# Patient Record
Sex: MALE | Race: BLACK OR AFRICAN AMERICAN | NOT HISPANIC OR LATINO | URBAN - METROPOLITAN AREA
[De-identification: names, ages, dates, MRNs, and addresses within clinical notes are randomized per-mention and may not be internally consistent; named-entity substitution may affect disease eponyms.]

---

## 2017-07-02 ENCOUNTER — EMERGENCY (EMERGENCY)
Facility: HOSPITAL | Age: 57
LOS: 0 days | Discharge: HOME | End: 2017-07-02
Attending: EMERGENCY MEDICINE
Payer: COMMERCIAL

## 2017-07-02 VITALS
HEART RATE: 93 BPM | WEIGHT: 175.05 LBS | SYSTOLIC BLOOD PRESSURE: 151 MMHG | HEIGHT: 69 IN | DIASTOLIC BLOOD PRESSURE: 108 MMHG | TEMPERATURE: 99 F | RESPIRATION RATE: 24 BRPM | OXYGEN SATURATION: 94 %

## 2017-07-02 VITALS
SYSTOLIC BLOOD PRESSURE: 112 MMHG | HEART RATE: 83 BPM | DIASTOLIC BLOOD PRESSURE: 68 MMHG | RESPIRATION RATE: 21 BRPM | OXYGEN SATURATION: 96 % | TEMPERATURE: 98 F

## 2017-07-02 LAB
ALBUMIN SERPL ELPH-MCNC: 3.6 G/DL — SIGNIFICANT CHANGE UP (ref 3.3–5)
ALP SERPL-CCNC: 53 U/L — SIGNIFICANT CHANGE UP (ref 40–120)
ALT FLD-CCNC: 39 U/L — SIGNIFICANT CHANGE UP (ref 12–78)
ANION GAP SERPL CALC-SCNC: 10 MMOL/L — SIGNIFICANT CHANGE UP (ref 5–17)
AST SERPL-CCNC: 38 U/L — HIGH (ref 15–37)
BASOPHILS # BLD AUTO: 0.1 K/UL — SIGNIFICANT CHANGE UP (ref 0–0.2)
BASOPHILS NFR BLD AUTO: 2.1 % — HIGH (ref 0–2)
BILIRUB SERPL-MCNC: 0.2 MG/DL — SIGNIFICANT CHANGE UP (ref 0.2–1.2)
BUN SERPL-MCNC: 15 MG/DL — SIGNIFICANT CHANGE UP (ref 7–23)
CALCIUM SERPL-MCNC: 8.4 MG/DL — LOW (ref 8.5–10.1)
CHLORIDE SERPL-SCNC: 109 MMOL/L — HIGH (ref 96–108)
CK MB BLD-MCNC: 2.8 % — SIGNIFICANT CHANGE UP (ref 0–3.5)
CK MB CFR SERPL CALC: 28 NG/ML — HIGH (ref 0.5–3.6)
CK SERPL-CCNC: 1001 U/L — HIGH (ref 26–308)
CO2 SERPL-SCNC: 24 MMOL/L — SIGNIFICANT CHANGE UP (ref 22–31)
CREAT SERPL-MCNC: 0.94 MG/DL — SIGNIFICANT CHANGE UP (ref 0.5–1.3)
EOSINOPHIL # BLD AUTO: 0.1 K/UL — SIGNIFICANT CHANGE UP (ref 0–0.5)
EOSINOPHIL NFR BLD AUTO: 1.4 % — SIGNIFICANT CHANGE UP (ref 0–6)
ETHANOL SERPL-MCNC: 119 MG/DL — HIGH (ref 0–10)
GLUCOSE SERPL-MCNC: 122 MG/DL — HIGH (ref 70–99)
HCT VFR BLD CALC: 38.7 % — LOW (ref 39–50)
HGB BLD-MCNC: 13.1 G/DL — SIGNIFICANT CHANGE UP (ref 13–17)
LACTATE SERPL-SCNC: 2.3 MMOL/L — HIGH (ref 0.7–2)
LYMPHOCYTES # BLD AUTO: 1.3 K/UL — SIGNIFICANT CHANGE UP (ref 1–3.3)
LYMPHOCYTES # BLD AUTO: 31.4 % — SIGNIFICANT CHANGE UP (ref 13–44)
MAGNESIUM SERPL-MCNC: 3.1 MG/DL — HIGH (ref 1.6–2.6)
MCHC RBC-ENTMCNC: 29.5 PG — SIGNIFICANT CHANGE UP (ref 27–34)
MCHC RBC-ENTMCNC: 33.9 GM/DL — SIGNIFICANT CHANGE UP (ref 32–36)
MCV RBC AUTO: 87 FL — SIGNIFICANT CHANGE UP (ref 80–100)
MONOCYTES # BLD AUTO: 0.2 K/UL — SIGNIFICANT CHANGE UP (ref 0–0.9)
MONOCYTES NFR BLD AUTO: 5.5 % — SIGNIFICANT CHANGE UP (ref 2–14)
NEUTROPHILS # BLD AUTO: 2.6 K/UL — SIGNIFICANT CHANGE UP (ref 1.8–7.4)
NEUTROPHILS NFR BLD AUTO: 59.7 % — SIGNIFICANT CHANGE UP (ref 43–77)
PLATELET # BLD AUTO: 203 K/UL — SIGNIFICANT CHANGE UP (ref 150–400)
POTASSIUM SERPL-MCNC: 3.5 MMOL/L — SIGNIFICANT CHANGE UP (ref 3.5–5.3)
POTASSIUM SERPL-SCNC: 3.5 MMOL/L — SIGNIFICANT CHANGE UP (ref 3.5–5.3)
PROT SERPL-MCNC: 6.6 GM/DL — SIGNIFICANT CHANGE UP (ref 6–8.3)
RBC # BLD: 4.45 M/UL — SIGNIFICANT CHANGE UP (ref 4.2–5.8)
RBC # FLD: 14.4 % — SIGNIFICANT CHANGE UP (ref 11–15)
SODIUM SERPL-SCNC: 143 MMOL/L — SIGNIFICANT CHANGE UP (ref 135–145)
TROPONIN I SERPL-MCNC: 0.02 NG/ML — SIGNIFICANT CHANGE UP (ref 0.01–0.04)
WBC # BLD: 4.3 K/UL — SIGNIFICANT CHANGE UP (ref 3.8–10.5)
WBC # FLD AUTO: 4.3 K/UL — SIGNIFICANT CHANGE UP (ref 3.8–10.5)

## 2017-07-02 PROCEDURE — 71020: CPT | Mod: 26

## 2017-07-02 PROCEDURE — 99285 EMERGENCY DEPT VISIT HI MDM: CPT

## 2017-07-02 RX ORDER — RANIBIZUMAB 10 MG/ML
0 INJECTION, SOLUTION INTRAVITREAL
Qty: 0 | Refills: 0 | COMMUNITY

## 2017-07-02 RX ORDER — MAGNESIUM SULFATE 500 MG/ML
2 VIAL (ML) INJECTION ONCE
Qty: 0 | Refills: 0 | Status: COMPLETED | OUTPATIENT
Start: 2017-07-02 | End: 2017-07-02

## 2017-07-02 RX ORDER — IPRATROPIUM/ALBUTEROL SULFATE 18-103MCG
3 AEROSOL WITH ADAPTER (GRAM) INHALATION
Qty: 0 | Refills: 0 | Status: COMPLETED | OUTPATIENT
Start: 2017-07-02 | End: 2017-07-02

## 2017-07-02 RX ORDER — SODIUM CHLORIDE 9 MG/ML
1000 INJECTION INTRAMUSCULAR; INTRAVENOUS; SUBCUTANEOUS ONCE
Qty: 0 | Refills: 0 | Status: COMPLETED | OUTPATIENT
Start: 2017-07-02 | End: 2017-07-02

## 2017-07-02 RX ADMIN — Medication 50 GRAM(S): at 08:43

## 2017-07-02 RX ADMIN — Medication 3 MILLILITER(S): at 08:50

## 2017-07-02 RX ADMIN — SODIUM CHLORIDE 1000 MILLILITER(S): 9 INJECTION INTRAMUSCULAR; INTRAVENOUS; SUBCUTANEOUS at 08:46

## 2017-07-02 RX ADMIN — Medication 3 MILLILITER(S): at 08:46

## 2017-07-02 NOTE — ED ADULT NURSE NOTE - OBJECTIVE STATEMENT
Patient alert, stating that he has had shortness of breath x 4 hours. Brother called EMS and brought him to the ER.

## 2017-07-02 NOTE — ED ADULT TRIAGE NOTE - CHIEF COMPLAINT QUOTE
Shortness of breath x couples of hours ago ,h/o asthma , epi, solumedrol ivp and 1 treatment given by ems. wheezing, using accessories muscles

## 2017-07-02 NOTE — ED PROVIDER NOTE - OBJECTIVE STATEMENT
Pertinent PMH/PSH/FHx/SHx and Review of Systems contained within:  Patient presents to the ED for SOB with asthma exacerbation.  PMH of EtOH, asthma (on advair, never intubated), and chronic sinusitis.  VSS.  BIBA with solumedrol and nebs.  no other concerns.  Speaking in full sentences.  lying nearly supine without distress.  Non toxic.  Well appearing. No aggravating or relieving factors. No other pertinent PMH.  No other pertinent PSH.  No other pertinent FHx.  Patient denies EtOH/tobacco/illicit substance use. No fever/chills, No photophobia/eye pain/changes in vision, No ear pain/sore throat/dysphagia, No chest pain/palpitations, no cough/stridor, No abdominal pain, No N/V/D, no dysuria/frequency/discharge, No neck/back pain, no rash, no changes in neurological status/function.

## 2017-07-02 NOTE — ED PROVIDER NOTE - MEDICAL DECISION MAKING DETAILS
Patient presnet with asthma exacerbation.  Lab values do not require emergent intervention. Xrays demonstrate no acute pathology. Patient feeling well.  smiling.  family bedside.  wants to d/c.  will f/u.  Patient given prescription medications for their condition and advised to take them as prescribed and check with their Primary Care Provider if any questions arise. Discussed results and outcome of testing with the patient.  Patient advised to please follow up with their primary care doctor within the next 24 hours and return to the Emergency Department for worsening symptoms or any other concerns.  Patient advised that their doctor may call  to follow up on the specific results of the tests performed today in the emergency department.

## 2017-07-02 NOTE — ED PROVIDER NOTE - PHYSICAL EXAMINATION
Gen: Alert, NAD Head: NC, AT, PERRL, EOMI, normal lids/conjunctiva ENT: normal hearing, patent oropharynx without erythema/exudate, uvula midline Neck: +supple, no tenderness/meningismus/JVD, +Trachea midline Pulm: Bilateral BS, wheeze and crackles LLL but otherwise normal resp effort, no stridor/retractions CV: RRR, no M/R/G, +dist pulses Abd: soft, NT/ND, +BS, no hepatosplenomegaly Mskel: no edema/erythema/cyanosis Skin: no rash Neuro: AAOx3, no sensory/motor deficits, CN 2-12 intact

## 2017-07-03 DIAGNOSIS — Z91.010 ALLERGY TO PEANUTS: ICD-10-CM

## 2017-07-03 DIAGNOSIS — F10.10 ALCOHOL ABUSE, UNCOMPLICATED: ICD-10-CM

## 2017-07-03 DIAGNOSIS — J45.901 UNSPECIFIED ASTHMA WITH (ACUTE) EXACERBATION: ICD-10-CM

## 2020-12-03 NOTE — ED ADULT TRIAGE NOTE - BSA (M2)
FUNCTIONAL MOBILITY:  Assistive Device: Rolling Walker and O2 (6L)  Assist Level:  Contact Guard Assistance. Distance:   Completed functional mobility within unit hallway ~60ft x2 at slow pace, no LOB noted. Pt requires max cues for walker safety to keep within MITCH reaching destination- extended seated rest break after trial of mobility, mod fatigue noted- O2 and HR WNL throughout. ADDITIONAL ACTIVITIES:  Patient identified a personal goal to increase UB strength and improve overall endurance so they can complete their toilet & shower transfers; skilled edu on UE strengthening. Completed BUE exercises x10-15 reps x1 sets using mod resistance band in all joints/planes to increase strength and endurance required for ADLs. Pt required mod rest break between each exercise and min v/c for proper technique. ASSESSMENT:     Activity Tolerance:  Patient tolerance of  treatment: fair. Pt limited by fatigue. Discharge Recommendations: Subacute/Skilled Nursing Facility(if declines, recommend 24/7 assist with [facility] OT)   Equipment Recommendations: Other: continue to assess  Plan: Times per week: 5x  Current Treatment Recommendations: Strengthening, Patient/Caregiver Education & Training, Balance Training, Functional Mobility Training, Endurance Training, Safety Education & Training, Self-Care / ADL    Patient Education  Patient Education: Home Exercise Program, Importance of Increasing Activity, Assistive Device Safety and Safety with transfers and mobility.     Goals  Short term goals  Time Frame for Short term goals: by discharge  Short term goal 1: Pt to complete LB ADL tasks using LHAE with SBA to be indep wtih dressing tasks  Short term goal 2: Pt to complete toileting tasks and tranfser wit CGA and no vcs for safety  Short term goal 3: Pt to navigate to/from bathroom using RW with CGA and no increase in SOB or decrease in O2 sats during to complete ADL tasks  Short term goal 4: pt to increase standing tolerance > 3 min with CGA and 0-1 UE support to allow for completion of ADL tasks    Following session, patient left in safe position with all fall risk precautions in place. 1.95

## 2022-09-13 PROBLEM — J45.909 UNSPECIFIED ASTHMA, UNCOMPLICATED: Chronic | Status: ACTIVE | Noted: 2017-07-02

## 2022-09-14 PROBLEM — Z00.00 ENCOUNTER FOR PREVENTIVE HEALTH EXAMINATION: Status: ACTIVE | Noted: 2022-09-14

## 2022-09-20 ENCOUNTER — APPOINTMENT (OUTPATIENT)
Dept: ORTHOPEDIC SURGERY | Facility: CLINIC | Age: 62
End: 2022-09-20

## 2022-09-20 DIAGNOSIS — M51.36 OTHER INTERVERTEBRAL DISC DEGENERATION, LUMBAR REGION: ICD-10-CM

## 2022-09-20 DIAGNOSIS — J45.909 UNSPECIFIED ASTHMA, UNCOMPLICATED: ICD-10-CM

## 2022-09-20 DIAGNOSIS — M25.512 PAIN IN LEFT SHOULDER: ICD-10-CM

## 2022-09-20 PROCEDURE — 99204 OFFICE O/P NEW MOD 45 MIN: CPT

## 2022-09-20 PROCEDURE — 72170 X-RAY EXAM OF PELVIS: CPT

## 2022-09-20 PROCEDURE — 72110 X-RAY EXAM L-2 SPINE 4/>VWS: CPT

## 2022-09-20 PROCEDURE — 73030 X-RAY EXAM OF SHOULDER: CPT | Mod: LT

## 2022-09-20 RX ORDER — DICLOFENAC SODIUM AND MISOPROSTOL 75; 200 MG/1; UG/1
TABLET, FILM COATED ORAL
Refills: 0 | Status: ACTIVE | COMMUNITY

## 2022-09-20 RX ORDER — TIZANIDINE HYDROCHLORIDE 4 MG/1
4 CAPSULE ORAL
Refills: 0 | Status: ACTIVE | COMMUNITY

## 2022-09-20 NOTE — ASSESSMENT
[FreeTextEntry1] : Unclear clinical picture\par Severe pain and limited L shoulder ROM concerning for RTC tear, though also distal weakness not explained by RTC pathology alone\par Also has potentially abnormal reflexes\par Differential including plexitis vs concurrent cervical HNP and RTC tear, plus LE radic\par MRI C spine to eval for critical stenosis given severe LUE weakness\par L shoulder MRI to eval for concurrent confounding L RTC tear\par Lumbar MRI to eval for HNP\par EMG to help differentiate LUE symptoms, may also have underlying CuTS

## 2022-09-20 NOTE — IMAGING
[Outside films reviewed] : Outside films reviewed [Straightening consistent with spasm] : Straightening consistent with spasm [de-identified] : CSPINE\par Inspection: No rash or ecchymosis\par Palpation: No spasm in traps, rhomboids, paracervicals\par ROM: diminished all planes\par Strength: 5/5 R deltoid, biceps, triceps, wrist flexors, wrist extensors, , abductors\par LUE delt 3/5, bi 4/5, tri 3/5, WE 2/5, WF 1/5,  1/5, abductions 1/5\par Sensation: Sensation present to light touch bilateral C5-T1 distributions\par Reflexes: + Gilbert's bilaterally \par \par L Shoulder-\par Palpation: Lateral tenderness to palpation\par ROM: FF AROM 40, FF PROM 80 with pain and guarding\par Strength: severe pain with rotator cuff testing\par Provocative maneuvers: Positive impingement testing\par \par + Tinel's at L>R elbows\par \par LSPINE\par Palpation: No tenderness to palpation or spasm in bilateral thoracic and lumbar paraspinal musculature, no SI joint tenderness to palpation\par ROM: diminished all planeds\par Strength: 5/5 bilateral hip flexors, knee extensors, ankle dorsiflexors, EHL, ankle plantarflexors\par Sensation: Sensation present to light touch bilateral L2-S1 distributions\par Provocative maneuvers: + bilateral straight leg raise   [Facet arthropathy] : Facet arthropathy [Disc space narrowing] : Disc space narrowing [Left] : left shoulder [There are no fractures, subluxations or dislocations. No significant abnormalities are seen] : There are no fractures, subluxations or dislocations. No significant abnormalities are seen [FreeTextEntry1] : Congenitally short pedicles

## 2022-09-20 NOTE — HISTORY OF PRESENT ILLNESS
[Neck] : neck [Lower back] : lower back [Sudden] : sudden [10] : 10 [Dull/Aching] : dull/aching [Sharp] : sharp [Tightness] : tightness [Constant] : constant [de-identified] : 9/20/22- [facility]. Neck pain with burning pain localized to the L shoulder with 'electric' shocks down the BUE to the digits. Difficulty raising LUE as well and pain with ROM. Also LBP with pain radiating down the BLE to the toes with N/T. Started 2 weeks ago pt was pulling weeds and woke up the next day with pain down the arms and down the legs. No BB dysfunction. \par MDP, nsaids, and muscle relaxant from PCP w/o relief.  [] : no [FreeTextEntry6] : numbness  [FreeTextEntry7] : down the arms and legs

## 2022-10-12 ENCOUNTER — EMERGENCY (EMERGENCY)
Facility: HOSPITAL | Age: 62
LOS: 0 days | Discharge: HOME | End: 2022-10-12
Attending: EMERGENCY MEDICINE

## 2022-10-12 VITALS
TEMPERATURE: 98 F | HEART RATE: 81 BPM | SYSTOLIC BLOOD PRESSURE: 139 MMHG | RESPIRATION RATE: 15 BRPM | DIASTOLIC BLOOD PRESSURE: 88 MMHG | OXYGEN SATURATION: 96 %

## 2022-10-12 VITALS
DIASTOLIC BLOOD PRESSURE: 102 MMHG | WEIGHT: 184.09 LBS | HEART RATE: 94 BPM | SYSTOLIC BLOOD PRESSURE: 177 MMHG | TEMPERATURE: 98 F | HEIGHT: 69 IN | RESPIRATION RATE: 14 BRPM | OXYGEN SATURATION: 100 %

## 2022-10-12 DIAGNOSIS — M54.50 LOW BACK PAIN, UNSPECIFIED: ICD-10-CM

## 2022-10-12 DIAGNOSIS — R20.0 ANESTHESIA OF SKIN: ICD-10-CM

## 2022-10-12 DIAGNOSIS — Z91.010 ALLERGY TO PEANUTS: ICD-10-CM

## 2022-10-12 DIAGNOSIS — M79.604 PAIN IN RIGHT LEG: ICD-10-CM

## 2022-10-12 DIAGNOSIS — M54.6 PAIN IN THORACIC SPINE: ICD-10-CM

## 2022-10-12 DIAGNOSIS — M79.602 PAIN IN LEFT ARM: ICD-10-CM

## 2022-10-12 DIAGNOSIS — M79.89 OTHER SPECIFIED SOFT TISSUE DISORDERS: ICD-10-CM

## 2022-10-12 LAB
ALBUMIN SERPL ELPH-MCNC: 3.7 G/DL — SIGNIFICANT CHANGE UP (ref 3.3–5)
ALP SERPL-CCNC: 58 U/L — SIGNIFICANT CHANGE UP (ref 40–120)
ALT FLD-CCNC: 34 U/L — SIGNIFICANT CHANGE UP (ref 12–78)
ANION GAP SERPL CALC-SCNC: 7 MMOL/L — SIGNIFICANT CHANGE UP (ref 5–17)
AST SERPL-CCNC: 30 U/L — SIGNIFICANT CHANGE UP (ref 15–37)
BASOPHILS # BLD AUTO: 0.06 K/UL — SIGNIFICANT CHANGE UP (ref 0–0.2)
BASOPHILS NFR BLD AUTO: 1.3 % — SIGNIFICANT CHANGE UP (ref 0–2)
BILIRUB SERPL-MCNC: 0.4 MG/DL — SIGNIFICANT CHANGE UP (ref 0.2–1.2)
BUN SERPL-MCNC: 15 MG/DL — SIGNIFICANT CHANGE UP (ref 7–23)
CALCIUM SERPL-MCNC: 9.4 MG/DL — SIGNIFICANT CHANGE UP (ref 8.5–10.1)
CHLORIDE SERPL-SCNC: 105 MMOL/L — SIGNIFICANT CHANGE UP (ref 96–108)
CK SERPL-CCNC: 585 U/L — HIGH (ref 26–308)
CO2 SERPL-SCNC: 30 MMOL/L — SIGNIFICANT CHANGE UP (ref 22–31)
CREAT SERPL-MCNC: 1.08 MG/DL — SIGNIFICANT CHANGE UP (ref 0.5–1.3)
EGFR: 78 ML/MIN/1.73M2 — SIGNIFICANT CHANGE UP
EOSINOPHIL # BLD AUTO: 0.13 K/UL — SIGNIFICANT CHANGE UP (ref 0–0.5)
EOSINOPHIL NFR BLD AUTO: 2.8 % — SIGNIFICANT CHANGE UP (ref 0–6)
GLUCOSE SERPL-MCNC: 97 MG/DL — SIGNIFICANT CHANGE UP (ref 70–99)
HCT VFR BLD CALC: 41.9 % — SIGNIFICANT CHANGE UP (ref 39–50)
HGB BLD-MCNC: 13.5 G/DL — SIGNIFICANT CHANGE UP (ref 13–17)
IMM GRANULOCYTES NFR BLD AUTO: 0.2 % — SIGNIFICANT CHANGE UP (ref 0–0.9)
LYMPHOCYTES # BLD AUTO: 2.14 K/UL — SIGNIFICANT CHANGE UP (ref 1–3.3)
LYMPHOCYTES # BLD AUTO: 46.7 % — HIGH (ref 13–44)
MCHC RBC-ENTMCNC: 28.6 PG — SIGNIFICANT CHANGE UP (ref 27–34)
MCHC RBC-ENTMCNC: 32.2 G/DL — SIGNIFICANT CHANGE UP (ref 32–36)
MCV RBC AUTO: 88.8 FL — SIGNIFICANT CHANGE UP (ref 80–100)
MONOCYTES # BLD AUTO: 0.43 K/UL — SIGNIFICANT CHANGE UP (ref 0–0.9)
MONOCYTES NFR BLD AUTO: 9.4 % — SIGNIFICANT CHANGE UP (ref 2–14)
NEUTROPHILS # BLD AUTO: 1.81 K/UL — SIGNIFICANT CHANGE UP (ref 1.8–7.4)
NEUTROPHILS NFR BLD AUTO: 39.6 % — LOW (ref 43–77)
NRBC # BLD: 0 /100 WBCS — SIGNIFICANT CHANGE UP (ref 0–0)
PLATELET # BLD AUTO: 288 K/UL — SIGNIFICANT CHANGE UP (ref 150–400)
POTASSIUM SERPL-MCNC: 3.9 MMOL/L — SIGNIFICANT CHANGE UP (ref 3.5–5.3)
POTASSIUM SERPL-SCNC: 3.9 MMOL/L — SIGNIFICANT CHANGE UP (ref 3.5–5.3)
PROT SERPL-MCNC: 7.5 GM/DL — SIGNIFICANT CHANGE UP (ref 6–8.3)
RBC # BLD: 4.72 M/UL — SIGNIFICANT CHANGE UP (ref 4.2–5.8)
RBC # FLD: 14.4 % — SIGNIFICANT CHANGE UP (ref 10.3–14.5)
SODIUM SERPL-SCNC: 142 MMOL/L — SIGNIFICANT CHANGE UP (ref 135–145)
WBC # BLD: 4.58 K/UL — SIGNIFICANT CHANGE UP (ref 3.8–10.5)
WBC # FLD AUTO: 4.58 K/UL — SIGNIFICANT CHANGE UP (ref 3.8–10.5)

## 2022-10-12 PROCEDURE — 93010 ELECTROCARDIOGRAM REPORT: CPT

## 2022-10-12 PROCEDURE — 99284 EMERGENCY DEPT VISIT MOD MDM: CPT

## 2022-10-12 RX ORDER — METHOCARBAMOL 500 MG/1
2 TABLET, FILM COATED ORAL
Qty: 30 | Refills: 0
Start: 2022-10-12 | End: 2022-10-16

## 2022-10-12 RX ORDER — IBUPROFEN 200 MG
1 TABLET ORAL
Qty: 20 | Refills: 0
Start: 2022-10-12 | End: 2022-10-16

## 2022-10-12 RX ORDER — KETOROLAC TROMETHAMINE 30 MG/ML
30 SYRINGE (ML) INJECTION ONCE
Refills: 0 | Status: DISCONTINUED | OUTPATIENT
Start: 2022-10-12 | End: 2022-10-12

## 2022-10-12 RX ORDER — METHOCARBAMOL 500 MG/1
1500 TABLET, FILM COATED ORAL ONCE
Refills: 0 | Status: COMPLETED | OUTPATIENT
Start: 2022-10-12 | End: 2022-10-12

## 2022-10-12 RX ORDER — SODIUM CHLORIDE 9 MG/ML
1000 INJECTION INTRAMUSCULAR; INTRAVENOUS; SUBCUTANEOUS ONCE
Refills: 0 | Status: COMPLETED | OUTPATIENT
Start: 2022-10-12 | End: 2022-10-12

## 2022-10-12 RX ORDER — MIDAZOLAM HYDROCHLORIDE 1 MG/ML
2 INJECTION, SOLUTION INTRAMUSCULAR; INTRAVENOUS ONCE
Refills: 0 | Status: DISCONTINUED | OUTPATIENT
Start: 2022-10-12 | End: 2022-10-12

## 2022-10-12 RX ORDER — DEXAMETHASONE 0.5 MG/5ML
6 ELIXIR ORAL ONCE
Refills: 0 | Status: COMPLETED | OUTPATIENT
Start: 2022-10-12 | End: 2022-10-12

## 2022-10-12 RX ADMIN — METHOCARBAMOL 1500 MILLIGRAM(S): 500 TABLET, FILM COATED ORAL at 09:22

## 2022-10-12 RX ADMIN — Medication 6 MILLIGRAM(S): at 09:21

## 2022-10-12 RX ADMIN — SODIUM CHLORIDE 1000 MILLILITER(S): 9 INJECTION INTRAMUSCULAR; INTRAVENOUS; SUBCUTANEOUS at 11:11

## 2022-10-12 RX ADMIN — Medication 30 MILLIGRAM(S): at 09:22

## 2022-10-12 RX ADMIN — MIDAZOLAM HYDROCHLORIDE 2 MILLIGRAM(S): 1 INJECTION, SOLUTION INTRAMUSCULAR; INTRAVENOUS at 12:47

## 2022-10-12 NOTE — ED PROVIDER NOTE - PATIENT PORTAL LINK FT
You can access the FollowMyHealth Patient Portal offered by [facility] by registering at the following website: [email or web address]. By joining [facility]’s FollowMyHealth portal, you will also be able to view your health information using other applications (apps) compatible with our system.

## 2022-10-12 NOTE — ED PROVIDER NOTE - CLINICAL SUMMARY MEDICAL DECISION MAKING FREE TEXT BOX
62 year old man with 1 month of worsening sensory changes and muscle wasting in LUE; increase fasciculations of lower extremities and 2 weeks of severe pain.  Will give medications for pain, toradol, robaxin and decadron.  Patient has follow-up with Orthopedics and states primary care doctor in process of having patient see neurology.  Low suspicion for spinal abscess will get MRI to also r/o mass.

## 2022-10-12 NOTE — ED PROVIDER NOTE - NSFOLLOWUPINSTRUCTIONS_ED_ALL_ED_FT
1) Take prescription medication as instructed  2) Follow up with your primary care doctor  3) Return to the ER for worsening or concerning symptoms

## 2022-10-12 NOTE — ED ADULT NURSE NOTE - CHIEF COMPLAINT QUOTE
hx of nerve damage and asthma S/P fall reports RLE " became weak "  denies head injury and LOC. reports LUE numb and edematous x 1 month.  vaccinated x 3 w/ Pfizer

## 2022-10-12 NOTE — ED PROVIDER NOTE - OBJECTIVE STATEMENT
This patient is a 62 year old man who presents to the ER c/o numbness and swelling of his left arm for the past month and weakness of his right leg.  He reports that he was told that he has nerve damage by his doctors.  Over the month the symptoms have been getting worse and include sensory changes.  He states that his arm can get very cold to touch and sometimes the right leg may not be able to accurately feel hot temperatures.  He reports increasing fasiculations and spasms.  His brother who is at bedside reports the patient having frequent falls due to his weakness.  This morning patient reports that he had a spasm and weakness and fell which prompted him to finally be seen.  Patient requesting to have his MRIs done in the ER.  He is being seen by a spine specialist and has a prescription to have his MRI as outpatient but his soonest appointment is in 2 weeks.  No fever, no urinary retention, no fecal incontinence.    62 year old man with 1 month of worsening sensory changes and muscle wasting in LUE; increase fasciculations of lower extremities and 2 weeks of severe pain. This patient is a 62 year old man who presents to the ER c/o numbness and swelling of his left arm for the past month and weakness of his right leg.  He reports that he was told that he has nerve damage by his doctors.  Over the month the symptoms have been getting worse and include sensory changes.  He states that his arm can get very cold to touch and sometimes the right leg may not be able to accurately feel hot temperatures.  He reports increasing fasciculations and spasms.  His brother who is at bedside reports the patient having frequent falls due to his weakness.  This morning patient reports that he had a spasm and weakness and fell which prompted him to finally be seen.  Patient requesting to have his MRIs done in the ER.  He is being seen by a spine specialist and has a prescription to have his MRI as outpatient but his soonest appointment is in 2 weeks.  No fever, no urinary retention, no fecal incontinence.    62 year old man with 1 month of worsening sensory changes and muscle wasting in LUE; increase fasciculations of lower extremities and 2 weeks of severe pain.

## 2022-10-12 NOTE — ED ADULT TRIAGE NOTE - CHIEF COMPLAINT QUOTE
hx of nerve damage and asthma S/P fall reports RLE " began weak "  denies head injury and LOC. reports LUE numb and edematous x 1 month.  vaccinated x 3 w/ Pfizer hx of nerve damage and asthma S/P fall reports RLE " became weak "  denies head injury and LOC. reports LUE numb and edematous x 1 month.  vaccinated x 3 w/ Pfizer

## 2022-10-12 NOTE — ED ADULT TRIAGE NOTE - GLASGOW COMA SCALE: SCORE, MLM
Height (cm): 170.18 (06-29)  Weight (kg): 64.8 (06-29)  BMI (kg/m2): 22.4 (06-29)  IBW:  61.4 kg +/- 10%       % IBW: 105%      UBW: unknown     %UBW: unknown 15

## 2022-10-12 NOTE — ED ADULT NURSE REASSESSMENT NOTE - NS ED NURSE REASSESS COMMENT FT1
call received from MRI by Sherif. As per MRI technician, patient wants to be asleep for the MRI. Discussed concerns with Dr. Stewart. versed 2mg ordered for MRI. Patient refuses to be sedated and wants to be fully asleep. Discussed concerns with patient and educated. Patient will follow up outpatient for MRI as per Dr. Stewart.
Received patient from overnight RN found laying supine, head elevated, c/o LLE weakness x 1 month, with spinal pain.  Has been seen by PCP, advised needs MRI to further eval for nerve damage.  Remains in bed, awaiting MD eval.

## 2022-10-12 NOTE — ED ADULT NURSE NOTE - OBJECTIVE STATEMENT
multiple complains. pt reports trip & fall today. denies LOC or hitting head. hx of nerve damage and asthma. reports progressively worsening gait. reports LUE numbness and edematous x 1 month +cold/warm sensation in right leg and Left hand. pt scheduled for MRI sometime Nov but states claustrophobic and cannot wit long for that appointment.

## 2022-10-22 ENCOUNTER — INPATIENT (INPATIENT)
Facility: HOSPITAL | Age: 62
LOS: 1 days | Discharge: ANOTHER HOSPITAL | End: 2022-10-24
Attending: HOSPITALIST | Admitting: HOSPITALIST

## 2022-10-22 VITALS
DIASTOLIC BLOOD PRESSURE: 84 MMHG | RESPIRATION RATE: 17 BRPM | HEIGHT: 69 IN | WEIGHT: 184.09 LBS | TEMPERATURE: 99 F | SYSTOLIC BLOOD PRESSURE: 199 MMHG | HEART RATE: 119 BPM | OXYGEN SATURATION: 100 %

## 2022-10-22 PROCEDURE — 99285 EMERGENCY DEPT VISIT HI MDM: CPT

## 2022-10-23 ENCOUNTER — TRANSCRIPTION ENCOUNTER (OUTPATIENT)
Age: 62
End: 2022-10-23

## 2022-10-23 VITALS
RESPIRATION RATE: 16 BRPM | SYSTOLIC BLOOD PRESSURE: 126 MMHG | TEMPERATURE: 98 F | DIASTOLIC BLOOD PRESSURE: 73 MMHG | HEART RATE: 92 BPM | OXYGEN SATURATION: 97 %

## 2022-10-23 LAB
ALBUMIN SERPL ELPH-MCNC: 3.5 G/DL — SIGNIFICANT CHANGE UP (ref 3.3–5)
ALP SERPL-CCNC: 43 U/L — SIGNIFICANT CHANGE UP (ref 40–120)
ALT FLD-CCNC: 32 U/L — SIGNIFICANT CHANGE UP (ref 12–78)
ANION GAP SERPL CALC-SCNC: 6 MMOL/L — SIGNIFICANT CHANGE UP (ref 5–17)
APPEARANCE UR: CLEAR — SIGNIFICANT CHANGE UP
APTT BLD: 24.4 SEC — LOW (ref 27.5–35.5)
AST SERPL-CCNC: 31 U/L — SIGNIFICANT CHANGE UP (ref 15–37)
BASOPHILS # BLD AUTO: 0.04 K/UL — SIGNIFICANT CHANGE UP (ref 0–0.2)
BASOPHILS NFR BLD AUTO: 0.7 % — SIGNIFICANT CHANGE UP (ref 0–2)
BILIRUB SERPL-MCNC: 0.3 MG/DL — SIGNIFICANT CHANGE UP (ref 0.2–1.2)
BILIRUB UR-MCNC: NEGATIVE — SIGNIFICANT CHANGE UP
BUN SERPL-MCNC: 14 MG/DL — SIGNIFICANT CHANGE UP (ref 7–23)
CALCIUM SERPL-MCNC: 9 MG/DL — SIGNIFICANT CHANGE UP (ref 8.5–10.1)
CHLORIDE SERPL-SCNC: 109 MMOL/L — HIGH (ref 96–108)
CO2 SERPL-SCNC: 26 MMOL/L — SIGNIFICANT CHANGE UP (ref 22–31)
COLOR SPEC: YELLOW — SIGNIFICANT CHANGE UP
CREAT SERPL-MCNC: 1.06 MG/DL — SIGNIFICANT CHANGE UP (ref 0.5–1.3)
CRP SERPL-MCNC: <3 MG/L — SIGNIFICANT CHANGE UP
CRP SERPL-MCNC: <3 MG/L — SIGNIFICANT CHANGE UP
DIFF PNL FLD: NEGATIVE — SIGNIFICANT CHANGE UP
EGFR: 79 ML/MIN/1.73M2 — SIGNIFICANT CHANGE UP
EOSINOPHIL # BLD AUTO: 0.05 K/UL — SIGNIFICANT CHANGE UP (ref 0–0.5)
EOSINOPHIL NFR BLD AUTO: 0.9 % — SIGNIFICANT CHANGE UP (ref 0–6)
ERYTHROCYTE [SEDIMENTATION RATE] IN BLOOD: 3 MM/HR — SIGNIFICANT CHANGE UP (ref 0–20)
ERYTHROCYTE [SEDIMENTATION RATE] IN BLOOD: 5 MM/HR — SIGNIFICANT CHANGE UP (ref 0–20)
GLUCOSE SERPL-MCNC: 121 MG/DL — HIGH (ref 70–99)
GLUCOSE UR QL: NEGATIVE MG/DL — SIGNIFICANT CHANGE UP
HCT VFR BLD CALC: 37.8 % — LOW (ref 39–50)
HGB BLD-MCNC: 12.3 G/DL — LOW (ref 13–17)
IMM GRANULOCYTES NFR BLD AUTO: 0.4 % — SIGNIFICANT CHANGE UP (ref 0–0.9)
INR BLD: 0.99 RATIO — SIGNIFICANT CHANGE UP (ref 0.88–1.16)
KETONES UR-MCNC: NEGATIVE — SIGNIFICANT CHANGE UP
LEUKOCYTE ESTERASE UR-ACNC: NEGATIVE — SIGNIFICANT CHANGE UP
LYMPHOCYTES # BLD AUTO: 1.14 K/UL — SIGNIFICANT CHANGE UP (ref 1–3.3)
LYMPHOCYTES # BLD AUTO: 20.9 % — SIGNIFICANT CHANGE UP (ref 13–44)
MCHC RBC-ENTMCNC: 28.5 PG — SIGNIFICANT CHANGE UP (ref 27–34)
MCHC RBC-ENTMCNC: 32.5 G/DL — SIGNIFICANT CHANGE UP (ref 32–36)
MCV RBC AUTO: 87.7 FL — SIGNIFICANT CHANGE UP (ref 80–100)
MONOCYTES # BLD AUTO: 0.13 K/UL — SIGNIFICANT CHANGE UP (ref 0–0.9)
MONOCYTES NFR BLD AUTO: 2.4 % — SIGNIFICANT CHANGE UP (ref 2–14)
NEUTROPHILS # BLD AUTO: 4.07 K/UL — SIGNIFICANT CHANGE UP (ref 1.8–7.4)
NEUTROPHILS NFR BLD AUTO: 74.7 % — SIGNIFICANT CHANGE UP (ref 43–77)
NITRITE UR-MCNC: NEGATIVE — SIGNIFICANT CHANGE UP
NRBC # BLD: 0 /100 WBCS — SIGNIFICANT CHANGE UP (ref 0–0)
PH UR: 7 — SIGNIFICANT CHANGE UP (ref 5–8)
PLATELET # BLD AUTO: 280 K/UL — SIGNIFICANT CHANGE UP (ref 150–400)
POTASSIUM SERPL-MCNC: 4 MMOL/L — SIGNIFICANT CHANGE UP (ref 3.5–5.3)
POTASSIUM SERPL-SCNC: 4 MMOL/L — SIGNIFICANT CHANGE UP (ref 3.5–5.3)
PROT SERPL-MCNC: 7 GM/DL — SIGNIFICANT CHANGE UP (ref 6–8.3)
PROT UR-MCNC: NEGATIVE MG/DL — SIGNIFICANT CHANGE UP
PROTHROM AB SERPL-ACNC: 11.9 SEC — SIGNIFICANT CHANGE UP (ref 10.5–13.4)
RBC # BLD: 4.31 M/UL — SIGNIFICANT CHANGE UP (ref 4.2–5.8)
RBC # FLD: 14.6 % — HIGH (ref 10.3–14.5)
SODIUM SERPL-SCNC: 141 MMOL/L — SIGNIFICANT CHANGE UP (ref 135–145)
SP GR SPEC: 1.01 — SIGNIFICANT CHANGE UP (ref 1.01–1.02)
UROBILINOGEN FLD QL: NEGATIVE MG/DL — SIGNIFICANT CHANGE UP
WBC # BLD: 5.45 K/UL — SIGNIFICANT CHANGE UP (ref 3.8–10.5)
WBC # FLD AUTO: 5.45 K/UL — SIGNIFICANT CHANGE UP (ref 3.8–10.5)

## 2022-10-23 PROCEDURE — 71045 X-RAY EXAM CHEST 1 VIEW: CPT | Mod: 26

## 2022-10-23 PROCEDURE — 72156 MRI NECK SPINE W/O & W/DYE: CPT | Mod: 26

## 2022-10-23 PROCEDURE — 93010 ELECTROCARDIOGRAM REPORT: CPT

## 2022-10-23 PROCEDURE — 72128 CT CHEST SPINE W/O DYE: CPT | Mod: 26

## 2022-10-23 PROCEDURE — 70450 CT HEAD/BRAIN W/O DYE: CPT | Mod: 26,MA

## 2022-10-23 PROCEDURE — 72100 X-RAY EXAM L-S SPINE 2/3 VWS: CPT | Mod: 26

## 2022-10-23 PROCEDURE — 70498 CT ANGIOGRAPHY NECK: CPT | Mod: 26

## 2022-10-23 PROCEDURE — 99223 1ST HOSP IP/OBS HIGH 75: CPT

## 2022-10-23 PROCEDURE — 72157 MRI CHEST SPINE W/O & W/DYE: CPT | Mod: 26

## 2022-10-23 PROCEDURE — 93971 EXTREMITY STUDY: CPT | Mod: 26

## 2022-10-23 PROCEDURE — 72158 MRI LUMBAR SPINE W/O & W/DYE: CPT | Mod: 26

## 2022-10-23 PROCEDURE — 93010 ELECTROCARDIOGRAM REPORT: CPT | Mod: 77

## 2022-10-23 PROCEDURE — 72131 CT LUMBAR SPINE W/O DYE: CPT | Mod: 26,MA

## 2022-10-23 PROCEDURE — 72125 CT NECK SPINE W/O DYE: CPT | Mod: 26

## 2022-10-23 RX ORDER — ONDANSETRON 8 MG/1
4 TABLET, FILM COATED ORAL EVERY 8 HOURS
Refills: 0 | Status: DISCONTINUED | OUTPATIENT
Start: 2022-10-23 | End: 2022-10-24

## 2022-10-23 RX ORDER — DIAZEPAM 5 MG
2 TABLET ORAL ONCE
Refills: 0 | Status: DISCONTINUED | OUTPATIENT
Start: 2022-10-23 | End: 2022-10-23

## 2022-10-23 RX ORDER — LANOLIN ALCOHOL/MO/W.PET/CERES
3 CREAM (GRAM) TOPICAL AT BEDTIME
Refills: 0 | Status: DISCONTINUED | OUTPATIENT
Start: 2022-10-23 | End: 2022-10-24

## 2022-10-23 RX ORDER — MORPHINE SULFATE 50 MG/1
4 CAPSULE, EXTENDED RELEASE ORAL ONCE
Refills: 0 | Status: DISCONTINUED | OUTPATIENT
Start: 2022-10-23 | End: 2022-10-23

## 2022-10-23 RX ORDER — TAPENTADOL HYDROCHLORIDE 50 MG/1
0 TABLET, FILM COATED ORAL
Qty: 0 | Refills: 0 | DISCHARGE

## 2022-10-23 RX ORDER — DIAZEPAM 5 MG
5 TABLET ORAL ONCE
Refills: 0 | Status: DISCONTINUED | OUTPATIENT
Start: 2022-10-23 | End: 2022-10-23

## 2022-10-23 RX ORDER — DEXAMETHASONE 0.5 MG/5ML
10 ELIXIR ORAL ONCE
Refills: 0 | Status: COMPLETED | OUTPATIENT
Start: 2022-10-23 | End: 2022-10-23

## 2022-10-23 RX ORDER — LANOLIN ALCOHOL/MO/W.PET/CERES
1 CREAM (GRAM) TOPICAL
Qty: 0 | Refills: 0 | DISCHARGE
Start: 2022-10-23

## 2022-10-23 RX ORDER — ACETAMINOPHEN 500 MG
650 TABLET ORAL EVERY 6 HOURS
Refills: 0 | Status: DISCONTINUED | OUTPATIENT
Start: 2022-10-23 | End: 2022-10-24

## 2022-10-23 RX ORDER — KETOROLAC TROMETHAMINE 30 MG/ML
15 SYRINGE (ML) INJECTION ONCE
Refills: 0 | Status: DISCONTINUED | OUTPATIENT
Start: 2022-10-23 | End: 2022-10-23

## 2022-10-23 RX ORDER — ACETAMINOPHEN 500 MG
2 TABLET ORAL
Qty: 0 | Refills: 0 | DISCHARGE
Start: 2022-10-23

## 2022-10-23 RX ADMIN — Medication 15 MILLIGRAM(S): at 07:01

## 2022-10-23 RX ADMIN — Medication 5 MILLIGRAM(S): at 09:44

## 2022-10-23 RX ADMIN — Medication 2 MILLIGRAM(S): at 13:15

## 2022-10-23 RX ADMIN — MORPHINE SULFATE 4 MILLIGRAM(S): 50 CAPSULE, EXTENDED RELEASE ORAL at 05:08

## 2022-10-23 RX ADMIN — Medication 1 MILLIGRAM(S): at 10:57

## 2022-10-23 RX ADMIN — MORPHINE SULFATE 4 MILLIGRAM(S): 50 CAPSULE, EXTENDED RELEASE ORAL at 04:38

## 2022-10-23 RX ADMIN — Medication 10 MILLIGRAM(S): at 15:38

## 2022-10-23 RX ADMIN — Medication 5 MILLIGRAM(S): at 04:49

## 2022-10-23 NOTE — DISCHARGE NOTE PROVIDER - NSDCMRMEDTOKEN_GEN_ALL_CORE_FT
acetaminophen 325 mg oral tablet: 2 tab(s) orally every 6 hours, As needed, Temp greater or equal to 38C (100.4F), Mild Pain (1 - 3)  Advair Diskus:  inhaled   aluminum hydroxide-magnesium hydroxide 200 mg-200 mg/5 mL oral suspension: 30 milliliter(s) orally every 4 hours, As needed, Dyspepsia  melatonin 3 mg oral tablet: 1 tab(s) orally once a day (at bedtime), As needed, Insomnia

## 2022-10-23 NOTE — CONSULT NOTE ADULT - SUBJECTIVE AND OBJECTIVE BOX
Neurology consult    KEVEN [id number]     Patient is a 62y old  Male who presents with a chief complaint of Back pain (23 Oct 2022 09:33)      HPI:  "62 year old man who presents to the ER c/o numbness and swelling of his left arm for the past month and weakness of his right leg.  He reports that he was told that he has nerve damage by his doctors.  Over the month the symptoms have been getting worse and include sensory changes.  He states that his arm can get very cold to touch and sometimes the right leg may not be able to accurately feel hot temperatures.  He reports increasing fasciculations and spasms.  His brother who is at bedside reports the patient having frequent falls due to his weakness.  This morning patient reports that he had a spasm and weakness and fell which prompted him to finally be seen.  Patient requesting to have his MRIs done in the ER.  He is being seen by a spine specialist and has a prescription to have his MRI as outpatient but his soonest appointment is in 2 weeks.  No fever, no urinary retention, no fecal incontinence.    ER course:  VSS afebrile  CT Lumbar negative   (23 Oct 2022 09:27)"    Patient endorse few weaknes LUE and LLe> e weakness with leg spasms back pain    MEDICATIONS    acetaminophen     Tablet .. 650 milliGRAM(s) Oral every 6 hours PRN  aluminum hydroxide/magnesium hydroxide/simethicone Suspension 30 milliLiter(s) Oral every 4 hours PRN  LORazepam   Injectable 1 milliGRAM(s) IV Push once  melatonin 3 milliGRAM(s) Oral at bedtime PRN  ondansetron Injectable 4 milliGRAM(s) IV Push every 8 hours PRN      PMH: Asthma         PSH:     Family history: No history of dementia, strokes, or seizures   FAMILY HISTORY:      SOCIAL HISTORY:  No history of tobacco or alcohol use     Allergies    No Known Drug Allergies  peanuts (Unknown)    Intolerances        Height (cm): 175.3 (10-22 @ 22:09)  Weight (kg): 83.5 (10-22 @ 22:09)  BMI (kg/m2): 27.2 (10-22 @ 22:09)    Vital Signs Last 24 Hrs  T(C): 36.6 (23 Oct 2022 04:48), Max: 37.1 (22 Oct 2022 22:09)  T(F): 97.8 (23 Oct 2022 04:48), Max: 98.7 (22 Oct 2022 22:09)  HR: 92 (23 Oct 2022 04:48) (92 - 119)  BP: 126/73 (23 Oct 2022 04:48) (126/73 - 199/84)  BP(mean): --  RR: 16 (23 Oct 2022 04:48) (16 - 17)  SpO2: 97% (23 Oct 2022 04:48) (97% - 100%)    Parameters below as of 23 Oct 2022 04:48  Patient On (Oxygen Delivery Method): room air          On Neurological Examination:    Mental Status - Patient is alert, awake, oriented X3. fluent, names, no dysarthria no aphasia Follows commands well and able to answer questions appropriately. Mood and affect  normal    Cranial Nerves - PERRL, EOMI, VFF, V1-V3 intact, no gross facial asymmetry, tongue/uvula midline    Motor Exam -   Right upper 4+ to 5/5  Left upper deltoid 4-/5, bicps 3/5 rest 3/5  Right lower increased tone 4/5  Left lower increased tone 4/5 distal 3/5      Sensory    Intact to light touch and pinprick bilaterally    Coord: FTN intact bilaterally     Gait -  deferred    Reflexes:          2+ RUe 1+ LUE + left hoffmans  left pattealr 3+ righjt 2+ bilateral clonus in response to babinxky                                                          LABS:  CBC Full  -  ( 23 Oct 2022 04:03 )  WBC Count : 5.45 K/uL  RBC Count : 4.31 M/uL  Hemoglobin : 12.3 g/dL  Hematocrit : 37.8 %  Platelet Count - Automated : 280 K/uL  Mean Cell Volume : 87.7 fl  Mean Cell Hemoglobin : 28.5 pg  Mean Cell Hemoglobin Concentration : 32.5 g/dL  Auto Neutrophil # : 4.07 K/uL  Auto Lymphocyte # : 1.14 K/uL  Auto Monocyte # : 0.13 K/uL  Auto Eosinophil # : 0.05 K/uL  Auto Basophil # : 0.04 K/uL  Auto Neutrophil % : 74.7 %  Auto Lymphocyte % : 20.9 %  Auto Monocyte % : 2.4 %  Auto Eosinophil % : 0.9 %  Auto Basophil % : 0.7 %    Urinalysis Basic - ( 23 Oct 2022 06:29 )    Color: Yellow / Appearance: Clear / S.010 / pH: x  Gluc: x / Ketone: Negative  / Bili: Negative / Urobili: Negative mg/dL   Blood: x / Protein: Negative mg/dL / Nitrite: Negative   Leuk Esterase: Negative / RBC: x / WBC x   Sq Epi: x / Non Sq Epi: x / Bacteria: x      10-23    141  |  109<H>  |  14  ----------------------------<  121<H>  4.0   |  26  |  1.06    Ca    9.0      23 Oct 2022 04:03    TPro  7.0  /  Alb  3.5  /  TBili  0.3  /  DBili  x   /  AST  31  /  ALT  32  /  AlkPhos  43  10-23    LIVER FUNCTIONS - ( 23 Oct 2022 04:03 )  Alb: 3.5 g/dL / Pro: 7.0 gm/dL / ALK PHOS: 43 U/L / ALT: 32 U/L / AST: 31 U/L / GGT: x           Hemoglobin A1C:             RADIOLOGY  < from: CT Lumbar Spine No Cont (10.23.22 @ 02:29) >  Impression:  1. Multilevel degenerative disc disease, results in severe central   stenosis at L3-4 and L4-5.  2. No acute fracture.    --- End of Report ---      < end of copied text >  < from: CT Head No Cont (10.23.22 @ 02:30) >  IMPRESSION:  Mildly motion degraded.  No CT evidence of acute intracranial pathology.    Follow-up MRI may be obtained for more sensitive evaluation.    --- End of Report ---    < end of copied text >

## 2022-10-23 NOTE — ED PROVIDER NOTE - OBJECTIVE STATEMENT
Pertinent PMH/PSH/FHx/SHx and Review of Systems contained within:  Patient presents to the ED for back pain.  Patient has known back problems, has not been able to undergo MR imaging.  He reports numbness going down the left leg and infact says for 3 weeks he's been having numbness of both the left arm and leg which appear swollen, and says that he's been dragging the leg.  He complains of pain and spasms going down the right leg which he is moving.  He denies any right sided weakness or saddle numbness.  Chart review shows that patient had been seen on Oct 12 but no imaging done, hospital discharge course not clear.     Relevant PMHx/SHx/SOCHx/FAMH:  HTN, back pain  Patient denies EtOH/tobacco/illicit substance use. Pertinent PMH/PSH/FHx/SHx and Review of Systems contained within:  Patient presents to the ED for back pain.  Patient has known back problems, has not been able to undergo MR imaging.  He reports numbness going down the left leg and infact says for 3 weeks he's been having numbness of both the left arm and leg which appear swollen, and says that he's been dragging the leg.  He complains of pain and spasms going to the right abdomen and down the right leg which he is moving.  He denies any right sided weakness or saddle numbness.  Chart review shows that patient had been seen on Oct 12 but no imaging done, hospital discharge course not clear.     Relevant PMHx/SHx/SOCHx/FAMH:  HTN, back pain  Patient denies EtOH/tobacco/illicit substance use.    ROS: No fever/chills, No headache/photophobia/eye pain/changes in vision, No ear pain/sore throat/dysphagia, No chest pain/palpitations, no SOB/cough/wheeze/stridor, No N/V/D/melena, no dysuria/frequency/discharge, No neck pain, no rash, no changes in neurological status/function.

## 2022-10-23 NOTE — ED ADULT NURSE REASSESSMENT NOTE - NS ED NURSE REASSESS COMMENT FT1
Pt AAOx4. Pt sitting comfortably in bed with no complaints at this time. Respirations equal and unlabored. No acute distress noted at this time.

## 2022-10-23 NOTE — H&P ADULT - NSHPPHYSICALEXAM_GEN_ALL_CORE
Objective:    Vitals:  T(C): 36.6 (10-23-22 @ 04:48), Max: 37.1 (10-22-22 @ 22:09)  HR: 92 (10-23-22 @ 04:48) (92 - 119)  BP: 126/73 (10-23-22 @ 04:48) (126/73 - 199/84)  RR: 16 (10-23-22 @ 04:48) (16 - 17)  SpO2: 97% (10-23-22 @ 04:48) (97% - 100%)    Physical Exam:  General: comfortable, no acute distress, well nourished  HEENT: Atraumatic, no LAD, trachea midline, PERRLA  Cardiovascular: normal s1s2, no murmurs, gallops or fricition rubs  Pulmonary: clear to ausculation Bilaterally, no wheezing , rhonchi  Gastrointestinal: soft non tender non distended, no masses felt, no organomegally  Muscloskeletal: no lower extremity edema, intact bilateral lower extremity pulses  Neurological: CN II-12 intact. No focal weakness  Psychiatrical: normal mood, cooperative  SKIN: no rash, lesions or ulcers

## 2022-10-23 NOTE — DISCHARGE NOTE PROVIDER - NSDCCPCAREPLAN_GEN_ALL_CORE_FT
PRINCIPAL DISCHARGE DIAGNOSIS  Diagnosis: Cord compression  Assessment and Plan of Treatment:       SECONDARY DISCHARGE DIAGNOSES  Diagnosis: Weakness  Assessment and Plan of Treatment:

## 2022-10-23 NOTE — CONSULT NOTE ADULT - ASSESSMENT
61 yo male few weeks weaknessLUE LLe> RLe weaknes PE LUE weakness bilateral LE increased tone hyperreflexia clonus left andrade left babinski CT L spine degenerative changes as above    Impression r/o cervical cord compression. possible superimposed LS radic/stenosis    Urgent MRi c-spine (can also obtain MRI brain and L spine)/ Patient states he is claustrophobic and may need sedation. Can consider urgent CT Cervical spine while arrangements are made for MRIs  reassess after imaging  Communicated to primary team

## 2022-10-23 NOTE — H&P ADULT - ASSESSMENT
62 year old man who presents to the ER c/o numbness and swelling of his left arm for the past month and weakness of his right leg. Patient admitted to medicine for further evl      Lumbar Radiculopathy: check MRI, PT medrol dose pack

## 2022-10-23 NOTE — ED ADULT NURSE NOTE - OBJECTIVE STATEMENT
Pt AAOx4. 62M presents to ED with c/o constant muscle spasm to right leg, entire lumbar region, and lower abdomen x 3 weeks. Pt states about 3 weeks ago he was gardening and the next day he noted swelling to his left arm, and then spasms began. Swelling noted in left lower extremity, pt unable to bend left knee. Pt states he was seen about a week ago but was not able to get MRI due to claustrophobia. Respirations equal and unlabored. No acute distress noted at this time. Visible spasms noted to right leg. Pt able to speak in complete sentences. Pt was seen last week and given methocarbamol and ibuprofen, with little relief, but spasms return. Denies recent vaccinations, denies new medications, denies trauma.

## 2022-10-23 NOTE — H&P ADULT - HISTORY OF PRESENT ILLNESS
62 year old man who presents to the ER c/o numbness and swelling of his left arm for the past month and weakness of his right leg.  He reports that he was told that he has nerve damage by his doctors.  Over the month the symptoms have been getting worse and include sensory changes.  He states that his arm can get very cold to touch and sometimes the right leg may not be able to accurately feel hot temperatures.  He reports increasing fasciculations and spasms.  His brother who is at bedside reports the patient having frequent falls due to his weakness.  This morning patient reports that he had a spasm and weakness and fell which prompted him to finally be seen.  Patient requesting to have his MRIs done in the ER.  He is being seen by a spine specialist and has a prescription to have his MRI as outpatient but his soonest appointment is in 2 weeks.  No fever, no urinary retention, no fecal incontinence.    ER course:  VSS afebrile  CT Lumbar negative

## 2022-10-23 NOTE — DISCHARGE NOTE PROVIDER - HOSPITAL COURSE
62y Male presents with b/l UE radicular type symptoms with subjective numbness bilaterally, left wrist drop, inability to flex biceps, RLE radicular type symptoms to the knee, b/l LE muscle spasm/fasiculation as well as contracted quadriceps muscle on the left. Pt reports onset of symptoms 3 weeks ago after physical labor in his yard, including moving the grass. Pt saw Dr. Irwin and attempted to see another spine surgeon who was "not credentialed and unable to see patients. Patient was referred for Lumbar MRI, L shoulder MRI and R Hip MRI. However due to extreme claustrophobia pt is unable to undergo MRI. MR with sedation was schedule for november. Due to noted decline in symptoms, MRI was moved up to 11/3, however patient is unable to bear the symptoms any longer. Patient was seen in the ED 10/12 for similar events. Was unable to tolerate MR at that visit. Patient's symptomatology has been reportedly unchanged for approximately 2 weeks. This includes the subjective numbness bilaterally, left wrist drop, inability to flex biceps, RLE radicular type symptoms to the knee, b/l LE muscle spasm/fasiculations as well as contracted quadriceps muscle on the left. Three days prior patient reports saddle anesthesia and left foot drop, both which spontaneously resolved. Pt denies hx urinary/bowel incontinence. At baseline pt ambulates independently.     MRI C spine shows C 3 cord compression and patient being transferred to [facility] for ortho spine / further management.

## 2022-10-23 NOTE — ED ADULT NURSE NOTE - ED STAT RN HANDOFF DETAILS 2
Report given to Rishabh Lawson on [facility] Tower at [facility]/ Pt Kaitlin on RA. All belongings and medical records sent with patient.

## 2022-10-23 NOTE — H&P ADULT - NSHPLABSRESULTS_GEN_ALL_CORE
Labs:                          12.3   5.45  )-----------( 280      ( 23 Oct 2022 04:03 )             37.8     10-23    141  |  109<H>  |  14  ----------------------------<  121<H>  4.0   |  26  |  1.06    Ca    9.0      23 Oct 2022 04:03    TPro  7.0  /  Alb  3.5  /  TBili  0.3  /  DBili  x   /  AST  31  /  ALT  32  /  AlkPhos  43  10-23    LIVER FUNCTIONS - ( 23 Oct 2022 04:03 )  Alb: 3.5 g/dL / Pro: 7.0 gm/dL / ALK PHOS: 43 U/L / ALT: 32 U/L / AST: 31 U/L / GGT: x                 Active Medications  MEDICATIONS  (STANDING):    MEDICATIONS  (PRN):  acetaminophen     Tablet .. 650 milliGRAM(s) Oral every 6 hours PRN Temp greater or equal to 38C (100.4F), Mild Pain (1 - 3)  aluminum hydroxide/magnesium hydroxide/simethicone Suspension 30 milliLiter(s) Oral every 4 hours PRN Dyspepsia  melatonin 3 milliGRAM(s) Oral at bedtime PRN Insomnia  ondansetron Injectable 4 milliGRAM(s) IV Push every 8 hours PRN Nausea and/or Vomiting

## 2022-10-23 NOTE — CONSULT NOTE ADULT - SUBJECTIVE AND OBJECTIVE BOX
62y Male presents with new onset left wrist drop and right lower leg radiculopathy. Pt reports onset of symptoms 3 weeks ago after physical labor in his yard. Pt was to undergo MRI Nov 3 via Dr. Irwin however pain in his RLE became severe and now presents to the ED. Pt endorses saddle anesthesia for a period of 2 days, now resolved. Pt denies urinary/bowel incontinence. Pt endorses RLE radicular pain, numbness & tingling, along with calf spasms. In LUE pt reports wrist drop with onset of 3 weeks. Pt reported "left foot was dragging" consistent with foot drop for a period of 2 days which resolved. At baseline pt ambulates independently.     PAST MEDICAL & SURGICAL HISTORY:  Asthma        Home Medications:  Advair Diskus:  inhaled  (2017 08:30)  Nucynta:  orally  (2017 08:30)    Allergies    No Known Drug Allergies  peanuts (Unknown)    Intolerances                              12.3   5.45  )-----------( 280      ( 23 Oct 2022 04:03 )             37.8     10    141  |  109<H>  |  14  ----------------------------<  121<H>  4.0   |  26  |  1.06    Ca    9.0      23 Oct 2022 04:03    TPro  7.0  /  Alb  3.5  /  TBili  0.3  /  DBili  x   /  AST  31  /  ALT  32  /  AlkPhos  43  10-23      Urinalysis Basic - ( 23 Oct 2022 06:29 )    Color: Yellow / Appearance: Clear / S.010 / pH: x  Gluc: x / Ketone: Negative  / Bili: Negative / Urobili: Negative mg/dL   Blood: x / Protein: Negative mg/dL / Nitrite: Negative   Leuk Esterase: Negative / RBC: x / WBC x   Sq Epi: x / Non Sq Epi: x / Bacteria: x          Vital Signs Last 24 Hrs  T(C): 36.6 (23 Oct 2022 04:48), Max: 37.1 (22 Oct 2022 22:09)  T(F): 97.8 (23 Oct 2022 04:48), Max: 98.7 (22 Oct 2022 22:09)  HR: 92 (23 Oct 2022 04:48) (92 - 119)  BP: 126/73 (23 Oct 2022 04:48) (126/73 - 199/84)  BP(mean): --  RR: 16 (23 Oct 2022 04:48) (16 - 17)  SpO2: 97% (23 Oct 2022 04:48) (97% - 100%)    Parameters below as of 23 Oct 2022 04:48  Patient On (Oxygen Delivery Method): room air        PHYSICAL EXAM  General: NAD, Awake and Alert    Exam:    General: NAD    Spine:    No palpable step off. Nontender to palpation.   LAURA: 4 corners SILT, +passive tone, +active tone  Motor:                   C5                C6              C7               C8           T1   R            5/5                5/5            5/5             5/5          5/5  L             2/5               0/5             2/5             4/5          3/5                L2             L3             L4               L5            S1  R         5/5           5/5          5/5             5/5           5/5  L          3/5          NT/5        3/5             4/5           4/5  NT = not testable  Sensory:            C5         C6         C7      C8       T1        (0=absent, 1=impaired, 2=normal, NT=not testable)  R         2            2           2        2         2  L          1            1           1        1         1               L2          L3         L4      L5       S1         (0=absent, 1=impaired, 2=normal, NT=not testable)  R         2            2            2        2        2  L          2            2           2        2         2    UMNs:    Babinski (-) B/L  Clonus (-) B/L  Gilbert (-) B/L      IMAGING:  CT : L3-4, L4-5 severe canal stenosis for which pt could be at risk for cauda equina syndrome    Assessment/Plan:  62y Male with new onset L wrist drop, RLE radiculopathy     -Suspicion for acute cervical myelopathy, cauda equina warrants emergent MRI, however pt reports inability to tolerate MRI without sedation  -If pt unable to tolerate emergent MRI with light sedation, then pt to be transferred for emergent MRI under full sedation  -Neurology recs appreciated, consider degenerative spinal tract dz, ALS  -Pain control as needed  -DVT ppx   -WBAT   -Will discuss with attending, and advise if plan changes   62y Male presents with new onset left wrist drop and right lower leg radiculopathy. Pt reports onset of symptoms 3 weeks ago after physical labor in his yard. Pt was to undergo MRI Nov 3 via Dr. Irwin, however pain in his RLE became severe and now pt presents to the ED.  Pt endorses RLE radicular pain, numbness & tingling, along with calf spasms. Pt also reports left wrist drop with similar onset of 3 weeks. Pt also briefly had a left foot drop which resolved after 2 days. Pt reports b/l UE radicular pain to shoulders along with finger numbness, tingling. Pt denies urinary/bowel incontinence. At baseline pt ambulates independently.     PAST MEDICAL & SURGICAL HISTORY:  Asthma        Home Medications:  Advair Diskus:  inhaled  (2017 08:30)  Nucynta:  orally  (2017 08:30)    Allergies    No Known Drug Allergies  peanuts (Unknown)    Intolerances                              12.3   5.45  )-----------( 280      ( 23 Oct 2022 04:03 )             37.8     10-    141  |  109<H>  |  14  ----------------------------<  121<H>  4.0   |  26  |  1.06    Ca    9.0      23 Oct 2022 04:03    TPro  7.0  /  Alb  3.5  /  TBili  0.3  /  DBili  x   /  AST  31  /  ALT  32  /  AlkPhos  43  10-23      Urinalysis Basic - ( 23 Oct 2022 06:29 )    Color: Yellow / Appearance: Clear / S.010 / pH: x  Gluc: x / Ketone: Negative  / Bili: Negative / Urobili: Negative mg/dL   Blood: x / Protein: Negative mg/dL / Nitrite: Negative   Leuk Esterase: Negative / RBC: x / WBC x   Sq Epi: x / Non Sq Epi: x / Bacteria: x          Vital Signs Last 24 Hrs  T(C): 36.6 (23 Oct 2022 04:48), Max: 37.1 (22 Oct 2022 22:09)  T(F): 97.8 (23 Oct 2022 04:48), Max: 98.7 (22 Oct 2022 22:09)  HR: 92 (23 Oct 2022 04:48) (92 - 119)  BP: 126/73 (23 Oct 2022 04:48) (126/73 - 199/84)  BP(mean): --  RR: 16 (23 Oct 2022 04:48) (16 - 17)  SpO2: 97% (23 Oct 2022 04:48) (97% - 100%)    Parameters below as of 23 Oct 2022 04:48  Patient On (Oxygen Delivery Method): room air        PHYSICAL EXAM  General: NAD, Awake and Alert    Exam:    General: NAD    Spine:    No palpable step off. Nontender to palpation.   LAURA: 4 corners SILT, +passive tone, +active tone  Motor:                   C5                C6              C7               C8           T1   R            5/5                5/5            5/5             5/5          5/5  L             2/5               0/5             2/5             4/5          3/5                L2             L3             L4               L5            S1  R         5/5           5/5          5/5             5/5           5/5  L          3/5          NT/5        3/5             4/5           4/5  NT = not testable  Sensory:            C5         C6         C7      C8       T1        (0=absent, 1=impaired, 2=normal, NT=not testable)  R         2            2           2        2         2  L          1            1           1        1         1               L2          L3         L4      L5       S1         (0=absent, 1=impaired, 2=normal, NT=not testable)  R         2            2            2        2        2  L          2            2           2        2         2    UMNs:    Babinski (-) B/L  Clonus (-) B/L  Gilbert (-) B/L      IMAGING:  CT : L3-4, L4-5 severe canal stenosis for which pt could be at risk for cauda equina syndrome    Assessment/Plan:  62y Male with new onset L wrist drop, RLE radiculopathy     -Suspicion for acute cervical myelopathy, cauda equina warrants emergent MRI, however pt reports inability to tolerate MRI without sedation  -If pt unable to tolerate emergent MRI with light sedation, then pt to be transferred for emergent MRI under full sedation  -Neurology recs appreciated, consider degenerative spinal tract dz (ALS)  - FU Lyme panel, pt reports extensive history of outdoor activities  -Pain control as needed  -DVT ppx   -WBAT   -Will discuss with attending, and advise if plan changes   62y Male presents with b/l UE radicular type symptoms with subjective numbness bilaterally, left wrist drop, inability to flex biceps, RLE radicular type symptoms to the knee, b/l LE muscle spasm/fasiculation as well as contracted quadriceps muscle on the left.     Pt reports onset of symptoms 3 weeks ago after physical labor in his yard, including moving the grass. Pt saw Dr. Irwin and attempted to see another spine surgeon who was "not credentialed and unable to see patients. Patient was referred for Lumbar MRI, L shoulder MRI and R Hip MRI. However due to extreme claustrophobia pt is unable to undergo MRI. MR with sedation was schedule for november. Due to noted decline in symptoms, MRI was moved up to 11/3, however patient is unable to bear the symptoms any longer. Patient was seen in the ED 10/12 for similar events. Was unable to tolerate MR at that visit.     Patient's symptomatology has been reportedly unchanged for approximately 2 weeks. This includes the subjective numbness bilaterally, left wrist drop, inability to flex biceps, RLE radicular type symptoms to the knee, b/l LE muscle spasm/fasiculations as well as contracted quadriceps muscle on the left. Three days prior patient reports saddle anesthesia and left foot drop, both which spontaneously resolved. The     Of note, Pt denies hx urinary/bowel incontinence. At baseline pt ambulates independently.     PAST MEDICAL & SURGICAL HISTORY:  Asthma        Home Medications:  Advair Diskus:  inhaled  (2017 08:30)  Nucynta:  orally  (2017 08:30)    Allergies    No Known Drug Allergies  peanuts (Unknown)    Intolerances                              12.3   5.45  )-----------( 280      ( 23 Oct 2022 04:03 )             37.8     10    141  |  109<H>  |  14  ----------------------------<  121<H>  4.0   |  26  |  1.06    Ca    9.0      23 Oct 2022 04:03    TPro  7.0  /  Alb  3.5  /  TBili  0.3  /  DBili  x   /  AST  31  /  ALT  32  /  AlkPhos  43  10      Urinalysis Basic - ( 23 Oct 2022 06:29 )    Color: Yellow / Appearance: Clear / S.010 / pH: x  Gluc: x / Ketone: Negative  / Bili: Negative / Urobili: Negative mg/dL   Blood: x / Protein: Negative mg/dL / Nitrite: Negative   Leuk Esterase: Negative / RBC: x / WBC x   Sq Epi: x / Non Sq Epi: x / Bacteria: x          Vital Signs Last 24 Hrs  T(C): 36.6 (23 Oct 2022 04:48), Max: 37.1 (22 Oct 2022 22:09)  T(F): 97.8 (23 Oct 2022 04:48), Max: 98.7 (22 Oct 2022 22:09)  HR: 92 (23 Oct 2022 04:48) (92 - 119)  BP: 126/73 (23 Oct 2022 04:48) (126/73 - 199/84)  BP(mean): --  RR: 16 (23 Oct 2022 04:48) (16 - 17)  SpO2: 97% (23 Oct 2022 04:48) (97% - 100%)    Parameters below as of 23 Oct 2022 04:48  Patient On (Oxygen Delivery Method): room air        PHYSICAL EXAM  General: NAD, Awake and Alert    Exam:    General: NAD    SPINE:  NTTP over the bony prominences of the cervical / thoracic / lumbar / sacral spine  + Paraspinal TTP of the cervical / lumbar spine  No paraspinal TTP of the thoracic   No bony step-offs  Grossly moving all extremities  + Radial Pulse  + DP/PT Pulses  No saddle anesthesia at present  + rectal tone      MOTOR EXAM:                          Elbow Flex (C5)     Wrist Ext (C6)     Elbow Ext (C7)      Finger Flex (C8)    Finger Abduction (T1)  RIGHT                 5/5                         5/5                         5/5                          5/5                              5/5  LEFT                    2/5                         0/5                         2/5                          4/5                              3/5                           Hip Flex (L2)      Knee Ext (L3)      Ank Dorsiflex (L4)     Hallux Ext (L5)     Ank PlantarFlex (S1)  RIGHT               5/5                      5/5                          5/5                            5/5                           5/5  LEFT                  3/5                     NT                          3/5                            4/5                           4/5      SENSORY EXAM:                        C5      C6      C7      C8       T1          RIGHT          2         2        2         2         2          (0=absent, 1=impaired, 2=normal, NT=not testable)  LEFT             1         1        1         1         1          (0=absent, 1=impaired, 2=normal, NT=not testable)                        L2        L3       L4      L5       S1          RIGHT        2          2         2        2        2           (0=absent, 1=impaired, 2=normal, NT=not testable)  LEFT           2          2         2        2        2           (0=absent, 1=impaired, 2=normal, NT=not testable)    Negative Gilbert's sign bilaterally  Negative Babinski bilaterally   Negative Myoclonus RLE  4 beats myoclonus LLE  Negative  and Release Test R  Unable to complete  and release on the L            IMAGING:  CT : L3-4, L4-5 severe canal stenosis for which pt could be at risk for cauda equina syndrome    Assessment/Plan:  62y Male with symptoms of cord compression vs central nervious system type pathology, B/l UE radiculopathy, RLE Radiculopathy, Lumbago, L3-4, L4-5 Central Stenosis     -Suspicion for acute cervical myelopathy, cauda equina warrants emergent MRI, however pt reports inability to tolerate MRI without procedural sedation  -Will attempt to perform MRI C/T/LSp w/ w/o   w/ valium  -FU XR CSp, LSp  -FU CT CSp  -CT LSp reviewed  -If pt unable to tolerate emergent MRI with light sedation, then pt to be transferred for emergent MRI under procedural sedation  -FU Neurology c/s, would consider degenerative spinal tract dz (ALS)  -FU Lyme Titers, pt reports extensive history of outdoor activities  -Pain control as needed  -Muscle relaxers  -Medical managment appreciated   -Hold DVT ppx   -Will keep NPO until MRI reviewed   -Preop labs  -CXR/EKG  -WBAT   -Will discuss with attending, and advise if plan changes

## 2022-10-23 NOTE — CONSULT NOTE ADULT - CONSULT REASON
L spine radic, L wrist drop Cord compression w/ neurological symptoms: left wrist drop; hx of L foot drop and saddle anesthesia

## 2022-10-23 NOTE — ED ADULT NURSE NOTE - ED STAT RN HANDOFF DETAILS
Report endorsed to oncoming RN, Lorena Meraz. Safety checks completed this shift. Safety rounds completed hourly.  IV sites checked Q2+remains WDL. Medications administered as ordered with no s/s of adverse rxns. Fall & skin precautions in place. Any issues endorsed to oncoming RN for follow up.

## 2022-10-23 NOTE — ED PROVIDER NOTE - PHYSICAL EXAMINATION
Gen: Alert, distressed, well appearing  Head: NC, AT, EOMI, normal lids/conjunctiva  ENT: normal hearing, patent oropharynx without erythema/exudate, uvula midline  Neck: +supple, no tenderness, +Trachea midline  Pulm: Bilateral BS, normal resp effort, no wheeze/stridor/retractions  CV: RRR, no M/R/G, +dist pulses  Abd: soft, NT/ND, Negative [place] signs, +BS, no palpable masses  Mskel: no erythema/cyanosis, +left arm and left leg edema, nonpitting  Skin: no rash, warm/dry  Neuro: AAOx3, coordination intact, +left arm and leg weakness

## 2022-10-24 ENCOUNTER — INPATIENT (INPATIENT)
Facility: HOSPITAL | Age: 62
LOS: 14 days | Discharge: NURSING, REHAB OR LONG-TERM CARE | End: 2022-11-08
Attending: ORTHOPAEDIC SURGERY | Admitting: ORTHOPAEDIC SURGERY
Payer: COMMERCIAL

## 2022-10-24 VITALS
DIASTOLIC BLOOD PRESSURE: 95 MMHG | TEMPERATURE: 99 F | OXYGEN SATURATION: 100 % | RESPIRATION RATE: 18 BRPM | SYSTOLIC BLOOD PRESSURE: 140 MMHG | HEART RATE: 73 BPM

## 2022-10-24 DIAGNOSIS — I10 ESSENTIAL (PRIMARY) HYPERTENSION: ICD-10-CM

## 2022-10-24 DIAGNOSIS — Z29.9 ENCOUNTER FOR PROPHYLACTIC MEASURES, UNSPECIFIED: ICD-10-CM

## 2022-10-24 DIAGNOSIS — G95.20 UNSPECIFIED CORD COMPRESSION: ICD-10-CM

## 2022-10-24 LAB
ALBUMIN SERPL ELPH-MCNC: 4.5 G/DL — SIGNIFICANT CHANGE UP (ref 3.3–5)
ALP SERPL-CCNC: 50 U/L — SIGNIFICANT CHANGE UP (ref 40–120)
ALT FLD-CCNC: 24 U/L — SIGNIFICANT CHANGE UP (ref 4–41)
ANION GAP SERPL CALC-SCNC: 12 MMOL/L — SIGNIFICANT CHANGE UP (ref 7–14)
APTT BLD: 25.2 SEC — LOW (ref 27–36.3)
APTT BLD: 25.6 SEC — LOW (ref 27–36.3)
AST SERPL-CCNC: 28 U/L — SIGNIFICANT CHANGE UP (ref 4–40)
B BURGDOR C6 AB SER-ACNC: NEGATIVE — SIGNIFICANT CHANGE UP
B BURGDOR IGG+IGM SER-ACNC: 0.07 INDEX — SIGNIFICANT CHANGE UP (ref 0.01–0.89)
BILIRUB SERPL-MCNC: 0.4 MG/DL — SIGNIFICANT CHANGE UP (ref 0.2–1.2)
BLD GP AB SCN SERPL QL: NEGATIVE — SIGNIFICANT CHANGE UP
BLOOD GAS ARTERIAL - LYTES,HGB,ICA,LACT RESULT: SIGNIFICANT CHANGE UP
BUN SERPL-MCNC: 22 MG/DL — SIGNIFICANT CHANGE UP (ref 7–23)
CALCIUM SERPL-MCNC: 9.9 MG/DL — SIGNIFICANT CHANGE UP (ref 8.4–10.5)
CHLORIDE SERPL-SCNC: 104 MMOL/L — SIGNIFICANT CHANGE UP (ref 98–107)
CO2 SERPL-SCNC: 25 MMOL/L — SIGNIFICANT CHANGE UP (ref 22–31)
CREAT SERPL-MCNC: 0.95 MG/DL — SIGNIFICANT CHANGE UP (ref 0.5–1.3)
CULTURE RESULTS: SIGNIFICANT CHANGE UP
EGFR: 90 ML/MIN/1.73M2 — SIGNIFICANT CHANGE UP
GLUCOSE SERPL-MCNC: 107 MG/DL — HIGH (ref 70–99)
HCT VFR BLD CALC: 43.8 % — SIGNIFICANT CHANGE UP (ref 39–50)
HGB BLD-MCNC: 14 G/DL — SIGNIFICANT CHANGE UP (ref 13–17)
INR BLD: 0.97 RATIO — SIGNIFICANT CHANGE UP (ref 0.88–1.16)
INR BLD: 1.05 RATIO — SIGNIFICANT CHANGE UP (ref 0.88–1.16)
MAGNESIUM SERPL-MCNC: 2.2 MG/DL — SIGNIFICANT CHANGE UP (ref 1.6–2.6)
MCHC RBC-ENTMCNC: 28.2 PG — SIGNIFICANT CHANGE UP (ref 27–34)
MCHC RBC-ENTMCNC: 32 GM/DL — SIGNIFICANT CHANGE UP (ref 32–36)
MCV RBC AUTO: 88.3 FL — SIGNIFICANT CHANGE UP (ref 80–100)
NRBC # BLD: 0 /100 WBCS — SIGNIFICANT CHANGE UP (ref 0–0)
NRBC # FLD: 0 K/UL — SIGNIFICANT CHANGE UP (ref 0–0)
PHOSPHATE SERPL-MCNC: 2.7 MG/DL — SIGNIFICANT CHANGE UP (ref 2.5–4.5)
PLATELET # BLD AUTO: 329 K/UL — SIGNIFICANT CHANGE UP (ref 150–400)
POTASSIUM SERPL-MCNC: 3.9 MMOL/L — SIGNIFICANT CHANGE UP (ref 3.5–5.3)
POTASSIUM SERPL-SCNC: 3.9 MMOL/L — SIGNIFICANT CHANGE UP (ref 3.5–5.3)
PROT SERPL-MCNC: 7.5 G/DL — SIGNIFICANT CHANGE UP (ref 6–8.3)
PROTHROM AB SERPL-ACNC: 11.3 SEC — SIGNIFICANT CHANGE UP (ref 10.5–13.4)
PROTHROM AB SERPL-ACNC: 12.2 SEC — SIGNIFICANT CHANGE UP (ref 10.5–13.4)
RBC # BLD: 4.96 M/UL — SIGNIFICANT CHANGE UP (ref 4.2–5.8)
RBC # FLD: 14.6 % — HIGH (ref 10.3–14.5)
RH IG SCN BLD-IMP: POSITIVE — SIGNIFICANT CHANGE UP
SARS-COV-2 RNA SPEC QL NAA+PROBE: SIGNIFICANT CHANGE UP
SODIUM SERPL-SCNC: 141 MMOL/L — SIGNIFICANT CHANGE UP (ref 135–145)
SPECIMEN SOURCE: SIGNIFICANT CHANGE UP
WBC # BLD: 5.43 K/UL — SIGNIFICANT CHANGE UP (ref 3.8–10.5)
WBC # FLD AUTO: 5.43 K/UL — SIGNIFICANT CHANGE UP (ref 3.8–10.5)

## 2022-10-24 PROCEDURE — 99223 1ST HOSP IP/OBS HIGH 75: CPT | Mod: GC

## 2022-10-24 PROCEDURE — 93010 ELECTROCARDIOGRAM REPORT: CPT

## 2022-10-24 DEVICE — MAYFIELD SKULL PIN ADULT PLASTIC: Type: IMPLANTABLE DEVICE | Status: FUNCTIONAL

## 2022-10-24 DEVICE — IMPLANTABLE DEVICE: Type: IMPLANTABLE DEVICE | Status: FUNCTIONAL

## 2022-10-24 DEVICE — BONE WAX 2.5GM: Type: IMPLANTABLE DEVICE | Status: FUNCTIONAL

## 2022-10-24 DEVICE — SCREW INFINITY MULTI AXIAL 3.5X14MM: Type: IMPLANTABLE DEVICE | Status: FUNCTIONAL

## 2022-10-24 DEVICE — ROD TI INFINITY 3.5X240MM: Type: IMPLANTABLE DEVICE | Status: FUNCTIONAL

## 2022-10-24 DEVICE — SURGIFLO MATRIX WITH THROMBIN KIT: Type: IMPLANTABLE DEVICE | Status: FUNCTIONAL

## 2022-10-24 DEVICE — SURGIFOAM PAD 8CM X 12.5CM X 2MM (100C): Type: IMPLANTABLE DEVICE | Status: FUNCTIONAL

## 2022-10-24 DEVICE — SCREW INFINITY MULTI AXIAL 3.5X12MM: Type: IMPLANTABLE DEVICE | Status: FUNCTIONAL

## 2022-10-24 DEVICE — SCREW SET INFINITY UPPER THORCIC: Type: IMPLANTABLE DEVICE | Status: FUNCTIONAL

## 2022-10-24 RX ORDER — AMLODIPINE BESYLATE 2.5 MG/1
10 TABLET ORAL DAILY
Refills: 0 | Status: DISCONTINUED | OUTPATIENT
Start: 2022-10-24 | End: 2022-10-26

## 2022-10-24 RX ORDER — ACETAMINOPHEN 500 MG
650 TABLET ORAL EVERY 6 HOURS
Refills: 0 | Status: DISCONTINUED | OUTPATIENT
Start: 2022-10-24 | End: 2022-10-25

## 2022-10-24 RX ORDER — HYDROMORPHONE HYDROCHLORIDE 2 MG/ML
2 INJECTION INTRAMUSCULAR; INTRAVENOUS; SUBCUTANEOUS ONCE
Refills: 0 | Status: DISCONTINUED | OUTPATIENT
Start: 2022-10-24 | End: 2022-10-24

## 2022-10-24 RX ORDER — DEXAMETHASONE 0.5 MG/5ML
10 ELIXIR ORAL DAILY
Refills: 0 | Status: DISCONTINUED | OUTPATIENT
Start: 2022-10-24 | End: 2022-10-25

## 2022-10-24 RX ORDER — HYDROMORPHONE HYDROCHLORIDE 2 MG/ML
2 INJECTION INTRAMUSCULAR; INTRAVENOUS; SUBCUTANEOUS EVERY 6 HOURS
Refills: 0 | Status: DISCONTINUED | OUTPATIENT
Start: 2022-10-24 | End: 2022-10-25

## 2022-10-24 RX ORDER — OXYCODONE HYDROCHLORIDE 5 MG/1
5 TABLET ORAL ONCE
Refills: 0 | Status: DISCONTINUED | OUTPATIENT
Start: 2022-10-24 | End: 2022-10-24

## 2022-10-24 RX ORDER — AMLODIPINE BESYLATE 2.5 MG/1
10 TABLET ORAL DAILY
Refills: 0 | Status: DISCONTINUED | OUTPATIENT
Start: 2022-10-24 | End: 2022-10-24

## 2022-10-24 RX ORDER — SODIUM CHLORIDE 9 MG/ML
1000 INJECTION, SOLUTION INTRAVENOUS
Refills: 0 | Status: DISCONTINUED | OUTPATIENT
Start: 2022-10-24 | End: 2022-10-24

## 2022-10-24 RX ORDER — MORPHINE SULFATE 50 MG/1
2 CAPSULE, EXTENDED RELEASE ORAL ONCE
Refills: 0 | Status: DISCONTINUED | OUTPATIENT
Start: 2022-10-24 | End: 2022-10-24

## 2022-10-24 RX ORDER — ACETAMINOPHEN 500 MG
1000 TABLET ORAL ONCE
Refills: 0 | Status: COMPLETED | OUTPATIENT
Start: 2022-10-24 | End: 2022-10-24

## 2022-10-24 RX ADMIN — Medication 400 MILLIGRAM(S): at 14:28

## 2022-10-24 RX ADMIN — MORPHINE SULFATE 2 MILLIGRAM(S): 50 CAPSULE, EXTENDED RELEASE ORAL at 06:40

## 2022-10-24 RX ADMIN — HYDROMORPHONE HYDROCHLORIDE 2 MILLIGRAM(S): 2 INJECTION INTRAMUSCULAR; INTRAVENOUS; SUBCUTANEOUS at 19:39

## 2022-10-24 RX ADMIN — HYDROMORPHONE HYDROCHLORIDE 2 MILLIGRAM(S): 2 INJECTION INTRAMUSCULAR; INTRAVENOUS; SUBCUTANEOUS at 18:34

## 2022-10-24 RX ADMIN — HYDROMORPHONE HYDROCHLORIDE 2 MILLIGRAM(S): 2 INJECTION INTRAMUSCULAR; INTRAVENOUS; SUBCUTANEOUS at 11:20

## 2022-10-24 RX ADMIN — OXYCODONE HYDROCHLORIDE 5 MILLIGRAM(S): 5 TABLET ORAL at 07:59

## 2022-10-24 RX ADMIN — Medication 102 MILLIGRAM(S): at 14:55

## 2022-10-24 RX ADMIN — AMLODIPINE BESYLATE 10 MILLIGRAM(S): 2.5 TABLET ORAL at 12:37

## 2022-10-24 RX ADMIN — MORPHINE SULFATE 2 MILLIGRAM(S): 50 CAPSULE, EXTENDED RELEASE ORAL at 06:19

## 2022-10-24 RX ADMIN — HYDROMORPHONE HYDROCHLORIDE 2 MILLIGRAM(S): 2 INJECTION INTRAMUSCULAR; INTRAVENOUS; SUBCUTANEOUS at 11:51

## 2022-10-24 NOTE — CONSULT NOTE ADULT - SUBJECTIVE AND OBJECTIVE BOX
Orthopedic Spine Consult Note    62y Male presents with b/l UE radicular type symptoms with subjective numbness bilaterally, left wrist drop, inability to flex biceps, RLE radicular type symptoms to the knee, b/l LE muscle spasm/fasiculation as well as contracted quadriceps muscle on the left.     Pt reports onset of symptoms 3 weeks ago after physical labor in his yard, including moving the grass. Pt saw Dr. Irwin and attempted to see another spine surgeon who was "not credentialed and unable to see patients. Patient was referred for Lumbar MRI, L shoulder MRI and R Hip MRI. However due to extreme claustrophobia pt is unable to undergo MRI. MR with sedation was schedule for november. Due to noted decline in symptoms, MRI was moved up to 11/3, however patient is unable to bear the symptoms any longer. Patient was seen in the ED 10/12 for similar events. Was unable to tolerate MR at that visit.     Patient's symptomatology has been reportedly unchanged for approximately 2 weeks. This includes the subjective numbness bilaterally, left wrist drop, inability to flex biceps, RLE radicular type symptoms to the knee, b/l LE muscle spasm/fasiculations as well as contracted quadriceps muscle on the left. Three days prior patient reports saddle anesthesia and left foot drop, both which spontaneously resolved. Initially seen at [facility] where MRI demonstrated C4-6 cord compression. Pt tx to [facility] for further care    Currently reports lower back pain and cramping LLE. No current numbness of biltaeral LE, saddle anesthesia or urinary/fecal incontinence. Reports numbness of entire LLE. Denies any other bone or joint complaints. No fevers or chills.         MEDICATIONS  (STANDING):  dexAMETHasone  IVPB 10 milliGRAM(s) IV Intermittent daily  oxyCODONE    IR 5 milliGRAM(s) Oral once      Allergies    No Known Drug Allergies  peanuts (Unknown)    Intolerances        PAST MEDICAL & SURGICAL HISTORY:  Asthma                              12.3   5.45  )-----------( 280      ( 23 Oct 2022 04:03 )             37.8       23 Oct 2022 04:03    141    |  109    |  14     ----------------------------<  121    4.0     |  26     |  1.06     Ca    9.0        23 Oct 2022 04:03    TPro  7.0    /  Alb  3.5    /  TBili  0.3    /  DBili  x      /  AST  31     /  ALT  32     /  AlkPhos  43     23 Oct 2022 04:03      PT/INR - ( 23 Oct 2022 12:20 )   PT: 11.9 sec;   INR: 0.99 ratio         PTT - ( 23 Oct 2022 12:20 )  PTT:24.4 sec    Urinalysis Basic - ( 23 Oct 2022 06:29 )    Color: Yellow / Appearance: Clear / S.010 / pH: x  Gluc: x / Ketone: Negative  / Bili: Negative / Urobili: Negative mg/dL   Blood: x / Protein: Negative mg/dL / Nitrite: Negative   Leuk Esterase: Negative / RBC: x / WBC x   Sq Epi: x / Non Sq Epi: x / Bacteria: x        Vital Signs Last 24 Hrs  T(C): 37.1 (10-24-22 @ 05:37), Max: 37.1 (10-24-22 @ 05:37)  T(F): 98.7 (10-24-22 @ 05:37), Max: 98.7 (10-24-22 @ 05:37)  HR: 100 (10-24-22 @ 06:24) (73 - 100)  BP: 150/98 (10-24-22 @ 06:24) (140/95 - 150/98)  BP(mean): --  RR: 18 (10-24-22 @ 05:37) (18 - 18)  SpO2: 100% (10-24-22 @ 05:37) (100% - 100%)      Physical exam  Gen: NAD  Resp: no increased WOB on RA  Spine PE:  Skin intact  No gross deformity  No midnline TTP C/T/L/S spine  No bony step offs  No paraspinal muscle ttp/hypertonicity   Negative clonus  Negative babinski  Negative andrade  No saddle anesthesia    Motor:                   C5                C6              C7               C8           T1   R            5/5                5/5            5/5             5/5          5/5  L             0/5               1/5             1/5             1/5          2/5                L2             L3             L4               L5            S1  R         5/5           5/5          5/5             5/5           5/5  L          5/5          5/5           5/5             5/5           5/5    Sensory:            C5         C6         C7      C8       T1        (0=absent, 1=impaired, 2=normal, NT=not testable)  R           2           2           2      2         2  L          1           1           1        1         1               L2          L3         L4      L5       S1         (0=absent, 1=impaired, 2=normal, NT=not testable)  R         2            2            2        2        2  L          2            2           2        2         2    Imaginy Male with symptoms of cord compression vs central nervious system type pathology, B/l UE radiculopathy, RLE Radiculopathy, Lumbago, L3-4, L4-5 Central Stenosis     -Plan for OR today w/ John  -Pain control as needed  -Muscle relaxers  -Medical managment appreciated   -Hold DVT ppx   -NPO/IVF  -Preop labs  -CXR/EKG  -WBAT

## 2022-10-24 NOTE — H&P ADULT - NSHPPHYSICALEXAM_GEN_ALL_CORE
PHYSICAL EXAM:  GENERAL: Sitting comfortable in bed, in no acute distress  HENMT: Atraumatic, moist mucous membranes, no oropharyngeal exudates or vesicles, uvula is midline EYES: Clear bilaterally, PERRL, EOMs intact b/l  HEART: RRR, S1/S2, no murmur/gallops/rubs  RESPIRATORY: Clear to auscultation bilaterally, no wheezes/rhonchi/rales  ABDOMEN: +BS, soft, nontender, nondistended  EXTREMITIES: No lower extremity edema, +2 radial pulses b/l  NEURO:  A&Ox4, no focal motor deficits or sensory deficits. 0/5 strength in LUE, limited abduction and adduction, flexion, extension. full ROM in RUE. +straight leg raise in LLE, pain in internal rotation in RLE  Heme/LYMPH: No ecchymosis or bruising, no anterior/posterior cervical or supraclavicular LAD  SKIN:  Skin normal color for race, warm, dry and intact. No evidence of rash.

## 2022-10-24 NOTE — CONSULT NOTE ADULT - SUBJECTIVE AND OBJECTIVE BOX
Manuel Porter MD  Interventional Cardiology / Endovascular Specialist  [place] Office : [address] [place] [address] [place] [address]  Tel:   [place] Office : [address] [facility] [place] [address]  Tel: [phone]        HISTORY OF PRESENTING ILLNESS:  62y Male presents with b/l UE radicular type symptoms with subjective numbness bilaterally, left wrist drop, inability to flex biceps, RLE radicular type symptoms to the knee, b/l LE muscle spasm/fasiculation as well as contracted quadriceps muscle on the left.     Pt reports onset of symptoms 3 weeks ago after physical labor in his yard, including moving the grass. Pt saw Dr. Irwin and attempted to see another spine surgeon who was "not credentialed and unable to see patients. Patient was referred for Lumbar MRI, L shoulder MRI and R Hip MRI. However due to extreme claustrophobia pt is unable to undergo MRI. MR with sedation was schedule for november. Due to noted decline in symptoms, MRI was moved up to 11/3, however patient is unable to bear the symptoms any longer. Patient was seen in the ED 10/12 for similar events. Was unable to tolerate MR at that visit.     Patient's symptomatology has been reportedly unchanged for approximately 2 weeks. This includes the subjective numbness bilaterally, left wrist drop, inability to flex biceps, RLE radicular type symptoms to the knee, b/l LE muscle spasm/fasiculations as well as contracted quadriceps muscle on the left. Three days prior patient reports saddle anesthesia and left foot drop, both which spontaneously resolved. Initially seen at [facility] where MRI demonstrated C4-6 cord compression. Pt tx to [facility] for further care    Currently reports lower back pain and cramping LLE. No current numbness of biltaeral LE, saddle anesthesia or urinary/fecal incontinence. Reports numbness of entire LLE. Denies any other bone or joint complaints. No fevers or chills.       PAST MEDICAL & SURGICAL HISTORY:  Asthma          SOCIAL HISTORY: Substance Use (street drugs): ( x ) never used  (  ) other:    FAMILY HISTORY:      REVIEW OF SYSTEMS:  CONSTITUTIONAL: No fever, weight loss, or fatigue  EYES: No eye pain, visual disturbances, or discharge  ENMT:  No difficulty hearing, tinnitus, vertigo; No sinus or throat pain  BREASTS: No pain, masses, or nipple discharge  GASTROINTESTINAL: No abdominal or epigastric pain. No nausea, vomiting, or hematemesis; No diarrhea or constipation. No melena or hematochezia.  GENITOURINARY: No dysuria, frequency, hematuria, or incontinence  NEUROLOGICAL: No headaches, memory loss, loss of strength, numbness, or tremors  ENDOCRINE: No heat or cold intolerance; No hair loss  MUSCULOSKELETAL: b/l LE pain and numbness   PSYCHIATRIC: No depression, anxiety, mood swings, or difficulty sleeping  HEME/LYMPH: No easy bruising, or bleeding gums  All others negative    MEDICATIONS:  amLODIPine   Tablet 10 milliGRAM(s) Oral daily        acetaminophen     Tablet .. 650 milliGRAM(s) Oral every 6 hours PRN  HYDROmorphone  Injectable 2 milliGRAM(s) IV Push once      dexAMETHasone  IVPB 10 milliGRAM(s) IV Intermittent daily        FAMILY HISTORY:        Allergies    No Known Drug Allergies  peanuts (Unknown)    Intolerances    	      PHYSICAL EXAM:  T(C): 36.8 (10-24-22 @ 09:41), Max: 37.1 (10-24-22 @ 05:37)  HR: 96 (10-24-22 @ 10:15) (73 - 100)  BP: 153/98 (10-24-22 @ 10:15) (140/95 - 169/103)  RR: 18 (10-24-22 @ 09:41) (18 - 18)  SpO2: 99% (10-24-22 @ 09:41) (99% - 100%)  Wt(kg): --  I&O's Summary      GENERAL: NAD  EYES: conjunctiva and sclera clear  ENMT: No tonsillar erythema, exudates, or enlargement  Cardiovascular: Normal S1 S2, No JVD, No murmurs, No edema  Respiratory: Lungs clear to auscultation	  Gastrointestinal:  Soft, Non-tender, + BS	  Extremities: No edema      LABS:	 	    CARDIAC MARKERS:                                  14.0   5.43  )-----------( 329      ( 24 Oct 2022 06:43 )             43.8     10-24    141  |  104  |  22  ----------------------------<  107<H>  3.9   |  25  |  0.95    Ca    9.9      24 Oct 2022 06:43  Phos  2.7     10-24  Mg     2.20     10-24    TPro  7.5  /  Alb  4.5  /  TBili  0.4  /  DBili  x   /  AST  28  /  ALT  24  /  AlkPhos  50  10-24    proBNP:   Lipid Profile:   HgA1c:   TSH:     Consultant(s) Notes Reviewed:  [x ] YES  [ ] NO    Care Discussed with Consultants/Other Providers [ x] YES  [ ] NO    Imaging Personally Reviewed independently:  [x] YES  [ ] NO    All labs, radiologic studies, vitals, orders and medications list reviewed. Patient is seen and examined at bedside. Case discussed with medical team.    ASSESSMENT/PLAN:

## 2022-10-24 NOTE — PATIENT PROFILE ADULT - FALL HARM RISK - HARM RISK INTERVENTIONS

## 2022-10-24 NOTE — H&P ADULT - ATTENDING COMMENTS
62M with asthma (well controlled) and HTN who presents as a transfer from [facility] with c/o numbness, weakness and swelling of left arm for the past month and weakness of right leg, and b/l LE muscle spasms, found to have L3-L5 central stenosis.  c/w Dilaudid for severe pain, is helping.  EKG- LVH , in setting of HTN. F/w TTE/non emergent basis   Jean-Baptiste pre-op is 0.2%.

## 2022-10-24 NOTE — CONSULT NOTE ADULT - ASSESSMENT
EKG SR LVH with re op changed unchanges from earlier this month     Assessment and Plan     1) Pre op asessment: planned for Spine decompression surgery. denies CP ro SOB with >5 METs of exertion , denies Palpitation syncope, BP elevated 2/2 pain needs pain management optimized from a cardiac standpoint for Spine surgery RCRI Class I risk 3.9 % risk for MACE in 30 days     2) HTN : needs pain management , will reassess once pain under control     3) DVT PPX recommend SCD's

## 2022-10-24 NOTE — H&P ADULT - HISTORY OF PRESENT ILLNESS
62M with asthma who presents as a transfer from  with c/o  62M with asthma who presents as a transfer from [facility] with c/o  62M with asthma and HTN who presents as a transfer from [facility] with c/o numbness, weakness and swelling of left arm for the past month and weakness of right leg, and b/l LE muscle spasms. Patient reports that hx of herniated disc, but his sx started worsening last month when he was gardening. no falls or trauma. Since then has been having progressively worsening weakness and muscle spasms. No bowel or bladder incontinence. Has been trying to take nucenta 100 mg daily and ibuprofen without relief. Decided to present to [facility] when the pain was uncontrolled.  62M with asthma and HTN who presents as a transfer from [facility] with c/o numbness, weakness and swelling of left arm for the past month and weakness of right leg, and b/l LE muscle spasms. Patient reports that hx of herniated disc, but his sx started worsening last month when he was gardening. no falls or trauma. Since then has been having progressively worsening weakness and muscle spasms. No bowel or bladder incontinence. Has been trying to take nucenta 100 mg daily and ibuprofen without relief. Decided to present to [facility] when the pain was uncontrolled. Imaging with concern for spinal canal stenosis and transferred to [facility] for further evaluation and management with orthopedic-spine team. In the ED patient was afebrile, hemodynamically stable satting well on RA. Given morphine and po oxy and admitted to medicine for further evaluation.

## 2022-10-24 NOTE — H&P ADULT - PROBLEM SELECTOR PLAN 1
MR lumbar spine wwo IVC with diffuse moderate spinal canal stenosis at L1-L2 thru L4-L5  - ortho following: plan for OR with John  - RCRI 0, Jean-Baptiste 0.2%, ASA class 2. low risk spinal surgery. patient is medically optimized for surgery  - pain: tylenol 650 mg q6h PRN for mild and moderate pain. dilaudid 2 mg q6h PRN for severe pain. Decadron 10 mg daily  - hold NSAIDs preoperatively given bleeding risk  - npo

## 2022-10-24 NOTE — H&P ADULT - NSHPREVIEWOFSYSTEMS_GEN_ALL_CORE
Constitutional: no fever and no chills  Eyes: no discharge, no irritation, no pain, no visual changes  ENMT: no ear pain or hearing loss, no dysphagia or throat pain  Neck: no pain, no stiffness, no swollen glands  CV: no chest pain, no palpitations, no edema  Resp: no cough, no shortness of breath  Abd: no abdominal pain, no nausea or vomiting, no diarrhea  : no dysuria, no hematuria  MSK: no back pain, no neck pain, no joint pain  Neuro: no LOC, no gait abnormality, no headache, no sensory deficits, no weakness  Skin: no rashes, no lacerations, no lesions Constitutional: no fever and no chills  Eyes: no discharge, no irritation, no pain, no visual changes  ENMT: no ear pain or hearing loss, no dysphagia or throat pain  Neck: no pain, no stiffness, no swollen glands  CV: no chest pain, no palpitations, no edema  Resp: no cough, no shortness of breath  Abd: no abdominal pain, no nausea or vomiting, no diarrhea  : no dysuria, no hematuria  MSK: no back pain, no neck pain, no joint pain  Neuro: no LOC, +gait abnormality, no headache, +sensory deficits, +weakness, no loss of bowel or bladder continence, no saddle anesthesia  Skin: no rashes, no lacerations, no lesions

## 2022-10-24 NOTE — H&P ADULT - PROBLEM SELECTOR PLAN 3
FEN/GI: NPO with meds for now preoperatively, then regular diet pending bedside swallow eval  Lines: PIV  PPx: SCDs for now, will start lovenox post op  Dispo: admit to Medicine  Code Status: Full    Plan was discussed with team and Dr. Eugene

## 2022-10-24 NOTE — PATIENT PROFILE ADULT - NSPROEXTENSIONSOFSELF_GEN_A_NUR
none Received patient's belongings from [address].  Belongings include phone, , patient's medications from home, clothes, shoes, change

## 2022-10-24 NOTE — H&P ADULT - ASSESSMENT
62M with asthma (well controlled) and HTN who presents as a transfer from [facility] with c/o numbness, weakness and swelling of left arm for the past month and weakness of right leg, and b/l LE muscle spasms, found to have L3-L5 central stenosis

## 2022-10-24 NOTE — H&P ADULT - NSHPSOCIALHISTORY_GEN_ALL_CORE
former smoker, quit 30 years ago  no alcohol  no ilicit drugs  lives with mother, who he helps care for  works as a mailman

## 2022-10-24 NOTE — H&P ADULT - NSHPLABSRESULTS_GEN_ALL_CORE
LABS:                         14.0   5.43  )-----------( 329      ( 24 Oct 2022 06:43 )             43.8     10-24    141  |  104  |  22  ----------------------------<  107<H>  3.9   |  25  |  0.95    Ca    9.9      24 Oct 2022 06:43  Phos  2.7     10-24  Mg     2.20     10-24    TPro  7.5  /  Alb  4.5  /  TBili  0.4  /  DBili  x   /  AST  28  /  ALT  24  /  AlkPhos  50  10-24    PT/INR - ( 24 Oct 2022 15:10 )   PT: 12.2 sec;   INR: 1.05 ratio         PTT - ( 24 Oct 2022 15:10 )  PTT:25.6 sec  Urinalysis Basic - ( 23 Oct 2022 06:29 )    Color: Yellow / Appearance: Clear / S.010 / pH: x  Gluc: x / Ketone: Negative  / Bili: Negative / Urobili: Negative mg/dL   Blood: x / Protein: Negative mg/dL / Nitrite: Negative   Leuk Esterase: Negative / RBC: x / WBC x   Sq Epi: x / Non Sq Epi: x / Bacteria: x            RADIOLOGY, EKG & ADDITIONAL TESTS: Reviewed.   < from: CT Angio Neck w/ IV Cont (10.23.22 @ 22:39) >    IMPRESSION:  CT ANGIOGRAPHY NECK: The cervical carotid systems and vertebral arteries   are patent bilaterally without significant stenosis or dissection.    CT ANGIOGRAPHY BRAIN: No vessel occlusion, flow-limiting stenosis or   aneurysm is identified about the [place].  Anatomic variants as   discussed above.    < end of copied text >    < from: MR Lumbar Spine w/wo IV Cont (10.23.22 @ 14:15) >    IMPRESSION:  CERVICAL SPINE MRI:  1.  Motion degraded.  Postcontrast images were not obtained.  2.  There is diffuse swelling of the cervical cord with long segment   central cord edema extending from C2 to T1, which has broad differential   diagnosis including transverse myelitis, other infectious and   inflammatory causes of myelitis, and trauma with subacute progressive   ascending myelopathy (SPAM).  Central cord infarct could produce similar   findings but is thought to be less likely as this is an atypical location   for spinal cord infarct.  While there is underlying moderate spinal canal   stenosis with apparent mild cord compression at C3-C4 and C4-C5,  minimal   cord compression at C5-C6, and mild ventral cord impingement at C6-C7,   the diffuse changes in the cervical cord are not compatible with primary   compressive myelopathy.  The appearanceof cord compression may be due in   part to the underlying swelling of the cervical cord.  3.  Follow-up contrast-enhanced MRI of the cervical spine is recommended.      THORACIC SPINE MRI:  1.  Motion degraded.  Postcontrast images were not obtained.  2.  Small disc herniations throughout the thoracic spine with slight   contact of the ventral cord at T7-T8.  No thoracic cord compression, cord   edema or other focal cord lesion.    LUMBAR SPINE MRI:  1.  Incomplete exam.  Only motion degraded sagittal T1 and sagittal T2   images are available.  2.  There appears to be at least diffuse moderate spinal canal stenosis   at L1-L2 through L4-L5, which is suboptimally evaluated due to motion   artifact.  Evaluation for cauda equina compression is suboptimal.  No   high-grade neural foraminal narrowing is visualized.    < end of copied text >

## 2022-10-25 ENCOUNTER — TRANSCRIPTION ENCOUNTER (OUTPATIENT)
Age: 62
End: 2022-10-25

## 2022-10-25 LAB
ANION GAP SERPL CALC-SCNC: 12 MMOL/L — SIGNIFICANT CHANGE UP (ref 7–14)
APTT BLD: 22.1 SEC — LOW (ref 27–36.3)
BLD GP AB SCN SERPL QL: NEGATIVE — SIGNIFICANT CHANGE UP
BLOOD GAS ARTERIAL - LYTES,HGB,ICA,LACT RESULT: SIGNIFICANT CHANGE UP
BLOOD GAS ARTERIAL - LYTES,HGB,ICA,LACT RESULT: SIGNIFICANT CHANGE UP
BUN SERPL-MCNC: 16 MG/DL — SIGNIFICANT CHANGE UP (ref 7–23)
CALCIUM SERPL-MCNC: 9 MG/DL — SIGNIFICANT CHANGE UP (ref 8.4–10.5)
CHLORIDE SERPL-SCNC: 107 MMOL/L — SIGNIFICANT CHANGE UP (ref 98–107)
CO2 SERPL-SCNC: 22 MMOL/L — SIGNIFICANT CHANGE UP (ref 22–31)
CREAT SERPL-MCNC: 0.72 MG/DL — SIGNIFICANT CHANGE UP (ref 0.5–1.3)
EGFR: 103 ML/MIN/1.73M2 — SIGNIFICANT CHANGE UP
GLUCOSE SERPL-MCNC: 150 MG/DL — HIGH (ref 70–99)
HCT VFR BLD CALC: 36.8 % — LOW (ref 39–50)
HGB BLD-MCNC: 12.3 G/DL — LOW (ref 13–17)
INR BLD: 1.02 RATIO — SIGNIFICANT CHANGE UP (ref 0.88–1.16)
MAGNESIUM SERPL-MCNC: 1.8 MG/DL — SIGNIFICANT CHANGE UP (ref 1.6–2.6)
MCHC RBC-ENTMCNC: 29.2 PG — SIGNIFICANT CHANGE UP (ref 27–34)
MCHC RBC-ENTMCNC: 33.4 GM/DL — SIGNIFICANT CHANGE UP (ref 32–36)
MCV RBC AUTO: 87.4 FL — SIGNIFICANT CHANGE UP (ref 80–100)
NRBC # BLD: 0 /100 WBCS — SIGNIFICANT CHANGE UP (ref 0–0)
NRBC # FLD: 0 K/UL — SIGNIFICANT CHANGE UP (ref 0–0)
PHOSPHATE SERPL-MCNC: 3.2 MG/DL — SIGNIFICANT CHANGE UP (ref 2.5–4.5)
PLATELET # BLD AUTO: 271 K/UL — SIGNIFICANT CHANGE UP (ref 150–400)
POTASSIUM SERPL-MCNC: 4.1 MMOL/L — SIGNIFICANT CHANGE UP (ref 3.5–5.3)
POTASSIUM SERPL-SCNC: 4.1 MMOL/L — SIGNIFICANT CHANGE UP (ref 3.5–5.3)
PROTHROM AB SERPL-ACNC: 11.8 SEC — SIGNIFICANT CHANGE UP (ref 10.5–13.4)
RBC # BLD: 4.21 M/UL — SIGNIFICANT CHANGE UP (ref 4.2–5.8)
RBC # FLD: 14.5 % — SIGNIFICANT CHANGE UP (ref 10.3–14.5)
RH IG SCN BLD-IMP: POSITIVE — SIGNIFICANT CHANGE UP
SODIUM SERPL-SCNC: 141 MMOL/L — SIGNIFICANT CHANGE UP (ref 135–145)
WBC # BLD: 9.41 K/UL — SIGNIFICANT CHANGE UP (ref 3.8–10.5)
WBC # FLD AUTO: 9.41 K/UL — SIGNIFICANT CHANGE UP (ref 3.8–10.5)

## 2022-10-25 PROCEDURE — 99222 1ST HOSP IP/OBS MODERATE 55: CPT

## 2022-10-25 PROCEDURE — 71045 X-RAY EXAM CHEST 1 VIEW: CPT | Mod: 26

## 2022-10-25 PROCEDURE — 99291 CRITICAL CARE FIRST HOUR: CPT | Mod: 25

## 2022-10-25 RX ORDER — MAGNESIUM SULFATE 500 MG/ML
2 VIAL (ML) INJECTION ONCE
Refills: 0 | Status: COMPLETED | OUTPATIENT
Start: 2022-10-25 | End: 2022-10-25

## 2022-10-25 RX ORDER — SODIUM CHLORIDE 9 MG/ML
1000 INJECTION, SOLUTION INTRAVENOUS ONCE
Refills: 0 | Status: COMPLETED | OUTPATIENT
Start: 2022-10-25 | End: 2022-10-25

## 2022-10-25 RX ORDER — CHLORHEXIDINE GLUCONATE 213 G/1000ML
15 SOLUTION TOPICAL EVERY 12 HOURS
Refills: 0 | Status: DISCONTINUED | OUTPATIENT
Start: 2022-10-25 | End: 2022-10-26

## 2022-10-25 RX ORDER — PROPOFOL 10 MG/ML
5 INJECTION, EMULSION INTRAVENOUS
Qty: 1000 | Refills: 0 | Status: DISCONTINUED | OUTPATIENT
Start: 2022-10-25 | End: 2022-10-25

## 2022-10-25 RX ORDER — DEXAMETHASONE 0.5 MG/5ML
10 ELIXIR ORAL EVERY 8 HOURS
Refills: 0 | Status: COMPLETED | OUTPATIENT
Start: 2022-10-25 | End: 2022-10-27

## 2022-10-25 RX ORDER — SODIUM CHLORIDE 9 MG/ML
1000 INJECTION, SOLUTION INTRAVENOUS
Refills: 0 | Status: DISCONTINUED | OUTPATIENT
Start: 2022-10-25 | End: 2022-10-27

## 2022-10-25 RX ORDER — CHLORHEXIDINE GLUCONATE 213 G/1000ML
15 SOLUTION TOPICAL EVERY 12 HOURS
Refills: 0 | Status: DISCONTINUED | OUTPATIENT
Start: 2022-10-25 | End: 2022-10-25

## 2022-10-25 RX ORDER — CEFAZOLIN SODIUM 1 G
1000 VIAL (EA) INJECTION EVERY 8 HOURS
Refills: 0 | Status: DISCONTINUED | OUTPATIENT
Start: 2022-10-25 | End: 2022-10-30

## 2022-10-25 RX ORDER — CHLORHEXIDINE GLUCONATE 213 G/1000ML
1 SOLUTION TOPICAL DAILY
Refills: 0 | Status: DISCONTINUED | OUTPATIENT
Start: 2022-10-25 | End: 2022-11-03

## 2022-10-25 RX ORDER — HYDROMORPHONE HYDROCHLORIDE 2 MG/ML
0.5 INJECTION INTRAMUSCULAR; INTRAVENOUS; SUBCUTANEOUS EVERY 4 HOURS
Refills: 0 | Status: DISCONTINUED | OUTPATIENT
Start: 2022-10-25 | End: 2022-10-26

## 2022-10-25 RX ORDER — DEXMEDETOMIDINE HYDROCHLORIDE IN 0.9% SODIUM CHLORIDE 4 UG/ML
0.05 INJECTION INTRAVENOUS
Qty: 400 | Refills: 0 | Status: DISCONTINUED | OUTPATIENT
Start: 2022-10-25 | End: 2022-10-26

## 2022-10-25 RX ADMIN — PROPOFOL 2.55 MICROGRAM(S)/KG/MIN: 10 INJECTION, EMULSION INTRAVENOUS at 06:28

## 2022-10-25 RX ADMIN — Medication 100 MILLIGRAM(S): at 22:07

## 2022-10-25 RX ADMIN — Medication 102 MILLIGRAM(S): at 21:27

## 2022-10-25 RX ADMIN — Medication 100 MILLIGRAM(S): at 13:03

## 2022-10-25 RX ADMIN — SODIUM CHLORIDE 1000 MILLILITER(S): 9 INJECTION, SOLUTION INTRAVENOUS at 09:15

## 2022-10-25 RX ADMIN — SODIUM CHLORIDE 100 MILLILITER(S): 9 INJECTION, SOLUTION INTRAVENOUS at 06:31

## 2022-10-25 RX ADMIN — HYDROMORPHONE HYDROCHLORIDE 0.5 MILLIGRAM(S): 2 INJECTION INTRAMUSCULAR; INTRAVENOUS; SUBCUTANEOUS at 08:14

## 2022-10-25 RX ADMIN — DEXMEDETOMIDINE HYDROCHLORIDE IN 0.9% SODIUM CHLORIDE 1.06 MICROGRAM(S)/KG/HR: 4 INJECTION INTRAVENOUS at 06:28

## 2022-10-25 RX ADMIN — HYDROMORPHONE HYDROCHLORIDE 0.5 MILLIGRAM(S): 2 INJECTION INTRAMUSCULAR; INTRAVENOUS; SUBCUTANEOUS at 07:44

## 2022-10-25 RX ADMIN — Medication 25 GRAM(S): at 11:06

## 2022-10-25 RX ADMIN — Medication 102 MILLIGRAM(S): at 13:04

## 2022-10-25 RX ADMIN — CHLORHEXIDINE GLUCONATE 15 MILLILITER(S): 213 SOLUTION TOPICAL at 18:06

## 2022-10-25 RX ADMIN — CHLORHEXIDINE GLUCONATE 1 APPLICATION(S): 213 SOLUTION TOPICAL at 11:10

## 2022-10-25 NOTE — PROGRESS NOTE ADULT - SUBJECTIVE AND OBJECTIVE BOX
SICU Progress Note    Interval Events:  - cuff leak test in PM 7% leak, will reassess the next day  - precedex increased due to agitation    ALLERGIES:  No Known Drug Allergies  peanuts (Unknown)      --------------------------------------------------------------------------------------    MEDICATIONS:    Neurologic Medications  fentaNYL    Injectable 25 MICROGram(s) IV Push every 5 minutes PRN Moderate Pain (4 - 6)  fentaNYL    Injectable 50 MICROGram(s) IV Push every 15 minutes PRN Severe Pain (7 - 10)  ondansetron Injectable 4 milliGRAM(s) IV Push once PRN Nausea and/or Vomiting    Respiratory Medications    Cardiovascular Medications    Gastrointestinal Medications  lactated ringers. 1000 milliLiter(s) IV Continuous <Continuous>    Genitourinary Medications    Hematologic/Oncologic Medications    Antimicrobial/Immunologic Medications    Endocrine/Metabolic Medications  insulin lispro (ADMELOG) corrective regimen sliding scale   SubCutaneous every 6 hours    Topical/Other Medications  chlorhexidine 4% Liquid 1 Application(s) Topical daily    --------------------------------------------------------------------------------------    VITAL SIGNS:  T(C): 37.3 (10-24-22 @ 19:45), Max: 37.3 (10-24-22 @ 19:45)  HR: 106 (10-24-22 @ 19:45) (73 - 106)  BP: 105/103 (10-24-22 @ 19:45) (105/103 - 169/103)  RR: 16 (10-24-22 @ 19:45) (16 - 18)  SpO2: 96% (10-24-22 @ 19:45) (96% - 100%)  --------------------------------------------------------------------------------------    INS AND OUTS:    10-24-22 @ 07:01  -  10-25-22 @ 02:36  --------------------------------------------------------  IN: 0 mL / OUT: 900 mL / NET: -900 mL    --------------------------------------------------------------------------------------    EXAM     NEURO: NAD,   HEENT: NC/AT  RESPIRATORY: intubated  CARDIO: RRR, S1S2  ABDOMEN: soft, nontender, nondistended  EXTREMITIES: normal strength RUE and RLE, improved strength in LUE compared to am and LLE no deformities    --------------------------------------------------------------------------------------    LABS                         14.0   5.43  )-----------( 329      ( 24 Oct 2022 06:43 )             43.8   10-24    141  |  104  |  22  ----------------------------<  107<H>  3.9   |  25  |  0.95    Ca    9.9      24 Oct 2022 06:43  Phos  2.7     10-24  Mg     2.20     10-24    TPro  7.5  /  Alb  4.5  /  TBili  0.4  /  DBili  x   /  AST  28  /  ALT  24  /  AlkPhos  50  10-24      --------------------------------------------------------------------------------------

## 2022-10-25 NOTE — PROGRESS NOTE ADULT - SUBJECTIVE AND OBJECTIVE BOX
Patient seen and examined this am.  s/p C2-T1 lami/fusion   MEDICATIONS:    amLODIPine   Tablet 10 milliGRAM(s) Oral daily  ceFAZolin   IVPB 1000 milliGRAM(s) IV Intermittent every 8 hours  chlorhexidine 0.12% Liquid 15 milliLiter(s) Oral Mucosa every 12 hours  chlorhexidine 4% Liquid 1 Application(s) Topical daily  dexAMETHasone  IVPB 10 milliGRAM(s) IV Intermittent every 8 hours  dexMEDEtomidine Infusion 0.05 MICROgram(s)/kG/Hr IV Continuous <Continuous>  HYDROmorphone  Injectable 0.5 milliGRAM(s) IV Push every 4 hours PRN  lactated ringers. 1000 milliLiter(s) IV Continuous <Continuous>  propofol Infusion 5 MICROgram(s)/kG/Min IV Continuous <Continuous>      LABS:                          12.3   9.41  )-----------( 271      ( 25 Oct 2022 06:30 )             36.8     10-25    141  |  107  |  16  ----------------------------<  150<H>  4.1   |  22  |  0.72    Ca    9.0      25 Oct 2022 06:30  Phos  3.2     10-25  Mg     1.80     10-25    TPro  7.5  /  Alb  4.5  /  TBili  0.4  /  DBili  x   /  AST  28  /  ALT  24  /  AlkPhos  50  10-24    CAPILLARY BLOOD GLUCOSE        PT/INR - ( 25 Oct 2022 06:30 )   PT: 11.8 sec;   INR: 1.02 ratio         PTT - ( 25 Oct 2022 06:30 )  PTT:22.1 sec    I&O's Summary    24 Oct 2022 07:01  -  25 Oct 2022 07:00  --------------------------------------------------------  IN: 130.5 mL / OUT: 1070 mL / NET: -939.5 mL    25 Oct 2022 07:01  -  25 Oct 2022 11:11  --------------------------------------------------------  IN: 221 mL / OUT: 130 mL / NET: 91 mL      Vital Signs Last 24 Hrs  T(C): 36.6 (25 Oct 2022 08:00), Max: 37.3 (24 Oct 2022 19:45)  T(F): 97.9 (25 Oct 2022 08:00), Max: 99.1 (24 Oct 2022 19:45)  HR: 64 (25 Oct 2022 10:00) (59 - 106)  BP: 90/58 (25 Oct 2022 09:00) (90/58 - 166/116)  BP(mean): 66 (25 Oct 2022 09:00) (66 - 130)  RR: 14 (25 Oct 2022 10:00) (11 - 21)  SpO2: 97% (25 Oct 2022 10:00) (96% - 100%)    Parameters below as of 25 Oct 2022 08:00  Patient On (Oxygen Delivery Method): ventilator  O2 Flow (L/min): 50        On Neurological Examination:    Mental Status - intubated alert awake follows commands    Cranial Nerves - PERRL, EOMI, VFF, V1-V3 intact, no gross facial asymmetry, tongue/uvula midline    Motor Exam -   Right upper 4+ to 5/5  Left upper deltoid 4/5, biceps 3/5 rest 3/5  Right lower increased tone 4/5  Left lower increased tone 4/5 distal 4-/5      Sensory    Intact to light touch and pinprick bilaterally    Coord: FTN intact bilaterally     Gait -  deferred                                                   RADIOLOGY  < from: CT Lumbar Spine No Cont (10.23.22 @ 02:29) >  Impression:  1. Multilevel degenerative disc disease, results in severe central   stenosis at L3-4 and L4-5.  2. No acute fracture.    --- End of Report ---      < end of copied text >  < from: CT Head No Cont (10.23.22 @ 02:30) >  IMPRESSION:  Mildly motion degraded.  No CT evidence of acute intracranial pathology.    Follow-up MRI may be obtained for more sensitive evaluation.    --- End of Report ---    < end of copied text >    < from: MR Thoracic Spine w/wo IV Cont (10.23.22 @ 14:15) >  IMPRESSION:  CERVICAL SPINE MRI:  1.  Motion degraded.  Postcontrast images were not obtained.  2.  There is diffuse swelling of the cervical cord with long segment   central cord edema extending from C2 to T1, which has broad differential   diagnosis including transverse myelitis, other infectious and   inflammatory causes of myelitis, and trauma with subacute progressive   ascending myelopathy (SPAM).  Central cord infarct could produce similar   findings but is thought to be less likely as this is an atypical location   for spinal cord infarct.  While there is underlying moderate spinal canal   stenosis with apparent mild cord compression at C3-C4 and C4-C5,  minimal   cord compression at C5-C6, and mild ventral cord impingement at C6-C7,   the diffuse changes in the cervical cord are not compatible with primary   compressive myelopathy.  The appearanceof cord compression may be due in   part to the underlying swelling of the cervical cord.  3.  Follow-up contrast-enhanced MRI of the cervical spine is recommended.      THORACIC SPINE MRI:  1.  Motion degraded.  Postcontrast images were not obtained.  2.  Small disc herniations throughout the thoracic spine with slight   contact of the ventral cord at T7-T8.  No thoracic cord compression, cord   edema or other focal cord lesion.    LUMBAR SPINE MRI:  1.  Incomplete exam.  Only motion degraded sagittal T1 and sagittal T2   images are available.  2.  There appears to be at least diffuse moderate spinal canal stenosis   at L1-L2 through L4-L5, which is suboptimally evaluated due to motion   artifact.  Evaluation for cauda equina compression is suboptimal.  No   high-grade neural foraminal narrowing is visualized.      I discussed the major findings in this report with Dr. Bolaños from   orthopedic surgery on 10/24/2022 at 3:30 AM.  Read back verification was   obtained.    --- End of Report ---    < end of copied text >

## 2022-10-25 NOTE — PROGRESS NOTE ADULT - ASSESSMENT
ASSESSMENT:  62M with PMH asthma, HTN, herniated disc who presents as a transfer from [facility] with c/o numbness, weakness and swelling of left arm for the past month and weakness of right leg, and b/l LE muscle spasms. Patient reports progressive worsening weakness and muscle spasms since last month. Denies falls, traumas, bladder or bowel incontinence. Has been trying to take nucenta 100 mg daily and ibuprofen without relief. Decided to present to [facility] when the pain was uncontrolled. Imaging with concern for spinal canal stenosis and transferred to [facility] for further evaluation and management with orthopedic-spine team. Now s/p C2-T1 laminectomy and fusion. SICU consulted for q1 neurovascular checks.    PLAN  NEUROLOGIC   - Wean precedex as needed  - Q1 neuro check  - aspen collar after extubation    RESPIRATORY   - Intubated CPAP 5/5 21%  - Monitor SpO2 goal >92%    CARDIOVASCULAR   - Monitor hemodynamics   - MAP goal > 85    GASTROINTESTINAL   - Diet: NPO    /RENAL   - IV fluids: LR @ 100cc/hr  - Strict I/Os  - Monitor BMP qd  - Maintain ladd catheter, strict Is/Os  - Monitor electrolytes, replete PRN    HEMATOLOGIC  - Monitor H/H   - DVT prophylaxis SCDs    INFECTIOUS DISEASE  - Monitor fever / WC  - Ancef until YOON drain removal    ENDOCRINE  - Monitor gluc  - Decadron 10q8 for 48 hours (started 10/25)    LINES  - R rad a line  - PIV  - Ladd    DISPO: SICU

## 2022-10-25 NOTE — CONSULT NOTE ADULT - SUBJECTIVE AND OBJECTIVE BOX
SICU Consult Note    HPI:  62M with PMH asthma, HTN, herniated disc who presents as a transfer from [facility] with c/o numbness, weakness and swelling of left arm for the past month and weakness of right leg, and b/l LE muscle spasms. Patient reports progressive worsening weakness and muscle spasms since last month. Denies falls, traumas, bladder or bowel incontinence. Has been trying to take nucenta 100 mg daily and ibuprofen without relief. Decided to present to [facility] when the pain was uncontrolled. Imaging with concern for spinal canal stenosis and transferred to [facility] for further evaluation and management with orthopedic-spine team. Now s/p C2-T1 laminectomy and fusion. SICU consulted for q1 neurovascular checks.      ALLERGIES:  No Known Drug Allergies  peanuts (Unknown)      --------------------------------------------------------------------------------------    MEDICATIONS:    Neurologic Medications  fentaNYL    Injectable 25 MICROGram(s) IV Push every 5 minutes PRN Moderate Pain (4 - 6)  fentaNYL    Injectable 50 MICROGram(s) IV Push every 15 minutes PRN Severe Pain (7 - 10)  ondansetron Injectable 4 milliGRAM(s) IV Push once PRN Nausea and/or Vomiting    Respiratory Medications    Cardiovascular Medications    Gastrointestinal Medications  lactated ringers. 1000 milliLiter(s) IV Continuous <Continuous>    Genitourinary Medications    Hematologic/Oncologic Medications    Antimicrobial/Immunologic Medications    Endocrine/Metabolic Medications  insulin lispro (ADMELOG) corrective regimen sliding scale   SubCutaneous every 6 hours    Topical/Other Medications  chlorhexidine 4% Liquid 1 Application(s) Topical daily    --------------------------------------------------------------------------------------    VITAL SIGNS:  T(C): 37.3 (10-24-22 @ 19:45), Max: 37.3 (10-24-22 @ 19:45)  HR: 106 (10-24-22 @ 19:45) (73 - 106)  BP: 105/103 (10-24-22 @ 19:45) (105/103 - 169/103)  RR: 16 (10-24-22 @ 19:45) (16 - 18)  SpO2: 96% (10-24-22 @ 19:45) (96% - 100%)  --------------------------------------------------------------------------------------    INS AND OUTS:    10-24-22 @ 07:01  -  10-25-22 @ 02:36  --------------------------------------------------------  IN: 0 mL / OUT: 900 mL / NET: -900 mL    --------------------------------------------------------------------------------------    EXAM ********    NEURO: NAD,   HEENT: NC/AT  RESPIRATORY: nonlabored respirations, normal CW expansion  CARDIO: RRR, S1S2  ABDOMEN: soft, nontender, nondistended, +/- NGT, ostomy, surgical dressing c/d/i, YOON drain output  EXTREMITIES: normal strength, no deformities    --------------------------------------------------------------------------------------    LABS ******    ((Insert SICU Labs here))***  -------------------------------------------------------------------------------------- SICU Consult Note    HPI:  62M with PMH asthma, HTN, herniated disc who presents as a transfer from [facility] with c/o numbness, weakness and swelling of left arm for the past month and weakness of right leg, and b/l LE muscle spasms. Patient reports progressive worsening weakness and muscle spasms since last month. Denies falls, traumas, bladder or bowel incontinence. Has been trying to take nucenta 100 mg daily and ibuprofen without relief. Decided to present to [facility] when the pain was uncontrolled. Imaging with concern for spinal canal stenosis and transferred to [facility] for further evaluation and management with orthopedic-spine team. Now s/p C2-T1 laminectomy and fusion. SICU consulted for q1 neurovascular checks.      ALLERGIES:  No Known Drug Allergies  peanuts (Unknown)      --------------------------------------------------------------------------------------    MEDICATIONS:    Neurologic Medications  fentaNYL    Injectable 25 MICROGram(s) IV Push every 5 minutes PRN Moderate Pain (4 - 6)  fentaNYL    Injectable 50 MICROGram(s) IV Push every 15 minutes PRN Severe Pain (7 - 10)  ondansetron Injectable 4 milliGRAM(s) IV Push once PRN Nausea and/or Vomiting    Respiratory Medications    Cardiovascular Medications    Gastrointestinal Medications  lactated ringers. 1000 milliLiter(s) IV Continuous <Continuous>    Genitourinary Medications    Hematologic/Oncologic Medications    Antimicrobial/Immunologic Medications    Endocrine/Metabolic Medications  insulin lispro (ADMELOG) corrective regimen sliding scale   SubCutaneous every 6 hours    Topical/Other Medications  chlorhexidine 4% Liquid 1 Application(s) Topical daily    --------------------------------------------------------------------------------------    VITAL SIGNS:  T(C): 37.3 (10-24-22 @ 19:45), Max: 37.3 (10-24-22 @ 19:45)  HR: 106 (10-24-22 @ 19:45) (73 - 106)  BP: 105/103 (10-24-22 @ 19:45) (105/103 - 169/103)  RR: 16 (10-24-22 @ 19:45) (16 - 18)  SpO2: 96% (10-24-22 @ 19:45) (96% - 100%)  --------------------------------------------------------------------------------------    INS AND OUTS:    10-24-22 @ 07:01  -  10-25-22 @ 02:36  --------------------------------------------------------  IN: 0 mL / OUT: 900 mL / NET: -900 mL    --------------------------------------------------------------------------------------    EXAM     NEURO: NAD,   HEENT: NC/AT  RESPIRATORY: intubated  CARDIO: RRR, S1S2  ABDOMEN: soft, nontender, nondistended  EXTREMITIES: normal strength, no deformities    --------------------------------------------------------------------------------------    LABS                         14.0   5.43  )-----------( 329      ( 24 Oct 2022 06:43 )             43.8   10-24    141  |  104  |  22  ----------------------------<  107<H>  3.9   |  25  |  0.95    Ca    9.9      24 Oct 2022 06:43  Phos  2.7     10-24  Mg     2.20     10-24    TPro  7.5  /  Alb  4.5  /  TBili  0.4  /  DBili  x   /  AST  28  /  ALT  24  /  AlkPhos  50  10-24      --------------------------------------------------------------------------------------

## 2022-10-25 NOTE — PROGRESS NOTE ADULT - ASSESSMENT
61 yo male few weeks weaknessLUE LLe> RLe weaknes PE LUE weakness bilateral LE. Patient s/p c2-T1 lami/fsuions. Pe exam stable to slightly improved from my exam on 1023 at [facility]      Impression cervical cord compression. possible superimposed LS radic/stenosis    s/p Lami/fusion  steroids as per spine servics  plan for possible lumbar intervention  When stable would consider a dedicated MRi C-spine with contrast

## 2022-10-25 NOTE — PROGRESS NOTE ADULT - SUBJECTIVE AND OBJECTIVE BOX
Pt seen/examined. Doing well. Pain controlled. No acute overnight complaints or events.    T(C): 37.3 (10-24-22 @ 19:45), Max: 37.3 (10-24-22 @ 19:45)  HR: 106 (10-24-22 @ 19:45) (79 - 106)  BP: 105/103 (10-24-22 @ 19:45) (105/103 - 169/103)  RR: 16 (10-24-22 @ 19:45) (16 - 18)  SpO2: 96% (10-24-22 @ 19:45) (96% - 99%)  Wt(kg): --  - Gen: NAD    Physical exam  Intubated/sedated  Spine PE:  Skin intact  No gross deformity  No bony step offs  Negative clonus  Negative babinski  Negative andrade    Motor:                   C5                C6              C7               C8           T1   R            5/5                5/5            5/5             5/5          5/5  L             1/5               2/5             2/5             1/5          2/5                L2             L3             L4               L5            S1  R         5/5           5/5          5/5             5/5           5/5  L          3/5          2/5           1/5             1/5           4/5    Sensory:            C5         C6         C7      C8       T1        (0=absent, 1=impaired, 2=normal, NT=not testable)  R           2           2           2      2         2  L          1           1           1        1         1               L2          L3         L4      L5       S1         (0=absent, 1=impaired, 2=normal, NT=not testable)  R         2            2            2        2        2  L          2            2           2        2         2    62y male s/p C2-T1 lami/fusion admitted to SICU for q1 neurochecks and MAP >85    - Q1 neurochecks  - Plan for return to OR 10/28 L2-S1 lami/fusion  - MAP >85  - Decadron 10 q8  - Aspen collar when extubated  - Pain control  - mechanical DVT ppx

## 2022-10-25 NOTE — CONSULT NOTE ADULT - ATTENDING COMMENTS
risks, benefits and alternative discussed.  Informed consent obtained.  all questions answered.  Patient with acute spinal cord injury with central cord type of picture from cervical spine ~ 3 weeks.  Progressive symptoms in last 3 weeks with essentially complete loss of function of left arm, acute wrist drop, shoulder function 1/5. elbow flexion 1-2/5 on left, triceps 1-2/5, wrist drop 1/5, finger flexion 2/5, decreased  strength loss in right 4/5 (difficulty grasping pen and signing, decreased sensation with diffuse numbness left > right.  Left leg foot drop, diffuse spontaneous fasciculations and spasm in the right leg and left leg.  Lumbar MRI shows severe compression as well from multilevel disc herniations L1-S1 with congenital underlying lumbar stenosis.  patient has had lumbar symptoms > 2 years.  All questions answered.  patient needs both cervical and lumbar decompression.  patient is aware he may not make any improvement.  There is also a small risk he can get worse.  We will stage lumbar surgery for later this week.  complications reviewed.  nails pin sites discussed.  Informed consent obtained.
I agree with the detailed interval history, physical, and plan, which I have reviewed and edited where appropriate'; also agree with notes/assessment with my team on service.  I have personally examined the patient.  I was physically present for the key portions of the evaluation and management (E/M) service provided.  I reviewed all the pertinent data.  The patient is a critical care patient with life threatening hemodynamic and metabolic instability in SICU.  The SICU team has a constant risk benefit analyzes discussion and coordinating care with the primary team and all consultants.   The patient is in SICU with the chief complaint and diagnosis mentioned in the note.   The plan will be specified in the note.  62M with PMH asthma, HTN, herniated disc; s/p C2-T1 laminectomy and fusion. SICU consulted for q1 neurovascular checks.  EXAM   NEURO: sedated  RESPIRATORY: intubated/clear  CARDIO: RR  ABDOMEN: soft, nontender, nondistended  EXTREMITIES: improving strength    PLAN  NEUROLOGIC;  Sedated  -propofol and precedex  - Q1 neuro check  RESPIRATORY; acute respiratory insufficiency  - Intubated 12/450/5/100%  CARDIOVASCULAR   - MAP goal > 75-85  GASTROINTESTINAL   - Diet: NPO  /RENAL   - IV fluids: LR @ 100cc/hr  HEMATOLOGIC  - DVT prophylaxis   INFECTIOUS DISEASE  - Ancef  ENDOCRINE  - Monitor glucose  - Decadron 10q8 for 48 hours  DISPO: SICU

## 2022-10-25 NOTE — BRIEF OPERATIVE NOTE - COMMENTS
Remained intubated postop per anesthesia request for monitoring  YOON drain x1  Decadron 10q8 x 48 hours  Ancef standing until drain removed  MAP >85 Remained intubated postop per anesthesia request for monitoring  YOON drain x1  Decadron 10q8 x 48 hours  Ancef standing until drain removed  No anticoagulation (if required please discuss with ortho team)  MAP >85

## 2022-10-25 NOTE — BRIEF OPERATIVE NOTE - NSICDXBRIEFPROCEDURE_GEN_ALL_CORE_FT
PROCEDURES:  Fusion of cervical spine at 6 levels by posterior approach 25-Oct-2022 05:53:39 C2-T1 Ntahan Clarke

## 2022-10-25 NOTE — CONSULT NOTE ADULT - ASSESSMENT
ASSESSMENT:  62M with PMH asthma, HTN, herniated disc who presents as a transfer from [facility] with c/o numbness, weakness and swelling of left arm for the past month and weakness of right leg, and b/l LE muscle spasms. Patient reports progressive worsening weakness and muscle spasms since last month. Denies falls, traumas, bladder or bowel incontinence. Has been trying to take nucenta 100 mg daily and ibuprofen without relief. Decided to present to [facility] when the pain was uncontrolled. Imaging with concern for spinal canal stenosis and transferred to [facility] for further evaluation and management with orthopedic-spine team. Now s/p C2-T1 laminectomy and fusion. SICU consulted for q1 neurovascular checks.    PLAN  NEUROLOGIC   - Pain control:   - Q1 neurovascular check    RESPIRATORY   - Monitor SpO2 goal >92%  - Incentive spirometry     CARDIOVASCULAR   - Monitor hemodynamics   - MAP goal > 85    GASTROINTESTINAL   - Diet:   -     /RENAL   - IV fluids: LR @ 125cc/hr  - Strict I/Os  - Monitor BMP qd  - Maintain ladd catheter, strict Is/Os  - Monitor electrolytes, replete PRN    HEMATOLOGIC  - Monitor H/H   - DVT ppx: Lovenox/SQH & SCD's    INFECTIOUS DISEASE  - Monitor fever / WBC    ENDOCRINE  - Monitor gluc    LINES  - IJ CVC (  /  )  - A line (  /  )  - Ladd (  /  )  - PIV     DISPO:   --------------------------------------------------------------------------------------    Critical Care Diagnoses: ASSESSMENT:  62M with PMH asthma, HTN, herniated disc who presents as a transfer from [facility] with c/o numbness, weakness and swelling of left arm for the past month and weakness of right leg, and b/l LE muscle spasms. Patient reports progressive worsening weakness and muscle spasms since last month. Denies falls, traumas, bladder or bowel incontinence. Has been trying to take nucenta 100 mg daily and ibuprofen without relief. Decided to present to [facility] when the pain was uncontrolled. Imaging with concern for spinal canal stenosis and transferred to [facility] for further evaluation and management with orthopedic-spine team. Now s/p C2-T1 laminectomy and fusion. SICU consulted for q1 neurovascular checks.    PLAN  NEUROLOGIC   - Sedated with propofol and precedex  - Q1 neuro check    RESPIRATORY   - Intubated 12/450/5/100%  - Monitor SpO2 goal >92%    CARDIOVASCULAR   - Monitor hemodynamics   - MAP goal > 85    GASTROINTESTINAL   - Diet: NPO    /RENAL   - IV fluids: LR @ 100cc/hr  - Strict I/Os  - Monitor BMP qd  - Maintain ladd catheter, strict Is/Os  - Monitor electrolytes, replete PRN    HEMATOLOGIC  - Monitor H/H   - DVT prophylaxis SCDs    INFECTIOUS DISEASE  - Monitor fever / WBC  - Ancef until YOON drain removal    ENDOCRINE  - Monitor gluc    LINES  - R rad a line  - PIV  - Ladd    DISPO: SICU  --------------------------------------------------------------------------------------    Critical Care Diagnoses: ASSESSMENT:  62M with PMH asthma, HTN, herniated disc who presents as a transfer from [facility] with c/o numbness, weakness and swelling of left arm for the past month and weakness of right leg, and b/l LE muscle spasms. Patient reports progressive worsening weakness and muscle spasms since last month. Denies falls, traumas, bladder or bowel incontinence. Has been trying to take nucenta 100 mg daily and ibuprofen without relief. Decided to present to [facility] when the pain was uncontrolled. Imaging with concern for spinal canal stenosis and transferred to [facility] for further evaluation and management with orthopedic-spine team. Now s/p C2-T1 laminectomy and fusion. SICU consulted for q1 neurovascular checks.    PLAN  NEUROLOGIC   - Sedated with propofol and precedex  - Q1 neuro check  - aspen collar after extubation    RESPIRATORY   - Intubated 12/450/5/100%  - Monitor SpO2 goal >92%      CARDIOVASCULAR   - Monitor hemodynamics   - MAP goal > 85    GASTROINTESTINAL   - Diet: NPO    /RENAL   - IV fluids: LR @ 100cc/hr  - Strict I/Os  - Monitor BMP qd  - Maintain ladd catheter, strict Is/Os  - Monitor electrolytes, replete PRN    HEMATOLOGIC  - Monitor H/H   - DVT prophylaxis SCDs    INFECTIOUS DISEASE  - Monitor fever / WBC  - Ancef until YOON drain removal    ENDOCRINE  - Monitor gluc  - Decadron 10q8 for 48 hours    LINES  - R rad a line  - PIV  - Ladd    DISPO: SICU  --------------------------------------------------------------------------------------    Critical Care Diagnoses:

## 2022-10-25 NOTE — PROGRESS NOTE ADULT - ASSESSMENT
ASSESSMENT:  62M with PMH asthma, HTN, herniated disc who presents as a transfer from [facility] with c/o numbness, weakness and swelling of left arm for the past month and weakness of right leg, and b/l LE muscle spasms. Patient reports progressive worsening weakness and muscle spasms since last month. Denies falls, traumas, bladder or bowel incontinence. Has been trying to take nucenta 100 mg daily and ibuprofen without relief. Decided to present to [facility] when the pain was uncontrolled. Imaging with concern for spinal canal stenosis and transferred to [facility] for further evaluation and management with orthopedic-spine team. Now s/p C2-T1 laminectomy and fusion. SICU consulted for q1 neurovascular checks.    PLAN  NEUROLOGIC   - Sedated with propofol and precedex  - Q1 neuro check  - aspen collar after extubation    RESPIRATORY   - Intubated 12/450/5/100%  - Monitor SpO2 goal >92%      CARDIOVASCULAR   - Monitor hemodynamics   - MAP goal > 85    GASTROINTESTINAL   - Diet: NPO    /RENAL   - IV fluids: LR @ 100cc/hr  - Strict I/Os  - Monitor BMP qd  - Maintain ladd catheter, strict Is/Os  - Monitor electrolytes, replete PRN    HEMATOLOGIC  - Monitor H/H   - DVT prophylaxis SCDs    INFECTIOUS DISEASE  - Monitor fever / WBC  - Ancef until YOON drain removal    ENDOCRINE  - Monitor gluc  - Decadron 10q8 for 48 hours    LINES  - R rad a line  - PIV  - Ladd    DISPO: SICU ASSESSMENT:  62M with PMH asthma, HTN, herniated disc who presents as a transfer from [facility] with c/o numbness, weakness and swelling of left arm for the past month and weakness of right leg, and b/l LE muscle spasms. Patient reports progressive worsening weakness and muscle spasms since last month. Denies falls, traumas, bladder or bowel incontinence. Has been trying to take nucenta 100 mg daily and ibuprofen without relief. Decided to present to [facility] when the pain was uncontrolled. Imaging with concern for spinal canal stenosis and transferred to [facility] for further evaluation and management with orthopedic-spine team. Now s/p C2-T1 laminectomy and fusion. SICU consulted for q1 neurovascular checks.    PLAN  NEUROLOGIC   - Wean propofol and precedex  - Q1 neuro check  - aspen collar after extubation    RESPIRATORY   - Intubated CPAP 5/5 21%  - Monitor SpO2 goal >92%    CARDIOVASCULAR   - Monitor hemodynamics   - MAP goal > 85    GASTROINTESTINAL   - Diet: NPO    /RENAL   - IV fluids: LR @ 100cc/hr  - Strict I/Os  - Monitor BMP qd  - Maintain ladd catheter, strict Is/Os  - Monitor electrolytes, replete PRN    HEMATOLOGIC  - Monitor H/H   - DVT prophylaxis SCDs    INFECTIOUS DISEASE  - Monitor fever / WC  - Ancef until YOON drain removal    ENDOCRINE  - Monitor gluc  - Decadron 10q8 for 48 hours    LINES  - R rad a line  - PIV  - Ladd    DISPO: SICU

## 2022-10-25 NOTE — PROGRESS NOTE ADULT - ASSESSMENT
EKG SR LVH with re op changed unchanges from earlier this month     Assessment and Plan     1) Pre op asessment: s/p spinal surgery tolerated the procedure well     2) HTN : has A line t.t per sicu team     3) DVT PPX recommend SCD's

## 2022-10-25 NOTE — CONSULT NOTE ADULT - CONSULT REASON
q1 neurovascular checks, possible mechanical ventilation q1 neurovascular checks, mechanical ventilation

## 2022-10-25 NOTE — PROGRESS NOTE ADULT - SUBJECTIVE AND OBJECTIVE BOX
HPI:  62M with PMH asthma, HTN, herniated disc who presents as a transfer from [facility] with c/o numbness, weakness and swelling of left arm for the past month and weakness of right leg, and b/l LE muscle spasms. Patient reports progressive worsening weakness and muscle spasms since last month. Denies falls, traumas, bladder or bowel incontinence. Has been trying to take nucenta 100 mg daily and ibuprofen without relief. Decided to present to [facility] when the pain was uncontrolled. Imaging with concern for spinal canal stenosis and transferred to [facility] for further evaluation and management with orthopedic-spine team. Now s/p C2-T1 laminectomy and fusion. SICU consulted for q1 neurovascular checks.      ALLERGIES:  No Known Drug Allergies  peanuts (Unknown)      --------------------------------------------------------------------------------------    MEDICATIONS:    Neurologic Medications  fentaNYL    Injectable 25 MICROGram(s) IV Push every 5 minutes PRN Moderate Pain (4 - 6)  fentaNYL    Injectable 50 MICROGram(s) IV Push every 15 minutes PRN Severe Pain (7 - 10)  ondansetron Injectable 4 milliGRAM(s) IV Push once PRN Nausea and/or Vomiting    Respiratory Medications    Cardiovascular Medications    Gastrointestinal Medications  lactated ringers. 1000 milliLiter(s) IV Continuous <Continuous>    Genitourinary Medications    Hematologic/Oncologic Medications    Antimicrobial/Immunologic Medications    Endocrine/Metabolic Medications  insulin lispro (ADMELOG) corrective regimen sliding scale   SubCutaneous every 6 hours    Topical/Other Medications  chlorhexidine 4% Liquid 1 Application(s) Topical daily    --------------------------------------------------------------------------------------    VITAL SIGNS:  T(C): 37.3 (10-24-22 @ 19:45), Max: 37.3 (10-24-22 @ 19:45)  HR: 106 (10-24-22 @ 19:45) (73 - 106)  BP: 105/103 (10-24-22 @ 19:45) (105/103 - 169/103)  RR: 16 (10-24-22 @ 19:45) (16 - 18)  SpO2: 96% (10-24-22 @ 19:45) (96% - 100%)  --------------------------------------------------------------------------------------    INS AND OUTS:    10-24-22 @ 07:01  -  10-25-22 @ 02:36  --------------------------------------------------------  IN: 0 mL / OUT: 900 mL / NET: -900 mL    --------------------------------------------------------------------------------------    EXAM     NEURO: NAD,   TEJA: NC/AT  RESPIRATORY: intubated  CARDIO: RRR, S1S2  ABDOMEN: soft, nontender, nondistended  EXTREMITIES: normal strength, no deformities    --------------------------------------------------------------------------------------    LABS                         14.0   5.43  )-----------( 329      ( 24 Oct 2022 06:43 )             43.8   10-24    141  |  104  |  22  ----------------------------<  107<H>  3.9   |  25  |  0.95    Ca    9.9      24 Oct 2022 06:43  Phos  2.7     10-24  Mg     2.20     10-24    TPro  7.5  /  Alb  4.5  /  TBili  0.4  /  DBili  x   /  AST  28  /  ALT  24  /  AlkPhos  50  10-24      -------------------------------------------------------------------------------------- HPI:  62M with PMH asthma, HTN, herniated disc who presents as a transfer from [facility] with c/o numbness, weakness and swelling of left arm for the past month and weakness of right leg, and b/l LE muscle spasms. Patient reports progressive worsening weakness and muscle spasms since last month. Denies falls, traumas, bladder or bowel incontinence. Has been trying to take nucenta 100 mg daily and ibuprofen without relief. Decided to present to [facility] when the pain was uncontrolled. Imaging with concern for spinal canal stenosis and transferred to [facility] for further evaluation and management with orthopedic-spine team. Now s/p C2-T1 laminectomy and fusion. SICU  for q1 neurovascular checks.    Interval Events:  - cuff leak test in AM 24% leak, will keep intubated at this time    ALLERGIES:  No Known Drug Allergies  peanuts (Unknown)      --------------------------------------------------------------------------------------    MEDICATIONS:    Neurologic Medications  fentaNYL    Injectable 25 MICROGram(s) IV Push every 5 minutes PRN Moderate Pain (4 - 6)  fentaNYL    Injectable 50 MICROGram(s) IV Push every 15 minutes PRN Severe Pain (7 - 10)  ondansetron Injectable 4 milliGRAM(s) IV Push once PRN Nausea and/or Vomiting    Respiratory Medications    Cardiovascular Medications    Gastrointestinal Medications  lactated ringers. 1000 milliLiter(s) IV Continuous <Continuous>    Genitourinary Medications    Hematologic/Oncologic Medications    Antimicrobial/Immunologic Medications    Endocrine/Metabolic Medications  insulin lispro (ADMELOG) corrective regimen sliding scale   SubCutaneous every 6 hours    Topical/Other Medications  chlorhexidine 4% Liquid 1 Application(s) Topical daily    --------------------------------------------------------------------------------------    VITAL SIGNS:  T(C): 37.3 (10-24-22 @ 19:45), Max: 37.3 (10-24-22 @ 19:45)  HR: 106 (10-24-22 @ 19:45) (73 - 106)  BP: 105/103 (10-24-22 @ 19:45) (105/103 - 169/103)  RR: 16 (10-24-22 @ 19:45) (16 - 18)  SpO2: 96% (10-24-22 @ 19:45) (96% - 100%)  --------------------------------------------------------------------------------------    INS AND OUTS:    10-24-22 @ 07:01  -  10-25-22 @ 02:36  --------------------------------------------------------  IN: 0 mL / OUT: 900 mL / NET: -900 mL    --------------------------------------------------------------------------------------    EXAM     NEURO: NAD,   HEENT: NC/AT  RESPIRATORY: intubated  CARDIO: RRR, S1S2  ABDOMEN: soft, nontender, nondistended  EXTREMITIES: normal strength RUE and RLE, decreased strength in LUE and LLE no deformities    --------------------------------------------------------------------------------------    LABS                         14.0   5.43  )-----------( 329      ( 24 Oct 2022 06:43 )             43.8   10-24    141  |  104  |  22  ----------------------------<  107<H>  3.9   |  25  |  0.95    Ca    9.9      24 Oct 2022 06:43  Phos  2.7     10-24  Mg     2.20     10-24    TPro  7.5  /  Alb  4.5  /  TBili  0.4  /  DBili  x   /  AST  28  /  ALT  24  /  AlkPhos  50  10-24      -------------------------------------------------------------------------------------- SICU Progress Note    Interval Events:  - cuff leak test in AM 24% leak, will keep intubated at this time    ALLERGIES:  No Known Drug Allergies  peanuts (Unknown)      --------------------------------------------------------------------------------------    MEDICATIONS:    Neurologic Medications  fentaNYL    Injectable 25 MICROGram(s) IV Push every 5 minutes PRN Moderate Pain (4 - 6)  fentaNYL    Injectable 50 MICROGram(s) IV Push every 15 minutes PRN Severe Pain (7 - 10)  ondansetron Injectable 4 milliGRAM(s) IV Push once PRN Nausea and/or Vomiting    Respiratory Medications    Cardiovascular Medications    Gastrointestinal Medications  lactated ringers. 1000 milliLiter(s) IV Continuous <Continuous>    Genitourinary Medications    Hematologic/Oncologic Medications    Antimicrobial/Immunologic Medications    Endocrine/Metabolic Medications  insulin lispro (ADMELOG) corrective regimen sliding scale   SubCutaneous every 6 hours    Topical/Other Medications  chlorhexidine 4% Liquid 1 Application(s) Topical daily    --------------------------------------------------------------------------------------    VITAL SIGNS:  T(C): 37.3 (10-24-22 @ 19:45), Max: 37.3 (10-24-22 @ 19:45)  HR: 106 (10-24-22 @ 19:45) (73 - 106)  BP: 105/103 (10-24-22 @ 19:45) (105/103 - 169/103)  RR: 16 (10-24-22 @ 19:45) (16 - 18)  SpO2: 96% (10-24-22 @ 19:45) (96% - 100%)  --------------------------------------------------------------------------------------    INS AND OUTS:    10-24-22 @ 07:01  -  10-25-22 @ 02:36  --------------------------------------------------------  IN: 0 mL / OUT: 900 mL / NET: -900 mL    --------------------------------------------------------------------------------------    EXAM     NEURO: NAD,   TEJA: NC/AT  RESPIRATORY: intubated  CARDIO: RRR, S1S2  ABDOMEN: soft, nontender, nondistended  EXTREMITIES: normal strength RUE and RLE, decreased strength in LUE and LLE no deformities    --------------------------------------------------------------------------------------    LABS                         14.0   5.43  )-----------( 329      ( 24 Oct 2022 06:43 )             43.8   10-24    141  |  104  |  22  ----------------------------<  107<H>  3.9   |  25  |  0.95    Ca    9.9      24 Oct 2022 06:43  Phos  2.7     10-24  Mg     2.20     10-24    TPro  7.5  /  Alb  4.5  /  TBili  0.4  /  DBili  x   /  AST  28  /  ALT  24  /  AlkPhos  50  10-24      --------------------------------------------------------------------------------------

## 2022-10-25 NOTE — PROGRESS NOTE ADULT - SUBJECTIVE AND OBJECTIVE BOX
Manuel Porter MD  Interventional Cardiology / Endovascular Specialist  [place] Office : [address] [place] [address] [address] [address]  Tel:   [place] Office : [address] [facility] [place] [address]  Tel: [phone]    Pt lying in bed in NAD denjoão CP SOB   	  MEDICATIONS:  amLODIPine   Tablet 10 milliGRAM(s) Oral daily    ceFAZolin   IVPB 1000 milliGRAM(s) IV Intermittent every 8 hours      dexMEDEtomidine Infusion 0.05 MICROgram(s)/kG/Hr IV Continuous <Continuous>  HYDROmorphone  Injectable 0.5 milliGRAM(s) IV Push every 4 hours PRN  propofol Infusion 5 MICROgram(s)/kG/Min IV Continuous <Continuous>      dexAMETHasone  IVPB 10 milliGRAM(s) IV Intermittent every 8 hours    chlorhexidine 0.12% Liquid 15 milliLiter(s) Oral Mucosa every 12 hours  chlorhexidine 4% Liquid 1 Application(s) Topical daily  lactated ringers. 1000 milliLiter(s) IV Continuous <Continuous>      PAST MEDICAL/SURGICAL HISTORY  PAST MEDICAL & SURGICAL HISTORY:  Asthma          SOCIAL HISTORY: Substance Use (street drugs): ( x ) never used  (  ) other:    FAMILY HISTORY:      REVIEW OF SYSTEMS:  not able to assess     PHYSICAL EXAM:  T(C): 35.9 (10-25-22 @ 12:00), Max: 37.3 (10-24-22 @ 19:45)  HR: 54 (10-25-22 @ 15:24) (54 - 106)  BP: 122/81 (10-25-22 @ 15:00) (90/58 - 166/116)  RR: 15 (10-25-22 @ 15:00) (11 - 21)  SpO2: 99% (10-25-22 @ 15:24) (96% - 100%)  Wt(kg): --  I&O's Summary    24 Oct 2022 07:01  -  25 Oct 2022 07:00  --------------------------------------------------------  IN: 130.5 mL / OUT: 1070 mL / NET: -939.5 mL    25 Oct 2022 07:01  -  25 Oct 2022 16:28  --------------------------------------------------------  IN: 2093 mL / OUT: 1160 mL / NET: 933 mL        Weight (kg): 84.9 (10-24 @ 18:26)    GENERAL: intubated and sedated   EYES: conjunctiva and sclera clear  ENMT: No tonsillar erythema, exudates, or enlargement  Cardiovascular: Normal S1 S2, No JVD, No murmurs, No edema  Respiratory: Lungs clear to auscultation	  Gastrointestinal:  Soft, Non-tender, + BS	  Extremities: No edema                                  12.3   9.41  )-----------( 271      ( 25 Oct 2022 06:30 )             36.8     10-25    141  |  107  |  16  ----------------------------<  150<H>  4.1   |  22  |  0.72    Ca    9.0      25 Oct 2022 06:30  Phos  3.2     10-25  Mg     1.80     10-25    TPro  7.5  /  Alb  4.5  /  TBili  0.4  /  DBili  x   /  AST  28  /  ALT  24  /  AlkPhos  50  10-24    proBNP:   Lipid Profile:   HgA1c:   TSH:     Consultant(s) Notes Reviewed:  [x ] YES  [ ] NO    Care Discussed with Consultants/Other Providers [ x] YES  [ ] NO    Imaging Personally Reviewed independently:  [x] YES  [ ] NO    All labs, radiologic studies, vitals, orders and medications list reviewed. Patient is seen and examined at bedside. Case discussed with medical team.

## 2022-10-26 LAB
ANION GAP SERPL CALC-SCNC: 8 MMOL/L — SIGNIFICANT CHANGE UP (ref 7–14)
BLOOD GAS ARTERIAL - LYTES,HGB,ICA,LACT RESULT: SIGNIFICANT CHANGE UP
BUN SERPL-MCNC: 17 MG/DL — SIGNIFICANT CHANGE UP (ref 7–23)
CALCIUM SERPL-MCNC: 8.7 MG/DL — SIGNIFICANT CHANGE UP (ref 8.4–10.5)
CHLORIDE SERPL-SCNC: 110 MMOL/L — HIGH (ref 98–107)
CO2 SERPL-SCNC: 25 MMOL/L — SIGNIFICANT CHANGE UP (ref 22–31)
CREAT SERPL-MCNC: 0.72 MG/DL — SIGNIFICANT CHANGE UP (ref 0.5–1.3)
EGFR: 103 ML/MIN/1.73M2 — SIGNIFICANT CHANGE UP
GLUCOSE SERPL-MCNC: 173 MG/DL — HIGH (ref 70–99)
HCT VFR BLD CALC: 33 % — LOW (ref 39–50)
HGB BLD-MCNC: 11 G/DL — LOW (ref 13–17)
MAGNESIUM SERPL-MCNC: 2.1 MG/DL — SIGNIFICANT CHANGE UP (ref 1.6–2.6)
MCHC RBC-ENTMCNC: 29 PG — SIGNIFICANT CHANGE UP (ref 27–34)
MCHC RBC-ENTMCNC: 33.3 GM/DL — SIGNIFICANT CHANGE UP (ref 32–36)
MCV RBC AUTO: 87.1 FL — SIGNIFICANT CHANGE UP (ref 80–100)
NRBC # BLD: 0 /100 WBCS — SIGNIFICANT CHANGE UP (ref 0–0)
NRBC # FLD: 0 K/UL — SIGNIFICANT CHANGE UP (ref 0–0)
PHOSPHATE SERPL-MCNC: 3.2 MG/DL — SIGNIFICANT CHANGE UP (ref 2.5–4.5)
PLATELET # BLD AUTO: 204 K/UL — SIGNIFICANT CHANGE UP (ref 150–400)
POTASSIUM SERPL-MCNC: 4.1 MMOL/L — SIGNIFICANT CHANGE UP (ref 3.5–5.3)
POTASSIUM SERPL-SCNC: 4.1 MMOL/L — SIGNIFICANT CHANGE UP (ref 3.5–5.3)
RBC # BLD: 3.79 M/UL — LOW (ref 4.2–5.8)
RBC # FLD: 14.5 % — SIGNIFICANT CHANGE UP (ref 10.3–14.5)
SODIUM SERPL-SCNC: 143 MMOL/L — SIGNIFICANT CHANGE UP (ref 135–145)
WBC # BLD: 8.14 K/UL — SIGNIFICANT CHANGE UP (ref 3.8–10.5)
WBC # FLD AUTO: 8.14 K/UL — SIGNIFICANT CHANGE UP (ref 3.8–10.5)

## 2022-10-26 PROCEDURE — 99291 CRITICAL CARE FIRST HOUR: CPT

## 2022-10-26 PROCEDURE — 99292 CRITICAL CARE ADDL 30 MIN: CPT

## 2022-10-26 PROCEDURE — 71045 X-RAY EXAM CHEST 1 VIEW: CPT | Mod: 26

## 2022-10-26 RX ORDER — ACETAMINOPHEN 500 MG
1000 TABLET ORAL EVERY 6 HOURS
Refills: 0 | Status: DISCONTINUED | OUTPATIENT
Start: 2022-10-26 | End: 2022-10-27

## 2022-10-26 RX ORDER — HYDROMORPHONE HYDROCHLORIDE 2 MG/ML
1 INJECTION INTRAMUSCULAR; INTRAVENOUS; SUBCUTANEOUS EVERY 4 HOURS
Refills: 0 | Status: DISCONTINUED | OUTPATIENT
Start: 2022-10-26 | End: 2022-10-27

## 2022-10-26 RX ORDER — ALPRAZOLAM 0.25 MG
0.5 TABLET ORAL ONCE
Refills: 0 | Status: DISCONTINUED | OUTPATIENT
Start: 2022-10-26 | End: 2022-10-26

## 2022-10-26 RX ORDER — HYDROMORPHONE HYDROCHLORIDE 2 MG/ML
0.5 INJECTION INTRAMUSCULAR; INTRAVENOUS; SUBCUTANEOUS ONCE
Refills: 0 | Status: DISCONTINUED | OUTPATIENT
Start: 2022-10-26 | End: 2022-10-26

## 2022-10-26 RX ORDER — LANOLIN ALCOHOL/MO/W.PET/CERES
3 CREAM (GRAM) TOPICAL AT BEDTIME
Refills: 0 | Status: DISCONTINUED | OUTPATIENT
Start: 2022-10-26 | End: 2022-11-08

## 2022-10-26 RX ORDER — AMLODIPINE BESYLATE 2.5 MG/1
10 TABLET ORAL DAILY
Refills: 0 | Status: DISCONTINUED | OUTPATIENT
Start: 2022-10-26 | End: 2022-10-31

## 2022-10-26 RX ORDER — HYDROMORPHONE HYDROCHLORIDE 2 MG/ML
0.5 INJECTION INTRAMUSCULAR; INTRAVENOUS; SUBCUTANEOUS EVERY 4 HOURS
Refills: 0 | Status: DISCONTINUED | OUTPATIENT
Start: 2022-10-26 | End: 2022-10-27

## 2022-10-26 RX ORDER — ACETAMINOPHEN 500 MG
1000 TABLET ORAL ONCE
Refills: 0 | Status: COMPLETED | OUTPATIENT
Start: 2022-10-26 | End: 2022-10-26

## 2022-10-26 RX ORDER — HYDRALAZINE HCL 50 MG
10 TABLET ORAL EVERY 8 HOURS
Refills: 0 | Status: DISCONTINUED | OUTPATIENT
Start: 2022-10-26 | End: 2022-10-27

## 2022-10-26 RX ADMIN — Medication 100 MILLIGRAM(S): at 22:06

## 2022-10-26 RX ADMIN — Medication 1000 MILLIGRAM(S): at 08:30

## 2022-10-26 RX ADMIN — HYDROMORPHONE HYDROCHLORIDE 1 MILLIGRAM(S): 2 INJECTION INTRAMUSCULAR; INTRAVENOUS; SUBCUTANEOUS at 15:18

## 2022-10-26 RX ADMIN — SODIUM CHLORIDE 100 MILLILITER(S): 9 INJECTION, SOLUTION INTRAVENOUS at 03:17

## 2022-10-26 RX ADMIN — Medication 10 MILLIGRAM(S): at 22:05

## 2022-10-26 RX ADMIN — Medication 100 MILLIGRAM(S): at 06:09

## 2022-10-26 RX ADMIN — HYDROMORPHONE HYDROCHLORIDE 0.5 MILLIGRAM(S): 2 INJECTION INTRAMUSCULAR; INTRAVENOUS; SUBCUTANEOUS at 10:29

## 2022-10-26 RX ADMIN — Medication 3 MILLIGRAM(S): at 22:02

## 2022-10-26 RX ADMIN — HYDROMORPHONE HYDROCHLORIDE 1 MILLIGRAM(S): 2 INJECTION INTRAMUSCULAR; INTRAVENOUS; SUBCUTANEOUS at 19:44

## 2022-10-26 RX ADMIN — HYDROMORPHONE HYDROCHLORIDE 0.5 MILLIGRAM(S): 2 INJECTION INTRAMUSCULAR; INTRAVENOUS; SUBCUTANEOUS at 14:02

## 2022-10-26 RX ADMIN — DEXMEDETOMIDINE HYDROCHLORIDE IN 0.9% SODIUM CHLORIDE 1.06 MICROGRAM(S)/KG/HR: 4 INJECTION INTRAVENOUS at 03:17

## 2022-10-26 RX ADMIN — CHLORHEXIDINE GLUCONATE 15 MILLILITER(S): 213 SOLUTION TOPICAL at 05:05

## 2022-10-26 RX ADMIN — Medication 400 MILLIGRAM(S): at 22:02

## 2022-10-26 RX ADMIN — HYDROMORPHONE HYDROCHLORIDE 1 MILLIGRAM(S): 2 INJECTION INTRAMUSCULAR; INTRAVENOUS; SUBCUTANEOUS at 14:48

## 2022-10-26 RX ADMIN — HYDROMORPHONE HYDROCHLORIDE 0.5 MILLIGRAM(S): 2 INJECTION INTRAMUSCULAR; INTRAVENOUS; SUBCUTANEOUS at 13:32

## 2022-10-26 RX ADMIN — Medication 400 MILLIGRAM(S): at 08:00

## 2022-10-26 RX ADMIN — CHLORHEXIDINE GLUCONATE 1 APPLICATION(S): 213 SOLUTION TOPICAL at 11:44

## 2022-10-26 RX ADMIN — Medication 102 MILLIGRAM(S): at 13:31

## 2022-10-26 RX ADMIN — HYDROMORPHONE HYDROCHLORIDE 1 MILLIGRAM(S): 2 INJECTION INTRAMUSCULAR; INTRAVENOUS; SUBCUTANEOUS at 19:59

## 2022-10-26 RX ADMIN — Medication 10 MILLIGRAM(S): at 14:31

## 2022-10-26 RX ADMIN — Medication 102 MILLIGRAM(S): at 23:02

## 2022-10-26 RX ADMIN — Medication 100 MILLIGRAM(S): at 13:34

## 2022-10-26 RX ADMIN — AMLODIPINE BESYLATE 10 MILLIGRAM(S): 2.5 TABLET ORAL at 16:52

## 2022-10-26 RX ADMIN — Medication 102 MILLIGRAM(S): at 05:05

## 2022-10-26 RX ADMIN — HYDROMORPHONE HYDROCHLORIDE 0.5 MILLIGRAM(S): 2 INJECTION INTRAMUSCULAR; INTRAVENOUS; SUBCUTANEOUS at 10:59

## 2022-10-26 RX ADMIN — Medication 0.5 MILLIGRAM(S): at 16:50

## 2022-10-26 NOTE — PROGRESS NOTE ADULT - ASSESSMENT
62M with PMH asthma, HTN, herniated disc who presents as a transfer from [facility] with c/o numbness, weakness and swelling of left arm for the past month and weakness of right leg, and b/l LE muscle spasms. Patient reports progressive worsening weakness and muscle spasms since last month. Denies falls, traumas, bladder or bowel incontinence. Has been trying to take nucenta 100 mg daily and ibuprofen without relief. Decided to present to [facility] when the pain was uncontrolled. Imaging with concern for spinal canal stenosis and transferred to [facility] for further evaluation and management with orthopedic-spine team. Now s/p C2-T1 laminectomy and fusion. SICU consulted for q1 neurovascular checks.    PLAN  NEUROLOGIC   - Wean precedex as needed  - Q4 neuro check  - aspen collar to be placed by ortho    RESPIRATORY   - Extubated 10/26  - Currently on NC  - Monitor SpO2 goal >92%    CARDIOVASCULAR   - Monitor hemodynamics   - MAP goal > 85    GASTROINTESTINAL   - Diet: NPO    /RENAL   - IV fluids: LR @ 100cc/hr  - Strict I/Os  - Monitor BMP qd  - Maintain ladd catheter, strict Is/Os  - Monitor electrolytes, replete PRN    HEMATOLOGIC  - Monitor H/H   - DVT prophylaxis SCDs and heparin subcutaneous    INFECTIOUS DISEASE  - Monitor fever / WC  - Ancef until YOON drain removal    ENDOCRINE  - Monitor gluc  - Decadron 10q8 for 48 hours (started 10/25)    LINES  - R rad a line  - PIV  - Ladd    DISPO: List for floor   62M with PMH asthma, HTN, herniated disc who presents as a transfer from [facility] with c/o numbness, weakness and swelling of left arm for the past month and weakness of right leg, and b/l LE muscle spasms. Patient reports progressive worsening weakness and muscle spasms since last month. Denies falls, traumas, bladder or bowel incontinence. Has been trying to take nucenta 100 mg daily and ibuprofen without relief. Decided to present to [facility] when the pain was uncontrolled. Imaging with concern for spinal canal stenosis and transferred to [facility] for further evaluation and management with orthopedic-spine team. Now s/p C2-T1 laminectomy and fusion. SICU consulted for q1 neurovascular checks.    PLAN  NEUROLOGIC   - Wean precedex as needed  - Q4 neuro check  - aspen collar to be placed by ortho  - Pain control with dilaudid PRN and tylenol    RESPIRATORY   - Extubated 10/26  - Currently on NC  - Monitor SpO2 goal >92%    CARDIOVASCULAR   - Monitor hemodynamics   - MAP goal > 85    GASTROINTESTINAL   - Diet: NPO    /RENAL   - IV fluids: LR @ 100cc/hr  - Strict I/Os  - Monitor BMP qd  - Maintain aldd catheter, strict Is/Os  - Monitor electrolytes, replete PRN    HEMATOLOGIC  - Monitor H/H   - DVT prophylaxis SCDs and heparin subcutaneous    INFECTIOUS DISEASE  - Monitor fever / WC  - Ancef until YOON drain removal    ENDOCRINE  - Monitor gluc  - Decadron 10q8 for 48 hours (started 10/25)    LINES  - R rad a line  - PIV  - Ladd    DISPO: List for floor   62M with PMH asthma, HTN, herniated disc who presents as a transfer from [facility] with c/o numbness, weakness and swelling of left arm for the past month and weakness of right leg, and b/l LE muscle spasms. Patient reports progressive worsening weakness and muscle spasms since last month. Denies falls, traumas, bladder or bowel incontinence. Has been trying to take nucenta 100 mg daily and ibuprofen without relief. Decided to present to [facility] [place] [facility] when the pain was uncontrolled. Imaging with concern for spinal canal stenosis and transferred to [facility] for further evaluation and management with orthopedic-spine team. Now s/p C2-T1 laminectomy and fusion. SICU consulted for q1 neurovascular checks.    PLAN  NEUROLOGIC   - Q4 neuro check  - aspen collar to be placed by ortho  - Pain control with dilaudid PRN and tylenol    RESPIRATORY   - Extubated 10/26  - Currently on NC  - Monitor SpO2 goal >92%    CARDIOVASCULAR   - Monitor hemodynamics   - MAP goal > 85    GASTROINTESTINAL   - Diet: NPO    /RENAL   - IV fluids: LR @ 100cc/hr  - Strict I/Os  - Monitor BMP qd  - Maintain ladd catheter, strict Is/Os  - Monitor electrolytes, replete PRN    HEMATOLOGIC  - Monitor H/H   - DVT prophylaxis SCDs and heparin subcutaneous    INFECTIOUS DISEASE  - Monitor fever / WC  - Ancef until YOON drain removal    ENDOCRINE  - Monitor gluc  - Decadron 10q8 for 48 hours (started 10/25)    LINES  - R rad a line  - PIV  - Ladd    DISPO: List for floor

## 2022-10-26 NOTE — PROGRESS NOTE ADULT - SUBJECTIVE AND OBJECTIVE BOX
SICU Daily Progress Note  =====================================================  Interval/Overnight Events:   - Increased apneic episodes --> transition from CPAP to volume AC  - Pending cuff leak potential extubation today 10/26    ALLERGIES:  No Known Drug Allergies  peanuts (Unknown)      --------------------------------------------------------------------------------------    MEDICATIONS:    Neurologic Medications  dexMEDEtomidine Infusion 0.05 MICROgram(s)/kG/Hr IV Continuous <Continuous>  HYDROmorphone  Injectable 0.5 milliGRAM(s) IV Push every 4 hours PRN Pain    Respiratory Medications    Cardiovascular Medications  amLODIPine   Tablet 10 milliGRAM(s) Oral daily    Gastrointestinal Medications  lactated ringers. 1000 milliLiter(s) IV Continuous <Continuous>    Genitourinary Medications    Hematologic/Oncologic Medications    Antimicrobial/Immunologic Medications  ceFAZolin   IVPB 1000 milliGRAM(s) IV Intermittent every 8 hours    Endocrine/Metabolic Medications  dexAMETHasone  IVPB 10 milliGRAM(s) IV Intermittent every 8 hours    Topical/Other Medications  chlorhexidine 0.12% Liquid 15 milliLiter(s) Oral Mucosa every 12 hours  chlorhexidine 4% Liquid 1 Application(s) Topical daily    --------------------------------------------------------------------------------------    VITAL SIGNS:  ICU Vital Signs Last 24 Hrs  T(C): 36.3 (26 Oct 2022 00:00), Max: 36.7 (25 Oct 2022 16:00)  T(F): 97.3 (26 Oct 2022 00:00), Max: 98 (25 Oct 2022 16:00)  HR: 49 (26 Oct 2022 00:00) (49 - 97)  BP: 150/90 (25 Oct 2022 20:00) (90/58 - 166/116)  BP(mean): 108 (25 Oct 2022 20:00) (66 - 130)  ABP: 136/80 (26 Oct 2022 00:00) (98/61 - 193/117)  ABP(mean): 100 (26 Oct 2022 00:00) (72 - 309)  RR: 14 (26 Oct 2022 00:00) (11 - 21)  SpO2: 99% (26 Oct 2022 00:00) (97% - 100%)    O2 Parameters below as of 26 Oct 2022 00:00  Patient On (Oxygen Delivery Method): ventilator,Volume AC     O2 Concentration (%): 21      --------------------------------------------------------------------------------------    INS AND OUTS:  I&O's Detail    24 Oct 2022 07:01  -  25 Oct 2022 07:00  --------------------------------------------------------  IN:    Dexmedetomidine: 12.7 mL    Lactated Ringers: 100 mL    Propofol: 17.8 mL  Total IN: 130.5 mL    OUT:    Bulb (mL): 70 mL    Indwelling Catheter - Urethral (mL): 100 mL    Voided (mL): 900 mL  Total OUT: 1070 mL    Total NET: -939.5 mL      25 Oct 2022 07:01  -  26 Oct 2022 00:22  --------------------------------------------------------  IN:    Dexmedetomidine: 377.8 mL    IV PiggyBack: 150 mL    Lactated Ringers: 1700 mL    Lactated Ringers Bolus: 1000 mL    Propofol: 5 mL  Total IN: 3232.8 mL    OUT:    Bulb (mL): 270 mL    Indwelling Catheter - Urethral (mL): 2340 mL  Total OUT: 2610 mL    Total NET: 622.8 mL  --------------------------------------------------------------------------------------    EXAM    NEURO: NAD, YOON x 1  HEENT: NC/AT  RESPIRATORY: nonlabored respirations, normal CW expansion  CARDIO: RRR, S1S2  ABDOMEN: soft, nontender, nondistended  EXTREMITIES: RUE 5/5, RLE 4/5, improving LUE and LLE strength, no deformities    --------------------------------------------------------------------------------------    LABS                        12.3   9.41  )-----------( 271      ( 25 Oct 2022 06:30 )             36.8   10-25    141  |  107  |  16  ----------------------------<  150<H>  4.1   |  22  |  0.72    Ca    9.0      25 Oct 2022 06:30  Phos  3.2     10-25  Mg     1.80     10-25    TPro  7.5  /  Alb  4.5  /  TBili  0.4  /  DBili  x   /  AST  28  /  ALT  24  /  AlkPhos  50  10-24    -------------------------------------------------------------------------------------- SICU Daily Progress Note  =====================================================  Interval/Overnight Events:   - Increased apneic episodes --> transition from CPAP to volume AC, back on CPAP in AM  - RSBI 24 -> extubated 10/26 AM    ALLERGIES:  No Known Drug Allergies  peanuts (Unknown)      --------------------------------------------------------------------------------------    MEDICATIONS:    Neurologic Medications  dexMEDEtomidine Infusion 0.05 MICROgram(s)/kG/Hr IV Continuous <Continuous>  HYDROmorphone  Injectable 0.5 milliGRAM(s) IV Push every 4 hours PRN Pain    Respiratory Medications    Cardiovascular Medications  amLODIPine   Tablet 10 milliGRAM(s) Oral daily    Gastrointestinal Medications  lactated ringers. 1000 milliLiter(s) IV Continuous <Continuous>    Genitourinary Medications    Hematologic/Oncologic Medications    Antimicrobial/Immunologic Medications  ceFAZolin   IVPB 1000 milliGRAM(s) IV Intermittent every 8 hours    Endocrine/Metabolic Medications  dexAMETHasone  IVPB 10 milliGRAM(s) IV Intermittent every 8 hours    Topical/Other Medications  chlorhexidine 0.12% Liquid 15 milliLiter(s) Oral Mucosa every 12 hours  chlorhexidine 4% Liquid 1 Application(s) Topical daily    --------------------------------------------------------------------------------------    VITAL SIGNS:  ICU Vital Signs Last 24 Hrs  T(C): 36.3 (26 Oct 2022 00:00), Max: 36.7 (25 Oct 2022 16:00)  T(F): 97.3 (26 Oct 2022 00:00), Max: 98 (25 Oct 2022 16:00)  HR: 49 (26 Oct 2022 00:00) (49 - 97)  BP: 150/90 (25 Oct 2022 20:00) (90/58 - 166/116)  BP(mean): 108 (25 Oct 2022 20:00) (66 - 130)  ABP: 136/80 (26 Oct 2022 00:00) (98/61 - 193/117)  ABP(mean): 100 (26 Oct 2022 00:00) (72 - 309)  RR: 14 (26 Oct 2022 00:00) (11 - 21)  SpO2: 99% (26 Oct 2022 00:00) (97% - 100%)    O2 Parameters below as of 26 Oct 2022 00:00  Patient On (Oxygen Delivery Method): ventilator,Volume AC     O2 Concentration (%): 21      --------------------------------------------------------------------------------------    INS AND OUTS:  I&O's Detail    24 Oct 2022 07:01  -  25 Oct 2022 07:00  --------------------------------------------------------  IN:    Dexmedetomidine: 12.7 mL    Lactated Ringers: 100 mL    Propofol: 17.8 mL  Total IN: 130.5 mL    OUT:    Bulb (mL): 70 mL    Indwelling Catheter - Urethral (mL): 100 mL    Voided (mL): 900 mL  Total OUT: 1070 mL    Total NET: -939.5 mL      25 Oct 2022 07:01  -  26 Oct 2022 00:22  --------------------------------------------------------  IN:    Dexmedetomidine: 377.8 mL    IV PiggyBack: 150 mL    Lactated Ringers: 1700 mL    Lactated Ringers Bolus: 1000 mL    Propofol: 5 mL  Total IN: 3232.8 mL    OUT:    Bulb (mL): 270 mL    Indwelling Catheter - Urethral (mL): 2340 mL  Total OUT: 2610 mL    Total NET: 622.8 mL  --------------------------------------------------------------------------------------    EXAM    NEURO: TAMMI, YOON x 1  HEENT: NC/AT  RESPIRATORY: nonlabored respirations, normal CW expansion  CARDIO: RRR, S1S2  ABDOMEN: soft, nontender, nondistended  EXTREMITIES: RUE 5/5, RLE 4/5, improving LUE and LLE strength, no deformities    --------------------------------------------------------------------------------------    LABS                        12.3   9.41  )-----------( 271      ( 25 Oct 2022 06:30 )             36.8   10-25    141  |  107  |  16  ----------------------------<  150<H>  4.1   |  22  |  0.72    Ca    9.0      25 Oct 2022 06:30  Phos  3.2     10-25  Mg     1.80     10-25    TPro  7.5  /  Alb  4.5  /  TBili  0.4  /  DBili  x   /  AST  28  /  ALT  24  /  AlkPhos  50  10-24    --------------------------------------------------------------------------------------

## 2022-10-26 NOTE — PROGRESS NOTE ADULT - SUBJECTIVE AND OBJECTIVE BOX
Pt seen/examined. Doing well. Pain controlled. No acute overnight complaints or events.    T(C): 36.4 (10-26-22 @ 04:00), Max: 36.7 (10-25-22 @ 16:00)  HR: 54 (10-26-22 @ 06:00) (47 - 79)  BP: 150/90 (10-25-22 @ 20:00) (90/58 - 150/90)  RR: 14 (10-26-22 @ 06:00) (13 - 21)  SpO2: 95% (10-26-22 @ 06:00) (94% - 100%)  Wt(kg): --  - Gen: NAD    Physical exam  Intubated/sedated  Unable to exam due to sedation  RESPIRATORY: nonlabored respirations, normal CW expansion  CARDIO: RRR  ABDOMEN: soft, nontender, nondistended      62y male s/p C2-T1 lami/fusion admitted to SICU for MAP >85    - Plan for return to OR 10/28 L2-S1 lami/fusion  - MAP >85  - Decadron 10 q8 [id number], end 10/26  - Aspen collar when extubated  - Pain control  - mechanical DVT ppx

## 2022-10-26 NOTE — PROGRESS NOTE ADULT - ASSESSMENT
EKG SR LVH with re op changed unchanges from earlier this month     Assessment and Plan     1) Pre op asessment: s/p spinal surgery tolerated the procedure well     2) HTN : has A line start hydral 10 mg IV TID     3) DVT PPX recommend SCD's

## 2022-10-26 NOTE — PROGRESS NOTE ADULT - SUBJECTIVE AND OBJECTIVE BOX
SICU Daily Progress Note  =====================================================  Interval/Overnight Events:   - Resting comfortably in bed on NC    ALLERGIES:  No Known Drug Allergies  peanuts (Unknown)      --------------------------------------------------------------------------------------    MEDICATIONS:    Neurologic Medications  dexMEDEtomidine Infusion 0.05 MICROgram(s)/kG/Hr IV Continuous <Continuous>  HYDROmorphone  Injectable 0.5 milliGRAM(s) IV Push every 4 hours PRN Pain    Respiratory Medications    Cardiovascular Medications  amLODIPine   Tablet 10 milliGRAM(s) Oral daily    Gastrointestinal Medications  lactated ringers. 1000 milliLiter(s) IV Continuous <Continuous>    Genitourinary Medications    Hematologic/Oncologic Medications    Antimicrobial/Immunologic Medications  ceFAZolin   IVPB 1000 milliGRAM(s) IV Intermittent every 8 hours    Endocrine/Metabolic Medications  dexAMETHasone  IVPB 10 milliGRAM(s) IV Intermittent every 8 hours    Topical/Other Medications  chlorhexidine 0.12% Liquid 15 milliLiter(s) Oral Mucosa every 12 hours  chlorhexidine 4% Liquid 1 Application(s) Topical daily    --------------------------------------------------------------------------------------    VITAL SIGNS:  ICU Vital Signs Last 24 Hrs  T(C): 36.3 (26 Oct 2022 00:00), Max: 36.7 (25 Oct 2022 16:00)  T(F): 97.3 (26 Oct 2022 00:00), Max: 98 (25 Oct 2022 16:00)  HR: 49 (26 Oct 2022 00:00) (49 - 97)  BP: 150/90 (25 Oct 2022 20:00) (90/58 - 166/116)  BP(mean): 108 (25 Oct 2022 20:00) (66 - 130)  ABP: 136/80 (26 Oct 2022 00:00) (98/61 - 193/117)  ABP(mean): 100 (26 Oct 2022 00:00) (72 - 309)  RR: 14 (26 Oct 2022 00:00) (11 - 21)  SpO2: 99% (26 Oct 2022 00:00) (97% - 100%)    O2 Parameters below as of 26 Oct 2022 00:00  Patient On (Oxygen Delivery Method): ventilator,Volume AC     O2 Concentration (%): 21      --------------------------------------------------------------------------------------    INS AND OUTS:  I&O's Detail    24 Oct 2022 07:01  -  25 Oct 2022 07:00  --------------------------------------------------------  IN:    Dexmedetomidine: 12.7 mL    Lactated Ringers: 100 mL    Propofol: 17.8 mL  Total IN: 130.5 mL    OUT:    Bulb (mL): 70 mL    Indwelling Catheter - Urethral (mL): 100 mL    Voided (mL): 900 mL  Total OUT: 1070 mL    Total NET: -939.5 mL      25 Oct 2022 07:01  -  26 Oct 2022 00:22  --------------------------------------------------------  IN:    Dexmedetomidine: 377.8 mL    IV PiggyBack: 150 mL    Lactated Ringers: 1700 mL    Lactated Ringers Bolus: 1000 mL    Propofol: 5 mL  Total IN: 3232.8 mL    OUT:    Bulb (mL): 270 mL    Indwelling Catheter - Urethral (mL): 2340 mL  Total OUT: 2610 mL    Total NET: 622.8 mL  --------------------------------------------------------------------------------------    EXAM    NEURO: TAMMI, YOON x 1  HEENT: NC/AT  RESPIRATORY: nonlabored respirations, normal CW expansion  CARDIO: RRR, S1S2  ABDOMEN: soft, nontender, nondistended  EXTREMITIES: RUE 5/5, RLE 4/5, improving LUE and LLE strength, no deformities    --------------------------------------------------------------------------------------    LABS                        12.3   9.41  )-----------( 271      ( 25 Oct 2022 06:30 )             36.8   10-25    141  |  107  |  16  ----------------------------<  150<H>  4.1   |  22  |  0.72    Ca    9.0      25 Oct 2022 06:30  Phos  3.2     10-25  Mg     1.80     10-25    TPro  7.5  /  Alb  4.5  /  TBili  0.4  /  DBili  x   /  AST  28  /  ALT  24  /  AlkPhos  50  10-24    -------------------------------------------------------------------------------------- SICU Afternoon Progress Note  =====================================================  Interval/Overnight Events:   - Resting comfortably in bed on NC    ALLERGIES:  No Known Drug Allergies  peanuts (Unknown)      --------------------------------------------------------------------------------------    MEDICATIONS:    Neurologic Medications  dexMEDEtomidine Infusion 0.05 MICROgram(s)/kG/Hr IV Continuous <Continuous>  HYDROmorphone  Injectable 0.5 milliGRAM(s) IV Push every 4 hours PRN Pain    Respiratory Medications    Cardiovascular Medications  amLODIPine   Tablet 10 milliGRAM(s) Oral daily    Gastrointestinal Medications  lactated ringers. 1000 milliLiter(s) IV Continuous <Continuous>    Genitourinary Medications    Hematologic/Oncologic Medications    Antimicrobial/Immunologic Medications  ceFAZolin   IVPB 1000 milliGRAM(s) IV Intermittent every 8 hours    Endocrine/Metabolic Medications  dexAMETHasone  IVPB 10 milliGRAM(s) IV Intermittent every 8 hours    Topical/Other Medications  chlorhexidine 0.12% Liquid 15 milliLiter(s) Oral Mucosa every 12 hours  chlorhexidine 4% Liquid 1 Application(s) Topical daily    --------------------------------------------------------------------------------------    VITAL SIGNS:  ICU Vital Signs Last 24 Hrs  T(C): 36.3 (26 Oct 2022 00:00), Max: 36.7 (25 Oct 2022 16:00)  T(F): 97.3 (26 Oct 2022 00:00), Max: 98 (25 Oct 2022 16:00)  HR: 49 (26 Oct 2022 00:00) (49 - 97)  BP: 150/90 (25 Oct 2022 20:00) (90/58 - 166/116)  BP(mean): 108 (25 Oct 2022 20:00) (66 - 130)  ABP: 136/80 (26 Oct 2022 00:00) (98/61 - 193/117)  ABP(mean): 100 (26 Oct 2022 00:00) (72 - 309)  RR: 14 (26 Oct 2022 00:00) (11 - 21)  SpO2: 99% (26 Oct 2022 00:00) (97% - 100%)    O2 Parameters below as of 26 Oct 2022 00:00  Patient On (Oxygen Delivery Method): ventilator,Volume AC     O2 Concentration (%): 21      --------------------------------------------------------------------------------------    INS AND OUTS:  I&O's Detail    24 Oct 2022 07:01  -  25 Oct 2022 07:00  --------------------------------------------------------  IN:    Dexmedetomidine: 12.7 mL    Lactated Ringers: 100 mL    Propofol: 17.8 mL  Total IN: 130.5 mL    OUT:    Bulb (mL): 70 mL    Indwelling Catheter - Urethral (mL): 100 mL    Voided (mL): 900 mL  Total OUT: 1070 mL    Total NET: -939.5 mL      25 Oct 2022 07:01  -  26 Oct 2022 00:22  --------------------------------------------------------  IN:    Dexmedetomidine: 377.8 mL    IV PiggyBack: 150 mL    Lactated Ringers: 1700 mL    Lactated Ringers Bolus: 1000 mL    Propofol: 5 mL  Total IN: 3232.8 mL    OUT:    Bulb (mL): 270 mL    Indwelling Catheter - Urethral (mL): 2340 mL  Total OUT: 2610 mL    Total NET: 622.8 mL  --------------------------------------------------------------------------------------    EXAM    NEURO: TAMMI, YOON x 1  HEENT: NC/AT  RESPIRATORY: nonlabored respirations, normal CW expansion  CARDIO: RRR, S1S2  ABDOMEN: soft, nontender, nondistended  EXTREMITIES: RUE 5/5, RLE 4/5, improving LUE and LLE strength, no deformities    --------------------------------------------------------------------------------------    LABS                        12.3   9.41  )-----------( 271      ( 25 Oct 2022 06:30 )             36.8   10-25    141  |  107  |  16  ----------------------------<  150<H>  4.1   |  22  |  0.72    Ca    9.0      25 Oct 2022 06:30  Phos  3.2     10-25  Mg     1.80     10-25    TPro  7.5  /  Alb  4.5  /  TBili  0.4  /  DBili  x   /  AST  28  /  ALT  24  /  AlkPhos  50  10-24    -------------------------------------------------------------------------------------- SICU Afternoon Progress Note  =====================================================  Interval/Overnight Events:   - Resting comfortably in bed on NC  - Precedex discontinued    ALLERGIES:  No Known Drug Allergies  peanuts (Unknown)      --------------------------------------------------------------------------------------    MEDICATIONS:    Neurologic Medications  dexMEDEtomidine Infusion 0.05 MICROgram(s)/kG/Hr IV Continuous <Continuous>  HYDROmorphone  Injectable 0.5 milliGRAM(s) IV Push every 4 hours PRN Pain    Respiratory Medications    Cardiovascular Medications  amLODIPine   Tablet 10 milliGRAM(s) Oral daily    Gastrointestinal Medications  lactated ringers. 1000 milliLiter(s) IV Continuous <Continuous>    Genitourinary Medications    Hematologic/Oncologic Medications    Antimicrobial/Immunologic Medications  ceFAZolin   IVPB 1000 milliGRAM(s) IV Intermittent every 8 hours    Endocrine/Metabolic Medications  dexAMETHasone  IVPB 10 milliGRAM(s) IV Intermittent every 8 hours    Topical/Other Medications  chlorhexidine 0.12% Liquid 15 milliLiter(s) Oral Mucosa every 12 hours  chlorhexidine 4% Liquid 1 Application(s) Topical daily    --------------------------------------------------------------------------------------    VITAL SIGNS:  ICU Vital Signs Last 24 Hrs  T(C): 36.3 (26 Oct 2022 00:00), Max: 36.7 (25 Oct 2022 16:00)  T(F): 97.3 (26 Oct 2022 00:00), Max: 98 (25 Oct 2022 16:00)  HR: 49 (26 Oct 2022 00:00) (49 - 97)  BP: 150/90 (25 Oct 2022 20:00) (90/58 - 166/116)  BP(mean): 108 (25 Oct 2022 20:00) (66 - 130)  ABP: 136/80 (26 Oct 2022 00:00) (98/61 - 193/117)  ABP(mean): 100 (26 Oct 2022 00:00) (72 - 309)  RR: 14 (26 Oct 2022 00:00) (11 - 21)  SpO2: 99% (26 Oct 2022 00:00) (97% - 100%)    O2 Parameters below as of 26 Oct 2022 00:00  Patient On (Oxygen Delivery Method): ventilator,Volume AC     O2 Concentration (%): 21      --------------------------------------------------------------------------------------    INS AND OUTS:  I&O's Detail    24 Oct 2022 07:01  -  25 Oct 2022 07:00  --------------------------------------------------------  IN:    Dexmedetomidine: 12.7 mL    Lactated Ringers: 100 mL    Propofol: 17.8 mL  Total IN: 130.5 mL    OUT:    Bulb (mL): 70 mL    Indwelling Catheter - Urethral (mL): 100 mL    Voided (mL): 900 mL  Total OUT: 1070 mL    Total NET: -939.5 mL      25 Oct 2022 07:01  -  26 Oct 2022 00:22  --------------------------------------------------------  IN:    Dexmedetomidine: 377.8 mL    IV PiggyBack: 150 mL    Lactated Ringers: 1700 mL    Lactated Ringers Bolus: 1000 mL    Propofol: 5 mL  Total IN: 3232.8 mL    OUT:    Bulb (mL): 270 mL    Indwelling Catheter - Urethral (mL): 2340 mL  Total OUT: 2610 mL    Total NET: 622.8 mL  --------------------------------------------------------------------------------------    EXAM    NEURO: NAD, YOON x 1  HEENT: NC/AT  RESPIRATORY: nonlabored respirations, normal CW expansion  CARDIO: RRR, S1S2  ABDOMEN: soft, nontender, nondistended  EXTREMITIES: RUE 5/5, RLE 4/5, improving LUE and LLE strength, no deformities    --------------------------------------------------------------------------------------    LABS                        12.3   9.41  )-----------( 271      ( 25 Oct 2022 06:30 )             36.8   10-25    141  |  107  |  16  ----------------------------<  150<H>  4.1   |  22  |  0.72    Ca    9.0      25 Oct 2022 06:30  Phos  3.2     10-25  Mg     1.80     10-25    TPro  7.5  /  Alb  4.5  /  TBili  0.4  /  DBili  x   /  AST  28  /  ALT  24  /  AlkPhos  50  10-24    --------------------------------------------------------------------------------------

## 2022-10-27 LAB
ANION GAP SERPL CALC-SCNC: 12 MMOL/L — SIGNIFICANT CHANGE UP (ref 7–14)
BUN SERPL-MCNC: 22 MG/DL — SIGNIFICANT CHANGE UP (ref 7–23)
CALCIUM SERPL-MCNC: 8.6 MG/DL — SIGNIFICANT CHANGE UP (ref 8.4–10.5)
CHLORIDE SERPL-SCNC: 106 MMOL/L — SIGNIFICANT CHANGE UP (ref 98–107)
CO2 SERPL-SCNC: 23 MMOL/L — SIGNIFICANT CHANGE UP (ref 22–31)
CREAT SERPL-MCNC: 0.89 MG/DL — SIGNIFICANT CHANGE UP (ref 0.5–1.3)
EGFR: 97 ML/MIN/1.73M2 — SIGNIFICANT CHANGE UP
GLUCOSE SERPL-MCNC: 143 MG/DL — HIGH (ref 70–99)
HCT VFR BLD CALC: 33.1 % — LOW (ref 39–50)
HGB BLD-MCNC: 10.9 G/DL — LOW (ref 13–17)
MAGNESIUM SERPL-MCNC: 2.1 MG/DL — SIGNIFICANT CHANGE UP (ref 1.6–2.6)
MCHC RBC-ENTMCNC: 28.5 PG — SIGNIFICANT CHANGE UP (ref 27–34)
MCHC RBC-ENTMCNC: 32.9 GM/DL — SIGNIFICANT CHANGE UP (ref 32–36)
MCV RBC AUTO: 86.6 FL — SIGNIFICANT CHANGE UP (ref 80–100)
NRBC # BLD: 0 /100 WBCS — SIGNIFICANT CHANGE UP (ref 0–0)
NRBC # FLD: 0 K/UL — SIGNIFICANT CHANGE UP (ref 0–0)
PHOSPHATE SERPL-MCNC: 3.2 MG/DL — SIGNIFICANT CHANGE UP (ref 2.5–4.5)
PLATELET # BLD AUTO: 206 K/UL — SIGNIFICANT CHANGE UP (ref 150–400)
POTASSIUM SERPL-MCNC: 3.9 MMOL/L — SIGNIFICANT CHANGE UP (ref 3.5–5.3)
POTASSIUM SERPL-SCNC: 3.9 MMOL/L — SIGNIFICANT CHANGE UP (ref 3.5–5.3)
RBC # BLD: 3.82 M/UL — LOW (ref 4.2–5.8)
RBC # FLD: 14.7 % — HIGH (ref 10.3–14.5)
SODIUM SERPL-SCNC: 141 MMOL/L — SIGNIFICANT CHANGE UP (ref 135–145)
WBC # BLD: 13.09 K/UL — HIGH (ref 3.8–10.5)
WBC # FLD AUTO: 13.09 K/UL — HIGH (ref 3.8–10.5)

## 2022-10-27 PROCEDURE — 99233 SBSQ HOSP IP/OBS HIGH 50: CPT

## 2022-10-27 RX ORDER — MORPHINE SULFATE 50 MG/1
2 CAPSULE, EXTENDED RELEASE ORAL
Refills: 0 | Status: DISCONTINUED | OUTPATIENT
Start: 2022-10-27 | End: 2022-11-03

## 2022-10-27 RX ORDER — POTASSIUM CHLORIDE 20 MEQ
10 PACKET (EA) ORAL ONCE
Refills: 0 | Status: DISCONTINUED | OUTPATIENT
Start: 2022-10-27 | End: 2022-10-27

## 2022-10-27 RX ORDER — TIZANIDINE 4 MG/1
2 TABLET ORAL EVERY 6 HOURS
Refills: 0 | Status: DISCONTINUED | OUTPATIENT
Start: 2022-10-27 | End: 2022-11-01

## 2022-10-27 RX ORDER — OXYCODONE HYDROCHLORIDE 5 MG/1
10 TABLET ORAL EVERY 4 HOURS
Refills: 0 | Status: DISCONTINUED | OUTPATIENT
Start: 2022-10-27 | End: 2022-10-27

## 2022-10-27 RX ORDER — CYCLOBENZAPRINE HYDROCHLORIDE 10 MG/1
5 TABLET, FILM COATED ORAL THREE TIMES A DAY
Refills: 0 | Status: DISCONTINUED | OUTPATIENT
Start: 2022-10-27 | End: 2022-10-27

## 2022-10-27 RX ORDER — ACETAMINOPHEN 500 MG
1000 TABLET ORAL EVERY 6 HOURS
Refills: 0 | Status: COMPLETED | OUTPATIENT
Start: 2022-10-27 | End: 2022-10-30

## 2022-10-27 RX ORDER — METHOCARBAMOL 500 MG/1
750 TABLET, FILM COATED ORAL EVERY 8 HOURS
Refills: 0 | Status: DISCONTINUED | OUTPATIENT
Start: 2022-10-27 | End: 2022-10-27

## 2022-10-27 RX ORDER — MORPHINE SULFATE 50 MG/1
15 CAPSULE, EXTENDED RELEASE ORAL
Refills: 0 | Status: DISCONTINUED | OUTPATIENT
Start: 2022-10-27 | End: 2022-11-03

## 2022-10-27 RX ORDER — POTASSIUM CHLORIDE 20 MEQ
10 PACKET (EA) ORAL ONCE
Refills: 0 | Status: COMPLETED | OUTPATIENT
Start: 2022-10-27 | End: 2022-10-27

## 2022-10-27 RX ORDER — HYDROMORPHONE HYDROCHLORIDE 2 MG/ML
0.5 INJECTION INTRAMUSCULAR; INTRAVENOUS; SUBCUTANEOUS EVERY 4 HOURS
Refills: 0 | Status: DISCONTINUED | OUTPATIENT
Start: 2022-10-27 | End: 2022-10-27

## 2022-10-27 RX ORDER — OXYCODONE HYDROCHLORIDE 5 MG/1
5 TABLET ORAL EVERY 4 HOURS
Refills: 0 | Status: DISCONTINUED | OUTPATIENT
Start: 2022-10-27 | End: 2022-10-27

## 2022-10-27 RX ORDER — MORPHINE SULFATE 50 MG/1
4 CAPSULE, EXTENDED RELEASE ORAL ONCE
Refills: 0 | Status: DISCONTINUED | OUTPATIENT
Start: 2022-10-27 | End: 2022-10-27

## 2022-10-27 RX ORDER — HEPARIN SODIUM 5000 [USP'U]/ML
5000 INJECTION INTRAVENOUS; SUBCUTANEOUS EVERY 8 HOURS
Refills: 0 | Status: DISCONTINUED | OUTPATIENT
Start: 2022-10-28 | End: 2022-10-31

## 2022-10-27 RX ADMIN — MORPHINE SULFATE 15 MILLIGRAM(S): 50 CAPSULE, EXTENDED RELEASE ORAL at 21:53

## 2022-10-27 RX ADMIN — MORPHINE SULFATE 15 MILLIGRAM(S): 50 CAPSULE, EXTENDED RELEASE ORAL at 17:55

## 2022-10-27 RX ADMIN — Medication 1000 MILLIGRAM(S): at 17:54

## 2022-10-27 RX ADMIN — Medication 100 MILLIGRAM(S): at 21:24

## 2022-10-27 RX ADMIN — MORPHINE SULFATE 4 MILLIGRAM(S): 50 CAPSULE, EXTENDED RELEASE ORAL at 11:25

## 2022-10-27 RX ADMIN — MORPHINE SULFATE 15 MILLIGRAM(S): 50 CAPSULE, EXTENDED RELEASE ORAL at 13:14

## 2022-10-27 RX ADMIN — HYDROMORPHONE HYDROCHLORIDE 1 MILLIGRAM(S): 2 INJECTION INTRAMUSCULAR; INTRAVENOUS; SUBCUTANEOUS at 01:40

## 2022-10-27 RX ADMIN — MORPHINE SULFATE 4 MILLIGRAM(S): 50 CAPSULE, EXTENDED RELEASE ORAL at 10:55

## 2022-10-27 RX ADMIN — TIZANIDINE 2 MILLIGRAM(S): 4 TABLET ORAL at 15:08

## 2022-10-27 RX ADMIN — OXYCODONE HYDROCHLORIDE 10 MILLIGRAM(S): 5 TABLET ORAL at 06:05

## 2022-10-27 RX ADMIN — Medication 400 MILLIGRAM(S): at 05:33

## 2022-10-27 RX ADMIN — TIZANIDINE 2 MILLIGRAM(S): 4 TABLET ORAL at 23:46

## 2022-10-27 RX ADMIN — Medication 1000 MILLIGRAM(S): at 23:45

## 2022-10-27 RX ADMIN — MORPHINE SULFATE 15 MILLIGRAM(S): 50 CAPSULE, EXTENDED RELEASE ORAL at 21:23

## 2022-10-27 RX ADMIN — OXYCODONE HYDROCHLORIDE 10 MILLIGRAM(S): 5 TABLET ORAL at 05:35

## 2022-10-27 RX ADMIN — CYCLOBENZAPRINE HYDROCHLORIDE 5 MILLIGRAM(S): 10 TABLET, FILM COATED ORAL at 02:16

## 2022-10-27 RX ADMIN — MORPHINE SULFATE 2 MILLIGRAM(S): 50 CAPSULE, EXTENDED RELEASE ORAL at 15:09

## 2022-10-27 RX ADMIN — HYDROMORPHONE HYDROCHLORIDE 1 MILLIGRAM(S): 2 INJECTION INTRAMUSCULAR; INTRAVENOUS; SUBCUTANEOUS at 01:25

## 2022-10-27 RX ADMIN — HYDROMORPHONE HYDROCHLORIDE 0.5 MILLIGRAM(S): 2 INJECTION INTRAMUSCULAR; INTRAVENOUS; SUBCUTANEOUS at 07:50

## 2022-10-27 RX ADMIN — MORPHINE SULFATE 2 MILLIGRAM(S): 50 CAPSULE, EXTENDED RELEASE ORAL at 15:39

## 2022-10-27 RX ADMIN — Medication 1000 MILLIGRAM(S): at 12:57

## 2022-10-27 RX ADMIN — Medication 100 MILLIGRAM(S): at 14:33

## 2022-10-27 RX ADMIN — MORPHINE SULFATE 15 MILLIGRAM(S): 50 CAPSULE, EXTENDED RELEASE ORAL at 12:44

## 2022-10-27 RX ADMIN — Medication 10 MILLIGRAM(S): at 05:35

## 2022-10-27 RX ADMIN — CHLORHEXIDINE GLUCONATE 1 APPLICATION(S): 213 SOLUTION TOPICAL at 12:56

## 2022-10-27 RX ADMIN — Medication 3 MILLIGRAM(S): at 23:45

## 2022-10-27 RX ADMIN — AMLODIPINE BESYLATE 10 MILLIGRAM(S): 2.5 TABLET ORAL at 05:35

## 2022-10-27 RX ADMIN — SODIUM CHLORIDE 100 MILLILITER(S): 9 INJECTION, SOLUTION INTRAVENOUS at 06:32

## 2022-10-27 RX ADMIN — HYDROMORPHONE HYDROCHLORIDE 0.5 MILLIGRAM(S): 2 INJECTION INTRAMUSCULAR; INTRAVENOUS; SUBCUTANEOUS at 08:20

## 2022-10-27 RX ADMIN — Medication 10 MILLIEQUIVALENT(S): at 06:32

## 2022-10-27 RX ADMIN — Medication 100 MILLIGRAM(S): at 05:33

## 2022-10-27 RX ADMIN — MORPHINE SULFATE 15 MILLIGRAM(S): 50 CAPSULE, EXTENDED RELEASE ORAL at 18:25

## 2022-10-27 RX ADMIN — Medication 102 MILLIGRAM(S): at 05:34

## 2022-10-27 NOTE — PROGRESS NOTE ADULT - ASSESSMENT
62y Male presents with b/l UE radicular type symptoms with subjective numbness bilaterally, left wrist drop, inability to flex biceps, RLE radicular type symptoms to the knee, b/l LE muscle spasm/fasiculation as well as contracted quadriceps muscle on the left.     EKG SR LVH with re op changed unchanged from earlier this month       1) Post op assessment  - s/p spinal surgery tolerated the procedure well     2) HTN  -improving  -c/w norvasc  -continue to monitor BP     3) DVT PPX  -hep subq

## 2022-10-27 NOTE — PHYSICAL THERAPY INITIAL EVALUATION ADULT - PERTINENT HX OF CURRENT PROBLEM, REHAB EVAL
62 year old male presents as a transfer from [facility] with c/o numbness, weakness and swelling of left arm for the past month and weakness of right leg, and b/l LE muscle spasms. Now s/p C2-T1 laminectomy and fusion. POD#2

## 2022-10-27 NOTE — DIETITIAN INITIAL EVALUATION ADULT - OTHER INFO
63 y/o male with PMH asthma, HTN, herniated disc who presents as a transfer from [facility] with c/o numbness, weakness and swelling of left arm for the past month and weakness of right leg, and b/l LE muscle spasms. Patient reports progressive worsening weakness and muscle spasms since last month. Denies falls, traumas, bladder or bowel incontinence. Has been trying to take nucenta 100 mg daily and ibuprofen without relief. Decided to present to [facility] when the pain was uncontrolled. Imaging with concern for spinal canal stenosis and transferred to [facility] for further evaluation and management with orthopedic-spine team. Now s/p C2-T1 laminectomy and fusion. Visited with pt to obtain nutrition hx. Pt said he was eating well PTA. He has a food allergy to peanuts - Food and Nutrition Dept aware. No GI distress reported; last BM 10/26. Pt identifed with a suspected DTI of the R chest which confers a higher nutrition need. Suggest advancing diet as tolerated. Will provide Magic Cups (290 kcal, 9 gm protein per 4 oz) x 2/day on meal trays for when diet has been advanced, to enhance pt's overall nutrition. Please assist pt with meals/feeding as he has a neck collar which makes consuming meals now more difficult for him. No stated wt change PTA. RDN services to remain available as needed.

## 2022-10-27 NOTE — DIETITIAN INITIAL EVALUATION ADULT - PERTINENT LABORATORY DATA
10-27    141  |  106  |  22  ----------------------------<  143<H>  3.9   |  23  |  0.89    Ca    8.6      27 Oct 2022 02:30  Phos  3.2     10-27  Mg     2.10     10-27

## 2022-10-27 NOTE — CONSULT NOTE ADULT - SUBJECTIVE AND OBJECTIVE BOX
Patient is a 62y old  Male who presents with a chief complaint of Spinal cord compression     (27 Oct 2022 11:20)      HPI:  62M with asthma and HTN who presents as a transfer from [facility] with c/o numbness, weakness and swelling of left arm for the past month and weakness of right leg, and b/l LE muscle spasms. Patient reports that hx of herniated disc, but his sx started worsening last month when he was gardening. no falls or trauma. Since then has been having progressively worsening weakness and muscle spasms. No bowel or bladder incontinence. Has been trying to take nucenta 100 mg daily and ibuprofen without relief. Decided to present to [facility] when the pain was uncontrolled. Imaging with concern for spinal canal stenosis and transferred to [facility] for further evaluation and management with orthopedic-spine team. In the ED patient was afebrile, hemodynamically stable satting well on RA. Given morphine and po oxy and admitted to medicine for further evaluation. (24 Oct 2022 07:37)      PAST MEDICAL & SURGICAL HISTORY:  Asthma          MEDICATIONS  (STANDING):  acetaminophen     Tablet .. 1000 milliGRAM(s) Oral every 6 hours  amLODIPine   Tablet 10 milliGRAM(s) Oral daily  ceFAZolin   IVPB 1000 milliGRAM(s) IV Intermittent every 8 hours  chlorhexidine 4% Liquid 1 Application(s) Topical daily  melatonin 3 milliGRAM(s) Oral at bedtime  tiZANidine 2 milliGRAM(s) Oral every 6 hours    MEDICATIONS  (PRN):  morphine  - Injectable 2 milliGRAM(s) IV Push every 3 hours PRN Severe Breakthrough Pain (7 - 10)  morphine  IR 15 milliGRAM(s) Oral every 3 hours PRN Moderate - Severe Pain (4 - 6)      ICU Vital Signs Last 24 Hrs  T(C): 37 (27 Oct 2022 08:00), Max: 37.9 (26 Oct 2022 20:00)  T(F): 98.6 (27 Oct 2022 08:00), Max: 100.3 (26 Oct 2022 20:00)  HR: 118 (27 Oct 2022 12:00) (93 - 127)  BP: 153/81 (27 Oct 2022 12:00) (121/77 - 153/98)  BP(mean): 100 (27 Oct 2022 12:00) (87 - 113)  ABP: 130/73 (27 Oct 2022 12:00) (124/67 - 174/92)  ABP(mean): 92 (27 Oct 2022 12:00) (86 - 119)  RR: 23 (27 Oct 2022 12:00) (14 - 26)  SpO2: 100% (27 Oct 2022 12:00) (99% - 100%)    O2 Parameters below as of 27 Oct 2022 08:00  Patient On (Oxygen Delivery Method): room air            Vital Signs Last 24 Hrs  T(C): 37 (27 Oct 2022 08:00), Max: 37.9 (26 Oct 2022 20:00)  T(F): 98.6 (27 Oct 2022 08:00), Max: 100.3 (26 Oct 2022 20:00)  HR: 118 (27 Oct 2022 12:00) (93 - 127)  BP: 153/81 (27 Oct 2022 12:00) (121/77 - 153/98)  BP(mean): 100 (27 Oct 2022 12:00) (87 - 113)  RR: 23 (27 Oct 2022 12:00) (14 - 26)  SpO2: 100% (27 Oct 2022 12:00) (99% - 100%)    Parameters below as of 27 Oct 2022 08:00  Patient On (Oxygen Delivery Method): room air                              10.9   13.09 )-----------( 206      ( 27 Oct 2022 02:30 )             33.1                 COMMENTS/PLAN:  Patient is POD#2 and reports 8/10 spasm pains from his right hip to the top of the knee and from the left foot radiating up to his abdomen. He states that prior to surgery, the pain was also in his back and wrapped around to his abdomen. Patient had seen his PCP Dr. Vaughn Fraser who prescribed Motrin and Robaxin 750mg BID which helped briefly. He also reports being prescribed Nucynta 100mg TID by his PCP for history of herniated discs for several years. However, the spasm pain worsened and Nucynta dose was increased to Q6H prior to surgery. Patient reports Oxycodone 10mg has not helped and IVP Dilaudid worked "for 2 minutes" and he was in pain again. He states Flexeril and Robaxin have not been working. Flexeril 5mg was recently ordered PRN and patient had not taken for full 24 hours but did not believe it was effective. Patient states he had received IV Morphine in the ED during this admission which helped. Denies taking any other pain medications at home. Denies smoking, alcohol and illicit drug use.     PHYSICAL EXAM:  GENERAL: Alert & Oriented x 3 in NAD. Maintains eye contact, answers questions appropriately.    Recommendations:  -  Consider continuing current orders for Acetaminophen.  -  Consider discontinuing current orders for Flexeril and Robaxin. Instead:  -  Consider ordering PO Tizanidine 2mg Q6H for muscle spasms. Hold for sedation and SBP <100.  -  Consider discontinuing current orders for Oxycodone. Instead:  -  Consider ordering PO Morphine 15mg Q3H PRN Moderate-Severe pain. Hold for sedation. Not to be given within 1 hour of any immediate acting opioid.  -  Consider ordering IVP Morphine 2mg Q3H PRN Severe Breakthrough pain. Hold for sedation. Not to be given within 1 hour of any immediate acting opioid.  -  Ordered STAT dose IVP Morphine 4mg x 1 dose.  -  Recommend continuing home dose of Nucynta 100mg Q6H and having [facility] verify home medication as it is non-formulary. Then change PO Morphine to Q4H PRN moderate-severe pain.  -  Recommend maintaining continuous pulse oximetry.  -  Recommend Physical Therapy consult for TENS therapy and strengthening exercises.  -  Recommend follow up with PCP and/or Chronic Pain doctor when discharged. If patient does not have a Chronic Pain doctor, may acquire one through patient's personal insurance carrier.  Discussed patient with Anesthesia Pain Attending on call who agrees with the above recommendations.  No further recommendations at this time, acute pain service to sign off. May call Pain Service if needed.   Thank you.    [x]  [place]  Reviewed   Nucynta 100mg tablets. Quantity 120 tablets for 30 days. Last dispensed 10/07/2022.  Alprazolam 0.5mg tablet. Quantity 8 tablets for 4 days. Last dispensed 10/06/2022.

## 2022-10-27 NOTE — PROGRESS NOTE ADULT - ASSESSMENT
62M with PMH asthma, HTN, herniated disc who presents as a transfer from [facility] with c/o numbness, weakness and swelling of left arm for the past month and weakness of right leg, and b/l LE muscle spasms. Patient reports progressive worsening weakness and muscle spasms since last month. Denies falls, traumas, bladder or bowel incontinence. Has been trying to take nucenta 100 mg daily and ibuprofen without relief. Decided to present to [facility] when the pain was uncontrolled. Imaging with concern for spinal canal stenosis and transferred to [facility] for further evaluation and management with orthopedic-spine team. Now s/p C2-T1 laminectomy and fusion. SICU consulted for q1 neurovascular checks.      PLAN  NEUROLOGIC   - Q4 neuro check  - aspen collar to be placed, primary team aware    RESPIRATORY   - Extubated 10/26  - Monitor SpO2 goal >92%    CARDIOVASCULAR   - Monitor hemodynamics   - MAP goal > 85  -10mgq8 Hydral for BP  - amlodipine 10mg PO (home med)    GASTROINTESTINAL   - Diet: NPO except meds    /RENAL   - IV fluids: LR @ 100cc/hr  - Strict I/Os  - Monitor BMP qd  - Maintain ladd catheter, strict Is/Os  - Monitor electrolytes, replete PRN    HEMATOLOGIC  - Monitor H/H   - DVT prophylaxis SCDs and heparin subcutaneous    INFECTIOUS DISEASE  - Monitor fever / WC  - Ancef until YOON drain removal    ENDOCRINE  - Monitor gluc  - Decadron 10q8 for 48 hours (started 10/25)    LINES  - R rad a line  - PIV  - Ladd    DISPO: List for floor if tolerating extubation     62M with PMH asthma, HTN, herniated disc who presents as a transfer from [facility] with c/o numbness, weakness and swelling of left arm for the past month and weakness of right leg, and b/l LE muscle spasms. Patient reports progressive worsening weakness and muscle spasms since last month. Denies falls, traumas, bladder or bowel incontinence. Has been trying to take nucenta 100 mg daily and ibuprofen without relief. Decided to present to [facility] when the pain was uncontrolled. Imaging with concern for spinal canal stenosis and transferred to [facility] for further evaluation and management with orthopedic-spine team. Now s/p C2-T1 laminectomy and fusion. SICU consulted for q1 neurovascular checks.      PLAN  NEUROLOGIC   - Q4 neuro check  - aspen collar to be placed, primary team aware  - melatonin at bedtime  - Pain control: dilaudid, oxy, tylenol, flexeril  - Pain management consult    RESPIRATORY   - Extubated 10/26  - On NC  - Monitor SpO2 goal >92%    CARDIOVASCULAR   - Monitor hemodynamics   - MAP goal > 85  -10mgq8 Hydral for BP  - amlodipine 10mg PO (home med)    GASTROINTESTINAL   - Diet: Regular diet    /RENAL   - IV fluids: LR @ 100cc/hr  - Strict I/Os  - Monitor BMP qd  - d/c ladd, TOV  - strict Is/Os  - Monitor electrolytes, replete PRN    HEMATOLOGIC  - Monitor H/H   - DVT prophylaxis SCDs and heparin subcutaneous    INFECTIOUS DISEASE  - Monitor fever / WC  - Ancef until YOON drain removal    ENDOCRINE  - Monitor gluc  - s/p Decadron 10q8 for 48 hours    LINES  - R rad a line (d/c)  - PIV  - Ladd (d/c)    DISPO: Listed for floor

## 2022-10-27 NOTE — PROGRESS NOTE ADULT - SUBJECTIVE AND OBJECTIVE BOX
SICU Daily Progress Note  =====================================================  Interval/Overnight Events:   - Extubated during rounds 10/26 to RA  - Listed for floor     HPI: 62M with PMH asthma, HTN, herniated disc who presents as a transfer from [facility] with c/o numbness, weakness and swelling of left arm for the past month and weakness of right leg, and b/l LE muscle spasms. Patient reports progressive worsening weakness and muscle spasms since last month. Denies falls, traumas, bladder or bowel incontinence. Has been trying to take nucenta 100 mg daily and ibuprofen without relief. Decided to present to [facility] when the pain was uncontrolled. Imaging with concern for spinal canal stenosis and transferred to [facility] for further evaluation and management with orthopedic-spine team. Now s/p C2-T1 laminectomy and fusion. SICU consulted for q1 neurovascular checks.  --------------------------------------------------------------------------------------    MEDICATIONS:    Neurologic Medications  acetaminophen   IVPB .. 1000 milliGRAM(s) IV Intermittent every 6 hours  HYDROmorphone  Injectable 1 milliGRAM(s) IV Push every 4 hours PRN Severe Pain (7 - 10)  HYDROmorphone  Injectable 0.5 milliGRAM(s) IV Push every 4 hours PRN Moderate Pain (4 - 6)  melatonin 3 milliGRAM(s) Oral at bedtime    Cardiovascular Medications  amLODIPine   Tablet 10 milliGRAM(s) Oral daily  hydrALAZINE Injectable 10 milliGRAM(s) IV Push every 8 hours    Gastrointestinal Medications  lactated ringers. 1000 milliLiter(s) IV Continuous <Continuous>    Antimicrobial/Immunologic Medications  ceFAZolin   IVPB 1000 milliGRAM(s) IV Intermittent every 8 hours    Endocrine/Metabolic Medications  dexAMETHasone  IVPB 10 milliGRAM(s) IV Intermittent every 8 hours    Topical/Other Medications  chlorhexidine 4% Liquid 1 Application(s) Topical daily    --------------------------------------------------------------------------------------    VITAL SIGNS:  Vital Signs Last 24 Hrs  T(C): 37.3 (27 Oct 2022 00:00), Max: 37.9 (26 Oct 2022 20:00)  T(F): 99.1 (27 Oct 2022 00:00), Max: 100.3 (26 Oct 2022 20:00)  HR: 102 (27 Oct 2022 00:00) (47 - 127)  BP: 122/74 (27 Oct 2022 00:00) (121/77 - 155/97)  BP(mean): 87 (27 Oct 2022 00:00) (87 - 116)  RR: 21 (27 Oct 2022 00:00) (12 - 26)  SpO2: 100% (27 Oct 2022 00:00) (94% - 100%)    Parameters below as of 27 Oct 2022 00:00  Patient On (Oxygen Delivery Method): room air    --------------------------------------------------------------------------------------    25 Oct 2022 07:01  -  26 Oct 2022 07:00  --------------------------------------------------------  IN:    Dexmedetomidine: 475.5 mL    IV PiggyBack: 150 mL    Lactated Ringers: 2400 mL    Lactated Ringers Bolus: 1000 mL    Propofol: 5 mL  Total IN: 4030.5 mL    OUT:    Bulb (mL): 350 mL    Indwelling Catheter - Urethral (mL): 2740 mL  Total OUT: 3090 mL    Total NET: 940.5 mL      26 Oct 2022 07:01  -  27 Oct 2022 00:12  --------------------------------------------------------  IN:    Dexmedetomidine: 8.5 mL    IV PiggyBack: 200 mL    Lactated Ringers: 1600 mL  Total IN: 1808.5 mL    OUT:    Bulb (mL): 40 mL    Indwelling Catheter - Urethral (mL): 880 mL  Total OUT: 920 mL    Total NET: 888.5 mL        EXAM     NEURO: NAD,   HEENT: NC/AT  RESPIRATORY: nonlabored respirations on RA  ABDOMEN: soft, nontender, nondistended  EXTREMITIES: normal strength RUE and RLE, decreased strength in LUE and LLE no deformities          HEMATOLOGIC  [x] VTE Prophylaxis:   Transfusions:	[] PRBC	[] Platelets		[] FFP	[] Cryoprecipitate    INFECTIOUS DISEASE  Antimicrobials/Immunologic Medications:  ceFAZolin   IVPB 1000 milliGRAM(s) IV Intermittent every 8 hours      LABS:  cret                        11.0   8.14  )-----------( 204      ( 26 Oct 2022 02:22 )             33.0     10-26    143  |  110<H>  |  17  ----------------------------<  173<H>  4.1   |  25  |  0.72    Ca    8.7      26 Oct 2022 02:22  Phos  3.2     10-26  Mg     2.10     10-26      PT/INR - ( 25 Oct 2022 06:30 )   PT: 11.8 sec;   INR: 1.02 ratio         PTT - ( 25 Oct 2022 06:30 )  PTT:22.1 sec    OTHER LABORATORY:    SICU Daily Progress Note  =====================================================  Interval/Overnight Events:   - Endorses pain at surgical site      HPI: 62M with PMH asthma, HTN, herniated disc who presents as a transfer from [facility] with c/o numbness, weakness and swelling of left arm for the past month and weakness of right leg, and b/l LE muscle spasms. Patient reports progressive worsening weakness and muscle spasms since last month. Denies falls, traumas, bladder or bowel incontinence. Has been trying to take nucenta 100 mg daily and ibuprofen without relief. Decided to present to [facility] when the pain was uncontrolled. Imaging with concern for spinal canal stenosis and transferred to [facility] for further evaluation and management with orthopedic-spine team. Now s/p C2-T1 laminectomy and fusion. SICU consulted for q1 neurovascular checks.  --------------------------------------------------------------------------------------    MEDICATIONS:    Neurologic Medications  acetaminophen   IVPB .. 1000 milliGRAM(s) IV Intermittent every 6 hours  HYDROmorphone  Injectable 1 milliGRAM(s) IV Push every 4 hours PRN Severe Pain (7 - 10)  HYDROmorphone  Injectable 0.5 milliGRAM(s) IV Push every 4 hours PRN Moderate Pain (4 - 6)  melatonin 3 milliGRAM(s) Oral at bedtime    Cardiovascular Medications  amLODIPine   Tablet 10 milliGRAM(s) Oral daily  hydrALAZINE Injectable 10 milliGRAM(s) IV Push every 8 hours    Gastrointestinal Medications  lactated ringers. 1000 milliLiter(s) IV Continuous <Continuous>    Antimicrobial/Immunologic Medications  ceFAZolin   IVPB 1000 milliGRAM(s) IV Intermittent every 8 hours    Endocrine/Metabolic Medications  dexAMETHasone  IVPB 10 milliGRAM(s) IV Intermittent every 8 hours    Topical/Other Medications  chlorhexidine 4% Liquid 1 Application(s) Topical daily    --------------------------------------------------------------------------------------    VITAL SIGNS:  Vital Signs Last 24 Hrs  T(C): 37.3 (27 Oct 2022 00:00), Max: 37.9 (26 Oct 2022 20:00)  T(F): 99.1 (27 Oct 2022 00:00), Max: 100.3 (26 Oct 2022 20:00)  HR: 102 (27 Oct 2022 00:00) (47 - 127)  BP: 122/74 (27 Oct 2022 00:00) (121/77 - 155/97)  BP(mean): 87 (27 Oct 2022 00:00) (87 - 116)  RR: 21 (27 Oct 2022 00:00) (12 - 26)  SpO2: 100% (27 Oct 2022 00:00) (94% - 100%)    Parameters below as of 27 Oct 2022 00:00  Patient On (Oxygen Delivery Method): room air    --------------------------------------------------------------------------------------    25 Oct 2022 07:01  -  26 Oct 2022 07:00  --------------------------------------------------------  IN:    Dexmedetomidine: 475.5 mL    IV PiggyBack: 150 mL    Lactated Ringers: 2400 mL    Lactated Ringers Bolus: 1000 mL    Propofol: 5 mL  Total IN: 4030.5 mL    OUT:    Bulb (mL): 350 mL    Indwelling Catheter - Urethral (mL): 2740 mL  Total OUT: 3090 mL    Total NET: 940.5 mL      26 Oct 2022 07:01  -  27 Oct 2022 00:12  --------------------------------------------------------  IN:    Dexmedetomidine: 8.5 mL    IV PiggyBack: 200 mL    Lactated Ringers: 1600 mL  Total IN: 1808.5 mL    OUT:    Bulb (mL): 40 mL    Indwelling Catheter - Urethral (mL): 880 mL  Total OUT: 920 mL    Total NET: 888.5 mL        EXAM     NEURO: NAD,   HEENT: NC/AT  RESPIRATORY: nonlabored respirations on RA  ABDOMEN: soft, nontender, nondistended  EXTREMITIES: normal strength RUE and RLE, normal strength LLE, improved strength LUE          HEMATOLOGIC  [x] VTE Prophylaxis:   Transfusions:	[] PRBC	[] Platelets		[] FFP	[] Cryoprecipitate    INFECTIOUS DISEASE  Antimicrobials/Immunologic Medications:  ceFAZolin   IVPB 1000 milliGRAM(s) IV Intermittent every 8 hours      LABS:  cret                        11.0   8.14  )-----------( 204      ( 26 Oct 2022 02:22 )             33.0     10-26    143  |  110<H>  |  17  ----------------------------<  173<H>  4.1   |  25  |  0.72    Ca    8.7      26 Oct 2022 02:22  Phos  3.2     10-26  Mg     2.10     10-26      PT/INR - ( 25 Oct 2022 06:30 )   PT: 11.8 sec;   INR: 1.02 ratio         PTT - ( 25 Oct 2022 06:30 )  PTT:22.1 sec    OTHER LABORATORY:

## 2022-10-27 NOTE — CHART NOTE - NSCHARTNOTEFT_GEN_A_CORE
ORTHO CHART NOTE    Pt seen and evaluated by Dr Lopez.  Pt left lower extremity strength has returned after cervical procedure therefore will not proceed with lumbar procedure tomorrow.  Pt floor eligible per SICU.

## 2022-10-27 NOTE — PROGRESS NOTE ADULT - SUBJECTIVE AND OBJECTIVE BOX
ORTHO PROGRESS NOTE     No acute overnight events. Pt resting comfortably, describes neck pain mostly relieved with prn pain mediccation      Vital Signs Last 24 Hrs  T(C): 36.8 (27 Oct 2022 04:00), Max: 37.9 (26 Oct 2022 20:00)  T(F): 98.3 (27 Oct 2022 04:00), Max: 100.3 (26 Oct 2022 20:00)  HR: 101 (27 Oct 2022 07:00) (70 - 127)  BP: 140/84 (27 Oct 2022 04:00) (121/77 - 155/97)  BP(mean): 102 (27 Oct 2022 04:00) (87 - 116)  RR: 18 (27 Oct 2022 07:00) (15 - 26)  SpO2: 100% (27 Oct 2022 07:00) (97% - 100%)    Parameters below as of 27 Oct 2022 04:00  Patient On (Oxygen Delivery Method): room air        Back: Dressing/ Incision Clean/Dry/Intact,  HV intact with SS output  Motor:                   C5                C6              C7               C8           T1   R            5/5                5/5            5/5             5/5          5/5  L             3/5               3/5             3/5             3/5          3/5                L2             L3             L4               L5            S1  R         5/5           5/5          5/5             5/5           5/5  L          4/5          4/5           4/5             4/5           4/5    Sensory:            C5         C6         C7      C8       T1        (0=absent, 1=impaired, 2=normal, NT=not testable)  R         2            2           2        2         2  L          2            2           2        2         2               L2          L3         L4      L5       S1         (0=absent, 1=impaired, 2=normal, NT=not testable)  R         2            2            2        2        2  L          2            2           2        2         2      62y male s/p C2-T1 lami/fusion admitted to SICU for MAP >85. Plan for RTOR for L2-S1 lami fusion tomorrow, Please document SICU clearance    - Plan for return to OR 10/28 L2-S1 lami/fusion  - MAP >85  - Decadron 10 q8 [id number], end 10/26  - Aspen collar at all times  - Pain control  - mechanical DVT ppx

## 2022-10-27 NOTE — DIETITIAN INITIAL EVALUATION ADULT - PERTINENT MEDS FT
MEDICATIONS  (STANDING):  acetaminophen     Tablet .. 1000 milliGRAM(s) Oral every 6 hours  amLODIPine   Tablet 10 milliGRAM(s) Oral daily  ceFAZolin   IVPB 1000 milliGRAM(s) IV Intermittent every 8 hours  chlorhexidine 4% Liquid 1 Application(s) Topical daily  melatonin 3 milliGRAM(s) Oral at bedtime  tiZANidine 2 milliGRAM(s) Oral every 6 hours    MEDICATIONS  (PRN):  morphine  - Injectable 2 milliGRAM(s) IV Push every 3 hours PRN Severe Breakthrough Pain (7 - 10)  morphine  IR 15 milliGRAM(s) Oral every 3 hours PRN Moderate - Severe Pain (4 - 6)

## 2022-10-27 NOTE — DIETITIAN INITIAL EVALUATION ADULT - CALCULATED TO (CAL/KG)
[employer] Message sent with results. Unable to reach patient by phone. [id number] Jason Whitakerwliz,    Your ultrasound showed small fibroids. There is no follow up needed at this time. If you have further questions or concerns please call the office at [phone]. [phone]

## 2022-10-27 NOTE — PROGRESS NOTE ADULT - SUBJECTIVE AND OBJECTIVE BOX
Manuel Porter MD  Interventional Cardiology / Endovascular Specialist  [place] Office : [address] [place] [address] [place]. [address]  Tel:   [place] Office : [address] [facility] [place] [address]  Tel: [phone]      Subjective/Overnight events: Patient lying in bed comfortably. No acute distress.   	  MEDICATIONS:  amLODIPine   Tablet 10 milliGRAM(s) Oral daily    ceFAZolin   IVPB 1000 milliGRAM(s) IV Intermittent every 8 hours      acetaminophen     Tablet .. 1000 milliGRAM(s) Oral every 6 hours  melatonin 3 milliGRAM(s) Oral at bedtime  morphine  - Injectable 2 milliGRAM(s) IV Push every 3 hours PRN  morphine  IR 15 milliGRAM(s) Oral every 3 hours PRN  tiZANidine 2 milliGRAM(s) Oral every 6 hours        chlorhexidine 4% Liquid 1 Application(s) Topical daily      PAST MEDICAL/SURGICAL HISTORY  PAST MEDICAL & SURGICAL HISTORY:  Asthma          SOCIAL HISTORY: Substance Use (street drugs): ( x ) never used  (  ) other:    FAMILY HISTORY:          PHYSICAL EXAM:  T(C): 37 (10-27-22 @ 08:00), Max: 37.9 (10-26-22 @ 20:00)  HR: 118 (10-27-22 @ 12:00) (93 - 127)  BP: 153/81 (10-27-22 @ 12:00) (121/77 - 153/98)  RR: 23 (10-27-22 @ 12:00) (14 - 26)  SpO2: 100% (10-27-22 @ 12:00) (99% - 100%)  Wt(kg): --  I&O's Summary    26 Oct 2022 07:01  -  27 Oct 2022 07:00  --------------------------------------------------------  IN: 2508.5 mL / OUT: 1415 mL / NET: 1093.5 mL    27 Oct 2022 07:01  -  27 Oct 2022 14:43  --------------------------------------------------------  IN: 200 mL / OUT: 660 mL / NET: -460 mL        GENERAL: extubated   EYES: conjunctiva and sclera clear  ENMT: No tonsillar erythema, exudates, or enlargement  Cardiovascular: Normal S1 S2, No JVD, No murmurs, No edema  Respiratory: Lungs clear to auscultation	  Gastrointestinal:  Soft, Non-tender, + BS	  Extremities: No edema                                    10.9   13.09 )-----------( 206      ( 27 Oct 2022 02:30 )             33.1     10-27    141  |  106  |  22  ----------------------------<  143<H>  3.9   |  23  |  0.89    Ca    8.6      27 Oct 2022 02:30  Phos  3.2     10-27  Mg     2.10     10-27      proBNP:   Lipid Profile:   HgA1c:   TSH:     Consultant(s) Notes Reviewed:  [x ] YES  [ ] NO    Care Discussed with Consultants/Other Providers [ x] YES  [ ] NO    Imaging Personally Reviewed independently:  [x] YES  [ ] NO    All labs, radiologic studies, vitals, orders and medications list reviewed. Patient is seen and examined at bedside. Case discussed with medical team.

## 2022-10-27 NOTE — PHYSICAL THERAPY INITIAL EVALUATION ADULT - ADDITIONAL COMMENTS
Pt states he lives in a house with his brother and mother with 3 steps to enter. Prior to admission pt reports ambulating independently without a device and being independent in ADLs. Pt owns a cane, began using it prior to admission.    Following evaluation, pt was left semireclined in bed in no distress, all lines in tact, call hurtado in reach. MAULIK zuleta.

## 2022-10-27 NOTE — DIETITIAN INITIAL EVALUATION ADULT - ADD RECOMMEND
1. Advance diet when medically feasible. 2. Monitor weights, labs, BM's, skin integrity, p.o. intake. 3. Follow pt as per protocol.

## 2022-10-28 LAB
ANION GAP SERPL CALC-SCNC: 7 MMOL/L — SIGNIFICANT CHANGE UP (ref 7–14)
BUN SERPL-MCNC: 16 MG/DL — SIGNIFICANT CHANGE UP (ref 7–23)
CALCIUM SERPL-MCNC: 8.5 MG/DL — SIGNIFICANT CHANGE UP (ref 8.4–10.5)
CHLORIDE SERPL-SCNC: 100 MMOL/L — SIGNIFICANT CHANGE UP (ref 98–107)
CO2 SERPL-SCNC: 27 MMOL/L — SIGNIFICANT CHANGE UP (ref 22–31)
CREAT SERPL-MCNC: 0.77 MG/DL — SIGNIFICANT CHANGE UP (ref 0.5–1.3)
EGFR: 101 ML/MIN/1.73M2 — SIGNIFICANT CHANGE UP
GLUCOSE SERPL-MCNC: 122 MG/DL — HIGH (ref 70–99)
HCT VFR BLD CALC: 32.1 % — LOW (ref 39–50)
HGB BLD-MCNC: 10.5 G/DL — LOW (ref 13–17)
MAGNESIUM SERPL-MCNC: 2 MG/DL — SIGNIFICANT CHANGE UP (ref 1.6–2.6)
MCHC RBC-ENTMCNC: 28.4 PG — SIGNIFICANT CHANGE UP (ref 27–34)
MCHC RBC-ENTMCNC: 32.7 GM/DL — SIGNIFICANT CHANGE UP (ref 32–36)
MCV RBC AUTO: 86.8 FL — SIGNIFICANT CHANGE UP (ref 80–100)
NRBC # BLD: 0 /100 WBCS — SIGNIFICANT CHANGE UP (ref 0–0)
NRBC # FLD: 0 K/UL — SIGNIFICANT CHANGE UP (ref 0–0)
PHOSPHATE SERPL-MCNC: 2.7 MG/DL — SIGNIFICANT CHANGE UP (ref 2.5–4.5)
PLATELET # BLD AUTO: 171 K/UL — SIGNIFICANT CHANGE UP (ref 150–400)
POTASSIUM SERPL-MCNC: 3.6 MMOL/L — SIGNIFICANT CHANGE UP (ref 3.5–5.3)
POTASSIUM SERPL-SCNC: 3.6 MMOL/L — SIGNIFICANT CHANGE UP (ref 3.5–5.3)
RBC # BLD: 3.7 M/UL — LOW (ref 4.2–5.8)
RBC # FLD: 14.5 % — SIGNIFICANT CHANGE UP (ref 10.3–14.5)
SODIUM SERPL-SCNC: 134 MMOL/L — LOW (ref 135–145)
WBC # BLD: 11.72 K/UL — HIGH (ref 3.8–10.5)
WBC # FLD AUTO: 11.72 K/UL — HIGH (ref 3.8–10.5)

## 2022-10-28 PROCEDURE — 99233 SBSQ HOSP IP/OBS HIGH 50: CPT

## 2022-10-28 RX ORDER — TAPENTADOL HYDROCHLORIDE 50 MG/1
100 TABLET, FILM COATED ORAL EVERY 8 HOURS
Refills: 0 | Status: DISCONTINUED | OUTPATIENT
Start: 2022-10-28 | End: 2022-11-04

## 2022-10-28 RX ADMIN — Medication 3 MILLIGRAM(S): at 21:37

## 2022-10-28 RX ADMIN — Medication 1000 MILLIGRAM(S): at 12:37

## 2022-10-28 RX ADMIN — Medication 100 MILLIGRAM(S): at 14:36

## 2022-10-28 RX ADMIN — MORPHINE SULFATE 2 MILLIGRAM(S): 50 CAPSULE, EXTENDED RELEASE ORAL at 14:43

## 2022-10-28 RX ADMIN — TIZANIDINE 2 MILLIGRAM(S): 4 TABLET ORAL at 17:53

## 2022-10-28 RX ADMIN — Medication 1000 MILLIGRAM(S): at 23:47

## 2022-10-28 RX ADMIN — HEPARIN SODIUM 5000 UNIT(S): 5000 INJECTION INTRAVENOUS; SUBCUTANEOUS at 05:54

## 2022-10-28 RX ADMIN — MORPHINE SULFATE 15 MILLIGRAM(S): 50 CAPSULE, EXTENDED RELEASE ORAL at 05:53

## 2022-10-28 RX ADMIN — MORPHINE SULFATE 2 MILLIGRAM(S): 50 CAPSULE, EXTENDED RELEASE ORAL at 20:00

## 2022-10-28 RX ADMIN — MORPHINE SULFATE 2 MILLIGRAM(S): 50 CAPSULE, EXTENDED RELEASE ORAL at 09:28

## 2022-10-28 RX ADMIN — AMLODIPINE BESYLATE 10 MILLIGRAM(S): 2.5 TABLET ORAL at 05:54

## 2022-10-28 RX ADMIN — MORPHINE SULFATE 15 MILLIGRAM(S): 50 CAPSULE, EXTENDED RELEASE ORAL at 06:08

## 2022-10-28 RX ADMIN — HEPARIN SODIUM 5000 UNIT(S): 5000 INJECTION INTRAVENOUS; SUBCUTANEOUS at 21:37

## 2022-10-28 RX ADMIN — CHLORHEXIDINE GLUCONATE 1 APPLICATION(S): 213 SOLUTION TOPICAL at 21:44

## 2022-10-28 RX ADMIN — Medication 1000 MILLIGRAM(S): at 13:07

## 2022-10-28 RX ADMIN — MORPHINE SULFATE 2 MILLIGRAM(S): 50 CAPSULE, EXTENDED RELEASE ORAL at 19:32

## 2022-10-28 RX ADMIN — MORPHINE SULFATE 2 MILLIGRAM(S): 50 CAPSULE, EXTENDED RELEASE ORAL at 15:10

## 2022-10-28 RX ADMIN — Medication 100 MILLIGRAM(S): at 05:53

## 2022-10-28 RX ADMIN — Medication 100 MILLIGRAM(S): at 21:37

## 2022-10-28 RX ADMIN — MORPHINE SULFATE 2 MILLIGRAM(S): 50 CAPSULE, EXTENDED RELEASE ORAL at 09:58

## 2022-10-28 RX ADMIN — TAPENTADOL HYDROCHLORIDE 100 MILLIGRAM(S): 50 TABLET, FILM COATED ORAL at 21:44

## 2022-10-28 RX ADMIN — Medication 1000 MILLIGRAM(S): at 05:53

## 2022-10-28 RX ADMIN — TIZANIDINE 2 MILLIGRAM(S): 4 TABLET ORAL at 12:38

## 2022-10-28 RX ADMIN — TIZANIDINE 2 MILLIGRAM(S): 4 TABLET ORAL at 05:53

## 2022-10-28 RX ADMIN — Medication 1000 MILLIGRAM(S): at 17:50

## 2022-10-28 RX ADMIN — HEPARIN SODIUM 5000 UNIT(S): 5000 INJECTION INTRAVENOUS; SUBCUTANEOUS at 14:38

## 2022-10-28 NOTE — OCCUPATIONAL THERAPY INITIAL EVALUATION ADULT - PERTINENT HX OF CURRENT PROBLEM, REHAB EVAL
Pt is a 62 year old male with hx of asthma and HTN, who presented to [facility] with c/o numbness, weakness and swelling of left arm for the past month and weakness of right leg, and Bilateral LE muscle spasms. Imaging with concern for spinal canal stenosis and transferred to [facility] for further evaluation and management with orthopedic-spine team. Pt is now s/p C2-T1 posterolateral spinal fusion with C3-6 laminectomies on 10/24/22.

## 2022-10-28 NOTE — PROGRESS NOTE ADULT - SUBJECTIVE AND OBJECTIVE BOX
SICU AM PROGRESS NOTE  ===============================  Interval Events:   -No acute events overnight  -Awaiting floor bed    HPI:  62M with PMH asthma, HTN, herniated disc who presents as a transfer from [facility] with c/o numbness, weakness and swelling of left arm for the past month and weakness of right leg, and b/l LE muscle spasms. Patient reports progressive worsening weakness and muscle spasms since last month. Denies falls, traumas, bladder or bowel incontinence. Has been trying to take nucenta 100 mg daily and ibuprofen without relief. Decided to present to [facility] when the pain was uncontrolled. Imaging with concern for spinal canal stenosis and transferred to [facility] for further evaluation and management with orthopedic-spine team. Now s/p C2-T1 laminectomy and fusion. SICU consulted for q1 neurovascular checks.    VITAL SIGNS, INS/OUTS (last 24 hours):  --------------------------------------------------------------------------------------  T(C): 37.2 (10-27-22 @ 20:00), Max: 37.2 (10-27-22 @ 16:00)  HR: 110 (10-27-22 @ 20:00) (93 - 118)  BP: 143/80 (10-27-22 @ 20:00) (126/74 - 153/81)  BP(mean): 97 (10-27-22 @ 20:00) (89 - 102)  ABP: 130/73 (10-27-22 @ 12:00) (124/67 - 162/82)  ABP(mean): 92 (10-27-22 @ 12:00) (86 - 110)  RR: 21 (10-27-22 @ 20:00) (14 - 23)  SpO2: 98% (10-27-22 @ 20:00) (98% - 100%)  CAPILLARY BLOOD GLUCOSE      10-26 @ 07:01  -  10-27 @ 07:00  --------------------------------------------------------  IN:    Dexmedetomidine: 8.5 mL    IV PiggyBack: 200 mL    Lactated Ringers: 2300 mL  Total IN: 2508.5 mL    OUT:    Bulb (mL): 100 mL    Indwelling Catheter - Urethral (mL): 1315 mL  Total OUT: 1415 mL    Total NET: 1093.5 mL      10-27 @ 07:01  -  10-28 @ 00:02  --------------------------------------------------------  IN:    IV PiggyBack: 50 mL    Lactated Ringers: 200 mL  Total IN: 250 mL    OUT:    Bulb (mL): 50 mL    Indwelling Catheter - Urethral (mL): 710 mL    Voided (mL): 600 mL  Total OUT: 1360 mL    Total NET: -1110 mL  --------------------------------------------------------------------------------------    EXAM  NEUROLOGY  Exam: Normal, NAD, alert, oriented x3, no focal deficits. PERRLA.       RESPIRATORY  Exam: Lungs clear to auscultation, Normal expansion/effort.      CARDIOVASCULAR  Exam: S1, S2.  Regular rate and rhythm     GI/NUTRITION  Exam: Abdomen soft, Non-tender, Non-distended.        METABOLIC/FLUIDS/ELECTROLYTES      HEMATOLOGIC  [x] DVT Prophylaxis:   Transfusions:	[] PRBC	[] Platelets		[] FFP	[] Cryoprecipitate    INFECTIOUS DISEASE  Antimicrobials/Immunologic Medications:  ceFAZolin   IVPB 1000 milliGRAM(s) IV Intermittent every 8 hours      VASCULAR  Exam: Extremities warm, pink, well-perfused.      MUSCULOSKELETAL  Exam: normal strength RUE and RLE, normal strength LLE, improved strength LUE    SKIN  Exam: Good skin turgor, no skin breakdown.     LABS  --------------------------------------------------------------------------------------  CBC (10-27 @ 02:30)                              10.9<L>                         13.09<H>  )----------------(  206        --    % Neutrophils, --    % Lymphocytes, ANC: --                                  33.1<L>    BMP (10-27 @ 02:30)             141     |  106     |  22    		Ca++ --      Ca 8.6                ---------------------------------( 143<H>		Mg 2.10               3.9     |  23      |  0.89  			Ph 3.2                   -> Clean Catch Clean Catch (Midstream) Culture (10-23 @ 06:29)     NG    NG    <10,000 CFU/mL Normal Urogenital Jessenia      --------------------------------------------------------------------------------------    IMAGING STUDIES

## 2022-10-28 NOTE — PROGRESS NOTE ADULT - ASSESSMENT
62M with PMH asthma, HTN, herniated disc who presents as a transfer from [facility] with c/o numbness, weakness and swelling of left arm for the past month and weakness of right leg, and b/l LE muscle spasms. Patient reports progressive worsening weakness and muscle spasms since last month. Denies falls, traumas, bladder or bowel incontinence. Has been trying to take nucenta 100 mg daily and ibuprofen without relief. Decided to present to [facility] when the pain was uncontrolled. Imaging with concern for spinal canal stenosis and transferred to [facility] for further evaluation and management with orthopedic-spine team. Now s/p C2-T1 laminectomy and fusion. SICU consulted for q1 neurovascular checks.      PLAN  NEUROLOGIC   - Q4 neuro check  - aspen collar to be placed, primary team aware  - melatonin at bedtime  - Pain control: dilaudid, oxy, tylenol, flexeril  - Pain management consult    RESPIRATORY   - Extubated 10/26  - On NC  - Monitor SpO2 goal >92%    CARDIOVASCULAR   - Monitor hemodynamics   - MAP goal > 85  -10mgq8 Hydral for BP  - amlodipine 10mg PO (home med)    GASTROINTESTINAL   - Diet: Regular diet    /RENAL   - IV fluids: LR @ 100cc/hr  - Strict I/Os  - Monitor BMP qd  - d/c ladd, TOV  - strict Is/Os  - Monitor electrolytes, replete PRN    HEMATOLOGIC  - Monitor H/H   - DVT prophylaxis SCDs and heparin subcutaneous    INFECTIOUS DISEASE  - Monitor fever / WC  - Ancef until YOON drain removal    ENDOCRINE  - Monitor gluc  - s/p Decadron 10q8 for 48 hours    LINES  - R rad a line (d/c)  - PIV  - Ladd (d/c)    DISPO: Listed for floor 62M with PMH asthma, HTN, herniated disc who presents as a transfer from [facility] with c/o numbness, weakness and swelling of left arm for the past month and weakness of right leg, and b/l LE muscle spasms. Patient reports progressive worsening weakness and muscle spasms since last month. Denies falls, traumas, bladder or bowel incontinence. Has been trying to take nucenta 100 mg daily and ibuprofen without relief. Decided to present to [facility] when the pain was uncontrolled. Imaging with concern for spinal canal stenosis and transferred to [facility] for further evaluation and management with orthopedic-spine team. Now s/p C2-T1 laminectomy and fusion. SICU consulted for q1 neurovascular checks.      PLAN  NEUROLOGIC   - Q4 neuro check  - melatonin at bedtime  - Pain control: dilaudid, oxy, tylenol, flexeril  - Pain management following     RESPIRATORY   - Extubated 10/26  - On NC  - Monitor SpO2 goal >92%    CARDIOVASCULAR   - Monitor hemodynamics   - MAP goal > 85  -10mgq8 Hydral for BP  - amlodipine 10mg PO (home med)    GASTROINTESTINAL   - Diet: Regular diet    /RENAL   - Monitor BMP qd  - Monitor electrolytes, replete PRN    HEMATOLOGIC  - Monitor H/H   - DVT prophylaxis SCDs and heparin subcutaneous    INFECTIOUS DISEASE  - Monitor fever / WC  - Ancef until YOON drain removal    ENDOCRINE  - Monitor gluc  - s/p Decadron 10q8 for 48 hours    LINES  - PIV    DISPO: Listed for floor

## 2022-10-28 NOTE — OCCUPATIONAL THERAPY INITIAL EVALUATION ADULT - NSOTDISCHREC_GEN_A_CORE
Rehabilitation facility. Pt. may benefit from PM&R consult, to determine if pt. may qualify for Acute Rehab.

## 2022-10-28 NOTE — OCCUPATIONAL THERAPY INITIAL EVALUATION ADULT - RANGE OF MOTION EXAMINATION, UPPER EXTREMITY
except Left Shoulder Flexion Active Assistive ROM 0-40 degrees; Right Shoulder Flexion Active ROM 0-90 degrees (not assessed further than 90 degrees secondary to spinal precautions)/Left UE Active Assistive ROM was WFL  (within functional limits)/Right UE Active ROM was WFL (within functional limits)

## 2022-10-28 NOTE — PROGRESS NOTE ADULT - SUBJECTIVE AND OBJECTIVE BOX
Pt seen/examined. Doing well. Pain controlled. No acute overnight complaints or events.    T(C): 36.2 (10-28-22 @ 04:00), Max: 37.2 (10-27-22 @ 16:00)  HR: 85 (10-28-22 @ 04:00) (85 - 118)  BP: 118/74 (10-28-22 @ 04:00) (118/74 - 153/81)  RR: 14 (10-28-22 @ 04:00) (14 - 23)  SpO2: 98% (10-28-22 @ 04:00) (98% - 100%)  Wt(kg): --  - Gen: NAD    Back: Dressing/ Incision Clean/Dry/Intact,  YOON intact with SS output  Motor:                   C5                C6              C7               C8           T1   R            5/5                5/5            5/5             5/5          5/5  L             3/5               3/5             3/5             3/5          3/5                L2             L3             L4               L5            S1  R         5/5           5/5          5/5             5/5           5/5  L          5/5          5/5           5/5             5/5           5/5    Sensory:            C5         C6         C7      C8       T1        (0=absent, 1=impaired, 2=normal, NT=not testable)  R         2            2           2        2         2  L          2            2           2        2         2               L2          L3         L4      L5       S1         (0=absent, 1=impaired, 2=normal, NT=not testable)  R         2            2            2        2        2  L          2            2           2        2         2    62y male s/p C2-T1 lami/fusion. Recovering appropriately. No plans to RTOR at this time, will cont. to monitor    - Aspen collar at all times  - Pain control  - DVT ppx SQH  - Ancef while drain is in  - f/u PMR consult

## 2022-10-28 NOTE — PROGRESS NOTE ADULT - SUBJECTIVE AND OBJECTIVE BOX
Manuel Porter MD  Interventional Cardiology / Endovascular Specialist  [place] Office : [address] [place] [address]. [address]  Tel:   [place] Office : [address] [facility] [place] [address]  Tel: [phone]    Subjective/Overnight events: Patient lying in bed comfortably. No acute distress.   	  MEDICATIONS:  amLODIPine   Tablet 10 milliGRAM(s) Oral daily  heparin   Injectable 5000 Unit(s) SubCutaneous every 8 hours    ceFAZolin   IVPB 1000 milliGRAM(s) IV Intermittent every 8 hours      acetaminophen     Tablet .. 1000 milliGRAM(s) Oral every 6 hours  melatonin 3 milliGRAM(s) Oral at bedtime  morphine  - Injectable 2 milliGRAM(s) IV Push every 3 hours PRN  morphine  IR 15 milliGRAM(s) Oral every 3 hours PRN  tiZANidine 2 milliGRAM(s) Oral every 6 hours        chlorhexidine 4% Liquid 1 Application(s) Topical daily      PAST MEDICAL/SURGICAL HISTORY  PAST MEDICAL & SURGICAL HISTORY:  Asthma          SOCIAL HISTORY: Substance Use (street drugs): ( x ) never used  (  ) other:    FAMILY HISTORY:      REVIEW OF SYSTEMS:  CONSTITUTIONAL: No fever, weight loss, or fatigue  EYES: No eye pain, visual disturbances, or discharge  ENMT:  No difficulty hearing, tinnitus, vertigo; No sinus or throat pain  BREASTS: No pain, masses, or nipple discharge  GASTROINTESTINAL: No abdominal or epigastric pain. No nausea, vomiting, or hematemesis; No diarrhea or constipation. No melena or hematochezia.  GENITOURINARY: No dysuria, frequency, hematuria, or incontinence  NEUROLOGICAL: No headaches, memory loss, loss of strength, numbness, or tremors  ENDOCRINE: No heat or cold intolerance; No hair loss  MUSCULOSKELETAL: No joint pain or swelling; No muscle, back, or extremity pain  PSYCHIATRIC: No depression, anxiety, mood swings, or difficulty sleeping  HEME/LYMPH: No easy bruising, or bleeding gums  All others negative    PHYSICAL EXAM:  T(C): 36.8 (10-28-22 @ 16:00), Max: 37.2 (10-27-22 @ 20:00)  HR: 85 (10-28-22 @ 16:00) (85 - 110)  BP: 106/69 (10-28-22 @ 16:00) (106/69 - 143/80)  RR: 16 (10-28-22 @ 16:00) (14 - 21)  SpO2: 99% (10-28-22 @ 16:00) (98% - 100%)  Wt(kg): --  I&O's Summary    27 Oct 2022 07:01  -  28 Oct 2022 07:00  --------------------------------------------------------  IN: 300 mL / OUT: 2200 mL / NET: -1900 mL    28 Oct 2022 07:01  -  28 Oct 2022 16:18  --------------------------------------------------------  IN: 890 mL / OUT: 280 mL / NET: 610 mL    GENERAL: extubated   EYES: conjunctiva and sclera clear  ENMT: No tonsillar erythema, exudates, or enlargement  Cardiovascular: Normal S1 S2, No JVD, No murmurs, No edema  Respiratory: Lungs clear to auscultation	  Gastrointestinal:  Soft, Non-tender, + BS	  Extremities: No edema                                10.5   11.72 )-----------( 171      ( 28 Oct 2022 06:12 )             32.1     10-28    134<L>  |  100  |  16  ----------------------------<  122<H>  3.6   |  27  |  0.77    Ca    8.5      28 Oct 2022 06:12  Phos  2.7     10-28  Mg     2.00     10-28      proBNP:   Lipid Profile:   HgA1c:   TSH:     Consultant(s) Notes Reviewed:  [x ] YES  [ ] NO    Care Discussed with Consultants/Other Providers [ x] YES  [ ] NO    Imaging Personally Reviewed independently:  [x] YES  [ ] NO    All labs, radiologic studies, vitals, orders and medications list reviewed. Patient is seen and examined at bedside. Case discussed with medical team.

## 2022-10-28 NOTE — OCCUPATIONAL THERAPY INITIAL EVALUATION ADULT - DIAGNOSIS, OT EVAL
s/p C2-T1 posterolateral spinal fusion with C3-6 laminectomies; Decreased Left UE Active ROM; Decreased standing balance; Decreased functional mobility; Decreased ADL management

## 2022-10-28 NOTE — OCCUPATIONAL THERAPY INITIAL EVALUATION ADULT - GENERAL OBSERVATIONS, REHAB EVAL
Pt. received semisupine in bed. No acute distress. Patient agreed to evaluation from Occupational Therapist. +IV, +Tele, +Cervical Collar, +YOON Bulb Drain, +Pulse ox to Left finger, +Bilateral Venodynes.

## 2022-10-28 NOTE — OCCUPATIONAL THERAPY INITIAL EVALUATION ADULT - LIVES WITH, PROFILE
Pt. reports he lives with his mother and brother in a house with 4 steps to enter. Once inside, pt. reports he has a full flight of steps to negotiate to reach basement where main bedroom and bathroom are located. Per pt., he has a shower stall in his bathroom (under construction), but a bathtub in bathroom on the main level.

## 2022-10-28 NOTE — OCCUPATIONAL THERAPY INITIAL EVALUATION ADULT - PHYSICAL ASSIST/NONPHYSICAL ASSIST: SIT/SUPINE, REHAB EVAL
using the log-rolling technique to adhere to spinal precautions/set-up required/verbal cues/nonverbal cues (demo/gestures)/1 person assist

## 2022-10-28 NOTE — PROGRESS NOTE ADULT - ATTENDING COMMENTS
I agree with the detailed interval history, physical, and plan, which I have reviewed and edited where appropriate'; also agree with notes/assessment with my team on service.  I have personally examined the patient.  I was physically present for the key portions of the evaluation and management (E/M) service provided.  I reviewed all the pertinent data.  The patient is a critical care patient with life threatening hemodynamic and metabolic instability in SICU.  The SICU team has a constant risk benefit analyzes discussion and coordinating care with the primary team and all consultants.   The patient is in SICU with the chief complaint and diagnosis mentioned in the note.   The plan will be specified in the note.  62M with PMH asthma, HTN, herniated disc; s/p C2-T1 laminectomy and fusion in SICU for neurovascular checks.    EXAM  NEUROLOGY  Exam:  alert, oriented x3,   RESPIRATORY  Exam: Lungs clear   CARDIOVASCULAR  Exam:  Regular rate   GI/NUTRITION  Exam: Abdomen soft    PLAN  NEUROLOGIC   - Q4 neuro check  - dilaudid, oxy, tylenol, flexeril  RESPIRATORY   -- Monitor SpO2 goal >92%  CARDIOVASCULAR   --Hydral for BP  - amlodipine   GASTROINTESTINAL   - Diet: Regular diet  /RENAL   - Monitor electrolytes,   HEMATOLOGIC  - DVT prophylaxis, heparin subcutaneous  INFECTIOUS DISEASE  -- Ancef   ENDOCRINE  - Monitor glucose  DISPO: d/c floor
I agree with the detailed interval history, physical, and plan, which I have reviewed and edited where appropriate'; also agree with notes/assessment with my team on service.  I have personally examined the patient.  I was physically present for the key portions of the evaluation and management (E/M) service provided.  I reviewed all the pertinent data.  The patient is a critical care patient with life threatening hemodynamic and metabolic instability in SICU.  The SICU team has a constant risk benefit analyzes discussion and coordinating care with the primary team and all consultants.   The patient is in SICU with the chief complaint and diagnosis mentioned in the note.   The plan will be specified in the note.  62M with PMH asthma, HTN, herniated disc; s/p C2-T1 laminectomy and fusion in SICU for q1 neurovascular checks.    EXAM   RESPIRATORY: intubated-clear  CARDIO: RR  ABDOMEN: soft, nontender, nondistended  EXTREMITIES: normal strength RUE and RLE, improved strength in LUE compared to am and LLE no deformities    PLAN  NEUROLOGIC   - Wean precedex d  - Q1 neuro check  RESPIRATORY   - Intubated CPAP 5/5 21%  CARDIOVASCULAR   - MAP goal > 85  GASTROINTESTINAL   - Diet: NPO  /RENAL   - IV fluids: LR @ 100cc/hr  HEMATOLOGIC  - Monitor H/H   INFECTIOUS DISEASE  - Monitor fever   ENDOCRINE  - Decadron   DISPO: SICU
I agree with the detailed interval history, physical, and plan, which I have reviewed and edited where appropriate'; also agree with notes/assessment with my team on service.  I have personally examined the patient.  I was physically present for the key portions of the evaluation and management (E/M) service provided.  I reviewed all the pertinent data.  The patient is a critical care patient with life threatening hemodynamic and metabolic instability in SICU.  The SICU team has a constant risk benefit analyzes discussion and coordinating care with the primary team and all consultants.   The patient is in SICU with the chief complaint and diagnosis mentioned in the note.   The plan will be specified in the note.  62M with PMH asthma, HTN, herniated disc; s/p C2-T1 laminectomy and fusion in SICU for neurovascular checks.    EXAM   NEURO: NAD,   RESPIRATORY: clear  ABDOMEN: soft, nontender  EXTREMITIES: normal strength RUE and RLE, normal strength LLE, improved strength LUE    PLAN  NEUROLOGIC   - Q4 neuro check  -dilaudid, oxy, tylenol, flexeril  RESPIRATORY   -Monitor SpO2 goal >92%  CARDIOVASCULAR   - amlodipine   GASTROINTESTINAL   - Diet: Regular diet  /RENAL   - IV fluids: LR @ 100cc/hr  HEMATOLOGIC  -  SCDs and heparin subcutaneous  INFECTIOUS DISEASE  - Ancef   ENDOCRINE  - Monitor glucose  DISPO: d/c floor
I agree with the detailed interval history, physical, and plan, which I have reviewed and edited where appropriate'; also agree with notes/assessment with my team on service.  I have personally examined the patient.  I was physically present for the key portions of the evaluation and management (E/M) service provided.  I reviewed all the pertinent data.  The patient is a critical care patient with life threatening hemodynamic and metabolic instability in SICU.  The SICU team has a constant risk benefit analyzes discussion and coordinating care with the primary team and all consultants.   The patient is in SICU with the chief complaint and diagnosis mentioned in the note.   The plan will be specified in the note.  62M s/p C2-T1 laminectomy and fusion. SICU consulted for q1 neurovascular checks.    EXAM   NEURO: NAD,   RESPIRATORY: intubated-clear  CARDIO: RR  ABDOMEN: soft, nontender, nondistended  EXTREMITIES: normal strength RUE and RLE, decreased strength in LUE and LLE no deformities    PLAN  NEUROLOGIC   - Wean propofol and precedex  - Q1 neuro check  RESPIRATORY   - Intubated CPAP 5/5 21%  CARDIOVASCULAR   - MAP goal > 85  GASTROINTESTINAL   - Diet: NPO  /RENAL   - IV fluids: LR @ 100cc/hr  HEMATOLOGIC  - DVT prophylaxis SCDs  INFECTIOUS DISEASE  - Ancef   ENDOCRINE  - Monitor glucose  - Decadron     DISPO: SICU
I agree with the detailed interval history, physical, and plan, which I have reviewed and edited where appropriate'; also agree with notes/assessment with my team on service.  I have personally examined the patient.  I was physically present for the key portions of the evaluation and management (E/M) service provided.  I reviewed all the pertinent data.  The patient is a critical care patient with life threatening hemodynamic and metabolic instability in SICU.  The SICU team has a constant risk benefit analyzes discussion and coordinating care with the primary team and all consultants.   The patient is in SICU with the chief complaint and diagnosis mentioned in the note.   The plan will be specified in the note.  62M with PMH asthma, HTN, herniated disc; s/p C2-T1 laminectomy and fusion in SICU for neurovascular checks.    EXAM  RESPIRATORY: clear  CARDIO: RR  ABDOMEN: soft, nontender, nondistended  EXTREMITIES: RUE 5/5, RLE 4/5, improving LUE and LLE strength, no deformities    PLAN  NEUROLOGIC   - Q4 neuro check  -dilaudid PRN and tylenol  RESPIRATORY   -- Currently on 2LtNC  CARDIOVASCULAR   - MAP goal > 85  GASTROINTESTINAL   - Diet: NPO  /RENAL   - IV fluids: LR @ 100cc/hr  HEMATOLOGIC  - Heparin subcutaneous  INFECTIOUS DISEASE  - Ancef   ENDOCRINE  - Monitor glucose  - Decadron     DISPO: List for floor
I agree with the detailed interval history, physical, and plan, which I have reviewed and edited where appropriate'; also agree with notes/assessment with my team on service.  I have personally examined the patient.  I was physically present for the key portions of the evaluation and management (E/M) service provided.  I reviewed all the pertinent data.  The patient is a critical care patient with life threatening hemodynamic and metabolic instability in SICU.  The SICU team has a constant risk benefit analyzes discussion and coordinating care with the primary team and all consultants.   The patient is in SICU with the chief complaint and diagnosis mentioned in the note.   The plan will be specified in the note.  62M with PMH asthma, HTN, herniated disc; s/p C2-T1 laminectomy and fusion in SICU for neurovascular checks.    EXAM  NEURO: NAD, YOON x 1  RESPIRATORY: clear  CARDIO: RR  ABDOMEN: soft, nontender, nondistended  EXTREMITIES: RUE 5/5, RLE 4/5, improving LUE and LLE strength, no deformities    PLAN  NEUROLOGIC   - Wean precedex as needed  - Q4 neuro check  RESPIRATORY   - Extubate  CARDIOVASCULAR   MAP>70  GASTROINTESTINAL   - Diet: NPO  /RENAL   - IV fluids: LR @ 100cc/hr  HEMATOLOGIC  - heparin subcutaneous  INFECTIOUS DISEASE  - Ancef   ENDOCRINE  - Decadron   DISPO: SICU-d/c floor

## 2022-10-29 LAB
ANION GAP SERPL CALC-SCNC: 12 MMOL/L — SIGNIFICANT CHANGE UP (ref 7–14)
BUN SERPL-MCNC: 18 MG/DL — SIGNIFICANT CHANGE UP (ref 7–23)
CALCIUM SERPL-MCNC: 8.7 MG/DL — SIGNIFICANT CHANGE UP (ref 8.4–10.5)
CHLORIDE SERPL-SCNC: 104 MMOL/L — SIGNIFICANT CHANGE UP (ref 98–107)
CO2 SERPL-SCNC: 25 MMOL/L — SIGNIFICANT CHANGE UP (ref 22–31)
CREAT SERPL-MCNC: 0.78 MG/DL — SIGNIFICANT CHANGE UP (ref 0.5–1.3)
EGFR: 101 ML/MIN/1.73M2 — SIGNIFICANT CHANGE UP
GLUCOSE SERPL-MCNC: 87 MG/DL — SIGNIFICANT CHANGE UP (ref 70–99)
HCT VFR BLD CALC: 34.6 % — LOW (ref 39–50)
HGB BLD-MCNC: 11.2 G/DL — LOW (ref 13–17)
MAGNESIUM SERPL-MCNC: 2.2 MG/DL — SIGNIFICANT CHANGE UP (ref 1.6–2.6)
MCHC RBC-ENTMCNC: 28.9 PG — SIGNIFICANT CHANGE UP (ref 27–34)
MCHC RBC-ENTMCNC: 32.4 GM/DL — SIGNIFICANT CHANGE UP (ref 32–36)
MCV RBC AUTO: 89.4 FL — SIGNIFICANT CHANGE UP (ref 80–100)
NRBC # BLD: 0 /100 WBCS — SIGNIFICANT CHANGE UP (ref 0–0)
NRBC # FLD: 0 K/UL — SIGNIFICANT CHANGE UP (ref 0–0)
PHOSPHATE SERPL-MCNC: 3.1 MG/DL — SIGNIFICANT CHANGE UP (ref 2.5–4.5)
PLATELET # BLD AUTO: 180 K/UL — SIGNIFICANT CHANGE UP (ref 150–400)
POTASSIUM SERPL-MCNC: 3.8 MMOL/L — SIGNIFICANT CHANGE UP (ref 3.5–5.3)
POTASSIUM SERPL-SCNC: 3.8 MMOL/L — SIGNIFICANT CHANGE UP (ref 3.5–5.3)
RBC # BLD: 3.87 M/UL — LOW (ref 4.2–5.8)
RBC # FLD: 14.6 % — HIGH (ref 10.3–14.5)
SODIUM SERPL-SCNC: 141 MMOL/L — SIGNIFICANT CHANGE UP (ref 135–145)
WBC # BLD: 7.97 K/UL — SIGNIFICANT CHANGE UP (ref 3.8–10.5)
WBC # FLD AUTO: 7.97 K/UL — SIGNIFICANT CHANGE UP (ref 3.8–10.5)

## 2022-10-29 RX ADMIN — TIZANIDINE 2 MILLIGRAM(S): 4 TABLET ORAL at 12:31

## 2022-10-29 RX ADMIN — Medication 1000 MILLIGRAM(S): at 18:24

## 2022-10-29 RX ADMIN — Medication 1000 MILLIGRAM(S): at 05:40

## 2022-10-29 RX ADMIN — MORPHINE SULFATE 15 MILLIGRAM(S): 50 CAPSULE, EXTENDED RELEASE ORAL at 21:31

## 2022-10-29 RX ADMIN — Medication 3 MILLIGRAM(S): at 22:32

## 2022-10-29 RX ADMIN — TIZANIDINE 2 MILLIGRAM(S): 4 TABLET ORAL at 00:40

## 2022-10-29 RX ADMIN — Medication 100 MILLIGRAM(S): at 21:31

## 2022-10-29 RX ADMIN — Medication 1000 MILLIGRAM(S): at 13:00

## 2022-10-29 RX ADMIN — MORPHINE SULFATE 15 MILLIGRAM(S): 50 CAPSULE, EXTENDED RELEASE ORAL at 14:17

## 2022-10-29 RX ADMIN — Medication 1000 MILLIGRAM(S): at 17:31

## 2022-10-29 RX ADMIN — TAPENTADOL HYDROCHLORIDE 100 MILLIGRAM(S): 50 TABLET, FILM COATED ORAL at 05:26

## 2022-10-29 RX ADMIN — MORPHINE SULFATE 15 MILLIGRAM(S): 50 CAPSULE, EXTENDED RELEASE ORAL at 22:15

## 2022-10-29 RX ADMIN — TIZANIDINE 2 MILLIGRAM(S): 4 TABLET ORAL at 17:33

## 2022-10-29 RX ADMIN — Medication 100 MILLIGRAM(S): at 14:16

## 2022-10-29 RX ADMIN — HEPARIN SODIUM 5000 UNIT(S): 5000 INJECTION INTRAVENOUS; SUBCUTANEOUS at 21:31

## 2022-10-29 RX ADMIN — MORPHINE SULFATE 15 MILLIGRAM(S): 50 CAPSULE, EXTENDED RELEASE ORAL at 15:15

## 2022-10-29 RX ADMIN — Medication 100 MILLIGRAM(S): at 05:41

## 2022-10-29 RX ADMIN — AMLODIPINE BESYLATE 10 MILLIGRAM(S): 2.5 TABLET ORAL at 05:41

## 2022-10-29 RX ADMIN — Medication 1000 MILLIGRAM(S): at 12:30

## 2022-10-29 RX ADMIN — TIZANIDINE 2 MILLIGRAM(S): 4 TABLET ORAL at 06:44

## 2022-10-29 RX ADMIN — HEPARIN SODIUM 5000 UNIT(S): 5000 INJECTION INTRAVENOUS; SUBCUTANEOUS at 05:39

## 2022-10-29 RX ADMIN — TAPENTADOL HYDROCHLORIDE 100 MILLIGRAM(S): 50 TABLET, FILM COATED ORAL at 22:29

## 2022-10-29 RX ADMIN — TAPENTADOL HYDROCHLORIDE 100 MILLIGRAM(S): 50 TABLET, FILM COATED ORAL at 14:11

## 2022-10-29 RX ADMIN — HEPARIN SODIUM 5000 UNIT(S): 5000 INJECTION INTRAVENOUS; SUBCUTANEOUS at 13:53

## 2022-10-29 NOTE — PROGRESS NOTE ADULT - SUBJECTIVE AND OBJECTIVE BOX
Pt seen/examined. Doing well. Pain controlled. No acute overnight complaints or events.    T(C): 37.1 (10-28-22 @ 23:33), Max: 37.6 (10-28-22 @ 20:00)  HR: 88 (10-28-22 @ 23:33) (85 - 99)  BP: 142/90 (10-28-22 @ 23:33) (106/69 - 149/94)  RR: 20 (10-28-22 @ 23:33) (14 - 25)  SpO2: 98% (10-28-22 @ 23:33) (97% - 100%)  Wt(kg): --  - Gen: NAD    Back: Dressing/ Incision Clean/Dry/Intact,  YOON intact with SS output  Motor:                   C5                C6              C7               C8           T1   R            5/5                5/5            5/5             5/5          5/5  L             3/5               3/5             3/5             3/5          3/5                L2             L3             L4               L5            S1  R         5/5           5/5          5/5             5/5           5/5  L          5/5          5/5           5/5             5/5           5/5    Sensory:            C5         C6         C7      C8       T1        (0=absent, 1=impaired, 2=normal, NT=not testable)  R         2            2           2        2         2  L          2            2           2        2         2               L2          L3         L4      L5       S1         (0=absent, 1=impaired, 2=normal, NT=not testable)  R         2            2            2        2        2  L          2            2           2        2         2      62y male s/p C2-T1 lami/fusion. Recovering appropriately. No plans to RTOR at this time, will cont. to monitor    - Aspen collar at all times  - Pain control  - DVT ppx SQH  - Ancef while drain is in  - f/u PMR consult  - kd ladd when pt OOB and walking

## 2022-10-29 NOTE — PROGRESS NOTE ADULT - SUBJECTIVE AND OBJECTIVE BOX
Manuel Porter MD  Interventional Cardiology / Endovascular Specialist  [place] Office : [address] [place] [address] N. [address]  Tel:   [place] Office : [address] [facility] [place] [address]  Tel: [phone]    Subjective/Overnight events: Patient lying in bed comfortably. No acute distress.   	  MEDICATIONS:  amLODIPine   Tablet 10 milliGRAM(s) Oral daily  heparin   Injectable 5000 Unit(s) SubCutaneous every 8 hours    ceFAZolin   IVPB 1000 milliGRAM(s) IV Intermittent every 8 hours      acetaminophen     Tablet .. 1000 milliGRAM(s) Oral every 6 hours  melatonin 3 milliGRAM(s) Oral at bedtime  morphine  - Injectable 2 milliGRAM(s) IV Push every 3 hours PRN  morphine  IR 15 milliGRAM(s) Oral every 3 hours PRN  tapentadol 100 milliGRAM(s) Oral every 8 hours  tiZANidine 2 milliGRAM(s) Oral every 6 hours        chlorhexidine 4% Liquid 1 Application(s) Topical daily      PAST MEDICAL/SURGICAL HISTORY  PAST MEDICAL & SURGICAL HISTORY:  Asthma          SOCIAL HISTORY: Substance Use (street drugs): ( x ) never used  (  ) other:    FAMILY HISTORY:      REVIEW OF SYSTEMS:  CONSTITUTIONAL: No fever, weight loss, or fatigue  EYES: No eye pain, visual disturbances, or discharge  ENMT:  No difficulty hearing, tinnitus, vertigo; No sinus or throat pain  BREASTS: No pain, masses, or nipple discharge  GASTROINTESTINAL: No abdominal or epigastric pain. No nausea, vomiting, or hematemesis; No diarrhea or constipation. No melena or hematochezia.  GENITOURINARY: No dysuria, frequency, hematuria, or incontinence  NEUROLOGICAL: No headaches, memory loss, loss of strength, numbness, or tremors  ENDOCRINE: No heat or cold intolerance; No hair loss  MUSCULOSKELETAL: No joint pain or swelling; No muscle, back, or extremity pain  PSYCHIATRIC: No depression, anxiety, mood swings, or difficulty sleeping  HEME/LYMPH: No easy bruising, or bleeding gums  All others negative    PHYSICAL EXAM:  T(C): 36.9 (10-29-22 @ 05:37), Max: 37.6 (10-28-22 @ 20:00)  HR: 84 (10-29-22 @ 05:37) (84 - 99)  BP: 131/88 (10-29-22 @ 05:37) (106/69 - 149/94)  RR: 20 (10-29-22 @ 05:37) (16 - 25)  SpO2: 100% (10-29-22 @ 05:37) (97% - 100%)  Wt(kg): --  I&O's Summary    28 Oct 2022 07:01  -  29 Oct 2022 07:00  --------------------------------------------------------  IN: 1290 mL / OUT: 1255 mL / NET: 35 mL    GENERAL: NAD   EYES: conjunctiva and sclera clear  ENMT: No tonsillar erythema, exudates, or enlargement  Cardiovascular: Normal S1 S2, No JVD, No murmurs, No edema  Respiratory: Lungs clear to auscultation	  Gastrointestinal:  Soft, Non-tender, + BS	  Extremities: No edema                            11.2   7.97  )-----------( 180      ( 29 Oct 2022 05:04 )             34.6     10-29    141  |  104  |  18  ----------------------------<  87  3.8   |  25  |  0.78    Ca    8.7      29 Oct 2022 05:04  Phos  3.1     10-29  Mg     2.20     10-29      proBNP:   Lipid Profile:   HgA1c:   TSH:     Consultant(s) Notes Reviewed:  [x ] YES  [ ] NO    Care Discussed with Consultants/Other Providers [ x] YES  [ ] NO    Imaging Personally Reviewed independently:  [x] YES  [ ] NO    All labs, radiologic studies, vitals, orders and medications list reviewed. Patient is seen and examined at bedside. Case discussed with medical team.

## 2022-10-30 LAB
ANION GAP SERPL CALC-SCNC: 9 MMOL/L — SIGNIFICANT CHANGE UP (ref 7–14)
BUN SERPL-MCNC: 16 MG/DL — SIGNIFICANT CHANGE UP (ref 7–23)
CALCIUM SERPL-MCNC: 8.7 MG/DL — SIGNIFICANT CHANGE UP (ref 8.4–10.5)
CHLORIDE SERPL-SCNC: 99 MMOL/L — SIGNIFICANT CHANGE UP (ref 98–107)
CO2 SERPL-SCNC: 27 MMOL/L — SIGNIFICANT CHANGE UP (ref 22–31)
CREAT SERPL-MCNC: 0.76 MG/DL — SIGNIFICANT CHANGE UP (ref 0.5–1.3)
EGFR: 102 ML/MIN/1.73M2 — SIGNIFICANT CHANGE UP
GLUCOSE SERPL-MCNC: 100 MG/DL — HIGH (ref 70–99)
HCT VFR BLD CALC: 35.4 % — LOW (ref 39–50)
HGB BLD-MCNC: 11.5 G/DL — LOW (ref 13–17)
MCHC RBC-ENTMCNC: 29 PG — SIGNIFICANT CHANGE UP (ref 27–34)
MCHC RBC-ENTMCNC: 32.5 GM/DL — SIGNIFICANT CHANGE UP (ref 32–36)
MCV RBC AUTO: 89.4 FL — SIGNIFICANT CHANGE UP (ref 80–100)
NRBC # BLD: 0 /100 WBCS — SIGNIFICANT CHANGE UP (ref 0–0)
NRBC # FLD: 0 K/UL — SIGNIFICANT CHANGE UP (ref 0–0)
PLATELET # BLD AUTO: 135 K/UL — LOW (ref 150–400)
POTASSIUM SERPL-MCNC: 4.4 MMOL/L — SIGNIFICANT CHANGE UP (ref 3.5–5.3)
POTASSIUM SERPL-SCNC: 4.4 MMOL/L — SIGNIFICANT CHANGE UP (ref 3.5–5.3)
RBC # BLD: 3.96 M/UL — LOW (ref 4.2–5.8)
RBC # FLD: 14.6 % — HIGH (ref 10.3–14.5)
SODIUM SERPL-SCNC: 135 MMOL/L — SIGNIFICANT CHANGE UP (ref 135–145)
WBC # BLD: 9.11 K/UL — SIGNIFICANT CHANGE UP (ref 3.8–10.5)
WBC # FLD AUTO: 9.11 K/UL — SIGNIFICANT CHANGE UP (ref 3.8–10.5)

## 2022-10-30 PROCEDURE — 93010 ELECTROCARDIOGRAM REPORT: CPT

## 2022-10-30 RX ADMIN — TIZANIDINE 2 MILLIGRAM(S): 4 TABLET ORAL at 12:13

## 2022-10-30 RX ADMIN — MORPHINE SULFATE 15 MILLIGRAM(S): 50 CAPSULE, EXTENDED RELEASE ORAL at 09:55

## 2022-10-30 RX ADMIN — MORPHINE SULFATE 15 MILLIGRAM(S): 50 CAPSULE, EXTENDED RELEASE ORAL at 23:35

## 2022-10-30 RX ADMIN — MORPHINE SULFATE 15 MILLIGRAM(S): 50 CAPSULE, EXTENDED RELEASE ORAL at 15:30

## 2022-10-30 RX ADMIN — MORPHINE SULFATE 15 MILLIGRAM(S): 50 CAPSULE, EXTENDED RELEASE ORAL at 05:37

## 2022-10-30 RX ADMIN — TIZANIDINE 2 MILLIGRAM(S): 4 TABLET ORAL at 00:09

## 2022-10-30 RX ADMIN — AMLODIPINE BESYLATE 10 MILLIGRAM(S): 2.5 TABLET ORAL at 05:38

## 2022-10-30 RX ADMIN — TAPENTADOL HYDROCHLORIDE 100 MILLIGRAM(S): 50 TABLET, FILM COATED ORAL at 15:06

## 2022-10-30 RX ADMIN — Medication 3 MILLIGRAM(S): at 21:01

## 2022-10-30 RX ADMIN — MORPHINE SULFATE 15 MILLIGRAM(S): 50 CAPSULE, EXTENDED RELEASE ORAL at 10:30

## 2022-10-30 RX ADMIN — MORPHINE SULFATE 15 MILLIGRAM(S): 50 CAPSULE, EXTENDED RELEASE ORAL at 15:03

## 2022-10-30 RX ADMIN — HEPARIN SODIUM 5000 UNIT(S): 5000 INJECTION INTRAVENOUS; SUBCUTANEOUS at 15:03

## 2022-10-30 RX ADMIN — TAPENTADOL HYDROCHLORIDE 100 MILLIGRAM(S): 50 TABLET, FILM COATED ORAL at 22:57

## 2022-10-30 RX ADMIN — TAPENTADOL HYDROCHLORIDE 100 MILLIGRAM(S): 50 TABLET, FILM COATED ORAL at 05:37

## 2022-10-30 RX ADMIN — MORPHINE SULFATE 15 MILLIGRAM(S): 50 CAPSULE, EXTENDED RELEASE ORAL at 06:20

## 2022-10-30 RX ADMIN — HEPARIN SODIUM 5000 UNIT(S): 5000 INJECTION INTRAVENOUS; SUBCUTANEOUS at 21:01

## 2022-10-30 RX ADMIN — MORPHINE SULFATE 15 MILLIGRAM(S): 50 CAPSULE, EXTENDED RELEASE ORAL at 19:35

## 2022-10-30 RX ADMIN — Medication 1000 MILLIGRAM(S): at 05:36

## 2022-10-30 RX ADMIN — HEPARIN SODIUM 5000 UNIT(S): 5000 INJECTION INTRAVENOUS; SUBCUTANEOUS at 05:37

## 2022-10-30 RX ADMIN — MORPHINE SULFATE 2 MILLIGRAM(S): 50 CAPSULE, EXTENDED RELEASE ORAL at 20:47

## 2022-10-30 RX ADMIN — MORPHINE SULFATE 15 MILLIGRAM(S): 50 CAPSULE, EXTENDED RELEASE ORAL at 19:06

## 2022-10-30 RX ADMIN — MORPHINE SULFATE 2 MILLIGRAM(S): 50 CAPSULE, EXTENDED RELEASE ORAL at 21:02

## 2022-10-30 RX ADMIN — Medication 1000 MILLIGRAM(S): at 00:09

## 2022-10-30 RX ADMIN — TIZANIDINE 2 MILLIGRAM(S): 4 TABLET ORAL at 17:44

## 2022-10-30 NOTE — PROGRESS NOTE ADULT - SUBJECTIVE AND OBJECTIVE BOX
Manuel Porter MD  Interventional Cardiology / Endovascular Specialist  [place] Office : [address] [place] [address]. [address]  Tel:   [place] Office : [address] [facility] [place] [address]  Tel: [phone]    Subjective/Overnight events: Patient lying in bed comfortably. No acute distress.   	  MEDICATIONS:  amLODIPine   Tablet 10 milliGRAM(s) Oral daily  heparin   Injectable 5000 Unit(s) SubCutaneous every 8 hours        melatonin 3 milliGRAM(s) Oral at bedtime  morphine  - Injectable 2 milliGRAM(s) IV Push every 3 hours PRN  morphine  IR 15 milliGRAM(s) Oral every 3 hours PRN  tapentadol 100 milliGRAM(s) Oral every 8 hours  tiZANidine 2 milliGRAM(s) Oral every 6 hours        chlorhexidine 4% Liquid 1 Application(s) Topical daily      PAST MEDICAL/SURGICAL HISTORY  PAST MEDICAL & SURGICAL HISTORY:  Asthma          SOCIAL HISTORY: Substance Use (street drugs): ( x ) never used  (  ) other:    FAMILY HISTORY:      REVIEW OF SYSTEMS:  CONSTITUTIONAL: No fever, weight loss, or fatigue  EYES: No eye pain, visual disturbances, or discharge  ENMT:  No difficulty hearing, tinnitus, vertigo; No sinus or throat pain  BREASTS: No pain, masses, or nipple discharge  GASTROINTESTINAL: No abdominal or epigastric pain. No nausea, vomiting, or hematemesis; No diarrhea or constipation. No melena or hematochezia.  GENITOURINARY: No dysuria, frequency, hematuria, or incontinence  NEUROLOGICAL: No headaches, memory loss, loss of strength, numbness, or tremors  ENDOCRINE: No heat or cold intolerance; No hair loss  MUSCULOSKELETAL: No joint pain or swelling; No muscle, back, or extremity pain  PSYCHIATRIC: No depression, anxiety, mood swings, or difficulty sleeping  HEME/LYMPH: No easy bruising, or bleeding gums  All others negative    PHYSICAL EXAM:  T(C): 37.4 (10-31-22 @ 01:45), Max: 37.8 (10-30-22 @ 15:05)  HR: 108 (10-31-22 @ 01:45) (88 - 119)  BP: 129/78 (10-31-22 @ 01:45) (104/70 - 161/97)  RR: 18 (10-31-22 @ 01:45) (17 - 18)  SpO2: 97% (10-31-22 @ 01:45) (94% - 97%)  Wt(kg): --  I&O's Summary    29 Oct 2022 07:01  -  30 Oct 2022 07:00  --------------------------------------------------------  IN: 0 mL / OUT: 950 mL / NET: -950 mL    30 Oct 2022 07:01  -  31 Oct 2022 01:55  --------------------------------------------------------  IN: 480 mL / OUT: 450 mL / NET: 30 mL        GENERAL: NAD   EYES: conjunctiva and sclera clear  ENMT: No tonsillar erythema, exudates, or enlargement  Cardiovascular: Normal S1 S2, No JVD, No murmurs, No edema  Respiratory: Lungs clear to auscultation	  Gastrointestinal:  Soft, Non-tender, + BS	  Extremities: No edema                              11.5   9.11  )-----------( 135      ( 30 Oct 2022 05:30 )             35.4     10-30    135  |  99  |  16  ----------------------------<  100<H>  4.4   |  27  |  0.76    Ca    8.7      30 Oct 2022 05:30  Phos  3.1     10-29  Mg     2.20     10-29      proBNP:   Lipid Profile:   HgA1c:   TSH:     Consultant(s) Notes Reviewed:  [x ] YES  [ ] NO    Care Discussed with Consultants/Other Providers [ x] YES  [ ] NO    Imaging Personally Reviewed independently:  [x] YES  [ ] NO    All labs, radiologic studies, vitals, orders and medications list reviewed. Patient is seen and examined at bedside. Case discussed with medical team.

## 2022-10-30 NOTE — PROGRESS NOTE ADULT - SUBJECTIVE AND OBJECTIVE BOX
Pt seen/examined. Doing well. Pain controlled. No acute overnight complaints or events.    Vital Signs Last 24 Hrs  T(C): 37.2 (30 Oct 2022 01:26), Max: 37.4 (29 Oct 2022 18:30)  T(F): 99 (30 Oct 2022 01:26), Max: 99.3 (29 Oct 2022 18:30)  HR: 88 (30 Oct 2022 02:35) (84 - 99)  BP: 104/70 (30 Oct 2022 02:35) (99/57 - 145/83)  RR: 19 (30 Oct 2022 01:26) (18 - 20)  SpO2: 94% (30 Oct 2022 01:26) (94% - 100%)  Parameters below as of 30 Oct 2022 01:26  Patient On (Oxygen Delivery Method): room air      Back: Dressing/ Incision Clean/Dry/Intact  Motor:                   C5                C6              C7               C8           T1   R            5/5                5/5            5/5             5/5          5/5  L             3/5               3/5             3/5             3/5          3/5                L2             L3             L4               L5            S1  R         5/5           5/5          5/5             5/5           5/5  L          5/5          5/5           5/5             5/5           5/5    Sensory:            C5         C6         C7      C8       T1        (0=absent, 1=impaired, 2=normal, NT=not testable)  R         2            2           2        2         2  L          2            2           2        2         2               L2          L3         L4      L5       S1         (0=absent, 1=impaired, 2=normal, NT=not testable)  R         2            2            2        2        2  L          2            2           2        2         2      62y male s/p C2-T1 lami/fusion. Recovering appropriately. No plans to RTOR at this time, will cont. to monitor    - Aspen collar at all times  - Drain removed  - Pain control  - DVT ppx SQH  - f/u PMR consult

## 2022-10-31 DIAGNOSIS — R00.0 TACHYCARDIA, UNSPECIFIED: ICD-10-CM

## 2022-10-31 DIAGNOSIS — J45.909 UNSPECIFIED ASTHMA, UNCOMPLICATED: ICD-10-CM

## 2022-10-31 DIAGNOSIS — I10 ESSENTIAL (PRIMARY) HYPERTENSION: ICD-10-CM

## 2022-10-31 DIAGNOSIS — D50.0 IRON DEFICIENCY ANEMIA SECONDARY TO BLOOD LOSS (CHRONIC): ICD-10-CM

## 2022-10-31 DIAGNOSIS — D72.829 ELEVATED WHITE BLOOD CELL COUNT, UNSPECIFIED: ICD-10-CM

## 2022-10-31 LAB
ANION GAP SERPL CALC-SCNC: 10 MMOL/L — SIGNIFICANT CHANGE UP (ref 7–14)
APPEARANCE UR: CLEAR — SIGNIFICANT CHANGE UP
BILIRUB UR-MCNC: NEGATIVE — SIGNIFICANT CHANGE UP
BUN SERPL-MCNC: 17 MG/DL — SIGNIFICANT CHANGE UP (ref 7–23)
CALCIUM SERPL-MCNC: 8.8 MG/DL — SIGNIFICANT CHANGE UP (ref 8.4–10.5)
CHLORIDE SERPL-SCNC: 99 MMOL/L — SIGNIFICANT CHANGE UP (ref 98–107)
CO2 SERPL-SCNC: 26 MMOL/L — SIGNIFICANT CHANGE UP (ref 22–31)
COLOR SPEC: YELLOW — SIGNIFICANT CHANGE UP
CREAT SERPL-MCNC: 0.85 MG/DL — SIGNIFICANT CHANGE UP (ref 0.5–1.3)
DIFF PNL FLD: NEGATIVE — SIGNIFICANT CHANGE UP
EGFR: 98 ML/MIN/1.73M2 — SIGNIFICANT CHANGE UP
GLUCOSE SERPL-MCNC: 104 MG/DL — HIGH (ref 70–99)
GLUCOSE UR QL: NEGATIVE — SIGNIFICANT CHANGE UP
HCT VFR BLD CALC: 32.4 % — LOW (ref 39–50)
HGB BLD-MCNC: 10.9 G/DL — LOW (ref 13–17)
KETONES UR-MCNC: NEGATIVE — SIGNIFICANT CHANGE UP
LEUKOCYTE ESTERASE UR-ACNC: NEGATIVE — SIGNIFICANT CHANGE UP
MCHC RBC-ENTMCNC: 29.1 PG — SIGNIFICANT CHANGE UP (ref 27–34)
MCHC RBC-ENTMCNC: 33.6 GM/DL — SIGNIFICANT CHANGE UP (ref 32–36)
MCV RBC AUTO: 86.6 FL — SIGNIFICANT CHANGE UP (ref 80–100)
NITRITE UR-MCNC: NEGATIVE — SIGNIFICANT CHANGE UP
NRBC # BLD: 0 /100 WBCS — SIGNIFICANT CHANGE UP (ref 0–0)
NRBC # FLD: 0 K/UL — SIGNIFICANT CHANGE UP (ref 0–0)
NT-PROBNP SERPL-SCNC: 907 PG/ML — HIGH
PH UR: 6 — SIGNIFICANT CHANGE UP (ref 5–8)
PLATELET # BLD AUTO: 123 K/UL — LOW (ref 150–400)
POTASSIUM SERPL-MCNC: 4.5 MMOL/L — SIGNIFICANT CHANGE UP (ref 3.5–5.3)
POTASSIUM SERPL-SCNC: 4.5 MMOL/L — SIGNIFICANT CHANGE UP (ref 3.5–5.3)
PROT UR-MCNC: ABNORMAL
RBC # BLD: 3.74 M/UL — LOW (ref 4.2–5.8)
RBC # FLD: 14.2 % — SIGNIFICANT CHANGE UP (ref 10.3–14.5)
SODIUM SERPL-SCNC: 135 MMOL/L — SIGNIFICANT CHANGE UP (ref 135–145)
SP GR SPEC: 1.03 — SIGNIFICANT CHANGE UP (ref 1.01–1.05)
TROPONIN T, HIGH SENSITIVITY RESULT: 46 NG/L — SIGNIFICANT CHANGE UP
UROBILINOGEN FLD QL: SIGNIFICANT CHANGE UP
WBC # BLD: 10.04 K/UL — SIGNIFICANT CHANGE UP (ref 3.8–10.5)
WBC # FLD AUTO: 10.04 K/UL — SIGNIFICANT CHANGE UP (ref 3.8–10.5)

## 2022-10-31 PROCEDURE — 99233 SBSQ HOSP IP/OBS HIGH 50: CPT

## 2022-10-31 PROCEDURE — 71275 CT ANGIOGRAPHY CHEST: CPT | Mod: 26

## 2022-10-31 PROCEDURE — 99221 1ST HOSP IP/OBS SF/LOW 40: CPT

## 2022-10-31 PROCEDURE — 93010 ELECTROCARDIOGRAM REPORT: CPT

## 2022-10-31 PROCEDURE — 71045 X-RAY EXAM CHEST 1 VIEW: CPT | Mod: 26

## 2022-10-31 RX ORDER — ACETAMINOPHEN 500 MG
1000 TABLET ORAL ONCE
Refills: 0 | Status: COMPLETED | OUTPATIENT
Start: 2022-10-31 | End: 2022-10-31

## 2022-10-31 RX ORDER — RIVAROXABAN 15 MG-20MG
20 KIT ORAL
Refills: 0 | Status: DISCONTINUED | OUTPATIENT
Start: 2022-10-31 | End: 2022-11-08

## 2022-10-31 RX ORDER — AMLODIPINE BESYLATE 2.5 MG/1
10 TABLET ORAL DAILY
Refills: 0 | Status: DISCONTINUED | OUTPATIENT
Start: 2022-10-31 | End: 2022-11-08

## 2022-10-31 RX ORDER — ENOXAPARIN SODIUM 100 MG/ML
30 INJECTION SUBCUTANEOUS EVERY 12 HOURS
Refills: 0 | Status: DISCONTINUED | OUTPATIENT
Start: 2022-10-31 | End: 2022-10-31

## 2022-10-31 RX ADMIN — MORPHINE SULFATE 15 MILLIGRAM(S): 50 CAPSULE, EXTENDED RELEASE ORAL at 05:12

## 2022-10-31 RX ADMIN — MORPHINE SULFATE 2 MILLIGRAM(S): 50 CAPSULE, EXTENDED RELEASE ORAL at 02:41

## 2022-10-31 RX ADMIN — TAPENTADOL HYDROCHLORIDE 100 MILLIGRAM(S): 50 TABLET, FILM COATED ORAL at 18:05

## 2022-10-31 RX ADMIN — TIZANIDINE 2 MILLIGRAM(S): 4 TABLET ORAL at 08:34

## 2022-10-31 RX ADMIN — Medication 400 MILLIGRAM(S): at 15:22

## 2022-10-31 RX ADMIN — TAPENTADOL HYDROCHLORIDE 100 MILLIGRAM(S): 50 TABLET, FILM COATED ORAL at 23:29

## 2022-10-31 RX ADMIN — MORPHINE SULFATE 15 MILLIGRAM(S): 50 CAPSULE, EXTENDED RELEASE ORAL at 06:12

## 2022-10-31 RX ADMIN — Medication 3 MILLIGRAM(S): at 23:24

## 2022-10-31 RX ADMIN — MORPHINE SULFATE 15 MILLIGRAM(S): 50 CAPSULE, EXTENDED RELEASE ORAL at 19:32

## 2022-10-31 RX ADMIN — MORPHINE SULFATE 15 MILLIGRAM(S): 50 CAPSULE, EXTENDED RELEASE ORAL at 00:35

## 2022-10-31 RX ADMIN — TIZANIDINE 2 MILLIGRAM(S): 4 TABLET ORAL at 12:32

## 2022-10-31 RX ADMIN — TIZANIDINE 2 MILLIGRAM(S): 4 TABLET ORAL at 01:20

## 2022-10-31 RX ADMIN — TIZANIDINE 2 MILLIGRAM(S): 4 TABLET ORAL at 18:05

## 2022-10-31 RX ADMIN — Medication 400 MILLIGRAM(S): at 09:37

## 2022-10-31 RX ADMIN — MORPHINE SULFATE 2 MILLIGRAM(S): 50 CAPSULE, EXTENDED RELEASE ORAL at 02:56

## 2022-10-31 RX ADMIN — HEPARIN SODIUM 5000 UNIT(S): 5000 INJECTION INTRAVENOUS; SUBCUTANEOUS at 14:49

## 2022-10-31 RX ADMIN — TAPENTADOL HYDROCHLORIDE 100 MILLIGRAM(S): 50 TABLET, FILM COATED ORAL at 06:16

## 2022-10-31 RX ADMIN — AMLODIPINE BESYLATE 10 MILLIGRAM(S): 2.5 TABLET ORAL at 05:13

## 2022-10-31 RX ADMIN — TAPENTADOL HYDROCHLORIDE 100 MILLIGRAM(S): 50 TABLET, FILM COATED ORAL at 12:29

## 2022-10-31 RX ADMIN — MORPHINE SULFATE 15 MILLIGRAM(S): 50 CAPSULE, EXTENDED RELEASE ORAL at 19:02

## 2022-10-31 RX ADMIN — MORPHINE SULFATE 2 MILLIGRAM(S): 50 CAPSULE, EXTENDED RELEASE ORAL at 22:46

## 2022-10-31 RX ADMIN — HEPARIN SODIUM 5000 UNIT(S): 5000 INJECTION INTRAVENOUS; SUBCUTANEOUS at 05:15

## 2022-10-31 RX ADMIN — TIZANIDINE 2 MILLIGRAM(S): 4 TABLET ORAL at 23:23

## 2022-10-31 RX ADMIN — MORPHINE SULFATE 2 MILLIGRAM(S): 50 CAPSULE, EXTENDED RELEASE ORAL at 22:16

## 2022-10-31 NOTE — CONSULT NOTE ADULT - CONSULT REQUESTED DATE/TIME
25-Oct-2022 02:33
24-Oct-2022 11:15
27-Oct-2022 13:14
31-Oct-2022 14:07
24-Oct-2022 06:47
31-Oct-2022 08:01

## 2022-10-31 NOTE — PROGRESS NOTE ADULT - SUBJECTIVE AND OBJECTIVE BOX
Manuel Porter MD  Interventional Cardiology / Advance Heart Failure and Cardiac Transplant Specialist  [place] Office : [phone] [place] [address] [address] [address]  Tel:   [place] Office : [address] [facility] [place] [address]  Tel: [phone]      Subjective/Overnight events: Pt is lying in bed comfortable not in distress, no chest pains no SOB no palpitations  	  MEDICATIONS:  amLODIPine   Tablet 10 milliGRAM(s) Oral daily  heparin   Injectable 5000 Unit(s) SubCutaneous every 8 hours        melatonin 3 milliGRAM(s) Oral at bedtime  morphine  - Injectable 2 milliGRAM(s) IV Push every 3 hours PRN  morphine  IR 15 milliGRAM(s) Oral every 3 hours PRN  tapentadol 100 milliGRAM(s) Oral four times a day  tiZANidine 2 milliGRAM(s) Oral every 6 hours        chlorhexidine 4% Liquid 1 Application(s) Topical daily      PAST MEDICAL/SURGICAL HISTORY  PAST MEDICAL & SURGICAL HISTORY:  Asthma          SOCIAL HISTORY: Substance Use (street drugs): ( x ) never used  (  ) other:    FAMILY HISTORY:        PHYSICAL EXAM:  T(C): 36.5 (10-31-22 @ 09:45), Max: 38 (10-31-22 @ 05:11)  HR: 106 (10-31-22 @ 09:45) (98 - 115)  BP: 118/76 (10-31-22 @ 09:45) (118/76 - 161/97)  RR: 18 (10-31-22 @ 09:45) (17 - 18)  SpO2: 98% (10-31-22 @ 09:45) (94% - 100%)  Wt(kg): --  I&O's Summary    30 Oct 2022 07:01  -  31 Oct 2022 07:00  --------------------------------------------------------  IN: 480 mL / OUT: 750 mL / NET: -270 mL            GENERAL: NAD   EYES: conjunctiva and sclera clear  ENMT: No tonsillar erythema, exudates, or enlargement  Cardiovascular: Normal S1 S2, No JVD, No murmurs, No edema  Respiratory: Lungs clear to auscultation	  Gastrointestinal:  Soft, Non-tender, + BS	  Extremities: No edema                                10.9   10.04 )-----------( 123      ( 31 Oct 2022 06:29 )             32.4     10-31    135  |  99  |  17  ----------------------------<  104<H>  4.5   |  26  |  0.85    Ca    8.8      31 Oct 2022 06:29      proBNP:   Lipid Profile:   HgA1c:   TSH:     Consultant(s) Notes Reviewed:  [x ] YES  [ ] NO    Care Discussed with Consultants/Other Providers [ x] YES  [ ] NO    Imaging Personally Reviewed independently:  [x] YES  [ ] NO    All labs, radiologic studies, vitals, orders and medications list reviewed. Patient is seen and examined at bedside. Case discussed with medical team.

## 2022-10-31 NOTE — PROGRESS NOTE ADULT - SUBJECTIVE AND OBJECTIVE BOX
Pt seen/examined. Doing well. Pain controlled. No acute overnight complaints or events. Tachy ysterday with low grade temp of 100.1. Asymptomatic, no calf tenderness    T(C): 37.4 (10-31-22 @ 01:45), Max: 37.8 (10-30-22 @ 15:05)  HR: 108 (10-31-22 @ 01:45) (99 - 119)  BP: 129/78 (10-31-22 @ 01:45) (106/44 - 161/97)  RR: 18 (10-31-22 @ 01:45) (17 - 18)  SpO2: 97% (10-31-22 @ 01:45) (94% - 97%)  Wt(kg): --  - Gen: NAD    Back: Dressing/ Incision Clean/Dry/Intact  Motor:                   C5                C6              C7               C8           T1   R            5/5                5/5            5/5             5/5          5/5  L             3/5               3/5             3/5             3/5          3/5                L2             L3             L4               L5            S1  R         5/5           5/5          5/5             5/5           5/5  L          5/5          5/5           5/5             5/5           5/5    Sensory:            C5         C6         C7      C8       T1        (0=absent, 1=impaired, 2=normal, NT=not testable)  R         2            2           2        2         2  L          2            2           2        2         2               L2          L3         L4      L5       S1         (0=absent, 1=impaired, 2=normal, NT=not testable)  R         2            2            2        2        2  L          2            2           2        2         2      62y male s/p C2-T1 lami/fusion. Recovering appropriately. No plans to RTOR at this time, will cont. to monitor    - Aspen collar at all times  - FU CTPA  - Drain removed  - Pain control  - DVT ppx SQH  - f/u PMR consult

## 2022-10-31 NOTE — PROGRESS NOTE ADULT - ASSESSMENT
62y Male presents with b/l UE radicular type symptoms with subjective numbness bilaterally, left wrist drop, inability to flex biceps, RLE radicular type symptoms to the knee, b/l LE muscle spasm/fasiculation as well as contracted quadriceps muscle on the left.     EKG SR LVH with re op changed unchanged from earlier this month       1) Post op assessment  - s/p spinal surgery tolerated the procedure well   -EKG today with non-specific STT changes, denies CP. will do outpatient ischemic eval     2) HTN  -improving  -c/w norvasc  -continue to monitor BP     3) DVT PPX  -hep subq

## 2022-10-31 NOTE — PROVIDER CONTACT NOTE (OTHER) - ASSESSMENT
pt temp 102.2, pulse 100, bp 111/76, rr 18 sating 94 on RA. pt in no acute distress, denies SOB or chest pains.

## 2022-10-31 NOTE — CONSULT NOTE ADULT - ASSESSMENT
62M Asthma, HTN, spinal stenosis w/ radiculopathy s/p C2-T1 lami/fusion on 10/25 - in SICU for post-op neuro checks c/b post-op leukocytosis, anemia, tachycardia.

## 2022-10-31 NOTE — CONSULT NOTE ADULT - PROBLEM SELECTOR RECOMMENDATION 2
While clinical suspicion of PE is low, given that patient is high risk, will obtain CTPA to rule out.  - Sinus tachycardia - could be related to pain.

## 2022-10-31 NOTE — CONSULT NOTE ADULT - SUBJECTIVE AND OBJECTIVE BOX
[facility] Division of Hospital Medicine  Juan Downey MD  Pager (DARYN-MARYCRUZ, 8A-5P): [phone]  Other Times:  [id number]    Patient is a 62y old  Male who presents with a chief complaint of cord compression (31 Oct 2022 14:00)      HPI:  62M with asthma and HTN who presents as a transfer from [facility] with c/o numbness, weakness and swelling of left arm for the past month and weakness of right leg, and b/l LE muscle spasms. Patient reports that hx of herniated disc, but his sx started worsening last month when he was gardening. no falls or trauma. Since then has been having progressively worsening weakness and muscle spasms. No bowel or bladder incontinence. Has been trying to take nucenta 100 mg daily and ibuprofen without relief. Decided to present to [facility] when the pain was uncontrolled. Imaging with concern for spinal canal stenosis and transferred to [facility] for further evaluation and management with orthopedic-spine team. In the ED patient was afebrile, hemodynamically stable satting well on RA. Given morphine and po oxy and admitted to medicine for further evaluation. (24 Oct 2022 07:37)  Patient underwent C2-T1 lami/fusion on 10/25 - was in SICU for post-op neuro checks c/b post-op leukocytosis, anemia, tachycardia.  Patient c/b pain at the surgical site, spasm in nature, 10/10 at worst, non-radiating, worse with movement but improved with pain meds. Episode of low grade temp and reviewing the vitals showed elevtated HR overnight but no other complaints.  No F/C, N/V, CP, SOB, Cough, lightheadedness, dizziness, abdominal pain, diarrhea, dysuria.    Pain Symptoms if applicable:             	                         none	   mild         moderate         severe  Pain:	           10                 0	    1-3	     4-6	         7-10  Location:	Surgical site  Modifying factors:	Worse with movement  Associated symptoms:	    Allergies    No Known Drug Allergies  peanuts (Unknown)    Intolerances        HOME MEDICATIONS: Reviewed    MEDICATIONS  (STANDING):  amLODIPine   Tablet 10 milliGRAM(s) Oral daily  chlorhexidine 4% Liquid 1 Application(s) Topical daily  heparin   Injectable 5000 Unit(s) SubCutaneous every 8 hours  melatonin 3 milliGRAM(s) Oral at bedtime  tapentadol 100 milliGRAM(s) Oral four times a day  tiZANidine 2 milliGRAM(s) Oral every 6 hours    MEDICATIONS  (PRN):  morphine  - Injectable 2 milliGRAM(s) IV Push every 3 hours PRN Severe Breakthrough Pain (7 - 10)  morphine  IR 15 milliGRAM(s) Oral every 3 hours PRN Moderate - Severe Pain (4 - 6)      PAST MEDICAL & SURGICAL HISTORY:  Asthma          SOCIAL HISTORY:  No tobacco/alcohol use/abuse.    FAMILY HISTORY:      REVIEW OF SYSTEMS:    CONSTITUTIONAL: No fever, weight loss, or fatigue  EYES: No eye pain, visual disturbances, or discharge  ENMT:  No difficulty hearing, tinnitus, vertigo; No sinus or throat pain  NECK: No pain or stiffness  RESPIRATORY: No cough, wheezing, chills or hemoptysis; No shortness of breath  CARDIOVASCULAR: No chest pain, palpitations, dizziness, or leg swelling  GASTROINTESTINAL: No abdominal or epigastric pain. No nausea, vomiting, or hematemesis; No diarrhea or constipation. No melena or hematochezia.  GENITOURINARY: No dysuria, frequency, hematuria, or incontinence  NEUROLOGICAL: No headaches, memory loss, loss of strength, numbness, or tremors  SKIN: No itching, burning, rashes, or lesions   LYMPH NODES: No enlarged glands  ENDOCRINE: No heat or cold intolerance; No hair loss  MUSCULOSKELETAL:   PSYCHIATRIC: No depression, anxiety, mood swings, or difficulty sleeping  HEME/LYMPH: No easy bruising, or bleeding gums  ALLERGY AND IMMUNOLOGIC: No hives or eczema    [] Unable to obtain due to poor mental status    Vital Signs Last 24 Hrs  T(C): 36.5 (31 Oct 2022 09:45), Max: 38 (31 Oct 2022 05:11)  T(F): 97.7 (31 Oct 2022 09:45), Max: 100.4 (31 Oct 2022 05:11)  HR: 106 (31 Oct 2022 09:45) (98 - 115)  BP: 118/76 (31 Oct 2022 09:45) (118/76 - 161/97)  BP(mean): --  RR: 18 (31 Oct 2022 09:45) (17 - 18)  SpO2: 98% (31 Oct 2022 09:45) (94% - 100%)    Parameters below as of 31 Oct 2022 09:45  Patient On (Oxygen Delivery Method): nasal cannula      CAPILLARY BLOOD GLUCOSE          PHYSICAL EXAM:    CONSTITUTIONAL: NAD, well-developed, well-groomed  EYES: PERRLA; conjunctiva and sclera clear  ENMT: Moist oral mucosa, no pharyngeal injection or exudates; normal dentition  NECK: C-collar in place, no palpable masses  RESPIRATORY: Normal respiratory effort; lungs are clear to auscultation bilaterally  CARDIOVASCULAR: Regular rate and rhythm, normal S1 and S2, no murmur/rub/gallop; No lower extremity edema; Peripheral pulses are 2+ bilaterally  ABDOMEN: Nontender to palpation, normoactive bowel sounds, no rebound/guarding; No hepatosplenomegaly  MUSCULOSKELETAL:  Did not assess gait; no clubbing or cyanosis of digits; no joint swelling or tenderness to palpation  PSYCH: A+O to person, place, and time; affect appropriate  NEUROLOGY: CN 2-12 are intact and symmetric; no gross sensory deficits   SKIN: No rashes; no palpable lesions, surgical dressing C/D/I    LABS:                        10.9   10.04 )-----------( 123      ( 31 Oct 2022 06:29 )             32.4     10-    135  |  99  |  17  ----------------------------<  104<H>  4.5   |  26  |  0.85    Ca    8.8      31 Oct 2022 06:29        Urinalysis Basic - ( 31 Oct 2022 10:30 )    Color: Yellow / Appearance: Clear / S.027 / pH: x  Gluc: x / Ketone: Negative  / Bili: Negative / Urobili: <2 mg/dL   Blood: x / Protein: Trace / Nitrite: Negative   Leuk Esterase: Negative / RBC: x / WBC x   Sq Epi: x / Non Sq Epi: x / Bacteria: x      CAPILLARY BLOOD GLUCOSE          RADIOLOGY & ADDITIONAL STUDIES:    Imaging:   Personally Reviewed:  [ ] YES               EKG:   Personally Reviewed:  [ ] YES       Care Discussed with Consultant(s)/Other Providers:  Care Discussed with Primary Team.      [ ] Increased delirium risk  [ ] Delirium and other risks can be reduced by:          -early ambulation          -minimizing "tethers" - IV, oxygen, catheters, etc          -avoiding hypnotics and sedatives          -maintaining hydration/nutrition          -avoid anticholinergics - diphenhydramine, etc          -pain control          -supportive environment

## 2022-10-31 NOTE — CONSULT NOTE ADULT - PROBLEM SELECTOR RECOMMENDATION 9
s/p C2-T1 lami/Fusion on 10/25  - Pain control with morphine IR PRN, zanaflex, tapentadol  - Hep SC for VTE ppx  - Wound care as per ortho  - PT/OT eval for safe disposition

## 2022-10-31 NOTE — PROVIDER CONTACT NOTE (OTHER) - ASSESSMENT
Pt in bed at this time, with complaints of feeling "hot." Pt in bed at this time with complaint of feeling "hot." All other vital sgns stable at this time. See flowsheet.

## 2022-10-31 NOTE — CONSULT NOTE ADULT - SUBJECTIVE AND OBJECTIVE BOX
HPI:  62M with asthma and HTN who presents as a transfer from [facility] with c/o numbness, weakness and swelling of left arm for the past month and weakness of right leg, and b/l LE muscle spasms. Patient reports that hx of herniated disc, but his sx started worsening last month when he was gardening. no falls or trauma. Since then has been having progressively worsening weakness and muscle spasms. No bowel or bladder incontinence. Has been trying to take nucenta 100 mg daily and ibuprofen without relief. Decided to present to [facility] when the pain was uncontrolled. Imaging with concern for spinal canal stenosis and transferred to [facility] for further evaluation and management with orthopedic-spine team. In the ED patient was afebrile, hemodynamically stable satting well on RA. Given morphine and po oxy and admitted to medicine for further evaluation. (24 Oct 2022 07:37), s/p C2-T1 PLSF, C3-C6 lami 2/2 cervical spinal stenosis.      REVIEW OF SYSTEMS/Subjective: Patient in bed with Aspen collar in NAD. No SOB, no n/v, pain controlled.  Constitutional - No fever, No fatigue  HEENT - No visual disturbances, No difficulty hearing,  (+) neck pain  Respiratory - No cough, No wheezing, No shortness of breath  Cardiovascular - No chest pain  Gastrointestinal - No abdominal pain, No nausea, No vomiting  Genitourinary - No dysuria,  No incontinence  Neurological - No headaches, (+)weakness LUE/LLE, (+)numbness LUE  Skin - No itching, No rashes  Musculoskeletal - No joint pain, No joint swelling  Psychiatric - No depression, No anxiety  All other review of systems negative    PAST MEDICAL & SURGICAL HISTORY  Spinal cord compression    Handoff    MEWS Score    Asthma    Injury of cervical spinal cord    Cervical spinal cord injury, sequela    Spinal cord compression    HTN (hypertension)    Preventive measure    Fusion of cervical spine at 6 levels by posterior approach    [email or web address]        SOCIAL HISTORY    Smoking - Denied  EtOH - Denied   Drugs - Denied    FUNCTIONAL HISTORY  Lives with mother un a pvt house with 1 flight of steps to bedroom. (-)HHA.   Independent in ambulation, ADL's, transfers prior to hospitalization      FAMILY HISTORY   Reviewed and non-contributory    ALLERGIES  No Known Drug Allergies  peanuts (Unknown)    VITALS  T(C): 38 (10-31-22 @ 05:11)  T(F): 100.4 (10-31-22 @ 05:11), Max: 100.4 (10-31-22 @ 05:11)  HR: 98 (10-31-22 @ 05:11) (98 - 119)  BP: 136/76 (10-31-22 @ 05:11) (106/44 - 161/97)  RR:  (17 - 18)  SpO2:  (94% - 100%)  Wt(kg): --    PHYSICAL EXAM  Constitutional - NAD, Comfortable  HEENT - NCAT, MMM  Neck - Supple  Chest - CTA bilaterally  Cardiovascular - RRR, S1S2  Abdomen - BS+, Soft, NTND  Extremities - No C/C/E, No calf tenderness   Neurologic Exam -                    Cognitive - Awake, Alert, Oriented to self, place, date, year, situation     Communication - Fluent, No dysarthria     Cranial Nerves - EOMI, tongue midline, no facial asymetry     Motor -                     LEFT    UE - ShAB <3/5, EF 3/5, EE <2/5, WE 4-/5,  4/5                    RIGHT UE - ShAB 5/5, EF 5/5, EE 5/5, WE 5/5,  5/5                    LEFT    LE - HF 4/5, KE 4+/5, DF 4/5, PF 4/5                    RIGHT LE - HF 5/5, KE 5/5, DF 5/5, PF 5/5        Sensory - LT intact BLE, diminished LUE     Reflexes - +1 KJ on left, 0/+1 KJ on right (+) Babinski on left.         Psychiatric - Affect WNL    CURRENT FUNCTIONAL STATUS  Sit-Stand Transfer Training  Sit-to-Stand Transfer Training Rehab Potential: good, to achieve stated therapy goals  Sit-to-Stand Transfer Training Symptoms Noted During/After Treatment: none  Transfer Training Sit-to-Stand Transfer: rolling walker;  weight-bearing as tolerated   1 person assist;  verbal cues;  minimum assist (75% patient effort)  Transfer Training Stand-to-Sit Transfer: rolling walker;  weight-bearing as tolerated   1 person assist;  verbal cues;  minimum assist (75% patient effort)  Sit-to-Stand Transfer Training Transfer Safety Analysis: decreased step length;  decreased weight-shifting ability;  decreased balance;  decreased strength;  decreased flexibility;  impaired balance;  impaired coordination;  impaired motor control;  rolling walker    Gait Training  Gait Training Rehab Potential: good, to achieve stated therapy goals  Gait Training Symptoms Noted During/After Treatment: dizziness  Gait Trainin feet;  rolling walker;  weight-bearing as tolerated   1 person assist;  verbal cues;  minimum assist (75% patient effort)  Gait Analysis: 2-point gait   decreased stride length;  decreased hip/knee flexion;  increased time in double stance;  ataxic;  decreased nilda;  decreased strength;  impaired balance;  impaired coordination;  decreased flexibility;  impaired postural control;  impaired motor control;  25 feet;  rolling walker  Brace/Orthotics Brace/Orthotics: cervical collar  Gait Number of Times:: x 1    RECENT LABS/IMAGING                        11.5   9.11  )-----------( 135      ( 30 Oct 2022 05:30 )             35.4     10-30    135  |  99  |  16  ----------------------------<  100<H>  4.4   |  27  |  0.76    Ca    8.7      30 Oct 2022 05:30    MEDICATIONS   MEDICATIONS  (STANDING):  amLODIPine   Tablet 10 milliGRAM(s) Oral daily  chlorhexidine 4% Liquid 1 Application(s) Topical daily  heparin   Injectable 5000 Unit(s) SubCutaneous every 8 hours  melatonin 3 milliGRAM(s) Oral at bedtime  tapentadol 100 milliGRAM(s) Oral every 8 hours  tiZANidine 2 milliGRAM(s) Oral every 6 hours    MEDICATIONS  (PRN):  morphine  - Injectable 2 milliGRAM(s) IV Push every 3 hours PRN Severe Breakthrough Pain (7 - 10)  morphine  IR 15 milliGRAM(s) Oral every 3 hours PRN Moderate - Severe Pain (4 - 6)      ASSESSMENT/PLAN  62M with asthma and HTN who presents as a transfer from [facility] with c/o numbness, weakness and swelling of left arm for the past month and weakness of left leg, and b/l LE muscle spasms with progressively worsening weakness and muscle spasms 2/2 cervical spinal stenosis/myelopathy. s/p C2-T1 PLSF, C3-C6 lami  with functional, gait, ADL impairments.    Disposition - Patient is a candidate for restorative inpatient rehab, acute unit. Patient can tolerate 3 hours/day of rehab services and requires daily physician visits.   PT - ROM, Bed mobility, Transfers, Ambulation with assistive device  OT - ADLs, ROM  SLP - Dysphagia eval and treat  Precautions - Falls, Cardiac  DVT Prophylaxis - Heparin SQ  Weight bearing status - WBAT  Skin - Turn Q2hrs  Diet - Reg

## 2022-11-01 DIAGNOSIS — I26.99 OTHER PULMONARY EMBOLISM WITHOUT ACUTE COR PULMONALE: ICD-10-CM

## 2022-11-01 LAB
ANION GAP SERPL CALC-SCNC: 9 MMOL/L — SIGNIFICANT CHANGE UP (ref 7–14)
BASOPHILS # BLD AUTO: 0.02 K/UL — SIGNIFICANT CHANGE UP (ref 0–0.2)
BASOPHILS NFR BLD AUTO: 0.2 % — SIGNIFICANT CHANGE UP (ref 0–2)
BUN SERPL-MCNC: 18 MG/DL — SIGNIFICANT CHANGE UP (ref 7–23)
CALCIUM SERPL-MCNC: 8.6 MG/DL — SIGNIFICANT CHANGE UP (ref 8.4–10.5)
CHLORIDE SERPL-SCNC: 96 MMOL/L — LOW (ref 98–107)
CO2 SERPL-SCNC: 26 MMOL/L — SIGNIFICANT CHANGE UP (ref 22–31)
CREAT SERPL-MCNC: 0.87 MG/DL — SIGNIFICANT CHANGE UP (ref 0.5–1.3)
EGFR: 98 ML/MIN/1.73M2 — SIGNIFICANT CHANGE UP
EOSINOPHIL # BLD AUTO: 0.06 K/UL — SIGNIFICANT CHANGE UP (ref 0–0.5)
EOSINOPHIL NFR BLD AUTO: 0.7 % — SIGNIFICANT CHANGE UP (ref 0–6)
GLUCOSE SERPL-MCNC: 113 MG/DL — HIGH (ref 70–99)
HCT VFR BLD CALC: 30.6 % — LOW (ref 39–50)
HGB BLD-MCNC: 10.3 G/DL — LOW (ref 13–17)
IANC: 5.93 K/UL — SIGNIFICANT CHANGE UP (ref 1.8–7.4)
IMM GRANULOCYTES NFR BLD AUTO: 0.3 % — SIGNIFICANT CHANGE UP (ref 0–0.9)
LYMPHOCYTES # BLD AUTO: 1.52 K/UL — SIGNIFICANT CHANGE UP (ref 1–3.3)
LYMPHOCYTES # BLD AUTO: 17.6 % — SIGNIFICANT CHANGE UP (ref 13–44)
MCHC RBC-ENTMCNC: 28.8 PG — SIGNIFICANT CHANGE UP (ref 27–34)
MCHC RBC-ENTMCNC: 33.7 GM/DL — SIGNIFICANT CHANGE UP (ref 32–36)
MCV RBC AUTO: 85.5 FL — SIGNIFICANT CHANGE UP (ref 80–100)
MONOCYTES # BLD AUTO: 1.08 K/UL — HIGH (ref 0–0.9)
MONOCYTES NFR BLD AUTO: 12.5 % — SIGNIFICANT CHANGE UP (ref 2–14)
NEUTROPHILS # BLD AUTO: 5.93 K/UL — SIGNIFICANT CHANGE UP (ref 1.8–7.4)
NEUTROPHILS NFR BLD AUTO: 68.7 % — SIGNIFICANT CHANGE UP (ref 43–77)
NRBC # BLD: 0 /100 WBCS — SIGNIFICANT CHANGE UP (ref 0–0)
NRBC # FLD: 0 K/UL — SIGNIFICANT CHANGE UP (ref 0–0)
PLATELET # BLD AUTO: 130 K/UL — LOW (ref 150–400)
POTASSIUM SERPL-MCNC: 4 MMOL/L — SIGNIFICANT CHANGE UP (ref 3.5–5.3)
POTASSIUM SERPL-SCNC: 4 MMOL/L — SIGNIFICANT CHANGE UP (ref 3.5–5.3)
RBC # BLD: 3.58 M/UL — LOW (ref 4.2–5.8)
RBC # FLD: 13.9 % — SIGNIFICANT CHANGE UP (ref 10.3–14.5)
SODIUM SERPL-SCNC: 131 MMOL/L — LOW (ref 135–145)
WBC # BLD: 8.64 K/UL — SIGNIFICANT CHANGE UP (ref 3.8–10.5)
WBC # FLD AUTO: 8.64 K/UL — SIGNIFICANT CHANGE UP (ref 3.8–10.5)

## 2022-11-01 PROCEDURE — 93306 TTE W/DOPPLER COMPLETE: CPT | Mod: 26

## 2022-11-01 PROCEDURE — 99233 SBSQ HOSP IP/OBS HIGH 50: CPT

## 2022-11-01 RX ORDER — ACETAMINOPHEN 500 MG
975 TABLET ORAL EVERY 8 HOURS
Refills: 0 | Status: DISCONTINUED | OUTPATIENT
Start: 2022-11-01 | End: 2022-11-08

## 2022-11-01 RX ORDER — CYCLOBENZAPRINE HYDROCHLORIDE 10 MG/1
5 TABLET, FILM COATED ORAL THREE TIMES A DAY
Refills: 0 | Status: DISCONTINUED | OUTPATIENT
Start: 2022-11-01 | End: 2022-11-07

## 2022-11-01 RX ADMIN — MORPHINE SULFATE 15 MILLIGRAM(S): 50 CAPSULE, EXTENDED RELEASE ORAL at 18:51

## 2022-11-01 RX ADMIN — MORPHINE SULFATE 2 MILLIGRAM(S): 50 CAPSULE, EXTENDED RELEASE ORAL at 16:00

## 2022-11-01 RX ADMIN — Medication 975 MILLIGRAM(S): at 13:33

## 2022-11-01 RX ADMIN — TIZANIDINE 2 MILLIGRAM(S): 4 TABLET ORAL at 06:00

## 2022-11-01 RX ADMIN — CYCLOBENZAPRINE HYDROCHLORIDE 5 MILLIGRAM(S): 10 TABLET, FILM COATED ORAL at 23:49

## 2022-11-01 RX ADMIN — MORPHINE SULFATE 2 MILLIGRAM(S): 50 CAPSULE, EXTENDED RELEASE ORAL at 20:38

## 2022-11-01 RX ADMIN — TAPENTADOL HYDROCHLORIDE 100 MILLIGRAM(S): 50 TABLET, FILM COATED ORAL at 12:28

## 2022-11-01 RX ADMIN — Medication 975 MILLIGRAM(S): at 12:30

## 2022-11-01 RX ADMIN — MORPHINE SULFATE 2 MILLIGRAM(S): 50 CAPSULE, EXTENDED RELEASE ORAL at 09:30

## 2022-11-01 RX ADMIN — TAPENTADOL HYDROCHLORIDE 100 MILLIGRAM(S): 50 TABLET, FILM COATED ORAL at 18:00

## 2022-11-01 RX ADMIN — MORPHINE SULFATE 2 MILLIGRAM(S): 50 CAPSULE, EXTENDED RELEASE ORAL at 09:16

## 2022-11-01 RX ADMIN — TAPENTADOL HYDROCHLORIDE 100 MILLIGRAM(S): 50 TABLET, FILM COATED ORAL at 06:03

## 2022-11-01 RX ADMIN — TAPENTADOL HYDROCHLORIDE 100 MILLIGRAM(S): 50 TABLET, FILM COATED ORAL at 23:48

## 2022-11-01 RX ADMIN — TAPENTADOL HYDROCHLORIDE 100 MILLIGRAM(S): 50 TABLET, FILM COATED ORAL at 12:27

## 2022-11-01 RX ADMIN — RIVAROXABAN 20 MILLIGRAM(S): KIT at 17:57

## 2022-11-01 RX ADMIN — MORPHINE SULFATE 15 MILLIGRAM(S): 50 CAPSULE, EXTENDED RELEASE ORAL at 04:16

## 2022-11-01 RX ADMIN — CYCLOBENZAPRINE HYDROCHLORIDE 5 MILLIGRAM(S): 10 TABLET, FILM COATED ORAL at 15:48

## 2022-11-01 RX ADMIN — MORPHINE SULFATE 15 MILLIGRAM(S): 50 CAPSULE, EXTENDED RELEASE ORAL at 04:57

## 2022-11-01 RX ADMIN — TAPENTADOL HYDROCHLORIDE 100 MILLIGRAM(S): 50 TABLET, FILM COATED ORAL at 04:57

## 2022-11-01 RX ADMIN — MORPHINE SULFATE 2 MILLIGRAM(S): 50 CAPSULE, EXTENDED RELEASE ORAL at 15:48

## 2022-11-01 NOTE — PROGRESS NOTE ADULT - SUBJECTIVE AND OBJECTIVE BOX
[facility] Division of Hospital Medicine  Juan Downey MD  Pager (M-F, 8A-5P): [phone]  Other Times:  [phone]    Patient is a 62y old  Male who presents with a chief complaint of cord compression (2022 11:35)    SUBJECTIVE / OVERNIGHT EVENTS:  Patient was noted to have PE on CTPA. No F/C, N/V, CP, SOB, Cough, lightheadedness, dizziness, abdominal pain, diarrhea, dysuria.    MEDICATIONS  (STANDING):  acetaminophen     Tablet .. 975 milliGRAM(s) Oral every 8 hours  amLODIPine   Tablet 10 milliGRAM(s) Oral daily  chlorhexidine 4% Liquid 1 Application(s) Topical daily  melatonin 3 milliGRAM(s) Oral at bedtime  rivaroxaban 20 milliGRAM(s) Oral with dinner  tapentadol 100 milliGRAM(s) Oral four times a day    MEDICATIONS  (PRN):  cyclobenzaprine 5 milliGRAM(s) Oral three times a day PRN Muscle Spasm  morphine  - Injectable 2 milliGRAM(s) IV Push every 3 hours PRN Severe Breakthrough Pain (7 - 10)  morphine  IR 15 milliGRAM(s) Oral every 3 hours PRN Moderate - Severe Pain (4 - 6)      Vital Signs Last 24 Hrs  T(C): 38.3 (2022 12:10), Max: 39 (31 Oct 2022 14:55)  T(F): 100.9 (2022 12:10), Max: 102.2 (31 Oct 2022 14:55)  HR: 97 (2022 12:10) (85 - 103)  BP: 132/80 (2022 12:10) (103/63 - 132/80)  BP(mean): --  RR: 18 (2022 12:10) (17 - 18)  SpO2: 97% (2022 12:10) (94% - 97%)    Parameters below as of 2022 12:10  Patient On (Oxygen Delivery Method): room air      CAPILLARY BLOOD GLUCOSE        I&O's Summary    31 Oct 2022 07:01  -  2022 07:00  --------------------------------------------------------  IN: 350 mL / OUT: 950 mL / NET: -600 mL    2022 07:01  -  2022 14:03  --------------------------------------------------------  IN: 0 mL / OUT: 500 mL / NET: -500 mL        PHYSICAL EXAM:  CONSTITUTIONAL: NAD, well-developed, well-groomed  EYES: PERRLA; conjunctiva and sclera clear  ENMT: Moist oral mucosa, no pharyngeal injection or exudates; normal dentition  NECK: C-collar in place, no palpable masses  RESPIRATORY: Normal respiratory effort; lungs are clear to auscultation bilaterally  CARDIOVASCULAR: Regular rate and rhythm, normal S1 and S2, no murmur/rub/gallop; No lower extremity edema; Peripheral pulses are 2+ bilaterally  ABDOMEN: Nontender to palpation, normoactive bowel sounds, no rebound/guarding; No hepatosplenomegaly  MUSCULOSKELETAL:  Did not assess gait; no clubbing or cyanosis of digits; no joint swelling or tenderness to palpation  PSYCH: A+O to person, place, and time; affect appropriate  NEUROLOGY: CN 2-12 are intact and symmetric; no gross sensory deficits   SKIN: No rashes; no palpable lesions, surgical dressing C/D/I    LABS:                        10.3   8.64  )-----------( 130      ( 2022 04:35 )             30.6     11-    131<L>  |  96<L>  |  18  ----------------------------<  113<H>  4.0   |  26  |  0.87    Ca    8.6      2022 04:35            Urinalysis Basic - ( 31 Oct 2022 10:30 )    Color: Yellow / Appearance: Clear / S.027 / pH: x  Gluc: x / Ketone: Negative  / Bili: Negative / Urobili: <2 mg/dL   Blood: x / Protein: Trace / Nitrite: Negative   Leuk Esterase: Negative / RBC: x / WBC x   Sq Epi: x / Non Sq Epi: x / Bacteria: x        RADIOLOGY & ADDITIONAL TESTS:    Imaging Personally Reviewed:    Care Discussed with Consultants/Other Providers:    Care Discussed with Orthopedic PA about:

## 2022-11-01 NOTE — PROGRESS NOTE ADULT - ASSESSMENT
62y Male presents with b/l UE radicular type symptoms with subjective numbness bilaterally, left wrist drop, inability to flex biceps, RLE radicular type symptoms to the knee, b/l LE muscle spasm/fasiculation as well as contracted quadriceps muscle on the left.     EKG SR LVH with re op changed unchanged from earlier this month       1) Post op assessment  - s/p spinal surgery tolerated the procedure well   -CT chest shows b/l PE on xarelto   - Echo shows mild LV segmental dysfunction, needs out pt ischmia eval     2) HTN  -improving  -c/w norvasc  -continue to monitor BP     3) DVT PPX  -on xarelto

## 2022-11-01 NOTE — PROGRESS NOTE ADULT - ASSESSMENT
62M Asthma, HTN, spinal stenosis w/ radiculopathy s/p C2-T1 lami/fusion on 10/25 - in SICU for post-op neuro checks c/b post-op leukocytosis, anemia, pulmonary embolism with cor pulmonale.

## 2022-11-01 NOTE — PROGRESS NOTE ADULT - ASSESSMENT
61 yo male few weeks weaknessLUE LLe> RLe weaknes PE LUE weakness bilateral LE. Patient s/p c2-T1 lami/fsuions. Pe exam stable to slightly improved from my exam on 1023 at [facility]      Impression cervical cord compression, decompressed with improved strength in Les    s/p Lami/fusion  PT  Can follow up with Neurology, Dr. Kory Ibarra at [phone]

## 2022-11-01 NOTE — PROGRESS NOTE ADULT - SUBJECTIVE AND OBJECTIVE BOX
Manuel Porter MD  Interventional Cardiology / Advance Heart Failure and Cardiac Transplant Specialist  [place] Office : [address] [place] [address] [place]. [address]  Tel:   [place] Office : [address] [facility] [place] [address]  Tel: [phone]       Pt is lying in bed comfortable not in distress, no chest pains no SOB no palpitations  	  MEDICATIONS:  amLODIPine   Tablet 10 milliGRAM(s) Oral daily  rivaroxaban 20 milliGRAM(s) Oral with dinner        acetaminophen     Tablet .. 975 milliGRAM(s) Oral every 8 hours  cyclobenzaprine 5 milliGRAM(s) Oral three times a day PRN  melatonin 3 milliGRAM(s) Oral at bedtime  morphine  - Injectable 2 milliGRAM(s) IV Push every 3 hours PRN  morphine  IR 15 milliGRAM(s) Oral every 3 hours PRN  tapentadol 100 milliGRAM(s) Oral four times a day        chlorhexidine 4% Liquid 1 Application(s) Topical daily      PAST MEDICAL/SURGICAL HISTORY  PAST MEDICAL & SURGICAL HISTORY:  Asthma          SOCIAL HISTORY: Substance Use (street drugs): ( x ) never used  (  ) other:    FAMILY HISTORY:      REVIEW OF SYSTEMS:  CONSTITUTIONAL: No fever, weight loss, or fatigue  EYES: No eye pain, visual disturbances, or discharge  ENMT:  No difficulty hearing, tinnitus, vertigo; No sinus or throat pain  BREASTS: No pain, masses, or nipple discharge  GASTROINTESTINAL: No abdominal or epigastric pain. No nausea, vomiting, or hematemesis; No diarrhea or constipation. No melena or hematochezia.  GENITOURINARY: No dysuria, frequency, hematuria, or incontinence  NEUROLOGICAL: No headaches, memory loss, loss of strength, numbness, or tremors  ENDOCRINE: No heat or cold intolerance; No hair loss  MUSCULOSKELETAL: No joint pain or swelling; No muscle, back, or extremity pain  PSYCHIATRIC: No depression, anxiety, mood swings, or difficulty sleeping  HEME/LYMPH: No easy bruising, or bleeding gums       PHYSICAL EXAM:  T(C): 38.6 (11-01-22 @ 22:22), Max: 38.6 (11-01-22 @ 22:22)  HR: 97 (11-01-22 @ 22:22) (85 - 97)  BP: 109/60 (11-01-22 @ 22:22) (103/74 - 132/89)  RR: 18 (11-01-22 @ 22:22) (18 - 18)  SpO2: 95% (11-01-22 @ 22:22) (95% - 97%)  Wt(kg): --  I&O's Summary    31 Oct 2022 07:01  -  01 Nov 2022 07:00  --------------------------------------------------------  IN: 350 mL / OUT: 950 mL / NET: -600 mL    01 Nov 2022 07:01  -  01 Nov 2022 23:32  --------------------------------------------------------  IN: 0 mL / OUT: 1000 mL / NET: -1000 mL      Height (cm): 177.8 (11-01 @ 09:32)    GENERAL: NAD  EYES:   PERRLA   ENMT:   Moist mucous membranes, Good dentition, No lesions  Cardiovascular: Normal S1 S2, No JVD, No murmurs, No edema  Respiratory: Lungs clear to auscultation	  Gastrointestinal:  Soft, Non-tender, + BS	  Extremities: no edema                                    10.3   8.64  )-----------( 130      ( 01 Nov 2022 04:35 )             30.6     11-01    131<L>  |  96<L>  |  18  ----------------------------<  113<H>  4.0   |  26  |  0.87    Ca    8.6      01 Nov 2022 04:35      proBNP:   Lipid Profile:   HgA1c:   TSH:     Consultant(s) Notes Reviewed:  [x ] YES  [ ] NO    Care Discussed with Consultants/Other Providers [ x] YES  [ ] NO    Imaging Personally Reviewed independently:  [x] YES  [ ] NO    All labs, radiologic studies, vitals, orders and medications list reviewed. Patient is seen and examined at bedside. Case discussed with medical team.

## 2022-11-01 NOTE — PROGRESS NOTE ADULT - SUBJECTIVE AND OBJECTIVE BOX
Pt seen/examined. Doing well. Pain controlled.  No acute overnight complaints or events. CTPA showed b/l PE, will order echo, blood cx, bnp. Patient will start AC. VSS, asymptomatic.     T(C): 37.3 (10-31-22 @ 23:19), Max: 39 (10-31-22 @ 14:55)  HR: 86 (10-31-22 @ 23:19) (86 - 106)  BP: 110/73 (10-31-22 @ 23:19) (103/63 - 136/76)  RR: 17 (10-31-22 @ 23:19) (17 - 18)  SpO2: 94% (10-31-22 @ 23:19) (94% - 100%)  Wt(kg): --  - Gen: NAD    Back: Dressing/ Incision Clean/Dry/Intact  Motor:                   C5                C6              C7               C8           T1   R            5/5                5/5            5/5             5/5          5/5  L             3/5               3/5             3/5             3/5          3/5                L2             L3             L4               L5            S1  R         5/5           5/5          5/5             5/5           5/5  L          5/5          5/5           5/5             5/5           5/5    Sensory:            C5         C6         C7      C8       T1        (0=absent, 1=impaired, 2=normal, NT=not testable)  R         2            2           2        2         2  L          2            2           2        2         2               L2          L3         L4      L5       S1         (0=absent, 1=impaired, 2=normal, NT=not testable)  R         2            2            2        2        2  L          2            2           2        2         2    62y male s/p C2-T1 lami/fusion. Recovering appropriately. No plans to RTOR at this time, will cont. to monitor. CTPA 10/31 showed b/l PE    - Aspen collar at all times  - FU ECHO  - AC with Xarelto 20qD  - FU Blood Cx  - Drain removed  - Pain control

## 2022-11-01 NOTE — PROGRESS NOTE ADULT - SUBJECTIVE AND OBJECTIVE BOX
Patient seen and examined this am.  s/p C2-T1 lami/fusion       MEDICATIONS:    amLODIPine   Tablet 10 milliGRAM(s) Oral daily  chlorhexidine 4% Liquid 1 Application(s) Topical daily  cyclobenzaprine 5 milliGRAM(s) Oral three times a day PRN  melatonin 3 milliGRAM(s) Oral at bedtime  morphine  - Injectable 2 milliGRAM(s) IV Push every 3 hours PRN  morphine  IR 15 milliGRAM(s) Oral every 3 hours PRN  rivaroxaban 20 milliGRAM(s) Oral with dinner  tapentadol 100 milliGRAM(s) Oral four times a day      LABS:                          10.3   8.64  )-----------( 130      ( 2022 04:35 )             30.6     11    131<L>  |  96<L>  |  18  ----------------------------<  113<H>  4.0   |  26  |  0.87    Ca    8.6      2022 04:35      CAPILLARY BLOOD GLUCOSE          Urinalysis Basic - ( 31 Oct 2022 10:30 )    Color: Yellow / Appearance: Clear / S.027 / pH: x  Gluc: x / Ketone: Negative  / Bili: Negative / Urobili: <2 mg/dL   Blood: x / Protein: Trace / Nitrite: Negative   Leuk Esterase: Negative / RBC: x / WBC x   Sq Epi: x / Non Sq Epi: x / Bacteria: x      I&O's Summary    31 Oct 2022 07:01  -  2022 07:00  --------------------------------------------------------  IN: 350 mL / OUT: 950 mL / NET: -600 mL      Vital Signs Last 24 Hrs  T(C): 37.3 (2022 09:32), Max: 39 (31 Oct 2022 14:55)  T(F): 99.1 (2022 09:32), Max: 102.2 (31 Oct 2022 14:55)  HR: 92 (2022 09:32) (85 - 103)  BP: 105/74 (2022 09:32) (103/63 - 127/65)  BP(mean): --  RR: 18 (:) (17 - 18)  SpO2: 97% (:) (94% - 97%)    Parameters below as of :32  Patient On (Oxygen Delivery Method): room air          On Neurological Examination:    Mental Status - iAAO x3 follows complex commands fluent    Cranial Nerves - PERRL, EOMI, VFF, V1-V3 intact, no gross facial asymmetry, tongue/uvula midline    Motor Exam -   Right upper 5/5 5/5  Left upper deltoid 4/5, biceps 3/5 rest 3/5  lowers 4+ to 5/5      Sensory    Intact to light touch and pinprick bilaterally    Coord: FTN intact bilaterally     Gait -  deferred                                                   RADIOLOGY  < from: CT Lumbar Spine No Cont (10.23.22 @ 02:29) >  Impression:  1. Multilevel degenerative disc disease, results in severe central   stenosis at L3-4 and L4-5.  2. No acute fracture.    --- End of Report ---      < end of copied text >  < from: CT Head No Cont (10.23.22 @ 02:30) >  IMPRESSION:  Mildly motion degraded.  No CT evidence of acute intracranial pathology.    Follow-up MRI may be obtained for more sensitive evaluation.    --- End of Report ---    < end of copied text >    < from: MR Thoracic Spine w/wo IV Cont (10.23.22 @ 14:15) >  IMPRESSION:  CERVICAL SPINE MRI:  1.  Motion degraded.  Postcontrast images were not obtained.  2.  There is diffuse swelling of the cervical cord with long segment   central cord edema extending from C2 to T1, which has broad differential   diagnosis including transverse myelitis, other infectious and   inflammatory causes of myelitis, and trauma with subacute progressive   ascending myelopathy (SPAM).  Central cord infarct could produce similar   findings but is thought to be less likely as this is an atypical location   for spinal cord infarct.  While there is underlying moderate spinal canal   stenosis with apparent mild cord compression at C3-C4 and C4-C5,  minimal   cord compression at C5-C6, and mild ventral cord impingement at C6-C7,   the diffuse changes in the cervical cord are not compatible with primary   compressive myelopathy.  The appearanceof cord compression may be due in   part to the underlying swelling of the cervical cord.  3.  Follow-up contrast-enhanced MRI of the cervical spine is recommended.      THORACIC SPINE MRI:  1.  Motion degraded.  Postcontrast images were not obtained.  2.  Small disc herniations throughout the thoracic spine with slight   contact of the ventral cord at T7-T8.  No thoracic cord compression, cord   edema or other focal cord lesion.    LUMBAR SPINE MRI:  1.  Incomplete exam.  Only motion degraded sagittal T1 and sagittal T2   images are available.  2.  There appears to be at least diffuse moderate spinal canal stenosis   at L1-L2 through L4-L5, which is suboptimally evaluated due to motion   artifact.  Evaluation for cauda equina compression is suboptimal.  No   high-grade neural foraminal narrowing is visualized.      I discussed the major findings in this report with Dr. Bolaños from   orthopedic surgery on 10/24/2022 at 3:30 AM.  Read back verification was   obtained.    --- End of Report ---    < end of copied text >

## 2022-11-02 LAB
ANION GAP SERPL CALC-SCNC: 12 MMOL/L — SIGNIFICANT CHANGE UP (ref 7–14)
BASOPHILS # BLD AUTO: 0.03 K/UL — SIGNIFICANT CHANGE UP (ref 0–0.2)
BASOPHILS NFR BLD AUTO: 0.4 % — SIGNIFICANT CHANGE UP (ref 0–2)
BUN SERPL-MCNC: 20 MG/DL — SIGNIFICANT CHANGE UP (ref 7–23)
CALCIUM SERPL-MCNC: 8.6 MG/DL — SIGNIFICANT CHANGE UP (ref 8.4–10.5)
CHLORIDE SERPL-SCNC: 95 MMOL/L — LOW (ref 98–107)
CO2 SERPL-SCNC: 24 MMOL/L — SIGNIFICANT CHANGE UP (ref 22–31)
CREAT SERPL-MCNC: 0.89 MG/DL — SIGNIFICANT CHANGE UP (ref 0.5–1.3)
EGFR: 97 ML/MIN/1.73M2 — SIGNIFICANT CHANGE UP
EOSINOPHIL # BLD AUTO: 0.13 K/UL — SIGNIFICANT CHANGE UP (ref 0–0.5)
EOSINOPHIL NFR BLD AUTO: 1.7 % — SIGNIFICANT CHANGE UP (ref 0–6)
GLUCOSE SERPL-MCNC: 110 MG/DL — HIGH (ref 70–99)
HCT VFR BLD CALC: 31.6 % — LOW (ref 39–50)
HGB BLD-MCNC: 10.5 G/DL — LOW (ref 13–17)
IANC: 5.24 K/UL — SIGNIFICANT CHANGE UP (ref 1.8–7.4)
IMM GRANULOCYTES NFR BLD AUTO: 0.5 % — SIGNIFICANT CHANGE UP (ref 0–0.9)
LYMPHOCYTES # BLD AUTO: 1.29 K/UL — SIGNIFICANT CHANGE UP (ref 1–3.3)
LYMPHOCYTES # BLD AUTO: 16.5 % — SIGNIFICANT CHANGE UP (ref 13–44)
MCHC RBC-ENTMCNC: 28.9 PG — SIGNIFICANT CHANGE UP (ref 27–34)
MCHC RBC-ENTMCNC: 33.2 GM/DL — SIGNIFICANT CHANGE UP (ref 32–36)
MCV RBC AUTO: 87.1 FL — SIGNIFICANT CHANGE UP (ref 80–100)
MONOCYTES # BLD AUTO: 1.09 K/UL — HIGH (ref 0–0.9)
MONOCYTES NFR BLD AUTO: 13.9 % — SIGNIFICANT CHANGE UP (ref 2–14)
NEUTROPHILS # BLD AUTO: 5.24 K/UL — SIGNIFICANT CHANGE UP (ref 1.8–7.4)
NEUTROPHILS NFR BLD AUTO: 67 % — SIGNIFICANT CHANGE UP (ref 43–77)
NRBC # BLD: 0 /100 WBCS — SIGNIFICANT CHANGE UP (ref 0–0)
NRBC # FLD: 0 K/UL — SIGNIFICANT CHANGE UP (ref 0–0)
PLATELET # BLD AUTO: 179 K/UL — SIGNIFICANT CHANGE UP (ref 150–400)
POTASSIUM SERPL-MCNC: 4.1 MMOL/L — SIGNIFICANT CHANGE UP (ref 3.5–5.3)
POTASSIUM SERPL-SCNC: 4.1 MMOL/L — SIGNIFICANT CHANGE UP (ref 3.5–5.3)
RBC # BLD: 3.63 M/UL — LOW (ref 4.2–5.8)
RBC # FLD: 14.1 % — SIGNIFICANT CHANGE UP (ref 10.3–14.5)
SODIUM SERPL-SCNC: 131 MMOL/L — LOW (ref 135–145)
WBC # BLD: 7.82 K/UL — SIGNIFICANT CHANGE UP (ref 3.8–10.5)
WBC # FLD AUTO: 7.82 K/UL — SIGNIFICANT CHANGE UP (ref 3.8–10.5)

## 2022-11-02 PROCEDURE — 99233 SBSQ HOSP IP/OBS HIGH 50: CPT

## 2022-11-02 RX ADMIN — TAPENTADOL HYDROCHLORIDE 100 MILLIGRAM(S): 50 TABLET, FILM COATED ORAL at 18:02

## 2022-11-02 RX ADMIN — Medication 975 MILLIGRAM(S): at 13:28

## 2022-11-02 RX ADMIN — Medication 975 MILLIGRAM(S): at 05:09

## 2022-11-02 RX ADMIN — CHLORHEXIDINE GLUCONATE 1 APPLICATION(S): 213 SOLUTION TOPICAL at 11:23

## 2022-11-02 RX ADMIN — MORPHINE SULFATE 15 MILLIGRAM(S): 50 CAPSULE, EXTENDED RELEASE ORAL at 08:08

## 2022-11-02 RX ADMIN — AMLODIPINE BESYLATE 10 MILLIGRAM(S): 2.5 TABLET ORAL at 05:09

## 2022-11-02 RX ADMIN — TAPENTADOL HYDROCHLORIDE 100 MILLIGRAM(S): 50 TABLET, FILM COATED ORAL at 06:00

## 2022-11-02 RX ADMIN — TAPENTADOL HYDROCHLORIDE 100 MILLIGRAM(S): 50 TABLET, FILM COATED ORAL at 05:07

## 2022-11-02 RX ADMIN — CYCLOBENZAPRINE HYDROCHLORIDE 5 MILLIGRAM(S): 10 TABLET, FILM COATED ORAL at 18:05

## 2022-11-02 RX ADMIN — Medication 975 MILLIGRAM(S): at 14:28

## 2022-11-02 RX ADMIN — Medication 975 MILLIGRAM(S): at 00:02

## 2022-11-02 RX ADMIN — MORPHINE SULFATE 15 MILLIGRAM(S): 50 CAPSULE, EXTENDED RELEASE ORAL at 20:15

## 2022-11-02 RX ADMIN — CYCLOBENZAPRINE HYDROCHLORIDE 5 MILLIGRAM(S): 10 TABLET, FILM COATED ORAL at 08:08

## 2022-11-02 RX ADMIN — Medication 975 MILLIGRAM(S): at 06:00

## 2022-11-02 RX ADMIN — MORPHINE SULFATE 2 MILLIGRAM(S): 50 CAPSULE, EXTENDED RELEASE ORAL at 01:02

## 2022-11-02 RX ADMIN — TAPENTADOL HYDROCHLORIDE 100 MILLIGRAM(S): 50 TABLET, FILM COATED ORAL at 11:23

## 2022-11-02 RX ADMIN — MORPHINE SULFATE 15 MILLIGRAM(S): 50 CAPSULE, EXTENDED RELEASE ORAL at 13:29

## 2022-11-02 RX ADMIN — MORPHINE SULFATE 15 MILLIGRAM(S): 50 CAPSULE, EXTENDED RELEASE ORAL at 21:00

## 2022-11-02 RX ADMIN — TAPENTADOL HYDROCHLORIDE 100 MILLIGRAM(S): 50 TABLET, FILM COATED ORAL at 12:03

## 2022-11-02 RX ADMIN — MORPHINE SULFATE 15 MILLIGRAM(S): 50 CAPSULE, EXTENDED RELEASE ORAL at 09:08

## 2022-11-02 RX ADMIN — MORPHINE SULFATE 15 MILLIGRAM(S): 50 CAPSULE, EXTENDED RELEASE ORAL at 14:29

## 2022-11-02 RX ADMIN — TAPENTADOL HYDROCHLORIDE 100 MILLIGRAM(S): 50 TABLET, FILM COATED ORAL at 23:33

## 2022-11-02 RX ADMIN — RIVAROXABAN 20 MILLIGRAM(S): KIT at 18:05

## 2022-11-02 RX ADMIN — Medication 975 MILLIGRAM(S): at 01:30

## 2022-11-02 RX ADMIN — Medication 975 MILLIGRAM(S): at 23:33

## 2022-11-02 NOTE — PROGRESS NOTE ADULT - SUBJECTIVE AND OBJECTIVE BOX
[facility] Division of Hospital Medicine  Juan Downey MD  Pager (M-F, 8A-5P): [phone]  Other Times:  [phone]    Patient is a 62y old  Male who presents with a chief complaint of cord compression (02 Nov 2022 06:32)    SUBJECTIVE / OVERNIGHT EVENTS:  Episode of fever overnight.  No N/V, CP, SOB, Cough, lightheadedness, dizziness, abdominal pain, diarrhea, dysuria.    MEDICATIONS  (STANDING):  acetaminophen     Tablet .. 975 milliGRAM(s) Oral every 8 hours  amLODIPine   Tablet 10 milliGRAM(s) Oral daily  chlorhexidine 4% Liquid 1 Application(s) Topical daily  melatonin 3 milliGRAM(s) Oral at bedtime  rivaroxaban 20 milliGRAM(s) Oral with dinner  tapentadol 100 milliGRAM(s) Oral four times a day    MEDICATIONS  (PRN):  cyclobenzaprine 5 milliGRAM(s) Oral three times a day PRN Muscle Spasm  morphine  - Injectable 2 milliGRAM(s) IV Push every 3 hours PRN Severe Breakthrough Pain (7 - 10)  morphine  IR 15 milliGRAM(s) Oral every 3 hours PRN Moderate - Severe Pain (4 - 6)      Vital Signs Last 24 Hrs  T(C): 37.3 (02 Nov 2022 11:41), Max: 38.6 (01 Nov 2022 22:22)  T(F): 99.2 (02 Nov 2022 11:41), Max: 101.5 (01 Nov 2022 22:22)  HR: 81 (02 Nov 2022 11:20) (81 - 101)  BP: 9/71 (02 Nov 2022 11:20) (9/71 - 132/89)  BP(mean): 82 (02 Nov 2022 06:00) (81 - 82)  RR: 16 (02 Nov 2022 11:20) (16 - 18)  SpO2: 98% (02 Nov 2022 11:20) (95% - 98%)    Parameters below as of 02 Nov 2022 11:20  Patient On (Oxygen Delivery Method): room air      CAPILLARY BLOOD GLUCOSE        I&O's Summary    01 Nov 2022 07:01  -  02 Nov 2022 07:00  --------------------------------------------------------  IN: 740 mL / OUT: 1800 mL / NET: -1060 mL    02 Nov 2022 07:01  -  02 Nov 2022 13:04  --------------------------------------------------------  IN: 100 mL / OUT: 0 mL / NET: 100 mL        PHYSICAL EXAM:  CONSTITUTIONAL: NAD, well-developed, well-groomed  EYES: PERRLA; conjunctiva and sclera clear  ENMT: Moist oral mucosa, no pharyngeal injection or exudates; normal dentition  NECK: C-collar in place, no palpable masses  RESPIRATORY: Normal respiratory effort; lungs are clear to auscultation bilaterally  CARDIOVASCULAR: Regular rate and rhythm, normal S1 and S2, no murmur/rub/gallop; No lower extremity edema; Peripheral pulses are 2+ bilaterally  ABDOMEN: Nontender to palpation, normoactive bowel sounds, no rebound/guarding; No hepatosplenomegaly  MUSCULOSKELETAL:  Did not assess gait; no clubbing or cyanosis of digits; no joint swelling or tenderness to palpation  PSYCH: A+O to person, place, and time; affect appropriate  NEUROLOGY: CN 2-12 are intact and symmetric; no gross sensory deficits   SKIN: No rashes; no palpable lesions, surgical dressing C/D/I    LABS:                        10.5   7.82  )-----------( 179      ( 02 Nov 2022 05:34 )             31.6     11-02    131<L>  |  95<L>  |  20  ----------------------------<  110<H>  4.1   |  24  |  0.89    Ca    8.6      02 Nov 2022 05:34                RADIOLOGY & ADDITIONAL TESTS:  < from: Transthoracic Echocardiogram (11.01.22 @ 14:45) >  CONCLUSIONS:  1. Aortic valve not well visualized; appears calcified.  2. Increased relative wall thickness with normal left  ventricular mass index, consistent with concentric left  ventricular remodeling.  3. Endocardium not well visualized;grossly mild segmental  left ventricular systolic function. Possible basal inferior  and basal inferolateral wall hypokinesis.  4. The right ventricle is not well visualized; grossly  normal right ventricular systolic function.    < end of copied text >    Imaging Personally Reviewed:    Care Discussed with Consultants/Other Providers:    Care Discussed with Orthopedic PA about:

## 2022-11-02 NOTE — PROGRESS NOTE ADULT - SUBJECTIVE AND OBJECTIVE BOX
Pt seen/examined. Doing well. Pain controlled. No acute overnight complaints or events.    T(C): 37.6 (11-01-22 @ 23:52), Max: 38.6 (11-01-22 @ 22:22)  HR: 100 (11-02-22 @ 01:00) (92 - 101)  BP: 120/70 (11-02-22 @ 01:00) (103/74 - 132/89)  RR: 18 (11-02-22 @ 01:00) (18 - 18)  SpO2: 98% (11-02-22 @ 01:00) (95% - 98%)  Wt(kg): --  - Gen: NAD    Back: Dressing/ Incision Clean/Dry/Intact  Motor:                   C5                C6              C7               C8           T1   R            5/5                5/5            5/5             5/5          5/5  L             3/5               3/5             3/5             3/5          3/5                L2             L3             L4               L5            S1  R         5/5           5/5          5/5             5/5           5/5  L          5/5          5/5           5/5             5/5           5/5    Sensory:            C5         C6         C7      C8       T1        (0=absent, 1=impaired, 2=normal, NT=not testable)  R         2            2           2        2         2  L          2            2           2        2         2               L2          L3         L4      L5       S1         (0=absent, 1=impaired, 2=normal, NT=not testable)  R         2            2            2        2        2  L          2            2           2        2         2    62y male s/p C2-T1 lami/fusion. Recovering appropriately. No plans to RTOR at this time, will cont. to monitor. CTPA 10/31 showed b/l PE    - Aspen collar at all times  - AC with Xarelto 20qD  - FU Blood Cx NGTD  - Drain removed  - Pain control  - cont. fever w/u

## 2022-11-02 NOTE — PROGRESS NOTE ADULT - ASSESSMENT
62M Asthma, HTN, spinal stenosis w/ radiculopathy s/p C2-T1 lami/fusion on 10/25 - in SICU for post-op neuro checks c/b post-op leukocytosis, anemia, pulmonary embolism.

## 2022-11-02 NOTE — PROGRESS NOTE ADULT - SUBJECTIVE AND OBJECTIVE BOX
Manuel Porter MD  Interventional Cardiology / Advance Heart Failure and Cardiac Transplant Specialist  [place] Office : [address] [place] [address] [place]. [address]  Tel:   [place] Office : [address] [facility] [place] [address]  Tel: [phone]       Pt is lying in bed comfortable not in distress, no chest pains no SOB no palpitations  	  MEDICATIONS:  amLODIPine   Tablet 10 milliGRAM(s) Oral daily  rivaroxaban 20 milliGRAM(s) Oral with dinner        acetaminophen     Tablet .. 975 milliGRAM(s) Oral every 8 hours  cyclobenzaprine 5 milliGRAM(s) Oral three times a day PRN  melatonin 3 milliGRAM(s) Oral at bedtime  morphine  - Injectable 2 milliGRAM(s) IV Push every 3 hours PRN  morphine  IR 15 milliGRAM(s) Oral every 3 hours PRN  tapentadol 100 milliGRAM(s) Oral four times a day        chlorhexidine 4% Liquid 1 Application(s) Topical daily      PAST MEDICAL/SURGICAL HISTORY  PAST MEDICAL & SURGICAL HISTORY:  Asthma          SOCIAL HISTORY: Substance Use (street drugs): ( x ) never used  (  ) other:    FAMILY HISTORY:      REVIEW OF SYSTEMS:  CONSTITUTIONAL: No fever, weight loss, or fatigue  EYES: No eye pain, visual disturbances, or discharge  ENMT:  No difficulty hearing, tinnitus, vertigo; No sinus or throat pain  BREASTS: No pain, masses, or nipple discharge  GASTROINTESTINAL: No abdominal or epigastric pain. No nausea, vomiting, or hematemesis; No diarrhea or constipation. No melena or hematochezia.  GENITOURINARY: No dysuria, frequency, hematuria, or incontinence  NEUROLOGICAL: No headaches, memory loss, loss of strength, numbness, or tremors  ENDOCRINE: No heat or cold intolerance; No hair loss  MUSCULOSKELETAL: No joint pain or swelling; No muscle, back, or extremity pain  PSYCHIATRIC: No depression, anxiety, mood swings, or difficulty sleeping  HEME/LYMPH: No easy bruising, or bleeding gums       PHYSICAL EXAM:  T(C): 37.3 (11-02-22 @ 17:35), Max: 38.6 (11-01-22 @ 22:22)  HR: 88 (11-02-22 @ 17:35) (81 - 101)  BP: 113/79 (11-02-22 @ 17:35) (109/60 - 124/75)  RR: 17 (11-02-22 @ 17:35) (16 - 18)  SpO2: 95% (11-02-22 @ 17:35) (95% - 98%)  Wt(kg): --  I&O's Summary    01 Nov 2022 07:01  -  02 Nov 2022 07:00  --------------------------------------------------------  IN: 740 mL / OUT: 1800 mL / NET: -1060 mL    02 Nov 2022 07:01  -  02 Nov 2022 20:44  --------------------------------------------------------  IN: 1050 mL / OUT: 1600 mL / NET: -550 mL          GENERAL: NAD  EYES:   PERRLA   ENMT:   Moist mucous membranes, Good dentition, No lesions  Cardiovascular: Normal S1 S2, No JVD, No murmurs, No edema  Respiratory: Lungs clear to auscultation	  Gastrointestinal:  Soft, Non-tender, + BS	  Extremities: no edema                                    10.5   7.82  )-----------( 179      ( 02 Nov 2022 05:34 )             31.6     11-02    131<L>  |  95<L>  |  20  ----------------------------<  110<H>  4.1   |  24  |  0.89    Ca    8.6      02 Nov 2022 05:34      proBNP:   Lipid Profile:   HgA1c:   TSH:     Consultant(s) Notes Reviewed:  [x ] YES  [ ] NO    Care Discussed with Consultants/Other Providers [ x] YES  [ ] NO    Imaging Personally Reviewed independently:  [x] YES  [ ] NO    All labs, radiologic studies, vitals, orders and medications list reviewed. Patient is seen and examined at bedside. Case discussed with medical team.

## 2022-11-03 DIAGNOSIS — E87.1 HYPO-OSMOLALITY AND HYPONATREMIA: ICD-10-CM

## 2022-11-03 LAB
ANION GAP SERPL CALC-SCNC: 8 MMOL/L — SIGNIFICANT CHANGE UP (ref 7–14)
APPEARANCE UR: CLEAR — SIGNIFICANT CHANGE UP
B PERT IGG+IGM PNL SER: SIGNIFICANT CHANGE UP
BASOPHILS # BLD AUTO: 0.05 K/UL — SIGNIFICANT CHANGE UP (ref 0–0.2)
BASOPHILS NFR BLD AUTO: 0.8 % — SIGNIFICANT CHANGE UP (ref 0–2)
BILIRUB UR-MCNC: NEGATIVE — SIGNIFICANT CHANGE UP
BUN SERPL-MCNC: 17 MG/DL — SIGNIFICANT CHANGE UP (ref 7–23)
CALCIUM SERPL-MCNC: 8.8 MG/DL — SIGNIFICANT CHANGE UP (ref 8.4–10.5)
CHLORIDE SERPL-SCNC: 95 MMOL/L — LOW (ref 98–107)
CO2 SERPL-SCNC: 26 MMOL/L — SIGNIFICANT CHANGE UP (ref 22–31)
COLOR FLD: SIGNIFICANT CHANGE UP
COLOR SPEC: SIGNIFICANT CHANGE UP
CREAT SERPL-MCNC: 0.91 MG/DL — SIGNIFICANT CHANGE UP (ref 0.5–1.3)
CRP SERPL-MCNC: 110 MG/L — HIGH
DIFF PNL FLD: NEGATIVE — SIGNIFICANT CHANGE UP
EGFR: 95 ML/MIN/1.73M2 — SIGNIFICANT CHANGE UP
EOSINOPHIL # BLD AUTO: 0.13 K/UL — SIGNIFICANT CHANGE UP (ref 0–0.5)
EOSINOPHIL # FLD: 0 % — SIGNIFICANT CHANGE UP
EOSINOPHIL NFR BLD AUTO: 2.1 % — SIGNIFICANT CHANGE UP (ref 0–6)
ERYTHROCYTE [SEDIMENTATION RATE] IN BLOOD: 63 MM/HR — HIGH (ref 1–15)
FLUID INTAKE SUBSTANCE CLASS: SIGNIFICANT CHANGE UP
FOLATE+VIT B12 SERBLD-IMP: 0 % — SIGNIFICANT CHANGE UP
GLUCOSE SERPL-MCNC: 106 MG/DL — HIGH (ref 70–99)
GLUCOSE UR QL: NEGATIVE — SIGNIFICANT CHANGE UP
GRAM STN FLD: SIGNIFICANT CHANGE UP
HCT VFR BLD CALC: 31.5 % — LOW (ref 39–50)
HCT VFR BLD CALC: 33.7 % — LOW (ref 39–50)
HGB BLD-MCNC: 10.3 G/DL — LOW (ref 13–17)
HGB BLD-MCNC: 11.3 G/DL — LOW (ref 13–17)
IANC: 3.69 K/UL — SIGNIFICANT CHANGE UP (ref 1.8–7.4)
IMM GRANULOCYTES NFR BLD AUTO: 0.5 % — SIGNIFICANT CHANGE UP (ref 0–0.9)
KETONES UR-MCNC: NEGATIVE — SIGNIFICANT CHANGE UP
LEUKOCYTE ESTERASE UR-ACNC: NEGATIVE — SIGNIFICANT CHANGE UP
LYMPHOCYTES # BLD AUTO: 1.33 K/UL — SIGNIFICANT CHANGE UP (ref 1–3.3)
LYMPHOCYTES # BLD AUTO: 21.1 % — SIGNIFICANT CHANGE UP (ref 13–44)
LYMPHOCYTES # FLD: 16 % — SIGNIFICANT CHANGE UP
MCHC RBC-ENTMCNC: 28.6 PG — SIGNIFICANT CHANGE UP (ref 27–34)
MCHC RBC-ENTMCNC: 29.4 PG — SIGNIFICANT CHANGE UP (ref 27–34)
MCHC RBC-ENTMCNC: 32.7 GM/DL — SIGNIFICANT CHANGE UP (ref 32–36)
MCHC RBC-ENTMCNC: 33.5 GM/DL — SIGNIFICANT CHANGE UP (ref 32–36)
MCV RBC AUTO: 87.5 FL — SIGNIFICANT CHANGE UP (ref 80–100)
MCV RBC AUTO: 87.5 FL — SIGNIFICANT CHANGE UP (ref 80–100)
MESOTHL CELL # FLD: 0 % — SIGNIFICANT CHANGE UP
MONOCYTES # BLD AUTO: 1.07 K/UL — HIGH (ref 0–0.9)
MONOCYTES NFR BLD AUTO: 17 % — HIGH (ref 2–14)
MONOS+MACROS # FLD: 83 % — SIGNIFICANT CHANGE UP
NEUTROPHILS # BLD AUTO: 3.69 K/UL — SIGNIFICANT CHANGE UP (ref 1.8–7.4)
NEUTROPHILS NFR BLD AUTO: 58.5 % — SIGNIFICANT CHANGE UP (ref 43–77)
NEUTROPHILS-BODY FLUID: 1 % — SIGNIFICANT CHANGE UP
NITRITE UR-MCNC: NEGATIVE — SIGNIFICANT CHANGE UP
NRBC # BLD: 0 /100 WBCS — SIGNIFICANT CHANGE UP (ref 0–0)
NRBC # BLD: 0 /100 WBCS — SIGNIFICANT CHANGE UP (ref 0–0)
NRBC # FLD: 0 K/UL — SIGNIFICANT CHANGE UP (ref 0–0)
NRBC # FLD: 0 K/UL — SIGNIFICANT CHANGE UP (ref 0–0)
OTHER CELLS FLD MANUAL: 0 % — SIGNIFICANT CHANGE UP
PH UR: 6.5 — SIGNIFICANT CHANGE UP (ref 5–8)
PLATELET # BLD AUTO: 227 K/UL — SIGNIFICANT CHANGE UP (ref 150–400)
PLATELET # BLD AUTO: 278 K/UL — SIGNIFICANT CHANGE UP (ref 150–400)
POTASSIUM SERPL-MCNC: 4.5 MMOL/L — SIGNIFICANT CHANGE UP (ref 3.5–5.3)
POTASSIUM SERPL-SCNC: 4.5 MMOL/L — SIGNIFICANT CHANGE UP (ref 3.5–5.3)
PROT UR-MCNC: ABNORMAL
RBC # BLD: 3.6 M/UL — LOW (ref 4.2–5.8)
RBC # BLD: 3.85 M/UL — LOW (ref 4.2–5.8)
RBC # FLD: 13.9 % — SIGNIFICANT CHANGE UP (ref 10.3–14.5)
RBC # FLD: 13.9 % — SIGNIFICANT CHANGE UP (ref 10.3–14.5)
RCV VOL RI: 4000 CELLS/UL — HIGH (ref 0–5)
SARS-COV-2 RNA SPEC QL NAA+PROBE: SIGNIFICANT CHANGE UP
SODIUM SERPL-SCNC: 129 MMOL/L — LOW (ref 135–145)
SP GR SPEC: 1.02 — SIGNIFICANT CHANGE UP (ref 1.01–1.05)
SPECIMEN SOURCE: SIGNIFICANT CHANGE UP
TOTAL CELLS COUNTED, BODY FLUID: 100 CELLS — SIGNIFICANT CHANGE UP
TOTAL NUCLEATED CELL COUNT, BODY FLUID: 371 CELLS/UL — HIGH (ref 0–5)
TUBE TYPE: SIGNIFICANT CHANGE UP
UROBILINOGEN FLD QL: SIGNIFICANT CHANGE UP
WBC # BLD: 6.3 K/UL — SIGNIFICANT CHANGE UP (ref 3.8–10.5)
WBC # BLD: 7.44 K/UL — SIGNIFICANT CHANGE UP (ref 3.8–10.5)
WBC # FLD AUTO: 6.3 K/UL — SIGNIFICANT CHANGE UP (ref 3.8–10.5)
WBC # FLD AUTO: 7.44 K/UL — SIGNIFICANT CHANGE UP (ref 3.8–10.5)

## 2022-11-03 PROCEDURE — 88108 CYTOPATH CONCENTRATE TECH: CPT | Mod: 26

## 2022-11-03 PROCEDURE — 93970 EXTREMITY STUDY: CPT | Mod: 26

## 2022-11-03 PROCEDURE — 99233 SBSQ HOSP IP/OBS HIGH 50: CPT

## 2022-11-03 PROCEDURE — 73562 X-RAY EXAM OF KNEE 3: CPT | Mod: 26,LT

## 2022-11-03 PROCEDURE — 71045 X-RAY EXAM CHEST 1 VIEW: CPT | Mod: 26

## 2022-11-03 RX ADMIN — MORPHINE SULFATE 2 MILLIGRAM(S): 50 CAPSULE, EXTENDED RELEASE ORAL at 19:45

## 2022-11-03 RX ADMIN — TAPENTADOL HYDROCHLORIDE 100 MILLIGRAM(S): 50 TABLET, FILM COATED ORAL at 05:36

## 2022-11-03 RX ADMIN — MORPHINE SULFATE 2 MILLIGRAM(S): 50 CAPSULE, EXTENDED RELEASE ORAL at 09:41

## 2022-11-03 RX ADMIN — Medication 975 MILLIGRAM(S): at 08:32

## 2022-11-03 RX ADMIN — Medication 975 MILLIGRAM(S): at 15:16

## 2022-11-03 RX ADMIN — MORPHINE SULFATE 2 MILLIGRAM(S): 50 CAPSULE, EXTENDED RELEASE ORAL at 18:59

## 2022-11-03 RX ADMIN — RIVAROXABAN 20 MILLIGRAM(S): KIT at 16:56

## 2022-11-03 RX ADMIN — TAPENTADOL HYDROCHLORIDE 100 MILLIGRAM(S): 50 TABLET, FILM COATED ORAL at 18:00

## 2022-11-03 RX ADMIN — MORPHINE SULFATE 15 MILLIGRAM(S): 50 CAPSULE, EXTENDED RELEASE ORAL at 03:20

## 2022-11-03 RX ADMIN — MORPHINE SULFATE 15 MILLIGRAM(S): 50 CAPSULE, EXTENDED RELEASE ORAL at 22:32

## 2022-11-03 RX ADMIN — TAPENTADOL HYDROCHLORIDE 100 MILLIGRAM(S): 50 TABLET, FILM COATED ORAL at 13:01

## 2022-11-03 RX ADMIN — MORPHINE SULFATE 15 MILLIGRAM(S): 50 CAPSULE, EXTENDED RELEASE ORAL at 02:19

## 2022-11-03 RX ADMIN — TAPENTADOL HYDROCHLORIDE 100 MILLIGRAM(S): 50 TABLET, FILM COATED ORAL at 08:32

## 2022-11-03 RX ADMIN — MORPHINE SULFATE 2 MILLIGRAM(S): 50 CAPSULE, EXTENDED RELEASE ORAL at 08:32

## 2022-11-03 RX ADMIN — Medication 975 MILLIGRAM(S): at 00:30

## 2022-11-03 RX ADMIN — TAPENTADOL HYDROCHLORIDE 100 MILLIGRAM(S): 50 TABLET, FILM COATED ORAL at 00:30

## 2022-11-03 RX ADMIN — TAPENTADOL HYDROCHLORIDE 100 MILLIGRAM(S): 50 TABLET, FILM COATED ORAL at 17:28

## 2022-11-03 RX ADMIN — MORPHINE SULFATE 2 MILLIGRAM(S): 50 CAPSULE, EXTENDED RELEASE ORAL at 10:30

## 2022-11-03 RX ADMIN — MORPHINE SULFATE 2 MILLIGRAM(S): 50 CAPSULE, EXTENDED RELEASE ORAL at 01:30

## 2022-11-03 RX ADMIN — Medication 975 MILLIGRAM(S): at 05:36

## 2022-11-03 NOTE — CHART NOTE - NSCHARTNOTEFT_GEN_A_CORE
Notified by nurse that patient has 102.1 fever rectally. Vitals otherwise stable.  Patient seen and evaluated. Patient resting comfortably in bed. Reports he feels hot, but otherwise fine. Complains of left knee pain since yesterday. Unable to weight bear due to pain. Reports he cannot range his knee because of pain. Denies history of gout.     Exam:  Neck: Dressing taken down, incision C/D/I  L knee: warm to touch, small effusion w/ tenderness to palpation, Unable to actively or passively range knee due to pain, calf nontender to palpation    Reviewed above case with medicine hospitalist Dr. Downey and Ortho Spine attending Dr. Lopez.    Plan:  -Order Repeat BCx, UA, CXR, BLE Dopplers, ESR, CRP  -Order XR L knee  -Per Dr. Lopez, would recommend joint aspiration L knee to rule out septic knee joint after obtaining X-ray.  -Will hold off on starting broad spectrum antibiotics at this time as patient is aseptic appearing and need to confirm source of infection.  -Will follow up results.  -Please notify ortho with any questions. Notified by nurse that patient has 102.1 fever rectally. Vitals otherwise stable.  Patient seen and evaluated. Patient resting comfortably in bed. Reports he feels hot, but otherwise fine. Complains of left knee pain since yesterday. Unable to weight bear due to pain. Reports he cannot range his knee because of pain. Denies history of gout.     Exam:  Neck: Dressing taken down, incision C/D/I  L knee: warm to touch, small effusion w/ tenderness to palpation, Unable to actively or passively range knee due to pain, calf nontender to palpation    Reviewed above case with medicine hospitalist Dr. Downey and Ortho Spine attending Dr. Lopez.    Plan:  -Order Repeat BCx, UA, CXR, BLE Dopplers, ESR, CRP  -Order XR L knee  -Per Dr. Lopez, would recommend joint aspiration L knee to rule out septic knee joint after obtaining X-ray.  -Will hold off on starting broad spectrum antibiotics at this time as patient is aseptic appearing and need to confirm source of infection.  -Will follow up results.  -Please notify ortho with any questions.    Attending note: patient is seen.  Left knee effusion is noted.  no significant erythema.  concern for septic joint is low but clinically he has decreased ROM.  Likely stress related inflammatory arthropathy.  Knee joint is being tapped for diagnostics and therapeutic relief.

## 2022-11-03 NOTE — PROGRESS NOTE ADULT - SUBJECTIVE AND OBJECTIVE BOX
[facility] Division of Hospital Medicine  Juan Downey MD  Pager (M-F, 8A-5P): [phone]  Other Times:  [phone]    Patient is a 62y old  Male who presents with a chief complaint of cord compression (03 Nov 2022 11:46)    SUBJECTIVE / OVERNIGHT EVENTS:  Patient offers no new complaints.  Does admit to drinking a lot of fluid because he was thirsty.  No F/C, N/V, CP, SOB, Cough, lightheadedness, dizziness, abdominal pain, diarrhea, dysuria.    MEDICATIONS  (STANDING):  acetaminophen     Tablet .. 975 milliGRAM(s) Oral every 8 hours  amLODIPine   Tablet 10 milliGRAM(s) Oral daily  chlorhexidine 4% Liquid 1 Application(s) Topical daily  melatonin 3 milliGRAM(s) Oral at bedtime  rivaroxaban 20 milliGRAM(s) Oral with dinner  tapentadol 100 milliGRAM(s) Oral four times a day    MEDICATIONS  (PRN):  cyclobenzaprine 5 milliGRAM(s) Oral three times a day PRN Muscle Spasm  morphine  - Injectable 2 milliGRAM(s) IV Push every 3 hours PRN Severe Breakthrough Pain (7 - 10)  morphine  IR 15 milliGRAM(s) Oral every 3 hours PRN Moderate - Severe Pain (4 - 6)      Vital Signs Last 24 Hrs  T(C): 37.8 (03 Nov 2022 13:00), Max: 38.1 (02 Nov 2022 21:58)  T(F): 100 (03 Nov 2022 13:00), Max: 100.5 (02 Nov 2022 21:58)  HR: 97 (03 Nov 2022 13:00) (88 - 98)  BP: 116/68 (03 Nov 2022 13:00) (113/74 - 120/76)  BP(mean): 83 (03 Nov 2022 06:00) (83 - 83)  RR: 18 (03 Nov 2022 13:00) (17 - 18)  SpO2: 100% (03 Nov 2022 13:00) (95% - 100%)    Parameters below as of 03 Nov 2022 13:00  Patient On (Oxygen Delivery Method): room air      CAPILLARY BLOOD GLUCOSE        I&O's Summary    02 Nov 2022 07:01  -  03 Nov 2022 07:00  --------------------------------------------------------  IN: 1350 mL / OUT: 2600 mL / NET: -1250 mL    03 Nov 2022 07:01  -  03 Nov 2022 14:24  --------------------------------------------------------  IN: 0 mL / OUT: 600 mL / NET: -600 mL        PHYSICAL EXAM:  CONSTITUTIONAL: NAD, well-developed, well-groomed  EYES: PERRLA; conjunctiva and sclera clear  ENMT: Moist oral mucosa, no pharyngeal injection or exudates; normal dentition  NECK: C-collar in place, no palpable masses  RESPIRATORY: Normal respiratory effort; lungs are clear to auscultation bilaterally  CARDIOVASCULAR: Regular rate and rhythm, normal S1 and S2, no murmur/rub/gallop; No lower extremity edema; Peripheral pulses are 2+ bilaterally  ABDOMEN: Nontender to palpation, normoactive bowel sounds, no rebound/guarding; No hepatosplenomegaly  MUSCULOSKELETAL:  Did not assess gait; no clubbing or cyanosis of digits; no joint swelling or tenderness to palpation  PSYCH: A+O to person, place, and time; affect appropriate  NEUROLOGY: CN 2-12 are intact and symmetric; no gross sensory deficits   SKIN: No rashes; no palpable lesions, surgical dressing C/D/I    LABS:                        10.3   6.30  )-----------( 227      ( 03 Nov 2022 06:00 )             31.5     11-03    129<L>  |  95<L>  |  17  ----------------------------<  106<H>  4.5   |  26  |  0.91    Ca    8.8      03 Nov 2022 06:00                RADIOLOGY & ADDITIONAL TESTS:    Imaging Personally Reviewed:    Care Discussed with Consultants/Other Providers:    Care Discussed with Orthopedic PA about:

## 2022-11-03 NOTE — PROGRESS NOTE ADULT - SUBJECTIVE AND OBJECTIVE BOX
ORTHO PROGRESS NOTE     Patient resting comfortably without complaint  Walking with physical therapy.  Tmax 100.5 at 9158 last pm       Vital Signs Last 24 Hrs  T(C): 37.5 (03 Nov 2022 00:11), Max: 38.1 (02 Nov 2022 21:58)  T(F): 99.5 (03 Nov 2022 00:11), Max: 100.5 (02 Nov 2022 21:58)  HR: 98 (03 Nov 2022 00:11) (81 - 98)  BP: 120/76 (03 Nov 2022 00:11) (112/73 - 124/75)  BP(mean): --  RR: 17 (03 Nov 2022 00:11) (16 - 18)  SpO2: 98% (03 Nov 2022 00:11) (95% - 98%)    Parameters below as of 03 Nov 2022 00:11  Patient On (Oxygen Delivery Method): room air      Neck: Dressing clean/ dry/intact       UE: lDeltoids: R 5/5 , L 4/5         Biceps: R 5/5, L 3/5         Triceps: R 5/5, L 4/5         Wrist ext: R 5/5, L 3/5         : R 5/5, L 4/5        A/P :  Stable              PT-WBAT             Pain control             Incentive spirometer             Rehab planning

## 2022-11-03 NOTE — PROGRESS NOTE ADULT - SUBJECTIVE AND OBJECTIVE BOX
Manuel Porter MD  Interventional Cardiology / Advance Heart Failure and Cardiac Transplant Specialist  [place] Office : [address] [place] [id number]  Tel:   [place] Office : [address] [facility] [place] [address]  Tel: [phone]      Subjective/Overnight events: Pt is lying in bed comfortable not in distress      MEDICATIONS:  amLODIPine   Tablet 10 milliGRAM(s) Oral daily  rivaroxaban 20 milliGRAM(s) Oral with dinner        acetaminophen     Tablet .. 975 milliGRAM(s) Oral every 8 hours  cyclobenzaprine 5 milliGRAM(s) Oral three times a day PRN  melatonin 3 milliGRAM(s) Oral at bedtime  morphine  - Injectable 2 milliGRAM(s) IV Push every 3 hours PRN  morphine  IR 15 milliGRAM(s) Oral every 3 hours PRN  tapentadol 100 milliGRAM(s) Oral four times a day        chlorhexidine 4% Liquid 1 Application(s) Topical daily      PAST MEDICAL/SURGICAL HISTORY  PAST MEDICAL & SURGICAL HISTORY:  Asthma          SOCIAL HISTORY: Substance Use (street drugs): ( x ) never used  (  ) other:    FAMILY HISTORY:        PHYSICAL EXAM:  T(C): 36.7 (11-03-22 @ 09:07), Max: 38.1 (11-02-22 @ 21:58)  HR: 88 (11-03-22 @ 09:07) (88 - 98)  BP: 119/78 (11-03-22 @ 09:07) (113/74 - 124/75)  RR: 18 (11-03-22 @ 09:07) (17 - 18)  SpO2: 100% (11-03-22 @ 09:07) (95% - 100%)  Wt(kg): --  I&O's Summary    02 Nov 2022 07:01  -  03 Nov 2022 07:00  --------------------------------------------------------  IN: 1350 mL / OUT: 2600 mL / NET: -1250 mL    03 Nov 2022 07:01  -  03 Nov 2022 11:46  --------------------------------------------------------  IN: 0 mL / OUT: 300 mL / NET: -300 mL          GENERAL: NAD  EYES:   PERRLA   ENMT:   Moist mucous membranes, Good dentition, No lesions  Cardiovascular: Normal S1 S2, No JVD, No murmurs, No edema  Respiratory: Lungs clear to auscultation	  Gastrointestinal:  Soft, Non-tender, + BS	  Extremities: no edema                                  10.3   6.30  )-----------( 227      ( 03 Nov 2022 06:00 )             31.5     11-03    129<L>  |  95<L>  |  17  ----------------------------<  106<H>  4.5   |  26  |  0.91    Ca    8.8      03 Nov 2022 06:00      proBNP:   Lipid Profile:   HgA1c:   TSH:     Consultant(s) Notes Reviewed:  [x ] YES  [ ] NO    Care Discussed with Consultants/Other Providers [ x] YES  [ ] NO    Imaging Personally Reviewed independently:  [x] YES  [ ] NO    All labs, radiologic studies, vitals, orders and medications list reviewed. Patient is seen and examined at bedside. Case discussed with medical team.

## 2022-11-03 NOTE — CHART NOTE - NSCHARTNOTEFT_GEN_A_CORE
NUTRITION FOLLOW-UP    Pt seen for Nutrition follow up.    Pt is s/p C2-T1 laminectomy and fusion.  Pt states his appetite and po intake have been good. He has no difficulty chewing or swallowing. Pt reports eating 510 to 75% of his meals and tolerating them well.    Weight: 85.8 kg  10/31/22    Labs: 11-03 Na 129 mmol/L<L> Glu 106 mg/dL<H> K+ 4.5 mmol/L Cr 0.91 mg/dL BUN 17 mg/dL Phos n/a        Current Diet: Diet, Regular (10-27-22 @ 09:45)    Edema: 1+ left knee    Skin: surgical incision posterior back/neck    RD to Remain Available: Ira Patino MS, RDN, CDN pager [phone]     Additional Recommendations:   1) Monitor weight, labs, po intake and skin integrity.

## 2022-11-03 NOTE — PROGRESS NOTE ADULT - ASSESSMENT
62M Asthma, HTN, spinal stenosis w/ radiculopathy s/p C2-T1 lami/fusion on 10/25 - in SICU for post-op neuro checks c/b post-op leukocytosis, anemia, pulmonary embolism, hyponatremia.

## 2022-11-04 DIAGNOSIS — M11.20 OTHER CHONDROCALCINOSIS, UNSPECIFIED SITE: ICD-10-CM

## 2022-11-04 LAB
ANION GAP SERPL CALC-SCNC: 12 MMOL/L — SIGNIFICANT CHANGE UP (ref 7–14)
BUN SERPL-MCNC: 16 MG/DL — SIGNIFICANT CHANGE UP (ref 7–23)
CALCIUM SERPL-MCNC: 8.9 MG/DL — SIGNIFICANT CHANGE UP (ref 8.4–10.5)
CHLORIDE SERPL-SCNC: 96 MMOL/L — LOW (ref 98–107)
CO2 SERPL-SCNC: 24 MMOL/L — SIGNIFICANT CHANGE UP (ref 22–31)
COMMENT - FLUIDS: SIGNIFICANT CHANGE UP
CREAT SERPL-MCNC: 0.85 MG/DL — SIGNIFICANT CHANGE UP (ref 0.5–1.3)
EGFR: 98 ML/MIN/1.73M2 — SIGNIFICANT CHANGE UP
GLUCOSE SERPL-MCNC: 111 MG/DL — HIGH (ref 70–99)
HCT VFR BLD CALC: 31.6 % — LOW (ref 39–50)
HGB BLD-MCNC: 10.4 G/DL — LOW (ref 13–17)
MCHC RBC-ENTMCNC: 28.8 PG — SIGNIFICANT CHANGE UP (ref 27–34)
MCHC RBC-ENTMCNC: 32.9 GM/DL — SIGNIFICANT CHANGE UP (ref 32–36)
MCV RBC AUTO: 87.5 FL — SIGNIFICANT CHANGE UP (ref 80–100)
NRBC # BLD: 0 /100 WBCS — SIGNIFICANT CHANGE UP (ref 0–0)
NRBC # FLD: 0 K/UL — SIGNIFICANT CHANGE UP (ref 0–0)
PLATELET # BLD AUTO: 285 K/UL — SIGNIFICANT CHANGE UP (ref 150–400)
POTASSIUM SERPL-MCNC: 4.4 MMOL/L — SIGNIFICANT CHANGE UP (ref 3.5–5.3)
POTASSIUM SERPL-SCNC: 4.4 MMOL/L — SIGNIFICANT CHANGE UP (ref 3.5–5.3)
RBC # BLD: 3.61 M/UL — LOW (ref 4.2–5.8)
RBC # FLD: 13.9 % — SIGNIFICANT CHANGE UP (ref 10.3–14.5)
SODIUM SERPL-SCNC: 132 MMOL/L — LOW (ref 135–145)
SYNOVIAL CRYSTALS CLARITY: SIGNIFICANT CHANGE UP
SYNOVIAL CRYSTALS COLOR: SIGNIFICANT CHANGE UP
SYNOVIAL CRYSTALS ID: SIGNIFICANT CHANGE UP
SYNOVIAL CRYSTALS TUBE: SIGNIFICANT CHANGE UP
WBC # BLD: 6.07 K/UL — SIGNIFICANT CHANGE UP (ref 3.8–10.5)
WBC # FLD AUTO: 6.07 K/UL — SIGNIFICANT CHANGE UP (ref 3.8–10.5)

## 2022-11-04 PROCEDURE — 99232 SBSQ HOSP IP/OBS MODERATE 35: CPT

## 2022-11-04 PROCEDURE — 99233 SBSQ HOSP IP/OBS HIGH 50: CPT

## 2022-11-04 RX ORDER — MORPHINE SULFATE 50 MG/1
15 CAPSULE, EXTENDED RELEASE ORAL
Refills: 0 | Status: DISCONTINUED | OUTPATIENT
Start: 2022-11-04 | End: 2022-11-04

## 2022-11-04 RX ORDER — MORPHINE SULFATE 50 MG/1
2 CAPSULE, EXTENDED RELEASE ORAL
Refills: 0 | Status: DISCONTINUED | OUTPATIENT
Start: 2022-11-04 | End: 2022-11-07

## 2022-11-04 RX ORDER — MORPHINE SULFATE 50 MG/1
2 CAPSULE, EXTENDED RELEASE ORAL
Refills: 0 | Status: DISCONTINUED | OUTPATIENT
Start: 2022-11-04 | End: 2022-11-04

## 2022-11-04 RX ORDER — TAPENTADOL HYDROCHLORIDE 50 MG/1
100 TABLET, FILM COATED ORAL
Refills: 0 | Status: DISCONTINUED | OUTPATIENT
Start: 2022-11-04 | End: 2022-11-08

## 2022-11-04 RX ORDER — MORPHINE SULFATE 50 MG/1
15 CAPSULE, EXTENDED RELEASE ORAL
Refills: 0 | Status: DISCONTINUED | OUTPATIENT
Start: 2022-11-04 | End: 2022-11-08

## 2022-11-04 RX ADMIN — Medication 975 MILLIGRAM(S): at 15:26

## 2022-11-04 RX ADMIN — MORPHINE SULFATE 15 MILLIGRAM(S): 50 CAPSULE, EXTENDED RELEASE ORAL at 08:00

## 2022-11-04 RX ADMIN — TAPENTADOL HYDROCHLORIDE 100 MILLIGRAM(S): 50 TABLET, FILM COATED ORAL at 00:00

## 2022-11-04 RX ADMIN — TAPENTADOL HYDROCHLORIDE 100 MILLIGRAM(S): 50 TABLET, FILM COATED ORAL at 17:14

## 2022-11-04 RX ADMIN — Medication 975 MILLIGRAM(S): at 16:18

## 2022-11-04 RX ADMIN — Medication 975 MILLIGRAM(S): at 05:55

## 2022-11-04 RX ADMIN — Medication 975 MILLIGRAM(S): at 21:58

## 2022-11-04 RX ADMIN — TAPENTADOL HYDROCHLORIDE 100 MILLIGRAM(S): 50 TABLET, FILM COATED ORAL at 05:53

## 2022-11-04 RX ADMIN — TAPENTADOL HYDROCHLORIDE 100 MILLIGRAM(S): 50 TABLET, FILM COATED ORAL at 10:42

## 2022-11-04 RX ADMIN — RIVAROXABAN 20 MILLIGRAM(S): KIT at 17:14

## 2022-11-04 RX ADMIN — MORPHINE SULFATE 15 MILLIGRAM(S): 50 CAPSULE, EXTENDED RELEASE ORAL at 08:14

## 2022-11-04 RX ADMIN — CYCLOBENZAPRINE HYDROCHLORIDE 5 MILLIGRAM(S): 10 TABLET, FILM COATED ORAL at 15:26

## 2022-11-04 RX ADMIN — Medication 3 MILLIGRAM(S): at 21:58

## 2022-11-04 RX ADMIN — AMLODIPINE BESYLATE 10 MILLIGRAM(S): 2.5 TABLET ORAL at 05:55

## 2022-11-04 RX ADMIN — TAPENTADOL HYDROCHLORIDE 100 MILLIGRAM(S): 50 TABLET, FILM COATED ORAL at 20:45

## 2022-11-04 RX ADMIN — TAPENTADOL HYDROCHLORIDE 100 MILLIGRAM(S): 50 TABLET, FILM COATED ORAL at 12:17

## 2022-11-04 RX ADMIN — Medication 20 MILLIGRAM(S): at 17:13

## 2022-11-04 NOTE — PROGRESS NOTE ADULT - SUBJECTIVE AND OBJECTIVE BOX
Manuel Porter MD  Interventional Cardiology / Advance Heart Failure and Cardiac Transplant Specialist  [place] Office : [address] [place] [address] [facility] [address]  Tel:   [place] Office : [address] [facility] [place] [address]  Tel: [phone]       Pt is lying in bed comfortable not in distress, no chest pains no SOB no palpitations s/p left knee arthrocentesis  	  MEDICATIONS:  amLODIPine   Tablet 10 milliGRAM(s) Oral daily  rivaroxaban 20 milliGRAM(s) Oral with dinner        acetaminophen     Tablet .. 975 milliGRAM(s) Oral every 8 hours  cyclobenzaprine 5 milliGRAM(s) Oral three times a day PRN  melatonin 3 milliGRAM(s) Oral at bedtime  morphine  - Injectable 2 milliGRAM(s) IV Push every 3 hours PRN  morphine  IR 15 milliGRAM(s) Oral every 3 hours PRN  tapentadol 100 milliGRAM(s) Oral four times a day      predniSONE   Tablet 20 milliGRAM(s) Oral two times a day        PAST MEDICAL/SURGICAL HISTORY  PAST MEDICAL & SURGICAL HISTORY:  Asthma          SOCIAL HISTORY: Substance Use (street drugs): ( x ) never used  (  ) other:    FAMILY HISTORY:         PHYSICAL EXAM:  T(C): 36.6 (11-04-22 @ 20:16), Max: 37.2 (11-04-22 @ 14:00)  HR: 86 (11-04-22 @ 20:16) (85 - 90)  BP: 111/73 (11-04-22 @ 20:16) (111/73 - 126/78)  RR: 18 (11-04-22 @ 20:16) (17 - 18)  SpO2: 95% (11-04-22 @ 20:16) (95% - 100%)  Wt(kg): --  I&O's Summary    03 Nov 2022 07:01  -  04 Nov 2022 07:00  --------------------------------------------------------  IN: 840 mL / OUT: 1400 mL / NET: -560 mL    04 Nov 2022 07:01  -  04 Nov 2022 22:49  --------------------------------------------------------  IN: 240 mL / OUT: 500 mL / NET: -260 mL             EYES:   PERRLA   ENMT:   Moist mucous membranes, Good dentition, No lesions  Cardiovascular: Normal S1 S2, No JVD, No murmurs, No edema  Respiratory: Lungs clear to auscultation	  Gastrointestinal:  Soft, Non-tender, + BS	  Extremities: left knee covered                                    10.4   6.07  )-----------( 285      ( 04 Nov 2022 05:57 )             31.6     11-04    132<L>  |  96<L>  |  16  ----------------------------<  111<H>  4.4   |  24  |  0.85    Ca    8.9      04 Nov 2022 05:57      proBNP:   Lipid Profile:   HgA1c:   TSH:     Consultant(s) Notes Reviewed:  [x ] YES  [ ] NO    Care Discussed with Consultants/Other Providers [ x] YES  [ ] NO    Imaging Personally Reviewed independently:  [x] YES  [ ] NO    All labs, radiologic studies, vitals, orders and medications list reviewed. Patient is seen and examined at bedside. Case discussed with medical team.

## 2022-11-04 NOTE — PROGRESS NOTE ADULT - SUBJECTIVE AND OBJECTIVE BOX
Patient seen and examined this am.  s/p C2-T1 lami/fusion       MEDICATIONS:    acetaminophen     Tablet .. 975 milliGRAM(s) Oral every 8 hours  amLODIPine   Tablet 10 milliGRAM(s) Oral daily  cyclobenzaprine 5 milliGRAM(s) Oral three times a day PRN  melatonin 3 milliGRAM(s) Oral at bedtime  morphine  - Injectable 2 milliGRAM(s) IV Push every 3 hours PRN  morphine  IR 15 milliGRAM(s) Oral every 3 hours PRN  rivaroxaban 20 milliGRAM(s) Oral with dinner  tapentadol 100 milliGRAM(s) Oral four times a day      LABS:                          10.4   6.07  )-----------( 285      ( 2022 05:57 )             31.6     11-04    132<L>  |  96<L>  |  16  ----------------------------<  111<H>  4.4   |  24  |  0.85    Ca    8.9      2022 05:57      CAPILLARY BLOOD GLUCOSE          Urinalysis Basic - ( 2022 15:40 )    Color: Light Yellow / Appearance: Clear / S.020 / pH: x  Gluc: x / Ketone: Negative  / Bili: Negative / Urobili: <2 mg/dL   Blood: x / Protein: Trace / Nitrite: Negative   Leuk Esterase: Negative / RBC: x / WBC x   Sq Epi: x / Non Sq Epi: x / Bacteria: x      I&O's Summary    2022 07:01  -  2022 07:00  --------------------------------------------------------  IN: 840 mL / OUT: 1400 mL / NET: -560 mL      Vital Signs Last 24 Hrs  T(C): 37.1 (2022 06:00), Max: 38.9 (2022 16:00)  T(F): 98.8 (2022 06:00), Max: 102.1 (2022 16:00)  HR: 90 (2022 06:00) (89 - 98)  BP: 121/71 (2022 06:00) (107/60 - 121/71)  BP(mean): --  RR: 18 (2022 06:00) (18 - 18)  SpO2: 95% (2022 06:00) (95% - 100%)    Parameters below as of 2022 06:00  Patient On (Oxygen Delivery Method): room air      On Neurological Examination:    Mental Status - iAAO x3 follows complex commands fluent    Cranial Nerves - PERRL, EOMI, VFF, V1-V3 intact, no gross facial asymmetry, tongue/uvula midline    Motor Exam -   Right upper 5/5 5/5  Left upper deltoid 4/5, biceps 3/5 rest 3/5  lowers 4+ to 5/5      Sensory    Intact to light touch and pinprick bilaterally    Coord: FTN intact bilaterally     Gait -  deferred                                                   RADIOLOGY  < from: CT Lumbar Spine No Cont (10.23.22 @ 02:29) >  Impression:  1. Multilevel degenerative disc disease, results in severe central   stenosis at L3-4 and L4-5.  2. No acute fracture.    --- End of Report ---      < end of copied text >  < from: CT Head No Cont (10.23.22 @ 02:30) >  IMPRESSION:  Mildly motion degraded.  No CT evidence of acute intracranial pathology.    Follow-up MRI may be obtained for more sensitive evaluation.    --- End of Report ---    < end of copied text >    < from: MR Thoracic Spine w/wo IV Cont (10.23.22 @ 14:15) >  IMPRESSION:  CERVICAL SPINE MRI:  1.  Motion degraded.  Postcontrast images were not obtained.  2.  There is diffuse swelling of the cervical cord with long segment   central cord edema extending from C2 to T1, which has broad differential   diagnosis including transverse myelitis, other infectious and   inflammatory causes of myelitis, and trauma with subacute progressive   ascending myelopathy (SPAM).  Central cord infarct could produce similar   findings but is thought to be less likely as this is an atypical location   for spinal cord infarct.  While there is underlying moderate spinal canal   stenosis with apparent mild cord compression at C3-C4 and C4-C5,  minimal   cord compression at C5-C6, and mild ventral cord impingement at C6-C7,   the diffuse changes in the cervical cord are not compatible with primary   compressive myelopathy.  The appearanceof cord compression may be due in   part to the underlying swelling of the cervical cord.  3.  Follow-up contrast-enhanced MRI of the cervical spine is recommended.      THORACIC SPINE MRI:  1.  Motion degraded.  Postcontrast images were not obtained.  2.  Small disc herniations throughout the thoracic spine with slight   contact of the ventral cord at T7-T8.  No thoracic cord compression, cord   edema or other focal cord lesion.    LUMBAR SPINE MRI:  1.  Incomplete exam.  Only motion degraded sagittal T1 and sagittal T2   images are available.  2.  There appears to be at least diffuse moderate spinal canal stenosis   at L1-L2 through L4-L5, which is suboptimally evaluated due to motion   artifact.  Evaluation for cauda equina compression is suboptimal.  No   high-grade neural foraminal narrowing is visualized.      I discussed the major findings in this report with Dr. Bolaños from   orthopedic surgery on 10/24/2022 at 3:30 AM.  Read back verification was   obtained.    --- End of Report ---    < end of copied text >

## 2022-11-04 NOTE — PROGRESS NOTE ADULT - ASSESSMENT
61 yo male few weeks weaknessLUE LLe> RLe weaknes PE LUE weakness bilateral LE. Patient s/p c2-T1 lami/fsuions. Pe exam stable to slightly improved from my exam on 1023 at [facility]      Impression cervical cord compression, decompressed with improved strength in Legs and mild improvement in LUE    s/p Lami/fusion  PT  Can follow up with Neurology, Dr. Kory Ibarra at [phone]

## 2022-11-04 NOTE — PROGRESS NOTE ADULT - SUBJECTIVE AND OBJECTIVE BOX
Pt seen/examined. Doing well. Pain controlled. No acute overnight complaints or events. Febrile yesterday. BLE LE doppler negative, UA neg.    T(C): 37.1 (11-04-22 @ 06:00), Max: 38.9 (11-03-22 @ 16:00)  HR: 90 (11-04-22 @ 06:00) (88 - 98)  BP: 121/71 (11-04-22 @ 06:00) (107/60 - 121/71)  RR: 18 (11-04-22 @ 06:00) (18 - 18)  SpO2: 95% (11-04-22 @ 06:00) (95% - 100%)  Wt(kg): --  - Gen: NAD    Back: Dressing/ Incision Clean/Dry/Intact  Motor:                   C5                C6              C7               C8           T1   R            5/5                5/5            5/5             5/5          5/5  L             3/5               3/5             3/5             3/5          3/5                L2             L3             L4               L5            S1  R         5/5           5/5          5/5             5/5           5/5  L          5/5          5/5           5/5             5/5           5/5    Sensory:            C5         C6         C7      C8       T1        (0=absent, 1=impaired, 2=normal, NT=not testable)  R         2            2           2        2         2  L          2            2           2        2         2               L2          L3         L4      L5       S1         (0=absent, 1=impaired, 2=normal, NT=not testable)  R         2            2            2        2        2  L          2            2           2        2         2    62y male s/p C2-T1 lami/fusion. Recovering appropriately. No plans to RTOR at this time, will cont. to monitor. CTPA 10/31 showed b/l PE. Febrile yesterday. BLE LE doppler negative, UA neg.    - Aspen collar at all times  - AC with Xarelto 20qD  - FU Blood Cx NGTD 11/4  - Knee aspiration (, crystals pending, GS -, f/u Cx)  - No plans for knee I and D at this time  - Drain removed  - Pain control  - cont. fever w/u

## 2022-11-04 NOTE — PROGRESS NOTE ADULT - SUBJECTIVE AND OBJECTIVE BOX
Patient is a 62y old  Male who presents with a chief complaint of cord compression (2022 12:36)      HPI:  62M with asthma and HTN who presents as a transfer from [facility] with c/o numbness, weakness and swelling of left arm for the past month and weakness of right leg, and b/l LE muscle spasms. Patient reports that hx of herniated disc, but his sx started worsening last month when he was gardening. no falls or trauma. Since then has been having progressively worsening weakness and muscle spasms. No bowel or bladder incontinence. Has been trying to take nucenta 100 mg daily and ibuprofen without relief. Decided to present to [facility] when the pain was uncontrolled. Imaging with concern for spinal canal stenosis and transferred to [facility] for further evaluation and management with orthopedic-spine team. In the ED patient was afebrile, hemodynamically stable satting well on RA. Given morphine and po oxy and admitted to medicine for further evaluation. (24 Oct 2022 07:37)    febrile 102.1 11/3 at 16:00    REVIEW OF SYSTEMS  Constitutional - No fever, No weight loss, No fatigue  HEENT - No eye pain, No visual disturbances, No difficulty hearing, No tinnitus, No vertigo, No neck pain  Respiratory - No cough, No wheezing, No shortness of breath  Cardiovascular - No chest pain, No palpitations  Gastrointestinal - No abdominal pain, No nausea, No vomiting, No diarrhea, No constipation  Genitourinary - No dysuria, No frequency, No hematuria, No incontinence  Neurological - No headaches, No memory loss, No loss of strength, No numbness, No tremors  Skin - No itching, No rashes, No lesions   Endocrine - No temperature intolerance  Musculoskeletal - No joint pain, No joint swelling, No muscle pain  Psychiatric - No depression, No anxiety    PAST MEDICAL & SURGICAL HISTORY  Asthma         CURRENT FUNCTIONAL STATUS     Bed Mobility  Bed Mobility Training Rehab Potential: good, to achieve stated therapy goals  Bed Mobility Training Symptoms Noted During/After Treatment: Left Lower Extremity spasms, RN made aware  Bed Mobility Training Rolling/Turning: moderate assist (50% patient effort);  1 person assist;  verbal cues;  bed rails  Bed Mobility Training Scooting: moderate assist (50% patient effort);  1 person assist;  towards edge of bed   Bed Mobility Training Sit-to-Supine: moderate assist (50% patient effort);  1 person assist;  verbal cues  Bed Mobility Training Supine-to-Sit: moderate assist (50% patient effort);  1 person assist;  nonverbal cues (demo/gestures);  verbal cues  Bed Mobility Training Limitations: decreased ability to use legs for bridging/pushing;  decreased ability to use arms for pushing/pulling;  decreased strength    Sit-Stand Transfer Training  Sit-to-Stand Transfer Training Rehab Potential: good, to achieve stated therapy goals  Sit-to-Stand Transfer Training Symptoms Noted During/After Treatment: none  Transfer Training Sit-to-Stand Transfer: moderate assist (50% patient effort);  1 person assist;  verbal cues;  full weight-bearing   rolling walker  Transfer Training Stand-to-Sit Transfer: moderate assist (50% patient effort);  1 person assist;  verbal cues;  full weight-bearing   rolling walker  Sit-to-Stand Transfer Training Transfer Safety Analysis: decreased balance;  decreased strength;  impaired balance;  rolling walker    Gait Training  Gait Training Treatment not Performed: unable to treat patient due to medical status,  unable to perform 2/2 pt having Left Lower Extremity spasms, RN made aware    Therapeutic Exercise  Therapeutic Exercise Rehab Effort: good  Therapeutic Exercise Detail: Pt. participated in seated Active Range of Motion on right LE and active assist range of motion on Left Lower Extremity therapeutic exercises x10.         11/3   Bed Mobility  Bed Mobility Training Treatment not Performed: Patient received and left in bedside chair.    Sit-Stand Transfer Training  Sit-to-Stand Transfer Training Treatment not Performed: Deferred OOB despite polite encouragement. Patient states discomfort in left knee.    Therapeutic Exercise  Therapeutic Exercise Rehab Effort: good  Therapeutic Exercise Symptoms Noted During/After Treatment: none  Therapeutic Exercise Detail: Active range of motion exercises for upper and lower extremities while sitting in chair.            RECENT LABS/IMAGING  CBC Full  -  ( 2022 05:57 )  WBC Count : 6.07 K/uL  RBC Count : 3.61 M/uL  Hemoglobin : 10.4 g/dL  Hematocrit : 31.6 %  Platelet Count - Automated : 285 K/uL  Mean Cell Volume : 87.5 fL  Mean Cell Hemoglobin : 28.8 pg  Mean Cell Hemoglobin Concentration : 32.9 gm/dL  Auto Neutrophil # : x  Auto Lymphocyte # : x  Auto Monocyte # : x  Auto Eosinophil # : x  Auto Basophil # : x  Auto Neutrophil % : x  Auto Lymphocyte % : x  Auto Monocyte % : x  Auto Eosinophil % : x  Auto Basophil % : x    11-04    132<L>  |  96<L>  |  16  ----------------------------<  111<H>  4.4   |  24  |  0.85    Ca    8.9      2022 05:57      Urinalysis Basic - ( 2022 15:40 )    Color: Light Yellow / Appearance: Clear / S.020 / pH: x  Gluc: x / Ketone: Negative  / Bili: Negative / Urobili: <2 mg/dL   Blood: x / Protein: Trace / Nitrite: Negative   Leuk Esterase: Negative / RBC: x / WBC x   Sq Epi: x / Non Sq Epi: x / Bacteria: x        VITALS  T(C): 36.6 (22 @ 10:00), Max: 38.9 (22 @ 16:00)  HR: 89 (22 @ 10:00) (89 - 98)  BP: 126/78 (22 @ 10:00) (107/60 - 126/78)  RR: 18 (22 @ 10:00) (18 - 18)  SpO2: 100% (22 @ 10:00) (95% - 100%)  Wt(kg): --    ALLERGIES  No Known Drug Allergies  peanuts (Unknown)      MEDICATIONS   acetaminophen     Tablet .. 975 milliGRAM(s) Oral every 8 hours  amLODIPine   Tablet 10 milliGRAM(s) Oral daily  cyclobenzaprine 5 milliGRAM(s) Oral three times a day PRN  melatonin 3 milliGRAM(s) Oral at bedtime  predniSONE   Tablet 20 milliGRAM(s) Oral two times a day  rivaroxaban 20 milliGRAM(s) Oral with dinner  tapentadol 100 milliGRAM(s) Oral four times a day      ----------------------------------------------------------------------------------------   PHYSICAL EXAM  Constitutional - NAD, Comfortable  HEENT - NCAT, MMM   Chest - no respiratory distress  Cardiovascular - RRR, S1S2  Abdomen - BS+, Soft, NTND  Extremities - No C/C/E, No calf tenderness   Neurologic Exam -                    Cognitive - Awake, Alert, Oriented to self, place, date, year, situation     Communication - Fluent, No dysarthria     Cranial Nerves - EOMI, tongue midline, no facial asymmetry     Motor -                     LEFT    UE - ShAB <3/5, EF 3/5, EE <2/5, WE 4-/5,  4/5                    RIGHT UE - ShAB 5/5, EF 5/5, EE 5/5, WE 5/5,  5/5                    LEFT    LE - HF 4/5, KE 4+/5, DF 4/5, PF 4/5                    RIGHT LE - HF 5/5, KE 5/5, DF 5/5, PF 5/5        Sensory - LT intact BLE, diminished LUE    ----------------------------------------------------------------------------------------  ASSESSMENT/PLAN  62y male s/p C2-T1 lami/fusion, course complicated by marciano/l PE 10/31       incomplete note, consult in progress Patient is a 62y old  Male who presents with a chief complaint of cord compression (2022 12:36)      HPI:  62M with asthma and HTN who presents as a transfer from [facility] with c/o numbness, weakness and swelling of left arm for the past month and weakness of right leg, and b/l LE muscle spasms. Patient reports that hx of herniated disc, but his sx started worsening last month when he was gardening. no falls or trauma. Since then has been having progressively worsening weakness and muscle spasms. No bowel or bladder incontinence. Has been trying to take nucenta 100 mg daily and ibuprofen without relief. Decided to present to [facility] when the pain was uncontrolled. Imaging with concern for spinal canal stenosis and transferred to [facility] for further evaluation and management with orthopedic-spine team. In the ED patient was afebrile, hemodynamically stable satting well on RA. Given morphine and po oxy and admitted to medicine for further evaluation. (24 Oct 2022 07:37)    febrile 102.1 11/3 at 16:00  Joint aspiration done yesterday by ortho  patient still reports L knee pain up to 9+/10.        REVIEW OF SYSTEMS  L knee pain  weakness    PAST MEDICAL & SURGICAL HISTORY  Asthma         CURRENT FUNCTIONAL STATUS     Bed Mobility  Bed Mobility Training Rehab Potential: good, to achieve stated therapy goals  Bed Mobility Training Symptoms Noted During/After Treatment: Left Lower Extremity spasms, RN made aware  Bed Mobility Training Rolling/Turning: moderate assist (50% patient effort);  1 person assist;  verbal cues;  bed rails  Bed Mobility Training Scooting: moderate assist (50% patient effort);  1 person assist;  towards edge of bed   Bed Mobility Training Sit-to-Supine: moderate assist (50% patient effort);  1 person assist;  verbal cues  Bed Mobility Training Supine-to-Sit: moderate assist (50% patient effort);  1 person assist;  nonverbal cues (demo/gestures);  verbal cues  Bed Mobility Training Limitations: decreased ability to use legs for bridging/pushing;  decreased ability to use arms for pushing/pulling;  decreased strength    Sit-Stand Transfer Training  Sit-to-Stand Transfer Training Rehab Potential: good, to achieve stated therapy goals  Sit-to-Stand Transfer Training Symptoms Noted During/After Treatment: none  Transfer Training Sit-to-Stand Transfer: moderate assist (50% patient effort);  1 person assist;  verbal cues;  full weight-bearing   rolling walker  Transfer Training Stand-to-Sit Transfer: moderate assist (50% patient effort);  1 person assist;  verbal cues;  full weight-bearing   rolling walker  Sit-to-Stand Transfer Training Transfer Safety Analysis: decreased balance;  decreased strength;  impaired balance;  rolling walker    Gait Training  Gait Training Treatment not Performed: unable to treat patient due to medical status,  unable to perform 2/2 pt having Left Lower Extremity spasms, RN made aware    Therapeutic Exercise  Therapeutic Exercise Rehab Effort: good  Therapeutic Exercise Detail: Pt. participated in seated Active Range of Motion on right LE and active assist range of motion on Left Lower Extremity therapeutic exercises x10.         /3   Bed Mobility  Bed Mobility Training Treatment not Performed: Patient received and left in bedside chair.    Sit-Stand Transfer Training  Sit-to-Stand Transfer Training Treatment not Performed: Deferred OOB despite polite encouragement. Patient states discomfort in left knee.    Therapeutic Exercise  Therapeutic Exercise Rehab Effort: good  Therapeutic Exercise Symptoms Noted During/After Treatment: none  Therapeutic Exercise Detail: Active range of motion exercises for upper and lower extremities while sitting in chair.            RECENT LABS/IMAGING  CBC Full  -  ( 2022 05:57 )  WBC Count : 6.07 K/uL  RBC Count : 3.61 M/uL  Hemoglobin : 10.4 g/dL  Hematocrit : 31.6 %  Platelet Count - Automated : 285 K/uL  Mean Cell Volume : 87.5 fL  Mean Cell Hemoglobin : 28.8 pg  Mean Cell Hemoglobin Concentration : 32.9 gm/dL  Auto Neutrophil # : x  Auto Lymphocyte # : x  Auto Monocyte # : x  Auto Eosinophil # : x  Auto Basophil # : x  Auto Neutrophil % : x  Auto Lymphocyte % : x  Auto Monocyte % : x  Auto Eosinophil % : x  Auto Basophil % : x    11-04    132<L>  |  96<L>  |  16  ----------------------------<  111<H>  4.4   |  24  |  0.85    Ca    8.9      2022 05:57      Urinalysis Basic - ( 2022 15:40 )    Color: Light Yellow / Appearance: Clear / S.020 / pH: x  Gluc: x / Ketone: Negative  / Bili: Negative / Urobili: <2 mg/dL   Blood: x / Protein: Trace / Nitrite: Negative   Leuk Esterase: Negative / RBC: x / WBC x   Sq Epi: x / Non Sq Epi: x / Bacteria: x        VITALS  T(C): 36.6 (22 @ 10:00), Max: 38.9 (22 @ 16:00)  HR: 89 (22 @ 10:00) (89 - 98)  BP: 126/78 (22 @ 10:00) (107/60 - 126/78)  RR: 18 (22 @ 10:00) (18 - 18)  SpO2: 100% (22 @ 10:00) (95% - 100%)  Wt(kg): --    ALLERGIES  No Known Drug Allergies  peanuts (Unknown)      MEDICATIONS   acetaminophen     Tablet .. 975 milliGRAM(s) Oral every 8 hours  amLODIPine   Tablet 10 milliGRAM(s) Oral daily  cyclobenzaprine 5 milliGRAM(s) Oral three times a day PRN  melatonin 3 milliGRAM(s) Oral at bedtime  predniSONE   Tablet 20 milliGRAM(s) Oral two times a day  rivaroxaban 20 milliGRAM(s) Oral with dinner  tapentadol 100 milliGRAM(s) Oral four times a day      ----------------------------------------------------------------------------------------   PHYSICAL EXAM  Constitutional - NAD, Comfortable  HEENT - NCAT, MMM   Chest - no respiratory distress  Cardiovascular - RRR, S1S2  Abdomen - BS+, Soft, NTND  Extremities - No C/C/E, No calf tenderness   Neurologic Exam -                    Cognitive - Awake, Alert, Oriented to self, place, date, year, situation     Communication - Fluent, No dysarthria     Cranial Nerves - EOMI, tongue midline, no facial asymmetry     Motor -                     LEFT    UE - ShAB 2-/5, EF 3/5, EE  2/5, WE 4-/5,  4/5                    RIGHT UE - ShAB 5/5, EF 5/5, EE 5/5, WE 5/5,  5/5                    LEFT    LE - HF 4/5, Knee ROM limited by pain DF 4/5, PF 4/5                    RIGHT LE - HF 5/5, KE 5/5, DF 5/5, PF 5/5        Sensory - LT intact BLE, diminished LUE    ----------------------------------------------------------------------------------------   ASSESSMENT/PLAN  62M with asthma and HTN who presents as a transfer from [facility] with c/o numbness, weakness and swelling of left arm for the past month and weakness of left leg, and b/l LE muscle spasms with progressively worsening weakness and muscle spasms 2/2 cervical spinal stenosis/myelopathy. s/p C2-T1 PLSF, C3-C6 lami  with functional, gait, ADL impairments.  course complicated by b/l PE- on rivaroxaban  Disposition - Patient is a candidate for inpatient rehab.  He was previously recommended for acute rehab, however now having significant L knee pain limiting participation in bedside therapy. He is currently unable to tolerate 3 hours per day of therapy due to pain.  Recommend subacute rehab based on current function, however will monitor for improvement.  PT - ROM, Bed mobility, Transfers, Ambulation with assistive device  OT - ADLs, ROM  SLP - Dysphagia eval and treat  Precautions - Falls, Cardiac   Skin - Turn Q2hrs  Diet - Reg     Patient is a 62y old  Male who presents with a chief complaint of cord compression (2022 12:36)      HPI:  62M with asthma and HTN who presents as a transfer from [facility] with c/o numbness, weakness and swelling of left arm for the past month and weakness of right leg, and b/l LE muscle spasms. Patient reports that hx of herniated disc, but his sx started worsening last month when he was gardening. no falls or trauma. Since then has been having progressively worsening weakness and muscle spasms. No bowel or bladder incontinence. Has been trying to take nucenta 100 mg daily and ibuprofen without relief. Decided to present to [facility] when the pain was uncontrolled. Imaging with concern for spinal canal stenosis and transferred to [facility] for further evaluation and management with orthopedic-spine team. In the ED patient was afebrile, hemodynamically stable satting well on RA. Given morphine and po oxy and admitted to medicine for further evaluation. (24 Oct 2022 07:37)    febrile 102.1 11/3 at 16:00  Joint aspiration done yesterday by ortho  patient still reports L knee pain up to 9+/10.        REVIEW OF SYSTEMS  L knee pain  weakness    PAST MEDICAL & SURGICAL HISTORY  Asthma         CURRENT FUNCTIONAL STATUS     Bed Mobility  Bed Mobility Training Rehab Potential: good, to achieve stated therapy goals  Bed Mobility Training Symptoms Noted During/After Treatment: Left Lower Extremity spasms, RN made aware  Bed Mobility Training Rolling/Turning: moderate assist (50% patient effort);  1 person assist;  verbal cues;  bed rails  Bed Mobility Training Scooting: moderate assist (50% patient effort);  1 person assist;  towards edge of bed   Bed Mobility Training Sit-to-Supine: moderate assist (50% patient effort);  1 person assist;  verbal cues  Bed Mobility Training Supine-to-Sit: moderate assist (50% patient effort);  1 person assist;  nonverbal cues (demo/gestures);  verbal cues  Bed Mobility Training Limitations: decreased ability to use legs for bridging/pushing;  decreased ability to use arms for pushing/pulling;  decreased strength    Sit-Stand Transfer Training  Sit-to-Stand Transfer Training Rehab Potential: good, to achieve stated therapy goals  Sit-to-Stand Transfer Training Symptoms Noted During/After Treatment: none  Transfer Training Sit-to-Stand Transfer: moderate assist (50% patient effort);  1 person assist;  verbal cues;  full weight-bearing   rolling walker  Transfer Training Stand-to-Sit Transfer: moderate assist (50% patient effort);  1 person assist;  verbal cues;  full weight-bearing   rolling walker  Sit-to-Stand Transfer Training Transfer Safety Analysis: decreased balance;  decreased strength;  impaired balance;  rolling walker    Gait Training  Gait Training Treatment not Performed: unable to treat patient due to medical status,  unable to perform 2/2 pt having Left Lower Extremity spasms, RN made aware    Therapeutic Exercise  Therapeutic Exercise Rehab Effort: good  Therapeutic Exercise Detail: Pt. participated in seated Active Range of Motion on right LE and active assist range of motion on Left Lower Extremity therapeutic exercises x10.         /3   Bed Mobility  Bed Mobility Training Treatment not Performed: Patient received and left in bedside chair.    Sit-Stand Transfer Training  Sit-to-Stand Transfer Training Treatment not Performed: Deferred OOB despite polite encouragement. Patient states discomfort in left knee.    Therapeutic Exercise  Therapeutic Exercise Rehab Effort: good  Therapeutic Exercise Symptoms Noted During/After Treatment: none  Therapeutic Exercise Detail: Active range of motion exercises for upper and lower extremities while sitting in chair.            RECENT LABS/IMAGING  CBC Full  -  ( 2022 05:57 )  WBC Count : 6.07 K/uL  RBC Count : 3.61 M/uL  Hemoglobin : 10.4 g/dL  Hematocrit : 31.6 %  Platelet Count - Automated : 285 K/uL  Mean Cell Volume : 87.5 fL  Mean Cell Hemoglobin : 28.8 pg  Mean Cell Hemoglobin Concentration : 32.9 gm/dL  Auto Neutrophil # : x  Auto Lymphocyte # : x  Auto Monocyte # : x  Auto Eosinophil # : x  Auto Basophil # : x  Auto Neutrophil % : x  Auto Lymphocyte % : x  Auto Monocyte % : x  Auto Eosinophil % : x  Auto Basophil % : x    11-04    132<L>  |  96<L>  |  16  ----------------------------<  111<H>  4.4   |  24  |  0.85    Ca    8.9      2022 05:57      Urinalysis Basic - ( 2022 15:40 )    Color: Light Yellow / Appearance: Clear / S.020 / pH: x  Gluc: x / Ketone: Negative  / Bili: Negative / Urobili: <2 mg/dL   Blood: x / Protein: Trace / Nitrite: Negative   Leuk Esterase: Negative / RBC: x / WBC x   Sq Epi: x / Non Sq Epi: x / Bacteria: x        VITALS  T(C): 36.6 (22 @ 10:00), Max: 38.9 (22 @ 16:00)  HR: 89 (22 @ 10:00) (89 - 98)  BP: 126/78 (22 @ 10:00) (107/60 - 126/78)  RR: 18 (22 @ 10:00) (18 - 18)  SpO2: 100% (22 @ 10:00) (95% - 100%)  Wt(kg): --    ALLERGIES  No Known Drug Allergies  peanuts (Unknown)      MEDICATIONS   acetaminophen     Tablet .. 975 milliGRAM(s) Oral every 8 hours  amLODIPine   Tablet 10 milliGRAM(s) Oral daily  cyclobenzaprine 5 milliGRAM(s) Oral three times a day PRN  melatonin 3 milliGRAM(s) Oral at bedtime  predniSONE   Tablet 20 milliGRAM(s) Oral two times a day  rivaroxaban 20 milliGRAM(s) Oral with dinner  tapentadol 100 milliGRAM(s) Oral four times a day      ----------------------------------------------------------------------------------------   PHYSICAL EXAM  Constitutional - NAD, Comfortable   Chest - no respiratory distress  Cardiovascular - RRR, S1S2  Abdomen - BS+, Soft, NTND  Extremities - No C/C/E, No calf tenderness   Neurologic Exam -                    Cognitive - Awake, Alert, Oriented to self, place, date, year, situation     Communication - Fluent, No dysarthria     Cranial Nerves - EOMI, tongue midline, no facial asymmetry     Motor -                     LEFT    UE - ShAB 2-/5, EF 3/5, EE  2/5, WE 4-/5,  4/5                    RIGHT UE - ShAB 5/5, EF 5/5, EE 5/5, WE 5/5,  5/5                    LEFT    LE - HF 4/5, Knee ROM limited by pain DF 4/5, PF 4/5                    RIGHT LE - HF 5/5, KE 5/5, DF 5/5, PF 5/5        Sensory - LT intact BLE, diminished LUE    ----------------------------------------------------------------------------------------   ASSESSMENT/PLAN  62M with asthma and HTN who presents as a transfer from [facility] with c/o numbness, weakness and swelling of left arm for the past month and weakness of left leg, and b/l LE muscle spasms with progressively worsening weakness and muscle spasms 2/2 cervical spinal stenosis/myelopathy. s/p C2-T1 PLSF, C3-C6 lami  with functional, gait, ADL impairments.  course complicated by b/l PE- on rivaroxaban  Disposition - Patient is a candidate for inpatient rehab.  He was previously recommended for acute rehab, however now having significant L knee pain limiting participation in bedside therapy. He is currently unable to tolerate 3 hours per day of therapy due to pain.   Will monitor for improvement.  pain: on nucynta, flexeril, tylenol  PT - ROM, Bed mobility, Transfers, Ambulation with assistive device  OT - ADLs, ROM  SLP - Dysphagia eval and treat  Precautions - Falls, Cardiac   Skin - Turn Q2hrs  Diet - Reg

## 2022-11-04 NOTE — PROGRESS NOTE ADULT - SUBJECTIVE AND OBJECTIVE BOX
[facility] Division of Hospital Medicine  Juan Downey MD  Pager (M-F, 8A-5P): [phone]  Other Times:  [phone]    Patient is a 62y old  Male who presents with a chief complaint of cord compression (2022 10:21)    SUBJECTIVE / OVERNIGHT EVENTS:  Patient with significant pain at the left knee s/p aspiration. No F/C, N/V, CP, SOB, Cough, lightheadedness, dizziness, abdominal pain, diarrhea, dysuria.    MEDICATIONS  (STANDING):  acetaminophen     Tablet .. 975 milliGRAM(s) Oral every 8 hours  amLODIPine   Tablet 10 milliGRAM(s) Oral daily  melatonin 3 milliGRAM(s) Oral at bedtime  rivaroxaban 20 milliGRAM(s) Oral with dinner  tapentadol 100 milliGRAM(s) Oral four times a day    MEDICATIONS  (PRN):  cyclobenzaprine 5 milliGRAM(s) Oral three times a day PRN Muscle Spasm      Vital Signs Last 24 Hrs  T(C): 37.1 (2022 06:00), Max: 38.9 (2022 16:00)  T(F): 98.8 (2022 06:00), Max: 102.1 (2022 16:00)  HR: 90 (2022 06:00) (89 - 98)  BP: 121/71 (2022 06:00) (107/60 - 121/71)  BP(mean): --  RR: 18 (2022 06:00) (18 - 18)  SpO2: 95% (2022 06:00) (95% - 100%)    Parameters below as of 2022 06:00  Patient On (Oxygen Delivery Method): room air      CAPILLARY BLOOD GLUCOSE        I&O's Summary    2022 07:01  -  2022 07:00  --------------------------------------------------------  IN: 840 mL / OUT: 1400 mL / NET: -560 mL        PHYSICAL EXAM:  CONSTITUTIONAL: NAD, well-developed, well-groomed  EYES: PERRLA; conjunctiva and sclera clear  ENMT: Moist oral mucosa, no pharyngeal injection or exudates; normal dentition  NECK: C-collar in place, no palpable masses  RESPIRATORY: Normal respiratory effort; lungs are clear to auscultation bilaterally  CARDIOVASCULAR: Regular rate and rhythm, normal S1 and S2, no murmur/rub/gallop; No lower extremity edema; Peripheral pulses are 2+ bilaterally  ABDOMEN: Nontender to palpation, normoactive bowel sounds, no rebound/guarding; No hepatosplenomegaly  MUSCULOSKELETAL:  Did not assess gait; no clubbing or cyanosis of digits; no joint swelling or tenderness to palpation  PSYCH: A+O to person, place, and time; affect appropriate  NEUROLOGY: CN 2-12 are intact and symmetric; no gross sensory deficits   SKIN: No rashes; no palpable lesions, surgical dressing C/D/I    LABS:                        10.4   6.07  )-----------( 285      ( 2022 05:57 )             31.6     11-04    132<L>  |  96<L>  |  16  ----------------------------<  111<H>  4.4   |  24  |  0.85    Ca    8.9      2022 05:57            Urinalysis Basic - ( 2022 15:40 )    Color: Light Yellow / Appearance: Clear / S.020 / pH: x  Gluc: x / Ketone: Negative  / Bili: Negative / Urobili: <2 mg/dL   Blood: x / Protein: Trace / Nitrite: Negative   Leuk Esterase: Negative / RBC: x / WBC x   Sq Epi: x / Non Sq Epi: x / Bacteria: x        RADIOLOGY & ADDITIONAL TESTS:    Imaging Personally Reviewed:    Care Discussed with Consultants/Other Providers:    Care Discussed with Orthopedic PA about:

## 2022-11-04 NOTE — PROGRESS NOTE ADULT - ASSESSMENT
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

## 2022-11-05 LAB
CULTURE RESULTS: SIGNIFICANT CHANGE UP
CULTURE RESULTS: SIGNIFICANT CHANGE UP
SPECIMEN SOURCE: SIGNIFICANT CHANGE UP
SPECIMEN SOURCE: SIGNIFICANT CHANGE UP

## 2022-11-05 PROCEDURE — 99233 SBSQ HOSP IP/OBS HIGH 50: CPT

## 2022-11-05 RX ADMIN — MORPHINE SULFATE 15 MILLIGRAM(S): 50 CAPSULE, EXTENDED RELEASE ORAL at 08:07

## 2022-11-05 RX ADMIN — AMLODIPINE BESYLATE 10 MILLIGRAM(S): 2.5 TABLET ORAL at 05:58

## 2022-11-05 RX ADMIN — MORPHINE SULFATE 2 MILLIGRAM(S): 50 CAPSULE, EXTENDED RELEASE ORAL at 13:19

## 2022-11-05 RX ADMIN — RIVAROXABAN 20 MILLIGRAM(S): KIT at 18:03

## 2022-11-05 RX ADMIN — MORPHINE SULFATE 15 MILLIGRAM(S): 50 CAPSULE, EXTENDED RELEASE ORAL at 16:00

## 2022-11-05 RX ADMIN — CYCLOBENZAPRINE HYDROCHLORIDE 5 MILLIGRAM(S): 10 TABLET, FILM COATED ORAL at 23:58

## 2022-11-05 RX ADMIN — Medication 975 MILLIGRAM(S): at 06:28

## 2022-11-05 RX ADMIN — Medication 975 MILLIGRAM(S): at 14:23

## 2022-11-05 RX ADMIN — TAPENTADOL HYDROCHLORIDE 100 MILLIGRAM(S): 50 TABLET, FILM COATED ORAL at 06:03

## 2022-11-05 RX ADMIN — TAPENTADOL HYDROCHLORIDE 100 MILLIGRAM(S): 50 TABLET, FILM COATED ORAL at 18:04

## 2022-11-05 RX ADMIN — TAPENTADOL HYDROCHLORIDE 100 MILLIGRAM(S): 50 TABLET, FILM COATED ORAL at 23:57

## 2022-11-05 RX ADMIN — Medication 975 MILLIGRAM(S): at 21:19

## 2022-11-05 RX ADMIN — Medication 975 MILLIGRAM(S): at 05:58

## 2022-11-05 RX ADMIN — Medication 975 MILLIGRAM(S): at 15:01

## 2022-11-05 RX ADMIN — TAPENTADOL HYDROCHLORIDE 100 MILLIGRAM(S): 50 TABLET, FILM COATED ORAL at 13:32

## 2022-11-05 RX ADMIN — Medication 975 MILLIGRAM(S): at 22:19

## 2022-11-05 RX ADMIN — MORPHINE SULFATE 15 MILLIGRAM(S): 50 CAPSULE, EXTENDED RELEASE ORAL at 09:00

## 2022-11-05 RX ADMIN — Medication 3 MILLIGRAM(S): at 21:20

## 2022-11-05 RX ADMIN — MORPHINE SULFATE 15 MILLIGRAM(S): 50 CAPSULE, EXTENDED RELEASE ORAL at 17:00

## 2022-11-05 RX ADMIN — Medication 20 MILLIGRAM(S): at 18:03

## 2022-11-05 RX ADMIN — MORPHINE SULFATE 2 MILLIGRAM(S): 50 CAPSULE, EXTENDED RELEASE ORAL at 12:50

## 2022-11-05 RX ADMIN — Medication 20 MILLIGRAM(S): at 05:58

## 2022-11-05 RX ADMIN — TAPENTADOL HYDROCHLORIDE 100 MILLIGRAM(S): 50 TABLET, FILM COATED ORAL at 18:35

## 2022-11-05 RX ADMIN — TAPENTADOL HYDROCHLORIDE 100 MILLIGRAM(S): 50 TABLET, FILM COATED ORAL at 22:44

## 2022-11-05 NOTE — PROGRESS NOTE ADULT - SUBJECTIVE AND OBJECTIVE BOX
Orthopaedic Surgery Progress Note    Subjective:   Patient seen and examined. No acute events overnight. States pain is controlled. Denies fever/chills/chest pain/shortness of breath/numbness/tingling.    Objective:  T(C): 36.7 (22 @ 00:03), Max: 37.2 (22 @ 14:00)  HR: 80 (22 @ 00:03) (80 - 90)  BP: 123/76 (22 @ 00:03) (111/73 - 126/78)  RR: 18 (22 @ 00:03) (17 - 18)  SpO2: 100% (22 @ 00:03) (95% - 100%)  Wt(kg): --     @ 07:01  -   @ 07:00  --------------------------------------------------------  IN: 840 mL / OUT: 1400 mL / NET: -560 mL     @ 07:01  -   @ 02:43  --------------------------------------------------------  IN: 240 mL / OUT: 1110 mL / NET: -870 mL        Physical Exam:    Gen: NAD    Back: Dressing/ Incision Clean/Dry/Intact  Motor:                   C5                C6              C7               C8           T1   R            5/5                5/5            5/5             5/5          5/5  L             3/5               3/5             3/5             3/5          3/5                L2             L3             L4               L5            S1  R         5/5           5/5          5/5             5/5           5/5  L          5/5          5/5           5/5             5/5           5/5    Sensory:            C5         C6         C7      C8       T1        (0=absent, 1=impaired, 2=normal, NT=not testable)  R         2            2           2        2         2  L          2            2           2        2         2               L2          L3         L4      L5       S1         (0=absent, 1=impaired, 2=normal, NT=not testable)  R         2            2            2        2        2  L          2            2           2        2         2                             10.4   6.07  )-----------( 285      ( 2022 05:57 )             31.6     11-04    132<L>  |  96<L>  |  16  ----------------------------<  111<H>  4.4   |  24  |  0.85    Ca    8.9      2022 05:57        Urinalysis Basic - ( 2022 15:40 )    Color: Light Yellow / Appearance: Clear / S.020 / pH: x  Gluc: x / Ketone: Negative  / Bili: Negative / Urobili: <2 mg/dL   Blood: x / Protein: Trace / Nitrite: Negative   Leuk Esterase: Negative / RBC: x / WBC x   Sq Epi: x / Non Sq Epi: x / Bacteria: x

## 2022-11-05 NOTE — PROGRESS NOTE ADULT - SUBJECTIVE AND OBJECTIVE BOX
Manuel Porter MD  Interventional Cardiology / Advance Heart Failure and Cardiac Transplant Specialist  [place] Office : [address] [place] [address] [place]. [address]  Tel:   [place] Office : [address] [facility] [place] [address]  Tel: [phone]       Pt is lying in bed comfortable not in distress,  	  MEDICATIONS:  amLODIPine   Tablet 10 milliGRAM(s) Oral daily  rivaroxaban 20 milliGRAM(s) Oral with dinner        acetaminophen     Tablet .. 975 milliGRAM(s) Oral every 8 hours  cyclobenzaprine 5 milliGRAM(s) Oral three times a day PRN  melatonin 3 milliGRAM(s) Oral at bedtime  morphine  - Injectable 2 milliGRAM(s) IV Push every 3 hours PRN  morphine  IR 15 milliGRAM(s) Oral every 3 hours PRN  tapentadol 100 milliGRAM(s) Oral four times a day      predniSONE   Tablet 20 milliGRAM(s) Oral two times a day        PAST MEDICAL/SURGICAL HISTORY  PAST MEDICAL & SURGICAL HISTORY:  Asthma          SOCIAL HISTORY: Substance Use (street drugs): ( x ) never used  (  ) other:    FAMILY HISTORY:          PHYSICAL EXAM:  T(C): 36.7 (11-05-22 @ 12:22), Max: 37.2 (11-04-22 @ 14:00)  HR: 97 (11-05-22 @ 12:22) (79 - 97)  BP: 127/83 (11-05-22 @ 12:22) (111/73 - 127/83)  RR: 18 (11-05-22 @ 12:22) (17 - 18)  SpO2: 99% (11-05-22 @ 12:22) (95% - 100%)  Wt(kg): --  I&O's Summary    04 Nov 2022 07:01  -  05 Nov 2022 07:00  --------------------------------------------------------  IN: 240 mL / OUT: 1510 mL / NET: -1270 mL    05 Nov 2022 08:01  -  05 Nov 2022 13:37  --------------------------------------------------------  IN: 560 mL / OUT: 700 mL / NET: -140 mL          GENERAL: NAD  EYES:   PERRLA   ENMT:   Moist mucous membranes, Good dentition, No lesions  Cardiovascular: Normal S1 S2, No JVD, No murmurs, No edema  Respiratory: Lungs clear to auscultation	  Gastrointestinal:  Soft, Non-tender, + BS	  Extremities: no edema                                  10.4   6.07  )-----------( 285      ( 04 Nov 2022 05:57 )             31.6     11-04    132<L>  |  96<L>  |  16  ----------------------------<  111<H>  4.4   |  24  |  0.85    Ca    8.9      04 Nov 2022 05:57      proBNP:   Lipid Profile:   HgA1c:   TSH:     Consultant(s) Notes Reviewed:  [x ] YES  [ ] NO    Care Discussed with Consultants/Other Providers [ x] YES  [ ] NO    Imaging Personally Reviewed independently:  [x] YES  [ ] NO    All labs, radiologic studies, vitals, orders and medications list reviewed. Patient is seen and examined at bedside. Case discussed with medical team.

## 2022-11-05 NOTE — PROGRESS NOTE ADULT - SUBJECTIVE AND OBJECTIVE BOX
[facility] Division of Hospital Medicine  Juan Downey MD  Pager (M-F, 8A-5P): [phone]  Other Times:  [phone]    Patient is a 62y old  Male who presents with a chief complaint of cord compression (2022 02:42)    SUBJECTIVE / OVERNIGHT EVENTS:  Patient states that his left knee pain is significantly improved after starting on steroid.  Able to ambulate.  No F/C, N/V, CP, SOB, Cough, lightheadedness, dizziness, abdominal pain, diarrhea, dysuria.    MEDICATIONS  (STANDING):  acetaminophen     Tablet .. 975 milliGRAM(s) Oral every 8 hours  amLODIPine   Tablet 10 milliGRAM(s) Oral daily  melatonin 3 milliGRAM(s) Oral at bedtime  predniSONE   Tablet 20 milliGRAM(s) Oral two times a day  rivaroxaban 20 milliGRAM(s) Oral with dinner  tapentadol 100 milliGRAM(s) Oral four times a day    MEDICATIONS  (PRN):  cyclobenzaprine 5 milliGRAM(s) Oral three times a day PRN Muscle Spasm  morphine  - Injectable 2 milliGRAM(s) IV Push every 3 hours PRN Severe Breakthrough Pain (7 - 10)  morphine  IR 15 milliGRAM(s) Oral every 3 hours PRN Moderate - Severe Pain (4 - 6)      Vital Signs Last 24 Hrs  T(C): 36.9 (2022 09:26), Max: 37.2 (2022 14:00)  T(F): 98.4 (:26), Max: 99 (2022 14:00)  HR: 79 (:) (79 - 91)  BP: 118/67 (:) (111/73 - 123/76)  BP(mean): --  RR: 18 (:26) (17 - 18)  SpO2: 100% (:) (95% - 100%)    Parameters below as of :  Patient On (Oxygen Delivery Method): room air      CAPILLARY BLOOD GLUCOSE        I&O's Summary    2022 07:01  -  2022 07:00  --------------------------------------------------------  IN: 240 mL / OUT: 1510 mL / NET: -1270 mL    2022 08:01  -  2022 12:07  --------------------------------------------------------  IN: 359 mL / OUT: 400 mL / NET: -41 mL        PHYSICAL EXAM:  CONSTITUTIONAL: NAD, well-developed, well-groomed  EYES: PERRLA; conjunctiva and sclera clear  ENMT: Moist oral mucosa, no pharyngeal injection or exudates; normal dentition  NECK: C-collar in place, no palpable masses  RESPIRATORY: Normal respiratory effort; lungs are clear to auscultation bilaterally  CARDIOVASCULAR: Regular rate and rhythm, normal S1 and S2, no murmur/rub/gallop; No lower extremity edema; Peripheral pulses are 2+ bilaterally  ABDOMEN: Nontender to palpation, normoactive bowel sounds, no rebound/guarding; No hepatosplenomegaly  MUSCULOSKELETAL:  Did not assess gait; no clubbing or cyanosis of digits; Lt knee joint ROM improved, tenderness improved.  PSYCH: A+O to person, place, and time; affect appropriate  NEUROLOGY: CN 2-12 are intact and symmetric; no gross sensory deficits   SKIN: No rashes; no palpable lesions, surgical dressing C/D/I    LABS:                        10.4   6.07  )-----------( 285      ( 2022 05:57 )             31.6     11-04    132<L>  |  96<L>  |  16  ----------------------------<  111<H>  4.4   |  24  |  0.85    Ca    8.9      2022 05:57            Urinalysis Basic - ( 2022 15:40 )    Color: Light Yellow / Appearance: Clear / S.020 / pH: x  Gluc: x / Ketone: Negative  / Bili: Negative / Urobili: <2 mg/dL   Blood: x / Protein: Trace / Nitrite: Negative   Leuk Esterase: Negative / RBC: x / WBC x   Sq Epi: x / Non Sq Epi: x / Bacteria: x        RADIOLOGY & ADDITIONAL TESTS:    Imaging Personally Reviewed:    Care Discussed with Consultants/Other Providers:    Care Discussed with Orthopedic PA about:

## 2022-11-05 NOTE — PROGRESS NOTE ADULT - ASSESSMENT
62y male s/p C2-T1 lami/fusion. Recovering appropriately. No plans to RTOR at this time, will cont. to monitor. CTPA 10/31 showed b/l PE    - Aspen collar at all times  - AC with Xarelto 20qD  - FU Blood Cx NGTD 11/5  - Knee aspiration: , crystals pending, GS -, Cx: NGTD (11/5)  - No plans for knee I and D at this time  - Drain removed  - Pain control  - cont. fever w/u

## 2022-11-05 NOTE — PROGRESS NOTE ADULT - ASSESSMENT
62y Male presents with b/l UE radicular type symptoms with subjective numbness bilaterally, left wrist drop, inability to flex biceps, RLE radicular type symptoms to the knee, b/l LE muscle spasm/fasiculation as well as contracted quadriceps muscle on the left.     EKG SR LVH with re op changed unchanged from earlier this month       1) Post op assessment  - s/p spinal surgery tolerated the procedure well   -CT chest shows b/l PE on xarelto   - Echo shows mild LV segmental dysfunction, needs out pt ischmia eval   -s/p left knee aspiration    2) HTN  -improving  -c/w norvasc  -continue to monitor BP     3) DVT PPX  -on xarelto

## 2022-11-06 LAB — SARS-COV-2 RNA SPEC QL NAA+PROBE: SIGNIFICANT CHANGE UP

## 2022-11-06 PROCEDURE — 99232 SBSQ HOSP IP/OBS MODERATE 35: CPT

## 2022-11-06 RX ADMIN — Medication 975 MILLIGRAM(S): at 06:25

## 2022-11-06 RX ADMIN — TAPENTADOL HYDROCHLORIDE 100 MILLIGRAM(S): 50 TABLET, FILM COATED ORAL at 13:01

## 2022-11-06 RX ADMIN — TAPENTADOL HYDROCHLORIDE 100 MILLIGRAM(S): 50 TABLET, FILM COATED ORAL at 12:22

## 2022-11-06 RX ADMIN — TAPENTADOL HYDROCHLORIDE 100 MILLIGRAM(S): 50 TABLET, FILM COATED ORAL at 18:19

## 2022-11-06 RX ADMIN — TAPENTADOL HYDROCHLORIDE 100 MILLIGRAM(S): 50 TABLET, FILM COATED ORAL at 06:25

## 2022-11-06 RX ADMIN — Medication 975 MILLIGRAM(S): at 15:11

## 2022-11-06 RX ADMIN — Medication 20 MILLIGRAM(S): at 18:18

## 2022-11-06 RX ADMIN — Medication 20 MILLIGRAM(S): at 06:25

## 2022-11-06 RX ADMIN — AMLODIPINE BESYLATE 10 MILLIGRAM(S): 2.5 TABLET ORAL at 06:26

## 2022-11-06 RX ADMIN — RIVAROXABAN 20 MILLIGRAM(S): KIT at 18:18

## 2022-11-06 RX ADMIN — Medication 3 MILLIGRAM(S): at 21:06

## 2022-11-06 RX ADMIN — Medication 975 MILLIGRAM(S): at 07:25

## 2022-11-06 RX ADMIN — MORPHINE SULFATE 2 MILLIGRAM(S): 50 CAPSULE, EXTENDED RELEASE ORAL at 15:08

## 2022-11-06 RX ADMIN — TAPENTADOL HYDROCHLORIDE 100 MILLIGRAM(S): 50 TABLET, FILM COATED ORAL at 19:02

## 2022-11-06 RX ADMIN — Medication 975 MILLIGRAM(S): at 14:13

## 2022-11-06 RX ADMIN — MORPHINE SULFATE 15 MILLIGRAM(S): 50 CAPSULE, EXTENDED RELEASE ORAL at 07:50

## 2022-11-06 RX ADMIN — MORPHINE SULFATE 15 MILLIGRAM(S): 50 CAPSULE, EXTENDED RELEASE ORAL at 08:20

## 2022-11-06 RX ADMIN — MORPHINE SULFATE 2 MILLIGRAM(S): 50 CAPSULE, EXTENDED RELEASE ORAL at 14:12

## 2022-11-06 RX ADMIN — TAPENTADOL HYDROCHLORIDE 100 MILLIGRAM(S): 50 TABLET, FILM COATED ORAL at 00:57

## 2022-11-06 RX ADMIN — MORPHINE SULFATE 2 MILLIGRAM(S): 50 CAPSULE, EXTENDED RELEASE ORAL at 21:11

## 2022-11-06 RX ADMIN — Medication 975 MILLIGRAM(S): at 21:30

## 2022-11-06 RX ADMIN — Medication 975 MILLIGRAM(S): at 21:06

## 2022-11-06 NOTE — PROGRESS NOTE ADULT - ASSESSMENT
62y male s/p C2-T1 lami/fusion. Recovering appropriately. No plans to RTOR at this time, will cont. to monitor. CTPA 10/31 showed b/l PE    - Aspen collar at all times  - AC with Xarelto 20qD  - FU Blood Cx NGTD 11/6  - Knee aspiration: , crystals pending, GS -, Cx: NGTD (11/6)  - No plans for knee I and D at this time  - Drain removed  - Pain control

## 2022-11-06 NOTE — PROGRESS NOTE ADULT - SUBJECTIVE AND OBJECTIVE BOX
Manuel Porter MD  Interventional Cardiology / Advance Heart Failure and Cardiac Transplant Specialist  [place] Office : [address] [place] [address] [place]. [address]  Tel:   [place] Office : [address] [facility] [place] [address]  Tel: [phone]       Pt is lying in bed comfortable not in distress, no chest pains no SOB no palpitations  	  MEDICATIONS:  amLODIPine   Tablet 10 milliGRAM(s) Oral daily  rivaroxaban 20 milliGRAM(s) Oral with dinner        acetaminophen     Tablet .. 975 milliGRAM(s) Oral every 8 hours  cyclobenzaprine 5 milliGRAM(s) Oral three times a day PRN  melatonin 3 milliGRAM(s) Oral at bedtime  morphine  - Injectable 2 milliGRAM(s) IV Push every 3 hours PRN  morphine  IR 15 milliGRAM(s) Oral every 3 hours PRN  tapentadol 100 milliGRAM(s) Oral four times a day      predniSONE   Tablet 20 milliGRAM(s) Oral two times a day        PAST MEDICAL/SURGICAL HISTORY  PAST MEDICAL & SURGICAL HISTORY:  Asthma          SOCIAL HISTORY: Substance Use (street drugs): ( x ) never used  (  ) other:    FAMILY HISTORY:           PHYSICAL EXAM:  T(C): 36.8 (11-06-22 @ 08:52), Max: 36.8 (11-06-22 @ 08:52)  HR: 95 (11-06-22 @ 08:52) (86 - 98)  BP: 109/83 (11-06-22 @ 08:52) (109/83 - 126/52)  RR: 18 (11-06-22 @ 08:52) (18 - 18)  SpO2: 97% (11-06-22 @ 08:52) (96% - 98%)  Wt(kg): --  I&O's Summary    05 Nov 2022 08:01  -  06 Nov 2022 07:00  --------------------------------------------------------  IN: 1680 mL / OUT: 2650 mL / NET: -970 mL    06 Nov 2022 07:01  -  06 Nov 2022 12:33  --------------------------------------------------------  IN: 220 mL / OUT: 300 mL / NET: -80 mL          GENERAL: NAD  EYES:   PERRLA   ENMT:   Moist mucous membranes, Good dentition, No lesions  Cardiovascular: Normal S1 S2, No JVD, No murmurs, No edema  Respiratory: Lungs clear to auscultation	  Gastrointestinal:  Soft, Non-tender, + BS	  Extremities: no edema                      proBNP:   Lipid Profile:   HgA1c:   TSH:     Consultant(s) Notes Reviewed:  [x ] YES  [ ] NO    Care Discussed with Consultants/Other Providers [ x] YES  [ ] NO    Imaging Personally Reviewed independently:  [x] YES  [ ] NO    All labs, radiologic studies, vitals, orders and medications list reviewed. Patient is seen and examined at bedside. Case discussed with medical team.

## 2022-11-06 NOTE — PROGRESS NOTE ADULT - SUBJECTIVE AND OBJECTIVE BOX
[facility] Division of Hospital Medicine  Juan Downey MD  Pager (M-F, 8A-5P): [phone]  Other Times:  [phone]    Patient is a 62y old  Male who presents with a chief complaint of cord compression (06 Nov 2022 00:30)    SUBJECTIVE / OVERNIGHT EVENTS:  Left knee pain almost resolved. No F/C, N/V, CP, SOB, Cough, lightheadedness, dizziness, abdominal pain, diarrhea, dysuria.    MEDICATIONS  (STANDING):  acetaminophen     Tablet .. 975 milliGRAM(s) Oral every 8 hours  amLODIPine   Tablet 10 milliGRAM(s) Oral daily  melatonin 3 milliGRAM(s) Oral at bedtime  predniSONE   Tablet 20 milliGRAM(s) Oral two times a day  rivaroxaban 20 milliGRAM(s) Oral with dinner  tapentadol 100 milliGRAM(s) Oral four times a day    MEDICATIONS  (PRN):  cyclobenzaprine 5 milliGRAM(s) Oral three times a day PRN Muscle Spasm  morphine  - Injectable 2 milliGRAM(s) IV Push every 3 hours PRN Severe Breakthrough Pain (7 - 10)  morphine  IR 15 milliGRAM(s) Oral every 3 hours PRN Moderate - Severe Pain (4 - 6)      Vital Signs Last 24 Hrs  T(C): 36.8 (06 Nov 2022 08:52), Max: 36.8 (06 Nov 2022 08:52)  T(F): 98.2 (06 Nov 2022 08:52), Max: 98.2 (06 Nov 2022 08:52)  HR: 95 (06 Nov 2022 08:52) (86 - 98)  BP: 109/83 (06 Nov 2022 08:52) (109/83 - 127/83)  BP(mean): --  RR: 18 (06 Nov 2022 08:52) (18 - 18)  SpO2: 97% (06 Nov 2022 08:52) (96% - 99%)    Parameters below as of 06 Nov 2022 08:52  Patient On (Oxygen Delivery Method): room air      CAPILLARY BLOOD GLUCOSE        I&O's Summary    05 Nov 2022 08:01  -  06 Nov 2022 07:00  --------------------------------------------------------  IN: 1680 mL / OUT: 2650 mL / NET: -970 mL    06 Nov 2022 07:01  -  06 Nov 2022 12:00  --------------------------------------------------------  IN: 220 mL / OUT: 300 mL / NET: -80 mL        PHYSICAL EXAM:  CONSTITUTIONAL: NAD, well-developed, well-groomed  EYES: PERRLA; conjunctiva and sclera clear  ENMT: Moist oral mucosa, no pharyngeal injection or exudates; normal dentition  NECK: C-collar in place, no palpable masses  RESPIRATORY: Normal respiratory effort; lungs are clear to auscultation bilaterally  CARDIOVASCULAR: Regular rate and rhythm, normal S1 and S2, no murmur/rub/gallop; No lower extremity edema; Peripheral pulses are 2+ bilaterally  ABDOMEN: Nontender to palpation, normoactive bowel sounds, no rebound/guarding; No hepatosplenomegaly  MUSCULOSKELETAL:  Did not assess gait; no clubbing or cyanosis of digits; Lt knee joint ROM improved, tenderness improved.  PSYCH: A+O to person, place, and time; affect appropriate  NEUROLOGY: CN 2-12 are intact and symmetric; no gross sensory deficits   SKIN: No rashes; no palpable lesions, surgical dressing C/D/I    LABS:                    RADIOLOGY & ADDITIONAL TESTS:    Imaging Personally Reviewed:    Care Discussed with Consultants/Other Providers:    Care Discussed with Orthopedic PA about:

## 2022-11-06 NOTE — PROGRESS NOTE ADULT - SUBJECTIVE AND OBJECTIVE BOX
ORTHOPAEDICS DAILY PROGRESS NOTE:       SUBJECTIVE/ROS: POD 13. Seen and examined. Pain well controlled. Has been working with Pt. Cj CP/SOB/N/V.         MEDICATIONS  (STANDING):  acetaminophen     Tablet .. 975 milliGRAM(s) Oral every 8 hours  amLODIPine   Tablet 10 milliGRAM(s) Oral daily  melatonin 3 milliGRAM(s) Oral at bedtime  predniSONE   Tablet 20 milliGRAM(s) Oral two times a day  rivaroxaban 20 milliGRAM(s) Oral with dinner  tapentadol 100 milliGRAM(s) Oral four times a day    MEDICATIONS  (PRN):  cyclobenzaprine 5 milliGRAM(s) Oral three times a day PRN Muscle Spasm  morphine  - Injectable 2 milliGRAM(s) IV Push every 3 hours PRN Severe Breakthrough Pain (7 - 10)  morphine  IR 15 milliGRAM(s) Oral every 3 hours PRN Moderate - Severe Pain (4 - 6)      OBJECTIVE:    Vital Signs Last 24 Hrs  T(C): 36.7 (2022 00:03), Max: 36.9 (2022 09:26)  T(F): 98.1 (2022 00:03), Max: 98.4 (2022 09:26)  HR: 98 (2022 00:03) (79 - 98)  BP: 126/52 (2022 00:03) (112/68 - 127/83)  BP(mean): --  RR: 18 (2022 00:03) (18 - 18)  SpO2: 98% (2022 00:03) (96% - 100%)    Parameters below as of 2022 00:03  Patient On (Oxygen Delivery Method): room air        I&O's Detail    2022 07:01  -  2022 07:00  --------------------------------------------------------  IN:    Oral Fluid: 240 mL  Total IN: 240 mL    OUT:    Voided (mL): 1510 mL  Total OUT: 1510 mL    Total NET: -1270 mL      2022 08:01  -  2022 00:31  --------------------------------------------------------  IN:    Oral Fluid: 960 mL  Total IN: 960 mL    OUT:    Voided (mL): 1450 mL  Total OUT: 1450 mL    Total NET: -490 mL          Daily     Daily Weight in k.2 (2022 04:30)    LABS:                        10.4   6.07  )-----------( 285      ( 2022 05:57 )             31.6     11-04    132<L>  |  96<L>  |  16  ----------------------------<  111<H>  4.4   |  24  |  0.85    Ca    8.9      2022 05:57                    PHYSICAL EXAM:  Gen: NAD    Back: Dressing/ Incision Clean/Dry/Intact  Motor:                   C5                C6              C7               C8           T1   R            5/5                5/5            5/5             5/5          5/5  L             3/5               3/5             3/5             3/5          3/5                L2             L3             L4               L5            S1  R         5/5           5/5          5/5             5/5           5/5  L          5/5          5/5           5/5             5/5           5/5    Sensory:            C5         C6         C7      C8       T1        (0=absent, 1=impaired, 2=normal, NT=not testable)  R         2            2           2        2         2  L          2            2           2        2         2               L2          L3         L4      L5       S1         (0=absent, 1=impaired, 2=normal, NT=not testable)  R         2            2            2        2        2  L          2            2           2        2         2

## 2022-11-06 NOTE — PROGRESS NOTE ADULT - ASSESSMENT
62y Male presents with b/l UE radicular type symptoms with subjective numbness bilaterally, left wrist drop, inability to flex biceps, RLE radicular type symptoms to the knee, b/l LE muscle spasm/fasiculation as well as contracted quadriceps muscle on the left.     EKG SR LVH with re op changed unchanged from earlier this month       1) Post op assessment  - s/p spinal surgery tolerated the procedure well   -CT chest shows b/l PE on xarelto   - Echo shows mild LV segmental dysfunction, needs out pt ischemia eval   -s/p left knee aspiration    2) HTN  -improving  -c/w norvasc  -continue to monitor BP     3) DVT PPX  -on xarelto

## 2022-11-07 ENCOUNTER — TRANSCRIPTION ENCOUNTER (OUTPATIENT)
Age: 62
End: 2022-11-07

## 2022-11-07 DIAGNOSIS — I47.29 OTHER VENTRICULAR TACHYCARDIA: ICD-10-CM

## 2022-11-07 PROCEDURE — 99232 SBSQ HOSP IP/OBS MODERATE 35: CPT

## 2022-11-07 PROCEDURE — 99233 SBSQ HOSP IP/OBS HIGH 50: CPT

## 2022-11-07 RX ORDER — CYCLOBENZAPRINE HYDROCHLORIDE 10 MG/1
10 TABLET, FILM COATED ORAL THREE TIMES A DAY
Refills: 0 | Status: DISCONTINUED | OUTPATIENT
Start: 2022-11-07 | End: 2022-11-08

## 2022-11-07 RX ORDER — POLYETHYLENE GLYCOL 3350 17 G/17G
17 POWDER, FOR SOLUTION ORAL DAILY
Refills: 0 | Status: DISCONTINUED | OUTPATIENT
Start: 2022-11-07 | End: 2022-11-08

## 2022-11-07 RX ADMIN — Medication 3 MILLIGRAM(S): at 22:39

## 2022-11-07 RX ADMIN — Medication 975 MILLIGRAM(S): at 22:40

## 2022-11-07 RX ADMIN — TAPENTADOL HYDROCHLORIDE 100 MILLIGRAM(S): 50 TABLET, FILM COATED ORAL at 06:29

## 2022-11-07 RX ADMIN — Medication 975 MILLIGRAM(S): at 23:00

## 2022-11-07 RX ADMIN — Medication 975 MILLIGRAM(S): at 07:00

## 2022-11-07 RX ADMIN — AMLODIPINE BESYLATE 10 MILLIGRAM(S): 2.5 TABLET ORAL at 06:31

## 2022-11-07 RX ADMIN — Medication 975 MILLIGRAM(S): at 06:30

## 2022-11-07 RX ADMIN — Medication 20 MILLIGRAM(S): at 17:29

## 2022-11-07 RX ADMIN — TAPENTADOL HYDROCHLORIDE 100 MILLIGRAM(S): 50 TABLET, FILM COATED ORAL at 00:12

## 2022-11-07 RX ADMIN — TAPENTADOL HYDROCHLORIDE 100 MILLIGRAM(S): 50 TABLET, FILM COATED ORAL at 00:00

## 2022-11-07 RX ADMIN — POLYETHYLENE GLYCOL 3350 17 GRAM(S): 17 POWDER, FOR SOLUTION ORAL at 11:44

## 2022-11-07 RX ADMIN — TAPENTADOL HYDROCHLORIDE 100 MILLIGRAM(S): 50 TABLET, FILM COATED ORAL at 11:42

## 2022-11-07 RX ADMIN — MORPHINE SULFATE 15 MILLIGRAM(S): 50 CAPSULE, EXTENDED RELEASE ORAL at 20:01

## 2022-11-07 RX ADMIN — TAPENTADOL HYDROCHLORIDE 100 MILLIGRAM(S): 50 TABLET, FILM COATED ORAL at 17:28

## 2022-11-07 RX ADMIN — RIVAROXABAN 20 MILLIGRAM(S): KIT at 17:28

## 2022-11-07 RX ADMIN — Medication 975 MILLIGRAM(S): at 14:21

## 2022-11-07 RX ADMIN — TAPENTADOL HYDROCHLORIDE 100 MILLIGRAM(S): 50 TABLET, FILM COATED ORAL at 12:30

## 2022-11-07 RX ADMIN — Medication 20 MILLIGRAM(S): at 06:33

## 2022-11-07 NOTE — PROGRESS NOTE ADULT - SUBJECTIVE AND OBJECTIVE BOX
Manuel Porter MD  Interventional Cardiology / Endovascular Specialist  [place] Office : [phone] [place] [address]. [id number]  Tel:   [place] Office : [address] [facility] [place] [address]  Tel: [phone]      Subjective/Overnight events: Patient lying in bed comfortably. No acute distress. Denies chest pain, SOB or palpitations  	  MEDICATIONS:  amLODIPine   Tablet 10 milliGRAM(s) Oral daily  rivaroxaban 20 milliGRAM(s) Oral with dinner        acetaminophen     Tablet .. 975 milliGRAM(s) Oral every 8 hours  cyclobenzaprine 10 milliGRAM(s) Oral three times a day PRN  melatonin 3 milliGRAM(s) Oral at bedtime  morphine  IR 15 milliGRAM(s) Oral every 3 hours PRN  tapentadol 100 milliGRAM(s) Oral four times a day    polyethylene glycol 3350 17 Gram(s) Oral daily    predniSONE   Tablet 20 milliGRAM(s) Oral two times a day        PAST MEDICAL/SURGICAL HISTORY  PAST MEDICAL & SURGICAL HISTORY:  Asthma          SOCIAL HISTORY: Substance Use (street drugs): ( x ) never used  (  ) other:    FAMILY HISTORY:          PHYSICAL EXAM:  T(C): 36.4 (11-07-22 @ 12:42), Max: 36.8 (11-07-22 @ 06:00)  HR: 84 (11-07-22 @ 12:42) (77 - 88)  BP: 117/82 (11-07-22 @ 12:42) (116/90 - 133/82)  RR: 18 (11-07-22 @ 12:42) (17 - 18)  SpO2: 99% (11-07-22 @ 12:42) (95% - 100%)  Wt(kg): --  I&O's Summary    06 Nov 2022 07:01  -  07 Nov 2022 07:00  --------------------------------------------------------  IN: 1320 mL / OUT: 2900 mL / NET: -1580 mL    07 Nov 2022 07:01  -  07 Nov 2022 16:16  --------------------------------------------------------  IN: 240 mL / OUT: 300 mL / NET: -60 mL        GENERAL: NAD  EYES:   PERRLA   ENMT:   Moist mucous membranes, Good dentition, No lesions  Cardiovascular: Normal S1 S2, No JVD, No murmurs, No edema  Respiratory: Lungs clear to auscultation	  Gastrointestinal:  Soft, Non-tender, + BS	  Extremities: no edema                    proBNP:   Lipid Profile:   HgA1c:   TSH:     Consultant(s) Notes Reviewed:  [x ] YES  [ ] NO    Care Discussed with Consultants/Other Providers [ x] YES  [ ] NO    Imaging Personally Reviewed independently:  [x] YES  [ ] NO    All labs, radiologic studies, vitals, orders and medications list reviewed. Patient is seen and examined at bedside. Case discussed with medical team.

## 2022-11-07 NOTE — DISCHARGE NOTE PROVIDER - CARE PROVIDER_API CALL
Ami Lopez (DO)  Orthopaedic Surgery Surgery  [address] [facility], [address] [place] [address]  Phone: [phone]  Fax: [phone]  Follow Up Time:    Ami Lopez (DO)  Orthopaedic Surgery Surgery  [address] [facility], [address] [place] [address]  Phone: [phone]  Fax: [phone]  Established Patient  Follow Up Time:

## 2022-11-07 NOTE — DISCHARGE NOTE PROVIDER - NSDCCPCAREPLAN_GEN_ALL_CORE_FT
PRINCIPAL DISCHARGE DIAGNOSIS  Diagnosis: Spinal cord compression  Assessment and Plan of Treatment:        PRINCIPAL DISCHARGE DIAGNOSIS  Diagnosis: Spinal cord compression  Assessment and Plan of Treatment: Diet: Continue regular diet upon discharge.   Activity: No heavy lifting. Avoid straining or excessive activity. DO NOT sit for long periods of time.   -DO NOT bend, twist, or lift heavy objects for at least 3 months.   -DO NOT drive until cleared by your surgeon.   -To reduce strain/pressure on your spine place a pillow under your knees when lying on your back. Place a pillow between your knees when lying on your side.   -When you sit put your feet up on a foot rest. DO NOT lie on your stomach or with your legs flat.  -If you need to bend over, bend at your knees, not your back.  -We encourage you to take many short walks after your surgery to help blood move through your body and to prevent blood clots from  If you feel weak or dizzy, sit or lie down right away.   Neck brace: DO NOT stop wearing your neck brace until instructed by Dr. Oconnor.  Dressings: Keep dressing clean, dry, and intact. Remove all cotton and yellow gauze 72 hours after surgery. You do not need to put a new dressing on your wound unless there is light drainage. If that occurs place Band-Aids on your wound.   Other Care:  -You may shower 72 hours after surgery but DO NOT soak dressing and/or incision. The water may run over your dressing/incision but DO NOT let the water directly hit your dressing/incision (take a shower with your wound away from the direct stream of water).  NO hot tubes, NO bath tubs, NO swimming pools.

## 2022-11-07 NOTE — PROGRESS NOTE ADULT - ASSESSMENT
61 yo male few weeks weaknessLUE LLe> RLe weaknes PE LUE weakness bilateral LE. Patient s/p c2-T1 lami/fsuions. Pe exam stable to slightly improved from my exam on 1023 at [facility]      Impression cervical cord compression, decompressed with improved strength in Les. on AC for PE    s/p Lami/fusion  PT  Can follow up with Neurology, Dr. Kory Ibarra at [phone]

## 2022-11-07 NOTE — PROGRESS NOTE ADULT - SUBJECTIVE AND OBJECTIVE BOX
[facility] Division of Hospital Medicine  Juan Downey MD  Pager (DARYN-F, 8A-5P): [phone]  Other Times:  [phone]    Patient is a 62y old  Male who presents with a chief complaint of cord compression (07 Nov 2022 12:40)    SUBJECTIVE / OVERNIGHT EVENTS:  Patient offers no new complaints.  No F/C, N/V, CP, SOB, Cough, lightheadedness, dizziness, abdominal pain, diarrhea, dysuria. Noted 5 beats of VT - but asymptomatic.    MEDICATIONS  (STANDING):  acetaminophen     Tablet .. 975 milliGRAM(s) Oral every 8 hours  amLODIPine   Tablet 10 milliGRAM(s) Oral daily  melatonin 3 milliGRAM(s) Oral at bedtime  polyethylene glycol 3350 17 Gram(s) Oral daily  predniSONE   Tablet 20 milliGRAM(s) Oral two times a day  rivaroxaban 20 milliGRAM(s) Oral with dinner  tapentadol 100 milliGRAM(s) Oral four times a day    MEDICATIONS  (PRN):  cyclobenzaprine 10 milliGRAM(s) Oral three times a day PRN Muscle Spasm  morphine  IR 15 milliGRAM(s) Oral every 3 hours PRN Moderate - Severe Pain (4 - 6)      Vital Signs Last 24 Hrs  T(C): 36.4 (07 Nov 2022 12:42), Max: 36.8 (07 Nov 2022 06:00)  T(F): 97.5 (07 Nov 2022 12:42), Max: 98.3 (07 Nov 2022 06:00)  HR: 84 (07 Nov 2022 12:42) (77 - 88)  BP: 117/82 (07 Nov 2022 12:42) (116/90 - 133/82)  BP(mean): --  RR: 18 (07 Nov 2022 12:42) (17 - 18)  SpO2: 99% (07 Nov 2022 12:42) (95% - 100%)    Parameters below as of 07 Nov 2022 12:42  Patient On (Oxygen Delivery Method): room air      CAPILLARY BLOOD GLUCOSE        I&O's Summary    06 Nov 2022 07:01  -  07 Nov 2022 07:00  --------------------------------------------------------  IN: 1320 mL / OUT: 2900 mL / NET: -1580 mL    07 Nov 2022 07:01  -  07 Nov 2022 15:40  --------------------------------------------------------  IN: 240 mL / OUT: 300 mL / NET: -60 mL        PHYSICAL EXAM:  CONSTITUTIONAL: NAD, well-developed, well-groomed  EYES: PERRLA; conjunctiva and sclera clear  ENMT: Moist oral mucosa, no pharyngeal injection or exudates; normal dentition  NECK: Supple, no palpable masses; no thyromegaly  RESPIRATORY: Normal respiratory effort; lungs are clear to auscultation bilaterally  CARDIOVASCULAR: Regular rate and rhythm, normal S1 and S2, no murmur/rub/gallop; No lower extremity edema; Peripheral pulses are 2+ bilaterally  ABDOMEN: Nontender to palpation, normoactive bowel sounds, no rebound/guarding; No hepatosplenomegaly  MUSCULOSKELETAL:  Normal gait; no clubbing or cyanosis of digits; no joint swelling or tenderness to palpation  PSYCH: A+O to person, place, and time; affect appropriate  NEUROLOGY: CN 2-12 are intact and symmetric; no gross sensory deficits   SKIN: No rashes; no palpable lesions, surgical dressing C/D/I    LABS:                    RADIOLOGY & ADDITIONAL TESTS:    Imaging Personally Reviewed:    Care Discussed with Consultants/Other Providers:    Care Discussed with Orthopedic PA about:

## 2022-11-07 NOTE — PROGRESS NOTE ADULT - ASSESSMENT
62M Asthma, HTN, spinal stenosis w/ radiculopathy s/p C2-T1 lami/fusion on 10/25 - in SICU for post-op neuro checks c/b post-op leukocytosis, anemia, acute PE, inflammatory left knee pseudogout, NSVT

## 2022-11-07 NOTE — PROGRESS NOTE ADULT - SUBJECTIVE AND OBJECTIVE BOX
Patient is a 62y old  Male who presents with a chief complaint of cord compression (07 Nov 2022 10:30)      HPI:  62M with asthma and HTN who presents as a transfer from [facility] with c/o numbness, weakness and swelling of left arm for the past month and weakness of right leg, and b/l LE muscle spasms. Patient reports that hx of herniated disc, but his sx started worsening last month when he was gardening. no falls or trauma. Since then has been having progressively worsening weakness and muscle spasms. No bowel or bladder incontinence. Has been trying to take nucenta 100 mg daily and ibuprofen without relief. Decided to present to [facility] when the pain was uncontrolled. Imaging with concern for spinal canal stenosis and transferred to [facility] for further evaluation and management with orthopedic-spine team. In the ED patient was afebrile, hemodynamically stable satting well on RA. Given morphine and po oxy and admitted to medicine for further evaluation. (24 Oct 2022 07:37)    reports pain improving, tolerating bedside therapy.  reports no bm in several days, denies n/v    REVIEW OF SYSTEMS  L knee pain improving  constipation    PAST MEDICAL & SURGICAL HISTORY  Asthma        CURRENT FUNCTIONAL STATUS  11/6   Bed Mobility  Bed Mobility Training Rehab Potential: good, to achieve stated therapy goals  Bed Mobility Training Symptoms Noted During/After Treatment: dizziness  Bed Mobility Training Rolling/Turning: minimum assist (75% patient effort);  1 person assist  Bed Mobility Training Supine-to-Sit: minimum assist (75% patient effort);  1 person assist;  verbal cues  Bed Mobility Training Limitations: decreased strength;  decreased flexibility;  decreased ability to use legs for bridging/pushing;  decreased ability to use arms for pushing/pulling;  impaired ability to control trunk for mobility    Sit-Stand Transfer Training  Sit-to-Stand Transfer Training Rehab Potential: good, to achieve stated therapy goals  Sit-to-Stand Transfer Training Symptoms Noted During/After Treatment: none  Transfer Training Sit-to-Stand Transfer: rolling walker;  weight-bearing as tolerated   1 person assist;  verbal cues;  minimum assist (75% patient effort)  Transfer Training Stand-to-Sit Transfer: minimum assist (75% patient effort);  1 person assist;  verbal cues;  weight-bearing as tolerated   rolling walker  Sit-to-Stand Transfer Training Transfer Safety Analysis: decreased step length;  decreased weight-shifting ability;  decreased balance;  decreased strength;  decreased flexibility;  impaired coordination;  impaired balance;  rolling walker    Gait Training  Gait Training Rehab Potential: good, to achieve stated therapy goals  Gait Training Symptoms Noted During/After Treatment: none  Gait Training: rolling walker;  weight-bearing as tolerated   25 feet;  verbal cues;  1 person assist;  minimum assist (75% patient effort);  contact guard  Gait Analysis: 3-point gait   decreased velocity of limb motion;  crouch;  decreased nilda;  decreased hip/knee flexion;  increased time in double stance;  decreased stride length;  decreased weight-shifting ability;  decreased trunk rotation;  decreased strength;  decreased flexibility;  impaired coordination;  impaired balance;  impaired postural control;  25 feet;  rolling walker  Brace/Orthotics Brace/Orthotics: cervical collar  Gait Number of Times:: x 1    Therapeutic Exercise  Therapeutic Exercise Rehab Effort: good  Therapeutic Exercise Symptoms Noted During/After Treatment: none  Therapeutic Exercise Detail: long arc quads, marches, hip adducitons complete without complaints. Encouraged Home exercise program to maintain strength and prevent DVT while in the hospital pt with verbal confirmation of HEP performance outside of PT             VITALS  T(C): 36.3 (11-07-22 @ 09:35), Max: 36.8 (11-06-22 @ 13:07)  HR: 87 (11-07-22 @ 09:35) (77 - 92)  BP: 118/75 (11-07-22 @ 09:35) (115/79 - 133/82)  RR: 18 (11-07-22 @ 09:35) (17 - 18)  SpO2: 99% (11-07-22 @ 09:35) (95% - 100%)  Wt(kg): --    ALLERGIES  No Known Drug Allergies  peanuts (Unknown)      MEDICATIONS   acetaminophen     Tablet .. 975 milliGRAM(s) Oral every 8 hours  amLODIPine   Tablet 10 milliGRAM(s) Oral daily  cyclobenzaprine 10 milliGRAM(s) Oral three times a day PRN  melatonin 3 milliGRAM(s) Oral at bedtime  morphine  IR 15 milliGRAM(s) Oral every 3 hours PRN  predniSONE   Tablet 20 milliGRAM(s) Oral two times a day  rivaroxaban 20 milliGRAM(s) Oral with dinner  tapentadol 100 milliGRAM(s) Oral four times a day      ----------------------------------------------------------------------------------------  PHYSICAL EXAM  Constitutional - NAD, Comfortable   Chest - no respiratory distress  Cardiovascular - RRR, S1S2  Abdomen - BS+, Soft, NTND  Extremities - No C/C/E, No calf tenderness   Neurologic Exam -                    Cognitive - Awake, Alert, Oriented to self, place, date, year, situation     Communication - Fluent, No dysarthria     Cranial Nerves - EOMI, tongue midline, no facial asymmetry     Motor -                     LEFT    UE - ShAB 3/5, EF 4/5, EE  3/5, WE 4-/5,  4/5                    RIGHT UE - ShAB 5/5, EF 5/5, EE 5/5, WE 5/5,  5/5                    LEFT    LE - HF 4/5, KE 4/5 DF 4/5, PF 4/5                    RIGHT LE - HF 5/5, KE 5/5, DF 5/5, PF 5/5        Sensory -numbness L hand     ----------------------------------------------------------------------------------------   ASSESSMENT/PLAN  62M with asthma and HTN who presents as a transfer from [facility] with c/o numbness, weakness and swelling of left arm for the past month and weakness of left leg, and b/l LE muscle spasms with progressively worsening weakness and muscle spasms 2/2 cervical spinal stenosis/myelopathy. s/p C2-T1 PLSF, C3-C6 lami  with functional, gait, ADL impairments.  course complicated by b/l PE- on rivaroxaban  Disposition - recommend acute inpatient rehab when medically cleared. patient can tolerate 3 hours per day of therapy with medical supervision.  pain: on nucynta, flexeril, tylenol    please treat for constipation  PT - ROM, Bed mobility, Transfers, Ambulation with assistive device  OT - ADLs, ROM   Precautions - Falls, Cardiac   Skin - Turn Q2hrs  Diet - Reg

## 2022-11-07 NOTE — DISCHARGE NOTE PROVIDER - HOSPITAL COURSE
Patient presented to [facility] ED on 10/23 for cord compression w/ neurological symptoms. Patient was admitted to the orthopedic service and underwent a C2-T1 laminectomy/fusion with Dr. Lopez on 10/24. Patient tolerated procedure without complication and recovered in the surgical ICU until 10/28 when he was downgraded to a floor telemetry unit with cardiology comanagement. Patient began to develop postoperative fevers and on 10/31 imaging was obtained that showed bilateral pulmonary embolisms for which therapeutic Xarelto was started. When patient's fevers persisted and he was unable to range his L knee, an aspiration was obtained on 11/3 which was found to be negative along with all other fever workup. Patient cleared by medicine and cardiology for discharge to acute rehab when bed available. 63yo male with PMH of asthma and HTN who was transferred from [facility]. Patient presented to [facility] ED on 10/23 and Orthopedics was consulted for cord compression w/ neurological symptoms. Patient was admitted to the orthopedic service and underwent a C2-T1 laminectomy/fusion with C3-6 laminectomies with Dr. Lopez on 10/24. Patient tolerated procedure without complication and recovered in the surgical ICU until 10/28 when he was downgraded to the floor with medicine and cardiology comanagement. Patient began to develop postoperative fevers and on 10/31 imaging was obtained that showed bilateral pulmonary embolisms for which the patient was transferred to telemetry and started on therapeutic Xarelto. When patient's fevers persisted and he was unable to range his L knee, an aspiration was obtained on 11/3 which was found to be negative along with all other fever workup. Throughout hospital stay patient worked with Physical Therapy. Patient cleared by medicine and cardiology for discharge to acute rehab.

## 2022-11-07 NOTE — DISCHARGE NOTE PROVIDER - NSDCMRMEDTOKEN_GEN_ALL_CORE_FT
Advair Diskus:  inhaled   amLODIPine 10 mg oral tablet: 1 tab(s) orally once a day   acetaminophen 325 mg oral tablet: 3 tab(s) orally every 8 hours  Advair Diskus:  inhaled   amLODIPine 10 mg oral tablet: 1 tab(s) orally once a day  bisacodyl 10 mg rectal suppository: 1 suppository(ies) rectal once a day, As needed, Constipation  cyclobenzaprine 10 mg oral tablet: 1 tab(s) orally 3 times a day, As needed, Muscle Spasm  melatonin 3 mg oral tablet: 1 tab(s) orally once a day (at bedtime)  polyethylene glycol 3350 oral powder for reconstitution: 17 gram(s) orally once a day  rivaroxaban 20 mg oral tablet: 1 tab(s) orally once a day (before a meal)  senna leaf extract oral tablet: 2 tab(s) orally once a day (at bedtime)  tapentadol 100 mg oral tablet: 1 tab(s) orally 4 times a day

## 2022-11-07 NOTE — PROGRESS NOTE ADULT - SUBJECTIVE AND OBJECTIVE BOX
Patient seen and examined this am.  s/p C2-T1 lami/fusion       MEDICATIONS:    acetaminophen     Tablet .. 975 milliGRAM(s) Oral every 8 hours  amLODIPine   Tablet 10 milliGRAM(s) Oral daily  cyclobenzaprine 10 milliGRAM(s) Oral three times a day PRN  melatonin 3 milliGRAM(s) Oral at bedtime  morphine  IR 15 milliGRAM(s) Oral every 3 hours PRN  polyethylene glycol 3350 17 Gram(s) Oral daily  predniSONE   Tablet 20 milliGRAM(s) Oral two times a day  rivaroxaban 20 milliGRAM(s) Oral with dinner  tapentadol 100 milliGRAM(s) Oral four times a day      LABS:            CAPILLARY BLOOD GLUCOSE            I&O's Summary    06 Nov 2022 07:01  -  07 Nov 2022 07:00  --------------------------------------------------------  IN: 1320 mL / OUT: 2900 mL / NET: -1580 mL    07 Nov 2022 07:01  -  07 Nov 2022 12:40  --------------------------------------------------------  IN: 240 mL / OUT: 300 mL / NET: -60 mL      Vital Signs Last 24 Hrs  T(C): 36.3 (07 Nov 2022 09:35), Max: 36.8 (06 Nov 2022 13:07)  T(F): 97.4 (07 Nov 2022 09:35), Max: 98.3 (06 Nov 2022 13:07)  HR: 87 (07 Nov 2022 09:35) (77 - 92)  BP: 118/75 (07 Nov 2022 09:35) (115/79 - 133/82)  BP(mean): --  RR: 18 (07 Nov 2022 09:35) (17 - 18)  SpO2: 99% (07 Nov 2022 09:35) (95% - 100%)    Parameters below as of 07 Nov 2022 09:35  Patient On (Oxygen Delivery Method): room air        On Neurological Examination:    Mental Status - iAAO x3 follows complex commands fluent    Cranial Nerves - PERRL, EOMI, VFF, V1-V3 intact, no gross facial asymmetry, tongue/uvula midline    Motor Exam -   Right upper 5/5 5/5  Left upper deltoid 4/5, biceps 3/5 rest 3/5  lowers 4+ to 5/5      Sensory    Intact to light touch and pinprick bilaterally    Coord: FTN intact bilaterally     Gait -  deferred                                                   RADIOLOGY  < from: CT Lumbar Spine No Cont (10.23.22 @ 02:29) >  Impression:  1. Multilevel degenerative disc disease, results in severe central   stenosis at L3-4 and L4-5.  2. No acute fracture.    --- End of Report ---      < end of copied text >  < from: CT Head No Cont (10.23.22 @ 02:30) >  IMPRESSION:  Mildly motion degraded.  No CT evidence of acute intracranial pathology.    Follow-up MRI may be obtained for more sensitive evaluation.    --- End of Report ---    < end of copied text >    < from: MR Thoracic Spine w/wo IV Cont (10.23.22 @ 14:15) >  IMPRESSION:  CERVICAL SPINE MRI:  1.  Motion degraded.  Postcontrast images were not obtained.  2.  There is diffuse swelling of the cervical cord with long segment   central cord edema extending from C2 to T1, which has broad differential   diagnosis including transverse myelitis, other infectious and   inflammatory causes of myelitis, and trauma with subacute progressive   ascending myelopathy (SPAM).  Central cord infarct could produce similar   findings but is thought to be less likely as this is an atypical location   for spinal cord infarct.  While there is underlying moderate spinal canal   stenosis with apparent mild cord compression at C3-C4 and C4-C5,  minimal   cord compression at C5-C6, and mild ventral cord impingement at C6-C7,   the diffuse changes in the cervical cord are not compatible with primary   compressive myelopathy.  The appearanceof cord compression may be due in   part to the underlying swelling of the cervical cord.  3.  Follow-up contrast-enhanced MRI of the cervical spine is recommended.      THORACIC SPINE MRI:  1.  Motion degraded.  Postcontrast images were not obtained.  2.  Small disc herniations throughout the thoracic spine with slight   contact of the ventral cord at T7-T8.  No thoracic cord compression, cord   edema or other focal cord lesion.    LUMBAR SPINE MRI:  1.  Incomplete exam.  Only motion degraded sagittal T1 and sagittal T2   images are available.  2.  There appears to be at least diffuse moderate spinal canal stenosis   at L1-L2 through L4-L5, which is suboptimally evaluated due to motion   artifact.  Evaluation for cauda equina compression is suboptimal.  No   high-grade neural foraminal narrowing is visualized.      I discussed the major findings in this report with Dr. Bolaños from   orthopedic surgery on 10/24/2022 at 3:30 AM.  Read back verification was   obtained.    --- End of Report ---    < end of copied text >

## 2022-11-07 NOTE — DISCHARGE NOTE PROVIDER - NSDCCPTREATMENT_GEN_ALL_CORE_FT
PRINCIPAL PROCEDURE  Procedure: Fusion of cervical spine at 6 levels by posterior approach  Findings and Treatment: C2-T1       PRINCIPAL PROCEDURE  Procedure: Fusion of cervical spine at 6 levels by posterior approach  Findings and Treatment: C2-T1  Follow up: Please follow up at your prescheduled post-operative follow up appointment with Dr. Lopez for 2 weeks after hospital discharge. Please call with any questions or concerns including fevers/chills, worsening pain, pus from the wounds, redness of the skin, new or worsening trouble when you swallow, worsening hoarsness or trouble speaking, difficulty breathing or heaviness in the chest atFollow up: Please follow up at your prescheduled post-operative follow up appointment with Dr. Lopez for 2 weeks after hospital discharge. Please call with any questions or concerns including fevers/chills, worsening pain, pus from the wounds, redness of the skin, new or worsening trouble when you swallow, worsening hoarsness or trouble speaking, difficulty breathing or heaviness in the chest at [phone].   Please seek immediate care or call 911 if:   -Your bandage begins to soak with blood  -Your surgical wound breaks open  -You cannot control your bladder or bowel movements  -You have painful swelling in your neck AND trouble swallowing  -You have new or worsening trouble moving your neck, arms, or legs  Please also follow up with your PCP and cardiologist within 1 week for continuation of care. .   Please seek immediate care or call 911 if:   -Your bandage begins to soak with blood  -Your surgical wound breaks open  -You cannot control your bladder or bowel movements  -You have painful swelling in your neck AND trouble swallowing  -You have new or worsening trouble moving your neck, arms, or legs  Please also follow up with your PCP and cardiologist within 1 week for continuation of care.

## 2022-11-07 NOTE — PROGRESS NOTE ADULT - SUBJECTIVE AND OBJECTIVE BOX
ORTHO PROGRESS NOTE     No acute overnight events. Pt resting comfortably without complaint. Pain controlled       Vital Signs Last 24 Hrs  T(C): 36.8 (07 Nov 2022 06:00), Max: 36.8 (06 Nov 2022 08:52)  T(F): 98.3 (07 Nov 2022 06:00), Max: 98.3 (06 Nov 2022 13:07)  HR: 82 (07 Nov 2022 06:00) (77 - 95)  BP: 133/82 (07 Nov 2022 06:00) (109/83 - 133/82)  BP(mean): --  RR: 18 (07 Nov 2022 06:00) (17 - 18)  SpO2: 100% (07 Nov 2022 06:00) (95% - 100%)    Parameters below as of 07 Nov 2022 06:00  Patient On (Oxygen Delivery Method): room air        Back: Dressing/ Incision Clean/Dry/Intact,     Motor:                   C5                C6              C7               C8           T1   R            5/5                5/5            5/5             5/5          5/5  L             3/5               3/5             3/5             3/5          3/5                L2             L3             L4               L5            S1  R         5/5           5/5          5/5             5/5           5/5  L          5/5          5/5           5/5             5/5           5/5    Sensory:            C5         C6         C7      C8       T1        (0=absent, 1=impaired, 2=normal, NT=not testable)  R         2            2           2        2         2  L          2            2           2        2         2               L2          L3         L4      L5       S1         (0=absent, 1=impaired, 2=normal, NT=not testable)  R         2            2            2        2        2  L          2            2           2        2         2      62y male s/p C2-T1 lami/fusion. Recovering appropriately. No plans to RTOR at this time, will cont. to monitor. CTPA 10/31 showed b/l PE    - Aspen collar at all times  - AC with Xarelto 20qD  - FU Blood Cx NGTD 11/6  - Knee aspiration: , crystals pending, GS -, Cx: NGTD (11/6)  - No plans for knee I and D at this time  - Drain removed  - Pain control

## 2022-11-08 ENCOUNTER — INPATIENT (INPATIENT)
Facility: HOSPITAL | Age: 62
LOS: 14 days | Discharge: HOME HEALTH | DRG: 949 | End: 2022-11-23
Attending: PHYSICAL MEDICINE & REHABILITATION | Admitting: PHYSICAL MEDICINE & REHABILITATION
Payer: COMMERCIAL

## 2022-11-08 ENCOUNTER — TRANSCRIPTION ENCOUNTER (OUTPATIENT)
Age: 62
End: 2022-11-08

## 2022-11-08 VITALS
RESPIRATION RATE: 16 BRPM | SYSTOLIC BLOOD PRESSURE: 156 MMHG | OXYGEN SATURATION: 97 % | HEART RATE: 90 BPM | DIASTOLIC BLOOD PRESSURE: 87 MMHG | TEMPERATURE: 98 F | WEIGHT: 166.89 LBS | HEIGHT: 70 IN

## 2022-11-08 VITALS
DIASTOLIC BLOOD PRESSURE: 55 MMHG | RESPIRATION RATE: 18 BRPM | TEMPERATURE: 98 F | SYSTOLIC BLOOD PRESSURE: 136 MMHG | OXYGEN SATURATION: 100 % | HEART RATE: 90 BPM

## 2022-11-08 DIAGNOSIS — Z98.1 ARTHRODESIS STATUS: ICD-10-CM

## 2022-11-08 DIAGNOSIS — I26.99 OTHER PULMONARY EMBOLISM WITHOUT ACUTE COR PULMONALE: ICD-10-CM

## 2022-11-08 LAB
ANION GAP SERPL CALC-SCNC: 13 MMOL/L — SIGNIFICANT CHANGE UP (ref 7–14)
CALCIUM SERPL-MCNC: 10 MG/DL — SIGNIFICANT CHANGE UP (ref 8.4–10.5)
CHLORIDE SERPL-SCNC: 96 MMOL/L — LOW (ref 98–107)
CO2 SERPL-SCNC: 27 MMOL/L — SIGNIFICANT CHANGE UP (ref 22–31)
CREAT SERPL-MCNC: 0.81 MG/DL — SIGNIFICANT CHANGE UP (ref 0.5–1.3)
GLUCOSE SERPL-MCNC: 136 MG/DL — HIGH (ref 70–99)
HCT VFR BLD CALC: 34.3 % — LOW (ref 39–50)
HGB BLD-MCNC: 11.4 G/DL — LOW (ref 13–17)
MCHC RBC-ENTMCNC: 29 PG — SIGNIFICANT CHANGE UP (ref 27–34)
MCHC RBC-ENTMCNC: 33.2 GM/DL — SIGNIFICANT CHANGE UP (ref 32–36)
MCV RBC AUTO: 87.3 FL — SIGNIFICANT CHANGE UP (ref 80–100)
NRBC # BLD: 0 /100 WBCS — SIGNIFICANT CHANGE UP (ref 0–0)
NRBC # FLD: 0 K/UL — SIGNIFICANT CHANGE UP (ref 0–0)
PLATELET # BLD AUTO: 540 K/UL — HIGH (ref 150–400)
RBC # BLD: 3.93 M/UL — LOW (ref 4.2–5.8)
RBC # FLD: 13.7 % — SIGNIFICANT CHANGE UP (ref 10.3–14.5)
SODIUM SERPL-SCNC: 136 MMOL/L — SIGNIFICANT CHANGE UP (ref 135–145)
WBC # BLD: 9.23 K/UL — SIGNIFICANT CHANGE UP (ref 3.8–10.5)
WBC # FLD AUTO: 9.23 K/UL — SIGNIFICANT CHANGE UP (ref 3.8–10.5)

## 2022-11-08 PROCEDURE — 99233 SBSQ HOSP IP/OBS HIGH 50: CPT

## 2022-11-08 PROCEDURE — 99232 SBSQ HOSP IP/OBS MODERATE 35: CPT

## 2022-11-08 RX ORDER — LANOLIN ALCOHOL/MO/W.PET/CERES
1 CREAM (GRAM) TOPICAL
Qty: 0 | Refills: 0 | DISCHARGE
Start: 2022-11-08

## 2022-11-08 RX ORDER — POLYETHYLENE GLYCOL 3350 17 G/17G
17 POWDER, FOR SOLUTION ORAL
Qty: 0 | Refills: 0 | DISCHARGE
Start: 2022-11-08

## 2022-11-08 RX ORDER — SENNA PLUS 8.6 MG/1
2 TABLET ORAL
Qty: 0 | Refills: 0 | DISCHARGE
Start: 2022-11-08

## 2022-11-08 RX ORDER — SENNA PLUS 8.6 MG/1
2 TABLET ORAL AT BEDTIME
Refills: 0 | Status: DISCONTINUED | OUTPATIENT
Start: 2022-11-08 | End: 2022-11-08

## 2022-11-08 RX ORDER — POLYETHYLENE GLYCOL 3350 17 G/17G
17 POWDER, FOR SOLUTION ORAL DAILY
Refills: 0 | Status: DISCONTINUED | OUTPATIENT
Start: 2022-11-08 | End: 2022-11-23

## 2022-11-08 RX ORDER — RIVAROXABAN 15 MG-20MG
1 KIT ORAL
Qty: 0 | Refills: 0 | DISCHARGE
Start: 2022-11-08

## 2022-11-08 RX ORDER — ACETAMINOPHEN 500 MG
3 TABLET ORAL
Qty: 0 | Refills: 0 | DISCHARGE
Start: 2022-11-08

## 2022-11-08 RX ORDER — TAPENTADOL HYDROCHLORIDE 50 MG/1
1 TABLET, FILM COATED ORAL
Qty: 0 | Refills: 0 | DISCHARGE
Start: 2022-11-08

## 2022-11-08 RX ORDER — ACETAMINOPHEN 500 MG
975 TABLET ORAL EVERY 8 HOURS
Refills: 0 | Status: DISCONTINUED | OUTPATIENT
Start: 2022-11-08 | End: 2022-11-23

## 2022-11-08 RX ORDER — CYCLOBENZAPRINE HYDROCHLORIDE 10 MG/1
1 TABLET, FILM COATED ORAL
Qty: 0 | Refills: 0 | DISCHARGE
Start: 2022-11-08

## 2022-11-08 RX ORDER — RIVAROXABAN 15 MG-20MG
20 KIT ORAL DAILY
Refills: 0 | Status: DISCONTINUED | OUTPATIENT
Start: 2022-11-09 | End: 2022-11-23

## 2022-11-08 RX ORDER — ALBUTEROL 90 UG/1
2 AEROSOL, METERED ORAL EVERY 6 HOURS
Refills: 0 | Status: DISCONTINUED | OUTPATIENT
Start: 2022-11-08 | End: 2022-11-23

## 2022-11-08 RX ORDER — LANOLIN ALCOHOL/MO/W.PET/CERES
3 CREAM (GRAM) TOPICAL AT BEDTIME
Refills: 0 | Status: DISCONTINUED | OUTPATIENT
Start: 2022-11-08 | End: 2022-11-15

## 2022-11-08 RX ORDER — OXYCODONE HYDROCHLORIDE 5 MG/1
10 TABLET ORAL EVERY 12 HOURS
Refills: 0 | Status: DISCONTINUED | OUTPATIENT
Start: 2022-11-08 | End: 2022-11-09

## 2022-11-08 RX ORDER — MORPHINE SULFATE 50 MG/1
15 CAPSULE, EXTENDED RELEASE ORAL
Refills: 0 | Status: DISCONTINUED | OUTPATIENT
Start: 2022-11-08 | End: 2022-11-10

## 2022-11-08 RX ORDER — SENNA PLUS 8.6 MG/1
2 TABLET ORAL AT BEDTIME
Refills: 0 | Status: DISCONTINUED | OUTPATIENT
Start: 2022-11-08 | End: 2022-11-23

## 2022-11-08 RX ORDER — CYCLOBENZAPRINE HYDROCHLORIDE 10 MG/1
10 TABLET, FILM COATED ORAL THREE TIMES A DAY
Refills: 0 | Status: DISCONTINUED | OUTPATIENT
Start: 2022-11-08 | End: 2022-11-10

## 2022-11-08 RX ORDER — LACTULOSE 10 G/15ML
10 SOLUTION ORAL ONCE
Refills: 0 | Status: COMPLETED | OUTPATIENT
Start: 2022-11-08 | End: 2022-11-08

## 2022-11-08 RX ADMIN — MORPHINE SULFATE 15 MILLIGRAM(S): 50 CAPSULE, EXTENDED RELEASE ORAL at 00:20

## 2022-11-08 RX ADMIN — TAPENTADOL HYDROCHLORIDE 100 MILLIGRAM(S): 50 TABLET, FILM COATED ORAL at 18:00

## 2022-11-08 RX ADMIN — AMLODIPINE BESYLATE 10 MILLIGRAM(S): 2.5 TABLET ORAL at 06:00

## 2022-11-08 RX ADMIN — Medication 3 MILLIGRAM(S): at 21:30

## 2022-11-08 RX ADMIN — Medication 975 MILLIGRAM(S): at 06:40

## 2022-11-08 RX ADMIN — Medication 975 MILLIGRAM(S): at 14:00

## 2022-11-08 RX ADMIN — Medication 20 MILLIGRAM(S): at 17:19

## 2022-11-08 RX ADMIN — RIVAROXABAN 20 MILLIGRAM(S): KIT at 17:19

## 2022-11-08 RX ADMIN — OXYCODONE HYDROCHLORIDE 10 MILLIGRAM(S): 5 TABLET ORAL at 23:32

## 2022-11-08 RX ADMIN — Medication 20 MILLIGRAM(S): at 06:00

## 2022-11-08 RX ADMIN — MORPHINE SULFATE 15 MILLIGRAM(S): 50 CAPSULE, EXTENDED RELEASE ORAL at 17:00

## 2022-11-08 RX ADMIN — Medication 975 MILLIGRAM(S): at 23:32

## 2022-11-08 RX ADMIN — TAPENTADOL HYDROCHLORIDE 100 MILLIGRAM(S): 50 TABLET, FILM COATED ORAL at 00:15

## 2022-11-08 RX ADMIN — SENNA PLUS 2 TABLET(S): 8.6 TABLET ORAL at 21:21

## 2022-11-08 RX ADMIN — TAPENTADOL HYDROCHLORIDE 100 MILLIGRAM(S): 50 TABLET, FILM COATED ORAL at 17:20

## 2022-11-08 RX ADMIN — OXYCODONE HYDROCHLORIDE 10 MILLIGRAM(S): 5 TABLET ORAL at 21:30

## 2022-11-08 RX ADMIN — Medication 975 MILLIGRAM(S): at 21:32

## 2022-11-08 RX ADMIN — TAPENTADOL HYDROCHLORIDE 100 MILLIGRAM(S): 50 TABLET, FILM COATED ORAL at 06:30

## 2022-11-08 RX ADMIN — TAPENTADOL HYDROCHLORIDE 100 MILLIGRAM(S): 50 TABLET, FILM COATED ORAL at 05:59

## 2022-11-08 RX ADMIN — Medication 975 MILLIGRAM(S): at 06:00

## 2022-11-08 RX ADMIN — MORPHINE SULFATE 15 MILLIGRAM(S): 50 CAPSULE, EXTENDED RELEASE ORAL at 16:03

## 2022-11-08 RX ADMIN — LACTULOSE 10 GRAM(S): 10 SOLUTION ORAL at 21:46

## 2022-11-08 NOTE — PROGRESS NOTE ADULT - PROVIDER SPECIALTY LIST ADULT
Orthopedics
Orthopedics
Rehab Medicine
Cardiology
Neurology
Orthopedics
SICU
SICU
Cardiology
Orthopedics
SICU
Cardiology
Neurology
SICU
Hospitalist

## 2022-11-08 NOTE — H&P ADULT - NSHPPHYSICALEXAM_GEN_ALL_CORE
PHYSICAL EXAM  VITALS  T(C): 36.8 (11-08-22 @ 19:44), Max: 36.8 (11-08-22 @ 19:44)  HR: 90 (11-08-22 @ 19:44) (85 - 93)  BP: 156/87 (11-08-22 @ 19:44) (115/91 - 156/87)  RR: 16 (11-08-22 @ 19:44) (16 - 18)  SpO2: 97% (11-08-22 @ 19:44) (97% - 100%)    Gen - NAD, Comfortable  HEENT - NCAT, EOMI, MMM  Neck - Supple, No limited ROM  Pulm - CTAB, No wheeze, No rhonchi, No crackles  Cardiovascular - RRR, S1S2, No murmurs  Chest - good chest expansion, good respiratory effort  Abdomen - Soft, NT/ND, +BS  Extremities - No Cyanosis, no clubbing, no edema, no calf tenderness  Neuro-     Cognitive - awake, alert, oriented to person, place, date, year, and situation.  Able  to follow command     Communication - Fluent     Attention: Intact, able to state days of week chronologically and backwards. Able to perform simple additions and subtractions     Judgement: Good evidence of judgement     Memory: Intact     Cranial Nerves - CN 2-12 intact     Motor -                     LEFT    UE - ShAB 4/5, EF 4/5, EE 3/5,  4/5                    RIGHT UE - ShAB 5/5, EF 5/5, EE 5/5,   5/5                    LEFT    LE - HF 4/5, KE 4/5, DF 4/5, PF 4/5                    RIGHT LE - HF 5/5, KE 5/5, DF 5/5, PF 5/5        Sensory - numbness to finger tips L hand > right hand, numbness to toes      Reflexes - DTR Intact     Coordination - FTN impaired   MSK:  left knee tenderness  Psychiatric - Mood stable, Affect WNL  Skin:   posterior cervical spine incision with sutures +gauze & tegaderm PHYSICAL EXAM  VITALS  T(C): 36.8 (11-08-22 @ 19:44), Max: 36.8 (11-08-22 @ 19:44)  HR: 90 (11-08-22 @ 19:44) (85 - 93)  BP: 156/87 (11-08-22 @ 19:44) (115/91 - 156/87)  RR: 16 (11-08-22 @ 19:44) (16 - 18)  SpO2: 97% (11-08-22 @ 19:44) (97% - 100%)    Gen - NAD, Comfortable. Seen with brother at bedside O x 3. NAD  HEENT - NCAT, EOMI, MMM  +incision C/D/I +running sutures, no erythema, no TTP, non induration or fluctuance    Pulm - CTAB, No wheeze, No rhonchi, No crackles  Cardiovascular - RRR, S1S2, No murmurs  Chest - good chest expansion, good respiratory effort  Abdomen - Soft, NT/ND, +BS  Extremities - No Cyanosis, no clubbing, no edema, no calf tenderness  Neuro-     Cognitive - awake, alert, oriented to person, place, date, year, and situation.  Able  to follow command     Communication - Fluent     Attention: Intact, able to state days of week chronologically and backwards. Able to perform simple additions and subtractions     Judgement: Good evidence of judgement     Memory: Intact     Cranial Nerves - CN 2-12 intact     Motor -                     LEFT    UE - ShAB 4/5, EF 4/5, EE 3/5,  4/5 intrinsic 3/5 +atrophy intrinsic muscles hand                    RIGHT UE - ShAB 5/5, EF 5/5, EE 5/5,   5/5                    LEFT    LE - HF 4/5, KE 4/5, DF 4/5, PF 4/5                    RIGHT LE - HF 5/5, KE 5/5, DF 5/5, PF 5/5        Sensory - numbness to finger tips L hand > right hand, numbness to toes      Reflexes - DTR Intact     Coordination - FTN impaired   MSK:  left knee tenderness no warmth  Psychiatric - Mood stable, Affect WNL  Skin:   posterior cervical spine incision with sutures +gauze & tegaderm

## 2022-11-08 NOTE — H&P ADULT - NSHPREVIEWOFSYSTEMS_GEN_ALL_CORE
REVIEW OF SYSTEMS  Constitutional: No fever, No Chills, No fatigue  HEENT: No eye pain, No visual disturbances, No difficulty hearing  Pulm: No cough,  No shortness of breath  Cardio: No chest pain, No palpitations  GI:  No abdominal pain, No nausea, No vomiting, No diarrhea, + constipation- NO BM in 6 days  : No dysuria, No frequency, No hematuria  Neuro: No headaches, No memory loss, + loss of strength, + numbness, No tremors  Skin: No itching, No rashes, No lesions   Endo: No temperature intolerance  MSK: + joint pain- left knee/ right hip, No joint swelling, No muscle pain, + Neck pain,  No back pain  Psych:  No depression, No anxiety REVIEW OF SYSTEMS  Constitutional: No fever, No Chills, No fatigue RH dominant +weight loss,   HEENT: No eye pain, No visual disturbances, No difficulty hearing  Pulm: No cough,  No shortness of breath  Cardio: No chest pain, No palpitations  GI:  No abdominal pain, No nausea, No vomiting, No diarrhea, + constipation- NO BM in 6 days  : No dysuria, No frequency, No hematuria  Neuro: No headaches, No memory loss, + loss of strength, + numbness, No tremors  Skin: No itching, No rashes, No lesions   Endo: No temperature intolerance  MSK: + joint pain- left knee/ right hip, +spasm that can start around leftknee , right hip, from LUE to neck  Psych:  No depression, No anxiety

## 2022-11-08 NOTE — PROGRESS NOTE ADULT - PROBLEM SELECTOR PLAN 2
s/p C2-T1 lami/Fusion on 10/25  - Pain control with morphine IR PRN, zanaflex, tapentadol  - Hep SC for VTE ppx  - Wound care as per ortho  - PT/OT eval for safe disposition.
s/p C2-T1 lami/Fusion on 10/25  - Pain control with morphine IR PRN, zanaflex, tapentadol  - Hep SC for VTE ppx  - Wound care as per ortho  - PT/OT eval for safe disposition.
TTE reviewed - No evidence of RV strain  Clinically stable at this time - not hypoxic.  Xarelto initiated - monitor for bleeding, monitor Hgb.  - fever likely related to PE - infection work up negative so far.
s/p C2-T1 lami/Fusion on 10/25  - Pain control with morphine IR PRN, zanaflex, tapentadol  - Hep SC for VTE ppx  - Wound care as per ortho  - PT/OT eval for safe disposition.
TTE reviewed - No evidence of RV strain  Clinically stable at this time - not hypoxic.  Xarelto initiated - monitor for bleeding, monitor Hgb.  - fever likely related to PE - infection work up negative so far.

## 2022-11-08 NOTE — H&P ADULT - ASSESSMENT
ASSESSMENT/PLAN  This is a 61 YO male with PMH of asthma and HTN who was transferred from [facility]. Patient presented to [facility] ED on 10/23 and Orthopedics was consulted for cord compression with neurological symptoms. Patient  underwent a C2-T1 laminectomy/fusion with C3-6 laminectomies with Dr. Lopez on 10/24. post -op c/b fevers found to have PE.   Patient now with gait Instability, ADL impairments and Functional impairments.    # Spinal cord compression  -  C2-T1 laminectomy/fusion with C3-6 laminectomies with Dr. Lopez on 10/24  - Start Comprehensive Rehab Program: PT/OT, 3hours daily and 5 days weekly  - PT: Focused on improving strength, endurance, coordination, balance, functional mobility, and transfers  - OT: Focused on improving strength, fine motor skills, coordination, posture and ADLs.   - Cervical collar when OOB    #HTN  - Off amldodipine  - monitor    #PE  - Xarelto 20mg daily  - TTE reviewed - No evidence of RV strain     #Asthma  - Albuterol INH PRN    #Hyponatremia  - likely SIADH    # Pseudo Gout  - aspirate from the Lt knee with no suspicion for infectious arthritis  - prednisone 20 BID for 5 days    #Leukocytosis  - likely reactive. No suspicion for infection  - Monitor WBC    #Pain management  - Tylenol PRN, morphine 15mg Q 3 hrs, flexeril TID     #DVT ppx  - SCD, TEDs    #GI ppx  -  Protonix 40mg    #Bowel Regimen  - Senna, miralax, dulcolax supp PRN    #Bladder management  - BS on admission, and q 8 hours (SC if > 400)  - Monitor UO    #FEN   - Diet: Regular- no egg  - Supplements:    #Skin:  - Skin on admission: ***  - Pressure injury/Skin: Turn Q2hrs while in bed, OOB to Chair, PT/OT     #Sleep:   - Maintain quiet hours and low stim environment.  - Melatonin PRN to maximize participation in therapy during the day.     #Precaution  - Fall,  cervical spine    #GOC  CODE STATUS: FULL CODE    Outpatient Follow-up (Specialty/Name of physician):    Ami Lopez (DO)  Orthopaedic Surgery Surgery  [address] [facility], [address]  [place] [address]  Phone: [phone]  Fax: [phone]      MEDICAL PROGNOSIS: GOOD            REHAB POTENTIAL: GOOD             ESTIMATED DISPOSITION: HOME WITH HOME CARE            ELOS: 10-14 Days   EXPECTED THERAPY:     P.T. 1hr/day       O.T. 1hr/day      S.L.P. 1hr/day     P&O Unnecessary     EXP FREQUENCY: 5 days per 7 day period     PRESCREEN COMPARISON:   I have reviewed the prescreen information and I have found no relevant changes between the preadmission screening and my post admission evaluation     RATIONALE FOR INPATIENT ADMISSION - Patient demonstrates the following: (check all that apply)  [X] Medically appropriate for rehabilitation admission  [X] Has attainable rehab goals with an appropriate initial discharge plan  [X] Has rehabilitation potential (expected to make a significant improvement within a reasonable period of time)   [X] Requires close medical management by a rehab physician, rehab nursing care, Hospitalist and comprehensive interdisciplinary team (including PT, OT, & or SLP, Prosthetics and Orthotics)   61 YO male with PMH of asthma and HTN who was transferred from [facility]. Patient presented to [facility] ED on 10/23 and Orthopedics was consulted for cord compression with neurological symptoms. Patient  underwent a C2-T1 laminectomy/fusion with C3-6 laminectomies with Dr. Lopez on 10/24. post -op c/b fevers found to have PE.   Patient now with gait Instability, ADL impairments and Functional impairments.    # Spinal cord compression/central cord syndrome  -  s/p C2-T1 laminectomy/fusion with C3-6 laminectomies with Dr. Lopez on 10/24  - Start Comprehensive Rehab Program: PT/OT, 3hours daily and 5 days weekly  - PT: Focused on improving strength, endurance, coordination, balance, functional mobility, and transfers  - OT: Focused on improving strength, fine motor skills, coordination, posture and ADLs.   - Cervical collar when OOB  - Precautions: spinal, respiratory, cardiac, sensory, fall, AC    # HTN  - Off amldodipine  - monitor, hospitalist consult    # PE  - TTE reviewed - No evidence of RV strain  - Xarelto 20mg daily  - monitor HR, O2 sats  - hosptialist consult     #Asthma  - Albuterol INH PRN    # Leukocytosis  - likely reactive. No suspicion for infection  - Monitor WBC. CBC 11/9    # Hyponatremia  - likely SIADH  - BMP 11/9    # Pseudo Gout  - aspirate from the Lt knee with no suspicion for infectious arthritis  - prednisone 20 BID for 5 days    # Pain management  - Tylenol PRN  - morphine 15mg Q 3 hrs  - flexeril TID     # GI ppx  -  Protonix 40mg  - Senna, miralax, dulcolax supp PRN    # Bladder management  - BS on admission, and q 8 hours (SC if > 400)  - Monitor UO    # FEN   - Diet: Regular- no egg    # Skin:  - Skin on admission: ***  - Pressure injury/Skin: Turn Q2hrs while in bed, OOB to Chair, PT/OT     # DVT ppx  - SCD, TEDs  - on xarelto    # LABs  CBC BMP 11/9    #GOC  CODE STATUS: FULL CODE    Outpatient Follow-up (Specialty/Name of physician):    mAi Lopez (DO)  Orthopaedic Surgery Surgery  [address] [facility], [address]  [place] [address]  Phone: [phone]  Fax: [phone]      MEDICAL PROGNOSIS: GOOD-            REHAB POTENTIAL: GOOD             ESTIMATED DISPOSITION: HOME WITH HOME CARE            ELOS: 17-21 Days   EXPECTED THERAPY:     P.T. 1.5 hr/day       O.T. 1.5 hr/day      S.L.P. NA  P&O TBD     EXP FREQUENCY: 5 days per 7 day period     PRESCREEN COMPARISON:   I have reviewed the prescreen information and I have found no relevant changes between the preadmission screening and my post admission evaluation     RATIONALE FOR INPATIENT ADMISSION - Patient demonstrates the following: (check all that apply)  [X] Medically appropriate for rehabilitation admission  [X] Has attainable rehab goals with an appropriate initial discharge plan  [X] Has rehabilitation potential (expected to make a significant improvement within a reasonable period of time)   [X] Requires close medical management by a rehab physician, rehab nursing care, Hospitalist and comprehensive interdisciplinary team (including PT, OT, & or SLP, Prosthetics and Orthotics)   61 YO male with PMH of asthma and HTN who was transferred from [facility]. Patient presented to [facility] ED on 10/23 and Orthopedics was consulted for cord compression with neurological symptoms. Patient  underwent a C2-T1 laminectomy/fusion with C3-6 laminectomies with Dr. Lopez on 10/24. post -op c/b fevers found to have PE.   Patient now with gait Instability, ADL impairments and Functional impairments.    # Spinal cord compression/central cord syndrome  -  s/p C2-T1 laminectomy/fusion with C3-6 laminectomies with Dr. Lopez on 10/24  - Start Comprehensive Rehab Program: PT/OT, 3hours daily and 5 days weekly  - PT: Focused on improving strength, endurance, coordination, balance, functional mobility, and transfers  - OT: Focused on improving strength, fine motor skills, coordination, posture and ADLs.   - Cervical collar when OOB  - Precautions: spinal, respiratory, cardiac, sensory, fall, AC    # HTN  - Off amldodipine  - monitor, hospitalist consult    # PE  - TTE reviewed - No evidence of RV strain  - Xarelto 20mg daily  - monitor HR, O2 sats  - hosptialist consult     #Asthma  - Albuterol INH PRN    # Leukocytosis  - likely reactive. No suspicion for infection  - Monitor WBC. CBC 11/9    # Hyponatremia  - likely SIADH  - BMP 11/9    # Pseudo Gout  - aspirate from the Lt knee with no suspicion for infectious arthritis  - prednisone 20 BID for 5 days- 1 more dose    # Pain management  - Tylenol PRN  - morphine 15mg Q 3 hrs  - flexeril TID   - Tapentadol- patient's family will bring in tomorrow. Oxycontin Q 12 for tonight. d/c once family brings Tapentadol    # GI ppx  -  Protonix 40mg  - Senna, miralax, dulcolax supp PRN    # Bladder management  - BS on admission, and q 8 hours (SC if > 400)  - Monitor UO    # FEN   - Diet: Regular- no egg    # Skin:  - Skin on admission: posterior cervical spine incision with sutures and dry dressing  - Pressure injury/Skin: Turn Q2hrs while in bed, OOB to Chair, PT/OT     # DVT ppx  - SCD, TEDs  - on xarelto    # LABs  CBC BMP 11/9    #GOC  CODE STATUS: FULL CODE    Outpatient Follow-up (Specialty/Name of physician):    Ami Lopez (DO)  Orthopaedic Surgery Surgery  [address] [facility], [address]  [place] [address]  Phone: [phone]  Fax: [phone]      MEDICAL PROGNOSIS: GOOD-            REHAB POTENTIAL: GOOD             ESTIMATED DISPOSITION: HOME WITH HOME CARE            ELOS: 17-21 Days   EXPECTED THERAPY:     P.T. 1.5 hr/day       O.T. 1.5 hr/day      S.L.P. NA  P&O TBD     EXP FREQUENCY: 5 days per 7 day period     PRESCREEN COMPARISON:   I have reviewed the prescreen information and I have found no relevant changes between the preadmission screening and my post admission evaluation     RATIONALE FOR INPATIENT ADMISSION - Patient demonstrates the following: (check all that apply)  [X] Medically appropriate for rehabilitation admission  [X] Has attainable rehab goals with an appropriate initial discharge plan  [X] Has rehabilitation potential (expected to make a significant improvement within a reasonable period of time)   [X] Requires close medical management by a rehab physician, rehab nursing care, Hospitalist and comprehensive interdisciplinary team (including PT, OT, & or SLP, Prosthetics and Orthotics)   61 YO male with PMH of asthma and HTN who was transferred from [facility]. Patient presented to [facility] ED on 10/23 and Orthopedics was consulted for cord compression with neurological symptoms. Patient  underwent a C2-T1 laminectomy/fusion with C3-6 laminectomies with Dr. Lopez on 10/24. post -op c/b fevers found to have PE.   Patient now with gait Instability, ADL impairments and Functional impairments.    # Spinal cord compression/central cord syndrome  -  s/p C2-T1 laminectomy/fusion with C3-6 laminectomies with Dr. Lopez on 10/24  - Start Comprehensive Rehab Program: PT/OT, 3hours daily and 5 days weekly  - PT: Focused on improving strength, endurance, coordination, balance, functional mobility, and transfers  - OT: Focused on improving strength, fine motor skills, coordination, posture and ADLs.   - Cervical collar when OOB  - Precautions: spinal, respiratory, cardiac, sensory, fall, AC    # HTN  - Off amldodipine  - monitor, hospitalist consult  - 115/91 - 156/87) 11/9    # PE  - TTE reviewed - No evidence of RV strain  - Xarelto 20mg daily  - monitor HR, O2 sats. O2 sat % RA 11/9  - hosptialist consult     #Asthma  - Albuterol INH PRN    # Leukocytosis  - likely reactive. No suspicion for infection  - WBC 11.54 11/9, monitor, repeat CBC 11/10    # Hyponatremia  - likely SIADH  - Na 135 11/9 stable    # Pseudo Gout  - aspirate from the Lt knee with no suspicion for infectious arthritis  - prednisone 20 BID for 5 days- 1 more dose    # Pain management  - Tylenol PRN  - morphine 15mg Q 3 hrs  - flexeril TID   - Tapentadol- patient's family will bring in tomorrow. Oxycontin Q 12 for tonight. d/c once family brings Tapentadol    # GI ppx  -  Protonix 40mg  - Senna, miralax, dulcolax supp PRN    # Bladder management  - BS on admission, and q 8 hours (SC if > 400)  - Monitor UO    # FEN   - Diet: Regular- no egg    # Skin:  - Skin on admission: posterior cervical spine incision with sutures and dry dressing  - Pressure injury/Skin: Turn Q2hrs while in bed, OOB to Chair, PT/OT     # DVT ppx  - SCD, TEDs  - on xarelto    # LABs  CBC BMP 11/10    #[facility]  CODE STATUS: FULL CODE    Outpatient Follow-up (Specialty/Name of physician):    Ami Lopez (DO)  Orthopaedic Surgery Surgery  [address] [facility], [address] [place] [address]  Phone: [phone]  Fax: [phone]      MEDICAL PROGNOSIS: GOOD-            REHAB POTENTIAL: GOOD             ESTIMATED DISPOSITION: HOME WITH HOME CARE            ELOS: 17-21 Days   EXPECTED THERAPY:     P.T. 1.5 hr/day       O.T. 1.5 hr/day      S.L.P. NA  P&O TBD     EXP FREQUENCY: 5 days per 7 day period     PRESCREEN COMPARISON:   I have reviewed the prescreen information and I have found no relevant changes between the preadmission screening and my post admission evaluation     RATIONALE FOR INPATIENT ADMISSION - Patient demonstrates the following: (check all that apply)  [X] Medically appropriate for rehabilitation admission  [X] Has attainable rehab goals with an appropriate initial discharge plan  [X] Has rehabilitation potential (expected to make a significant improvement within a reasonable period of time)   [X] Requires close medical management by a rehab physician, rehab nursing care, Hospitalist and comprehensive interdisciplinary team (including PT, OT, & or SLP, Prosthetics and Orthotics)   63 YO male with PMH of asthma and HTN who was transferred from [facility]. Patient presented to [facility] ED on 10/23 and Orthopedics was consulted for cord compression with neurological symptoms. Patient  underwent a C2-T1 laminectomy/fusion with C3-6 laminectomies with Dr. Lopez on 10/24. post -op c/b fevers found to have PE.   Patient now with gait Instability, ADL impairments and Functional impairments.    # Spinal cord compression/central cord syndrome  -  s/p C2-T1 laminectomy/fusion with C3-6 laminectomies with Dr. Lopez on 10/24  - Start Comprehensive Rehab Program: PT/OT, 3hours daily and 5 days weekly  - PT: Focused on improving strength, endurance, coordination, balance, functional mobility, and transfers  - OT: Focused on improving strength, fine motor skills, coordination, posture and ADLs.   - aspen cervical collar when OOB  - Precautions: spinal, respiratory, cardiac, sensory, fall, AC    # HTN  - Off amldodipine  - monitor, hospitalist consult  - 115/91 - 156/87) 11/9    # PE  - TTE reviewed - No evidence of RV strain  - Xarelto 20mg daily  - monitor HR, O2 sats. O2 sat % RA 11/9  - hosptialist consult     #Asthma  - Albuterol INH PRN    # Leukocytosis  - likely reactive. No suspicion for infection  - WBC 11.54 11/9, monitor, repeat CBC 11/10  - f/u Blood and Knee Cx    # Hyponatremia  - likely SIADH  - Na 135 11/9 stable    # Pseudo Gout  - aspirate from the Lt knee with no suspicion for infectious arthritis  - prednisone 20 BID for 5 days- Last dose 11/9    # Pain management  - Tylenol PRN  - morphine 15mg Q 3 hrs  - oxycontin 10 q12. Family to bring in nucynta to substitute  - flexeril TID     # GI ppx  -  Protonix 40mg  - Senna, miralax, dulcolax supp PRN  - lactulose    # Bladder management  - BS on admission, and q 8 hours (SC if > 400)  - Monitor UO    # FEN   - Diet: Regular- no egg    # Skin:  - Skin on admission: posterior cervical spine incision with sutures and dry dressing  - Pressure injury/Skin: Turn Q2hrs while in bed, OOB to Chair, PT/OT     # DVT ppx  - SCD, TEDs  - on xarelto    # LABs  CBC BMP 11/10  Blood and Knee Cx    CODE STATUS: FULL CODE    Outpatient Follow-up (Specialty/Name of physician):    Ami Lopez (DO)  Orthopaedic Surgery Surgery  [address] [facility], [address]  [place] [address]  Phone: [phone]  Fax: [phone]      MEDICAL PROGNOSIS: GOOD-            REHAB POTENTIAL: GOOD             ESTIMATED DISPOSITION: HOME WITH HOME CARE            ELOS: 17-21 Days   EXPECTED THERAPY:     P.T. 1.5 hr/day       O.T. 1.5 hr/day      S.L.P. NA  P&O TBD     EXP FREQUENCY: 5 days per 7 day period     PRESCREEN COMPARISON:   I have reviewed the prescreen information and I have found no relevant changes between the preadmission screening and my post admission evaluation     RATIONALE FOR INPATIENT ADMISSION - Patient demonstrates the following: (check all that apply)  [X] Medically appropriate for rehabilitation admission  [X] Has attainable rehab goals with an appropriate initial discharge plan  [X] Has rehabilitation potential (expected to make a significant improvement within a reasonable period of time)   [X] Requires close medical management by a rehab physician, rehab nursing care, Hospitalist and comprehensive interdisciplinary team (including PT, OT, & or SLP, Prosthetics and Orthotics)   61 YO male with PMH of asthma and HTN who was transferred from [facility]. Patient presented to [facility] ED on 10/23 and Orthopedics was consulted for cord compression with neurological symptoms. Patient  underwent a C2-T1 laminectomy/fusion with C3-6 laminectomies with Dr. Lopez on 10/24. post -op c/b fevers found to have PE.   Patient now with gait Instability, ADL impairments and Functional impairments.    # Spinal cord compression/central cord syndrome  -  s/p C2-T1 laminectomy/fusion with C3-6 laminectomies with Dr. Lopez on 10/24  - wound care: DSD and tegaderm daily. f/u with NSGY re: suture removal  - Start Comprehensive Rehab Program: PT/OT, 3hours daily and 5 days weekly  - PT: Focused on improving strength, endurance, coordination, balance, functional mobility, and transfers  - OT: Focused on improving strength, fine motor skills, coordination, posture and ADLs.   - aspen cervical collar when OOB  - Precautions: spinal, respiratory, cardiac, sensory, fall, AC    # HTN  - Off amldodipine  - monitor, hospitalist consult  - 115/91 - 156/87 11/9    # PE  - TTE reviewed - No evidence of RV strain  - Xarelto 20mg daily  - monitor HR, O2 sats. O2 sat % RA 11/9  - hosptialist consult     #Asthma  - Albuterol INH PRN    # Leukocytosis  - likely reactive. No suspicion for infection  - WBC 11.54 11/9, monitor, repeat CBC 11/10  - f/u Blood and Knee Cx    # Hyponatremia  - likely SIADH  - Na 135 11/9 stable    # Pseudo Gout  - aspirate from the Lt knee with no suspicion for infectious arthritis  - prednisone 20 BID for 5 days- Last dose 11/9    # Pain management  - Tylenol PRN  - morphine 15mg Q 3 hrs  - oxycontin 10 q12--> dc and switched to home med nucynta 100 q6 11/9  - flexeril TID     # GI ppx  -  Protonix 40mg  - Senna, miralax, dulcolax supp PRN  - lactulose x1 ordered for constipation 11/9    # Bladder management  - BS on admission, and q 8 hours (SC if > 400)  - Monitor UO    # FEN   - Diet: Regular- no egg    # Skin:  - Skin on admission: posterior cervical spine incision with sutures and dry dressing  - Pressure injury/Skin: Turn Q2hrs while in bed, OOB to Chair, PT/OT     # DVT ppx  - SCD, TEDs  - on xarelto    # LABs  CBC BMP 11/10  Blood and Knee Cx    CODE STATUS: FULL CODE    Outpatient Follow-up (Specialty/Name of physician):    Ami Lopez (DO)  Orthopaedic Surgery Surgery  [address] [facility], [address]  [place] [address]  Phone: [phone]  Fax: [phone]      MEDICAL PROGNOSIS: GOOD-            REHAB POTENTIAL: GOOD             ESTIMATED DISPOSITION: HOME WITH HOME CARE            ELOS: 14-17 Days   EXPECTED THERAPY:     P.T. 1.5 hr/day       O.T. 1.5 hr/day      S.L.P. NA  P&O TBD     EXP FREQUENCY: 5 days per 7 day period     PRESCREEN COMPARISON:   I have reviewed the prescreen information and I have found no relevant changes between the preadmission screening and my post admission evaluation     RATIONALE FOR INPATIENT ADMISSION - Patient demonstrates the following: (check all that apply)  [X] Medically appropriate for rehabilitation admission  [X] Has attainable rehab goals with an appropriate initial discharge plan  [X] Has rehabilitation potential (expected to make a significant improvement within a reasonable period of time)   [X] Requires close medical management by a rehab physician, rehab nursing care, Hospitalist and comprehensive interdisciplinary team (including PT, OT, & or SLP, Prosthetics and Orthotics)

## 2022-11-08 NOTE — PROGRESS NOTE ADULT - PROBLEM SELECTOR PROBLEM 5
HTN (hypertension)
Anemia due to blood loss
HTN (hypertension)
HTN (hypertension)
Anemia due to blood loss
Anemia due to blood loss

## 2022-11-08 NOTE — PROGRESS NOTE ADULT - PROBLEM SELECTOR PLAN 6
Norvasc with hold parameter.
Albuterol INH PRN if symptomatic.
Norvasc with hold parameter.
Albuterol INH PRN if symptomatic.
Norvasc with hold parameter.
Albuterol INH PRN if symptomatic.

## 2022-11-08 NOTE — PROGRESS NOTE ADULT - PROBLEM SELECTOR PLAN 1
TTE reviewed - No evidence of RV strain  Clinically stable at this time - not hypoxic.  Xarelto initiated - monitor for bleeding, monitor Hgb.  - fever likely related to PE - infection work up negative so far.
Reviewed aspirate from the Lt knee.  - no suspicion for infectious arthritis  - high clinical suspicion for pseudogout but crystals are not visible  - treat with prednisone 20 BID for 5 days  - F/U Cx from aspiration.
Reviewed aspirate from the Lt knee.  - no suspicion for infectious arthritis  - high clinical suspicion for pseudogout but crystals are not visible  - treat with prednisone 20 BID for 5 days - no need for taper.  - F/U Cx from aspiration.
with concern for Cor Pulmonale.  F/U TTE  Clinically stable at this time - not hypoxic.  Xarelto initiated - monitor for bleeding, monitor Hgb.
TTE reviewed - No evidence of RV strain  Clinically stable at this time - not hypoxic.  Xarelto initiated - monitor for bleeding, monitor Hgb.  - fever likely related to PE - infection work up negative so far.
Reviewed aspirate from the Lt knee.  - no suspicion for infectious arthritis  - high clinical suspicion for pseudogout but crystals are not visible  - treat with prednisone 20 BID for 5 days  - F/U Cx from aspiration.
Reviewed aspirate from the Lt knee.  - no suspicion for infectious arthritis  - high clinical suspicion for pseudogout but crystals are not visible  - will treat with prednisone 20 BID for 5 days  - F/U Cx from aspiration.
Reviewed aspirate from the Lt knee.  - no suspicion for infectious arthritis  - high clinical suspicion for pseudogout but crystals are not visible  - treat with prednisone 20 BID for 5 days  - F/U Cx from aspiration.

## 2022-11-08 NOTE — PROGRESS NOTE ADULT - SUBJECTIVE AND OBJECTIVE BOX
Patient is a 62y old  Male who presents with a chief complaint of cord compression (08 Nov 2022 12:52)      HPI:  62M with asthma and HTN who presents as a transfer from [facility] with c/o numbness, weakness and swelling of left arm for the past month and weakness of right leg, and b/l LE muscle spasms. Patient reports that hx of herniated disc, but his sx started worsening last month when he was gardening. no falls or trauma. Since then has been having progressively worsening weakness and muscle spasms. No bowel or bladder incontinence. Has been trying to take nucenta 100 mg daily and ibuprofen without relief. Decided to present to [facility] when the pain was uncontrolled. Imaging with concern for spinal canal stenosis and transferred to [facility] for further evaluation and management with orthopedic-spine team. In the ED patient was afebrile, hemodynamically stable satting well on RA. Given morphine and po oxy and admitted to medicine for further evaluation. (24 Oct 2022 07:37)    patient seen earlier this afternoon. denied bm, per social work patient had BM yesterday. Denies complaints, reports mild knee pain, tolerated bedside therapy    REVIEW OF SYSTEMS   pain  weakness  constipation    PAST MEDICAL & SURGICAL HISTORY  Asthma  HTN (hypertension)      CURRENT FUNCTIONAL STATUS  11/8  Bed-Chair Transfer Training  Transfer Training Bed-to-Chair Transfer: minimum assist (75% patient effort);  1 person assist;  weight-bearing as tolerated   rolling walker  Transfer Training Chair-to-Bed Transfer: minimum assist (75% patient effort);  1 person assist;  weight-bearing as tolerated   rolling walker  Bed-to-Chair Transfer Training Transfer Safety Analysis: decreased step length;  decreased strength;  decreased flexibility;  rolling walker    Sit-Stand Transfer Training  Transfer Training Sit-to-Stand Transfer: minimum assist (75% patient effort);  1 person assist;  weight-bearing as tolerated   rolling walker  Transfer Training Stand-to-Sit Transfer: minimum assist (75% patient effort);  1 person assist;  weight-bearing as tolerated   rolling walker  Sit-to-Stand Transfer Training Transfer Safety Analysis: decreased step length;  decreased balance;  decreased strength;  rolling walker    Gait Training  Gait Training: minimum assist (75% patient effort);  1 person assist;  weight-bearing as tolerated   rolling walker;  50 feet  Gait Analysis: 3-point gait   decreased strength;  50 feet;  rolling walker  Brace/Orthotics Brace/Orthotics: cervical collar    Therapeutic Exercise  Therapeutic Exercise Detail: Pt performed ActiveROM, ankle pumps Bilateral LE's.             RECENT LABS/IMAGING  CBC Full  -  ( 08 Nov 2022 10:54 )  WBC Count : 9.23 K/uL  RBC Count : 3.93 M/uL  Hemoglobin : 11.4 g/dL  Hematocrit : 34.3 %  Platelet Count - Automated : 540 K/uL  Mean Cell Volume : 87.3 fL  Mean Cell Hemoglobin : 29.0 pg  Mean Cell Hemoglobin Concentration : 33.2 gm/dL  Auto Neutrophil # : x  Auto Lymphocyte # : x  Auto Monocyte # : x  Auto Eosinophil # : x  Auto Basophil # : x  Auto Neutrophil % : x  Auto Lymphocyte % : x  Auto Monocyte % : x  Auto Eosinophil % : x  Auto Basophil % : x    11-08    136  |  96<L>  |  22  ----------------------------<  136<H>  4.7   |  27  |  0.81    Ca    10.0      08 Nov 2022 10:54          VITALS  T(C): 36.4 (11-08-22 @ 16:36), Max: 36.8 (11-07-22 @ 17:00)  HR: 90 (11-08-22 @ 16:36) (82 - 93)  BP: 136/55 (11-08-22 @ 16:36) (115/91 - 136/55)  RR: 18 (11-08-22 @ 16:36) (18 - 18)  SpO2: 100% (11-08-22 @ 16:36) (98% - 100%)  Wt(kg): --    ALLERGIES  No Known Drug Allergies  peanuts (Unknown)      MEDICATIONS   acetaminophen     Tablet .. 975 milliGRAM(s) Oral every 8 hours  amLODIPine   Tablet 10 milliGRAM(s) Oral daily  bisacodyl Suppository 10 milliGRAM(s) Rectal daily PRN  cyclobenzaprine 10 milliGRAM(s) Oral three times a day PRN  melatonin 3 milliGRAM(s) Oral at bedtime  morphine  IR 15 milliGRAM(s) Oral every 3 hours PRN  polyethylene glycol 3350 17 Gram(s) Oral daily  predniSONE   Tablet 20 milliGRAM(s) Oral two times a day  rivaroxaban 20 milliGRAM(s) Oral with dinner  senna 2 Tablet(s) Oral at bedtime  tapentadol 100 milliGRAM(s) Oral four times a day      ----------------------------------------------------------------------------------------   PHYSICAL EXAM  Constitutional - NAD, Comfortable   Chest - no respiratory distress  Cardiovascular - RRR, S1S2  Abdomen -   NTND  Extremities - No C/C/E, No calf tenderness   Neurologic Exam -                    Cognitive - Awake, Alert, Oriented to self, place, date, year, situation     Communication - Fluent, No dysarthria      Motor -                     LEFT    UE - ShAB 3/5, EF 4/5, EE  3/5, WE 4-/5,  4/5                    RIGHT UE - ShAB 5/5, EF 5/5, EE 5/5, WE 5/5,  5/5                    LEFT    LE - HF 4/5, KE 4/5 DF 4/5, PF 4/5                    RIGHT LE - HF 5/5, KE 5/5, DF 5/5, PF 5/5        Sensory -numbness L hand     ----------------------------------------------------------------------------------------   ASSESSMENT/PLAN  62M with asthma and HTN who presents as a transfer from [facility] with c/o numbness, weakness and swelling of left arm for the past month and weakness of left leg, and b/l LE muscle spasms with progressively worsening weakness and muscle spasms 2/2 cervical spinal stenosis/myelopathy. s/p C2-T1 PLSF, C3-C6 lami  with functional, gait, ADL impairments.  course complicated by b/l PE- on rivaroxaban  Disposition - recommend acute inpatient rehab when medically cleared. patient can tolerate 3 hours per day of therapy with medical supervision.  pain: on nucynta, flexeril, tylenol    please treat for constipation  PT - ROM, Bed mobility, Transfers, Ambulation with assistive device  OT - ADLs, ROM   Precautions - Falls, Cardiac   Skin - Turn Q2hrs  Diet - Reg

## 2022-11-08 NOTE — H&P ADULT - NSHPLABSRESULTS_GEN_ALL_CORE
RECENT LABS/IMAGING                        11.4   9.23  )-----------( 540      ( 08 Nov 2022 10:54 )             34.3     11-08    136  |  96<L>  |  22  ----------------------------<  136<H>  4.7   |  27  |  0.81    Ca    10.0      08 Nov 2022 10:54    US Duplex Venous Lower Ext Complete, Bilateral (11.03.22 @ 18:19)     IMPRESSION:  No evidence of deep venous thrombosis in either lower extremity.      X-ray Knee 3 Views, Left (11.03.22 @ 17:58)     IMPRESSION:  Obliquity on lateral view limits evaluation for joint effusion. No fractures or dislocations.  Narrowed medial tibiofemoral joint space with peripheral joint margin osteophytes. Preserved remaining visualized joint spaces and no joint margin erosions or intra-articular or periarticular calcifications. Fabella present adjacent to the lateral femoral condyle.  No discrete lytic or blastic lesions. Vascular calcifications.    CT Angio Chest PE Protocol w/ IV Cont (10.31.22 @ 18:34)     IMPRESSION:  Acute bilateral pulmonary embolism as described above with findings   suggestive of right heart strain. Consider further evaluation with   echocardiogram      CT Angio Neck w/ IV Cont (10.23.22 @ 22:39)     IMPRESSION:  CT ANGIOGRAPHY NECK: The cervical carotid systems and vertebral arteries   are patent bilaterally without significant stenosis or dissection.    CT ANGIOGRAPHY BRAIN: No vessel occlusion, flow-limiting stenosis or   aneurysm is identified about the [place].  Anatomic variants as   discussed above.    MR Lumbar Spine w/wo IV Cont (10.23.22 @ 14:15)     IMPRESSION:  CERVICAL SPINE MRI:  1.  Motion degraded.  Postcontrast images were not obtained.  2.  There is diffuse swelling of the cervical cord with long segment central cord edema extending from C2 to T1, which has broad differential diagnosis including transverse myelitis, other infectious and inflammatory causes of myelitis, and trauma with subacute progressive   ascending myelopathy (SPAM).  Central cord infarct could produce similar findings but is thought to be less likely as this is an atypical location for spinal cord infarct.  While there is underlying moderate spinal canal stenosis with apparent mild cord compression at C3-C4 and C4-C5,  minimal cord compression at C5-C6, and mild ventral cord impingement at C6-C7,   the diffuse changes in the cervical cord are not compatible with primary compressive myelopathy.  The appearance of cord compression may be due in part to the underlying swelling of the cervical cord.  3.  Follow-up contrast-enhanced MRI of the cervical spine is recommended.      THORACIC SPINE MRI:  1.  Motion degraded.  Postcontrast images were not obtained.  2.  Small disc herniations throughout the thoracic spine with slight contact of the ventral cord at T7-T8.  No thoracic cord compression, cord edema or other focal cord lesion.    LUMBAR SPINE MRI:  1.  Incomplete exam.  Only motion degraded sagittal T1 and sagittal T2 images are available.  2.  There appears to be at least diffuse moderate spinal canal stenosis at L1-L2 through L4-L5, which is suboptimally evaluated due to motion artifact.  Evaluation for cauda equina compression is suboptimal.  No high-grade neural foraminal narrowing is visualized.

## 2022-11-08 NOTE — PROGRESS NOTE ADULT - PROBLEM SELECTOR PROBLEM 6
HTN (hypertension)
Asthma

## 2022-11-08 NOTE — PROGRESS NOTE ADULT - SUBJECTIVE AND OBJECTIVE BOX
ORTHO PROGRESS NOTE     Run of asymptomatic vtach yesterday, no events on tele. Pt asymptomatic, denies any chest pain/SOB/palpitations. Pt resting comfortably without complaint. Pain controlled. Eager to go to rehab.       Vital Signs Last 24 Hrs  T(C): 36.7 (08 Nov 2022 06:40), Max: 36.8 (07 Nov 2022 17:00)  T(F): 98 (08 Nov 2022 06:40), Max: 98.3 (07 Nov 2022 17:00)  HR: 93 (08 Nov 2022 06:40) (82 - 93)  BP: 119/86 (08 Nov 2022 06:40) (117/82 - 134/73)  BP(mean): 74 (08 Nov 2022 06:40) (74 - 90)  RR: 18 (07 Nov 2022 21:00) (18 - 18)  SpO2: 100% (07 Nov 2022 21:00) (98% - 100%)  Parameters below as of 08 Nov 2022 06:40  Patient On (Oxygen Delivery Method): room air        Back: Dressing/ Incision Clean/Dry/Intact,     Motor:                   C5                C6              C7               C8           T1   R            5/5                5/5            5/5             5/5          5/5  L             3/5               3/5             3/5             3/5          3/5                L2             L3             L4               L5            S1  R         5/5           5/5          5/5             5/5           5/5  L          5/5          5/5           5/5             5/5           5/5    Sensory:            C5         C6         C7      C8       T1        (0=absent, 1=impaired, 2=normal, NT=not testable)  R         2            2           2        2         2  L          2            2           2        2         2               L2          L3         L4      L5       S1         (0=absent, 1=impaired, 2=normal, NT=not testable)  R         2            2            2        2        2  L          2            2           2        2         2      62y male s/p C2-T1 lami/fusion. Recovering appropriately. No plans to RTOR at this time, will cont. to monitor. CTPA 10/31 showed b/l PE    - Aspen collar at all times  - AC with Xarelto 20qD  - FU Blood Cx NGTD 11/8  - Knee aspiration: , crystals pending, GS -, Cx: NGTD (11/8)  - No plans for knee I and D at this time  - Drain removed  - Pain control  - Dispo: acute rehab

## 2022-11-08 NOTE — PROGRESS NOTE ADULT - PROBLEM SELECTOR PLAN 9
Asymptomatic.  Continue tele monitor.  If occurs again, will change Norvasc to Lopressor.
Asymptomatic.  Continue tele monitor.  If occurs again, will change Norvasc to Lopressor.

## 2022-11-08 NOTE — PROGRESS NOTE ADULT - PROBLEM SELECTOR PROBLEM 4
Anemia due to blood loss
Leukocytosis, unspecified type
Leukocytosis, unspecified type
Anemia due to blood loss
Anemia due to blood loss
Leukocytosis, unspecified type

## 2022-11-08 NOTE — PROGRESS NOTE ADULT - PROBLEM SELECTOR PLAN 3
s/p C2-T1 lami/Fusion on 10/25  - Pain control with morphine IR PRN, zanaflex, tapentadol  - Hep SC for VTE ppx  - Wound care as per ortho  - PT/OT eval for safe disposition.
Likely post-op reactive leukocytosis.  No clinical suspicion for infection.  Continue to monitor CBC.
Likely post-op reactive leukocytosis.  No clinical suspicion for infection.  Continue to monitor CBC.
s/p C2-T1 lami/Fusion on 10/25  - Pain control with morphine IR PRN, zanaflex, tapentadol  - Hep SC for VTE ppx  - Wound care as per ortho  - PT/OT eval for safe disposition.
s/p C2-T1 lami/Fusion on 10/25  - Pain control with morphine IR PRN, zanaflex, tapentadol  - Hep SC for VTE ppx  - Wound care as per ortho  - PT/OT eval for safe disposition.
Likely post-op reactive leukocytosis.  No clinical suspicion for infection.  Continue to monitor CBC.
s/p C2-T1 lami/Fusion on 10/25  - Pain control with morphine IR PRN, zanaflex, tapentadol  - Hep SC for VTE ppx  - Wound care as per ortho  - PT/OT eval for safe disposition.
s/p C2-T1 lami/Fusion on 10/25  - Pain control with morphine IR PRN, zanaflex, tapentadol  - Hep SC for VTE ppx  - Wound care as per ortho  - PT/OT eval for safe disposition.

## 2022-11-08 NOTE — PATIENT PROFILE ADULT - FALL HARM RISK - HARM RISK INTERVENTIONS

## 2022-11-08 NOTE — PATIENT PROFILE ADULT - FALL HARM RISK - PATIENT NEEDS ASSISTANCE
Routing refill request to provider for review/approval because:  Patient needs to be seen because:  Per protocol, patient needs to be seen every 6 months for diabetic follow ups.   Labs out of Range:  Creatinine, Microalbumin.     Paulina Garrett RN         
glipiZIDE (GLUCOTROL) 10 MG tablet    Last Written Prescription Date: 4/6/17  Last Fill Quantity: 120, # refills: 4  Last Office Visit with G, P or [facility] prescribing provider:  9/23/16               BP Readings from Last 3 Encounters:   04/06/17 124/78   09/23/16 128/66   08/29/16 135/80     Lab Results   Component Value Date    MICROL 58 07/25/2016     Lab Results   Component Value Date    UMALCR 52.64 07/25/2016     Creatinine   Date Value Ref Range Status   04/05/2017 1.21 (H) 0.52 - 1.04 mg/dL Final   ]  GFR Estimate   Date Value Ref Range Status   04/05/2017 43 (L) >60 mL/min/1.7m2 Final     Comment:     Non  GFR Calc   07/25/2016 44 (L) >60 mL/min/1.7m2 Final     Comment:     Non  GFR Calc   02/02/2016 57 (L) >60 mL/min/1.7m2 Final     Comment:     Non  GFR Calc     GFR Estimate If Black   Date Value Ref Range Status   04/05/2017 52 (L) >60 mL/min/1.7m2 Final     Comment:      GFR Calc   07/25/2016 53 (L) >60 mL/min/1.7m2 Final     Comment:      GFR Calc   02/02/2016 70 >60 mL/min/1.7m2 Final     Comment:      GFR Calc     Lab Results   Component Value Date    CHOL 167 04/05/2017     Lab Results   Component Value Date    HDL 43 04/05/2017     Lab Results   Component Value Date    LDL  04/05/2017     Cannot estimate LDL when triglyceride exceeds 400 mg/dL    LDL 81 04/05/2017     Lab Results   Component Value Date    TRIG 462 04/05/2017     Lab Results   Component Value Date    CHOLHDLRATIO 4.2 06/09/2015     Lab Results   Component Value Date    AST 17 04/05/2017     Lab Results   Component Value Date    ALT 24 04/05/2017     Lab Results   Component Value Date    A1C 8.0 04/05/2017    A1C 8.5 07/25/2016    A1C 7.8 09/25/2015    A1C 7.9 07/13/2015    A1C 7.9 06/09/2015     Potassium   Date Value Ref Range Status   04/05/2017 4.4 3.4 - 5.3 mmol/L Final       
Standing/Walking/Toileting

## 2022-11-08 NOTE — PROGRESS NOTE ADULT - PROBLEM SELECTOR PLAN 5
Acute anemia likely post-op blood loss related.  No clinical indication for PRBC transfusion.  Monitor H/H.
Norvasc with hold parameter.
Acute anemia likely post-op blood loss related.  No clinical indication for PRBC transfusion.  Monitor H/H.
Norvasc with hold parameter.
Acute anemia likely post-op blood loss related.  No clinical indication for PRBC transfusion.  Monitor H/H.
Norvasc with hold parameter.

## 2022-11-08 NOTE — DISCHARGE NOTE NURSING/CASE MANAGEMENT/SOCIAL WORK - PATIENT PORTAL LINK FT
You can access the FollowMyHealth Patient Portal offered by [facility] by registering at the following website: [email or web address]. By joining [facility]’s FollowMyHealth portal, you will also be able to view your health information using other applications (apps) compatible with our system.

## 2022-11-08 NOTE — H&P ADULT - NSHPSOCIALHISTORY_GEN_ALL_CORE
Smoking -  EtOH -   Drugs -     Marital status:    Patient lives in a house with his brother and mother with 3 steps to enter. Prior to admission pt reports ambulating independently without a device and being independent in ADLs. Pt owns a cane, began using it prior to admission.    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 Prior to admission pt reports ambulating independently without a device and being independent in ADLs. Pt owns a cane, began using it prior to admission.    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 Prior to admission pt reports ambulating independently without a device and being independent in ADLs. Pt owns a cane, began using it prior to admission. 6 months ago, was working out regularly     CURRENT FUNCTIONAL STATUS  Date: 11/7  Bed Mobility: Min  A, 1 person  Transfers: Min A, 1 person  Gait: Min A, 1 person, 50ft with RW

## 2022-11-08 NOTE — PROGRESS NOTE ADULT - PROBLEM SELECTOR PLAN 4
Likely post-op reactive leukocytosis.  No clinical suspicion for infection.  Continue to monitor CBC.
Likely post-op reactive leukocytosis.  No clinical suspicion for infection.  Continue to monitor CBC.
Acute anemia likely post-op blood loss related.  No clinical indication for PRBC transfusion.  Monitor H/H.
Likely post-op reactive leukocytosis.  No clinical suspicion for infection.  Continue to monitor CBC.

## 2022-11-08 NOTE — PROGRESS NOTE ADULT - PROBLEM SELECTOR PLAN 7
Asymptomatic.  Advised on limited oral fluid intake.  Check Urine/Serum Osm and Urine electrolyte - suspect SIADH.
Albuterol INH PRN if symptomatic.

## 2022-11-08 NOTE — PROGRESS NOTE ADULT - PROBLEM SELECTOR PROBLEM 3
Spinal cord compression
Leukocytosis, unspecified type
Spinal cord compression
Spinal cord compression
Leukocytosis, unspecified type
Spinal cord compression
Leukocytosis, unspecified type
Spinal cord compression

## 2022-11-08 NOTE — H&P ADULT - HISTORY OF PRESENT ILLNESS
This is a 61 YO male with PMH of asthma and HTN who was transferred from [facility]. Patient presented to [facility] ED on 10/23 and Orthopedics was consulted for cord compression with neurological symptoms. Patient was admitted to the orthopedic service and underwent a C2-T1 laminectomy/fusion with C3-6 laminectomies with Dr. Lopez on 10/24.     On 10/31, patient developed fevers, imaging  showed bilateral pulmonary embolisms. He was transferred to telemetry and started on therapeutic Xarelto.  Additionally, he was having trouble ranging his left knee. His left knee was aspirated on 11/3 which was found to be negative. He continued to have fever, all other fever work up remained negative. Fever likely pseudogout treated with steroid x 5 days.    Patient was evaluated by PM&R and therapy for functional deficits, gait/ADL impairments and acute rehabilitation was recommended. Patient was medically optimized for discharge to [facility] IRU on 11/8/22. This is a 61 YO male with PMH of asthma and HTN who was transferred from [facility] to [facility] ED on 10/23/22. Patient presented to [facility] 10/23 with complaints of numbness and swelling of his left arm for the past month and weakness of his right leg. He reports that he was told that he has nerve damage by his doctors. Over the month the symptoms have been getting worse and include sensory changes. He states that his arm can get very cold to touch and sometimes the right leg may not be able to accurately feel hot temperatures. He reports increasing fasciculations and spasms. His brother who is at bedside reports the patient having frequent falls due to his weakness. No fever, no urinary retention, no fecal incontinence.  C spine MRI showed diffuse swelling of the cervical cord with long segment central cord edema extending from C2 to T1. there was moderate spinal canal stenosis with apparent mild cord compression at C3-C4 and C4-C5,  minimal cord compression at C5-C6, and mild ventral cord impingement at C6-C7, however per rads read the diffuse changes in the cervical cord are not compatible with primary compressive myelopathy Patient was seen by neuro and orthopedics for cord compression with neurological symptoms. Patient was admitted to the orthopedic service and underwent a C2-T1 laminectomy/fusion with C3-6 laminectomies with Dr. Lopez on 10/24.     On 10/31, patient developed fevers, imaging  showed bilateral pulmonary embolisms. He was transferred to telemetry and started on therapeutic Xarelto.  Additionally, he was having trouble ranging his left knee. His left knee was aspirated on 11/3 which was found to be negative for gout. He continued to have fever, all other fever work up remained negative. Fever likely pseudogout treated with steroid x 5 days.    Patient was evaluated by PM&R and therapy for functional deficits, gait/ADL impairments and acute rehabilitation was recommended. Patient was medically optimized for discharge to [facility] IRF on 11/8/22. This is a 63 YO male RH dominant with PMH of asthma and HTN who was transferred from [facility] to [facility] ED on 10/23/22. Patient presented to [facility] 10/23 with complaints of numbness and swelling of his left arm for the past month and weakness of his right leg. He reports that he was told that he has nerve damage by his doctors. Over the month the symptoms have been getting worse and include sensory changes. He states that his arm can get very cold to touch and sometimes the right leg may not be able to accurately feel hot temperatures. He reports increasing fasciculations and spasms. His brother who is at bedside reports the patient having frequent falls due to his weakness. No fever, no urinary retention, no fecal incontinence.  C spine MRI showed diffuse swelling of the cervical cord with long segment central cord edema extending from C2 to T1. there was moderate spinal canal stenosis with apparent mild cord compression at C3-C4 and C4-C5,  minimal cord compression at C5-C6, and mild ventral cord impingement at C6-C7, however per rads read the diffuse changes in the cervical cord are not compatible with primary compressive myelopathy Patient was seen by neuro and orthopedics for cord compression with neurological symptoms. Patient was admitted to the orthopedic service and underwent a C2-T1 laminectomy/fusion with C3-6 laminectomies with Dr. Lopez on 10/24.     On 10/31, patient developed fevers, imaging  showed bilateral pulmonary embolisms. He was transferred to telemetry and started on therapeutic Xarelto.  Additionally, he was having trouble ranging his left knee. His left knee was aspirated on 11/3 which was found to be negative for gout. He continued to have fever, all other fever work up remained negative. Fever likely pseudogout treated with steroid x 5 days.    Patient was evaluated by PM&R and therapy for functional deficits, gait/ADL impairments and acute rehabilitation was recommended. Patient was medically optimized for discharge to [facility] IRF on 11/8/22.

## 2022-11-08 NOTE — PROGRESS NOTE ADULT - REASON FOR ADMISSION
cord compression

## 2022-11-08 NOTE — PATIENT PROFILE ADULT - FUNCTIONAL ASSESSMENT - BASIC MOBILITY 1.
Chemodenervation  Date/Time: 2/28/2019 11:44 AM  Performed by: Karthik Conley MD  Authorized by: Karthik Conley MD     Pre-procedure details:     Prepped With: Alcohol      Premedication:  Diazepam  Anesthesia (see MAR for exact dosages): Anesthesia method:  None  Procedure details:     Position:  Upright  Botox:     Botox Type:  Type A    Brand:  Botox    mL's of Botulinum Toxin:  200    Final Concentration per CC:  200 units    Needle Gauge:  30 G 2 5 inch  Procedures:     Botox Procedures: chronic headache      Indications: migraines    Injection Location:     Head / Face:  L superior cervical paraspinal, R superior cervical paraspinal, L , R , L frontalis, R frontalis, L medial occipitalis, R medial occipitalis, procerus, R temporalis, L temporalis, R superior trapezius and L superior trapezius    L  injection amount:  5 unit(s)    R  injection amount:  5 unit(s)    L lateral frontalis:  5 unit(s)    R lateral frontalis:  5 unit(s)    L medial frontalis:  5 unit(s)    R medial frontalis:  5 unit(s)    L temporalis injection amount:  20 unit(s)    R temporalis injection amount:  20 unit(s)    Procerus injection amount:  5 unit(s)    L medial occipitalis injection amount:  15 unit(s)    R medial occipitalis injection amount:  15 unit(s)    L superior cervical paraspinal injection amount:  10 unit(s)    R superior cervical paraspinal injection amount:  10 unit(s)    L superior trapezius injection amount:  15 unit(s)    R superior trapezius injection amount:  15 unit(s)  Total Units:     Total units used:  200    Total units discarded:  0  Post-procedure details:     Chemodenervation:  Chronic migraine    Facial Nerve Location[de-identified]  Bilateral facial nerve    Patient tolerance of procedure:   Tolerated well, no immediate complications    45 units given in the scalp which was medically necessary
3 = A little assistance

## 2022-11-08 NOTE — PROGRESS NOTE ADULT - PROBLEM SELECTOR PLAN 8
Asymptomatic.  Advised on limited oral fluid intake.  Check Urine/Serum Osm and Urine electrolyte - suspect SIADH.

## 2022-11-08 NOTE — PROGRESS NOTE ADULT - PROBLEM SELECTOR PROBLEM 2
Spinal cord compression
Acute pulmonary embolism
Spinal cord compression
Acute pulmonary embolism
Spinal cord compression
Acute pulmonary embolism

## 2022-11-08 NOTE — H&P ADULT - ATTENDING COMMENTS
Progress note amended to include my discussions with patient, resident, hospitalist, NP      RECENT LABS    Vital Signs Last 24 Hrs  T(C): 36.8 (08 Nov 2022 19:44), Max: 36.8 (08 Nov 2022 19:44)  T(F): 98.2 (08 Nov 2022 19:44), Max: 98.2 (08 Nov 2022 19:44)  HR: 87 (09 Nov 2022 06:43) (85 - 90)  BP: 139/88 (09 Nov 2022 06:43) (115/91 - 156/87)  BP(mean): --  RR: 16 (08 Nov 2022 19:44) (16 - 18)  SpO2: 97% (08 Nov 2022 19:44) (97% - 100%)    Parameters below as of 08 Nov 2022 19:44  Patient On (Oxygen Delivery Method): room air                              11.6   11.54 )-----------( 539      ( 09 Nov 2022 06:00 )             34.4     11-09    135  |  98  |  27<H>  ----------------------------<  115<H>  4.6   |  30  |  0.92    Ca    9.8      09 Nov 2022 06:00    TPro  7.6  /  Alb  3.3  /  TBili  0.3  /  DBili  x   /  AST  22  /  ALT  29  /  AlkPhos  60  11-09        CAPILLARY BLOOD GLUCOSE Progress note amended to include my discussions with patient, resident, hospitalist, NP. Brother    Patient is RH dominant male , retired post  who presents with progressive weakness and numbness predominantly UE left > right after trying to use pull cord on his lawnmower without success; he estimates that he tried close to 14 times, with final time yanking cord and jerking his neck. Developed weakness, numbness, spasm, fasciculations and swelling in hands after. Work up revealed diffuse swelling of the cervical cord with long segment central cord edema extending from C2 to T1, and patient underwent lami/fusion. He has had some improvement in strength since, but still weak and sensory deficits persists.    Incision evaluated, dressing removed. Intact, clean, no warmth or erythema, no induration, fluctuance or TTP. +running sutures, healing well. Daily dressing ordered, applied. Labs show mild leukocytosis, WBC 11.54 , likely reactive s incision looks good, afebrile, and without cough, N/V, diarrhea, urinary complaints. Will monitor, CBC in AM. Hgb stable 11.6. BUn/Cr 27/0.92, encourage po fluids. Constipated, will give lactulose x 1 dose per request. Re: pain, was given oxycontin last night 10 q12 as nucynta non formulary; family michael in medication, and restarted on 100 q6 standing. continue to work on pain management and weaning, combination spasticity, radicular pain/numbness/paresthesias, and incisional pain.    on exam, reduced left UE tone and coordination, PROM WF+evidence intrinsic wasting left hand. left shoulder 4+/5 elbow flexion 4/5 extension 4/5 gross grasp 4-/5 intrinsic 3/5. wrist extension 4-/5 flexion 4-/5. right shoulder 5-/5 elbow flexion and extension 5/5 gross grasp 5-/5. Calves soft, NTTP. O2 sats stable without respiratory distress.    comprehensive rehab evaluation in progress    RECENT LABS    Vital Signs Last 24 Hrs  T(C): 36.8 (08 Nov 2022 19:44), Max: 36.8 (08 Nov 2022 19:44)  T(F): 98.2 (08 Nov 2022 19:44), Max: 98.2 (08 Nov 2022 19:44)  HR: 87 (09 Nov 2022 06:43) (85 - 90)  BP: 139/88 (09 Nov 2022 06:43) (115/91 - 156/87)  BP(mean): --  RR: 16 (08 Nov 2022 19:44) (16 - 18)  SpO2: 97% (08 Nov 2022 19:44) (97% - 100%)    Parameters below as of 08 Nov 2022 19:44  Patient On (Oxygen Delivery Method): room air                              11.6   11.54 )-----------( 539      ( 09 Nov 2022 06:00 )             34.4     11-09    135  |  98  |  27<H>  ----------------------------<  115<H>  4.6   |  30  |  0.92    Ca    9.8      09 Nov 2022 06:00    TPro  7.6  /  Alb  3.3  /  TBili  0.3  /  DBili  x   /  AST  22  /  ALT  29  /  AlkPhos  60  11-09        CAPILLARY BLOOD GLUCOSE

## 2022-11-08 NOTE — PROGRESS NOTE ADULT - SUBJECTIVE AND OBJECTIVE BOX
[facility] Division of Hospital Medicine  Juan Downey MD  Pager (M-F, 8A-5P): [phone]  Other Times:  [phone]    Patient is a 62y old  Male who presents with a chief complaint of cord compression (08 Nov 2022 09:10)    SUBJECTIVE / OVERNIGHT EVENTS:  No new complaints. No F/C, N/V, CP, SOB, Cough, lightheadedness, dizziness, abdominal pain, diarrhea, dysuria.    MEDICATIONS  (STANDING):  acetaminophen     Tablet .. 975 milliGRAM(s) Oral every 8 hours  amLODIPine   Tablet 10 milliGRAM(s) Oral daily  melatonin 3 milliGRAM(s) Oral at bedtime  polyethylene glycol 3350 17 Gram(s) Oral daily  predniSONE   Tablet 20 milliGRAM(s) Oral two times a day  rivaroxaban 20 milliGRAM(s) Oral with dinner  senna 2 Tablet(s) Oral at bedtime  tapentadol 100 milliGRAM(s) Oral four times a day    MEDICATIONS  (PRN):  bisacodyl Suppository 10 milliGRAM(s) Rectal daily PRN Constipation  cyclobenzaprine 10 milliGRAM(s) Oral three times a day PRN Muscle Spasm  morphine  IR 15 milliGRAM(s) Oral every 3 hours PRN Moderate - Severe Pain (4 - 6)      Vital Signs Last 24 Hrs  T(C): 36.7 (08 Nov 2022 09:00), Max: 36.8 (07 Nov 2022 17:00)  T(F): 98 (08 Nov 2022 09:00), Max: 98.3 (07 Nov 2022 17:00)  HR: 85 (08 Nov 2022 09:00) (82 - 93)  BP: 115/91 (08 Nov 2022 09:00) (115/91 - 134/73)  BP(mean): 74 (08 Nov 2022 06:40) (74 - 90)  RR: 18 (08 Nov 2022 09:00) (18 - 18)  SpO2: 100% (08 Nov 2022 09:00) (98% - 100%)    Parameters below as of 08 Nov 2022 09:00  Patient On (Oxygen Delivery Method): room air      CAPILLARY BLOOD GLUCOSE        I&O's Summary    07 Nov 2022 07:01  -  08 Nov 2022 07:00  --------------------------------------------------------  IN: 960 mL / OUT: 2700 mL / NET: -1740 mL    08 Nov 2022 07:01  -  08 Nov 2022 12:52  --------------------------------------------------------  IN: 0 mL / OUT: 600 mL / NET: -600 mL        PHYSICAL EXAM:  CONSTITUTIONAL: NAD, well-developed, well-groomed  EYES: PERRLA; conjunctiva and sclera clear  ENMT: Moist oral mucosa, no pharyngeal injection or exudates; normal dentition  NECK: Supple, no palpable masses; no thyromegaly  RESPIRATORY: Normal respiratory effort; lungs are clear to auscultation bilaterally  CARDIOVASCULAR: Regular rate and rhythm, normal S1 and S2, no murmur/rub/gallop; No lower extremity edema; Peripheral pulses are 2+ bilaterally  ABDOMEN: Nontender to palpation, normoactive bowel sounds, no rebound/guarding; No hepatosplenomegaly  MUSCULOSKELETAL:  Normal gait; no clubbing or cyanosis of digits; no joint swelling or tenderness to palpation  PSYCH: A+O to person, place, and time; affect appropriate  NEUROLOGY: CN 2-12 are intact and symmetric; no gross sensory deficits   SKIN: No rashes; no palpable lesions, surgical dressing C/D/I    LABS:                        11.4   9.23  )-----------( 540      ( 08 Nov 2022 10:54 )             34.3     11-08    136  |  96<L>  |  22  ----------------------------<  136<H>  4.7   |  27  |  0.81    Ca    10.0      08 Nov 2022 10:54                RADIOLOGY & ADDITIONAL TESTS:    Imaging Personally Reviewed:    Care Discussed with Consultants/Other Providers:    Care Discussed with Orthopedic PA about:

## 2022-11-08 NOTE — PROGRESS NOTE ADULT - PROBLEM SELECTOR PROBLEM 1
Acute pulmonary embolism
Acute pulmonary embolism
Pseudogout
Acute pulmonary embolism
Pseudogout

## 2022-11-09 DIAGNOSIS — M47.12 OTHER SPONDYLOSIS WITH MYELOPATHY, CERVICAL REGION: ICD-10-CM

## 2022-11-09 LAB
ALBUMIN SERPL ELPH-MCNC: 3.3 G/DL — SIGNIFICANT CHANGE UP (ref 3.3–5)
ALP SERPL-CCNC: 60 U/L — SIGNIFICANT CHANGE UP (ref 40–120)
ALT FLD-CCNC: 29 U/L — SIGNIFICANT CHANGE UP (ref 10–45)
ANION GAP SERPL CALC-SCNC: 7 MMOL/L — SIGNIFICANT CHANGE UP (ref 5–17)
AST SERPL-CCNC: 22 U/L — SIGNIFICANT CHANGE UP (ref 10–40)
BASOPHILS # BLD AUTO: 0.04 K/UL — SIGNIFICANT CHANGE UP (ref 0–0.2)
BASOPHILS NFR BLD AUTO: 0.3 % — SIGNIFICANT CHANGE UP (ref 0–2)
BILIRUB SERPL-MCNC: 0.3 MG/DL — SIGNIFICANT CHANGE UP (ref 0.2–1.2)
BUN SERPL-MCNC: 27 MG/DL — HIGH (ref 7–23)
CALCIUM SERPL-MCNC: 9.8 MG/DL — SIGNIFICANT CHANGE UP (ref 8.4–10.5)
CHLORIDE SERPL-SCNC: 98 MMOL/L — SIGNIFICANT CHANGE UP (ref 96–108)
CO2 SERPL-SCNC: 30 MMOL/L — SIGNIFICANT CHANGE UP (ref 22–31)
CREAT SERPL-MCNC: 0.92 MG/DL — SIGNIFICANT CHANGE UP (ref 0.5–1.3)
CULTURE RESULTS: SIGNIFICANT CHANGE UP
CULTURE RESULTS: SIGNIFICANT CHANGE UP
EGFR: 94 ML/MIN/1.73M2 — SIGNIFICANT CHANGE UP
EOSINOPHIL # BLD AUTO: 0.01 K/UL — SIGNIFICANT CHANGE UP (ref 0–0.5)
EOSINOPHIL NFR BLD AUTO: 0.1 % — SIGNIFICANT CHANGE UP (ref 0–6)
GLUCOSE SERPL-MCNC: 115 MG/DL — HIGH (ref 70–99)
HCT VFR BLD CALC: 34.4 % — LOW (ref 39–50)
HCV AB S/CO SERPL IA: 0.08 S/CO — SIGNIFICANT CHANGE UP (ref 0–0.99)
HCV AB SERPL-IMP: SIGNIFICANT CHANGE UP
HGB BLD-MCNC: 11.6 G/DL — LOW (ref 13–17)
IMM GRANULOCYTES NFR BLD AUTO: 0.7 % — SIGNIFICANT CHANGE UP (ref 0–0.9)
LYMPHOCYTES # BLD AUTO: 2.39 K/UL — SIGNIFICANT CHANGE UP (ref 1–3.3)
LYMPHOCYTES # BLD AUTO: 20.7 % — SIGNIFICANT CHANGE UP (ref 13–44)
MCHC RBC-ENTMCNC: 29.3 PG — SIGNIFICANT CHANGE UP (ref 27–34)
MCHC RBC-ENTMCNC: 33.7 GM/DL — SIGNIFICANT CHANGE UP (ref 32–36)
MCV RBC AUTO: 86.9 FL — SIGNIFICANT CHANGE UP (ref 80–100)
MONOCYTES # BLD AUTO: 0.67 K/UL — SIGNIFICANT CHANGE UP (ref 0–0.9)
MONOCYTES NFR BLD AUTO: 5.8 % — SIGNIFICANT CHANGE UP (ref 2–14)
NEUTROPHILS # BLD AUTO: 8.35 K/UL — HIGH (ref 1.8–7.4)
NEUTROPHILS NFR BLD AUTO: 72.4 % — SIGNIFICANT CHANGE UP (ref 43–77)
NRBC # BLD: 0 /100 WBCS — SIGNIFICANT CHANGE UP (ref 0–0)
PLATELET # BLD AUTO: 539 K/UL — HIGH (ref 150–400)
POTASSIUM SERPL-MCNC: 4.6 MMOL/L — SIGNIFICANT CHANGE UP (ref 3.5–5.3)
POTASSIUM SERPL-SCNC: 4.6 MMOL/L — SIGNIFICANT CHANGE UP (ref 3.5–5.3)
PROT SERPL-MCNC: 7.6 G/DL — SIGNIFICANT CHANGE UP (ref 6–8.3)
RBC # BLD: 3.96 M/UL — LOW (ref 4.2–5.8)
RBC # FLD: 13.9 % — SIGNIFICANT CHANGE UP (ref 10.3–14.5)
SARS-COV-2 RNA SPEC QL NAA+PROBE: SIGNIFICANT CHANGE UP
SODIUM SERPL-SCNC: 135 MMOL/L — SIGNIFICANT CHANGE UP (ref 135–145)
SPECIMEN SOURCE: SIGNIFICANT CHANGE UP
SPECIMEN SOURCE: SIGNIFICANT CHANGE UP
WBC # BLD: 11.54 K/UL — HIGH (ref 3.8–10.5)
WBC # FLD AUTO: 11.54 K/UL — HIGH (ref 3.8–10.5)

## 2022-11-09 PROCEDURE — 99223 1ST HOSP IP/OBS HIGH 75: CPT

## 2022-11-09 RX ORDER — AMLODIPINE BESYLATE 2.5 MG/1
10 TABLET ORAL DAILY
Refills: 0 | Status: DISCONTINUED | OUTPATIENT
Start: 2022-11-09 | End: 2022-11-23

## 2022-11-09 RX ORDER — LACTULOSE 10 G/15ML
10 SOLUTION ORAL ONCE
Refills: 0 | Status: COMPLETED | OUTPATIENT
Start: 2022-11-09 | End: 2022-11-09

## 2022-11-09 RX ORDER — TAPENTADOL HYDROCHLORIDE 50 MG/1
100 TABLET, FILM COATED ORAL EVERY 6 HOURS
Refills: 0 | Status: DISCONTINUED | OUTPATIENT
Start: 2022-11-09 | End: 2022-11-15

## 2022-11-09 RX ADMIN — LACTULOSE 10 GRAM(S): 10 SOLUTION ORAL at 12:32

## 2022-11-09 RX ADMIN — TAPENTADOL HYDROCHLORIDE 100 MILLIGRAM(S): 50 TABLET, FILM COATED ORAL at 12:29

## 2022-11-09 RX ADMIN — OXYCODONE HYDROCHLORIDE 10 MILLIGRAM(S): 5 TABLET ORAL at 06:43

## 2022-11-09 RX ADMIN — Medication 975 MILLIGRAM(S): at 06:44

## 2022-11-09 RX ADMIN — TAPENTADOL HYDROCHLORIDE 100 MILLIGRAM(S): 50 TABLET, FILM COATED ORAL at 18:18

## 2022-11-09 RX ADMIN — RIVAROXABAN 20 MILLIGRAM(S): KIT at 11:22

## 2022-11-09 RX ADMIN — OXYCODONE HYDROCHLORIDE 10 MILLIGRAM(S): 5 TABLET ORAL at 04:52

## 2022-11-09 RX ADMIN — AMLODIPINE BESYLATE 10 MILLIGRAM(S): 2.5 TABLET ORAL at 06:38

## 2022-11-09 RX ADMIN — Medication 20 MILLIGRAM(S): at 04:52

## 2022-11-09 RX ADMIN — Medication 975 MILLIGRAM(S): at 22:00

## 2022-11-09 RX ADMIN — SENNA PLUS 2 TABLET(S): 8.6 TABLET ORAL at 21:28

## 2022-11-09 RX ADMIN — Medication 975 MILLIGRAM(S): at 21:27

## 2022-11-09 RX ADMIN — Medication 975 MILLIGRAM(S): at 04:52

## 2022-11-09 RX ADMIN — Medication 3 MILLIGRAM(S): at 21:27

## 2022-11-09 NOTE — DIETITIAN INITIAL EVALUATION ADULT - OTHER INFO
Patient is a 63 y/o M admitted to [facility] with other spondylosis with myelopathy, cervical region. PMH HTN, asthma. Patient reports good appetite at this time (% PO intake). Patient states UBW to be ~184lbs; current BW is 166.8lbs (17lbs wt loss, 9% x 1 month). BMI reflects as normal (23.9). Reports he is allergic to peanuts and does not want eggs. Patient needs assistance cutting food and opening up containers due to left arm/hand weakness. No issues chewing/swallowing at this time. No N/V/D however, patient reports he is constipated and has not had a BM in 7 days and has been experiencing a lot of gas. Severe signs of muscle wasting and fat loss found in upper and lower extremities

## 2022-11-09 NOTE — DIETITIAN INITIAL EVALUATION ADULT - PERTINENT LABORATORY DATA
11-09    135  |  98  |  27<H>  ----------------------------<  115<H>  4.6   |  30  |  0.92    Ca    9.8      09 Nov 2022 06:00    TPro  7.6  /  Alb  3.3  /  TBili  0.3  /  DBili  x   /  AST  22  /  ALT  29  /  AlkPhos  60  11-09

## 2022-11-09 NOTE — DIETITIAN INITIAL EVALUATION ADULT - PERSON TAUGHT/METHOD
Education on need for high protein and high kcalories to optimize nutrition/verbal instruction/patient instructed

## 2022-11-09 NOTE — CONSULT NOTE ADULT - ASSESSMENT
61yo male with hx of asthma and HTN, presented to [facility] for acute rehab from [facility] where he was transferred to from [facility] with acute complaints of numbness and swelling of his left arm for the past month and weakness of his right leg, noted to have diffuse swelling of the cervical cord with long segment central cord edema extending from C2 to T1 with moderate spinal canal stenosis and mild cord compression at C3-C4 and C4-C5. Pt s/p C2-T1 laminectomy/fusion with C3-6 laminectomies with Dr. Lopez on 10/24. Postop with B/L PE and left knee pain    #Spinal cord compression  #Central cord syndrome  -S/p C2-T1 laminectomy/fusion with C3-6 laminectomies with Dr. Lopez on 10/24  -Cervical collar when OOB  -Precautions: spinal, respiratory, cardiac, sensory, fall, AC  -Start PT/OT  -Primary team to manage pain regimen    #B/L Pulmonary Emboli  -No respiratory compromise  -Continue Xarelto. Treatment for 3-6months    #HTN  -Continue Amlodipine w/ holding parameters    #Asthma  -Albuterol INH PRN    #Left knee pain  -S/p aspiration, fluid assessment reviewed. Negative for gout or infection  -Empirically treated with Steroid  -Encourage PT/OT    # DVT ppx  -Xarelto

## 2022-11-09 NOTE — DIETITIAN INITIAL EVALUATION ADULT - PERTINENT MEDS FT
MEDICATIONS  (STANDING):  acetaminophen     Tablet .. 975 milliGRAM(s) Oral every 8 hours  amLODIPine   Tablet 10 milliGRAM(s) Oral daily  melatonin 3 milliGRAM(s) Oral at bedtime  polyethylene glycol 3350 17 Gram(s) Oral daily  rivaroxaban 20 milliGRAM(s) Oral daily  senna 2 Tablet(s) Oral at bedtime  tapentadol 100 milliGRAM(s) Oral every 6 hours    MEDICATIONS  (PRN):  albuterol    90 MICROgram(s) HFA Inhaler 2 Puff(s) Inhalation every 6 hours PRN Shortness of Breath and/or Wheezing  bisacodyl Suppository 10 milliGRAM(s) Rectal daily PRN Constipation  cyclobenzaprine 10 milliGRAM(s) Oral three times a day PRN Muscle Spasm  morphine  IR 15 milliGRAM(s) Oral every 3 hours PRN Moderate - Severe Pain (4 - 6)

## 2022-11-09 NOTE — CONSULT NOTE ADULT - SUBJECTIVE AND OBJECTIVE BOX
HPI:  63yo male with hx of asthma and HTN, presented to [facility] for acute rehab from [facility] where he was transferred to from [facility]- with acute complaints of numbness and swelling of his left arm for the past month and weakness of his right leg, noted to have diffuse swelling of the cervical cord with long segment central cord edema extending from C2 to T1 with moderate spinal canal stenosis and mild cord compression at C3-C4 and C4-C5. Pt s/p C2-T1 laminectomy/fusion with C3-6 laminectomies with Dr. Lopez on 10/24. No immediate post op complication. Hospital course, however, complicated by B/L Pulmonary Emboli, started on Xarelto. Further complication of Left knee pain and swelling, s/p knee aspiration negative for Gout, however noted to have therapeutic relief. Treated with course of Steroids x 5 days for empiric treatment of pseudogout. Pt was deemed medically stable for discharge to acute rehab    Pt seen and examined at bedside.  Overnight pt with episode of khari attack/anxiety. States he felt claustrophobic and was a bit confused as far as his location. States he calmed down after he received pain medication. Pt also states he takes standing Nucynta at home for pain. His brother will be bringing his medications to the hospital. He understands that his pain regimen will need to be readjusted but does not want to stop taking Nucynta.   Pt denies cp, palpitations, sob, abd pain, N/V, fever, chills    Home Medications:  acetaminophen 325 mg oral tablet: 3 tab(s) orally every 8 hours (08 Nov 2022 12:46)  Advair Diskus:  inhaled  (24 Oct 2022 10:13)  amLODIPine 10 mg oral tablet: 1 tab(s) orally once a day (24 Oct 2022 10:13)  bisacodyl 10 mg rectal suppository: 1 suppository(ies) rectal once a day, As needed, Constipation (08 Nov 2022 12:46)  cyclobenzaprine 10 mg oral tablet: 1 tab(s) orally 3 times a day, As needed, Muscle Spasm (08 Nov 2022 12:46)  melatonin 3 mg oral tablet: 1 tab(s) orally once a day (at bedtime) (08 Nov 2022 12:46)  polyethylene glycol 3350 oral powder for reconstitution: 17 gram(s) orally once a day (08 Nov 2022 12:46)  rivaroxaban 20 mg oral tablet: 1 tab(s) orally once a day (before a meal) (08 Nov 2022 12:46)  senna leaf extract oral tablet: 2 tab(s) orally once a day (at bedtime) (08 Nov 2022 12:46)  tapentadol 100 mg oral tablet: 1 tab(s) orally 4 times a day (08 Nov 2022 12:46)      PAST MEDICAL & SURGICAL HISTORY:  Asthma  HTN (hypertension)    Review of Systems:   CONSTITUTIONAL: No fever, weight loss, or fatigue  EYES: No eye pain, visual disturbances, or discharge  ENMT:  No difficulty hearing, tinnitus, vertigo; No sinus or throat pain  NECK: No pain or stiffness  BREASTS: No pain, masses, or nipple discharge  RESPIRATORY: No cough, wheezing, chills or hemoptysis; No shortness of breath  CARDIOVASCULAR: No chest pain, palpitations, dizziness, or leg swelling  GASTROINTESTINAL: No abdominal or epigastric pain. No nausea, vomiting, or hematemesis; No diarrhea or constipation. No melena or hematochezia.  GENITOURINARY: No dysuria, frequency, hematuria, or incontinence  NEUROLOGICAL: No headaches, memory loss, loss of strength, numbness, or tremors  SKIN: No itching, burning, rashes, or lesions   LYMPH NODES: No enlarged glands  ENDOCRINE: No heat or cold intolerance; No hair loss  MUSCULOSKELETAL: No joint pain or swelling; No muscle, back, or extremity pain  PSYCHIATRIC: No depression, anxiety, mood swings, or difficulty sleeping  HEME/LYMPH: No easy bruising, or bleeding gums  ALLERY AND IMMUNOLOGIC: No hives or eczema    Allergies  No Known Drug Allergies  peanuts (Unknown)    Social History:   Lives with family    FAMILY HISTORY:  No family hx of heart disease or CVA, but states mom has HTN and is still alive    MEDICATIONS  (STANDING):  acetaminophen     Tablet .. 975 milliGRAM(s) Oral every 8 hours  amLODIPine   Tablet 10 milliGRAM(s) Oral daily  melatonin 3 milliGRAM(s) Oral at bedtime  oxyCODONE  ER Tablet 10 milliGRAM(s) Oral every 12 hours  polyethylene glycol 3350 17 Gram(s) Oral daily  rivaroxaban 20 milliGRAM(s) Oral daily  senna 2 Tablet(s) Oral at bedtime    MEDICATIONS  (PRN):  albuterol    90 MICROgram(s) HFA Inhaler 2 Puff(s) Inhalation every 6 hours PRN Shortness of Breath and/or Wheezing  bisacodyl Suppository 10 milliGRAM(s) Rectal daily PRN Constipation  cyclobenzaprine 10 milliGRAM(s) Oral three times a day PRN Muscle Spasm  morphine  IR 15 milliGRAM(s) Oral every 3 hours PRN Moderate - Severe Pain (4 - 6)      Vital Signs Last 24 Hrs  T(C): 36.8 (08 Nov 2022 19:44), Max: 36.8 (08 Nov 2022 19:44)  T(F): 98.2 (08 Nov 2022 19:44), Max: 98.2 (08 Nov 2022 19:44)  HR: 94 (09 Nov 2022 08:53) (87 - 94)  BP: 128/84 (09 Nov 2022 08:53) (128/84 - 156/87)  BP(mean): --  RR: 16 (09 Nov 2022 08:53) (16 - 18)  SpO2: 98% (09 Nov 2022 08:53) (97% - 100%)    Parameters below as of 09 Nov 2022 08:53  Patient On (Oxygen Delivery Method): room air      CAPILLARY BLOOD GLUCOSE            PHYSICAL EXAM:  GENERAL: NAD, well-developed  HEAD:  Atraumatic, Normocephalic  EYES: EOMI, PERRLA, conjunctiva and sclera clear  NECK: +C-collar  CHEST/LUNG: Clear to auscultation bilaterally; No wheeze, nonlabored breathing  HEART: Regular rate and rhythm; No murmurs, rubs, or gallops  ABDOMEN: Soft, Nontender, Nondistended; Bowel sounds present  EXTREMITIES:  No clubbing, cyanosis, or edema  NEUROLOGY: AAOx3, non-focal  PSYCH: calm, appropriate mood  SKIN: No rashes or lesions, warm intact    LABS:                        11.6   11.54 )-----------( 539      ( 09 Nov 2022 06:00 )             34.4     11-09    135  |  98  |  27<H>  ----------------------------<  115<H>  4.6   |  30  |  0.92    Ca    9.8      09 Nov 2022 06:00    TPro  7.6  /  Alb  3.3  /  TBili  0.3  /  DBili  x   /  AST  22  /  ALT  29  /  AlkPhos  60  11-09                RADIOLOGY & ADDITIONAL TESTS:    Prior hospital records reviewed  Discussed case with physiatry

## 2022-11-10 DIAGNOSIS — G83.89 OTHER SPECIFIED PARALYTIC SYNDROMES: ICD-10-CM

## 2022-11-10 LAB
ALBUMIN SERPL ELPH-MCNC: 3.2 G/DL — LOW (ref 3.3–5)
ALP SERPL-CCNC: 60 U/L — SIGNIFICANT CHANGE UP (ref 40–120)
ALT FLD-CCNC: 29 U/L — SIGNIFICANT CHANGE UP (ref 10–45)
ANION GAP SERPL CALC-SCNC: 6 MMOL/L — SIGNIFICANT CHANGE UP (ref 5–17)
AST SERPL-CCNC: 27 U/L — SIGNIFICANT CHANGE UP (ref 10–40)
BILIRUB SERPL-MCNC: 0.4 MG/DL — SIGNIFICANT CHANGE UP (ref 0.2–1.2)
BUN SERPL-MCNC: 26 MG/DL — HIGH (ref 7–23)
CALCIUM SERPL-MCNC: 9.3 MG/DL — SIGNIFICANT CHANGE UP (ref 8.4–10.5)
CHLORIDE SERPL-SCNC: 100 MMOL/L — SIGNIFICANT CHANGE UP (ref 96–108)
CO2 SERPL-SCNC: 31 MMOL/L — SIGNIFICANT CHANGE UP (ref 22–31)
CREAT SERPL-MCNC: 0.98 MG/DL — SIGNIFICANT CHANGE UP (ref 0.5–1.3)
EGFR: 87 ML/MIN/1.73M2 — SIGNIFICANT CHANGE UP
GLUCOSE SERPL-MCNC: 95 MG/DL — SIGNIFICANT CHANGE UP (ref 70–99)
HCT VFR BLD CALC: 33.4 % — LOW (ref 39–50)
HGB BLD-MCNC: 11 G/DL — LOW (ref 13–17)
MCHC RBC-ENTMCNC: 29 PG — SIGNIFICANT CHANGE UP (ref 27–34)
MCHC RBC-ENTMCNC: 32.9 GM/DL — SIGNIFICANT CHANGE UP (ref 32–36)
MCV RBC AUTO: 88.1 FL — SIGNIFICANT CHANGE UP (ref 80–100)
NRBC # BLD: 0 /100 WBCS — SIGNIFICANT CHANGE UP (ref 0–0)
PLATELET # BLD AUTO: 521 K/UL — HIGH (ref 150–400)
POTASSIUM SERPL-MCNC: 4.8 MMOL/L — SIGNIFICANT CHANGE UP (ref 3.5–5.3)
POTASSIUM SERPL-SCNC: 4.8 MMOL/L — SIGNIFICANT CHANGE UP (ref 3.5–5.3)
PROT SERPL-MCNC: 6.7 G/DL — SIGNIFICANT CHANGE UP (ref 6–8.3)
RBC # BLD: 3.79 M/UL — LOW (ref 4.2–5.8)
RBC # FLD: 14 % — SIGNIFICANT CHANGE UP (ref 10.3–14.5)
SODIUM SERPL-SCNC: 137 MMOL/L — SIGNIFICANT CHANGE UP (ref 135–145)
WBC # BLD: 8.51 K/UL — SIGNIFICANT CHANGE UP (ref 3.8–10.5)
WBC # FLD AUTO: 8.51 K/UL — SIGNIFICANT CHANGE UP (ref 3.8–10.5)

## 2022-11-10 PROCEDURE — 99232 SBSQ HOSP IP/OBS MODERATE 35: CPT

## 2022-11-10 RX ORDER — OXYCODONE HYDROCHLORIDE 5 MG/1
5 TABLET ORAL EVERY 6 HOURS
Refills: 0 | Status: DISCONTINUED | OUTPATIENT
Start: 2022-11-10 | End: 2022-11-10

## 2022-11-10 RX ORDER — GABAPENTIN 400 MG/1
300 CAPSULE ORAL AT BEDTIME
Refills: 0 | Status: DISCONTINUED | OUTPATIENT
Start: 2022-11-10 | End: 2022-11-12

## 2022-11-10 RX ORDER — OXYCODONE HYDROCHLORIDE 5 MG/1
5 TABLET ORAL EVERY 6 HOURS
Refills: 0 | Status: DISCONTINUED | OUTPATIENT
Start: 2022-11-10 | End: 2022-11-17

## 2022-11-10 RX ORDER — GABAPENTIN 400 MG/1
100 CAPSULE ORAL AT BEDTIME
Refills: 0 | Status: DISCONTINUED | OUTPATIENT
Start: 2022-11-10 | End: 2022-11-10

## 2022-11-10 RX ORDER — CAPSAICIN 0.025 %
1 CREAM (GRAM) TOPICAL EVERY 8 HOURS
Refills: 0 | Status: DISCONTINUED | OUTPATIENT
Start: 2022-11-10 | End: 2022-11-11

## 2022-11-10 RX ORDER — OXYCODONE HYDROCHLORIDE 5 MG/1
10 TABLET ORAL EVERY 6 HOURS
Refills: 0 | Status: DISCONTINUED | OUTPATIENT
Start: 2022-11-10 | End: 2022-11-10

## 2022-11-10 RX ORDER — OXYCODONE HYDROCHLORIDE 5 MG/1
2.5 TABLET ORAL EVERY 6 HOURS
Refills: 0 | Status: DISCONTINUED | OUTPATIENT
Start: 2022-11-10 | End: 2022-11-10

## 2022-11-10 RX ORDER — PANTOPRAZOLE SODIUM 20 MG/1
40 TABLET, DELAYED RELEASE ORAL
Refills: 0 | Status: DISCONTINUED | OUTPATIENT
Start: 2022-11-10 | End: 2022-11-23

## 2022-11-10 RX ORDER — METHOCARBAMOL 500 MG/1
250 TABLET, FILM COATED ORAL EVERY 4 HOURS
Refills: 0 | Status: DISCONTINUED | OUTPATIENT
Start: 2022-11-10 | End: 2022-11-16

## 2022-11-10 RX ADMIN — Medication 975 MILLIGRAM(S): at 21:33

## 2022-11-10 RX ADMIN — SENNA PLUS 2 TABLET(S): 8.6 TABLET ORAL at 21:33

## 2022-11-10 RX ADMIN — TAPENTADOL HYDROCHLORIDE 100 MILLIGRAM(S): 50 TABLET, FILM COATED ORAL at 19:35

## 2022-11-10 RX ADMIN — TAPENTADOL HYDROCHLORIDE 100 MILLIGRAM(S): 50 TABLET, FILM COATED ORAL at 00:29

## 2022-11-10 RX ADMIN — Medication 1 APPLICATION(S): at 21:32

## 2022-11-10 RX ADMIN — Medication 975 MILLIGRAM(S): at 18:52

## 2022-11-10 RX ADMIN — Medication 975 MILLIGRAM(S): at 16:27

## 2022-11-10 RX ADMIN — TAPENTADOL HYDROCHLORIDE 100 MILLIGRAM(S): 50 TABLET, FILM COATED ORAL at 12:12

## 2022-11-10 RX ADMIN — OXYCODONE HYDROCHLORIDE 5 MILLIGRAM(S): 5 TABLET ORAL at 18:52

## 2022-11-10 RX ADMIN — Medication 975 MILLIGRAM(S): at 22:10

## 2022-11-10 RX ADMIN — GABAPENTIN 300 MILLIGRAM(S): 400 CAPSULE ORAL at 21:33

## 2022-11-10 RX ADMIN — TAPENTADOL HYDROCHLORIDE 100 MILLIGRAM(S): 50 TABLET, FILM COATED ORAL at 18:56

## 2022-11-10 RX ADMIN — Medication 3 MILLIGRAM(S): at 21:33

## 2022-11-10 RX ADMIN — TAPENTADOL HYDROCHLORIDE 100 MILLIGRAM(S): 50 TABLET, FILM COATED ORAL at 06:06

## 2022-11-10 RX ADMIN — AMLODIPINE BESYLATE 10 MILLIGRAM(S): 2.5 TABLET ORAL at 06:10

## 2022-11-10 RX ADMIN — Medication 975 MILLIGRAM(S): at 08:08

## 2022-11-10 RX ADMIN — Medication 1 APPLICATION(S): at 18:58

## 2022-11-10 RX ADMIN — RIVAROXABAN 20 MILLIGRAM(S): KIT at 12:15

## 2022-11-10 RX ADMIN — Medication 975 MILLIGRAM(S): at 06:10

## 2022-11-10 RX ADMIN — OXYCODONE HYDROCHLORIDE 5 MILLIGRAM(S): 5 TABLET ORAL at 16:27

## 2022-11-10 NOTE — PROGRESS NOTE ADULT - ASSESSMENT
63yo male with hx of asthma and HTN, presented to [facility] for acute rehab from [facility] where he was transferred to from [facility] [place] with acute complaints of numbness and swelling of his left arm for the past month and weakness of his right leg, noted to have diffuse swelling of the cervical cord with long segment central cord edema extending from C2 to T1 with moderate spinal canal stenosis and mild cord compression at C3-C4 and C4-C5. Pt s/p C2-T1 laminectomy/fusion with C3-6 laminectomies with Dr. Lopez on 10/24. Postop with B/L PE and left knee pain    #Spinal cord compression  #Central cord syndrome  -S/p C2-T1 laminectomy/fusion with C3-6 laminectomies with Dr. Lopez on 10/24  -Cervical collar when OOB  -Precautions: spinal, respiratory, cardiac, sensory, fall, AC  -Continue PT/OT  -Primary team to manage pain regimen    #B/L Pulmonary Emboli  -No respiratory compromise  -Continue Xarelto. Treatment for 3-6months    #HTN  -Continue Amlodipine w/ holding parameters    #Asthma  -Albuterol INH PRN    #Left knee pain  -S/p aspiration, fluid assessment reviewed. Negative for gout or infection  -Empirically treated with Steroid  -Encourage PT/OT    # DVT ppx  -Xarelto

## 2022-11-10 NOTE — PROGRESS NOTE ADULT - SUBJECTIVE AND OBJECTIVE BOX
HISTORY OF PRESENT ILLNESS  This is a 63 YO male RH dominant with PMH of asthma and HTN who was transferred from [facility] to [facility] ED on 10/23/22. Patient presented to [facility] 10/23 with complaints of numbness and swelling of his left arm for the past month and weakness of his right leg. He reports that he was told that he has nerve damage by his doctors. Over the month the symptoms have been getting worse and include sensory changes. He states that his arm can get very cold to touch and sometimes the right leg may not be able to accurately feel hot temperatures. He reports increasing fasciculations and spasms. His brother who is at bedside reports the patient having frequent falls due to his weakness. No fever, no urinary retention, no fecal incontinence.  C spine MRI showed diffuse swelling of the cervical cord with long segment central cord edema extending from C2 to T1. there was moderate spinal canal stenosis with apparent mild cord compression at C3-C4 and C4-C5,  minimal cord compression at C5-C6, and mild ventral cord impingement at C6-C7, however per rads read the diffuse changes in the cervical cord are not compatible with primary compressive myelopathy Patient was seen by neuro and orthopedics for cord compression with neurological symptoms. Patient was admitted to the orthopedic service and underwent a C2-T1 laminectomy/fusion with C3-6 laminectomies with Dr. Lopez on 10/24.     On 10/31, patient developed fevers, imaging  showed bilateral pulmonary embolisms. He was transferred to telemetry and started on therapeutic Xarelto.  Additionally, he was having trouble ranging his left knee. His left knee was aspirated on 11/3 which was found to be negative for gout. He continued to have fever, all other fever work up remained negative. Fever likely pseudogout treated with steroid x 5 days.    Patient was evaluated by PM&R and therapy for functional deficits, gait/ADL impairments and acute rehabilitation was recommended. Patient was medically optimized for discharge to [facility] IRF on 11/8/22.     SUBJECTIVE / OVERNIGHT EVENTS:  Pt seen and examined in chair. No acute events overnight.  Pt denies cp, palpitations, sob, abd pain, N/V, fever, chills. Right knee pain-requests topicals.     ROS:  Adjusting pain regimen for complaint of tingling in fingers      VITALS  Vital Signs Last 24 Hrs  T(C): 36.8 (09 Nov 2022 20:34), Max: 36.8 (09 Nov 2022 20:34)  T(F): 98.2 (09 Nov 2022 20:34), Max: 98.2 (09 Nov 2022 20:34)  HR: 97 (10 Nov 2022 09:48) (76 - 98)  BP: 146/88 (10 Nov 2022 09:48) (126/69 - 146/88)  BP(mean): --  RR: 16 (10 Nov 2022 09:48) (16 - 16)  SpO2: 99% (10 Nov 2022 09:48) (97% - 99%)    Parameters below as of 10 Nov 2022 09:48  Patient On (Oxygen Delivery Method): room air      RECENT LABS                        11.0   8.51  )-----------( 521      ( 10 Nov 2022 06:36 )             33.4     11-10    137  |  100  |  26<H>  ----------------------------<  95  4.8   |  31  |  0.98    Ca    9.3      10 Nov 2022 06:36    TPro  6.7  /  Alb  3.2<L>  /  TBili  0.4  /  DBili  x   /  AST  27  /  ALT  29  /  AlkPhos  60  11-10      LIVER FUNCTIONS - ( 10 Nov 2022 06:36 )  Alb: 3.2 g/dL / Pro: 6.7 g/dL / ALK PHOS: 60 U/L / ALT: 29 U/L / AST: 27 U/L / GGT: x             PHYSICAL EXAM:  GENERAL: NAD, well-developed  HEAD:  Atraumatic, Normocephalic  NECK: Supple, C-Collar on  CHEST/LUNG: nonlabored breathing  HEART: Regular rate and rhythm  ABDOMEN: Soft, Nontender, Nondistended; Bowel sounds present  EXTREMITIES:  No clubbing, cyanosis, or edema  NEUROLOGY: AAOx3, at baseline, no new weakness today  PSYCH: mood calm      CURRENT MEDICATIONS  MEDICATIONS  (STANDING):  acetaminophen     Tablet .. 975 milliGRAM(s) Oral every 8 hours  amLODIPine   Tablet 10 milliGRAM(s) Oral daily  capsaicin 0.025% Cream 1 Application(s) Topical every 8 hours  gabapentin 300 milliGRAM(s) Oral at bedtime  melatonin 3 milliGRAM(s) Oral at bedtime  pantoprazole    Tablet 40 milliGRAM(s) Oral before breakfast  polyethylene glycol 3350 17 Gram(s) Oral daily  rivaroxaban 20 milliGRAM(s) Oral daily  senna 2 Tablet(s) Oral at bedtime  tapentadol 100 milliGRAM(s) Oral every 6 hours    MEDICATIONS  (PRN):  albuterol    90 MICROgram(s) HFA Inhaler 2 Puff(s) Inhalation every 6 hours PRN Shortness of Breath and/or Wheezing  bisacodyl Suppository 10 milliGRAM(s) Rectal daily PRN Constipation  methocarbamol 250 milliGRAM(s) Oral every 4 hours PRN Muscle Spasm  oxyCODONE    IR 2.5 milliGRAM(s) Oral every 6 hours PRN Moderate Pain (4 - 6)  oxyCODONE    IR 5 milliGRAM(s) Oral every 6 hours PRN Severe Pain (7 - 10)    IDT meeting on 11/10    SW: PH 4STE, 0STI, lives c mother and brother    OT:   eating set up  grooming set up  UBD/LBD min A  toileting min A  tub/shower min A  bathing min A    PT:  amb 75ft CG  transfers CG  stairs 4 min A    SLP na    tentative dc home c HC on 11/18     Continue comprehensive acute rehab program consisting of 3hrs/day of OT/PT and SLP.

## 2022-11-10 NOTE — PROGRESS NOTE ADULT - ASSESSMENT
63 YO male with PMH of asthma and HTN who was transferred from [facility]. Patient presented to [facility] ED on 10/23 and Orthopedics was consulted for cord compression with neurological symptoms. Patient  underwent a C2-T1 laminectomy/fusion with C3-6 laminectomies with Dr. Lopez on 10/24. post -op c/b fevers found to have PE.   Patient now with gait Instability, ADL impairments and Functional impairments.    # Spinal cord compression/central cord syndrome  -  s/p C2-T1 laminectomy/fusion with C3-6 laminectomies with Dr. Lopez on 10/24  - wound care: DSD and tegaderm daily. f/u with NSGY re: suture removal  - Comprehensive Rehab Program: PT/OT, 3hours daily and 5 days weekly  - PT: Focused on improving strength, endurance, coordination, balance, functional mobility, and transfers  - OT: Focused on improving strength, fine motor skills, coordination, posture and ADLs.   - aspen cervical collar when OOB  - Precautions: spinal, respiratory, cardiac, sensory, fall, AC    # HTN  - Off amldodipine  - monitor, hospitalist consult    # PE  - TTE No evidence of RV strain  - Xarelto 20mg daily  - monitor HR, O2 sats well RA   - hosptialist in     #Asthma  - Albuterol INH PRN    # Leukocytosis  - No suspicion for infection  - WBC improved, afebrile    # Hyponatremia  - Na 135 11/9 stable    # Pseudo Gout  - aspirate from the Lt knee with no suspicion for infectious arthritis  - prednisone 20 BID for 5 days- Last dose 11/9    # Pain management  - Tylenol PRN  - morphine 15mg Q 3 hrs-to low dose oxycodone prn  - oxycontin 10 q12--> dc and switched to home med nucynta 100 q6 11/9  - flexeril TID to robaxin  -neurontin low dose added    # GI ppx  -  Protonix 40mg  - Senna, miralax, dulcolax supp PRN  -BM x1    # Bladder management  - BS on admission, and q 8 hours (SC if > 400)  - Monitor UO    # FEN   - Diet: Regular- no egg    # Skin:  - Skin on admission: posterior cervical spine incision with sutures and dry dressing  - Pressure injury/Skin: Turn Q2hrs while in bed, OOB to Chair, PT/OT     # DVT ppx  - SCD, TEDs  - on xarelto    CODE STATUS: FULL CODE    Outpatient Follow-up (Specialty/Name of physician):    Ami Lopez (DO)  Orthopaedic Surgery Surgery  [address], [place] [address]  Phone: [phone]  Fax: [phone]

## 2022-11-10 NOTE — PROGRESS NOTE ADULT - SUBJECTIVE AND OBJECTIVE BOX
Patient is a 62y old  Male who presents with a chief complaint of Other spondylosis with myelopathy, cervical region     (09 Nov 2022 14:14)      SUBJECTIVE / OVERNIGHT EVENTS:  Pt seen and examined at bedside. No acute events overnight.  Pt denies cp, palpitations, sob, abd pain, N/V, fever, chills.    ROS:  All other review of systems negative    Allergies    No Known Drug Allergies  peanuts (Unknown)    Intolerances        MEDICATIONS  (STANDING):  acetaminophen     Tablet .. 975 milliGRAM(s) Oral every 8 hours  amLODIPine   Tablet 10 milliGRAM(s) Oral daily  melatonin 3 milliGRAM(s) Oral at bedtime  polyethylene glycol 3350 17 Gram(s) Oral daily  rivaroxaban 20 milliGRAM(s) Oral daily  senna 2 Tablet(s) Oral at bedtime  tapentadol 100 milliGRAM(s) Oral every 6 hours    MEDICATIONS  (PRN):  albuterol    90 MICROgram(s) HFA Inhaler 2 Puff(s) Inhalation every 6 hours PRN Shortness of Breath and/or Wheezing  bisacodyl Suppository 10 milliGRAM(s) Rectal daily PRN Constipation  cyclobenzaprine 10 milliGRAM(s) Oral three times a day PRN Muscle Spasm  morphine  IR 15 milliGRAM(s) Oral every 3 hours PRN Moderate - Severe Pain (4 - 6)      Vital Signs Last 24 Hrs  T(C): 36.8 (09 Nov 2022 20:34), Max: 36.8 (09 Nov 2022 20:34)  T(F): 98.2 (09 Nov 2022 20:34), Max: 98.2 (09 Nov 2022 20:34)  HR: 76 (10 Nov 2022 06:10) (76 - 98)  BP: 126/69 (10 Nov 2022 06:10) (126/69 - 133/84)  BP(mean): --  RR: 16 (09 Nov 2022 20:34) (16 - 16)  SpO2: 97% (09 Nov 2022 20:34) (97% - 98%)    Parameters below as of 09 Nov 2022 20:34  Patient On (Oxygen Delivery Method): room air      CAPILLARY BLOOD GLUCOSE        I&O's Summary      PHYSICAL EXAM:  GENERAL: NAD, well-developed  HEAD:  Atraumatic, Normocephalic  NECK: Supple no JVD. C-Collar off while in bed  CHEST/LUNG: Clear to auscultation bilaterally; No wheeze, nonlabored breathing  HEART: Regular rate and rhythm; No murmurs, rubs, or gallops  ABDOMEN: Soft, Nontender, Nondistended; Bowel sounds present  EXTREMITIES:  No clubbing, cyanosis, or edema  NEUROLOGY: AAOx3, non-focal  PSYCH: calm, appropriate mood    LABS:                        11.0   8.51  )-----------( 521      ( 10 Nov 2022 06:36 )             33.4     11-10    137  |  100  |  26<H>  ----------------------------<  95  4.8   |  31  |  0.98    Ca    9.3      10 Nov 2022 06:36    TPro  6.7  /  Alb  3.2<L>  /  TBili  0.4  /  DBili  x   /  AST  27  /  ALT  29  /  AlkPhos  60  11-10              RADIOLOGY & ADDITIONAL TESTS:  Results Reviewed:   Imaging Personally Reviewed:  Electrocardiogram Personally Reviewed:    COORDINATION OF CARE:  Care Discussed with Consultants/Other Providers [Y/N]:  Prior or Outpatient Records Reviewed [Y/N]:

## 2022-11-11 PROCEDURE — 99232 SBSQ HOSP IP/OBS MODERATE 35: CPT

## 2022-11-11 RX ORDER — DICLOFENAC SODIUM 30 MG/G
4 GEL TOPICAL THREE TIMES A DAY
Refills: 0 | Status: DISCONTINUED | OUTPATIENT
Start: 2022-11-11 | End: 2022-11-23

## 2022-11-11 RX ADMIN — OXYCODONE HYDROCHLORIDE 5 MILLIGRAM(S): 5 TABLET ORAL at 13:43

## 2022-11-11 RX ADMIN — Medication 975 MILLIGRAM(S): at 13:45

## 2022-11-11 RX ADMIN — Medication 975 MILLIGRAM(S): at 06:20

## 2022-11-11 RX ADMIN — Medication 975 MILLIGRAM(S): at 13:03

## 2022-11-11 RX ADMIN — OXYCODONE HYDROCHLORIDE 5 MILLIGRAM(S): 5 TABLET ORAL at 14:30

## 2022-11-11 RX ADMIN — RIVAROXABAN 20 MILLIGRAM(S): KIT at 12:00

## 2022-11-11 RX ADMIN — TAPENTADOL HYDROCHLORIDE 100 MILLIGRAM(S): 50 TABLET, FILM COATED ORAL at 06:29

## 2022-11-11 RX ADMIN — SENNA PLUS 2 TABLET(S): 8.6 TABLET ORAL at 21:47

## 2022-11-11 RX ADMIN — TAPENTADOL HYDROCHLORIDE 100 MILLIGRAM(S): 50 TABLET, FILM COATED ORAL at 17:50

## 2022-11-11 RX ADMIN — Medication 975 MILLIGRAM(S): at 05:46

## 2022-11-11 RX ADMIN — Medication 1 APPLICATION(S): at 05:47

## 2022-11-11 RX ADMIN — PANTOPRAZOLE SODIUM 40 MILLIGRAM(S): 20 TABLET, DELAYED RELEASE ORAL at 05:46

## 2022-11-11 RX ADMIN — Medication 975 MILLIGRAM(S): at 21:47

## 2022-11-11 RX ADMIN — TAPENTADOL HYDROCHLORIDE 100 MILLIGRAM(S): 50 TABLET, FILM COATED ORAL at 12:45

## 2022-11-11 RX ADMIN — Medication 3 MILLIGRAM(S): at 21:47

## 2022-11-11 RX ADMIN — GABAPENTIN 300 MILLIGRAM(S): 400 CAPSULE ORAL at 21:48

## 2022-11-11 RX ADMIN — TAPENTADOL HYDROCHLORIDE 100 MILLIGRAM(S): 50 TABLET, FILM COATED ORAL at 00:24

## 2022-11-11 RX ADMIN — TAPENTADOL HYDROCHLORIDE 100 MILLIGRAM(S): 50 TABLET, FILM COATED ORAL at 05:38

## 2022-11-11 RX ADMIN — TAPENTADOL HYDROCHLORIDE 100 MILLIGRAM(S): 50 TABLET, FILM COATED ORAL at 01:00

## 2022-11-11 RX ADMIN — TAPENTADOL HYDROCHLORIDE 100 MILLIGRAM(S): 50 TABLET, FILM COATED ORAL at 11:53

## 2022-11-11 RX ADMIN — Medication 975 MILLIGRAM(S): at 22:17

## 2022-11-11 NOTE — PROGRESS NOTE ADULT - MOTOR
reduced left UE tone and coordination  left shoulder 4+/5 elbow flexion 4/5 extension 4/5 gross grasp 4-/5 intrinsic 3/5. wrist extension 4-/5 flexion 4-/5  right shoulder 5-/5 elbow flexion and extension 5/5 gross grasp 5-/5  bilateral calves soft no TTP

## 2022-11-11 NOTE — PROGRESS NOTE ADULT - ASSESSMENT
63 YO male with PMH of asthma and HTN who was transferred from [facility]. Patient presented to [facility] ED on 10/23 and Orthopedics was consulted for cord compression with neurological symptoms. Patient  underwent a C2-T1 laminectomy/fusion with C3-6 laminectomies with Dr. Lopez on 10/24. post -op complicated by PE.     # Spinal cord compression/central cord syndrome  -  s/p C2-T1 laminectomy/fusion with C3-6 laminectomies with Dr. Lopez on 10/24  - wound care: DSD and tegaderm daily. f/u with NSGY re: suture removal  - Comprehensive Rehab Program: PT/OT, 3hours daily and 5 days weekly   - aspen cervical collar when OOB  - Precautions: spinal, respiratory, cardiac, sensory, fall, AC    # HTN  - Off amldodipine  - (105/69 - 116/72) 11/11 stable    # PE  - TTE No evidence of RV strain  - Xarelto 20mg daily  - HR and O2 sats controlled on RA 11/11     # Asthma  - Albuterol INH PRN    # Leukocytosis  - No suspicion for infection  - WBC 8.51 11/10, resolved, afebrile  - CBC 11/14    # Hyponatremia  - Na 135 11/9--> 137 11/10  - CBC 11/11    # Pseudo Gout  - aspirate from the Lt knee with no suspicion for infectious arthritis  - prednisone 20 BID for 5 days- Last dose 11/9    # Pain management  - Tylenol PRN  - low dose oxycodone prn breakthrough  -  nucynta 100 q6 11/9  - robaxin PRN  - neurontin low dose added  - voltaren gel    # GI ppx  -  Protonix 40mg  - Senna, miralax, dulcolax supp PRN    # FEN   - Diet: Regular- no egg    # DVT ppx  - SCD, TEDs  - on xarelto    # LABS  CBC BMP 11/14  f/u re: suture removal    CODE STATUS: FULL CODE    Outpatient Follow-up (Specialty/Name of physician):    Ami Lopez (DO)  Orthopaedic Surgery Surgery  [address], [address]  [place] [address]  Phone: [phone]  Fax: [phone]

## 2022-11-11 NOTE — PROGRESS NOTE ADULT - SUBJECTIVE AND OBJECTIVE BOX
Patient is a 62y old  Male who presents with a chief complaint of Spinal cord compression s/p C2-T1 lami/fusion (10 Nov 2022 13:24)      SUBJECTIVE / OVERNIGHT EVENTS:  Pt seen and examined at bedside. No acute events overnight.  Pt denies cp, palpitations, sob, abd pain, N/V, fever, chills.    ROS:  All other review of systems negative    Allergies    No Known Drug Allergies  peanuts (Unknown)    Intolerances        MEDICATIONS  (STANDING):  acetaminophen     Tablet .. 975 milliGRAM(s) Oral every 8 hours  amLODIPine   Tablet 10 milliGRAM(s) Oral daily  capsaicin 0.025% Cream 1 Application(s) Topical every 8 hours  gabapentin 300 milliGRAM(s) Oral at bedtime  melatonin 3 milliGRAM(s) Oral at bedtime  pantoprazole    Tablet 40 milliGRAM(s) Oral before breakfast  polyethylene glycol 3350 17 Gram(s) Oral daily  rivaroxaban 20 milliGRAM(s) Oral daily  senna 2 Tablet(s) Oral at bedtime  tapentadol 100 milliGRAM(s) Oral every 6 hours    MEDICATIONS  (PRN):  albuterol    90 MICROgram(s) HFA Inhaler 2 Puff(s) Inhalation every 6 hours PRN Shortness of Breath and/or Wheezing  bisacodyl Suppository 10 milliGRAM(s) Rectal daily PRN Constipation  methocarbamol 250 milliGRAM(s) Oral every 4 hours PRN Muscle Spasm  oxyCODONE    IR 2.5 milliGRAM(s) Oral every 6 hours PRN Moderate Pain (4 - 6)  oxyCODONE    IR 5 milliGRAM(s) Oral every 6 hours PRN Severe Pain (7 - 10)      Vital Signs Last 24 Hrs  T(C): 36.8 (10 Nov 2022 19:49), Max: 36.8 (10 Nov 2022 19:49)  T(F): 98.2 (10 Nov 2022 19:49), Max: 98.2 (10 Nov 2022 19:49)  HR: 77 (11 Nov 2022 05:45) (77 - 97)  BP: 105/69 (11 Nov 2022 05:45) (105/69 - 146/88)  BP(mean): --  RR: 16 (10 Nov 2022 19:49) (16 - 16)  SpO2: 97% (10 Nov 2022 19:49) (97% - 99%)    Parameters below as of 10 Nov 2022 19:49  Patient On (Oxygen Delivery Method): room air      CAPILLARY BLOOD GLUCOSE        I&O's Summary      PHYSICAL EXAM:  GENERAL: NAD, well-developed  HEAD:  Atraumatic, Normocephalic  NECK: +C-Collar  CHEST/LUNG: Clear to auscultation bilaterally; No wheeze, nonlabored breathing  HEART: Regular rate and rhythm; No murmurs, rubs, or gallops  ABDOMEN: Soft, Nontender, Nondistended; Bowel sounds present  EXTREMITIES:  No clubbing, cyanosis, or edema  NEUROLOGY: AAOx3, non-focal  PSYCH: calm, appropriate mood      LABS:                        11.0   8.51  )-----------( 521      ( 10 Nov 2022 06:36 )             33.4     11-10    137  |  100  |  26<H>  ----------------------------<  95  4.8   |  31  |  0.98    Ca    9.3      10 Nov 2022 06:36    TPro  6.7  /  Alb  3.2<L>  /  TBili  0.4  /  DBili  x   /  AST  27  /  ALT  29  /  AlkPhos  60  11-10              RADIOLOGY & ADDITIONAL TESTS:  Results Reviewed:   Imaging Personally Reviewed:  Electrocardiogram Personally Reviewed:    COORDINATION OF CARE:  Care Discussed with Consultants/Other Providers [Y/N]:  Prior or Outpatient Records Reviewed [Y/N]:

## 2022-11-11 NOTE — PROGRESS NOTE ADULT - COMMENTS
Patient remains motivated, performing HEP in room. States feels improvement in therapies and is optimistic for his recovery. He also states he wants to remain focused on himself, giving himself the time needed, and reducing family responsibilities during this time. He did have some cramping in left quad/around knee last night; was not aware that methocarbamol was PRN. indication and dose reviewed.     will also add voltaren gel, patient agreeable

## 2022-11-11 NOTE — PROVIDER CONTACT NOTE (MEDICATION) - SITUATION
Pt is A&Ox4, s/p cervical lami. Pt requested replacement of at home med tapentadol 100mg for tonight, while family member brings a refill of med tommorow. Pt is new admission.
Norvasc held this AM as per parameters. Pt asymptomatic

## 2022-11-11 NOTE — PROGRESS NOTE ADULT - ASSESSMENT
61yo male with hx of asthma and HTN, presented to [facility] for acute rehab from [facility] where he was transferred to from [facility] with acute complaints of numbness and swelling of his left arm for the past month and weakness of his right leg, noted to have diffuse swelling of the cervical cord with long segment central cord edema extending from C2 to T1 with moderate spinal canal stenosis and mild cord compression at C3-C4 and C4-C5. Pt s/p C2-T1 laminectomy/fusion with C3-6 laminectomies with Dr. Lopez on 10/24. Postop with B/L PE and left knee pain    #Spinal cord compression  #Central cord syndrome  -S/p C2-T1 laminectomy/fusion with C3-6 laminectomies with Dr. Lopez on 10/24  -Cervical collar when OOB  -Precautions: spinal, respiratory, cardiac, sensory, fall, AC  -Continue PT/OT  -Primary team to manage pain regimen    #B/L Pulmonary Emboli  -Continue Xarelto. Treatment for 3-6months    #HTN  -Continue Amlodipine w/ holding parameters    #Asthma  -Albuterol INH PRN    #Left knee pain  -S/p aspiration, fluid assessment reviewed. Negative for gout or infection  -Empirically treated with Steroid  -Encourage PT/OT    # DVT ppx  -Xarelto

## 2022-11-11 NOTE — PROVIDER CONTACT NOTE (MEDICATION) - ACTION/TREATMENT ORDERED:
Oxy 10 ER ordered. MD approved oxy 10 ER to be given at night and morning until medication refill arrives tommorow morning.
Dr Dyson made aware

## 2022-11-11 NOTE — PROGRESS NOTE ADULT - ADDITIONAL PE
no joint effusion or warmth  +mld TTP reproducible superior patella and quad tendon insertion, increased discomfort with end range flexion, improved with extension knee

## 2022-11-11 NOTE — PROGRESS NOTE ADULT - CONSTITUTIONAL COMMENTS
alert O x 3. Mood stable. Aspen collar in place, incision with running sutures, C/D/I no erythema or swelling

## 2022-11-11 NOTE — PROGRESS NOTE ADULT - SUBJECTIVE AND OBJECTIVE BOX
Patient is a 62y old  Male who presents with a chief complaint of Spinal cord compression s/p C2-T1 lami/fusion (11 Nov 2022 08:40)      HPI:  This is a 63 YO male RH dominant with PMH of asthma and HTN who was transferred from [facility] to [facility] ED on 10/23/22. Patient presented to [facility] 10/23 with complaints of numbness and swelling of his left arm for the past month and weakness of his right leg. He reports that he was told that he has nerve damage by his doctors. Over the month the symptoms have been getting worse and include sensory changes. He states that his arm can get very cold to touch and sometimes the right leg may not be able to accurately feel hot temperatures. He reports increasing fasciculations and spasms. His brother who is at bedside reports the patient having frequent falls due to his weakness. No fever, no urinary retention, no fecal incontinence.  C spine MRI showed diffuse swelling of the cervical cord with long segment central cord edema extending from C2 to T1. there was moderate spinal canal stenosis with apparent mild cord compression at C3-C4 and C4-C5,  minimal cord compression at C5-C6, and mild ventral cord impingement at C6-C7, however per rads read the diffuse changes in the cervical cord are not compatible with primary compressive myelopathy Patient was seen by neuro and orthopedics for cord compression with neurological symptoms. Patient was admitted to the orthopedic service and underwent a C2-T1 laminectomy/fusion with C3-6 laminectomies with Dr. Lopez on 10/24.     On 10/31, patient developed fevers, imaging  showed bilateral pulmonary embolisms. He was transferred to telemetry and started on therapeutic Xarelto.  Additionally, he was having trouble ranging his left knee. His left knee was aspirated on 11/3 which was found to be negative for gout. He continued to have fever, all other fever work up remained negative. Fever likely pseudogout treated with steroid x 5 days.    Patient was evaluated by PM&R and therapy for functional deficits, gait/ADL impairments and acute rehabilitation was recommended. Patient was medically optimized for discharge to [facility] IRF on 11/8/22. (08 Nov 2022 13:56)      PAST MEDICAL & SURGICAL HISTORY:  Asthma      HTN (hypertension)          MEDICATIONS  (STANDING):  acetaminophen     Tablet .. 975 milliGRAM(s) Oral every 8 hours  amLODIPine   Tablet 10 milliGRAM(s) Oral daily  gabapentin 300 milliGRAM(s) Oral at bedtime  melatonin 3 milliGRAM(s) Oral at bedtime  pantoprazole    Tablet 40 milliGRAM(s) Oral before breakfast  polyethylene glycol 3350 17 Gram(s) Oral daily  rivaroxaban 20 milliGRAM(s) Oral daily  senna 2 Tablet(s) Oral at bedtime  tapentadol 100 milliGRAM(s) Oral every 6 hours    MEDICATIONS  (PRN):  albuterol    90 MICROgram(s) HFA Inhaler 2 Puff(s) Inhalation every 6 hours PRN Shortness of Breath and/or Wheezing  bisacodyl Suppository 10 milliGRAM(s) Rectal daily PRN Constipation  diclofenac sodium 1% Gel 4 Gram(s) Topical three times a day PRN pain  methocarbamol 250 milliGRAM(s) Oral every 4 hours PRN Muscle Spasm  oxyCODONE    IR 2.5 milliGRAM(s) Oral every 6 hours PRN Moderate Pain (4 - 6)  oxyCODONE    IR 5 milliGRAM(s) Oral every 6 hours PRN Severe Pain (7 - 10)      Allergies    No Known Drug Allergies  peanuts (Unknown)    Intolerances          VITALS  62y  Vital Signs Last 24 Hrs  T(C): 36.8 (11 Nov 2022 09:20), Max: 36.8 (10 Nov 2022 19:49)  T(F): 98.2 (11 Nov 2022 09:20), Max: 98.2 (10 Nov 2022 19:49)  HR: 90 (11 Nov 2022 09:20) (77 - 90)  BP: 106/71 (11 Nov 2022 09:20) (105/69 - 116/72)  BP(mean): --  RR: 16 (11 Nov 2022 09:20) (16 - 16)  SpO2: 97% (11 Nov 2022 09:20) (97% - 97%)    Parameters below as of 11 Nov 2022 09:20  Patient On (Oxygen Delivery Method): room air      Daily     Daily         RECENT LABS:                          11.0   8.51  )-----------( 521      ( 10 Nov 2022 06:36 )             33.4     11-10    137  |  100  |  26<H>  ----------------------------<  95  4.8   |  31  |  0.98    Ca    9.3      10 Nov 2022 06:36    TPro  6.7  /  Alb  3.2<L>  /  TBili  0.4  /  DBili  x   /  AST  27  /  ALT  29  /  AlkPhos  60  11-10    LIVER FUNCTIONS - ( 10 Nov 2022 06:36 )  Alb: 3.2 g/dL / Pro: 6.7 g/dL / ALK PHOS: 60 U/L / ALT: 29 U/L / AST: 27 U/L / GGT: x                   CAPILLARY BLOOD GLUCOSE

## 2022-11-12 PROCEDURE — 99232 SBSQ HOSP IP/OBS MODERATE 35: CPT

## 2022-11-12 RX ORDER — GABAPENTIN 400 MG/1
200 CAPSULE ORAL EVERY 8 HOURS
Refills: 0 | Status: DISCONTINUED | OUTPATIENT
Start: 2022-11-12 | End: 2022-11-14

## 2022-11-12 RX ORDER — CAPSAICIN 0.025 %
1 CREAM (GRAM) TOPICAL EVERY 8 HOURS
Refills: 0 | Status: DISCONTINUED | OUTPATIENT
Start: 2022-11-12 | End: 2022-11-23

## 2022-11-12 RX ADMIN — AMLODIPINE BESYLATE 10 MILLIGRAM(S): 2.5 TABLET ORAL at 05:58

## 2022-11-12 RX ADMIN — TAPENTADOL HYDROCHLORIDE 100 MILLIGRAM(S): 50 TABLET, FILM COATED ORAL at 18:30

## 2022-11-12 RX ADMIN — TAPENTADOL HYDROCHLORIDE 100 MILLIGRAM(S): 50 TABLET, FILM COATED ORAL at 00:09

## 2022-11-12 RX ADMIN — TAPENTADOL HYDROCHLORIDE 100 MILLIGRAM(S): 50 TABLET, FILM COATED ORAL at 05:54

## 2022-11-12 RX ADMIN — RIVAROXABAN 20 MILLIGRAM(S): KIT at 12:06

## 2022-11-12 RX ADMIN — GABAPENTIN 200 MILLIGRAM(S): 400 CAPSULE ORAL at 15:31

## 2022-11-12 RX ADMIN — TAPENTADOL HYDROCHLORIDE 100 MILLIGRAM(S): 50 TABLET, FILM COATED ORAL at 18:01

## 2022-11-12 RX ADMIN — OXYCODONE HYDROCHLORIDE 5 MILLIGRAM(S): 5 TABLET ORAL at 15:38

## 2022-11-12 RX ADMIN — Medication 975 MILLIGRAM(S): at 23:10

## 2022-11-12 RX ADMIN — TAPENTADOL HYDROCHLORIDE 100 MILLIGRAM(S): 50 TABLET, FILM COATED ORAL at 07:49

## 2022-11-12 RX ADMIN — Medication 3 MILLIGRAM(S): at 22:09

## 2022-11-12 RX ADMIN — Medication 975 MILLIGRAM(S): at 16:01

## 2022-11-12 RX ADMIN — Medication 975 MILLIGRAM(S): at 05:58

## 2022-11-12 RX ADMIN — Medication 975 MILLIGRAM(S): at 22:10

## 2022-11-12 RX ADMIN — TAPENTADOL HYDROCHLORIDE 100 MILLIGRAM(S): 50 TABLET, FILM COATED ORAL at 12:03

## 2022-11-12 RX ADMIN — Medication 975 MILLIGRAM(S): at 15:31

## 2022-11-12 RX ADMIN — TAPENTADOL HYDROCHLORIDE 100 MILLIGRAM(S): 50 TABLET, FILM COATED ORAL at 00:45

## 2022-11-12 RX ADMIN — PANTOPRAZOLE SODIUM 40 MILLIGRAM(S): 20 TABLET, DELAYED RELEASE ORAL at 06:00

## 2022-11-12 RX ADMIN — GABAPENTIN 200 MILLIGRAM(S): 400 CAPSULE ORAL at 22:09

## 2022-11-12 RX ADMIN — TAPENTADOL HYDROCHLORIDE 100 MILLIGRAM(S): 50 TABLET, FILM COATED ORAL at 23:01

## 2022-11-12 RX ADMIN — Medication 975 MILLIGRAM(S): at 06:15

## 2022-11-12 RX ADMIN — TAPENTADOL HYDROCHLORIDE 100 MILLIGRAM(S): 50 TABLET, FILM COATED ORAL at 12:30

## 2022-11-12 RX ADMIN — OXYCODONE HYDROCHLORIDE 5 MILLIGRAM(S): 5 TABLET ORAL at 16:08

## 2022-11-12 NOTE — PROGRESS NOTE ADULT - ASSESSMENT
61yo male with hx of asthma and HTN, presented to [facility] for acute rehab from [facility] where he was transferred to from [facility] with acute complaints of numbness and swelling of his left arm for the past month and weakness of his right leg, noted to have diffuse swelling of the cervical cord with long segment central cord edema extending from C2 to T1 with moderate spinal canal stenosis and mild cord compression at C3-C4 and C4-C5. Pt s/p C2-T1 laminectomy/fusion with C3-6 laminectomies with Dr. Lopez on 10/24. Postop with B/L PE and left knee pain    #Spinal cord compression  #Central cord syndrome  #Neuropathic Pain  -S/p C2-T1 laminectomy/fusion with C3-6 laminectomies with Dr. Lopez on 10/24  -Cervical collar when OOB  -Precautions: spinal, respiratory, cardiac, sensory, fall, AC  -Continue PT/OT  -Primary team to manage pain regimen  -Readjust Gabapentin from 300mg QHS to 200mg TID. Monitor for somnolence, sedation, drowsiness    #B/L Pulmonary Emboli  -Continue Xarelto. Treatment for 3-6months    #HTN  -Continue Amlodipine w/ holding parameters    #Asthma  -Albuterol INH PRN    #Left knee pain  -S/p aspiration, fluid assessment reviewed. Negative for gout or infection  -Empirically treated with Steroid  -Encourage PT/OT    # DVT ppx  -Xarelto

## 2022-11-12 NOTE — PROGRESS NOTE ADULT - SUBJECTIVE AND OBJECTIVE BOX
Patient is a 62y old  Male who presents with a chief complaint of Spinal cord compression s/p C2-T1 lami/fusion (11 Nov 2022 11:10)      SUBJECTIVE / OVERNIGHT EVENTS:  Pt seen and examined at bedside. No acute events overnight. Pt w/ complaints of ongoing numbness/tingling in UEs. States no improvement.   Pt denies cp, palpitations, sob, abd pain, N/V, fever, chills.    ROS:  All other review of systems negative    Allergies    No Known Drug Allergies  peanuts (Unknown)    Intolerances        MEDICATIONS  (STANDING):  acetaminophen     Tablet .. 975 milliGRAM(s) Oral every 8 hours  amLODIPine   Tablet 10 milliGRAM(s) Oral daily  gabapentin 200 milliGRAM(s) Oral every 8 hours  melatonin 3 milliGRAM(s) Oral at bedtime  pantoprazole    Tablet 40 milliGRAM(s) Oral before breakfast  polyethylene glycol 3350 17 Gram(s) Oral daily  rivaroxaban 20 milliGRAM(s) Oral daily  senna 2 Tablet(s) Oral at bedtime  tapentadol 100 milliGRAM(s) Oral every 6 hours    MEDICATIONS  (PRN):  albuterol    90 MICROgram(s) HFA Inhaler 2 Puff(s) Inhalation every 6 hours PRN Shortness of Breath and/or Wheezing  bisacodyl Suppository 10 milliGRAM(s) Rectal daily PRN Constipation  capsaicin 0.025% Cream 1 Application(s) Topical every 8 hours PRN neuropathic pain  diclofenac sodium 1% Gel 4 Gram(s) Topical three times a day PRN pain  methocarbamol 250 milliGRAM(s) Oral every 4 hours PRN Muscle Spasm  oxyCODONE    IR 2.5 milliGRAM(s) Oral every 6 hours PRN Moderate Pain (4 - 6)  oxyCODONE    IR 5 milliGRAM(s) Oral every 6 hours PRN Severe Pain (7 - 10)      Vital Signs Last 24 Hrs  T(C): 36.9 (11 Nov 2022 20:11), Max: 36.9 (11 Nov 2022 20:11)  T(F): 98.4 (11 Nov 2022 20:11), Max: 98.4 (11 Nov 2022 20:11)  HR: 82 (12 Nov 2022 05:57) (82 - 95)  BP: 127/80 (12 Nov 2022 05:57) (106/71 - 127/80)  BP(mean): --  RR: 16 (11 Nov 2022 20:11) (16 - 16)  SpO2: 96% (11 Nov 2022 20:11) (96% - 97%)    Parameters below as of 11 Nov 2022 20:11  Patient On (Oxygen Delivery Method): room air      CAPILLARY BLOOD GLUCOSE        I&O's Summary      PHYSICAL EXAM:  GENERAL: NAD, well-developed  HEAD:  Atraumatic, Normocephalic  NECK: Supple no JVD. C-Collar at bedside   CHEST/LUNG: Clear to auscultation bilaterally; No wheeze, nonlabored breathing  HEART: Regular rate and rhythm; No murmurs, rubs, or gallops  ABDOMEN: Soft, Nontender, Nondistended; Bowel sounds present  EXTREMITIES:  No clubbing, cyanosis, or edema  NEUROLOGY: AAOx3, non-focal  PSYCH: calm, appropriate mood      LABS:                    RADIOLOGY & ADDITIONAL TESTS:  Results Reviewed:   Imaging Personally Reviewed:  Electrocardiogram Personally Reviewed:    COORDINATION OF CARE:  Care Discussed with Consultants/Other Providers [Y/N]:  Prior or Outpatient Records Reviewed [Y/N]:

## 2022-11-13 PROCEDURE — 99232 SBSQ HOSP IP/OBS MODERATE 35: CPT

## 2022-11-13 RX ADMIN — AMLODIPINE BESYLATE 10 MILLIGRAM(S): 2.5 TABLET ORAL at 06:07

## 2022-11-13 RX ADMIN — Medication 975 MILLIGRAM(S): at 22:29

## 2022-11-13 RX ADMIN — Medication 975 MILLIGRAM(S): at 06:07

## 2022-11-13 RX ADMIN — GABAPENTIN 200 MILLIGRAM(S): 400 CAPSULE ORAL at 21:30

## 2022-11-13 RX ADMIN — DICLOFENAC SODIUM 4 GRAM(S): 30 GEL TOPICAL at 06:23

## 2022-11-13 RX ADMIN — OXYCODONE HYDROCHLORIDE 5 MILLIGRAM(S): 5 TABLET ORAL at 13:36

## 2022-11-13 RX ADMIN — Medication 975 MILLIGRAM(S): at 13:33

## 2022-11-13 RX ADMIN — TAPENTADOL HYDROCHLORIDE 100 MILLIGRAM(S): 50 TABLET, FILM COATED ORAL at 12:14

## 2022-11-13 RX ADMIN — OXYCODONE HYDROCHLORIDE 5 MILLIGRAM(S): 5 TABLET ORAL at 14:14

## 2022-11-13 RX ADMIN — Medication 975 MILLIGRAM(S): at 14:13

## 2022-11-13 RX ADMIN — Medication 975 MILLIGRAM(S): at 07:05

## 2022-11-13 RX ADMIN — POLYETHYLENE GLYCOL 3350 17 GRAM(S): 17 POWDER, FOR SOLUTION ORAL at 12:10

## 2022-11-13 RX ADMIN — TAPENTADOL HYDROCHLORIDE 100 MILLIGRAM(S): 50 TABLET, FILM COATED ORAL at 16:57

## 2022-11-13 RX ADMIN — Medication 3 MILLIGRAM(S): at 21:30

## 2022-11-13 RX ADMIN — RIVAROXABAN 20 MILLIGRAM(S): KIT at 12:10

## 2022-11-13 RX ADMIN — TAPENTADOL HYDROCHLORIDE 100 MILLIGRAM(S): 50 TABLET, FILM COATED ORAL at 07:06

## 2022-11-13 RX ADMIN — TAPENTADOL HYDROCHLORIDE 100 MILLIGRAM(S): 50 TABLET, FILM COATED ORAL at 00:45

## 2022-11-13 RX ADMIN — GABAPENTIN 200 MILLIGRAM(S): 400 CAPSULE ORAL at 13:32

## 2022-11-13 RX ADMIN — Medication 975 MILLIGRAM(S): at 21:29

## 2022-11-13 RX ADMIN — TAPENTADOL HYDROCHLORIDE 100 MILLIGRAM(S): 50 TABLET, FILM COATED ORAL at 12:10

## 2022-11-13 RX ADMIN — PANTOPRAZOLE SODIUM 40 MILLIGRAM(S): 20 TABLET, DELAYED RELEASE ORAL at 06:07

## 2022-11-13 RX ADMIN — TAPENTADOL HYDROCHLORIDE 100 MILLIGRAM(S): 50 TABLET, FILM COATED ORAL at 06:11

## 2022-11-13 RX ADMIN — TAPENTADOL HYDROCHLORIDE 100 MILLIGRAM(S): 50 TABLET, FILM COATED ORAL at 23:15

## 2022-11-13 RX ADMIN — GABAPENTIN 200 MILLIGRAM(S): 400 CAPSULE ORAL at 06:07

## 2022-11-13 RX ADMIN — TAPENTADOL HYDROCHLORIDE 100 MILLIGRAM(S): 50 TABLET, FILM COATED ORAL at 18:14

## 2022-11-13 NOTE — PROGRESS NOTE ADULT - SUBJECTIVE AND OBJECTIVE BOX
Patient is a 62y old  Male who presents with a chief complaint of Spinal cord compression s/p C2-T1 lami/fusion (12 Nov 2022 08:34)      SUBJECTIVE / OVERNIGHT EVENTS:  Pt seen and examined at bedside. No acute events overnight.  Pt denies cp, palpitations, sob, abd pain, N/V, fever, chills.    ROS:  All other review of systems negative    Allergies    No Known Drug Allergies  peanuts (Unknown)    Intolerances        MEDICATIONS  (STANDING):  acetaminophen     Tablet .. 975 milliGRAM(s) Oral every 8 hours  amLODIPine   Tablet 10 milliGRAM(s) Oral daily  gabapentin 200 milliGRAM(s) Oral every 8 hours  melatonin 3 milliGRAM(s) Oral at bedtime  pantoprazole    Tablet 40 milliGRAM(s) Oral before breakfast  polyethylene glycol 3350 17 Gram(s) Oral daily  rivaroxaban 20 milliGRAM(s) Oral daily  senna 2 Tablet(s) Oral at bedtime  tapentadol 100 milliGRAM(s) Oral every 6 hours    MEDICATIONS  (PRN):  albuterol    90 MICROgram(s) HFA Inhaler 2 Puff(s) Inhalation every 6 hours PRN Shortness of Breath and/or Wheezing  bisacodyl Suppository 10 milliGRAM(s) Rectal daily PRN Constipation  capsaicin 0.025% Cream 1 Application(s) Topical every 8 hours PRN neuropathic pain  diclofenac sodium 1% Gel 4 Gram(s) Topical three times a day PRN pain  methocarbamol 250 milliGRAM(s) Oral every 4 hours PRN Muscle Spasm  oxyCODONE    IR 2.5 milliGRAM(s) Oral every 6 hours PRN Moderate Pain (4 - 6)  oxyCODONE    IR 5 milliGRAM(s) Oral every 6 hours PRN Severe Pain (7 - 10)      Vital Signs Last 24 Hrs  T(C): 36.4 (13 Nov 2022 08:52), Max: 36.9 (12 Nov 2022 09:12)  T(F): 97.6 (13 Nov 2022 08:52), Max: 98.4 (12 Nov 2022 09:12)  HR: 92 (13 Nov 2022 08:52) (87 - 95)  BP: 103/72 (13 Nov 2022 08:52) (103/72 - 120/77)  BP(mean): --  RR: 15 (13 Nov 2022 08:52) (15 - 16)  SpO2: 97% (13 Nov 2022 08:52) (96% - 98%)    Parameters below as of 12 Nov 2022 22:15  Patient On (Oxygen Delivery Method): room air      CAPILLARY BLOOD GLUCOSE        I&O's Summary      PHYSICAL EXAM:  GENERAL: NAD, well-developed  HEAD:  Atraumatic, Normocephalic  NECK: Supple no JVD. C-Collar at bedside. Posterior incision c/d/i w/ dressing   CHEST/LUNG: Clear to auscultation bilaterally; No wheeze, nonlabored breathing  HEART: Regular rate and rhythm; No murmurs, rubs, or gallops  ABDOMEN: Soft, Nontender, Nondistended; Bowel sounds present  EXTREMITIES:  No clubbing, cyanosis, or edema  NEUROLOGY: AAOx3, non-focal  PSYCH: calm, appropriate mood    LABS:                    RADIOLOGY & ADDITIONAL TESTS:  Results Reviewed:   Imaging Personally Reviewed:  Electrocardiogram Personally Reviewed:    COORDINATION OF CARE:  Care Discussed with Consultants/Other Providers [Y/N]:  Prior or Outpatient Records Reviewed [Y/N]:

## 2022-11-13 NOTE — PROGRESS NOTE ADULT - SUBJECTIVE AND OBJECTIVE BOX
Cc: Gait dysfunction    HPI: Patient with no new medical issues overnight.  Pain controlled, no chest pain, no N/V, no Fevers/Chills. No other new ROS  Has been tolerating rehabilitation program.    acetaminophen     Tablet .. 975 milliGRAM(s) Oral every 8 hours  albuterol    90 MICROgram(s) HFA Inhaler 2 Puff(s) Inhalation every 6 hours PRN  amLODIPine   Tablet 10 milliGRAM(s) Oral daily  bisacodyl Suppository 10 milliGRAM(s) Rectal daily PRN  capsaicin 0.025% Cream 1 Application(s) Topical every 8 hours PRN  diclofenac sodium 1% Gel 4 Gram(s) Topical three times a day PRN  gabapentin 200 milliGRAM(s) Oral every 8 hours  melatonin 3 milliGRAM(s) Oral at bedtime  methocarbamol 250 milliGRAM(s) Oral every 4 hours PRN  oxyCODONE    IR 2.5 milliGRAM(s) Oral every 6 hours PRN  oxyCODONE    IR 5 milliGRAM(s) Oral every 6 hours PRN  pantoprazole    Tablet 40 milliGRAM(s) Oral before breakfast  polyethylene glycol 3350 17 Gram(s) Oral daily  rivaroxaban 20 milliGRAM(s) Oral daily  senna 2 Tablet(s) Oral at bedtime  tapentadol 100 milliGRAM(s) Oral every 6 hours      T(C): 36.4 (11-13-22 @ 08:52), Max: 36.8 (11-12-22 @ 22:15)  HR: 92 (11-13-22 @ 08:52) (87 - 93)  BP: 103/72 (11-13-22 @ 08:52) (103/72 - 120/77)  RR: 15 (11-13-22 @ 08:52) (15 - 16)  SpO2: 97% (11-13-22 @ 08:52) (97% - 98%)    In NAD  HEENT- EOMI  Heart- RRR, S1S2  Lungs- CTA bl.  Abd- + BS, NT  Ext- No calf pain  Neuro- Exam unchanged    neg modified finkelstein  neg eichoff  neg CMC grind test   Ambulatory

## 2022-11-13 NOTE — PROGRESS NOTE ADULT - ASSESSMENT
63yo male with hx of asthma and HTN, presented to [facility] for acute rehab from [facility] where he was transferred to from [facility] with acute complaints of numbness and swelling of his left arm for the past month and weakness of his right leg, noted to have diffuse swelling of the cervical cord with long segment central cord edema extending from C2 to T1 with moderate spinal canal stenosis and mild cord compression at C3-C4 and C4-C5. Pt s/p C2-T1 laminectomy/fusion with C3-6 laminectomies with Dr. Lopez on 10/24. Postop with B/L PE and left knee pain    #Spinal cord compression  #Central cord syndrome  #Neuropathic Pain  -S/p C2-T1 laminectomy/fusion with C3-6 laminectomies with Dr. Lopez on 10/24  -Cervical collar when OOB  -Continue PT/OT  -Primary team to manage pain regimen  -Continue Gabapentin 200mg TID. Monitor for somnolence, sedation, drowsiness. Titrate up as tolerated  -Precautions: spinal, respiratory, cardiac, sensory, fall, AC    #B/L Pulmonary Emboli  -Continue Xarelto. Treatment for 3-6months    #HTN  -Continue Amlodipine w/ holding parameters    #Asthma  -Albuterol INH PRN    #Left knee pain  -S/p aspiration, fluid assessment reviewed. Negative for gout or infection  -Empirically treated with Steroid  -Encourage PT/OT    # DVT ppx  -Xarelto

## 2022-11-13 NOTE — PROGRESS NOTE ADULT - ASSESSMENT
Imp: Patient with diagnosis of  spinal cord compression     admitted for comprehensive acute rehabilitation.    Plan:  - Continue PT/OT/SLP  - DVT prophylaxis  - Skin- Turn q2h, check skin daily  - Continue current medications; patient medically stable.   - continues to have left hand pain, appears to be part of their central pain syndrome, less likely radial styloid tenosynovitis or CMC OA based on their clinical exam  -Active issues-   - Patient is stable to continue current rehabilitation program.

## 2022-11-14 LAB
ALBUMIN SERPL ELPH-MCNC: 3 G/DL — LOW (ref 3.3–5)
ALP SERPL-CCNC: 63 U/L — SIGNIFICANT CHANGE UP (ref 40–120)
ALT FLD-CCNC: 32 U/L — SIGNIFICANT CHANGE UP (ref 10–45)
ANION GAP SERPL CALC-SCNC: 3 MMOL/L — LOW (ref 5–17)
AST SERPL-CCNC: 22 U/L — SIGNIFICANT CHANGE UP (ref 10–40)
BILIRUB SERPL-MCNC: 0.1 MG/DL — LOW (ref 0.2–1.2)
BUN SERPL-MCNC: 21 MG/DL — SIGNIFICANT CHANGE UP (ref 7–23)
CALCIUM SERPL-MCNC: 8.9 MG/DL — SIGNIFICANT CHANGE UP (ref 8.4–10.5)
CHLORIDE SERPL-SCNC: 104 MMOL/L — SIGNIFICANT CHANGE UP (ref 96–108)
CO2 SERPL-SCNC: 32 MMOL/L — HIGH (ref 22–31)
CREAT SERPL-MCNC: 0.79 MG/DL — SIGNIFICANT CHANGE UP (ref 0.5–1.3)
EGFR: 101 ML/MIN/1.73M2 — SIGNIFICANT CHANGE UP
GLUCOSE SERPL-MCNC: 97 MG/DL — SIGNIFICANT CHANGE UP (ref 70–99)
HCT VFR BLD CALC: 32.9 % — LOW (ref 39–50)
HGB BLD-MCNC: 10.6 G/DL — LOW (ref 13–17)
MCHC RBC-ENTMCNC: 28.9 PG — SIGNIFICANT CHANGE UP (ref 27–34)
MCHC RBC-ENTMCNC: 32.2 GM/DL — SIGNIFICANT CHANGE UP (ref 32–36)
MCV RBC AUTO: 89.6 FL — SIGNIFICANT CHANGE UP (ref 80–100)
NRBC # BLD: 0 /100 WBCS — SIGNIFICANT CHANGE UP (ref 0–0)
PLATELET # BLD AUTO: 429 K/UL — HIGH (ref 150–400)
POTASSIUM SERPL-MCNC: 4.4 MMOL/L — SIGNIFICANT CHANGE UP (ref 3.5–5.3)
POTASSIUM SERPL-SCNC: 4.4 MMOL/L — SIGNIFICANT CHANGE UP (ref 3.5–5.3)
PROT SERPL-MCNC: 6.5 G/DL — SIGNIFICANT CHANGE UP (ref 6–8.3)
RBC # BLD: 3.67 M/UL — LOW (ref 4.2–5.8)
RBC # FLD: 14.2 % — SIGNIFICANT CHANGE UP (ref 10.3–14.5)
SODIUM SERPL-SCNC: 139 MMOL/L — SIGNIFICANT CHANGE UP (ref 135–145)
WBC # BLD: 4.19 K/UL — SIGNIFICANT CHANGE UP (ref 3.8–10.5)
WBC # FLD AUTO: 4.19 K/UL — SIGNIFICANT CHANGE UP (ref 3.8–10.5)

## 2022-11-14 PROCEDURE — 99232 SBSQ HOSP IP/OBS MODERATE 35: CPT

## 2022-11-14 RX ORDER — GABAPENTIN 400 MG/1
300 CAPSULE ORAL THREE TIMES A DAY
Refills: 0 | Status: DISCONTINUED | OUTPATIENT
Start: 2022-11-14 | End: 2022-11-16

## 2022-11-14 RX ADMIN — TAPENTADOL HYDROCHLORIDE 100 MILLIGRAM(S): 50 TABLET, FILM COATED ORAL at 05:32

## 2022-11-14 RX ADMIN — TAPENTADOL HYDROCHLORIDE 100 MILLIGRAM(S): 50 TABLET, FILM COATED ORAL at 12:07

## 2022-11-14 RX ADMIN — AMLODIPINE BESYLATE 10 MILLIGRAM(S): 2.5 TABLET ORAL at 05:31

## 2022-11-14 RX ADMIN — TAPENTADOL HYDROCHLORIDE 100 MILLIGRAM(S): 50 TABLET, FILM COATED ORAL at 17:54

## 2022-11-14 RX ADMIN — METHOCARBAMOL 250 MILLIGRAM(S): 500 TABLET, FILM COATED ORAL at 21:08

## 2022-11-14 RX ADMIN — TAPENTADOL HYDROCHLORIDE 100 MILLIGRAM(S): 50 TABLET, FILM COATED ORAL at 00:15

## 2022-11-14 RX ADMIN — Medication 975 MILLIGRAM(S): at 13:57

## 2022-11-14 RX ADMIN — TAPENTADOL HYDROCHLORIDE 100 MILLIGRAM(S): 50 TABLET, FILM COATED ORAL at 08:01

## 2022-11-14 RX ADMIN — Medication 975 MILLIGRAM(S): at 05:31

## 2022-11-14 RX ADMIN — GABAPENTIN 300 MILLIGRAM(S): 400 CAPSULE ORAL at 21:06

## 2022-11-14 RX ADMIN — Medication 3 MILLIGRAM(S): at 21:06

## 2022-11-14 RX ADMIN — Medication 975 MILLIGRAM(S): at 22:05

## 2022-11-14 RX ADMIN — RIVAROXABAN 20 MILLIGRAM(S): KIT at 12:14

## 2022-11-14 RX ADMIN — GABAPENTIN 200 MILLIGRAM(S): 400 CAPSULE ORAL at 05:32

## 2022-11-14 RX ADMIN — TAPENTADOL HYDROCHLORIDE 100 MILLIGRAM(S): 50 TABLET, FILM COATED ORAL at 06:48

## 2022-11-14 RX ADMIN — PANTOPRAZOLE SODIUM 40 MILLIGRAM(S): 20 TABLET, DELAYED RELEASE ORAL at 06:17

## 2022-11-14 RX ADMIN — TAPENTADOL HYDROCHLORIDE 100 MILLIGRAM(S): 50 TABLET, FILM COATED ORAL at 23:36

## 2022-11-14 RX ADMIN — Medication 975 MILLIGRAM(S): at 06:48

## 2022-11-14 RX ADMIN — Medication 975 MILLIGRAM(S): at 21:06

## 2022-11-14 RX ADMIN — Medication 975 MILLIGRAM(S): at 16:01

## 2022-11-14 RX ADMIN — GABAPENTIN 300 MILLIGRAM(S): 400 CAPSULE ORAL at 13:57

## 2022-11-14 RX ADMIN — TAPENTADOL HYDROCHLORIDE 100 MILLIGRAM(S): 50 TABLET, FILM COATED ORAL at 13:53

## 2022-11-14 RX ADMIN — TAPENTADOL HYDROCHLORIDE 100 MILLIGRAM(S): 50 TABLET, FILM COATED ORAL at 19:07

## 2022-11-14 NOTE — PROGRESS NOTE ADULT - ASSESSMENT
63 y/o M with hx of asthma and HTN, presented to [facility] for acute rehab from [facility] where he was transferred to from [facility] with acute complaints of numbness and swelling of his left arm for the past month and weakness of his right leg, noted to have diffuse swelling of the cervical cord with long segment central cord edema extending from C2 to T1 with moderate spinal canal stenosis and mild cord compression at C3-C4 and C4-C5. Pt s/p C2-T1 laminectomy/fusion with C3-6 laminectomies with Dr. Lopez on 10/24. Postop with B/L PE and left knee pain    #Spinal cord compression  #Central cord syndrome  #Neuropathic Pain  -S/p C2-T1 laminectomy/fusion with C3-6 laminectomies with Dr. Lopez on 10/24  -Cervical collar when OOB  -Continue Gabapentin. Monitor for somnolence, sedation, drowsiness. Titrate up as tolerated  -Precautions: spinal, respiratory, cardiac, sensory, fall, AC  -Continue comprehensive rehab program - PT/OT/SLP per rehab team  -Pain management, bowel regimen per rehab     #B/L Pulmonary Emboli  -Continue Xarelto. Treatment for 3-6months    #HTN  -Continue Amlodipine w/ holding parameters    #Asthma  -Albuterol INH PRN    #Left knee pain  -S/p aspiration, fluid assessment reviewed. Negative for gout or infection  -Empirically treated with Steroid  -Encourage PT/OT    #DVT ppx - Xarelto  #GI ppx - PPI

## 2022-11-14 NOTE — PROGRESS NOTE ADULT - SUBJECTIVE AND OBJECTIVE BOX
Patient is a 62y old  Male who presents with a chief complaint of Spinal cord compression s/p C2-T1 lami/fusion (14 Nov 2022 10:05)      HPI:  This is a 61 YO male RH dominant with PMH of asthma and HTN who was transferred from [facility] to [facility] ED on 10/23/22. Patient presented to [facility] 10/23 with complaints of numbness and swelling of his left arm for the past month and weakness of his right leg. He reports that he was told that he has nerve damage by his doctors. Over the month the symptoms have been getting worse and include sensory changes. He states that his arm can get very cold to touch and sometimes the right leg may not be able to accurately feel hot temperatures. He reports increasing fasciculations and spasms. His brother who is at bedside reports the patient having frequent falls due to his weakness. No fever, no urinary retention, no fecal incontinence.  C spine MRI showed diffuse swelling of the cervical cord with long segment central cord edema extending from C2 to T1. there was moderate spinal canal stenosis with apparent mild cord compression at C3-C4 and C4-C5,  minimal cord compression at C5-C6, and mild ventral cord impingement at C6-C7, however per rads read the diffuse changes in the cervical cord are not compatible with primary compressive myelopathy Patient was seen by neuro and orthopedics for cord compression with neurological symptoms. Patient was admitted to the orthopedic service and underwent a C2-T1 laminectomy/fusion with C3-6 laminectomies with Dr. Lopez on 10/24.     On 10/31, patient developed fevers, imaging  showed bilateral pulmonary embolisms. He was transferred to telemetry and started on therapeutic Xarelto.  Additionally, he was having trouble ranging his left knee. His left knee was aspirated on 11/3 which was found to be negative for gout. He continued to have fever, all other fever work up remained negative. Fever likely pseudogout treated with steroid x 5 days.    Patient was evaluated by PM&R and therapy for functional deficits, gait/ADL impairments and acute rehabilitation was recommended. Patient was medically optimized for discharge to [facility] IRF on 11/8/22. (08 Nov 2022 13:56)      PAST MEDICAL & SURGICAL HISTORY:  Asthma      HTN (hypertension)          MEDICATIONS  (STANDING):  acetaminophen     Tablet .. 975 milliGRAM(s) Oral every 8 hours  amLODIPine   Tablet 10 milliGRAM(s) Oral daily  gabapentin 300 milliGRAM(s) Oral three times a day  melatonin 3 milliGRAM(s) Oral at bedtime  pantoprazole    Tablet 40 milliGRAM(s) Oral before breakfast  polyethylene glycol 3350 17 Gram(s) Oral daily  rivaroxaban 20 milliGRAM(s) Oral daily  senna 2 Tablet(s) Oral at bedtime  tapentadol 100 milliGRAM(s) Oral every 6 hours    MEDICATIONS  (PRN):  albuterol    90 MICROgram(s) HFA Inhaler 2 Puff(s) Inhalation every 6 hours PRN Shortness of Breath and/or Wheezing  bisacodyl Suppository 10 milliGRAM(s) Rectal daily PRN Constipation  capsaicin 0.025% Cream 1 Application(s) Topical every 8 hours PRN neuropathic pain  diclofenac sodium 1% Gel 4 Gram(s) Topical three times a day PRN pain  methocarbamol 250 milliGRAM(s) Oral every 4 hours PRN Muscle Spasm  oxyCODONE    IR 2.5 milliGRAM(s) Oral every 6 hours PRN Moderate Pain (4 - 6)  oxyCODONE    IR 5 milliGRAM(s) Oral every 6 hours PRN Severe Pain (7 - 10)      Allergies    No Known Drug Allergies  peanuts (Unknown)    Intolerances          VITALS  62y  Vital Signs Last 24 Hrs  T(C): 36.7 (13 Nov 2022 20:13), Max: 36.7 (13 Nov 2022 20:13)  T(F): 98.1 (13 Nov 2022 20:13), Max: 98.1 (13 Nov 2022 20:13)  HR: 90 (14 Nov 2022 08:29) (80 - 92)  BP: 122/79 (14 Nov 2022 08:29) (109/68 - 122/79)  BP(mean): --  RR: 16 (14 Nov 2022 08:29) (15 - 16)  SpO2: 98% (14 Nov 2022 08:29) (98% - 98%)    Parameters below as of 14 Nov 2022 08:29  Patient On (Oxygen Delivery Method): room air      Daily     Daily         RECENT LABS:                          10.6   4.19  )-----------( 429      ( 14 Nov 2022 06:20 )             32.9     11-14    139  |  104  |  21  ----------------------------<  97  4.4   |  32<H>  |  0.79    Ca    8.9      14 Nov 2022 06:20    TPro  6.5  /  Alb  3.0<L>  /  TBili  0.1<L>  /  DBili  x   /  AST  22  /  ALT  32  /  AlkPhos  63  11-14    LIVER FUNCTIONS - ( 14 Nov 2022 06:20 )  Alb: 3.0 g/dL / Pro: 6.5 g/dL / ALK PHOS: 63 U/L / ALT: 32 U/L / AST: 22 U/L / GGT: x                   CAPILLARY BLOOD GLUCOSE      Review of Systems:   · Additional ROS	Patient working on HEP hand strengthening. He was stable over the weekend although he had some increased numbness and paresthesias particularly in left hand. He has noted improvement in his LB dressing although he has difficulties wiht fasteners/laces and still has difficulty with control of knee and placement left LE    He is aware of improvements and admits he needs to be easier on himself . No new physical complaints    Physical Exam:   · Constitutional Comments	alert O x 3. emotional and tearful at times when discussing both improvement and residual deficits  · Respiratory	clear to auscultation bilaterally; no wheezes; no rales; no rhonchi  · Cardiovascular	regular rate and rhythm; S1 S2 present; no gallops; no rub; no murmur  · Gastrointestinal	soft; nontender; nondistended; normal active bowel sounds  · Motor	no left knee effusion no warmth or joint line TTP  PROM left knee flexion  to 110 improved  quad 5-/5 ham 4+/5  ankle eversion 4-/5 tight heel cords  bilateral calves soft no TTP

## 2022-11-14 NOTE — PROGRESS NOTE ADULT - ASSESSMENT
63 YO male with PMH of asthma and HTN who was transferred from [facility]. Patient presented to [facility] ED on 10/23 and Orthopedics was consulted for cord compression with neurological symptoms. Patient  underwent a C2-T1 laminectomy/fusion with C3-6 laminectomies with Dr. Lopez on 10/24. post -op complicated by PE.     # Spinal cord compression/central cord syndrome  -  s/p C2-T1 laminectomy/fusion with C3-6 laminectomies with Dr. Lopez on 10/24  - wound care: DSD and tegaderm daily. f/u with NSGY re: timing of suture removal  - Comprehensive Rehab Program: PT/OT, 3hours daily and 5 days weekly   - aspen cervical collar when OOB  - neuropsychology supportive counseling, rec therapy  - Precautions: spinal, respiratory, cardiac, sensory, fall, AC    # HTN  - Off amldodipine  -  (109/68 - 122/79) 11/14    # PE  - TTE No evidence of RV strain  - Xarelto 20mg daily  - HR and O2 sats controlled      # Asthma  - Albuterol INH PRN    # Leukocytosis  - No suspicion for infection  - WBC 4.19 11/14 resolved    # Hyponatremia  - Na 139 11/14 resolved    # Pseudo Gout  - aspirate from the Lt knee with no suspicion for infectious arthritis  - s/p prednisone 20 BID for 5 days    # Pain management  - Tylenol PRN  - low dose oxycodone prn breakthrough  -  nucynta 100 q6 11/9  - robaxin PRN  - neurontin low dose added. Will increase to 100/100 300 11/14, discussed with patient who is agreeable  - voltaren gel    # GI ppx  -  Protonix 40mg  - Senna, miralax, dulcolax supp PRN    # FEN   - Diet: Regular- no egg    # DVT ppx  - SCD, TEDs  - on xarelto    # LABS  CBC BMP 11/17  f/u re: suture removal    CODE STATUS: FULL CODE    Outpatient Follow-up (Specialty/Name of physician):    Ami Lopez (DO)  Orthopaedic Surgery Surgery  [address], [address]  [place] [address]  Phone: [phone]  Fax: [phone]   63 YO male with PMH of asthma and HTN who was transferred from [facility]. Patient presented to [facility] ED on 10/23 and Orthopedics was consulted for cord compression with neurological symptoms. Patient  underwent a C2-T1 laminectomy/fusion with C3-6 laminectomies with Dr. Lopez on 10/24. post -op complicated by PE.     # Spinal cord compression/central cord syndrome  -  s/p C2-T1 laminectomy/fusion with C3-6 laminectomies with Dr. Lopez on 10/24  - wound care: DSD and tegaderm daily. f/u with NSGY re: timing of suture removal  - Comprehensive Rehab Program: PT/OT, 3hours daily and 5 days weekly   - aspen cervical collar when OOB  - neuropsychology supportive counseling, rec therapy  - Precautions: spinal, respiratory, cardiac, sensory, fall, AC    # HTN  - Off amldodipine  -  (109/68 - 122/79) 11/14    # PE  - TTE No evidence of RV strain  - Xarelto 20mg daily  - HR and O2 sats controlled      # Asthma  - Albuterol INH PRN    # Leukocytosis  - No suspicion for infection  - WBC 4.19 11/14 resolved    # Hyponatremia  - Na 139 11/14 resolved    # Pseudo Gout  - aspirate from the Lt knee with no suspicion for infectious arthritis  - s/p prednisone 20 BID for 5 days    # Pain management  - Tylenol PRN  - low dose oxycodone prn breakthrough  -  nucynta 100 q6 11/9  - robaxin PRN  - neurontin low dose added. Will increase to 300 q8 11/14, discussed with patient who is agreeable  - voltaren gel    # GI ppx  -  Protonix 40mg  - Senna, miralax, dulcolax supp PRN    # FEN   - Diet: Regular- no egg    # DVT ppx  - SCD, TEDs  - on xarelto    # LABS  CBC BMP 11/17  f/u re: suture removal    CODE STATUS: FULL CODE    Outpatient Follow-up (Specialty/Name of physician):    Ami Lopez (DO)  Orthopaedic Surgery Surgery  [address] [facility] [address] [facility], [address] [place] [id number]  Phone: [phone]  Fax: [phone]

## 2022-11-14 NOTE — PROGRESS NOTE ADULT - SUBJECTIVE AND OBJECTIVE BOX
Patient is a 62y old  Male who presents with a chief complaint of Spinal cord compression s/p C2-T1 lami/fusion (13 Nov 2022 13:25)      Patient seen and examined at bedside. +tired, otherwise no acute medical complaints.     ALLERGIES:  No Known Drug Allergies  peanuts (Unknown)    MEDICATIONS  (STANDING):  acetaminophen     Tablet .. 975 milliGRAM(s) Oral every 8 hours  amLODIPine   Tablet 10 milliGRAM(s) Oral daily  gabapentin 200 milliGRAM(s) Oral every 8 hours  melatonin 3 milliGRAM(s) Oral at bedtime  pantoprazole    Tablet 40 milliGRAM(s) Oral before breakfast  polyethylene glycol 3350 17 Gram(s) Oral daily  rivaroxaban 20 milliGRAM(s) Oral daily  senna 2 Tablet(s) Oral at bedtime  tapentadol 100 milliGRAM(s) Oral every 6 hours    MEDICATIONS  (PRN):  albuterol    90 MICROgram(s) HFA Inhaler 2 Puff(s) Inhalation every 6 hours PRN Shortness of Breath and/or Wheezing  bisacodyl Suppository 10 milliGRAM(s) Rectal daily PRN Constipation  capsaicin 0.025% Cream 1 Application(s) Topical every 8 hours PRN neuropathic pain  diclofenac sodium 1% Gel 4 Gram(s) Topical three times a day PRN pain  methocarbamol 250 milliGRAM(s) Oral every 4 hours PRN Muscle Spasm  oxyCODONE    IR 2.5 milliGRAM(s) Oral every 6 hours PRN Moderate Pain (4 - 6)  oxyCODONE    IR 5 milliGRAM(s) Oral every 6 hours PRN Severe Pain (7 - 10)    Vital Signs Last 24 Hrs  T(F): 98.1 (13 Nov 2022 20:13), Max: 98.1 (13 Nov 2022 20:13)  HR: 90 (14 Nov 2022 08:29) (80 - 92)  BP: 122/79 (14 Nov 2022 08:29) (109/68 - 122/79)  RR: 16 (14 Nov 2022 08:29) (15 - 16)  SpO2: 98% (14 Nov 2022 08:29) (98% - 98%)  I&O's Summary        PHYSICAL EXAM:  General: NAD  ENT: MMM, no scleral icterus  Neck: Supple, No JVD  Lungs: Clear to auscultation bilaterally, no wheezes, rales, rhonchi  Cardio: RRR, S1/S2  Abdomen: Soft, Nontender, Nondistended; Bowel sounds present  Extremities: No calf tenderness, No pitting edema    LABS:                        10.6   4.19  )-----------( 429      ( 14 Nov 2022 06:20 )             32.9       11-14    139  |  104  |  21  ----------------------------<  97  4.4   |  32  |  0.79    Ca    8.9      14 Nov 2022 06:20    TPro  6.5  /  Alb  3.0  /  TBili  0.1  /  DBili  x   /  AST  22  /  ALT  32  /  AlkPhos  63  11-14                                      COVID-19 PCR: NotDetec (11-09-22 @ 06:00)  COVID-19 PCR: NotDetec (11-06-22 @ 06:57)  COVID-19 PCR: NotDetec (11-03-22 @ 02:28)  COVID-19 PCR: NotDetec (10-24-22 @ 09:00)      RADIOLOGY & ADDITIONAL TESTS: reviewed    Care Discussed with Consultants/Other Providers: yes, rehab

## 2022-11-15 PROCEDURE — 99232 SBSQ HOSP IP/OBS MODERATE 35: CPT | Mod: GC

## 2022-11-15 PROCEDURE — 90832 PSYTX W PT 30 MINUTES: CPT

## 2022-11-15 RX ORDER — RIVAROXABAN 15 MG-20MG
1 KIT ORAL
Qty: 30 | Refills: 0
Start: 2022-11-15 | End: 2022-12-14

## 2022-11-15 RX ORDER — HYDROXYZINE HCL 10 MG
25 TABLET ORAL ONCE
Refills: 0 | Status: DISCONTINUED | OUTPATIENT
Start: 2022-11-15 | End: 2022-11-23

## 2022-11-15 RX ORDER — METHOCARBAMOL 500 MG/1
500 TABLET, FILM COATED ORAL ONCE
Refills: 0 | Status: COMPLETED | OUTPATIENT
Start: 2022-11-15 | End: 2022-11-15

## 2022-11-15 RX ORDER — TAPENTADOL HYDROCHLORIDE 50 MG/1
100 TABLET, FILM COATED ORAL EVERY 6 HOURS
Refills: 0 | Status: DISCONTINUED | OUTPATIENT
Start: 2022-11-15 | End: 2022-11-22

## 2022-11-15 RX ORDER — LANOLIN ALCOHOL/MO/W.PET/CERES
6 CREAM (GRAM) TOPICAL AT BEDTIME
Refills: 0 | Status: DISCONTINUED | OUTPATIENT
Start: 2022-11-15 | End: 2022-11-23

## 2022-11-15 RX ADMIN — TAPENTADOL HYDROCHLORIDE 100 MILLIGRAM(S): 50 TABLET, FILM COATED ORAL at 18:58

## 2022-11-15 RX ADMIN — OXYCODONE HYDROCHLORIDE 5 MILLIGRAM(S): 5 TABLET ORAL at 22:43

## 2022-11-15 RX ADMIN — TAPENTADOL HYDROCHLORIDE 100 MILLIGRAM(S): 50 TABLET, FILM COATED ORAL at 23:44

## 2022-11-15 RX ADMIN — OXYCODONE HYDROCHLORIDE 5 MILLIGRAM(S): 5 TABLET ORAL at 21:59

## 2022-11-15 RX ADMIN — Medication 1 APPLICATION(S): at 22:22

## 2022-11-15 RX ADMIN — RIVAROXABAN 20 MILLIGRAM(S): KIT at 12:08

## 2022-11-15 RX ADMIN — GABAPENTIN 300 MILLIGRAM(S): 400 CAPSULE ORAL at 21:34

## 2022-11-15 RX ADMIN — Medication 975 MILLIGRAM(S): at 22:43

## 2022-11-15 RX ADMIN — GABAPENTIN 300 MILLIGRAM(S): 400 CAPSULE ORAL at 13:15

## 2022-11-15 RX ADMIN — SENNA PLUS 2 TABLET(S): 8.6 TABLET ORAL at 21:36

## 2022-11-15 RX ADMIN — TAPENTADOL HYDROCHLORIDE 100 MILLIGRAM(S): 50 TABLET, FILM COATED ORAL at 23:12

## 2022-11-15 RX ADMIN — Medication 975 MILLIGRAM(S): at 09:37

## 2022-11-15 RX ADMIN — TAPENTADOL HYDROCHLORIDE 100 MILLIGRAM(S): 50 TABLET, FILM COATED ORAL at 06:44

## 2022-11-15 RX ADMIN — TAPENTADOL HYDROCHLORIDE 100 MILLIGRAM(S): 50 TABLET, FILM COATED ORAL at 12:07

## 2022-11-15 RX ADMIN — METHOCARBAMOL 250 MILLIGRAM(S): 500 TABLET, FILM COATED ORAL at 19:57

## 2022-11-15 RX ADMIN — METHOCARBAMOL 500 MILLIGRAM(S): 500 TABLET, FILM COATED ORAL at 20:53

## 2022-11-15 RX ADMIN — AMLODIPINE BESYLATE 10 MILLIGRAM(S): 2.5 TABLET ORAL at 08:25

## 2022-11-15 RX ADMIN — PANTOPRAZOLE SODIUM 40 MILLIGRAM(S): 20 TABLET, DELAYED RELEASE ORAL at 06:43

## 2022-11-15 RX ADMIN — TAPENTADOL HYDROCHLORIDE 100 MILLIGRAM(S): 50 TABLET, FILM COATED ORAL at 07:02

## 2022-11-15 RX ADMIN — Medication 975 MILLIGRAM(S): at 06:42

## 2022-11-15 RX ADMIN — Medication 975 MILLIGRAM(S): at 18:56

## 2022-11-15 RX ADMIN — GABAPENTIN 300 MILLIGRAM(S): 400 CAPSULE ORAL at 06:42

## 2022-11-15 RX ADMIN — Medication 6 MILLIGRAM(S): at 21:36

## 2022-11-15 RX ADMIN — Medication 975 MILLIGRAM(S): at 21:35

## 2022-11-15 RX ADMIN — Medication 975 MILLIGRAM(S): at 13:16

## 2022-11-15 RX ADMIN — TAPENTADOL HYDROCHLORIDE 100 MILLIGRAM(S): 50 TABLET, FILM COATED ORAL at 13:17

## 2022-11-15 NOTE — PROGRESS NOTE ADULT - ASSESSMENT
Pt alert, attentive, intact language, thought processes - circumstantial, no abnormal thought contents noted, mildly elated affect, euthymic mood, denied AH/VH, denied SI/HI/I/P, calm behavior. Plan: Continue the assessment process.

## 2022-11-15 NOTE — PROGRESS NOTE ADULT - SUBJECTIVE AND OBJECTIVE BOX
Patient is a 62y old  Male who presents with a chief complaint of Spinal cord compression s/p C2-T1 lami/fusion (14 Nov 2022 10:42)      SUBJECTIVE/ROS: Patient seen and examined. He notes numbness and tingling in the fingers (bilateral) and shooting pain and numbness of R leg(+spasms) at night when lying down. He continues to complain of L knee pain. Denies constipation/diarrhea. Denies dysuria. Denies shortness of breath.       MEDICATIONS:  MEDICATIONS  (STANDING):  acetaminophen     Tablet .. 975 milliGRAM(s) Oral every 8 hours  amLODIPine   Tablet 10 milliGRAM(s) Oral daily  gabapentin 300 milliGRAM(s) Oral three times a day  melatonin 3 milliGRAM(s) Oral at bedtime  pantoprazole    Tablet 40 milliGRAM(s) Oral before breakfast  polyethylene glycol 3350 17 Gram(s) Oral daily  rivaroxaban 20 milliGRAM(s) Oral daily  senna 2 Tablet(s) Oral at bedtime  tapentadol 100 milliGRAM(s) Oral every 6 hours    MEDICATIONS  (PRN):  albuterol    90 MICROgram(s) HFA Inhaler 2 Puff(s) Inhalation every 6 hours PRN Shortness of Breath and/or Wheezing  bisacodyl Suppository 10 milliGRAM(s) Rectal daily PRN Constipation  capsaicin 0.025% Cream 1 Application(s) Topical every 8 hours PRN neuropathic pain  diclofenac sodium 1% Gel 4 Gram(s) Topical three times a day PRN pain  methocarbamol 250 milliGRAM(s) Oral every 4 hours PRN Muscle Spasm  oxyCODONE    IR 2.5 milliGRAM(s) Oral every 6 hours PRN Moderate Pain (4 - 6)  oxyCODONE    IR 5 milliGRAM(s) Oral every 6 hours PRN Severe Pain (7 - 10)      RECENT LABS:                        10.6   4.19  )-----------( 429      ( 14 Nov 2022 06:20 )             32.9     11-14    139  |  104  |  21  ----------------------------<  97  4.4   |  32<H>  |  0.79    Ca    8.9      14 Nov 2022 06:20    TPro  6.5  /  Alb  3.0<L>  /  TBili  0.1<L>  /  DBili  x   /  AST  22  /  ALT  32  /  AlkPhos  63  11-14    LIVER FUNCTIONS - ( 14 Nov 2022 06:20 )  Alb: 3.0 g/dL / Pro: 6.5 g/dL / ALK PHOS: 63 U/L / ALT: 32 U/L / AST: 22 U/L / GGT: x             VITALS  62y  Vital Signs Last 24 Hrs  T(C): 36.7 (15 Nov 2022 08:23), Max: 36.8 (14 Nov 2022 19:48)  T(F): 98 (15 Nov 2022 08:23), Max: 98.3 (14 Nov 2022 19:48)  HR: 95 (15 Nov 2022 08:23) (89 - 95)  BP: 130/79 (15 Nov 2022 08:23) (109/72 - 132/87)  BP(mean): --  RR: 16 (15 Nov 2022 08:23) (16 - 17)  SpO2: 96% (15 Nov 2022 08:23) (96% - 97%)    Parameters below as of 15 Nov 2022 08:23  Patient On (Oxygen Delivery Method): room air    Daily     PHYSICAL EXAM:   · Constitutional Comments	alert O x 3. emotional and tearful at times when discussing both improvement and residual deficits.  · Respiratory	clear to auscultation bilaterally; no wheezes; no rales; no rhonchi  · Cardiovascular	regular rate and rhythm; S1 S2 present; no gallops; no rub; no murmur  · Gastrointestinal	soft; nontender; nondistended; normal active bowel sounds  · Motor	no left knee effusion no warmth or joint line TTP  PROM left knee flexion  to 110 improved  quad 5-/5 ham 4+/5  ankle eversion 4-/5 tight heel cords  bilateral calves soft no TTP      IDT ROUNDS:             Patient is a 62y old  Male who presents with a chief complaint of Spinal cord compression s/p C2-T1 lami/fusion (14 Nov 2022 10:42)      SUBJECTIVE/ROS: Patient seen and examined. He notes numbness and tingling in the fingers (bilateral) and shooting pain and numbness of R leg(+spasms) at night when lying down. He continues to complain of L knee pain. Denies constipation/diarrhea. Denies dysuria. Denies shortness of breath.       MEDICATIONS:  MEDICATIONS  (STANDING):  acetaminophen     Tablet .. 975 milliGRAM(s) Oral every 8 hours  amLODIPine   Tablet 10 milliGRAM(s) Oral daily  gabapentin 300 milliGRAM(s) Oral three times a day  melatonin 3 milliGRAM(s) Oral at bedtime  pantoprazole    Tablet 40 milliGRAM(s) Oral before breakfast  polyethylene glycol 3350 17 Gram(s) Oral daily  rivaroxaban 20 milliGRAM(s) Oral daily  senna 2 Tablet(s) Oral at bedtime  tapentadol 100 milliGRAM(s) Oral every 6 hours    MEDICATIONS  (PRN):  albuterol    90 MICROgram(s) HFA Inhaler 2 Puff(s) Inhalation every 6 hours PRN Shortness of Breath and/or Wheezing  bisacodyl Suppository 10 milliGRAM(s) Rectal daily PRN Constipation  capsaicin 0.025% Cream 1 Application(s) Topical every 8 hours PRN neuropathic pain  diclofenac sodium 1% Gel 4 Gram(s) Topical three times a day PRN pain  methocarbamol 250 milliGRAM(s) Oral every 4 hours PRN Muscle Spasm  oxyCODONE    IR 2.5 milliGRAM(s) Oral every 6 hours PRN Moderate Pain (4 - 6)  oxyCODONE    IR 5 milliGRAM(s) Oral every 6 hours PRN Severe Pain (7 - 10)      RECENT LABS:                        10.6   4.19  )-----------( 429      ( 14 Nov 2022 06:20 )             32.9     11-14    139  |  104  |  21  ----------------------------<  97  4.4   |  32<H>  |  0.79    Ca    8.9      14 Nov 2022 06:20    TPro  6.5  /  Alb  3.0<L>  /  TBili  0.1<L>  /  DBili  x   /  AST  22  /  ALT  32  /  AlkPhos  63  11-14    LIVER FUNCTIONS - ( 14 Nov 2022 06:20 )  Alb: 3.0 g/dL / Pro: 6.5 g/dL / ALK PHOS: 63 U/L / ALT: 32 U/L / AST: 22 U/L / GGT: x             VITALS  62y  Vital Signs Last 24 Hrs  T(C): 36.7 (15 Nov 2022 08:23), Max: 36.8 (14 Nov 2022 19:48)  T(F): 98 (15 Nov 2022 08:23), Max: 98.3 (14 Nov 2022 19:48)  HR: 95 (15 Nov 2022 08:23) (89 - 95)  BP: 130/79 (15 Nov 2022 08:23) (109/72 - 132/87)  BP(mean): --  RR: 16 (15 Nov 2022 08:23) (16 - 17)  SpO2: 96% (15 Nov 2022 08:23) (96% - 97%)    Parameters below as of 15 Nov 2022 08:23  Patient On (Oxygen Delivery Method): room air    Daily     PHYSICAL EXAM:   · Constitutional Comments	alert O x 3. appears comfortable. Emotional at times discussing progress and previous function.   · Respiratory	clear to auscultation bilaterally; no wheezes; no rales; no rhonchi  · Cardiovascular	regular rate and rhythm; S1 S2 present; no gallops; no rub; no murmur  · Gastrointestinal	soft; nontender; nondistended; normal active bowel sounds  · Motor	no left knee effusion no warmth or joint line TTP  PROM left knee flexion  to 110 improving but less than R knee  L quad 5-/5 ham 4+/5  ankle eversion 4-/5 tight heel cords  bilateral calves soft no TTP  LUE shoulder ROM limited , painful past 90deg  4/5 throughout LUE       IDT ROUNDS:  IDT 11/10:  OT: setup for eating/grooming/UBD. Karol LBD  PT: amb at CG level with RW, 4 steps Karol   ST: n/a     Dispo planning: 10/18 home -> modified to 10/23.

## 2022-11-15 NOTE — PROGRESS NOTE ADULT - SUBJECTIVE AND OBJECTIVE BOX
Pt was seen for 30 min for supportive tx. Pt had no complaints. Reviewed chart, approached Pt, introduced the role of neuropsychology in the tx team, and explained the nature and purpose of the initial eval. Pt agreed to participate. Focus of session was on establishing rapport, gathering  biographical information, and assessing Pt's current emotional functioning. Pt answer all questions asked to explore his medical and social hx, as well as those to assess his current mental state. Pt was highly participative, verbose and circumstantial at times, and well related throughout the meeting. Pt expressed concern about his LOS he expressed awareness that he may be approaching his d/c soon and made clear that if possible he would like to stay a little longer in the program to solidify some of his gains. Pt also expressed awareness and gratitude for improvement he has noticed since he had neck surgery on 10/24/22.  Support and encouragement were provided.

## 2022-11-15 NOTE — PROGRESS NOTE ADULT - ASSESSMENT
61 YO male with PMH of asthma and HTN who was transferred from [facility]. Patient presented to [facility] ED on 10/23 and Orthopedics was consulted for cord compression with neurological symptoms. Patient  underwent a C2-T1 laminectomy/fusion with C3-6 laminectomies with Dr. Lopez on 10/24. post -op complicated by PE.     # Spinal cord compression/central cord syndrome  -  s/p C2-T1 laminectomy/fusion with C3-6 laminectomies with Dr. Lopez on 10/24  - wound care: DSD and tegaderm daily. Pending callback from Dr Lopez - regarding suture removal.   - Comprehensive Rehab Program: PT/OT, 3hours daily and 5 days weekly   - aspen cervical collar when OOB  - neuropsychology supportive counseling, rec therapy  - Precautions: spinal, respiratory, cardiac, sensory, fall, AC    # HTN  - Off amlodipine  - 109//87     # PE  - TTE No evidence of RV strain  - Xarelto 20mg daily  - HR and O2 sats controlled      # Asthma  - Albuterol INH PRN    # Leukocytosis - resolved  - No suspicion for infection  - WBC 4.19 11/14 resolved    # Hyponatremia - resolved   - Na 139 11/14 resolved    # Pseudo Gout  - aspirate from the Lt knee with no suspicion for infectious arthritis  - s/p prednisone 20 BID for 5 days    # Pain management  - Tylenol PRN  - low dose oxycodone prn breakthrough  -  nucynta 100 q6 11/9 - continue same dose for now, managed outpt by Dr Vaughn Fraser  - robaxin PRN  - neurontin increased to 300q8 11/14. Discussed time needed for effect.   - voltaren gel to L knee    #Sleep  -melatonin 6mg qhs    # GI ppx  -  Protonix 40mg  - Senna, miralax, dulcolax supp PRN    # FEN   - Diet: Regular- no egg    # DVT ppx  - on xarelto    # LABS  CBC BMP 11/17  f/u pending re: suture removal    CODE STATUS: FULL CODE    Outpatient Follow-up (Specialty/Name of physician):    Ami Lopez (DO)  Orthopaedic Surgery Surgery  [address], [address]  [place] [address]  Phone: [phone]  Fax: [phone] 63 YO male with PMH of asthma and HTN who was transferred from [facility]. Patient presented to [facility] ED on 10/23 and Orthopedics was consulted for cord compression with neurological symptoms. Patient  underwent a C2-T1 laminectomy/fusion with C3-6 laminectomies with Dr. Lopez on 10/24. post -op complicated by PE.     # Spinal cord compression/central cord syndrome  -  s/p C2-T1 laminectomy/fusion with C3-6 laminectomies with Dr. Lopze on 10/24  - wound care: DSD and tegaderm daily. Pending callback from Dr Lopez - regarding suture removal.   - Comprehensive Rehab Program: PT/OT, 3hours daily and 5 days weekly   - aspen cervical collar when OOB  - neuropsychology supportive counseling, rec therapy  - Precautions: spinal, respiratory, cardiac, sensory, fall, AC    # HTN  - Off amlodipine  - 109//87     # PE  - TTE No evidence of RV strain  - Xarelto 20mg daily  - HR and O2 sats controlled      # Asthma  - Albuterol INH PRN    # Leukocytosis - resolved  - No suspicion for infection  - WBC 4.19 11/14 resolved    # Hyponatremia - resolved   - Na 139 11/14 resolved    # Pseudo Gout  - aspirate from the Lt knee with no suspicion for infectious arthritis  - s/p prednisone 20 BID for 5 days    # Pain management  - Tylenol 975mg q8  - low dose oxycodone prn breakthrough  -  nucynta 100 q6 11/9 - continue same dose for now, managed outpt by Dr Vaughn Fraser  - robaxin PRN  - neurontin increased to 300q8 11/14. Discussed time needed for effect.   - voltaren gel to L knee    #Sleep  -melatonin 6mg qhs    # GI ppx  -  Protonix 40mg  - Senna, miralax, dulcolax supp PRN    # FEN   - Diet: Regular- no egg    # DVT ppx  - on xarelto    # LABS  CBC BMP 11/17  f/u pending re: suture removal    CODE STATUS: FULL CODE    Outpatient Follow-up (Specialty/Name of physician):    Ami Lopez (DO)  Orthopaedic Surgery Surgery  [address], [address]  [place] [address]  Phone: [phone]  Fax: [phone]

## 2022-11-16 PROCEDURE — 99232 SBSQ HOSP IP/OBS MODERATE 35: CPT | Mod: GC

## 2022-11-16 PROCEDURE — 99232 SBSQ HOSP IP/OBS MODERATE 35: CPT

## 2022-11-16 RX ORDER — GABAPENTIN 400 MG/1
300 CAPSULE ORAL
Refills: 0 | Status: DISCONTINUED | OUTPATIENT
Start: 2022-11-16 | End: 2022-11-21

## 2022-11-16 RX ORDER — CYCLOBENZAPRINE HYDROCHLORIDE 10 MG/1
5 TABLET, FILM COATED ORAL THREE TIMES A DAY
Refills: 0 | Status: DISCONTINUED | OUTPATIENT
Start: 2022-11-16 | End: 2022-11-18

## 2022-11-16 RX ORDER — GABAPENTIN 400 MG/1
600 CAPSULE ORAL
Refills: 0 | Status: DISCONTINUED | OUTPATIENT
Start: 2022-11-16 | End: 2022-11-21

## 2022-11-16 RX ADMIN — GABAPENTIN 600 MILLIGRAM(S): 400 CAPSULE ORAL at 22:00

## 2022-11-16 RX ADMIN — TAPENTADOL HYDROCHLORIDE 100 MILLIGRAM(S): 50 TABLET, FILM COATED ORAL at 07:42

## 2022-11-16 RX ADMIN — TAPENTADOL HYDROCHLORIDE 100 MILLIGRAM(S): 50 TABLET, FILM COATED ORAL at 23:37

## 2022-11-16 RX ADMIN — GABAPENTIN 300 MILLIGRAM(S): 400 CAPSULE ORAL at 05:28

## 2022-11-16 RX ADMIN — TAPENTADOL HYDROCHLORIDE 100 MILLIGRAM(S): 50 TABLET, FILM COATED ORAL at 19:09

## 2022-11-16 RX ADMIN — TAPENTADOL HYDROCHLORIDE 100 MILLIGRAM(S): 50 TABLET, FILM COATED ORAL at 05:29

## 2022-11-16 RX ADMIN — TAPENTADOL HYDROCHLORIDE 100 MILLIGRAM(S): 50 TABLET, FILM COATED ORAL at 12:15

## 2022-11-16 RX ADMIN — PANTOPRAZOLE SODIUM 40 MILLIGRAM(S): 20 TABLET, DELAYED RELEASE ORAL at 05:28

## 2022-11-16 RX ADMIN — SENNA PLUS 2 TABLET(S): 8.6 TABLET ORAL at 22:01

## 2022-11-16 RX ADMIN — RIVAROXABAN 20 MILLIGRAM(S): KIT at 12:17

## 2022-11-16 RX ADMIN — Medication 975 MILLIGRAM(S): at 21:59

## 2022-11-16 RX ADMIN — Medication 975 MILLIGRAM(S): at 18:06

## 2022-11-16 RX ADMIN — TAPENTADOL HYDROCHLORIDE 100 MILLIGRAM(S): 50 TABLET, FILM COATED ORAL at 18:02

## 2022-11-16 RX ADMIN — Medication 975 MILLIGRAM(S): at 07:42

## 2022-11-16 RX ADMIN — GABAPENTIN 300 MILLIGRAM(S): 400 CAPSULE ORAL at 15:23

## 2022-11-16 RX ADMIN — Medication 6 MILLIGRAM(S): at 22:01

## 2022-11-16 RX ADMIN — Medication 975 MILLIGRAM(S): at 15:22

## 2022-11-16 RX ADMIN — Medication 975 MILLIGRAM(S): at 22:39

## 2022-11-16 RX ADMIN — Medication 975 MILLIGRAM(S): at 05:28

## 2022-11-16 NOTE — PROGRESS NOTE ADULT - SUBJECTIVE AND OBJECTIVE BOX
Patient is a 62y old  Male who presents with a chief complaint of Spinal cord compression s/p C2-T1 lami/fusion (15 Nov 2022 14:38)    Patient seen and examined at bedside. denies headache, fever, chills, cp, sob, n/v, abd pain.    ALLERGIES:  No Known Drug Allergies  peanuts (Unknown)    MEDICATIONS  (STANDING):  acetaminophen     Tablet .. 975 milliGRAM(s) Oral every 8 hours  amLODIPine   Tablet 10 milliGRAM(s) Oral daily  gabapentin 300 milliGRAM(s) Oral three times a day  hydrOXYzine hydrochloride 25 milliGRAM(s) Oral once  melatonin 6 milliGRAM(s) Oral at bedtime  pantoprazole    Tablet 40 milliGRAM(s) Oral before breakfast  polyethylene glycol 3350 17 Gram(s) Oral daily  rivaroxaban 20 milliGRAM(s) Oral daily  senna 2 Tablet(s) Oral at bedtime  tapentadol 100 milliGRAM(s) Oral every 6 hours    MEDICATIONS  (PRN):  albuterol    90 MICROgram(s) HFA Inhaler 2 Puff(s) Inhalation every 6 hours PRN Shortness of Breath and/or Wheezing  bisacodyl Suppository 10 milliGRAM(s) Rectal daily PRN Constipation  capsaicin 0.025% Cream 1 Application(s) Topical every 8 hours PRN neuropathic pain  diclofenac sodium 1% Gel 4 Gram(s) Topical three times a day PRN pain  methocarbamol 250 milliGRAM(s) Oral every 4 hours PRN Muscle Spasm  oxyCODONE    IR 2.5 milliGRAM(s) Oral every 6 hours PRN Moderate Pain (4 - 6)  oxyCODONE    IR 5 milliGRAM(s) Oral every 6 hours PRN Severe Pain (7 - 10)    Vital Signs Last 24 Hrs  T(F): 98.3 (16 Nov 2022 07:46), Max: 98.3 (16 Nov 2022 07:46)  HR: 83 (16 Nov 2022 07:46) (83 - 83)  BP: 111/73 (16 Nov 2022 07:46) (111/73 - 111/73)  RR: 16 (16 Nov 2022 07:46) (16 - 16)  SpO2: 97% (16 Nov 2022 07:46) (97% - 97%)  I&O's Summary        PHYSICAL EXAM:  General: NAD, A/O x 3, pleasant  ENT: MMM, no scleral icterus  Neck: +c collar  Lungs: Clear to auscultation bilaterally, no wheezes, rales, rhonchi  Cardio: RRR, S1/S2, No murmurs  Abdomen: Soft, Nontender, Nondistended; Bowel sounds present  Extremities: No calf tenderness, No pitting edema    LABS:                        10.6   4.19  )-----------( 429      ( 14 Nov 2022 06:20 )             32.9       11-14    139  |  104  |  21  ----------------------------<  97  4.4   |  32  |  0.79    Ca    8.9      14 Nov 2022 06:20    TPro  6.5  /  Alb  3.0  /  TBili  0.1  /  DBili  x   /  AST  22  /  ALT  32  /  AlkPhos  63  11-14         COVID-19 PCR: NotDetec (11-09-22 @ 06:00)  COVID-19 PCR: NotDetec (11-06-22 @ 06:57)  COVID-19 PCR: NotDetec (11-03-22 @ 02:28)  COVID-19 PCR: NotDetec (10-24-22 @ 09:00)      RADIOLOGY & ADDITIONAL TESTS: reviewed    Care Discussed with Consultants/Other Providers: yes, rehab

## 2022-11-16 NOTE — PROGRESS NOTE ADULT - ASSESSMENT
61 y/o M with hx of asthma and HTN, presented to [facility] for acute rehab from [facility] where he was transferred to from [facility] with acute complaints of numbness and swelling of his left arm for the past month and weakness of his right leg, noted to have diffuse swelling of the cervical cord with long segment central cord edema extending from C2 to T1 with moderate spinal canal stenosis and mild cord compression at C3-C4 and C4-C5. Pt s/p C2-T1 laminectomy/fusion with C3-6 laminectomies with Dr. Lopez on 10/24. Postop with B/L PE and left knee pain    #Spinal cord compression  #Central cord syndrome  #Neuropathic Pain  -S/p C2-T1 laminectomy/fusion with C3-6 laminectomies with Dr. Lopez on 10/24  -Cervical collar when OOB  -Continue Gabapentin. Monitor for somnolence, sedation, drowsiness. Titrate up as tolerated  -Precautions: spinal, respiratory, cardiac, sensory, fall, AC  -Continue comprehensive rehab program - PT/OT/SLP per rehab team  -Pain management, bowel regimen per rehab    #B/L Pulmonary Emboli  -Continue Xarelto. Treatment for 3-6months    #HTN  -Continue Amlodipine w/ holding parameters    #Asthma  -Albuterol INH PRN    #Left knee pain  -S/p aspiration, fluid assessment reviewed. Negative for gout or infection  -Empirically treated with Steroid  -Encourage PT/OT    #DVT ppx - Xarelto  #GI ppx - PPI

## 2022-11-16 NOTE — PROGRESS NOTE ADULT - SUBJECTIVE AND OBJECTIVE BOX
Patient is a 62y old  Male who presents with a chief complaint of Spinal cord compression s/p C2-T1 lami/fusion (14 Nov 2022 10:42)      SUBJECTIVE/ROS: Patient seen and examined. numbness Left hand with increased spasms last night-continued RLE spasms as well increased anxiety regrading ineffectiveness of nucynta. He continues to complain of L knee pain. Denies constipation/diarrhea. Moved Bowels yesterday. Denies dysuria. Denies shortness of breath.       MEDICATIONS  (STANDING):  acetaminophen     Tablet .. 975 milliGRAM(s) Oral every 8 hours  amLODIPine   Tablet 10 milliGRAM(s) Oral daily  gabapentin 300 milliGRAM(s) Oral <User Schedule>  gabapentin 600 milliGRAM(s) Oral <User Schedule>  hydrOXYzine hydrochloride 25 milliGRAM(s) Oral once  melatonin 6 milliGRAM(s) Oral at bedtime  pantoprazole    Tablet 40 milliGRAM(s) Oral before breakfast  polyethylene glycol 3350 17 Gram(s) Oral daily  rivaroxaban 20 milliGRAM(s) Oral daily  senna 2 Tablet(s) Oral at bedtime  tapentadol 100 milliGRAM(s) Oral every 6 hours    MEDICATIONS  (PRN):  albuterol    90 MICROgram(s) HFA Inhaler 2 Puff(s) Inhalation every 6 hours PRN Shortness of Breath and/or Wheezing  bisacodyl Suppository 10 milliGRAM(s) Rectal daily PRN Constipation  capsaicin 0.025% Cream 1 Application(s) Topical every 8 hours PRN neuropathic pain  cyclobenzaprine 5 milliGRAM(s) Oral three times a day PRN Muscle Spasm  diclofenac sodium 1% Gel 4 Gram(s) Topical three times a day PRN pain  oxyCODONE    IR 2.5 milliGRAM(s) Oral every 6 hours PRN Moderate Pain (4 - 6)  oxyCODONE    IR 5 milliGRAM(s) Oral every 6 hours PRN Severe Pain (7 - 10)        RECENT LABS:                        10.6   4.19  )-----------( 429      ( 14 Nov 2022 06:20 )             32.9     11-14    139  |  104  |  21  ----------------------------<  97  4.4   |  32<H>  |  0.79    Ca    8.9      14 Nov 2022 06:20    TPro  6.5  /  Alb  3.0<L>  /  TBili  0.1<L>  /  DBili  x   /  AST  22  /  ALT  32  /  AlkPhos  63  11-14    LIVER FUNCTIONS - ( 14 Nov 2022 06:20 )  Alb: 3.0 g/dL / Pro: 6.5 g/dL / ALK PHOS: 63 U/L / ALT: 32 U/L / AST: 22 U/L / GGT: x             VITALS  62y  Vital Signs Last 24 Hrs  T(C): 36.8 (16 Nov 2022 07:46), Max: 36.8 (16 Nov 2022 07:46)  T(F): 98.3 (16 Nov 2022 07:46), Max: 98.3 (16 Nov 2022 07:46)  HR: 83 (16 Nov 2022 07:46) (83 - 83)  BP: 111/73 (16 Nov 2022 07:46) (111/73 - 111/73)  BP(mean): --  RR: 16 (16 Nov 2022 07:46) (16 - 16)  SpO2: 97% (16 Nov 2022 07:46) (97% - 97%)    Parameters below as of 16 Nov 2022 07:46  Patient On (Oxygen Delivery Method): room air      Daily     PHYSICAL EXAM:   · Constitutional Comments	alert O x 3. appears comfortable. Emotional at times discussing progress and previous function.   · Respiratory	clear to auscultation bilaterally; no wheezes; no rales; no rhonchi  · Cardiovascular	regular rate and rhythm; S1 S2 present; no gallops; no rub; no murmur  · Gastrointestinal	soft; nontender; nondistended; normal active bowel sounds  · Motor	no left knee effusion no warmth or joint line TTP  PROM left knee flexion  to 110 improving but less than R knee  L quad 5-/5 ham 4+/5  ankle eversion 4-/5 tight heel cords  bilateral calves soft no TTP  LUE shoulder ROM limited , painful past 90deg  4/5 throughout LUE       IDT ROUNDS:  IDT 11/10:  OT: setup for eating/grooming/UBD. Karol LBD  PT: amb at CG level with RW, 4 steps Karol   ST: n/a     Dispo planning: 10/18 home -> modified to 10/23.

## 2022-11-16 NOTE — PROGRESS NOTE ADULT - ASSESSMENT
63 YO male with PMH of asthma and HTN who was transferred from [facility]. Patient presented to [facility] ED on 10/23 and Orthopedics was consulted for cord compression with neurological symptoms. Patient  underwent a C2-T1 laminectomy/fusion with C3-6 laminectomies with Dr. Lopez on 10/24. post -op complicated by PE.     # Spinal cord compression/central cord syndrome  -  s/p C2-T1 laminectomy/fusion with C3-6 laminectomies with Dr. Lopez on 10/24  - wound care: DSD and tegaderm daily.Dr Lopez contacted- will DC sutures in AM  - Comprehensive Rehab Program: PT/OT, 3hours daily and 5 days weekly   - aspen cervical collar when OOB  - neuropsychology supportive counseling, rec therapy  - Precautions: spinal, respiratory, cardiac, sensory, fall, AC    # HTN  - Off amlodipine  - 109//87     # PE  - TTE No evidence of RV strain  - Xarelto 20mg daily  - HR and O2 sats controlled      # Asthma  - Albuterol INH PRN    # Leukocytosis - resolved  - No suspicion for infection  - WBC 4.19 11/14 resolved    # Hyponatremia - resolved   - Na 139 11/14 resolved    # Pseudo Gout  - aspirate from the Lt knee with no suspicion for infectious arthritis  - s/p prednisone 20 BID for 5 days    # Pain management  - Tylenol 975mg q8  - low dose oxycodone prn breakthrough  -  nucynta 100 q6 11/9 - continue same dose for now, managed outpt by Dr Vaughn Fraser  - robaxin PRN- Robaxin Dcd- started on flexeril PRN  - neurontin increased to 300,300,600mg   - voltaren gel to L knee    #Sleep  -melatonin 6mg qhs    # GI ppx  -  Protonix 40mg  - Senna, miralax, dulcolax supp PRN    # FEN   - Diet: Regular- no egg    # DVT ppx  - on xarelto    # LABS  CBC BMP 11/17      CODE STATUS: FULL CODE    Outpatient Follow-up (Specialty/Name of physician):    Ami Lopez (DO)  Orthopaedic Surgery Surgery  [address], [address]  [place] [address]  Phone: [phone]  Fax: [phone]

## 2022-11-17 LAB
ALBUMIN SERPL ELPH-MCNC: 3 G/DL — LOW (ref 3.3–5)
ALP SERPL-CCNC: 69 U/L — SIGNIFICANT CHANGE UP (ref 40–120)
ALT FLD-CCNC: 33 U/L — SIGNIFICANT CHANGE UP (ref 10–45)
ANION GAP SERPL CALC-SCNC: 9 MMOL/L — SIGNIFICANT CHANGE UP (ref 5–17)
AST SERPL-CCNC: 26 U/L — SIGNIFICANT CHANGE UP (ref 10–40)
BILIRUB SERPL-MCNC: 0.2 MG/DL — SIGNIFICANT CHANGE UP (ref 0.2–1.2)
BUN SERPL-MCNC: 19 MG/DL — SIGNIFICANT CHANGE UP (ref 7–23)
CALCIUM SERPL-MCNC: 8.9 MG/DL — SIGNIFICANT CHANGE UP (ref 8.4–10.5)
CHLORIDE SERPL-SCNC: 107 MMOL/L — SIGNIFICANT CHANGE UP (ref 96–108)
CO2 SERPL-SCNC: 28 MMOL/L — SIGNIFICANT CHANGE UP (ref 22–31)
CREAT SERPL-MCNC: 0.99 MG/DL — SIGNIFICANT CHANGE UP (ref 0.5–1.3)
CULTURE RESULTS: SIGNIFICANT CHANGE UP
EGFR: 86 ML/MIN/1.73M2 — SIGNIFICANT CHANGE UP
GLUCOSE SERPL-MCNC: 97 MG/DL — SIGNIFICANT CHANGE UP (ref 70–99)
HCT VFR BLD CALC: 32.6 % — LOW (ref 39–50)
HGB BLD-MCNC: 10.3 G/DL — LOW (ref 13–17)
MCHC RBC-ENTMCNC: 28.7 PG — SIGNIFICANT CHANGE UP (ref 27–34)
MCHC RBC-ENTMCNC: 31.6 GM/DL — LOW (ref 32–36)
MCV RBC AUTO: 90.8 FL — SIGNIFICANT CHANGE UP (ref 80–100)
NRBC # BLD: 0 /100 WBCS — SIGNIFICANT CHANGE UP (ref 0–0)
PLATELET # BLD AUTO: 381 K/UL — SIGNIFICANT CHANGE UP (ref 150–400)
POTASSIUM SERPL-MCNC: 4.6 MMOL/L — SIGNIFICANT CHANGE UP (ref 3.5–5.3)
POTASSIUM SERPL-SCNC: 4.6 MMOL/L — SIGNIFICANT CHANGE UP (ref 3.5–5.3)
PROT SERPL-MCNC: 6.2 G/DL — SIGNIFICANT CHANGE UP (ref 6–8.3)
RBC # BLD: 3.59 M/UL — LOW (ref 4.2–5.8)
RBC # FLD: 14.3 % — SIGNIFICANT CHANGE UP (ref 10.3–14.5)
SODIUM SERPL-SCNC: 144 MMOL/L — SIGNIFICANT CHANGE UP (ref 135–145)
SPECIMEN SOURCE: SIGNIFICANT CHANGE UP
WBC # BLD: 4.68 K/UL — SIGNIFICANT CHANGE UP (ref 3.8–10.5)
WBC # FLD AUTO: 4.68 K/UL — SIGNIFICANT CHANGE UP (ref 3.8–10.5)

## 2022-11-17 PROCEDURE — 99232 SBSQ HOSP IP/OBS MODERATE 35: CPT

## 2022-11-17 PROCEDURE — 99232 SBSQ HOSP IP/OBS MODERATE 35: CPT | Mod: GC

## 2022-11-17 RX ADMIN — OXYCODONE HYDROCHLORIDE 5 MILLIGRAM(S): 5 TABLET ORAL at 09:32

## 2022-11-17 RX ADMIN — OXYCODONE HYDROCHLORIDE 5 MILLIGRAM(S): 5 TABLET ORAL at 17:37

## 2022-11-17 RX ADMIN — OXYCODONE HYDROCHLORIDE 5 MILLIGRAM(S): 5 TABLET ORAL at 09:02

## 2022-11-17 RX ADMIN — Medication 975 MILLIGRAM(S): at 21:45

## 2022-11-17 RX ADMIN — SENNA PLUS 2 TABLET(S): 8.6 TABLET ORAL at 21:46

## 2022-11-17 RX ADMIN — TAPENTADOL HYDROCHLORIDE 100 MILLIGRAM(S): 50 TABLET, FILM COATED ORAL at 18:15

## 2022-11-17 RX ADMIN — Medication 975 MILLIGRAM(S): at 15:34

## 2022-11-17 RX ADMIN — TAPENTADOL HYDROCHLORIDE 100 MILLIGRAM(S): 50 TABLET, FILM COATED ORAL at 12:36

## 2022-11-17 RX ADMIN — GABAPENTIN 300 MILLIGRAM(S): 400 CAPSULE ORAL at 05:42

## 2022-11-17 RX ADMIN — POLYETHYLENE GLYCOL 3350 17 GRAM(S): 17 POWDER, FOR SOLUTION ORAL at 11:29

## 2022-11-17 RX ADMIN — AMLODIPINE BESYLATE 10 MILLIGRAM(S): 2.5 TABLET ORAL at 05:48

## 2022-11-17 RX ADMIN — Medication 975 MILLIGRAM(S): at 06:17

## 2022-11-17 RX ADMIN — CYCLOBENZAPRINE HYDROCHLORIDE 5 MILLIGRAM(S): 10 TABLET, FILM COATED ORAL at 12:08

## 2022-11-17 RX ADMIN — PANTOPRAZOLE SODIUM 40 MILLIGRAM(S): 20 TABLET, DELAYED RELEASE ORAL at 05:48

## 2022-11-17 RX ADMIN — OXYCODONE HYDROCHLORIDE 5 MILLIGRAM(S): 5 TABLET ORAL at 18:07

## 2022-11-17 RX ADMIN — CYCLOBENZAPRINE HYDROCHLORIDE 5 MILLIGRAM(S): 10 TABLET, FILM COATED ORAL at 17:48

## 2022-11-17 RX ADMIN — TAPENTADOL HYDROCHLORIDE 100 MILLIGRAM(S): 50 TABLET, FILM COATED ORAL at 23:49

## 2022-11-17 RX ADMIN — Medication 6 MILLIGRAM(S): at 21:44

## 2022-11-17 RX ADMIN — Medication 975 MILLIGRAM(S): at 16:04

## 2022-11-17 RX ADMIN — TAPENTADOL HYDROCHLORIDE 100 MILLIGRAM(S): 50 TABLET, FILM COATED ORAL at 05:43

## 2022-11-17 RX ADMIN — Medication 975 MILLIGRAM(S): at 05:46

## 2022-11-17 RX ADMIN — RIVAROXABAN 20 MILLIGRAM(S): KIT at 11:29

## 2022-11-17 RX ADMIN — TAPENTADOL HYDROCHLORIDE 100 MILLIGRAM(S): 50 TABLET, FILM COATED ORAL at 17:35

## 2022-11-17 RX ADMIN — TAPENTADOL HYDROCHLORIDE 100 MILLIGRAM(S): 50 TABLET, FILM COATED ORAL at 12:06

## 2022-11-17 RX ADMIN — GABAPENTIN 600 MILLIGRAM(S): 400 CAPSULE ORAL at 21:45

## 2022-11-17 RX ADMIN — Medication 975 MILLIGRAM(S): at 22:15

## 2022-11-17 RX ADMIN — GABAPENTIN 300 MILLIGRAM(S): 400 CAPSULE ORAL at 15:33

## 2022-11-17 RX ADMIN — TAPENTADOL HYDROCHLORIDE 100 MILLIGRAM(S): 50 TABLET, FILM COATED ORAL at 18:45

## 2022-11-17 NOTE — PROGRESS NOTE ADULT - SUBJECTIVE AND OBJECTIVE BOX
Patient is a 62y old  Male who presents with a chief complaint of Spinal cord compression s/p C2-T1 lami/fusion (16 Nov 2022 18:54)      SUBJECTIVE / OVERNIGHT EVENTS:  Pt seen and examined at bedside. No acute events overnight.  Pt denies cp, palpitations, sob, abd pain, N/V, fever, chills.    ROS:  All other review of systems negative    Allergies    No Known Drug Allergies  peanuts (Unknown)    Intolerances        MEDICATIONS  (STANDING):  acetaminophen     Tablet .. 975 milliGRAM(s) Oral every 8 hours  amLODIPine   Tablet 10 milliGRAM(s) Oral daily  gabapentin 300 milliGRAM(s) Oral <User Schedule>  gabapentin 600 milliGRAM(s) Oral <User Schedule>  hydrOXYzine hydrochloride 25 milliGRAM(s) Oral once  melatonin 6 milliGRAM(s) Oral at bedtime  pantoprazole    Tablet 40 milliGRAM(s) Oral before breakfast  polyethylene glycol 3350 17 Gram(s) Oral daily  rivaroxaban 20 milliGRAM(s) Oral daily  senna 2 Tablet(s) Oral at bedtime  tapentadol 100 milliGRAM(s) Oral every 6 hours    MEDICATIONS  (PRN):  albuterol    90 MICROgram(s) HFA Inhaler 2 Puff(s) Inhalation every 6 hours PRN Shortness of Breath and/or Wheezing  bisacodyl Suppository 10 milliGRAM(s) Rectal daily PRN Constipation  capsaicin 0.025% Cream 1 Application(s) Topical every 8 hours PRN neuropathic pain  cyclobenzaprine 5 milliGRAM(s) Oral three times a day PRN Muscle Spasm  diclofenac sodium 1% Gel 4 Gram(s) Topical three times a day PRN pain  oxyCODONE    IR 2.5 milliGRAM(s) Oral every 6 hours PRN Moderate Pain (4 - 6)  oxyCODONE    IR 5 milliGRAM(s) Oral every 6 hours PRN Severe Pain (7 - 10)      Vital Signs Last 24 Hrs  T(C): 36.8 (16 Nov 2022 20:20), Max: 36.8 (16 Nov 2022 20:20)  T(F): 98.2 (16 Nov 2022 20:20), Max: 98.2 (16 Nov 2022 20:20)  HR: 76 (17 Nov 2022 05:46) (76 - 89)  BP: 121/76 (17 Nov 2022 05:46) (114/74 - 121/76)  BP(mean): --  RR: 16 (16 Nov 2022 20:20) (16 - 16)  SpO2: 97% (16 Nov 2022 20:20) (97% - 97%)    Parameters below as of 16 Nov 2022 20:20  Patient On (Oxygen Delivery Method): room air      CAPILLARY BLOOD GLUCOSE        I&O's Summary      PHYSICAL EXAM:  GENERAL: NAD, well-developed  HEAD:  Atraumatic, Normocephalic  NECK: Supple no JVD. C-Collar at bedside. Posterior incision c/d/i w/ dressing   CHEST/LUNG: Clear to auscultation bilaterally; No wheeze, nonlabored breathing  HEART: Regular rate and rhythm; No murmurs, rubs, or gallops  ABDOMEN: Soft, Nontender, Nondistended; Bowel sounds present  EXTREMITIES:  No clubbing, cyanosis, or edema  NEUROLOGY: AAOx3, non-focal  PSYCH: calm, appropriate mood    LABS:                        10.3   4.68  )-----------( 381      ( 17 Nov 2022 06:13 )             32.6     11-17    144  |  107  |  19  ----------------------------<  97  4.6   |  28  |  0.99    Ca    8.9      17 Nov 2022 06:13    TPro  6.2  /  Alb  3.0<L>  /  TBili  0.2  /  DBili  x   /  AST  26  /  ALT  33  /  AlkPhos  69  11-17              RADIOLOGY & ADDITIONAL TESTS:  Results Reviewed:   Imaging Personally Reviewed:  Electrocardiogram Personally Reviewed:    COORDINATION OF CARE:  Care Discussed with Consultants/Other Providers [Y/N]:  Prior or Outpatient Records Reviewed [Y/N]:

## 2022-11-17 NOTE — PROGRESS NOTE ADULT - ASSESSMENT
63 YO male with PMH of asthma and HTN who was transferred from [facility]. Patient presented to [facility] ED on 10/23 and Orthopedics was consulted for cord compression with neurological symptoms. Patient  underwent a C2-T1 laminectomy/fusion with C3-6 laminectomies with Dr. Lopez on 10/24. post -op complicated by PE.     # Spinal cord compression/central cord syndrome  -  s/p C2-T1 laminectomy/fusion with C3-6 laminectomies with Dr. Lopez on 10/24  - wound care: DSD and tegaderm daily. Dr Lopez contacted- will DC sutures today  - Comprehensive Rehab Program: PT/OT, 3hours daily and 5 days weekly   - aspen cervical collar when OOB  - neuropsychology supportive counseling, rec therapy  - Precautions: spinal, respiratory, cardiac, sensory, fall, AC    # HTN  - Off amlodipine  - BP well-controlled.       # PE  - TTE No evidence of RV strain  - Xarelto 20mg daily  - HR and O2 sats controlled      # Asthma  - Albuterol INH PRN    # Pseudo Gout  - aspirate from the Lt knee with no suspicion for infectious arthritis  - s/p prednisone 20 BID for 5 days    # Pain management  - Tylenol 975mg q8  - low dose oxycodone prn breakthrough  -  nucynta 100 q6 11/9 - continue same dose for now, managed outpt by Dr Vaughn Fraser  - robaxin PRN- Robaxin Dcd- started on flexeril PRN 11/16  - neurontin increased to 300,300,600mg 11/16  - voltaren gel to L knee    #Sleep  -melatonin 6mg qhs    # GI ppx  -  Protonix 40mg  - Senna, miralax, dulcolax supp PRN    # FEN   - Diet: Regular- no egg    # DVT ppx  - on xarelto    # LABS  CBC BMP 11/21      CODE STATUS: FULL CODE    Outpatient Follow-up (Specialty/Name of physician):    Ami Lopez (DO)  Orthopaedic Surgery Surgery  [address], [address]  [place] [address]  Phone: [phone]  Fax: [phone]

## 2022-11-17 NOTE — PROGRESS NOTE ADULT - SUBJECTIVE AND OBJECTIVE BOX
Patient is a 62y old  Male who presents with a chief complaint of Spinal cord compression s/p C2-T1 lami/fusion (17 Nov 2022 07:54)            SUBJECTIVE/ROS: Patient seen and examined. Continues to complain of pain 8/10 however notes controlled with current med regimen and is able to tolerate therapy without issues. States he took PRN flexeril. Appears comfortable. No other issues. Denies diarrhea/constipation. Denies dysuria. No complaints of shortness of breath. No fevers.       MEDICATIONS:  MEDICATIONS  (STANDING):  acetaminophen     Tablet .. 975 milliGRAM(s) Oral every 8 hours  amLODIPine   Tablet 10 milliGRAM(s) Oral daily  gabapentin 300 milliGRAM(s) Oral <User Schedule>  gabapentin 600 milliGRAM(s) Oral <User Schedule>  hydrOXYzine hydrochloride 25 milliGRAM(s) Oral once  melatonin 6 milliGRAM(s) Oral at bedtime  pantoprazole    Tablet 40 milliGRAM(s) Oral before breakfast  polyethylene glycol 3350 17 Gram(s) Oral daily  rivaroxaban 20 milliGRAM(s) Oral daily  senna 2 Tablet(s) Oral at bedtime  tapentadol 100 milliGRAM(s) Oral every 6 hours    MEDICATIONS  (PRN):  albuterol    90 MICROgram(s) HFA Inhaler 2 Puff(s) Inhalation every 6 hours PRN Shortness of Breath and/or Wheezing  bisacodyl Suppository 10 milliGRAM(s) Rectal daily PRN Constipation  capsaicin 0.025% Cream 1 Application(s) Topical every 8 hours PRN neuropathic pain  cyclobenzaprine 5 milliGRAM(s) Oral three times a day PRN Muscle Spasm  diclofenac sodium 1% Gel 4 Gram(s) Topical three times a day PRN pain  oxyCODONE    IR 2.5 milliGRAM(s) Oral every 6 hours PRN Moderate Pain (4 - 6)  oxyCODONE    IR 5 milliGRAM(s) Oral every 6 hours PRN Severe Pain (7 - 10)      RECENT LABS:                        10.3   4.68  )-----------( 381      ( 17 Nov 2022 06:13 )             32.6     11-17    144  |  107  |  19  ----------------------------<  97  4.6   |  28  |  0.99    Ca    8.9      17 Nov 2022 06:13    TPro  6.2  /  Alb  3.0<L>  /  TBili  0.2  /  DBili  x   /  AST  26  /  ALT  33  /  AlkPhos  69  11-17    LIVER FUNCTIONS - ( 17 Nov 2022 06:13 )  Alb: 3.0 g/dL / Pro: 6.2 g/dL / ALK PHOS: 69 U/L / ALT: 33 U/L / AST: 26 U/L / GGT: x                 CAPILLARY BLOOD GLUCOSE          VITALS  62y  Vital Signs Last 24 Hrs  T(C): 36.8 (17 Nov 2022 08:19), Max: 36.8 (16 Nov 2022 20:20)  T(F): 98.2 (17 Nov 2022 08:19), Max: 98.2 (16 Nov 2022 20:20)  HR: 76 (17 Nov 2022 05:46) (76 - 89)  BP: 112/71 (17 Nov 2022 08:19) (112/71 - 121/76)  BP(mean): --  RR: 16 (17 Nov 2022 08:19) (16 - 16)  SpO2: 96% (17 Nov 2022 08:19) (96% - 97%)    Parameters below as of 17 Nov 2022 08:19  Patient On (Oxygen Delivery Method): room air      Daily     Daily     PHYSICAL EXAM:     · Constitutional Comments	alert O x 3. appears comfortable.  · Respiratory	clear to auscultation bilaterally; no wheezes; no rales; no rhonchi  · Cardiovascular	regular rate and rhythm; S1 S2 present; no gallops; no rub; no murmur  · Gastrointestinal	soft; nontender; nondistended; normal active bowel sounds  · Motor	no left knee effusion no warmth or joint line TTP  PROM left knee flexion  to 110 improving but less than R knee  L quad 5-/5 ham 4+/5  ankle eversion 4-/5 tight heel cords  bilateral calves soft no TTP  LUE shoulder ROM limited , painful past 90deg  4/5 throughout LUE         IDT ROUNDS:  IDT 11/17  EDOD: 11/23 home  SW: family training to be set up   OT: setup for eating/grooming. Karol UBD, CS/CG LBD and rest of ADLs  PT: amb at CG level with RW  ST: n/a                Self

## 2022-11-17 NOTE — PROGRESS NOTE ADULT - ASSESSMENT
63yo male with hx of asthma and HTN, presented to [facility] for acute rehab from [facility] where he was transferred to from [facility] with acute complaints of numbness and swelling of his left arm for the past month and weakness of his right leg, noted to have diffuse swelling of the cervical cord with long segment central cord edema extending from C2 to T1 with moderate spinal canal stenosis and mild cord compression at C3-C4 and C4-C5. Pt s/p C2-T1 laminectomy/fusion with C3-6 laminectomies with Dr. Lopez on 10/24. Postop with B/L PE and left knee pain    #Spinal cord compression  #Central cord syndrome  #Neuropathic Pain  -S/p C2-T1 laminectomy/fusion with C3-6 laminectomies with Dr. Lopez on 10/24  -Cervical collar when OOB  -Continue PT/OT  -Continue Gabapentin    #B/L Pulmonary Emboli  -Continue Xarelto. Treatment for 3-6months    #HTN  -Continue Amlodipine w/ holding parameters    #Asthma  -Albuterol INH PRN    #Left knee pain  -S/p aspiration, fluid assessment reviewed. Negative for gout or infection  -Empirically treated with Steroid  -Encourage PT/OT    # DVT ppx  -Xarelto

## 2022-11-18 PROCEDURE — 99232 SBSQ HOSP IP/OBS MODERATE 35: CPT | Mod: GC

## 2022-11-18 PROCEDURE — 99232 SBSQ HOSP IP/OBS MODERATE 35: CPT

## 2022-11-18 RX ORDER — CYCLOBENZAPRINE HYDROCHLORIDE 10 MG/1
10 TABLET, FILM COATED ORAL THREE TIMES A DAY
Refills: 0 | Status: DISCONTINUED | OUTPATIENT
Start: 2022-11-18 | End: 2022-11-23

## 2022-11-18 RX ORDER — OXYCODONE HYDROCHLORIDE 5 MG/1
5 TABLET ORAL EVERY 6 HOURS
Refills: 0 | Status: DISCONTINUED | OUTPATIENT
Start: 2022-11-18 | End: 2022-11-23

## 2022-11-18 RX ORDER — OXYCODONE HYDROCHLORIDE 5 MG/1
2.5 TABLET ORAL EVERY 6 HOURS
Refills: 0 | Status: DISCONTINUED | OUTPATIENT
Start: 2022-11-18 | End: 2022-11-23

## 2022-11-18 RX ORDER — LACTULOSE 10 G/15ML
20 SOLUTION ORAL DAILY
Refills: 0 | Status: DISCONTINUED | OUTPATIENT
Start: 2022-11-18 | End: 2022-11-23

## 2022-11-18 RX ADMIN — GABAPENTIN 600 MILLIGRAM(S): 400 CAPSULE ORAL at 21:30

## 2022-11-18 RX ADMIN — OXYCODONE HYDROCHLORIDE 5 MILLIGRAM(S): 5 TABLET ORAL at 10:45

## 2022-11-18 RX ADMIN — OXYCODONE HYDROCHLORIDE 5 MILLIGRAM(S): 5 TABLET ORAL at 21:36

## 2022-11-18 RX ADMIN — GABAPENTIN 300 MILLIGRAM(S): 400 CAPSULE ORAL at 14:29

## 2022-11-18 RX ADMIN — Medication 6 MILLIGRAM(S): at 21:30

## 2022-11-18 RX ADMIN — Medication 975 MILLIGRAM(S): at 05:53

## 2022-11-18 RX ADMIN — TAPENTADOL HYDROCHLORIDE 100 MILLIGRAM(S): 50 TABLET, FILM COATED ORAL at 12:30

## 2022-11-18 RX ADMIN — TAPENTADOL HYDROCHLORIDE 100 MILLIGRAM(S): 50 TABLET, FILM COATED ORAL at 05:55

## 2022-11-18 RX ADMIN — Medication 975 MILLIGRAM(S): at 15:00

## 2022-11-18 RX ADMIN — CYCLOBENZAPRINE HYDROCHLORIDE 10 MILLIGRAM(S): 10 TABLET, FILM COATED ORAL at 18:33

## 2022-11-18 RX ADMIN — TAPENTADOL HYDROCHLORIDE 100 MILLIGRAM(S): 50 TABLET, FILM COATED ORAL at 18:01

## 2022-11-18 RX ADMIN — OXYCODONE HYDROCHLORIDE 5 MILLIGRAM(S): 5 TABLET ORAL at 22:20

## 2022-11-18 RX ADMIN — RIVAROXABAN 20 MILLIGRAM(S): KIT at 12:05

## 2022-11-18 RX ADMIN — TAPENTADOL HYDROCHLORIDE 100 MILLIGRAM(S): 50 TABLET, FILM COATED ORAL at 11:59

## 2022-11-18 RX ADMIN — GABAPENTIN 300 MILLIGRAM(S): 400 CAPSULE ORAL at 05:53

## 2022-11-18 RX ADMIN — Medication 975 MILLIGRAM(S): at 21:30

## 2022-11-18 RX ADMIN — Medication 975 MILLIGRAM(S): at 14:29

## 2022-11-18 RX ADMIN — PANTOPRAZOLE SODIUM 40 MILLIGRAM(S): 20 TABLET, DELAYED RELEASE ORAL at 05:53

## 2022-11-18 RX ADMIN — Medication 975 MILLIGRAM(S): at 22:19

## 2022-11-18 RX ADMIN — OXYCODONE HYDROCHLORIDE 5 MILLIGRAM(S): 5 TABLET ORAL at 11:15

## 2022-11-18 RX ADMIN — SENNA PLUS 2 TABLET(S): 8.6 TABLET ORAL at 21:38

## 2022-11-18 RX ADMIN — TAPENTADOL HYDROCHLORIDE 100 MILLIGRAM(S): 50 TABLET, FILM COATED ORAL at 18:23

## 2022-11-18 RX ADMIN — TAPENTADOL HYDROCHLORIDE 100 MILLIGRAM(S): 50 TABLET, FILM COATED ORAL at 23:53

## 2022-11-18 NOTE — PROGRESS NOTE ADULT - SUBJECTIVE AND OBJECTIVE BOX
Patient is a 62y old  Male who presents with a chief complaint of Spinal cord compression s/p C2-T1 lami/fusion (17 Nov 2022 13:14)      SUBJECTIVE / OVERNIGHT EVENTS:  Pt seen and examined at bedside. No acute events overnight.  Pt denies cp, palpitations, sob, abd pain, N/V, fever, chills.    ROS:  All other review of systems negative    Allergies    No Known Drug Allergies  peanuts (Unknown)    Intolerances        MEDICATIONS  (STANDING):  acetaminophen     Tablet .. 975 milliGRAM(s) Oral every 8 hours  amLODIPine   Tablet 10 milliGRAM(s) Oral daily  gabapentin 300 milliGRAM(s) Oral <User Schedule>  gabapentin 600 milliGRAM(s) Oral <User Schedule>  hydrOXYzine hydrochloride 25 milliGRAM(s) Oral once  melatonin 6 milliGRAM(s) Oral at bedtime  pantoprazole    Tablet 40 milliGRAM(s) Oral before breakfast  polyethylene glycol 3350 17 Gram(s) Oral daily  rivaroxaban 20 milliGRAM(s) Oral daily  senna 2 Tablet(s) Oral at bedtime  tapentadol 100 milliGRAM(s) Oral every 6 hours    MEDICATIONS  (PRN):  albuterol    90 MICROgram(s) HFA Inhaler 2 Puff(s) Inhalation every 6 hours PRN Shortness of Breath and/or Wheezing  bisacodyl Suppository 10 milliGRAM(s) Rectal daily PRN Constipation  capsaicin 0.025% Cream 1 Application(s) Topical every 8 hours PRN neuropathic pain  cyclobenzaprine 5 milliGRAM(s) Oral three times a day PRN Muscle Spasm  diclofenac sodium 1% Gel 4 Gram(s) Topical three times a day PRN pain      Vital Signs Last 24 Hrs  T(C): 37.1 (17 Nov 2022 21:48), Max: 37.1 (17 Nov 2022 21:48)  T(F): 98.8 (17 Nov 2022 21:48), Max: 98.8 (17 Nov 2022 21:48)  HR: 87 (18 Nov 2022 05:52) (85 - 87)  BP: 115/76 (18 Nov 2022 05:52) (115/76 - 118/72)  BP(mean): --  RR: 16 (17 Nov 2022 21:48) (16 - 16)  SpO2: 97% (17 Nov 2022 21:48) (97% - 97%)    Parameters below as of 17 Nov 2022 21:48  Patient On (Oxygen Delivery Method): room air      CAPILLARY BLOOD GLUCOSE        I&O's Summary      PHYSICAL EXAM:  GENERAL: NAD, well-developed  HEAD:  Atraumatic, Normocephalic  NECK: Supple no JVD. C-Collar at bedside. Posterior incision c/d/i w/ dressing   CHEST/LUNG: Clear to auscultation bilaterally; No wheeze, nonlabored breathing  HEART: Regular rate and rhythm; No murmurs, rubs, or gallops  ABDOMEN: Soft, Nontender, Nondistended; Bowel sounds present  EXTREMITIES:  No clubbing, cyanosis, or edema  NEUROLOGY: AAOx3, non-focal  PSYCH: calm, appropriate mood      LABS:                        10.3   4.68  )-----------( 381      ( 17 Nov 2022 06:13 )             32.6     11-17    144  |  107  |  19  ----------------------------<  97  4.6   |  28  |  0.99    Ca    8.9      17 Nov 2022 06:13    TPro  6.2  /  Alb  3.0<L>  /  TBili  0.2  /  DBili  x   /  AST  26  /  ALT  33  /  AlkPhos  69  11-17              RADIOLOGY & ADDITIONAL TESTS:  Results Reviewed:   Imaging Personally Reviewed:  Electrocardiogram Personally Reviewed:    COORDINATION OF CARE:  Care Discussed with Consultants/Other Providers [Y/N]:  Prior or Outpatient Records Reviewed [Y/N]:

## 2022-11-18 NOTE — PROGRESS NOTE ADULT - ASSESSMENT
61yo male with hx of asthma and HTN, presented to [facility] for acute rehab from [facility] where he was transferred to from [facility] with acute complaints of numbness and swelling of his left arm for the past month and weakness of his right leg, noted to have diffuse swelling of the cervical cord with long segment central cord edema extending from C2 to T1 with moderate spinal canal stenosis and mild cord compression at C3-C4 and C4-C5. Pt s/p C2-T1 laminectomy/fusion with C3-6 laminectomies with Dr. Lopze on 10/24. Postop with B/L PE and left knee pain    #Spinal cord compression  #Central cord syndrome  #Neuropathic Pain  -S/p C2-T1 laminectomy/fusion with C3-6 laminectomies with Dr. Lopez on 10/24  -Cervical collar when OOB  -Continue PT/OT  -Continue Gabapentin    #B/L Pulmonary Emboli  -Continue Xarelto. Treatment for 3-6months    #HTN  -Continue Amlodipine w/ holding parameters    #Asthma  -Albuterol INH PRN    #Left knee pain  -S/p aspiration, fluid assessment reviewed. Negative for gout or infection  -Empirically treated with Steroid  -Encourage PT/OT    # DVT ppx  -Xarelto

## 2022-11-18 NOTE — PROGRESS NOTE ADULT - ASSESSMENT
61 YO male with PMH of asthma and HTN who was transferred from [facility]. Patient presented to [facility] ED on 10/23 and Orthopedics was consulted for cord compression with neurological symptoms. Patient  underwent a C2-T1 laminectomy/fusion with C3-6 laminectomies with Dr. Lopez on 10/24. post -op complicated by PE.     # Spinal cord compression/central cord syndrome  -  s/p C2-T1 laminectomy/fusion with C3-6 laminectomies with Dr. Lopez on 10/24  - wound care: DSD and tegaderm daily. Dr Lopez contacted- will DC sutures today  - Comprehensive Rehab Program: PT/OT, 3hours daily and 5 days weekly   - aspen cervical collar when OOB  - neuropsychology supportive counseling, rec therapy  - Precautions: spinal, respiratory, cardiac, sensory, fall, AC    # HTN  - Off amlodipine  - BP well-controlled.       # PE  - TTE No evidence of RV strain  - Xarelto 20mg daily  - HR and O2 sats controlled      # Asthma  - Albuterol INH PRN    # Pseudo Gout  - aspirate from the Lt knee with no suspicion for infectious arthritis  - s/p prednisone 20 BID for 5 days    # Pain management  - Tylenol 975mg q8  - low dose oxycodone prn breakthrough  -  nucynta 100 q6 11/9 - continue same dose for now, managed outpt by Dr Vaughn Fraser  - robaxin PRN- Robaxin Dcd- started on flexeril PRN 11/16> dose increased to 10mg 3x/day PRN  - neurontin increased to 300,300,600mg 11/16  - voltaren gel to L knee    #Sleep  -melatonin 6mg qhs    # GI ppx  -  Protonix 40mg  - Senna, miralax, dulcolax supp PRN- lactulose PRN    # FEN   - Diet: Regular- no egg    # DVT ppx  - on xarelto    # LABS  CBC BMP 11/21      CODE STATUS: FULL CODE    Outpatient Follow-up (Specialty/Name of physician):    Ami Lopez (DO)  Orthopaedic Surgery Surgery  [address], [address]  [place] [address]  Phone: [phone]  Fax: [phone]

## 2022-11-18 NOTE — PROGRESS NOTE ADULT - SUBJECTIVE AND OBJECTIVE BOX
Patient is a 62y old  Male who presents with a chief complaint of Spinal cord compression s/p C2-T1 lami/fusion (17 Nov 2022 07:54)            SUBJECTIVE/ROS: Patient seen and examined. Continues to complain of pain 8/10 however notes controlled with current med regimen and is able to tolerate therapy without issues.Patient requesting additional oxycodone at night but will increase flexeril instead-  Appears comfortable. No other issues. Denies diarrhea/constipation. Denies dysuria. No complaints of shortness of breath. No fevers. No BM x few days- requests lactulose- ordered      MEDICATIONS  (STANDING):  acetaminophen     Tablet .. 975 milliGRAM(s) Oral every 8 hours  amLODIPine   Tablet 10 milliGRAM(s) Oral daily  gabapentin 300 milliGRAM(s) Oral <User Schedule>  gabapentin 600 milliGRAM(s) Oral <User Schedule>  hydrOXYzine hydrochloride 25 milliGRAM(s) Oral once  melatonin 6 milliGRAM(s) Oral at bedtime  pantoprazole    Tablet 40 milliGRAM(s) Oral before breakfast  polyethylene glycol 3350 17 Gram(s) Oral daily  rivaroxaban 20 milliGRAM(s) Oral daily  senna 2 Tablet(s) Oral at bedtime  tapentadol 100 milliGRAM(s) Oral every 6 hours    MEDICATIONS  (PRN):  albuterol    90 MICROgram(s) HFA Inhaler 2 Puff(s) Inhalation every 6 hours PRN Shortness of Breath and/or Wheezing  bisacodyl Suppository 10 milliGRAM(s) Rectal daily PRN Constipation  capsaicin 0.025% Cream 1 Application(s) Topical every 8 hours PRN neuropathic pain  cyclobenzaprine 5 milliGRAM(s) Oral three times a day PRN Muscle Spasm  diclofenac sodium 1% Gel 4 Gram(s) Topical three times a day PRN pain  lactulose Syrup 20 Gram(s) Oral daily PRN constipation  oxyCODONE    IR 2.5 milliGRAM(s) Oral every 6 hours PRN Moderate Pain (4 - 6)  oxyCODONE    IR 5 milliGRAM(s) Oral every 6 hours PRN Severe Pain (7 - 10)                            10.3   4.68  )-----------( 381      ( 17 Nov 2022 06:13 )             32.6     11-17    144  |  107  |  19  ----------------------------<  97  4.6   |  28  |  0.99    Ca    8.9      17 Nov 2022 06:13    TPro  6.2  /  Alb  3.0<L>  /  TBili  0.2  /  DBili  x   /  AST  26  /  ALT  33  /  AlkPhos  69  11-17        CAPILLARY BLOOD GLUCOSE          Vital Signs Last 24 Hrs  T(C): 36.8 (18 Nov 2022 08:23), Max: 37.1 (17 Nov 2022 21:48)  T(F): 98.3 (18 Nov 2022 08:23), Max: 98.8 (17 Nov 2022 21:48)  HR: 96 (18 Nov 2022 08:23) (85 - 96)  BP: 133/80 (18 Nov 2022 08:23) (115/76 - 133/80)  BP(mean): --  RR: 16 (18 Nov 2022 08:23) (16 - 16)  SpO2: 93% (18 Nov 2022 08:23) (93% - 97%)    Parameters below as of 18 Nov 2022 08:23  Patient On (Oxygen Delivery Method): room air              PHYSICAL EXAM:     · Constitutional Comments	alert O x 3. appears comfortable.  · Respiratory	clear to auscultation bilaterally; no wheezes; no rales; no rhonchi  · Cardiovascular	regular rate and rhythm; S1 S2 present; no gallops; no rub; no murmur  · Gastrointestinal	soft; nontender; nondistended; normal active bowel sounds  · Motor	no left knee effusion no warmth or joint line TTP  PROM left knee flexion  to 120 improving but less than R knee  L quad 5-/5 ham 4+/5  ankle eversion 4-/5 tight heel cords  bilateral calves soft no TTP  LUE shoulder ROM limited , painful past 90deg  4/5 throughout LUE         IDT ROUNDS:  IDT 11/17  EDOD: 11/23 home  SW: family training to be set up   OT: setup for eating/grooming. Karol UBD, CS/CG LBD and rest of ADLs  PT: amb at CG level with RW  ST: n/a

## 2022-11-19 PROCEDURE — 99232 SBSQ HOSP IP/OBS MODERATE 35: CPT

## 2022-11-19 PROCEDURE — 99231 SBSQ HOSP IP/OBS SF/LOW 25: CPT

## 2022-11-19 RX ADMIN — Medication 975 MILLIGRAM(S): at 21:53

## 2022-11-19 RX ADMIN — Medication 6 MILLIGRAM(S): at 21:52

## 2022-11-19 RX ADMIN — Medication 975 MILLIGRAM(S): at 22:52

## 2022-11-19 RX ADMIN — TAPENTADOL HYDROCHLORIDE 100 MILLIGRAM(S): 50 TABLET, FILM COATED ORAL at 17:43

## 2022-11-19 RX ADMIN — GABAPENTIN 600 MILLIGRAM(S): 400 CAPSULE ORAL at 21:52

## 2022-11-19 RX ADMIN — TAPENTADOL HYDROCHLORIDE 100 MILLIGRAM(S): 50 TABLET, FILM COATED ORAL at 11:49

## 2022-11-19 RX ADMIN — TAPENTADOL HYDROCHLORIDE 100 MILLIGRAM(S): 50 TABLET, FILM COATED ORAL at 12:20

## 2022-11-19 RX ADMIN — CYCLOBENZAPRINE HYDROCHLORIDE 10 MILLIGRAM(S): 10 TABLET, FILM COATED ORAL at 06:57

## 2022-11-19 RX ADMIN — Medication 975 MILLIGRAM(S): at 15:52

## 2022-11-19 RX ADMIN — Medication 975 MILLIGRAM(S): at 06:54

## 2022-11-19 RX ADMIN — GABAPENTIN 300 MILLIGRAM(S): 400 CAPSULE ORAL at 15:22

## 2022-11-19 RX ADMIN — RIVAROXABAN 20 MILLIGRAM(S): KIT at 11:12

## 2022-11-19 RX ADMIN — TAPENTADOL HYDROCHLORIDE 100 MILLIGRAM(S): 50 TABLET, FILM COATED ORAL at 05:51

## 2022-11-19 RX ADMIN — TAPENTADOL HYDROCHLORIDE 100 MILLIGRAM(S): 50 TABLET, FILM COATED ORAL at 06:49

## 2022-11-19 RX ADMIN — TAPENTADOL HYDROCHLORIDE 100 MILLIGRAM(S): 50 TABLET, FILM COATED ORAL at 18:15

## 2022-11-19 RX ADMIN — Medication 975 MILLIGRAM(S): at 07:25

## 2022-11-19 RX ADMIN — Medication 975 MILLIGRAM(S): at 15:22

## 2022-11-19 RX ADMIN — PANTOPRAZOLE SODIUM 40 MILLIGRAM(S): 20 TABLET, DELAYED RELEASE ORAL at 07:01

## 2022-11-19 RX ADMIN — SENNA PLUS 2 TABLET(S): 8.6 TABLET ORAL at 21:53

## 2022-11-19 RX ADMIN — AMLODIPINE BESYLATE 10 MILLIGRAM(S): 2.5 TABLET ORAL at 06:55

## 2022-11-19 RX ADMIN — GABAPENTIN 300 MILLIGRAM(S): 400 CAPSULE ORAL at 06:54

## 2022-11-19 RX ADMIN — TAPENTADOL HYDROCHLORIDE 100 MILLIGRAM(S): 50 TABLET, FILM COATED ORAL at 00:10

## 2022-11-19 NOTE — PROGRESS NOTE ADULT - SUBJECTIVE AND OBJECTIVE BOX
Patient is a 62y old  Male who presents with a chief complaint of Spinal cord compression s/p C2-T1 lami/fusion (17 Nov 2022 07:54)    SUBJECTIVE/ROS: Patient seen and examined.   No difficulties overnight  He has burning in the arms and spasm in  the left arm.  The burning and spasm are less with recent changes in the flexeril and neurontin    MEDICATIONS  (STANDING):  acetaminophen     Tablet .. 975 milliGRAM(s) Oral every 8 hours  amLODIPine   Tablet 10 milliGRAM(s) Oral daily  gabapentin 300 milliGRAM(s) Oral <User Schedule>  gabapentin 600 milliGRAM(s) Oral <User Schedule>  hydrOXYzine hydrochloride 25 milliGRAM(s) Oral once  melatonin 6 milliGRAM(s) Oral at bedtime  pantoprazole    Tablet 40 milliGRAM(s) Oral before breakfast  polyethylene glycol 3350 17 Gram(s) Oral daily  rivaroxaban 20 milliGRAM(s) Oral daily  senna 2 Tablet(s) Oral at bedtime  tapentadol 100 milliGRAM(s) Oral every 6 hours    MEDICATIONS  (PRN):  albuterol    90 MICROgram(s) HFA Inhaler 2 Puff(s) Inhalation every 6 hours PRN Shortness of Breath and/or Wheezing  bisacodyl Suppository 10 milliGRAM(s) Rectal daily PRN Constipation  capsaicin 0.025% Cream 1 Application(s) Topical every 8 hours PRN neuropathic pain  cyclobenzaprine 5 milliGRAM(s) Oral three times a day PRN Muscle Spasm  diclofenac sodium 1% Gel 4 Gram(s) Topical three times a day PRN pain  lactulose Syrup 20 Gram(s) Oral daily PRN constipation  oxyCODONE    IR 2.5 milliGRAM(s) Oral every 6 hours PRN Moderate Pain (4 - 6)  oxyCODONE    IR 5 milliGRAM(s) Oral every 6 hours PRN Severe Pain (7 - 10)                            10.3   4.68  )-----------( 381      ( 17 Nov 2022 06:13 )             32.6     11-17    144  |  107  |  19  ----------------------------<  97  4.6   |  28  |  0.99    Ca    8.9      17 Nov 2022 06:13    TPro  6.2  /  Alb  3.0<L>  /  TBili  0.2  /  DBili  x   /  AST  26  /  ALT  33  /  AlkPhos  69  11-17    Vital Signs Last 24 Hrs  ICU Vital Signs Last 24 Hrs  T(C): 36.7 (19 Nov 2022 08:41), Max: 37 (18 Nov 2022 20:44)  T(F): 98.1 (19 Nov 2022 08:41), Max: 98.6 (18 Nov 2022 20:44)  HR: 93 (19 Nov 2022 08:41) (86 - 97)  BP: 127/89 (19 Nov 2022 08:41) (122/80 - 139/86)    PHYSICAL EXAM:     · Constitutional Comments	alert O x 3. appears comfortable.  · Respiratory	clear to auscultation bilaterally; no wheezes; no rales; no rhonchi  · Cardiovascular	regular rate and rhythm; S1 S2 present; no gallops; no rub; no murmur  · Gastrointestinal	soft; nontender; nondistended; normal active bowel sounds  · Motor	no left knee effusion no warmth or joint line TTP  PROM left knee flexion  to 120 improving but less than R knee  L quad 5-/5 ham 4+/5  ankle eversion 4-/5 tight heel cords  bilateral calves soft no TTP  LUE shoulder ROM limited , painful past 90deg  4/5 throughout LUE

## 2022-11-19 NOTE — PROGRESS NOTE ADULT - ASSESSMENT
61yo male with hx of asthma and HTN, presented to [facility] for acute rehab from [facility] where he was transferred to from [facility]- with acute complaints of numbness and swelling of his left arm for the past month and weakness of his right leg, noted to have diffuse swelling of the cervical cord with long segment central cord edema extending from C2 to T1 with moderate spinal canal stenosis and mild cord compression at C3-C4 and C4-C5. Pt s/p C2-T1 laminectomy/fusion with C3-6 laminectomies with Dr. Lopez on 10/24. Postop with B/L PE and left knee pain    #Spinal cord compression  #Central cord syndrome  #Neuropathic Pain  -S/p C2-T1 laminectomy/fusion with C3-6 laminectomies with Dr. Lopez on 10/24  -Cervical collar when OOB  -Continue PT/OT  -Continue Gabapentin  - comprehensive rehab  - fall precaution    #B/L Pulmonary Emboli  -Continue Xarelto. Treatment for 3-6months    #HTN  -Continue Amlodipine w/ holding parameters    #Asthma  -Albuterol INH PRN    #Left knee pain  -S/p aspiration, fluid assessment reviewed. Negative for gout or infection  -Empirically treated with Steroid  -Encourage PT/OT    # DVT ppx  -Xarelto  # GI: PPI    will follow

## 2022-11-19 NOTE — PROGRESS NOTE ADULT - SUBJECTIVE AND OBJECTIVE BOX
Medicine Progress Note    Patient is a 62y old  Male who presents with a chief complaint of Spinal cord compression s/p C2-T1 lami/fusion (19 Nov 2022 09:16)      SUBJECTIVE / OVERNIGHT EVENTS:  no complaints    ADDITIONAL REVIEW OF SYSTEMS:  no fever/chills/CP/sob/palpitation/dizziness/ abd pain/nausea/vomiting/diarrhea/constipation/headaches. all other ROS neg    MEDICATIONS  (STANDING):  acetaminophen     Tablet .. 975 milliGRAM(s) Oral every 8 hours  amLODIPine   Tablet 10 milliGRAM(s) Oral daily  gabapentin 300 milliGRAM(s) Oral <User Schedule>  gabapentin 600 milliGRAM(s) Oral <User Schedule>  hydrOXYzine hydrochloride 25 milliGRAM(s) Oral once  melatonin 6 milliGRAM(s) Oral at bedtime  pantoprazole    Tablet 40 milliGRAM(s) Oral before breakfast  polyethylene glycol 3350 17 Gram(s) Oral daily  rivaroxaban 20 milliGRAM(s) Oral daily  senna 2 Tablet(s) Oral at bedtime  tapentadol 100 milliGRAM(s) Oral every 6 hours    MEDICATIONS  (PRN):  albuterol    90 MICROgram(s) HFA Inhaler 2 Puff(s) Inhalation every 6 hours PRN Shortness of Breath and/or Wheezing  bisacodyl Suppository 10 milliGRAM(s) Rectal daily PRN Constipation  capsaicin 0.025% Cream 1 Application(s) Topical every 8 hours PRN neuropathic pain  cyclobenzaprine 10 milliGRAM(s) Oral three times a day PRN Muscle Spasm  diclofenac sodium 1% Gel 4 Gram(s) Topical three times a day PRN pain  lactulose Syrup 20 Gram(s) Oral daily PRN constipation  oxyCODONE    IR 2.5 milliGRAM(s) Oral every 6 hours PRN Moderate Pain (4 - 6)  oxyCODONE    IR 5 milliGRAM(s) Oral every 6 hours PRN Severe Pain (7 - 10)    CAPILLARY BLOOD GLUCOSE        I&O's Summary      PHYSICAL EXAM:  Vital Signs Last 24 Hrs  T(C): 36.7 (19 Nov 2022 08:41), Max: 37 (18 Nov 2022 20:44)  T(F): 98.1 (19 Nov 2022 08:41), Max: 98.6 (18 Nov 2022 20:44)  HR: 93 (19 Nov 2022 08:41) (86 - 97)  BP: 127/89 (19 Nov 2022 08:41) (122/80 - 139/86)  BP(mean): --  RR: 18 (19 Nov 2022 08:41) (18 - 18)  SpO2: 97% (19 Nov 2022 08:41) (95% - 97%)    Parameters below as of 19 Nov 2022 08:41  Patient On (Oxygen Delivery Method): room air    GENERAL: Not in distress. Alert    HEENT: clear conjuctiva, MMM. no pallor or icterus  CARDIOVASCULAR: RRR S1, S2. No murmur/rubs/gallop  LUNGS: BLAE+, no rales, no wheezing, no rhonchi.    ABDOMEN: ND. Soft,  NT, no guarding / rebound / rigidity. BS normoactive  BACK: No spine tenderness.  EXTREMITIES: no edema. no leg or calf TP.  SKIN: warm and dry. incision c/d/i  PSYCHIATRIC: Calm.  No agitation.    LABS:                    COVID-19 PCR: NotDetec (09 Nov 2022 06:00)  COVID-19 PCR: NotDetec (06 Nov 2022 06:57)  COVID-19 PCR: NotDetec (03 Nov 2022 02:28)  COVID-19 PCR: NotDetec (24 Oct 2022 09:00)      RADIOLOGY & ADDITIONAL TESTS:  Imaging from Last 24 Hours:    Electrocardiogram/QTc Interval:    COORDINATION OF CARE:  Care Discussed with Consultants/Other Providers:

## 2022-11-19 NOTE — PROGRESS NOTE ADULT - ASSESSMENT
61 YO male with PMH of asthma and HTN who was transferred from [facility]. Patient presented to [facility] ED on 10/23 and Orthopedics was consulted for cord compression with neurological symptoms. Patient  underwent a C2-T1 laminectomy/fusion with C3-6 laminectomies with Dr. Lopez on 10/24. post -op complicated by PE.     # Spinal cord compression/central cord syndrome  -  s/p C2-T1 laminectomy/fusion with C3-6 laminectomies with Dr. Lopez on 10/24  - wound care: DSD and tegaderm daily.   -sutures are out. dressing C/D/I  - Comprehensive Rehab Program: PT/OT, 3hours daily and 5 days weekly   - aspen cervical collar when OOB  - neuropsychology supportive counseling, rec therapy  - Precautions: spinal, respiratory, cardiac, sensory, fall, AC    # HTN  - Off amlodipine  - BP well-controlled.       # PE  - TTE No evidence of RV strain  - Xarelto 20mg daily  - HR and O2 sats controlled      # Asthma  - Albuterol INH PRN    # Pseudo Gout  - aspirate from the Lt knee with no suspicion for infectious arthritis  - s/p prednisone 20 BID for 5 days    # Pain management  - Tylenol 975mg q8  - low dose oxycodone prn breakthrough  -  nucynta 100 q6 11/9 - continue same dose for now, managed outpt by Dr Vaughn Fraser  - robaxin PRN- Robaxin Dcd- started on flexeril PRN 11/16> dose increased to 10mg 3x/day PRN  - neurontin increased to 300,300,600mg 11/16  - voltaren gel to L knee    #Sleep  -melatonin 6mg qhs    # GI ppx  -  Protonix 40mg  - Senna, miralax, dulcolax supp PRN- lactulose PRN    # FEN   - Diet: Regular- no egg    # DVT ppx  - on xarelto    # LABS  CBC BMP 11/21      CODE STATUS: FULL CODE    Outpatient Follow-up (Specialty/Name of physician):    Ami Lopez (DO)  Orthopaedic Surgery Surgery  [address], [address]  [place] [address]  Phone: [phone]  Fax: [phone]

## 2022-11-20 ENCOUNTER — TRANSCRIPTION ENCOUNTER (OUTPATIENT)
Age: 62
End: 2022-11-20

## 2022-11-20 PROCEDURE — 99231 SBSQ HOSP IP/OBS SF/LOW 25: CPT

## 2022-11-20 PROCEDURE — 99232 SBSQ HOSP IP/OBS MODERATE 35: CPT

## 2022-11-20 RX ORDER — CYCLOBENZAPRINE HYDROCHLORIDE 10 MG/1
1 TABLET, FILM COATED ORAL
Qty: 90 | Refills: 0
Start: 2022-11-20 | End: 2022-12-19

## 2022-11-20 RX ORDER — LANOLIN ALCOHOL/MO/W.PET/CERES
2 CREAM (GRAM) TOPICAL
Qty: 0 | Refills: 0 | DISCHARGE
Start: 2022-11-20

## 2022-11-20 RX ORDER — ACETAMINOPHEN 500 MG
3 TABLET ORAL
Qty: 0 | Refills: 0 | DISCHARGE
Start: 2022-11-20

## 2022-11-20 RX ORDER — POLYETHYLENE GLYCOL 3350 17 G/17G
17 POWDER, FOR SOLUTION ORAL
Qty: 0 | Refills: 0 | DISCHARGE
Start: 2022-11-20

## 2022-11-20 RX ORDER — GABAPENTIN 400 MG/1
1 CAPSULE ORAL
Qty: 120 | Refills: 0
Start: 2022-11-20 | End: 2022-12-19

## 2022-11-20 RX ORDER — PANTOPRAZOLE SODIUM 20 MG/1
1 TABLET, DELAYED RELEASE ORAL
Qty: 30 | Refills: 0
Start: 2022-11-20 | End: 2022-12-19

## 2022-11-20 RX ORDER — SENNA PLUS 8.6 MG/1
2 TABLET ORAL
Qty: 0 | Refills: 0 | DISCHARGE
Start: 2022-11-20

## 2022-11-20 RX ORDER — FLUTICASONE PROPIONATE AND SALMETEROL 50; 250 UG/1; UG/1
0 POWDER ORAL; RESPIRATORY (INHALATION)
Qty: 0 | Refills: 0 | DISCHARGE

## 2022-11-20 RX ORDER — DICLOFENAC SODIUM 30 MG/G
4 GEL TOPICAL
Qty: 1 | Refills: 0
Start: 2022-11-20 | End: 2022-11-26

## 2022-11-20 RX ORDER — AMLODIPINE BESYLATE 2.5 MG/1
1 TABLET ORAL
Qty: 30 | Refills: 0
Start: 2022-11-20 | End: 2022-12-19

## 2022-11-20 RX ORDER — AMLODIPINE BESYLATE 2.5 MG/1
1 TABLET ORAL
Qty: 0 | Refills: 0 | DISCHARGE

## 2022-11-20 RX ADMIN — SENNA PLUS 2 TABLET(S): 8.6 TABLET ORAL at 21:46

## 2022-11-20 RX ADMIN — AMLODIPINE BESYLATE 10 MILLIGRAM(S): 2.5 TABLET ORAL at 06:30

## 2022-11-20 RX ADMIN — TAPENTADOL HYDROCHLORIDE 100 MILLIGRAM(S): 50 TABLET, FILM COATED ORAL at 01:17

## 2022-11-20 RX ADMIN — Medication 975 MILLIGRAM(S): at 21:46

## 2022-11-20 RX ADMIN — Medication 975 MILLIGRAM(S): at 22:27

## 2022-11-20 RX ADMIN — Medication 6 MILLIGRAM(S): at 21:46

## 2022-11-20 RX ADMIN — TAPENTADOL HYDROCHLORIDE 100 MILLIGRAM(S): 50 TABLET, FILM COATED ORAL at 07:06

## 2022-11-20 RX ADMIN — Medication 975 MILLIGRAM(S): at 14:19

## 2022-11-20 RX ADMIN — TAPENTADOL HYDROCHLORIDE 100 MILLIGRAM(S): 50 TABLET, FILM COATED ORAL at 17:47

## 2022-11-20 RX ADMIN — TAPENTADOL HYDROCHLORIDE 100 MILLIGRAM(S): 50 TABLET, FILM COATED ORAL at 06:27

## 2022-11-20 RX ADMIN — PANTOPRAZOLE SODIUM 40 MILLIGRAM(S): 20 TABLET, DELAYED RELEASE ORAL at 06:30

## 2022-11-20 RX ADMIN — TAPENTADOL HYDROCHLORIDE 100 MILLIGRAM(S): 50 TABLET, FILM COATED ORAL at 00:04

## 2022-11-20 RX ADMIN — RIVAROXABAN 20 MILLIGRAM(S): KIT at 11:21

## 2022-11-20 RX ADMIN — GABAPENTIN 300 MILLIGRAM(S): 400 CAPSULE ORAL at 13:48

## 2022-11-20 RX ADMIN — Medication 975 MILLIGRAM(S): at 07:06

## 2022-11-20 RX ADMIN — TAPENTADOL HYDROCHLORIDE 100 MILLIGRAM(S): 50 TABLET, FILM COATED ORAL at 18:17

## 2022-11-20 RX ADMIN — GABAPENTIN 600 MILLIGRAM(S): 400 CAPSULE ORAL at 21:46

## 2022-11-20 RX ADMIN — Medication 975 MILLIGRAM(S): at 13:49

## 2022-11-20 RX ADMIN — TAPENTADOL HYDROCHLORIDE 100 MILLIGRAM(S): 50 TABLET, FILM COATED ORAL at 12:23

## 2022-11-20 RX ADMIN — TAPENTADOL HYDROCHLORIDE 100 MILLIGRAM(S): 50 TABLET, FILM COATED ORAL at 11:53

## 2022-11-20 RX ADMIN — TAPENTADOL HYDROCHLORIDE 100 MILLIGRAM(S): 50 TABLET, FILM COATED ORAL at 23:48

## 2022-11-20 RX ADMIN — CYCLOBENZAPRINE HYDROCHLORIDE 10 MILLIGRAM(S): 10 TABLET, FILM COATED ORAL at 21:49

## 2022-11-20 RX ADMIN — GABAPENTIN 300 MILLIGRAM(S): 400 CAPSULE ORAL at 06:30

## 2022-11-20 RX ADMIN — Medication 975 MILLIGRAM(S): at 06:30

## 2022-11-20 NOTE — PROGRESS NOTE ADULT - SUBJECTIVE AND OBJECTIVE BOX
Medicine Progress Note    Patient is a 62y old  Male who presents with a chief complaint of Spinal cord compression s/p C2-T1 lami/fusion (19 Nov 2022 09:16)      SUBJECTIVE / OVERNIGHT EVENTS:  no complaints    ADDITIONAL REVIEW OF SYSTEMS:  no fever/chills/CP/sob/palpitation/dizziness/ abd pain/nausea/vomiting/diarrhea/constipation/headaches. all other ROS neg    MEDICATIONS  (STANDING):  acetaminophen     Tablet .. 975 milliGRAM(s) Oral every 8 hours  amLODIPine   Tablet 10 milliGRAM(s) Oral daily  gabapentin 300 milliGRAM(s) Oral <User Schedule>  gabapentin 600 milliGRAM(s) Oral <User Schedule>  hydrOXYzine hydrochloride 25 milliGRAM(s) Oral once  melatonin 6 milliGRAM(s) Oral at bedtime  pantoprazole    Tablet 40 milliGRAM(s) Oral before breakfast  polyethylene glycol 3350 17 Gram(s) Oral daily  rivaroxaban 20 milliGRAM(s) Oral daily  senna 2 Tablet(s) Oral at bedtime  tapentadol 100 milliGRAM(s) Oral every 6 hours    MEDICATIONS  (PRN):  albuterol    90 MICROgram(s) HFA Inhaler 2 Puff(s) Inhalation every 6 hours PRN Shortness of Breath and/or Wheezing  bisacodyl Suppository 10 milliGRAM(s) Rectal daily PRN Constipation  capsaicin 0.025% Cream 1 Application(s) Topical every 8 hours PRN neuropathic pain  cyclobenzaprine 10 milliGRAM(s) Oral three times a day PRN Muscle Spasm  diclofenac sodium 1% Gel 4 Gram(s) Topical three times a day PRN pain  lactulose Syrup 20 Gram(s) Oral daily PRN constipation  oxyCODONE    IR 2.5 milliGRAM(s) Oral every 6 hours PRN Moderate Pain (4 - 6)  oxyCODONE    IR 5 milliGRAM(s) Oral every 6 hours PRN Severe Pain (7 - 10)        CAPILLARY BLOOD GLUCOSE        I&O's Summary      PHYSICAL EXAM:    Vital Signs Last 24 Hrs  T(C): 36.7 (19 Nov 2022 20:31), Max: 36.7 (19 Nov 2022 08:41)  T(F): 98 (19 Nov 2022 20:31), Max: 98.1 (19 Nov 2022 08:41)  HR: 90 (20 Nov 2022 06:36) (90 - 93)  BP: 120/78 (20 Nov 2022 06:36) (120/78 - 138/81)  BP(mean): --  RR: 17 (19 Nov 2022 20:31) (17 - 18)  SpO2: 96% (19 Nov 2022 20:31) (96% - 97%)    Parameters below as of 19 Nov 2022 20:31  Patient On (Oxygen Delivery Method): room air        GENERAL: Not in distress. Alert    HEENT: clear conjuctiva, MMM. no pallor or icterus  CARDIOVASCULAR: RRR S1, S2. No murmur/rubs/gallop  LUNGS: BLAE+, no rales, no wheezing, no rhonchi.    ABDOMEN: ND. Soft,  NT, no guarding / rebound / rigidity. BS normoactive  BACK: No spine tenderness.  EXTREMITIES: no edema. no leg or calf TP.  SKIN: warm and dry. incision c/d/i  PSYCHIATRIC: Calm.  No agitation.    LABS:                    COVID-19 PCR: NotDetec (09 Nov 2022 06:00)  COVID-19 PCR: NotDetec (06 Nov 2022 06:57)  COVID-19 PCR: NotDetec (03 Nov 2022 02:28)  COVID-19 PCR: NotDetec (24 Oct 2022 09:00)      RADIOLOGY & ADDITIONAL TESTS:  Imaging from Last 24 Hours:    Electrocardiogram/QTc Interval:    COORDINATION OF CARE:  Care Discussed with Consultants/Other Providers:   Medicine Progress Note    Patient is a 62y old  Male who presents with a chief complaint of Spinal cord compression s/p C2-T1 lami/fusion (19 Nov 2022 09:16)      SUBJECTIVE / OVERNIGHT EVENTS:  continued to have LUE tingling and numbness. left knee pain since arthrocentesis in OSH. no other complaints. pain controlled with meds.     ADDITIONAL REVIEW OF SYSTEMS:  no fever/chills/CP/sob/palpitation/dizziness/ abd pain/nausea/vomiting/diarrhea/constipation/headaches. all other ROS neg    MEDICATIONS  (STANDING):  acetaminophen     Tablet .. 975 milliGRAM(s) Oral every 8 hours  amLODIPine   Tablet 10 milliGRAM(s) Oral daily  gabapentin 300 milliGRAM(s) Oral <User Schedule>  gabapentin 600 milliGRAM(s) Oral <User Schedule>  hydrOXYzine hydrochloride 25 milliGRAM(s) Oral once  melatonin 6 milliGRAM(s) Oral at bedtime  pantoprazole    Tablet 40 milliGRAM(s) Oral before breakfast  polyethylene glycol 3350 17 Gram(s) Oral daily  rivaroxaban 20 milliGRAM(s) Oral daily  senna 2 Tablet(s) Oral at bedtime  tapentadol 100 milliGRAM(s) Oral every 6 hours    MEDICATIONS  (PRN):  albuterol    90 MICROgram(s) HFA Inhaler 2 Puff(s) Inhalation every 6 hours PRN Shortness of Breath and/or Wheezing  bisacodyl Suppository 10 milliGRAM(s) Rectal daily PRN Constipation  capsaicin 0.025% Cream 1 Application(s) Topical every 8 hours PRN neuropathic pain  cyclobenzaprine 10 milliGRAM(s) Oral three times a day PRN Muscle Spasm  diclofenac sodium 1% Gel 4 Gram(s) Topical three times a day PRN pain  lactulose Syrup 20 Gram(s) Oral daily PRN constipation  oxyCODONE    IR 2.5 milliGRAM(s) Oral every 6 hours PRN Moderate Pain (4 - 6)  oxyCODONE    IR 5 milliGRAM(s) Oral every 6 hours PRN Severe Pain (7 - 10)        CAPILLARY BLOOD GLUCOSE        I&O's Summary      PHYSICAL EXAM:    Vital Signs Last 24 Hrs  T(C): 36.7 (19 Nov 2022 20:31), Max: 36.7 (19 Nov 2022 08:41)  T(F): 98 (19 Nov 2022 20:31), Max: 98.1 (19 Nov 2022 08:41)  HR: 90 (20 Nov 2022 06:36) (90 - 93)  BP: 120/78 (20 Nov 2022 06:36) (120/78 - 138/81)  BP(mean): --  RR: 17 (19 Nov 2022 20:31) (17 - 18)  SpO2: 96% (19 Nov 2022 20:31) (96% - 97%)    Parameters below as of 19 Nov 2022 20:31  Patient On (Oxygen Delivery Method): room air        GENERAL: Not in distress. Alert    HEENT: clear conjuctiva, MMM. no pallor or icterus  CARDIOVASCULAR: RRR S1, S2. No murmur/rubs/gallop  LUNGS: BLAE+, no rales, no wheezing, no rhonchi.    ABDOMEN: ND. Soft,  NT, no guarding / rebound / rigidity. BS normoactive  BACK: No spine tenderness.  EXTREMITIES: no edema. no leg or calf TP. stiffness on left shoulder with reduced ROM. mild medial joint TP left knee. no erythema or swelling of any joints  SKIN: warm and dry. cervical dressing clean.   PSYCHIATRIC: Calm.  No agitation.  CNS: AAO*3. LUE weakness and reduced sensation    LABS:                    COVID-19 PCR: NotDetec (09 Nov 2022 06:00)  COVID-19 PCR: NotDetec (06 Nov 2022 06:57)  COVID-19 PCR: NotDetec (03 Nov 2022 02:28)  COVID-19 PCR: NotDetec (24 Oct 2022 09:00)      RADIOLOGY & ADDITIONAL TESTS:  Imaging from Last 24 Hours:    Electrocardiogram/QTc Interval:    COORDINATION OF CARE:  Care Discussed with Consultants/Other Providers:

## 2022-11-20 NOTE — DISCHARGE NOTE PROVIDER - NSDCCPCAREPLAN_GEN_ALL_CORE_FT
PRINCIPAL DISCHARGE DIAGNOSIS  Diagnosis: S/P cervical spinal fusion  Assessment and Plan of Treatment: You had a cervical spinal fusion with Dr Lopez . Following this you were sent to acute rehab to increase your function and improve strength. You tolerated acute rehab. Your sutures were removed in rehab. Continue your medications as prescribed and continue maintaining spinal precautions as instructed by your surgeon and therapy team. Follow-up with your surgeon and primary care doctor for further management.       PRINCIPAL DISCHARGE DIAGNOSIS  Diagnosis: S/P cervical spinal fusion  Assessment and Plan of Treatment: You had a cervical spinal fusion with Dr Lopez . Following this you were sent to acute rehab to increase your function and improve strength. You tolerated acute rehab. Your sutures were removed in rehab. Continue your medications as prescribed and continue maintaining spinal precautions as instructed by your surgeon and therapy team. Follow-up with your surgeon and primary care doctor for further management.      SECONDARY DISCHARGE DIAGNOSES  Diagnosis: Pulmonary thromboembolism  Assessment and Plan of Treatment: You had a PE or pulmonary embolism which is blood clot to your lung. You were started on anticoagulant medication for the blood clot. Continue taking the medication as prescribed. YOu should follow-up with Dr Downey OR your PCP regarding how long you need to take the medication.

## 2022-11-20 NOTE — DISCHARGE NOTE PROVIDER - HOSPITAL COURSE
This is a 63 YO male RH dominant with PMH of asthma and HTN who was transferred from [facility] to [facility] ED on 10/23/22. Patient presented to [facility] 10/23 with complaints of numbness and swelling of his left arm for the past month and weakness of his right leg. He reports that he was told that he has nerve damage by his doctors. Over the month the symptoms have been getting worse and include sensory changes. He states that his arm can get very cold to touch and sometimes the right leg may not be able to accurately feel hot temperatures. He reports increasing fasciculations and spasms. His brother who is at bedside reports the patient having frequent falls due to his weakness. No fever, no urinary retention, no fecal incontinence.  C spine MRI showed diffuse swelling of the cervical cord with long segment central cord edema extending from C2 to T1. there was moderate spinal canal stenosis with apparent mild cord compression at C3-C4 and C4-C5,  minimal cord compression at C5-C6, and mild ventral cord impingement at C6-C7, however per rads read the diffuse changes in the cervical cord are not compatible with primary compressive myelopathy Patient was seen by neuro and orthopedics for cord compression with neurological symptoms. Patient was admitted to the orthopedic service and underwent a C2-T1 laminectomy/fusion with C3-6 laminectomies with Dr. Lopez on 10/24.     On 10/31, patient developed fevers, imaging  showed bilateral pulmonary embolisms. He was transferred to telemetry and started on therapeutic Xarelto.  Additionally, he was having trouble ranging his left knee. His left knee was aspirated on 11/3 which was found to be negative for gout. He continued to have fever, all other fever work up remained negative. Fever likely pseudogout treated with steroid x 5 days.    Patient was evaluated by PM&R and therapy for functional deficits, gait/ADL impairments and acute rehabilitation was recommended. Patient was medically optimized for discharge to [facility] IRF on 11/8/22.    Rehab Course significant for L knee pain, neuropathic pain of multiple limbs. patient was treated with capsaicin cream/voltaren gel. He was on gabapentin which was titrated up. Also with muscle spasm complaints placed on robaxin then changed to flexeril. Also with constipation managed with PO medications.     All other medical co-morbidities were stable.     Pt tolerated course of inpatient PT/OT/SLP rehab with significant functional improvements and met rehab goals prior to discharge.    Pt was medically cleared on 11/23 for discharge home    Pt will follow up with the following: PCP, ortho spine, rehab.    This is a 63 YO male RH dominant with PMH of asthma and HTN who was transferred from [facility] to [facility] ED on 10/23/22. Patient presented to [facility] 10/23 with complaints of numbness and swelling of his left arm for the past month and weakness of his right leg. He reports that he was told that he has nerve damage by his doctors. Over the month the symptoms have been getting worse and include sensory changes. He states that his arm can get very cold to touch and sometimes the right leg may not be able to accurately feel hot temperatures. He reports increasing fasciculations and spasms. His brother who is at bedside reports the patient having frequent falls due to his weakness. No fever, no urinary retention, no fecal incontinence.  C spine MRI showed diffuse swelling of the cervical cord with long segment central cord edema extending from C2 to T1. there was moderate spinal canal stenosis with apparent mild cord compression at C3-C4 and C4-C5,  minimal cord compression at C5-C6, and mild ventral cord impingement at C6-C7, however per rads read the diffuse changes in the cervical cord are not compatible with primary compressive myelopathy Patient was seen by neuro and orthopedics for cord compression with neurological symptoms. Patient was admitted to the orthopedic service and underwent a C2-T1 laminectomy/fusion with C3-6 laminectomies with Dr. Lopez on 10/24.     On 10/31, patient developed fevers, imaging  showed bilateral pulmonary embolisms. He was transferred to telemetry and started on therapeutic Xarelto.  Additionally, he was having trouble ranging his left knee. His left knee was aspirated on 11/3 which was found to be negative for gout. He continued to have fever, all other fever work up remained negative. Fever likely pseudogout treated with steroid x 5 days.    Patient was evaluated by PM&R and therapy for functional deficits, gait/ADL impairments and acute rehabilitation was recommended. Patient was medically optimized for discharge to [facility] IRF on 11/8/22.    Rehab Course significant for L knee pain, neuropathic pain of multiple limbs. patient was treated with capsaicin cream/voltaren gel. He was on gabapentin which was titrated up. Also with muscle spasm complaints placed on robaxin then changed to flexeril. Also with constipation managed with PO medications. Sutures were removed in rehab.    All other medical co-morbidities were stable.     Pt tolerated course of inpatient PT/OT/SLP rehab with significant functional improvements and met rehab goals prior to discharge.    Pt was medically cleared on 11/23 for discharge home    Pt will follow up with the following: PCP, ortho spine, rehab.

## 2022-11-20 NOTE — DISCHARGE NOTE PROVIDER - DETAILS OF MALNUTRITION DIAGNOSIS/DIAGNOSES
This patient has been assessed with a concern for Malnutrition and was treated during this hospitalization for the following Nutrition diagnosis/diagnoses:     -  11/09/2022: Severe protein-calorie malnutrition

## 2022-11-20 NOTE — DISCHARGE NOTE PROVIDER - CARE PROVIDER_API CALL
Ami Lopez (DO)  Orthopaedic Surgery Surgery  [address], [address]  [place] [address]  Phone: [phone]  Fax: [phone]  Follow Up Time: 2 weeks    Satnam Tran)  PhysicalRehab Medicine  [address] [place] [address]  Phone: [phone]  Fax: [phone]  Follow Up Time: 1 month   Ami Lopez (DO)  Orthopaedic Surgery Surgery  [address], [address]  [place] [address]  Phone: [phone]  Fax: [phone]  Follow Up Time: 2 weeks    Satnam Tran)  PhysicalRehab Medicine  [address] [place] [address]  Phone: [phone]  Fax: [phone]  Follow Up Time: 1 month    Juan Downey)  Internal Medicine  [address] [place] [address]  Phone: [phone]  Fax: [phone]  Follow Up Time: 2 weeks

## 2022-11-20 NOTE — PROGRESS NOTE ADULT - ASSESSMENT
61yo male with hx of asthma and HTN, presented to [facility] for acute rehab from [facility] where he was transferred to from [facility]- with acute complaints of numbness and swelling of his left arm for the past month and weakness of his right leg, noted to have diffuse swelling of the cervical cord with long segment central cord edema extending from C2 to T1 with moderate spinal canal stenosis and mild cord compression at C3-C4 and C4-C5. Pt s/p C2-T1 laminectomy/fusion with C3-6 laminectomies with Dr. Lopez on 10/24. Postop with B/L PE and left knee pain    #Spinal cord compression  #Central cord syndrome  #Neuropathic Pain  -S/p C2-T1 laminectomy/fusion with C3-6 laminectomies with Dr. Lopez on 10/24  -Cervical collar when OOB  -Continue PT/OT  -Continue Gabapentin  - comprehensive rehab  - fall precaution    #B/L Pulmonary Emboli  -Continue Xarelto. Treatment for 3-6months    #HTN  -Continue Amlodipine w/ holding parameters    #Asthma  -Albuterol INH PRN    #Left knee pain  -S/p aspiration, fluid assessment reviewed. Negative for gout or infection  -Empirically treated with Steroid  -Encourage PT/OT    # DVT ppx  -Xarelto  # GI: PPI    will follow 63yo male with hx of asthma and HTN, presented to [facility] for acute rehab from [facility] where he was transferred to from [facility]- with acute complaints of numbness and swelling of his left arm for the past month and weakness of his right leg, noted to have diffuse swelling of the cervical cord with long segment central cord edema extending from C2 to T1 with moderate spinal canal stenosis and mild cord compression at C3-C4 and C4-C5. Pt s/p C2-T1 laminectomy/fusion with C3-6 laminectomies with Dr. Lopez on 10/24. Postop with B/L PE and left knee pain    #Spinal cord compression  #Central cord syndrome  #Neuropathic Pain  -S/p C2-T1 laminectomy/fusion with C3-6 laminectomies with Dr. Lopez on 10/24  -Cervical collar when OOB  -Continue PT/OT  -Continue Gabapentin. consider to increase dose  - comprehensive rehab  - fall precaution    #B/L Pulmonary Emboli  -Continue Xarelto. Treatment for 3-6months    #HTN  -Continue Amlodipine w/ holding parameters    #Asthma  -Albuterol INH PRN    #Left knee pain  -S/p aspiration, fluid assessment reviewed. Negative for gout or infection  -Empirically treated with Steroid  -Encourage PT/OT    # DVT ppx  -Xarelto  # GI: PPI    will follow    d/w dr. urrutia

## 2022-11-20 NOTE — DISCHARGE NOTE PROVIDER - NSDCHC_MEDRECSTATUS_GEN_ALL_CORE
Admission Reconciliation is Completed  Discharge Reconciliation is Not Complete Admission Reconciliation is Completed  Discharge Reconciliation is Completed Normal muscle tone/strength

## 2022-11-20 NOTE — DISCHARGE NOTE PROVIDER - NSDCHHCONTACT_GEN_ALL_CORE_FT
Nickie Jones
Nickie Jones
As certified below, I, or a nurse practitioner or physician assistant working with me, had a face-to-face encounter that meets the physician face-to-face encounter requirements.

## 2022-11-20 NOTE — DISCHARGE NOTE PROVIDER - NSDCMRMEDTOKEN_GEN_ALL_CORE_FT
acetaminophen 325 mg oral tablet: 3 tab(s) orally every 8 hours  Advair Diskus:  inhaled   amLODIPine 10 mg oral tablet: 1 tab(s) orally once a day  bisacodyl 10 mg rectal suppository: 1 suppository(ies) rectal once a day, As needed, Constipation  cyclobenzaprine 10 mg oral tablet: 1 tab(s) orally 3 times a day, As needed, Muscle Spasm  melatonin 3 mg oral tablet: 1 tab(s) orally once a day (at bedtime)  polyethylene glycol 3350 oral powder for reconstitution: 17 gram(s) orally once a day  rivaroxaban 20 mg oral tablet: 1 tab(s) orally once a day (before a meal)  rivaroxaban 20 mg oral tablet: 1 tab(s) orally once a day  senna leaf extract oral tablet: 2 tab(s) orally once a day (at bedtime)  tapentadol 100 mg oral tablet: 1 tab(s) orally 4 times a day   acetaminophen 325 mg oral tablet: 3 tab(s) orally every 8 hours  amLODIPine 10 mg oral tablet: 1 tab(s) orally once a day  cyclobenzaprine 10 mg oral tablet: 1 tab(s) orally 3 times a day, As needed, Muscle Spasm  diclofenac 1% topical gel: Apply topically to affected area 3 times a day   gabapentin 300 mg oral capsule: 1 cap(s) orally in the morning and afternoon. Take 2 caps orally at bedtime.  melatonin 3 mg oral tablet: 2 tab(s) orally once a day (at bedtime)  pantoprazole 40 mg oral delayed release tablet: 1 tab(s) orally once a day (before a meal)  polyethylene glycol 3350 oral powder for reconstitution: 17 gram(s) orally once a day  rivaroxaban 20 mg oral tablet: 1 tab(s) orally once a day  senna leaf extract oral tablet: 2 tab(s) orally once a day (at bedtime)  tapentadol 100 mg oral tablet: 1 tab(s) orally 4 times a day   acetaminophen 325 mg oral tablet: 3 tab(s) orally every 8 hours  amLODIPine 10 mg oral tablet: 1 tab(s) orally once a day  cyclobenzaprine 10 mg oral tablet: 1 tab(s) orally 3 times a day, As needed, Muscle Spasm  diclofenac 1% topical gel: Apply topically to affected area 3 times a day   gabapentin 400 mg oral capsule: 1 cap(s) orally in the morning and afternoon. 2 caps orally at bedtime.  melatonin 3 mg oral tablet: 2 tab(s) orally once a day (at bedtime)  pantoprazole 40 mg oral delayed release tablet: 1 tab(s) orally once a day (before a meal)  polyethylene glycol 3350 oral powder for reconstitution: 17 gram(s) orally once a day  rivaroxaban 20 mg oral tablet: 1 tab(s) orally once a day  senna leaf extract oral tablet: 2 tab(s) orally once a day (at bedtime)  tapentadol 100 mg oral tablet: 1 tab(s) orally 4 times a day   acetaminophen 325 mg oral tablet: 3 tab(s) orally every 8 hours  amLODIPine 10 mg oral tablet: 1 tab(s) orally once a day  cyclobenzaprine 10 mg oral tablet: 1 tab(s) orally 3 times a day, As needed, Muscle Spasm  diclofenac 1% topical gel: Apply topically to affected area 3 times a day   gabapentin 400 mg oral capsule: 1 cap(s) orally in the morning and afternoon. 2 caps orally at bedtime.  melatonin 3 mg oral tablet: 2 tab(s) orally once a day (at bedtime)  oxyCODONE 5 mg oral tablet: 1 tab(s) orally every 6 hours, As needed, Severe Pain (7 - 10) MDD:4 tabs  pantoprazole 40 mg oral delayed release tablet: 1 tab(s) orally once a day (before a meal)  polyethylene glycol 3350 oral powder for reconstitution: 17 gram(s) orally once a day  rivaroxaban 20 mg oral tablet: 1 tab(s) orally once a day  senna leaf extract oral tablet: 2 tab(s) orally once a day (at bedtime)  tapentadol 100 mg oral tablet: 1 tab(s) orally 4 times a day   acetaminophen 325 mg oral tablet: 3 tab(s) orally every 8 hours  amLODIPine 10 mg oral tablet: 1 tab(s) orally once a day  cyclobenzaprine 10 mg oral tablet: 1 tab(s) orally 3 times a day, As needed, Muscle Spasm  gabapentin 400 mg oral capsule: 1 cap(s) orally in the morning and afternoon. 2 caps orally at bedtime.  melatonin 3 mg oral tablet: 2 tab(s) orally once a day (at bedtime)  oxyCODONE 5 mg oral tablet: 1 tab(s) orally every 6 hours, As needed, Severe Pain (7 - 10) MDD:4 tabs  pantoprazole 40 mg oral delayed release tablet: 1 tab(s) orally once a day (before a meal)  polyethylene glycol 3350 oral powder for reconstitution: 17 gram(s) orally once a day  rivaroxaban 20 mg oral tablet: 1 tab(s) orally once a day  senna leaf extract oral tablet: 2 tab(s) orally once a day (at bedtime)  tapentadol 100 mg oral tablet: 1 tab(s) orally every 6 hours

## 2022-11-20 NOTE — PROGRESS NOTE ADULT - ASSESSMENT
63 YO male with PMH of asthma and HTN who was transferred from [facility]. Patient presented to [facility] ED on 10/23 and Orthopedics was consulted for cord compression with neurological symptoms. Patient  underwent a C2-T1 laminectomy/fusion with C3-6 laminectomies with Dr. Lopez on 10/24. post -op complicated by PE.     # Spinal cord compression/central cord syndrome  -  s/p C2-T1 laminectomy/fusion with C3-6 laminectomies with Dr. Lopez on 10/24  - wound care: DSD and tegaderm daily.   -sutures are out. dressing without drainage  - Comprehensive Rehab Program: PT/OT, 3hours daily and 5 days weekly   - aspen cervical collar when OOB  - neuropsychology supportive counseling, rec therapy  - Precautions: spinal, respiratory, cardiac, sensory, fall, AC    # HTN  - Off amlodipine  - BP well-controlled.       # PE  - TTE No evidence of RV strain  - Xarelto 20mg daily  - HR and O2 sats controlled      # Asthma  - Albuterol INH PRN    # Pseudo Gout  - aspirate from the Lt knee with no suspicion for infectious arthritis  - s/p prednisone 20 BID for 5 days    # Pain management  - Tylenol 975mg q8  - low dose oxycodone prn breakthrough  -  nucynta 100 q6 11/9 - continue same dose for now, managed outpt by Dr Vaughn Fraser  - flexeril PRN 11/16> dose increased to 10mg 3x/day PRN  - neurontin increased to 300,300,600mg 11/16  - voltaren gel to L knee    #Sleep  -melatonin 6mg qhs    # GI ppx  -  Protonix 40mg  - Senna, miralax, dulcolax supp PRN- lactulose PRN    # FEN   - Diet: Regular- no egg    # DVT ppx  - on xarelto    # LABS  CBC BMP 11/21      CODE STATUS: FULL CODE

## 2022-11-20 NOTE — PROGRESS NOTE ADULT - SUBJECTIVE AND OBJECTIVE BOX
Patient is a 62y old  Male who presents with a chief complaint of Spinal cord compression s/p C2-T1 lami/fusion (17 Nov 2022 07:54)    SUBJECTIVE/ROS: Patient seen and examined.   No difficulties overnight  The burning and spasm are less with recent changes in the flexeril and neurontin  He requets foot nail care    Vital Signs Last 24 Hrs  T(C): 36.4 (20 Nov 2022 09:03), Max: 36.7 (19 Nov 2022 20:31)  T(F): 97.5 (20 Nov 2022 09:03), Max: 98 (19 Nov 2022 20:31)  HR: 99 (20 Nov 2022 09:03) (90 - 99)  BP: 122/79 (20 Nov 2022 09:03) (120/78 - 138/81)  RR: 18 (20 Nov 2022 09:03) (17 - 18)  SpO2: 97% (20 Nov 2022 09:03) (96% - 97%)    MEDICATIONS  (STANDING):  acetaminophen     Tablet .. 975 milliGRAM(s) Oral every 8 hours  amLODIPine   Tablet 10 milliGRAM(s) Oral daily  gabapentin 300 milliGRAM(s) Oral <User Schedule>  gabapentin 600 milliGRAM(s) Oral <User Schedule>  hydrOXYzine hydrochloride 25 milliGRAM(s) Oral once  melatonin 6 milliGRAM(s) Oral at bedtime  pantoprazole    Tablet 40 milliGRAM(s) Oral before breakfast  polyethylene glycol 3350 17 Gram(s) Oral daily  rivaroxaban 20 milliGRAM(s) Oral daily  senna 2 Tablet(s) Oral at bedtime  tapentadol 100 milliGRAM(s) Oral every 6 hours    MEDICATIONS  (PRN):  albuterol    90 MICROgram(s) HFA Inhaler 2 Puff(s) Inhalation every 6 hours PRN Shortness of Breath and/or Wheezing  bisacodyl Suppository 10 milliGRAM(s) Rectal daily PRN Constipation  capsaicin 0.025% Cream 1 Application(s) Topical every 8 hours PRN neuropathic pain  cyclobenzaprine 5 milliGRAM(s) Oral three times a day PRN Muscle Spasm  diclofenac sodium 1% Gel 4 Gram(s) Topical three times a day PRN pain  lactulose Syrup 20 Gram(s) Oral daily PRN constipation  oxyCODONE    IR 2.5 milliGRAM(s) Oral every 6 hours PRN Moderate Pain (4 - 6)  oxyCODONE    IR 5 milliGRAM(s) Oral every 6 hours PRN Severe Pain (7 - 10)                        10.3   4.68  )-----------( 381      ( 17 Nov 2022 06:13 )             32.6     11-17    144  |  107  |  19  ----------------------------<  97  4.6   |  28  |  0.99    Ca    8.9      17 Nov 2022 06:13    TPro  6.2  /  Alb  3.0<L>  /  TBili  0.2  /  DBili  x   /  AST  26  /  ALT  33  /  AlkPhos  69  11-17    Vital Signs Last 24 Hrs  ICU Vital Signs Last 24 Hrs  T(C): 36.7 (19 Nov 2022 08:41), Max: 37 (18 Nov 2022 20:44)  T(F): 98.1 (19 Nov 2022 08:41), Max: 98.6 (18 Nov 2022 20:44)  HR: 93 (19 Nov 2022 08:41) (86 - 97)  BP: 127/89 (19 Nov 2022 08:41) (122/80 - 139/86)    PHYSICAL EXAM:   Cervical dressing is intact and C/D/I  · Constitutional Comments	alert O x 3. appears comfortable.    · Respiratory	clear to auscultation bilaterally; no wheezes; no rales; no rhonchi  · Cardiovascular	regular rate and rhythm; S1 S2 present; no gallops; no rub; no murmur  · Gastrointestinal	soft; nontender; nondistended; normal active bowel sounds  · Motor	no left knee effusion no warmth or joint line TTP  PROM left knee flexion  to 120 improving but less than R knee  L quad 5-/5 ham 4+/5  ankle eversion 4-/5 tight heel cords  bilateral calves soft no TTP  LUE shoulder ROM limited , painful past 90deg  4/5 throughout LUE

## 2022-11-20 NOTE — DISCHARGE NOTE PROVIDER - PROVIDER TOKENS
PROVIDER:[TOKEN:[[id number]],FOLLOWUP:[2 weeks]],PROVIDER:[TOKEN:[[id number]],FOLLOWUP:[1 month]] PROVIDER:[TOKEN:[[id number]],FOLLOWUP:[2 weeks]],PROVIDER:[TOKEN:[[id number]],FOLLOWUP:[1 month]],PROVIDER:[TOKEN:[[id number]],FOLLOWUP:[2 weeks]]

## 2022-11-21 LAB
ALBUMIN SERPL ELPH-MCNC: 3.1 G/DL — LOW (ref 3.3–5)
ALP SERPL-CCNC: 66 U/L — SIGNIFICANT CHANGE UP (ref 40–120)
ALT FLD-CCNC: 50 U/L — HIGH (ref 10–45)
ANION GAP SERPL CALC-SCNC: 2 MMOL/L — LOW (ref 5–17)
AST SERPL-CCNC: 38 U/L — SIGNIFICANT CHANGE UP (ref 10–40)
BILIRUB SERPL-MCNC: 0.2 MG/DL — SIGNIFICANT CHANGE UP (ref 0.2–1.2)
BUN SERPL-MCNC: 17 MG/DL — SIGNIFICANT CHANGE UP (ref 7–23)
CALCIUM SERPL-MCNC: 9.4 MG/DL — SIGNIFICANT CHANGE UP (ref 8.4–10.5)
CHLORIDE SERPL-SCNC: 106 MMOL/L — SIGNIFICANT CHANGE UP (ref 96–108)
CO2 SERPL-SCNC: 34 MMOL/L — HIGH (ref 22–31)
CREAT SERPL-MCNC: 0.78 MG/DL — SIGNIFICANT CHANGE UP (ref 0.5–1.3)
EGFR: 101 ML/MIN/1.73M2 — SIGNIFICANT CHANGE UP
GLUCOSE SERPL-MCNC: 93 MG/DL — SIGNIFICANT CHANGE UP (ref 70–99)
HCT VFR BLD CALC: 33.3 % — LOW (ref 39–50)
HGB BLD-MCNC: 10.4 G/DL — LOW (ref 13–17)
MCHC RBC-ENTMCNC: 28.9 PG — SIGNIFICANT CHANGE UP (ref 27–34)
MCHC RBC-ENTMCNC: 31.2 GM/DL — LOW (ref 32–36)
MCV RBC AUTO: 92.5 FL — SIGNIFICANT CHANGE UP (ref 80–100)
NRBC # BLD: 0 /100 WBCS — SIGNIFICANT CHANGE UP (ref 0–0)
PLATELET # BLD AUTO: 300 K/UL — SIGNIFICANT CHANGE UP (ref 150–400)
POTASSIUM SERPL-MCNC: 4.6 MMOL/L — SIGNIFICANT CHANGE UP (ref 3.5–5.3)
POTASSIUM SERPL-SCNC: 4.6 MMOL/L — SIGNIFICANT CHANGE UP (ref 3.5–5.3)
PROT SERPL-MCNC: 6.4 G/DL — SIGNIFICANT CHANGE UP (ref 6–8.3)
RBC # BLD: 3.6 M/UL — LOW (ref 4.2–5.8)
RBC # FLD: 14.5 % — SIGNIFICANT CHANGE UP (ref 10.3–14.5)
SODIUM SERPL-SCNC: 142 MMOL/L — SIGNIFICANT CHANGE UP (ref 135–145)
WBC # BLD: 4.35 K/UL — SIGNIFICANT CHANGE UP (ref 3.8–10.5)
WBC # FLD AUTO: 4.35 K/UL — SIGNIFICANT CHANGE UP (ref 3.8–10.5)

## 2022-11-21 PROCEDURE — 99232 SBSQ HOSP IP/OBS MODERATE 35: CPT

## 2022-11-21 PROCEDURE — 99232 SBSQ HOSP IP/OBS MODERATE 35: CPT | Mod: GC

## 2022-11-21 RX ORDER — GABAPENTIN 400 MG/1
400 CAPSULE ORAL
Refills: 0 | Status: DISCONTINUED | OUTPATIENT
Start: 2022-11-21 | End: 2022-11-23

## 2022-11-21 RX ORDER — GABAPENTIN 400 MG/1
800 CAPSULE ORAL AT BEDTIME
Refills: 0 | Status: DISCONTINUED | OUTPATIENT
Start: 2022-11-21 | End: 2022-11-23

## 2022-11-21 RX ADMIN — TAPENTADOL HYDROCHLORIDE 100 MILLIGRAM(S): 50 TABLET, FILM COATED ORAL at 06:24

## 2022-11-21 RX ADMIN — Medication 975 MILLIGRAM(S): at 15:50

## 2022-11-21 RX ADMIN — TAPENTADOL HYDROCHLORIDE 100 MILLIGRAM(S): 50 TABLET, FILM COATED ORAL at 12:30

## 2022-11-21 RX ADMIN — TAPENTADOL HYDROCHLORIDE 100 MILLIGRAM(S): 50 TABLET, FILM COATED ORAL at 11:33

## 2022-11-21 RX ADMIN — GABAPENTIN 400 MILLIGRAM(S): 400 CAPSULE ORAL at 14:50

## 2022-11-21 RX ADMIN — SENNA PLUS 2 TABLET(S): 8.6 TABLET ORAL at 21:39

## 2022-11-21 RX ADMIN — Medication 975 MILLIGRAM(S): at 14:50

## 2022-11-21 RX ADMIN — TAPENTADOL HYDROCHLORIDE 100 MILLIGRAM(S): 50 TABLET, FILM COATED ORAL at 17:38

## 2022-11-21 RX ADMIN — TAPENTADOL HYDROCHLORIDE 100 MILLIGRAM(S): 50 TABLET, FILM COATED ORAL at 00:35

## 2022-11-21 RX ADMIN — PANTOPRAZOLE SODIUM 40 MILLIGRAM(S): 20 TABLET, DELAYED RELEASE ORAL at 06:27

## 2022-11-21 RX ADMIN — Medication 975 MILLIGRAM(S): at 06:27

## 2022-11-21 RX ADMIN — TAPENTADOL HYDROCHLORIDE 100 MILLIGRAM(S): 50 TABLET, FILM COATED ORAL at 12:33

## 2022-11-21 RX ADMIN — OXYCODONE HYDROCHLORIDE 5 MILLIGRAM(S): 5 TABLET ORAL at 02:50

## 2022-11-21 RX ADMIN — RIVAROXABAN 20 MILLIGRAM(S): KIT at 11:33

## 2022-11-21 RX ADMIN — OXYCODONE HYDROCHLORIDE 5 MILLIGRAM(S): 5 TABLET ORAL at 01:20

## 2022-11-21 RX ADMIN — CYCLOBENZAPRINE HYDROCHLORIDE 10 MILLIGRAM(S): 10 TABLET, FILM COATED ORAL at 17:39

## 2022-11-21 RX ADMIN — GABAPENTIN 300 MILLIGRAM(S): 400 CAPSULE ORAL at 06:29

## 2022-11-21 RX ADMIN — Medication 975 MILLIGRAM(S): at 21:39

## 2022-11-21 RX ADMIN — Medication 975 MILLIGRAM(S): at 22:30

## 2022-11-21 RX ADMIN — AMLODIPINE BESYLATE 10 MILLIGRAM(S): 2.5 TABLET ORAL at 07:24

## 2022-11-21 RX ADMIN — GABAPENTIN 800 MILLIGRAM(S): 400 CAPSULE ORAL at 21:39

## 2022-11-21 RX ADMIN — Medication 6 MILLIGRAM(S): at 21:39

## 2022-11-21 NOTE — PROGRESS NOTE ADULT - ASSESSMENT
63yo male with hx of asthma and HTN, presented to [facility] for acute rehab from [facility] where he was transferred to from [facility]- with acute complaints of numbness and swelling of his left arm for the past month and weakness of his right leg, noted to have diffuse swelling of the cervical cord with long segment central cord edema extending from C2 to T1 with moderate spinal canal stenosis and mild cord compression at C3-C4 and C4-C5. Pt s/p C2-T1 laminectomy/fusion with C3-6 laminectomies with Dr. Lopez on 10/24. Postop with B/L PE and left knee pain    #Spinal cord compression  #Central cord syndrome  #Neuropathic Pain  -S/p C2-T1 laminectomy/fusion with C3-6 laminectomies with Dr. Lopez on 10/24  -Cervical collar when OOB  -Continue PT/OT  -Continue Gabapentin. adjust dose per primary team  - comprehensive rehab  - fall precaution    #B/L Pulmonary Emboli  -Continue Xarelto. Treatment for 3-6months  - f/u PCP    #HTN  -Continue Amlodipine w/ holding parameters    #Asthma  -Albuterol INH PRN    #Left knee pain  -S/p aspiration, fluid assessment reviewed. Negative for gout or infection  -Empirically treated with Steroid  -Encourage PT/OT    # DVT ppx  -Xarelto  # GI: PPI    stable medically

## 2022-11-21 NOTE — PROGRESS NOTE ADULT - ASSESSMENT
61 YO male with PMH of asthma and HTN who was transferred from [facility]. Patient presented to [facility] ED on 10/23 and Orthopedics was consulted for cord compression with neurological symptoms. Patient  underwent a C2-T1 laminectomy/fusion with C3-6 laminectomies with Dr. Lopez on 10/24. post -op complicated by PE.     # Spinal cord compression/central cord syndrome  -  s/p C2-T1 laminectomy/fusion with C3-6 laminectomies with Dr. Lopez on 10/24  - wound care: DSD and tegaderm daily. Dr Lopez contacted- sutures removed-healing well  - Comprehensive Rehab Program: PT/OT, 3hours daily and 5 days weekly   - aspen cervical collar when OOB  - neuropsychology supportive counseling, rec therapy  - Precautions: spinal, respiratory, cardiac, sensory, fall, AC    # HTN  - Off amlodipine  - BP well-controlled.       # PE  - TTE No evidence of RV strain  - Xarelto 20mg daily  - HR and O2 sats controlled      # Asthma  - Albuterol INH PRN    # Pseudo Gout  - aspirate from the Lt knee with no suspicion for infectious arthritis  - s/p prednisone 20 BID for 5 days    # Pain management  - Tylenol 975mg q8  - low dose oxycodone prn breakthrough  -  nucynta 100 q6 11/9 - continue same dose for now, managed outpt by Dr Vaughn Fraser  - on flexeril PRN   - neurontin increased  400/400/800 for LUE burning pain  - voltaren gel to L knee    #Sleep  -melatonin 6mg qhs    # GI ppx  -  Protonix 40mg  - Senna, miralax, dulcolax supp PRN- lactulose PRN    # FEN   - Diet: Regular- no egg    # DVT ppx  - on xarelto    CODE STATUS: FULL CODE    Outpatient Follow-up (Specialty/Name of physician):    Ami Lopez (DO)  Orthopaedic Surgery Surgery  [address], [address]  [place] [address]  Phone: [phone]  Fax: [phone]

## 2022-11-21 NOTE — PROGRESS NOTE ADULT - SUBJECTIVE AND OBJECTIVE BOX
Medicine Progress Note    Patient is a 62y old  Male who presents with a chief complaint of Spinal cord compression s/p C2-T1 lami/fusion (20 Nov 2022 19:10)      SUBJECTIVE / OVERNIGHT EVENTS:  no complaints excpet ongoing numbness and tingling RUE with some ROM limitation due to stiffness.  waiting to get some shower training    ADDITIONAL REVIEW OF SYSTEMS:  no fever/chills/CP/sob/palpitation/dizziness/ abd pain/nausea/vomiting/diarrhea/constipation/headaches. all other ROS neg    MEDICATIONS  (STANDING):  acetaminophen     Tablet .. 975 milliGRAM(s) Oral every 8 hours  amLODIPine   Tablet 10 milliGRAM(s) Oral daily  gabapentin 300 milliGRAM(s) Oral <User Schedule>  gabapentin 600 milliGRAM(s) Oral <User Schedule>  hydrOXYzine hydrochloride 25 milliGRAM(s) Oral once  melatonin 6 milliGRAM(s) Oral at bedtime  pantoprazole    Tablet 40 milliGRAM(s) Oral before breakfast  polyethylene glycol 3350 17 Gram(s) Oral daily  rivaroxaban 20 milliGRAM(s) Oral daily  senna 2 Tablet(s) Oral at bedtime  tapentadol 100 milliGRAM(s) Oral every 6 hours    MEDICATIONS  (PRN):  albuterol    90 MICROgram(s) HFA Inhaler 2 Puff(s) Inhalation every 6 hours PRN Shortness of Breath and/or Wheezing  bisacodyl Suppository 10 milliGRAM(s) Rectal daily PRN Constipation  capsaicin 0.025% Cream 1 Application(s) Topical every 8 hours PRN neuropathic pain  cyclobenzaprine 10 milliGRAM(s) Oral three times a day PRN Muscle Spasm  diclofenac sodium 1% Gel 4 Gram(s) Topical three times a day PRN pain  lactulose Syrup 20 Gram(s) Oral daily PRN constipation  oxyCODONE    IR 2.5 milliGRAM(s) Oral every 6 hours PRN Moderate Pain (4 - 6)  oxyCODONE    IR 5 milliGRAM(s) Oral every 6 hours PRN Severe Pain (7 - 10)    CAPILLARY BLOOD GLUCOSE        I&O's Summary      PHYSICAL EXAM:  Vital Signs Last 24 Hrs  T(C): 36.4 (21 Nov 2022 08:12), Max: 36.8 (20 Nov 2022 20:44)  T(F): 97.5 (21 Nov 2022 08:12), Max: 98.2 (20 Nov 2022 20:44)  HR: 81 (21 Nov 2022 08:12) (81 - 99)  BP: 130/79 (21 Nov 2022 08:12) (116/76 - 130/79)  BP(mean): --  RR: 16 (21 Nov 2022 08:12) (16 - 18)  SpO2: 96% (21 Nov 2022 08:12) (95% - 97%)    Parameters below as of 21 Nov 2022 08:12  Patient On (Oxygen Delivery Method): room air    GENERAL: Not in distress. Alert    HEENT: clear conjuctiva, MMM. no pallor or icterus  CARDIOVASCULAR: RRR S1, S2. No murmur/rubs/gallop  LUNGS: BLAE+, no rales, no wheezing, no rhonchi.    ABDOMEN: ND. Soft,  NT, no guarding / rebound / rigidity. BS normoactive  BACK: No spine tenderness.  EXTREMITIES: no edema. no leg or calf TP. stiffness on left shoulder with reduced ROM. mild medial joint TP left knee. no erythema or swelling of any joints  SKIN: warm and dry. cervical dressing clean.   PSYCHIATRIC: Calm.  No agitation.  CNS: AAO*3. LUE weakness and reduced sensation        LABS:                        10.4   4.35  )-----------( 300      ( 21 Nov 2022 06:29 )             33.3     11-21    142  |  106  |  17  ----------------------------<  93  4.6   |  34<H>  |  0.78    Ca    9.4      21 Nov 2022 06:29    TPro  6.4  /  Alb  3.1<L>  /  TBili  0.2  /  DBili  x   /  AST  38  /  ALT  50<H>  /  AlkPhos  66  11-21              COVID-19 PCR: NotDetec (09 Nov 2022 06:00)  COVID-19 PCR: NotDetec (06 Nov 2022 06:57)  COVID-19 PCR: NotDetec (03 Nov 2022 02:28)  COVID-19 PCR: NotDetec (24 Oct 2022 09:00)      RADIOLOGY & ADDITIONAL TESTS:  Imaging from Last 24 Hours:    Electrocardiogram/QTc Interval:    COORDINATION OF CARE:  Care Discussed with Consultants/Other Providers:

## 2022-11-21 NOTE — CHART NOTE - NSCHARTNOTEFT_GEN_A_CORE
Nutrition Follow Up Note  Hospital Course   (Per Electronic Medical Record)    Source:  Patient [X]  Medical Record [X]      Diet:   Regular Diet (IDDSI Level 7) w/ Thin Liquids (IDDSI Level 0)  Tolerates Diet Consistency Well  No Chewing/Swallowing Difficulties  No Recent Nausea, Vomiting, Diarrhea or Constipation (as Per Patient)  Consumes % of Meals (as Per Documentation) - States Good PO Intake/Appetite (Per Patient)   on Ensure Plus High Protein 8oz PO Daily (Provides 350kcal & 20grams of Protein)  Patient Takes Nutrition Supplement Well  Obtained Food Preferences from Patient    Enteral/Parenteral Nutrition: Not Applicable    Current Weight: 166.8lb on 11/8  Obtain New Weight  Obtain Weights Weekly     Pertinent Medications: MEDICATIONS  (STANDING):  acetaminophen     Tablet .. 975 milliGRAM(s) Oral every 8 hours  amLODIPine   Tablet 10 milliGRAM(s) Oral daily  gabapentin 300 milliGRAM(s) Oral <User Schedule>  gabapentin 600 milliGRAM(s) Oral <User Schedule>  hydrOXYzine hydrochloride 25 milliGRAM(s) Oral once  melatonin 6 milliGRAM(s) Oral at bedtime  pantoprazole    Tablet 40 milliGRAM(s) Oral before breakfast  polyethylene glycol 3350 17 Gram(s) Oral daily  rivaroxaban 20 milliGRAM(s) Oral daily  senna 2 Tablet(s) Oral at bedtime  tapentadol 100 milliGRAM(s) Oral every 6 hours    MEDICATIONS  (PRN):  albuterol    90 MICROgram(s) HFA Inhaler 2 Puff(s) Inhalation every 6 hours PRN Shortness of Breath and/or Wheezing  bisacodyl Suppository 10 milliGRAM(s) Rectal daily PRN Constipation  capsaicin 0.025% Cream 1 Application(s) Topical every 8 hours PRN neuropathic pain  cyclobenzaprine 10 milliGRAM(s) Oral three times a day PRN Muscle Spasm  diclofenac sodium 1% Gel 4 Gram(s) Topical three times a day PRN pain  lactulose Syrup 20 Gram(s) Oral daily PRN constipation  oxyCODONE    IR 2.5 milliGRAM(s) Oral every 6 hours PRN Moderate Pain (4 - 6)  oxyCODONE    IR 5 milliGRAM(s) Oral every 6 hours PRN Severe Pain (7 - 10)    Pertinent Labs:  11-21 Na142 mmol/L Glu 93 mg/dL K+ 4.6 mmol/L Cr  0.78 mg/dL BUN 17 mg/dL 11-21 Alb 3.1 g/dL<L>    Skin: No Pressure Ulcers  Surgical Incision on C-Spine  (as Per Nursing Flow Sheet)     Edema: None Noted (as Per Documentation)     Last Bowel Movement: on 11/20 (Per Patient)     Estimated Needs:   [X] No Change Since Previous Assessment    Previous Nutrition Diagnosis:   Severe Malnutrition     Nutrition Diagnosis is [X] Ongoing - Continues on Nutrition Supplement & Patient Takes Nutrition Supplement Well    New Nutrition Diagnosis: [X] Not Applicable    Interventions:   1. Recommend Continue Nutrition Plan of Care     Monitoring & Evaluation:   [X] Weights   [X] PO Intake   [X] Skin Integrity   [X] Follow Up (Per Protocol)  [X] Tolerance to Diet Prescription   [X] Other: Labs     Registered Dietitian/Nutritionist Remains Available.  Yunior Mckeon RDN    Phone# [phone]
REHABILITATION DIAGNOSIS:  central cord syndrome        COMORBIDITIES/COMPLICATING CONDITIONS IMPACTING REHABILITATION:  HEALTH ISSUES - PROBLEM Dx:  Pulmonary thromboembolism  paraparesis upper extremities  s/p C2-T1 laminectomy/fusion  leukocytosis  hyponatremia  long term AC use  constipation  spasticity  neuropathic pain      PAST MEDICAL & SURGICAL HISTORY:  Asthma      HTN (hypertension)          Based upon consideration of the patient's impairments, functional status, complicating conditions and any other contributing factors and after information garnered from the assessments of all therapy disciplines involved in treating the patient and other pertinent clinicians:    INTERDISCIPLINARY REHABILITATION INTERVENTIONS:    [  x ] Transfer Training  [ x  ] Bed Mobility  [ x  ] Therapeutic Exercise  [ x  ] Balance/Coordination Exercises  [ x  ] Locomotion training  [x   ] Stairs    [ x  ] Functional transfers  [ x  ] Activities of daily living  [   ] Visual Perceptual training    [ x  ] Bowel/Bladder program  [  x ] Pain Management  [  x ] Skin/Wound Care    [   ] Speech/Communication Exercise  [   ] Swallowing Exercises    [   ] Cognitive Exercises  [   ] Cognitive-linguistic Treatment  [   ] Behavioral Program    [ x  ] Patient/Family Counseling  [ x  ] Family Training  [   ] Community Re-entry  [   ] Orthotic Evaluation/Training  [   ] Prosthetic Eval/Training    MEDICAL PROGNOSIS:  good-    REHAB POTENTIAL:  good  EXPECTED DAILY THERAPIES:    [ x  ] PT  [ x  ] OT  [   ] ST    EXPECTED INTENSITY OF PROGRAM:  3 hours daily    EXPECTED FREQUENCY OF PROGRAM:  5 x week    ESTIMATED LOS:  14-17 days    ESTIMATED DISPOSITION:  home with home Pt and OT    INTERDISCIPLINARY FUNCTIONAL OUTCOMES/GOALS:         Gait/Mobility: mod independent level surfaces, supervison/CG stairs       Transfers: mod independent       ADLs: mod independent       Functional Transfers: CG       Medication Management: independent       Communication: independent       Cognitive: independent       Nutrition: regular solid thin liquids       Bladder: continent       Bowel: continent
Nutrition Follow Up Note  Hospital Course   (Per Electronic Medical Record)    Source:  Patient [X]  Medical Record [X]      Diet:   Regular Diet (IDDSI Level 7) w/ Thin Liquids (IDDSI Level 0)  Tolerates Diet Consistency Well  No Chewing/Swallowing Difficulties  No Recent Nausea, Vomiting, Diarrhea or Constipation (as Per Patient)  Consumes % of Meals (as Per Documentation) - States Good PO Intake/Appetite (Per Patient)   on Ensure Plus High Protein 8oz PO Daily (Provides 350kcal & 20grams of Protein)   Patient Takes Nutrition Supplement Well  Education Provided on Proper Nutrition  Obtained Food Preferences from Patient    Enteral/Parenteral Nutrition: Not Applicable    Current Weight: 166.8lb on 11/8  Obtain New Weight  Obtain Weights Weekly     Pertinent Medications: MEDICATIONS  (STANDING):  acetaminophen     Tablet .. 975 milliGRAM(s) Oral every 8 hours  amLODIPine   Tablet 10 milliGRAM(s) Oral daily  gabapentin 200 milliGRAM(s) Oral every 8 hours  melatonin 3 milliGRAM(s) Oral at bedtime  pantoprazole    Tablet 40 milliGRAM(s) Oral before breakfast  polyethylene glycol 3350 17 Gram(s) Oral daily  rivaroxaban 20 milliGRAM(s) Oral daily  senna 2 Tablet(s) Oral at bedtime  tapentadol 100 milliGRAM(s) Oral every 6 hours    MEDICATIONS  (PRN):  albuterol    90 MICROgram(s) HFA Inhaler 2 Puff(s) Inhalation every 6 hours PRN Shortness of Breath and/or Wheezing  bisacodyl Suppository 10 milliGRAM(s) Rectal daily PRN Constipation  capsaicin 0.025% Cream 1 Application(s) Topical every 8 hours PRN neuropathic pain  diclofenac sodium 1% Gel 4 Gram(s) Topical three times a day PRN pain  methocarbamol 250 milliGRAM(s) Oral every 4 hours PRN Muscle Spasm  oxyCODONE    IR 2.5 milliGRAM(s) Oral every 6 hours PRN Moderate Pain (4 - 6)  oxyCODONE    IR 5 milliGRAM(s) Oral every 6 hours PRN Severe Pain (7 - 10)    Pertinent Labs:  11-14 Na139 mmol/L Glu 97 mg/dL K+ 4.4 mmol/L Cr  0.79 mg/dL BUN 21 mg/dL 11-14 Alb 3.0 g/dL<L>    Skin: No Pressure Ulcers  Surgical Incision on C-Spine  (as Per Nursing Flow Sheet)     Edema: None Noted (as Per Documentation)     Last Bowel Movement: on 11/13    Estimated Needs:   [X] No Change Since Previous Assessment    Previous Nutrition Diagnosis:   Severe Malnutrition     Nutrition Diagnosis is [X] Ongoing - Continues on Nutrition Supplement & Patient Takes Nutrition Supplement Well    New Nutrition Diagnosis: [X] Not Applicable    Interventions:   1. Education Provided on Proper Nutrition   2. Recommend Continue Nutrition Plan of Care     Monitoring & Evaluation:   [X] Weights   [X] PO Intake   [X] Skin Integrity   [X] Follow Up (Per Protocol)  [X] Tolerance to Diet Prescription   [X] Other: Labs     Registered Dietitian/Nutritionist Remains Available.  Yunior Mckeon RDN    Phone# [phone]

## 2022-11-21 NOTE — PROGRESS NOTE ADULT - SUBJECTIVE AND OBJECTIVE BOX
Patient is a 62y old  Male who presents with a chief complaint of Spinal cord compression s/p C2-T1 lami/fusion (17 Nov 2022 07:54)      SUBJECTIVE/ROS: Patient seen and examined. Continues to complain of neuropathic pain at LUE/burning. Will increase Neurontin. No other issues. Denies diarrhea/constipation. BM today. Denies dysuria. No complaints of shortness of breath. No fevers.      MEDICATIONS  (STANDING):  acetaminophen     Tablet .. 975 milliGRAM(s) Oral every 8 hours  amLODIPine   Tablet 10 milliGRAM(s) Oral daily  gabapentin 300 milliGRAM(s) Oral <User Schedule>  gabapentin 600 milliGRAM(s) Oral <User Schedule>  hydrOXYzine hydrochloride 25 milliGRAM(s) Oral once  melatonin 6 milliGRAM(s) Oral at bedtime  pantoprazole    Tablet 40 milliGRAM(s) Oral before breakfast  polyethylene glycol 3350 17 Gram(s) Oral daily  rivaroxaban 20 milliGRAM(s) Oral daily  senna 2 Tablet(s) Oral at bedtime  tapentadol 100 milliGRAM(s) Oral every 6 hours     MEDICATIONS  (PRN):  albuterol    90 MICROgram(s) HFA Inhaler 2 Puff(s) Inhalation every 6 hours PRN Shortness of Breath and/or Wheezing  bisacodyl Suppository 10 milliGRAM(s) Rectal daily PRN Constipation  capsaicin 0.025% Cream 1 Application(s) Topical every 8 hours PRN neuropathic pain  cyclobenzaprine 5 milliGRAM(s) Oral three times a day PRN Muscle Spasm  diclofenac sodium 1% Gel 4 Gram(s) Topical three times a day PRN pain  lactulose Syrup 20 Gram(s) Oral daily PRN constipation  oxyCODONE    IR 2.5 milliGRAM(s) Oral every 6 hours PRN Moderate Pain (4 - 6)  oxyCODONE    IR 5 milliGRAM(s) Oral every 6 hours PRN Severe Pain (7 - 10)      VITALS  Vital Signs Last 24 Hrs  T(C): 36.4 (21 Nov 2022 08:12), Max: 36.8 (20 Nov 2022 20:44)  T(F): 97.5 (21 Nov 2022 08:12), Max: 98.2 (20 Nov 2022 20:44)  HR: 81 (21 Nov 2022 08:12) (81 - 89)  BP: 130/79 (21 Nov 2022 08:12) (116/76 - 130/79)  BP(mean): --  RR: 16 (21 Nov 2022 08:12) (16 - 16)  SpO2: 96% (21 Nov 2022 08:12) (95% - 96%)    Parameters below as of 21 Nov 2022 08:12  Patient On (Oxygen Delivery Method): room air        RECENT LABS                        10.4   4.35  )-----------( 300      ( 21 Nov 2022 06:29 )             33.3     11-21    142  |  106  |  17  ----------------------------<  93  4.6   |  34<H>  |  0.78    Ca    9.4      21 Nov 2022 06:29    TPro  6.4  /  Alb  3.1<L>  /  TBili  0.2  /  DBili  x   /  AST  38  /  ALT  50<H>  /  AlkPhos  66  11-21      LIVER FUNCTIONS - ( 21 Nov 2022 06:29 )  Alb: 3.1 g/dL / Pro: 6.4 g/dL / ALK PHOS: 66 U/L / ALT: 50 U/L / AST: 38 U/L / GGT: x                 PHYSICAL EXAM:     · Constitutional Comments	alert O x 3. comfortable.  · Respiratory	clear to auscultation bilaterally; no wheezes; no rales; no rhonchi  · Cardiovascular	regular rate and rhythm; S1 S2 present; no gallops; no rub; no murmur  · Gastrointestinal	soft; nontender; nondistended; normal active bowel sounds  · Motor	no left knee effusion no warmth or joint line TTP  PROM left knee flexion  to 120 improving but less than R knee  L quad 5-/5 ham 4+/5  ankle eversion 4-/5 tight heel cords  bilateral calves soft no TTP  LUE shoulder ROM limited , painful past 90deg  4/5 throughout LUE       IDT ROUNDS:  IDT 11/17  EDOD: 11/23 home  SW: family training to be set up   OT: setup for eating/grooming. Karol UBD, CS/CG LBD and rest of ADLs  PT: amb at CG level with RW  ST: n/a       Continue comprehensive acute rehab program consisting of 3hrs/day of OT/PT.

## 2022-11-21 NOTE — PROGRESS NOTE ADULT - NS ATTEND AMEND GEN_ALL_CORE FT
Rehab Attending- Patient seen and examined by me - Case discussed, above note reviewed by me with modifications made    pain improved with oxycodone- continues to report numbness/ spasms LUE/RLE  will increase gabapentin dosing  incision inspected- intact- dressing to neck DCd  labs reviewed- stable  to continue intensive rehab program

## 2022-11-22 PROCEDURE — 99232 SBSQ HOSP IP/OBS MODERATE 35: CPT

## 2022-11-22 PROCEDURE — 99232 SBSQ HOSP IP/OBS MODERATE 35: CPT | Mod: GC

## 2022-11-22 RX ORDER — GABAPENTIN 400 MG/1
1 CAPSULE ORAL
Qty: 120 | Refills: 0
Start: 2022-11-22 | End: 2022-12-21

## 2022-11-22 RX ORDER — TAPENTADOL HYDROCHLORIDE 50 MG/1
100 TABLET, FILM COATED ORAL EVERY 6 HOURS
Refills: 0 | Status: DISCONTINUED | OUTPATIENT
Start: 2022-11-23 | End: 2022-11-23

## 2022-11-22 RX ADMIN — TAPENTADOL HYDROCHLORIDE 100 MILLIGRAM(S): 50 TABLET, FILM COATED ORAL at 06:07

## 2022-11-22 RX ADMIN — TAPENTADOL HYDROCHLORIDE 100 MILLIGRAM(S): 50 TABLET, FILM COATED ORAL at 00:05

## 2022-11-22 RX ADMIN — Medication 975 MILLIGRAM(S): at 21:31

## 2022-11-22 RX ADMIN — Medication 1 APPLICATION(S): at 11:03

## 2022-11-22 RX ADMIN — RIVAROXABAN 20 MILLIGRAM(S): KIT at 11:06

## 2022-11-22 RX ADMIN — PANTOPRAZOLE SODIUM 40 MILLIGRAM(S): 20 TABLET, DELAYED RELEASE ORAL at 06:07

## 2022-11-22 RX ADMIN — OXYCODONE HYDROCHLORIDE 5 MILLIGRAM(S): 5 TABLET ORAL at 14:06

## 2022-11-22 RX ADMIN — TAPENTADOL HYDROCHLORIDE 100 MILLIGRAM(S): 50 TABLET, FILM COATED ORAL at 23:22

## 2022-11-22 RX ADMIN — Medication 975 MILLIGRAM(S): at 13:02

## 2022-11-22 RX ADMIN — OXYCODONE HYDROCHLORIDE 5 MILLIGRAM(S): 5 TABLET ORAL at 15:06

## 2022-11-22 RX ADMIN — GABAPENTIN 400 MILLIGRAM(S): 400 CAPSULE ORAL at 06:07

## 2022-11-22 RX ADMIN — GABAPENTIN 800 MILLIGRAM(S): 400 CAPSULE ORAL at 21:31

## 2022-11-22 RX ADMIN — Medication 975 MILLIGRAM(S): at 23:52

## 2022-11-22 RX ADMIN — Medication 6 MILLIGRAM(S): at 21:30

## 2022-11-22 RX ADMIN — TAPENTADOL HYDROCHLORIDE 100 MILLIGRAM(S): 50 TABLET, FILM COATED ORAL at 11:06

## 2022-11-22 RX ADMIN — Medication 975 MILLIGRAM(S): at 06:07

## 2022-11-22 RX ADMIN — GABAPENTIN 400 MILLIGRAM(S): 400 CAPSULE ORAL at 13:02

## 2022-11-22 RX ADMIN — TAPENTADOL HYDROCHLORIDE 100 MILLIGRAM(S): 50 TABLET, FILM COATED ORAL at 23:52

## 2022-11-22 RX ADMIN — TAPENTADOL HYDROCHLORIDE 100 MILLIGRAM(S): 50 TABLET, FILM COATED ORAL at 12:06

## 2022-11-22 RX ADMIN — TAPENTADOL HYDROCHLORIDE 100 MILLIGRAM(S): 50 TABLET, FILM COATED ORAL at 17:34

## 2022-11-22 RX ADMIN — Medication 975 MILLIGRAM(S): at 14:02

## 2022-11-22 RX ADMIN — AMLODIPINE BESYLATE 10 MILLIGRAM(S): 2.5 TABLET ORAL at 06:07

## 2022-11-22 RX ADMIN — TAPENTADOL HYDROCHLORIDE 100 MILLIGRAM(S): 50 TABLET, FILM COATED ORAL at 18:05

## 2022-11-22 NOTE — PROGRESS NOTE ADULT - SUBJECTIVE AND OBJECTIVE BOX
Patient is a 62y old  Male who presents with a chief complaint of Spinal cord compression s/p C2-T1 lami/fusion (22 Nov 2022 11:46)            SUBJECTIVE/ROS: Patient seen and examined during PT. No acute overnight events. He feels well and has no complaints. Discussed discharge tomorrow for which patient feels ready. Denies pain/shortness of breath/constipation/diarrhea/dysuria.       MEDICATIONS:  MEDICATIONS  (STANDING):  acetaminophen     Tablet .. 975 milliGRAM(s) Oral every 8 hours  amLODIPine   Tablet 10 milliGRAM(s) Oral daily  gabapentin 400 milliGRAM(s) Oral <User Schedule>  gabapentin 800 milliGRAM(s) Oral at bedtime  hydrOXYzine hydrochloride 25 milliGRAM(s) Oral once  melatonin 6 milliGRAM(s) Oral at bedtime  pantoprazole    Tablet 40 milliGRAM(s) Oral before breakfast  polyethylene glycol 3350 17 Gram(s) Oral daily  rivaroxaban 20 milliGRAM(s) Oral daily  senna 2 Tablet(s) Oral at bedtime  tapentadol 100 milliGRAM(s) Oral every 6 hours    MEDICATIONS  (PRN):  albuterol    90 MICROgram(s) HFA Inhaler 2 Puff(s) Inhalation every 6 hours PRN Shortness of Breath and/or Wheezing  bisacodyl Suppository 10 milliGRAM(s) Rectal daily PRN Constipation  capsaicin 0.025% Cream 1 Application(s) Topical every 8 hours PRN neuropathic pain  cyclobenzaprine 10 milliGRAM(s) Oral three times a day PRN Muscle Spasm  diclofenac sodium 1% Gel 4 Gram(s) Topical three times a day PRN pain  lactulose Syrup 20 Gram(s) Oral daily PRN constipation  oxyCODONE    IR 2.5 milliGRAM(s) Oral every 6 hours PRN Moderate Pain (4 - 6)  oxyCODONE    IR 5 milliGRAM(s) Oral every 6 hours PRN Severe Pain (7 - 10)      RECENT LABS:                        10.4   4.35  )-----------( 300      ( 21 Nov 2022 06:29 )             33.3     11-21    142  |  106  |  17  ----------------------------<  93  4.6   |  34<H>  |  0.78    Ca    9.4      21 Nov 2022 06:29    TPro  6.4  /  Alb  3.1<L>  /  TBili  0.2  /  DBili  x   /  AST  38  /  ALT  50<H>  /  AlkPhos  66  11-21    LIVER FUNCTIONS - ( 21 Nov 2022 06:29 )  Alb: 3.1 g/dL / Pro: 6.4 g/dL / ALK PHOS: 66 U/L / ALT: 50 U/L / AST: 38 U/L / GGT: x                 CAPILLARY BLOOD GLUCOSE          VITALS  62y  Vital Signs Last 24 Hrs  T(C): 36.6 (22 Nov 2022 08:11), Max: 36.6 (22 Nov 2022 08:11)  T(F): 97.8 (22 Nov 2022 08:11), Max: 97.8 (22 Nov 2022 08:11)  HR: 80 (22 Nov 2022 08:11) (80 - 95)  BP: 133/70 (22 Nov 2022 08:11) (117/74 - 142/91)  BP(mean): --  RR: 16 (22 Nov 2022 08:11) (16 - 17)  SpO2: 96% (22 Nov 2022 08:11) (95% - 96%)    Parameters below as of 22 Nov 2022 08:11  Patient On (Oxygen Delivery Method): room air      Daily     Daily     PHYSICAL EXAM:         IDT ROUNDS:             Patient is a 62y old  Male who presents with a chief complaint of Spinal cord compression s/p C2-T1 lami/fusion (22 Nov 2022 11:46)    SUBJECTIVE/ROS: Patient seen and examined during PT. No acute overnight events. He feels well and has no complaints. Discussed discharge tomorrow for which patient feels ready. Tolerating increased dose of gabapentin. Denies shortness of breath/constipation/diarrhea/dysuria.       MEDICATIONS:  MEDICATIONS  (STANDING):  acetaminophen     Tablet .. 975 milliGRAM(s) Oral every 8 hours  amLODIPine   Tablet 10 milliGRAM(s) Oral daily  gabapentin 400 milliGRAM(s) Oral <User Schedule>  gabapentin 800 milliGRAM(s) Oral at bedtime  hydrOXYzine hydrochloride 25 milliGRAM(s) Oral once  melatonin 6 milliGRAM(s) Oral at bedtime  pantoprazole    Tablet 40 milliGRAM(s) Oral before breakfast  polyethylene glycol 3350 17 Gram(s) Oral daily  rivaroxaban 20 milliGRAM(s) Oral daily  senna 2 Tablet(s) Oral at bedtime  tapentadol 100 milliGRAM(s) Oral every 6 hours    MEDICATIONS  (PRN):  albuterol    90 MICROgram(s) HFA Inhaler 2 Puff(s) Inhalation every 6 hours PRN Shortness of Breath and/or Wheezing  bisacodyl Suppository 10 milliGRAM(s) Rectal daily PRN Constipation  capsaicin 0.025% Cream 1 Application(s) Topical every 8 hours PRN neuropathic pain  cyclobenzaprine 10 milliGRAM(s) Oral three times a day PRN Muscle Spasm  diclofenac sodium 1% Gel 4 Gram(s) Topical three times a day PRN pain  lactulose Syrup 20 Gram(s) Oral daily PRN constipation  oxyCODONE    IR 2.5 milliGRAM(s) Oral every 6 hours PRN Moderate Pain (4 - 6)  oxyCODONE    IR 5 milliGRAM(s) Oral every 6 hours PRN Severe Pain (7 - 10)      RECENT LABS:                        10.4   4.35  )-----------( 300      ( 21 Nov 2022 06:29 )             33.3     11-21    142  |  106  |  17  ----------------------------<  93  4.6   |  34<H>  |  0.78    Ca    9.4      21 Nov 2022 06:29    TPro  6.4  /  Alb  3.1<L>  /  TBili  0.2  /  DBili  x   /  AST  38  /  ALT  50<H>  /  AlkPhos  66  11-21    LIVER FUNCTIONS - ( 21 Nov 2022 06:29 )  Alb: 3.1 g/dL / Pro: 6.4 g/dL / ALK PHOS: 66 U/L / ALT: 50 U/L / AST: 38 U/L / GGT: x                 CAPILLARY BLOOD GLUCOSE          VITALS  62y  Vital Signs Last 24 Hrs  T(C): 36.6 (22 Nov 2022 08:11), Max: 36.6 (22 Nov 2022 08:11)  T(F): 97.8 (22 Nov 2022 08:11), Max: 97.8 (22 Nov 2022 08:11)  HR: 80 (22 Nov 2022 08:11) (80 - 95)  BP: 133/70 (22 Nov 2022 08:11) (117/74 - 142/91)  BP(mean): --  RR: 16 (22 Nov 2022 08:11) (16 - 17)  SpO2: 96% (22 Nov 2022 08:11) (95% - 96%)    Parameters below as of 22 Nov 2022 08:11  Patient On (Oxygen Delivery Method): room air      Daily     Daily     PHYSICAL EXAM:     · Constitutional Comments	alert O x 3. comfortable.  · Respiratory	clear to auscultation bilaterally; no wheezes; no rales; no rhonchi  · Cardiovascular	regular rate and rhythm; S1 S2 present; no gallops; no rub; no murmur  · Gastrointestinal	soft; nontender; nondistended; normal active bowel sounds  · Motor	no left knee effusion no warmth or joint line TTP  L quad 5-/5 ham 4+/5  ankle eversion 4-/5 tight heel cords  bilateral calves soft no TTP  LUE shoulder ROM limited , painful past 90deg  4/5 throughout LUE         IDT ROUNDS:    IDT 11/17  EDOD: 11/23 home  SW: family training to be set up   OT: setup for eating/grooming. Karol UBD, CS/CG LBD and rest of ADLs  PT: amb at CG level with RW  ST: n/a

## 2022-11-22 NOTE — PROGRESS NOTE ADULT - ASSESSMENT
61yo male with hx of asthma and HTN, presented to [facility] for acute rehab from [facility] where he was transferred to from [facility] with acute complaints of numbness and swelling of his left arm for the past month and weakness of his right leg, noted to have diffuse swelling of the cervical cord with long segment central cord edema extending from C2 to T1 with moderate spinal canal stenosis and mild cord compression at C3-C4 and C4-C5. Pt s/p C2-T1 laminectomy/fusion with C3-6 laminectomies with Dr. Lopez on 10/24. Postop with B/L PE and left knee pain    #Spinal cord compression  #Central cord syndrome  #Neuropathic Pain  -S/p C2-T1 laminectomy/fusion with C3-6 laminectomies with Dr. Lopez on 10/24  -Cervical collar when OOB  -Continue PT/OT  -Continue Gabapentin. adjust dose per primary team  - comprehensive rehab  - fall precaution    #B/L Pulmonary Emboli  -Continue Xarelto. Treatment for 3-6months  - f/u PCP    #HTN  -Continue Amlodipine w/ holding parameters    #Asthma  -Albuterol INH PRN    #Left knee pain  -S/p aspiration, fluid assessment reviewed. Negative for gout or infection  -Empirically treated with Steroid  -Encourage PT/OT    # DVT ppx  -Xarelto  # GI: PPI    stable medically

## 2022-11-22 NOTE — PROGRESS NOTE ADULT - SUBJECTIVE AND OBJECTIVE BOX
Medicine Progress Note    Patient is a 62y old  Male who presents with a chief complaint of Spinal cord compression s/p C2-T1 lami/fusion (21 Nov 2022 10:59)      SUBJECTIVE / OVERNIGHT EVENTS:  LUE weakness, stiffness and numbness much better. now able feel the sensation   b/l LE muscle spasm but  improving. knee pain is improving    ADDITIONAL REVIEW OF SYSTEMS:  no fever/chills/CP/sob/palpitation/dizziness/ abd pain/nausea/vomiting/diarrhea/constipation/headaches. all other ROS neg    MEDICATIONS  (STANDING):  acetaminophen     Tablet .. 975 milliGRAM(s) Oral every 8 hours  amLODIPine   Tablet 10 milliGRAM(s) Oral daily  gabapentin 400 milliGRAM(s) Oral <User Schedule>  gabapentin 800 milliGRAM(s) Oral at bedtime  hydrOXYzine hydrochloride 25 milliGRAM(s) Oral once  melatonin 6 milliGRAM(s) Oral at bedtime  pantoprazole    Tablet 40 milliGRAM(s) Oral before breakfast  polyethylene glycol 3350 17 Gram(s) Oral daily  rivaroxaban 20 milliGRAM(s) Oral daily  senna 2 Tablet(s) Oral at bedtime  tapentadol 100 milliGRAM(s) Oral every 6 hours    MEDICATIONS  (PRN):  albuterol    90 MICROgram(s) HFA Inhaler 2 Puff(s) Inhalation every 6 hours PRN Shortness of Breath and/or Wheezing  bisacodyl Suppository 10 milliGRAM(s) Rectal daily PRN Constipation  capsaicin 0.025% Cream 1 Application(s) Topical every 8 hours PRN neuropathic pain  cyclobenzaprine 10 milliGRAM(s) Oral three times a day PRN Muscle Spasm  diclofenac sodium 1% Gel 4 Gram(s) Topical three times a day PRN pain  lactulose Syrup 20 Gram(s) Oral daily PRN constipation  oxyCODONE    IR 2.5 milliGRAM(s) Oral every 6 hours PRN Moderate Pain (4 - 6)  oxyCODONE    IR 5 milliGRAM(s) Oral every 6 hours PRN Severe Pain (7 - 10)    CAPILLARY BLOOD GLUCOSE        I&O's Summary      PHYSICAL EXAM:  Vital Signs Last 24 Hrs  T(C): 36.6 (22 Nov 2022 08:11), Max: 36.6 (22 Nov 2022 08:11)  T(F): 97.8 (22 Nov 2022 08:11), Max: 97.8 (22 Nov 2022 08:11)  HR: 80 (22 Nov 2022 08:11) (80 - 95)  BP: 133/70 (22 Nov 2022 08:11) (117/74 - 142/91)  BP(mean): --  RR: 16 (22 Nov 2022 08:11) (16 - 17)  SpO2: 96% (22 Nov 2022 08:11) (95% - 96%)    Parameters below as of 22 Nov 2022 08:11  Patient On (Oxygen Delivery Method): room air    GENERAL: Not in distress. Alert    HEENT: clear conjuctiva, MMM. no pallor or icterus  CARDIOVASCULAR: RRR S1, S2. No murmur/rubs/gallop  LUNGS: BLAE+, no rales, no wheezing, no rhonchi.    ABDOMEN: ND. Soft,  NT, no guarding / rebound / rigidity. BS normoactive  BACK: No spine tenderness.  EXTREMITIES: no edema. no leg or calf TP. stiffness on left shoulder with reduced ROM. mild medial joint TP left knee. no erythema or swelling of any joints  SKIN: warm and dry. cervical dressing clean.   PSYCHIATRIC: Calm.  No agitation.  CNS: AAO*3. LUE weakness and reduced sensation    LABS:                        10.4   4.35  )-----------( 300      ( 21 Nov 2022 06:29 )             33.3     11-21    142  |  106  |  17  ----------------------------<  93  4.6   |  34<H>  |  0.78    Ca    9.4      21 Nov 2022 06:29    TPro  6.4  /  Alb  3.1<L>  /  TBili  0.2  /  DBili  x   /  AST  38  /  ALT  50<H>  /  AlkPhos  66  11-21              COVID-19 PCR: NotDetec (09 Nov 2022 06:00)  COVID-19 PCR: NotDetec (06 Nov 2022 06:57)  COVID-19 PCR: NotDetec (03 Nov 2022 02:28)  COVID-19 PCR: NotDetec (24 Oct 2022 09:00)      RADIOLOGY & ADDITIONAL TESTS:  Imaging from Last 24 Hours:    Electrocardiogram/QTc Interval:    COORDINATION OF CARE:  Care Discussed with Consultants/Other Providers:

## 2022-11-22 NOTE — PROGRESS NOTE ADULT - ASSESSMENT
63 YO male with PMH of asthma and HTN who was transferred from [facility]. Patient presented to [facility] ED on 10/23 and Orthopedics was consulted for cord compression with neurological symptoms. Patient  underwent a C2-T1 laminectomy/fusion with C3-6 laminectomies with Dr. Lopez on 10/24. post -op complicated by PE.     # Spinal cord compression/central cord syndrome  -  s/p C2-T1 laminectomy/fusion with C3-6 laminectomies with Dr. Lopez on 10/24  - wound care: DSD and tegaderm daily. Dr Lopez contacted- sutures removed-healing well  - Comprehensive Rehab Program: PT/OT, 3hours daily and 5 days weekly   - aspen cervical collar when OOB  - neuropsychology supportive counseling, rec therapy  - Precautions: spinal, respiratory, cardiac, sensory, fall, AC    # HTN  - Off amlodipine  - BP well-controlled.       # PE  - TTE No evidence of RV strain  - Xarelto 20mg daily  - HR and O2 sats controlled      # Asthma  - Albuterol INH PRN    # Pseudo Gout  - aspirate from the Lt knee with no suspicion for infectious arthritis  - s/p prednisone 20 BID for 5 days    # Pain management  - Tylenol 975mg q8  - low dose oxycodone prn breakthrough  -  nucynta 100 q6 11/9 - continue same dose for now, managed outpt by Dr Vuaghn Fraser  - on flexeril PRN   - neurontin increased  400/400/800 for LUE burning pain  - voltaren gel to L knee    #Sleep  -melatonin 6mg qhs    # GI ppx  -  Protonix 40mg  - Senna, miralax, dulcolax supp PRN- lactulose PRN    # FEN   - Diet: Regular- no egg    # DVT ppx  - on xarelto    CODE STATUS: FULL CODE    Outpatient Follow-up (Specialty/Name of physician):    Ami Lopez (DO)  Orthopaedic Surgery Surgery  [address]  [place] [address]  Phone: [phone]  Fax: [phone]   61 YO male with PMH of asthma and HTN who was transferred from [facility]. Patient presented to [facility] ED on 10/23 and Orthopedics was consulted for cord compression with neurological symptoms. Patient  underwent a C2-T1 laminectomy/fusion with C3-6 laminectomies with Dr. Lopez on 10/24. post -op complicated by PE.     # Spinal cord compression/central cord syndrome  -  s/p C2-T1 laminectomy/fusion with C3-6 laminectomies with Dr. Lopez on 10/24  - wound care: DSD and tegaderm daily. Dr Lopez contacted- sutures removed-healing well  - Comprehensive Rehab Program: PT/OT, 3hours daily and 5 days weekly   - aspen cervical collar when OOB  - neuropsychology supportive counseling, rec therapy  - Precautions: spinal, respiratory, cardiac, sensory, fall, AC  ANTICIPATE DC IN AM     # HTN  - On amlodipine  - BP well-controlled.       # PE  - TTE No evidence of RV strain  - Xarelto 20mg daily  - HR and O2 sats controlled      # Asthma  - Albuterol INH PRN    # Pseudo Gout  - aspirate from the Lt knee with no suspicion for infectious arthritis  - s/p prednisone 20 BID for 5 days  Left knee pain resolved/ROM better    # Pain management  - Tylenol 975mg q8  - low dose oxycodone prn breakthrough  -  nucynta 100 q6 11/9 - continue same dose for now, managed outpt by Dr Vaughn Fraser  - on flexeril PRN   - neurontin increased  400/400/800 for LUE burning pain- pain better controlled      #Sleep  -melatonin 6mg qhs    # GI ppx  -  Protonix 40mg  - Senna, miralax, dulcolax supp PRN- lactulose PRN  +BM    # FEN   - Diet: Regular- no egg    # DVT ppx  - on xarelto    CODE STATUS: FULL CODE    Outpatient Follow-up (Specialty/Name of physician):    Ami Lopez (DO)  Orthopaedic Surgery Surgery  [address], [address]  [place] [address]  Phone: [phone]  Fax: [phone]

## 2022-11-22 NOTE — PROGRESS NOTE ADULT - ATTENDING COMMENTS
Rehab Attending- Patient seen and examined by me  Case discussed, above note reviewed by me with modifications made    Andree reportedly better overnight on increased gabapentin and flexeril  sutures neck Dc- incision clean dry intact  labs reviewed by me - nathan  discussed in team conference- tentative Dc 11/23  family training to be arranged by soc service  to continue intensive rehab program
Rehab Attending- Patient seen and examined by me - Case discussed, above note reviewed by me with modifications made    doing well  ready for Dc in AM  pain controlled on current regimen  VN services then transition to OPD PT/OT at [facility] in [place] once [employer] obtained  to continue intensive rehab program
Rehab Attending- Patient seen and examined by me - Case discussed, above note reviewed by me with modifications made    reports dysesthesias Right LE at night- cannot have sheets contact right leg  doesn't experience this during the day  voiding, moved bowels 11/13  Left knee ROM 0-120- still with some intraarticular pain  Left shoulder Frozen MS 4/5  numbness/ dysesthesias distally RLE- MS however 5/5  labs reviewed by me- stable  will change Dc to 11/22 for additional rehab  to continue intensive rehab program

## 2022-11-23 ENCOUNTER — TRANSCRIPTION ENCOUNTER (OUTPATIENT)
Age: 62
End: 2022-11-23

## 2022-11-23 VITALS
HEART RATE: 85 BPM | TEMPERATURE: 98 F | DIASTOLIC BLOOD PRESSURE: 83 MMHG | SYSTOLIC BLOOD PRESSURE: 142 MMHG | OXYGEN SATURATION: 96 % | RESPIRATION RATE: 16 BRPM

## 2022-11-23 DIAGNOSIS — G95.9 DISEASE OF SPINAL CORD, UNSPECIFIED: ICD-10-CM

## 2022-11-23 PROCEDURE — 97110 THERAPEUTIC EXERCISES: CPT

## 2022-11-23 PROCEDURE — 90686 IIV4 VACC NO PRSV 0.5 ML IM: CPT

## 2022-11-23 PROCEDURE — 97116 GAIT TRAINING THERAPY: CPT

## 2022-11-23 PROCEDURE — 99232 SBSQ HOSP IP/OBS MODERATE 35: CPT

## 2022-11-23 PROCEDURE — 86803 HEPATITIS C AB TEST: CPT

## 2022-11-23 PROCEDURE — 97112 NEUROMUSCULAR REEDUCATION: CPT

## 2022-11-23 PROCEDURE — 36415 COLL VENOUS BLD VENIPUNCTURE: CPT

## 2022-11-23 PROCEDURE — 97535 SELF CARE MNGMENT TRAINING: CPT

## 2022-11-23 PROCEDURE — 99238 HOSP IP/OBS DSCHRG MGMT 30/<: CPT | Mod: GC

## 2022-11-23 PROCEDURE — 97140 MANUAL THERAPY 1/> REGIONS: CPT

## 2022-11-23 PROCEDURE — U0003: CPT

## 2022-11-23 PROCEDURE — 97167 OT EVAL HIGH COMPLEX 60 MIN: CPT

## 2022-11-23 PROCEDURE — 85027 COMPLETE CBC AUTOMATED: CPT

## 2022-11-23 PROCEDURE — 97542 WHEELCHAIR MNGMENT TRAINING: CPT

## 2022-11-23 PROCEDURE — 85025 COMPLETE CBC W/AUTO DIFF WBC: CPT

## 2022-11-23 PROCEDURE — 97163 PT EVAL HIGH COMPLEX 45 MIN: CPT

## 2022-11-23 PROCEDURE — 80053 COMPREHEN METABOLIC PANEL: CPT

## 2022-11-23 PROCEDURE — U0005: CPT

## 2022-11-23 PROCEDURE — 97530 THERAPEUTIC ACTIVITIES: CPT

## 2022-11-23 RX ORDER — OXYCODONE HYDROCHLORIDE 5 MG/1
1 TABLET ORAL
Qty: 28 | Refills: 0
Start: 2022-11-23 | End: 2022-11-29

## 2022-11-23 RX ORDER — TAPENTADOL HYDROCHLORIDE 50 MG/1
1 TABLET, FILM COATED ORAL
Qty: 0 | Refills: 0 | DISCHARGE
Start: 2022-11-23

## 2022-11-23 RX ORDER — INFLUENZA VIRUS VACCINE 15; 15; 15; 15 UG/.5ML; UG/.5ML; UG/.5ML; UG/.5ML
0.5 SUSPENSION INTRAMUSCULAR ONCE
Refills: 0 | Status: COMPLETED | OUTPATIENT
Start: 2022-11-23 | End: 2022-11-23

## 2022-11-23 RX ADMIN — TAPENTADOL HYDROCHLORIDE 100 MILLIGRAM(S): 50 TABLET, FILM COATED ORAL at 12:31

## 2022-11-23 RX ADMIN — AMLODIPINE BESYLATE 10 MILLIGRAM(S): 2.5 TABLET ORAL at 05:30

## 2022-11-23 RX ADMIN — Medication 975 MILLIGRAM(S): at 13:55

## 2022-11-23 RX ADMIN — TAPENTADOL HYDROCHLORIDE 100 MILLIGRAM(S): 50 TABLET, FILM COATED ORAL at 05:48

## 2022-11-23 RX ADMIN — GABAPENTIN 400 MILLIGRAM(S): 400 CAPSULE ORAL at 05:31

## 2022-11-23 RX ADMIN — RIVAROXABAN 20 MILLIGRAM(S): KIT at 11:04

## 2022-11-23 RX ADMIN — Medication 975 MILLIGRAM(S): at 14:25

## 2022-11-23 RX ADMIN — TAPENTADOL HYDROCHLORIDE 100 MILLIGRAM(S): 50 TABLET, FILM COATED ORAL at 05:32

## 2022-11-23 RX ADMIN — Medication 975 MILLIGRAM(S): at 05:31

## 2022-11-23 RX ADMIN — PANTOPRAZOLE SODIUM 40 MILLIGRAM(S): 20 TABLET, DELAYED RELEASE ORAL at 05:30

## 2022-11-23 RX ADMIN — GABAPENTIN 400 MILLIGRAM(S): 400 CAPSULE ORAL at 13:54

## 2022-11-23 RX ADMIN — TAPENTADOL HYDROCHLORIDE 100 MILLIGRAM(S): 50 TABLET, FILM COATED ORAL at 12:01

## 2022-11-23 RX ADMIN — INFLUENZA VIRUS VACCINE 0.5 MILLILITER(S): 15; 15; 15; 15 SUSPENSION INTRAMUSCULAR at 11:51

## 2022-11-23 RX ADMIN — Medication 975 MILLIGRAM(S): at 05:48

## 2022-11-23 NOTE — DISCHARGE NOTE NURSING/CASE MANAGEMENT/SOCIAL WORK - NSDCPEFALRISK_GEN_ALL_CORE
For information on Fall & Injury Prevention, visit: [email or web address] OR  [email or web address] OR  https://www.cdc.gov/steadi/patient.html

## 2022-11-23 NOTE — PROGRESS NOTE ADULT - REASON FOR ADMISSION
Spinal cord compression s/p C2-T1 lami/fusion

## 2022-11-23 NOTE — DISCHARGE NOTE NURSING/CASE MANAGEMENT/SOCIAL WORK - NSSCTYPOFSERV_GEN_ALL_CORE
Home visit physical therapy, occupational therapy, nursing. Note: If you do not hear from home care agency within 24-48 hours after discharge, please contact them at

## 2022-11-23 NOTE — PROGRESS NOTE ADULT - ASSESSMENT
63 YO male with PMH of asthma and HTN who was transferred from [facility]-. Patient presented to [facility] ED on 10/23 and Orthopedics was consulted for cord compression with neurological symptoms. Patient  underwent a C2-T1 laminectomy/fusion with C3-6 laminectomies with Dr. Lopez on 10/24. post -op complicated by PE.     # Spinal cord compression/central cord syndrome  -  s/p C2-T1 laminectomy/fusion with C3-6 laminectomies with Dr. Lopez on 10/24  - wound care: DSD and tegaderm daily. Dr Lopez contacted- sutures removed-healing well  - Comprehensive Rehab Program: PT/OT, 3hours daily and 5 days weekly   - aspen cervical collar when OOB  - neuropsychology supportive counseling, rec therapy  - Precautions: spinal, respiratory, cardiac, sensory, fall, AC  Dc to home with VN services then transition to OPD at [facility]  FU Neurosurgery DR Lopez 2 weeks  FU PMD- Dr jones- Continue Xarelto    # HTN  - On amlodipine  - BP well-controlled.       # PE  - TTE No evidence of RV strain  - Xarelto 20mg daily  - HR and O2 sats controlled  FU PMD/ DR jones regarding duration of Xarelto      # Asthma  - Albuterol INH PRN    # Pseudo Gout  - aspirate from the Lt knee with no suspicion for infectious arthritis  - s/p prednisone 20 BID for 5 days  Left knee pain resolved/ROM better    # Pain management  - Tylenol 975mg q8  - low dose oxycodone prn breakthrough  -  nucynta 100 q6 11/9 - continue same dose for now, managed outpt by Dr Vaughn Fraser  - on flexeril PRN   - neurontin increased  400/400/800 for LUE burning pain- pain better controlled      #Sleep  -melatonin 6mg qhs    # GI ppx  -  Protonix 40mg  - Senna, miralax, dulcolax supp PRN- lactulose PRN  +BM    # FEN   - Diet: Regular- no egg    # DVT ppx  - on xarelto    CODE STATUS: FULL CODE    Outpatient Follow-up (Specialty/Name of physician):    Ami Lopez (DO)  Orthopaedic Surgery Surgery  [address], [address]  [place] [address]  Phone: [phone]  Fax: [phone]   63 YO male with PMH of asthma and HTN who was transferred from [facility]-. Patient presented to [facility] ED on 10/23 and Orthopedics was consulted for cord compression with neurological symptoms. Patient  underwent a C2-T1 laminectomy/fusion with C3-6 laminectomies with Dr. Lopez on 10/24. post -op complicated by PE.     # Spinal cord compression/central cord syndrome  -  s/p C2-T1 laminectomy/fusion with C3-6 laminectomies with Dr. Lopez on 10/24  - wound care: DSD and tegaderm daily. Dr Lopez contacted- sutures removed-healing well  - Comprehensive Rehab Program: PT/OT, 3hours daily and 5 days weekly   - aspen cervical collar when OOB  - neuropsychology supportive counseling, rec therapy  - Precautions: spinal, respiratory, cardiac, sensory, fall, AC  Dc to home with VN services then transition to OPD at [facility]  FU Neurosurgery DR Lopez 2 weeks  FU PMD- Dr jones- Continue Xarelto  FU PMR DR Tran 4-6 weeks    # HTN  - On amlodipine  - BP well-controlled.       # PE  - TTE No evidence of RV strain  - Xarelto 20mg daily  - HR and O2 sats controlled  FU PMD/ DR jones regarding duration of Xarelto      # Asthma  - Albuterol INH PRN    # Pseudo Gout  - aspirate from the Lt knee with no suspicion for infectious arthritis  - s/p prednisone 20 BID for 5 days  Left knee pain resolved/ROM better    # Pain management  - Tylenol 975mg q8  - low dose oxycodone prn breakthrough  -  nucynta 100 q6 11/9 - continue same dose for now, managed outpt by Dr Vaughn Fraser  - on flexeril PRN   - neurontin increased  400/400/800 for LUE burning pain- pain better controlled      #Sleep  -melatonin 6mg qhs    # GI ppx  -  Protonix 40mg  - Senna, miralax, dulcolax supp PRN- lactulose PRN  +BM    # FEN   - Diet: Regular- no egg    # DVT ppx  - on xarelto    CODE STATUS: FULL CODE    Outpatient Follow-up (Specialty/Name of physician):    Ami Lopez (DO)  Orthopaedic Surgery Surgery  [address], [address]  [place] [address]  Phone: [phone]  Fax: [phone]

## 2022-11-23 NOTE — PROGRESS NOTE ADULT - SUBJECTIVE AND OBJECTIVE BOX
Patient is a 62y old  Male who presents with a chief complaint of Spinal cord compression s/p C2-T1 lami/fusion (22 Nov 2022 11:46)    SUBJECTIVE/ROS: Patient seen and examined in room. No acute overnight events. He feels well and has no complaints. ready for discharge today. Tolerating increased dose of gabapentin. Denies shortness of breath/constipation/diarrhea/dysuria.       MEDICATIONS  (STANDING):  acetaminophen     Tablet .. 975 milliGRAM(s) Oral every 8 hours  amLODIPine   Tablet 10 milliGRAM(s) Oral daily  gabapentin 400 milliGRAM(s) Oral <User Schedule>  gabapentin 800 milliGRAM(s) Oral at bedtime  hydrOXYzine hydrochloride 25 milliGRAM(s) Oral once  melatonin 6 milliGRAM(s) Oral at bedtime  pantoprazole    Tablet 40 milliGRAM(s) Oral before breakfast  polyethylene glycol 3350 17 Gram(s) Oral daily  rivaroxaban 20 milliGRAM(s) Oral daily  senna 2 Tablet(s) Oral at bedtime  tapentadol 100 milliGRAM(s) Oral every 6 hours    MEDICATIONS  (PRN):  albuterol    90 MICROgram(s) HFA Inhaler 2 Puff(s) Inhalation every 6 hours PRN Shortness of Breath and/or Wheezing  bisacodyl Suppository 10 milliGRAM(s) Rectal daily PRN Constipation  capsaicin 0.025% Cream 1 Application(s) Topical every 8 hours PRN neuropathic pain  cyclobenzaprine 10 milliGRAM(s) Oral three times a day PRN Muscle Spasm  diclofenac sodium 1% Gel 4 Gram(s) Topical three times a day PRN pain  lactulose Syrup 20 Gram(s) Oral daily PRN constipation  oxyCODONE    IR 2.5 milliGRAM(s) Oral every 6 hours PRN Moderate Pain (4 - 6)  oxyCODONE    IR 5 milliGRAM(s) Oral every 6 hours PRN Severe Pain (7 - 10)        RECENT LABS:                        10.4   4.35  )-----------( 300      ( 21 Nov 2022 06:29 )             33.3     11-21    142  |  106  |  17  ----------------------------<  93  4.6   |  34<H>  |  0.78    Ca    9.4      21 Nov 2022 06:29    TPro  6.4  /  Alb  3.1<L>  /  TBili  0.2  /  DBili  x   /  AST  38  /  ALT  50<H>  /  AlkPhos  66  11-21    LIVER FUNCTIONS - ( 21 Nov 2022 06:29 )  Alb: 3.1 g/dL / Pro: 6.4 g/dL / ALK PHOS: 66 U/L / ALT: 50 U/L / AST: 38 U/L / GGT: x                 CAPILLARY BLOOD GLUCOSE          VITALS  62y  Vital Signs Last 24 Hrs  T(C): 36.8 (23 Nov 2022 07:48), Max: 37 (22 Nov 2022 20:02)  T(F): 98.3 (23 Nov 2022 07:48), Max: 98.6 (22 Nov 2022 20:02)  HR: 85 (23 Nov 2022 07:48) (85 - 97)  BP: 142/83 (23 Nov 2022 07:48) (115/80 - 142/83)  BP(mean): --  RR: 16 (23 Nov 2022 07:48) (16 - 16)  SpO2: 96% (23 Nov 2022 07:48) (96% - 96%)    Parameters below as of 23 Nov 2022 07:48  Patient On (Oxygen Delivery Method): room air          PHYSICAL EXAM:     · Constitutional Comments	alert O x 3. comfortable.  · Respiratory	clear to auscultation bilaterally; no wheezes; no rales; no rhonchi  · Cardiovascular	regular rate and rhythm; S1 S2 present; no gallops; no rub; no murmur  · Gastrointestinal	soft; nontender; nondistended; normal active bowel sounds  · Motor	no left knee effusion no warmth or joint line TTP  L quad 5-/5 ham 4+/5  ankle eversion 4-/5 tight heel cords  bilateral calves soft no TTP  LUE shoulder ROM limited , painful past 90deg  4/5 throughout LUE         IDT ROUNDS:    IDT 11/17  EDOD: 11/23 home  SW: family training to be set up   OT: setup for eating/grooming. Karol UBD, CS/CG LBD and rest of ADLs  PT: amb at CG level with RW  ST: n/a

## 2022-11-23 NOTE — DISCHARGE NOTE NURSING/CASE MANAGEMENT/SOCIAL WORK - PATIENT PORTAL LINK FT
You can access the FollowMyHealth Patient Portal offered by [facility] by registering at the following website: [email or web address]. By joining [employer]’s FollowMyHealth portal, you will also be able to view your health information using other applications (apps) compatible with our system.

## 2022-11-23 NOTE — DISCHARGE NOTE NURSING/CASE MANAGEMENT/SOCIAL WORK - NSDCFUADDAPPT_GEN_ALL_CORE_FT
You are scheduled for outpatient therapy at [facility]:  [address] [address]    Physical Therapy: 12/5/2022 1:00PM  Occupational Therapy: 12/19/2022 11:00AM

## 2022-11-23 NOTE — PROGRESS NOTE ADULT - NUTRITIONAL ASSESSMENT
This patient has been assessed with a concern for Malnutrition and has been determined to have a diagnosis/diagnoses of Severe protein-calorie malnutrition.    This patient is being managed with:   Diet Regular-  No Egg  Supplement Feeding Modality:  Oral  Ensure Plus High Protein Cans or Servings Per Day:  1       Frequency:  Daily  Entered: Nov 9 2022  2:13PM    

## 2022-11-23 NOTE — PROGRESS NOTE ADULT - PROVIDER SPECIALTY LIST ADULT
Hospitalist
Neuropsychology
Physiatry
Hospitalist
Physiatry
Hospitalist
Physiatry
Rehab Medicine
Physiatry

## 2022-11-23 NOTE — PROGRESS NOTE ADULT - ASSESSMENT
63yo male with hx of asthma and HTN, presented to [facility] for acute rehab from [facility] where he was transferred to from [facility] with acute complaints of numbness and swelling of his left arm for the past month and weakness of his right leg, noted to have diffuse swelling of the cervical cord with long segment central cord edema extending from C2 to T1 with moderate spinal canal stenosis and mild cord compression at C3-C4 and C4-C5. Pt s/p C2-T1 laminectomy/fusion with C3-6 laminectomies with Dr. Lopez on 10/24. Postop with B/L PE and left knee pain    #Spinal cord compression  #Central cord syndrome  #Neuropathic Pain  -S/p C2-T1 laminectomy/fusion with C3-6 laminectomies with Dr. Lopez on 10/24  -Cervical collar when OOB  -Continue PT/OT  -Continue Gabapentin. adjust dose per primary team  - comprehensive rehab  - fall precaution    #B/L Pulmonary Emboli  -Continue Xarelto. Treatment for 3-6months  - f/u PCP    #HTN  -Continue Amlodipine w/ holding parameters    #Asthma  -Albuterol INH PRN    #Left knee pain  -S/p aspiration, fluid assessment reviewed. Negative for gout or infection  -Empirically treated with Steroid  -Encourage PT/OT    # DVT ppx  -Xarelto  # GI: PPI    TDD: today. stable medically. fall precautions advised    d/w rehab team

## 2022-11-23 NOTE — PROGRESS NOTE ADULT - SUBJECTIVE AND OBJECTIVE BOX
Medicine Progress Note    Patient is a 62y old  Male who presents with a chief complaint of Spinal cord compression s/p C2-T1 lami/fusion (21 Nov 2022 10:59)      SUBJECTIVE / OVERNIGHT EVENTS:  LUE weakness, stiffness and numbness much better. now able feel the sensation   b/l LE muscle spasm but  improving. knee pain is improving  no new symptoms  excited going home today    ADDITIONAL REVIEW OF SYSTEMS:  no fever/chills/CP/sob/palpitation/dizziness/ abd pain/nausea/vomiting/diarrhea/constipation/dysuria/headaches. all other ROS neg    MEDICATIONS  (STANDING):  acetaminophen     Tablet .. 975 milliGRAM(s) Oral every 8 hours  amLODIPine   Tablet 10 milliGRAM(s) Oral daily  gabapentin 400 milliGRAM(s) Oral <User Schedule>  gabapentin 800 milliGRAM(s) Oral at bedtime  hydrOXYzine hydrochloride 25 milliGRAM(s) Oral once  influenza   Vaccine 0.5 milliLiter(s) IntraMuscular once  melatonin 6 milliGRAM(s) Oral at bedtime  pantoprazole    Tablet 40 milliGRAM(s) Oral before breakfast  polyethylene glycol 3350 17 Gram(s) Oral daily  rivaroxaban 20 milliGRAM(s) Oral daily  senna 2 Tablet(s) Oral at bedtime  tapentadol 100 milliGRAM(s) Oral every 6 hours    MEDICATIONS  (PRN):  albuterol    90 MICROgram(s) HFA Inhaler 2 Puff(s) Inhalation every 6 hours PRN Shortness of Breath and/or Wheezing  bisacodyl Suppository 10 milliGRAM(s) Rectal daily PRN Constipation  capsaicin 0.025% Cream 1 Application(s) Topical every 8 hours PRN neuropathic pain  cyclobenzaprine 10 milliGRAM(s) Oral three times a day PRN Muscle Spasm  diclofenac sodium 1% Gel 4 Gram(s) Topical three times a day PRN pain  lactulose Syrup 20 Gram(s) Oral daily PRN constipation  oxyCODONE    IR 2.5 milliGRAM(s) Oral every 6 hours PRN Moderate Pain (4 - 6)  oxyCODONE    IR 5 milliGRAM(s) Oral every 6 hours PRN Severe Pain (7 - 10)    CAPILLARY BLOOD GLUCOSE        I&O's Summary      PHYSICAL EXAM:    Vital Signs Last 24 Hrs  T(C): 36.8 (23 Nov 2022 07:48), Max: 37 (22 Nov 2022 20:02)  T(F): 98.3 (23 Nov 2022 07:48), Max: 98.6 (22 Nov 2022 20:02)  HR: 85 (23 Nov 2022 07:48) (85 - 97)  BP: 142/83 (23 Nov 2022 07:48) (115/80 - 142/83)  BP(mean): --  RR: 16 (23 Nov 2022 07:48) (16 - 16)  SpO2: 96% (23 Nov 2022 07:48) (96% - 96%)    Parameters below as of 23 Nov 2022 07:48  Patient On (Oxygen Delivery Method): room air    GENERAL: Not in distress. Alert    HEENT: clear conjuctiva, MMM. no pallor or icterus  CARDIOVASCULAR: RRR S1, S2. No murmur/rubs/gallop  LUNGS: BLAE+, no rales, no wheezing, no rhonchi.    ABDOMEN: ND. Soft,  NT, no guarding / rebound / rigidity. BS normoactive  BACK: No spine tenderness.  EXTREMITIES: no edema. no leg or calf TP. stiffness on left shoulder with reduced ROM. mild medial joint TP left knee. no erythema or swelling of any joints  SKIN: warm and dry. cervical dressing clean.   PSYCHIATRIC: Calm.  No agitation.  CNS: AAO*3. LUE weakness. sensation intact    LABS:                        10.4   4.35  )-----------( 300      ( 21 Nov 2022 06:29 )             33.3     11-21    142  |  106  |  17  ----------------------------<  93  4.6   |  34<H>  |  0.78    Ca    9.4      21 Nov 2022 06:29    TPro  6.4  /  Alb  3.1<L>  /  TBili  0.2  /  DBili  x   /  AST  38  /  ALT  50<H>  /  AlkPhos  66  11-21              COVID-19 PCR: NotDetec (09 Nov 2022 06:00)  COVID-19 PCR: NotDetec (06 Nov 2022 06:57)  COVID-19 PCR: NotDetec (03 Nov 2022 02:28)  COVID-19 PCR: NotDetec (24 Oct 2022 09:00)      RADIOLOGY & ADDITIONAL TESTS:  Imaging from Last 24 Hours:    Electrocardiogram/QTc Interval:    COORDINATION OF CARE:  Care Discussed with Consultants/Other Providers:

## 2022-12-02 PROBLEM — I10 ESSENTIAL (PRIMARY) HYPERTENSION: Chronic | Status: ACTIVE | Noted: 2022-11-08

## 2022-12-21 ENCOUNTER — APPOINTMENT (OUTPATIENT)
Dept: PHYSICAL MEDICINE AND REHAB | Facility: CLINIC | Age: 62
End: 2022-12-21

## 2023-03-21 ENCOUNTER — APPOINTMENT (OUTPATIENT)
Dept: ORTHOPEDIC SURGERY | Facility: CLINIC | Age: 63
End: 2023-03-21
Payer: COMMERCIAL

## 2023-03-21 ENCOUNTER — NON-APPOINTMENT (OUTPATIENT)
Age: 63
End: 2023-03-21

## 2023-03-21 PROCEDURE — 73564 X-RAY EXAM KNEE 4 OR MORE: CPT | Mod: LT

## 2023-03-21 PROCEDURE — 99203 OFFICE O/P NEW LOW 30 MIN: CPT

## 2023-03-21 NOTE — DISCUSSION/SUMMARY
[de-identified] : Left knee osteoarthritis.  I discussed the treatment of degenerative arthritis with the patient at length today. I described the spectrum of treatment from nonoperative modalities to total joint arthroplasty. Noninvasive and nonoperative treatment modalities include weight reduction, activity modification with low impact exercise, PRN use of acetaminophen or anti-inflammatory medication if tolerated, glucosamine/chondroitin supplements, and physical therapy. Further treatments can include corticosteroid injection and the use of hyaluronic acid injections. Definitive treatment can certainly include total joint arthroplasty also. The risks and benefits of each treatment options was discussed and all questions were answered.\par \par His symptoms today are not coming from his knee.  He has diffuse bilateral lower extremity swelling particularly in the left leg.  He has 2+ pitting edema.  We will obtain an ultrasound to rule out DVT although ultrasound in November was negative.  Recommend vascular surgery evaluation.  Recommend follow-up with his PCP or other medical provider given his underlying medical issues.  The swelling in his leg which is his main complaint today is not an orthopedic issue and there is no need for orthopedic surgery follow-up.  All questions were answered

## 2023-03-21 NOTE — HISTORY OF PRESENT ILLNESS
[de-identified] : 61yo male presents complaining of left knee and left leg pain about 4 months.  He states this all started when he was admitted at [facility] due to a neck issue.  He underwent cervical spine surgery in November 2022.  During his time there he started to develop left knee and leg pain and swelling.  He states he underwent a left knee aspiration during his hospital stay which came back negative.  He continues to report increased leg swelling.  He states he is unsure if they did a leg ultrasound.  He was then discharged to a rehab in [place] due to his cervical spine surgery.  He was therefore a few weeks.  He was discharged there in December.  He is doing physical therapy currently outpatient.  He continues to have pain in his left knee and lower leg.  He states since his knee aspiration his knee pain has gotten worse.  He has pain with walking.  He has difficulty bending his knee.  He states he has issues with his foot and unable to lift his ankle up and down.  He reports decreased sensation in his leg.\par He states he placed on a 5-day course of p.o. antibiotics at some point which did not help\par \par The patient's past medical history, past surgical history, medications, allergies, and social history were reviewed by me today with the patient and documented accordingly. In addition, the patient's family history, which is noncontributory to this visit, was also reviewed.\par

## 2023-03-21 NOTE — PHYSICAL EXAM
[de-identified] : General Exam\par \par Well developed, well nourished\par No apparent distress\par Oriented to person, place, and time\par Mood: Normal\par Affect: Normal\par Balance and coordination: Normal\par Gait: Normal\par \par left knee/lower leg exam\par \par Skin: Clean, dry, intact\par Inspection: No obvious malalignment, no masses, + swelling, no effusion.  +erythema/lower leg edema.  2+ pitting edema\par Tenderness: + MJLT. + LJLT. No tenderness over the medial and lateral patella facets. No ttp medial/lateral epicondyle, patella tendon, tibial tubercle, pes anserinus, or proximal fibula.\par ROM: 10 to 100°  pain with deep flexion. \par Strength: 5/5 Q/H/TA/GS/EHL, no atrophy\par Neuro: In tact to light touch throughout in dp/sp/tib/andre/saph nerve districutions, DTR's normal\par Pulses: decreased DP/PT pulses.\par  [de-identified] : \par The following radiographs were ordered and read by me during this patients visit. I reviewed each radiograph in detail with the patient and discussed the findings as highlighted below. \par \par 4 views left knee were obtained today.  Severe osteoarthritis joint space narrowing osteophytes and sclerosis

## 2023-03-30 NOTE — ED PROVIDER NOTE - CHIEF COMPLAINT
Patient Instructions by Archie Morgan MD, [facility] at 05/09/18 02:46 PM     Author:  Archie Morgan MD, [facility] Service:  (none) Author Type:  Physician     Filed:  05/09/18 02:46 PM Encounter Date:  5/9/2018 Status:  Signed     :  Archie Morgan MD, [facility] (Physician)            1) Continue all current medications  2) check carotid US 10/2018 + VENUS  3) Exercise daily at least 20-30 minutes  4) The following lifestyle modifications are encouraged: keep on walking   5) Low-fat, low cholesterol diet less than 1500 mg sodium daily  6) Follow-up in 6 months with Cardiology APN (and Dr. Justice Conroy in 1 year) or sooner if needed. Electronically Signed by:    Archie Morgan MD, [facility] , 5/9/2018     Additional Educational Resources: For additional resources regarding your symptoms, diagnosis, or further health information, please visit the Health Resources section on [email or web address] or the Online Health Resources section in [employer].        Revision History        User Key Date/Time User Provider Type Action    > [N/A] 05/09/18 02:46 PM Archie Morgan MD, [facility] Physician Sign The patient is a 62y Male complaining of fall. No

## 2023-04-14 ENCOUNTER — APPOINTMENT (OUTPATIENT)
Dept: VASCULAR SURGERY | Facility: CLINIC | Age: 63
End: 2023-04-14
Payer: COMMERCIAL

## 2023-04-14 VITALS
OXYGEN SATURATION: 96 % | WEIGHT: 180 LBS | HEIGHT: 70.5 IN | HEART RATE: 108 BPM | SYSTOLIC BLOOD PRESSURE: 152 MMHG | TEMPERATURE: 98.1 F | DIASTOLIC BLOOD PRESSURE: 83 MMHG | BODY MASS INDEX: 25.48 KG/M2

## 2023-04-14 PROCEDURE — 99205 OFFICE O/P NEW HI 60 MIN: CPT

## 2023-04-14 NOTE — PHYSICAL EXAM
[JVD] : no jugular venous distention  [Normal Thyroid] : the thyroid was normal [Normal Breath Sounds] : Normal breath sounds [Left Carotid Bruit] : no bruit heard over the left carotid [2+] : left 2+ [Right Femoral Bruit] : no bruit heard over the right femoral artery [Left Femoral Bruit] : no bruit heard over the left femoral artery [1+] : left 1+ [Ankle Swelling (On Exam)] : present [Ankle Swelling On The Left] : of the left ankle [Ankle Swelling Bilaterally] : severe [Varicose Veins Of Lower Extremities] : not present [] : not present [de-identified] : Well-built well-nourished [de-identified] : Within normal limit [de-identified] : Tenderness in the

## 2023-04-14 NOTE — ASSESSMENT
[FreeTextEntry1] : In my assessment it appears that the patient has a claw hand on the left upper extremity which may be a sequelae of his cervical spine pathology.  The etiology of the left lower extremity swelling is unclear especially in the context of negative DVT.  However there is no evidence of a compartment syndrome and no evidence of any cellulitis or infection.

## 2023-04-14 NOTE — REVIEW OF SYSTEMS
[Lower Ext Edema] : lower extremity edema [Limb Weakness] : limb weakness [Difficulty Walking] : difficulty walking [As Noted in HPI] : as noted in HPI [Negative] : Psychiatric

## 2023-04-14 NOTE — DATA REVIEWED
[FreeTextEntry1] : The CT scan report is reviewed which showed extensive bilateral DVTs.  The venous Doppler was negative for DVT.

## 2023-04-14 NOTE — HISTORY OF PRESENT ILLNESS
[FreeTextEntry1] : This is a 62-year-old male who has been referred to the office by Dr. Hawk gore for evaluation of swelling of the left lower extremity.  According to the history obtained from the patient he had injured his neck and pulled the nerve in his neck and late 2022 and had undergone C-spine surgery at [facility] in [place] with Dr. Lopez.  The patient also had pain and swelling in the left knee and undergone aspiration of the left knee.  The patient claims that since the surgery and since the hospitalization he has a chronic problems with his left upper extremity left lower extremity and with significant pain and swelling in the left knee and left leg.\par The patient states that his left upper extremity also has a pain and stiffness and his left hand has a limited range of motion and not able to straighten out his fingers.  The left leg ankle and calf is significantly swollen.  The patient says that he had a blister on the dorsum of the left foot which healed.\par The patient was a male person and was able to ambulate without difficulty until he had this episode in September.  He was trying to work on the lawn more and trying to pull the cord to start the lawnmower and after of 1015 tries he suddenly experienced a pain in his neck and had gone to the hospital.\par The patient also had a history of pulmonary embolism in the hospital and has been on anticoagulation with Xarelto and is currently taking 20 mg daily.\par

## 2023-04-14 NOTE — CONSULT LETTER
[Dear  ___] : Dear  [unfilled], [Consult Letter:] : I had the pleasure of evaluating your patient, [unfilled]. [Please see my note below.] : Please see my note below. [Consult Closing:] : Thank you very much for allowing me to participate in the care of this patient.  If you have any questions, please do not hesitate to contact me. [Sincerely,] : Sincerely, [FreeTextEntry2] : Dr Vaughn anand--[phone] [FreeTextEntry3] : Yunior Chen MD\par vascular and wound  care surgeon\par [phone]\par

## 2023-04-20 ENCOUNTER — APPOINTMENT (OUTPATIENT)
Dept: ULTRASOUND IMAGING | Facility: HOSPITAL | Age: 63
End: 2023-04-20

## 2023-04-20 ENCOUNTER — OUTPATIENT (OUTPATIENT)
Dept: OUTPATIENT SERVICES | Facility: HOSPITAL | Age: 63
LOS: 1 days | Discharge: HOME | End: 2023-04-20
Payer: COMMERCIAL

## 2023-04-20 DIAGNOSIS — I87.2 VENOUS INSUFFICIENCY (CHRONIC) (PERIPHERAL): ICD-10-CM

## 2023-04-20 DIAGNOSIS — M79.605 PAIN IN LEFT LEG: ICD-10-CM

## 2023-04-20 DIAGNOSIS — I73.9 PERIPHERAL VASCULAR DISEASE, UNSPECIFIED: ICD-10-CM

## 2023-04-20 DIAGNOSIS — R60.9 EDEMA, UNSPECIFIED: ICD-10-CM

## 2023-04-20 DIAGNOSIS — M79.604 PAIN IN RIGHT LEG: ICD-10-CM

## 2023-04-20 PROCEDURE — 93925 LOWER EXTREMITY STUDY: CPT | Mod: 26

## 2023-04-20 PROCEDURE — 93970 EXTREMITY STUDY: CPT | Mod: 26

## 2023-04-28 ENCOUNTER — APPOINTMENT (OUTPATIENT)
Dept: VASCULAR SURGERY | Facility: CLINIC | Age: 63
End: 2023-04-28
Payer: COMMERCIAL

## 2023-04-28 VITALS
HEART RATE: 94 BPM | SYSTOLIC BLOOD PRESSURE: 178 MMHG | HEIGHT: 70.5 IN | OXYGEN SATURATION: 95 % | TEMPERATURE: 98.1 F | WEIGHT: 180 LBS | DIASTOLIC BLOOD PRESSURE: 108 MMHG | BODY MASS INDEX: 25.48 KG/M2

## 2023-04-28 PROCEDURE — 99213 OFFICE O/P EST LOW 20 MIN: CPT

## 2023-04-28 NOTE — REVIEW OF SYSTEMS
[Lower Ext Edema] : lower extremity edema [Arthralgias] : arthralgias [Joint Swelling] : joint swelling [Joint Stiffness] : joint stiffness [Limb Swelling] : limb swelling [As Noted in HPI] : as noted in HPI

## 2023-04-28 NOTE — PHYSICAL EXAM
[1+] : left 1+ [Ankle Swelling (On Exam)] : present [Ankle Swelling On The Left] : of the left ankle [Ankle Swelling Bilaterally] : severe [Varicose Veins Of Lower Extremities] : not present [] : not present

## 2023-04-28 NOTE — HISTORY OF PRESENT ILLNESS
[FreeTextEntry1] : The patient has come for the follow-up of leg pains swelling.  Since the last office visit the patient had undergone a venous Doppler of both lower extremities which was negative for DVT and patient also underwent arterial duplex of both lower extremities which showed no evidence of hemodynamically significant stenosis.\par The patient also has ongoing complaints of pain in the left knee inability to fully flex or extend the left knee as well as in the left hand with the flexor deformity.  The patient does state that with leg elevation the swelling feels slightly improved and the leg feels less tight.  Patient only has symptoms related to the left lower extremity and does not have much pain or discomfort in the right leg.

## 2023-04-28 NOTE — ASSESSMENT
[FreeTextEntry1] : Patient with swelling in the left lower extremity with normal venous Doppler and normal arterial circulation.\par The etiology of patient's edema of the lower extremities is uncertain at this time but appear more due to lymphedema at this stage and also due to fluid retention.  There is no DVT.  There is no evidence of cellulitis.\par There is restricted motion in the left knee and difficulty of walking may be contributing to the edema as well.\par

## 2023-04-28 NOTE — DATA REVIEWED
[FreeTextEntry1] : The venous duplex examination of lower extremities reviewed and discussed with the patient it is negative for any blood clots in the legs.\par The arterial duplex examination of both lower extremities also reviewed and shows no evidence of hemodynamic significant stenosis in either extremity.

## 2023-06-09 ENCOUNTER — APPOINTMENT (OUTPATIENT)
Dept: VASCULAR SURGERY | Facility: CLINIC | Age: 63
End: 2023-06-09
Payer: COMMERCIAL

## 2023-06-09 VITALS
HEART RATE: 82 BPM | OXYGEN SATURATION: 96 % | DIASTOLIC BLOOD PRESSURE: 95 MMHG | HEIGHT: 70.5 IN | WEIGHT: 180 LBS | TEMPERATURE: 98 F | BODY MASS INDEX: 25.48 KG/M2 | SYSTOLIC BLOOD PRESSURE: 156 MMHG

## 2023-06-09 DIAGNOSIS — M25.562 PAIN IN LEFT KNEE: ICD-10-CM

## 2023-06-09 DIAGNOSIS — M79.89 OTHER SPECIFIED SOFT TISSUE DISORDERS: ICD-10-CM

## 2023-06-09 DIAGNOSIS — M79.604 PAIN IN RIGHT LEG: ICD-10-CM

## 2023-06-09 DIAGNOSIS — G89.29 PAIN IN LEFT KNEE: ICD-10-CM

## 2023-06-09 PROCEDURE — 99214 OFFICE O/P EST MOD 30 MIN: CPT

## 2023-06-09 NOTE — DATA REVIEWED
[FreeTextEntry1] : The venous Doppler and arterial Doppler reports are again reviewed which shows no evidence of DVT and no evidence of significant peripheral arterial disease and this reports are also discussed with the patient.

## 2023-06-09 NOTE — ASSESSMENT
[FreeTextEntry1] : Patient with a complicated hospital course with a significant swelling of the left lower extremity post hospitalization without any evidence of DVT or arterial insufficiency.\par The patient may have element of her lymphedema of the left lower extremity

## 2023-06-14 NOTE — DIETITIAN NUTRITION RISK NOTIFICATION - TREATMENT: THE FOLLOWING DIET HAS BEEN RECOMMENDED
Detail Level: Generalized Detail Level: Zone Diet, Regular:   No Egg  Supplement Feeding Modality:  Oral  Ensure Plus High Protein Cans or Servings Per Day:  1       Frequency:  Daily (11-09-22 @ 14:14) [Active]       Detail Level: Detailed

## 2023-06-16 NOTE — PHYSICAL THERAPY INITIAL EVALUATION ADULT - CRITERIA FOR SKILLED THERAPEUTIC INTERVENTIONS
Spoke with wife & Nely Duval, impairments found/rehab potential/therapy frequency/predicted duration of therapy intervention/anticipated discharge recommendation

## 2023-07-28 ENCOUNTER — EMERGENCY (EMERGENCY)
Facility: HOSPITAL | Age: 63
LOS: 1 days | Discharge: HOME | End: 2023-07-28
Attending: STUDENT IN AN ORGANIZED HEALTH CARE EDUCATION/TRAINING PROGRAM
Payer: COMMERCIAL

## 2023-07-28 VITALS
WEIGHT: 179.9 LBS | SYSTOLIC BLOOD PRESSURE: 155 MMHG | HEIGHT: 70 IN | TEMPERATURE: 98 F | DIASTOLIC BLOOD PRESSURE: 88 MMHG | HEART RATE: 106 BPM | OXYGEN SATURATION: 98 % | RESPIRATION RATE: 22 BRPM

## 2023-07-28 DIAGNOSIS — J45.909 UNSPECIFIED ASTHMA, UNCOMPLICATED: ICD-10-CM

## 2023-07-28 DIAGNOSIS — Z98.890 OTHER SPECIFIED POSTPROCEDURAL STATES: ICD-10-CM

## 2023-07-28 DIAGNOSIS — Z87.891 PERSONAL HISTORY OF NICOTINE DEPENDENCE: ICD-10-CM

## 2023-07-28 DIAGNOSIS — R07.9 CHEST PAIN, UNSPECIFIED: ICD-10-CM

## 2023-07-28 DIAGNOSIS — R10.13 EPIGASTRIC PAIN: ICD-10-CM

## 2023-07-28 DIAGNOSIS — Z86.711 PERSONAL HISTORY OF PULMONARY EMBOLISM: ICD-10-CM

## 2023-07-28 DIAGNOSIS — R07.89 OTHER CHEST PAIN: ICD-10-CM

## 2023-07-28 DIAGNOSIS — R06.02 SHORTNESS OF BREATH: ICD-10-CM

## 2023-07-28 DIAGNOSIS — I10 ESSENTIAL (PRIMARY) HYPERTENSION: ICD-10-CM

## 2023-07-28 DIAGNOSIS — Z91.010 ALLERGY TO PEANUTS: ICD-10-CM

## 2023-07-28 DIAGNOSIS — M62.838 OTHER MUSCLE SPASM: ICD-10-CM

## 2023-07-28 LAB
ALBUMIN SERPL ELPH-MCNC: 4.4 G/DL — SIGNIFICANT CHANGE UP (ref 3.3–5)
ALP SERPL-CCNC: 53 U/L — SIGNIFICANT CHANGE UP (ref 40–120)
ALT FLD-CCNC: 29 U/L — SIGNIFICANT CHANGE UP (ref 12–78)
ANION GAP SERPL CALC-SCNC: 9 MMOL/L — SIGNIFICANT CHANGE UP (ref 5–17)
APTT BLD: 31.6 SEC — SIGNIFICANT CHANGE UP (ref 24.5–35.6)
AST SERPL-CCNC: 27 U/L — SIGNIFICANT CHANGE UP (ref 15–37)
BASOPHILS # BLD AUTO: 0.06 K/UL — SIGNIFICANT CHANGE UP (ref 0–0.2)
BASOPHILS NFR BLD AUTO: 1.3 % — SIGNIFICANT CHANGE UP (ref 0–2)
BILIRUB SERPL-MCNC: 0.5 MG/DL — SIGNIFICANT CHANGE UP (ref 0.2–1.2)
BUN SERPL-MCNC: 11 MG/DL — SIGNIFICANT CHANGE UP (ref 7–23)
CALCIUM SERPL-MCNC: 9.6 MG/DL — SIGNIFICANT CHANGE UP (ref 8.5–10.1)
CHLORIDE SERPL-SCNC: 110 MMOL/L — HIGH (ref 96–108)
CO2 SERPL-SCNC: 25 MMOL/L — SIGNIFICANT CHANGE UP (ref 22–31)
CREAT SERPL-MCNC: 0.98 MG/DL — SIGNIFICANT CHANGE UP (ref 0.5–1.3)
EGFR: 87 ML/MIN/1.73M2 — SIGNIFICANT CHANGE UP
EOSINOPHIL # BLD AUTO: 0.02 K/UL — SIGNIFICANT CHANGE UP (ref 0–0.5)
EOSINOPHIL NFR BLD AUTO: 0.4 % — SIGNIFICANT CHANGE UP (ref 0–6)
GLUCOSE SERPL-MCNC: 124 MG/DL — HIGH (ref 70–99)
HCT VFR BLD CALC: 39.2 % — SIGNIFICANT CHANGE UP (ref 39–50)
HGB BLD-MCNC: 13.3 G/DL — SIGNIFICANT CHANGE UP (ref 13–17)
IMM GRANULOCYTES NFR BLD AUTO: 0.2 % — SIGNIFICANT CHANGE UP (ref 0–0.9)
INR BLD: 1.34 RATIO — HIGH (ref 0.85–1.18)
LIDOCAIN IGE QN: 101 U/L — SIGNIFICANT CHANGE UP (ref 73–393)
LYMPHOCYTES # BLD AUTO: 1.78 K/UL — SIGNIFICANT CHANGE UP (ref 1–3.3)
LYMPHOCYTES # BLD AUTO: 38.9 % — SIGNIFICANT CHANGE UP (ref 13–44)
MAGNESIUM SERPL-MCNC: 2.1 MG/DL — SIGNIFICANT CHANGE UP (ref 1.6–2.6)
MCHC RBC-ENTMCNC: 27.9 PG — SIGNIFICANT CHANGE UP (ref 27–34)
MCHC RBC-ENTMCNC: 33.9 G/DL — SIGNIFICANT CHANGE UP (ref 32–36)
MCV RBC AUTO: 82.2 FL — SIGNIFICANT CHANGE UP (ref 80–100)
MONOCYTES # BLD AUTO: 0.34 K/UL — SIGNIFICANT CHANGE UP (ref 0–0.9)
MONOCYTES NFR BLD AUTO: 7.4 % — SIGNIFICANT CHANGE UP (ref 2–14)
NEUTROPHILS # BLD AUTO: 2.36 K/UL — SIGNIFICANT CHANGE UP (ref 1.8–7.4)
NEUTROPHILS NFR BLD AUTO: 51.8 % — SIGNIFICANT CHANGE UP (ref 43–77)
NRBC # BLD: 0 /100 WBCS — SIGNIFICANT CHANGE UP (ref 0–0)
NT-PROBNP SERPL-SCNC: 263 PG/ML — HIGH (ref 0–125)
PLATELET # BLD AUTO: 305 K/UL — SIGNIFICANT CHANGE UP (ref 150–400)
POTASSIUM SERPL-MCNC: 3.8 MMOL/L — SIGNIFICANT CHANGE UP (ref 3.5–5.3)
POTASSIUM SERPL-SCNC: 3.8 MMOL/L — SIGNIFICANT CHANGE UP (ref 3.5–5.3)
PROT SERPL-MCNC: 7.7 GM/DL — SIGNIFICANT CHANGE UP (ref 6–8.3)
PROTHROM AB SERPL-ACNC: 15.8 SEC — HIGH (ref 9.5–13)
RBC # BLD: 4.77 M/UL — SIGNIFICANT CHANGE UP (ref 4.2–5.8)
RBC # FLD: 15.3 % — HIGH (ref 10.3–14.5)
SODIUM SERPL-SCNC: 144 MMOL/L — SIGNIFICANT CHANGE UP (ref 135–145)
TROPONIN I, HIGH SENSITIVITY RESULT: 12.2 NG/L — SIGNIFICANT CHANGE UP
TROPONIN I, HIGH SENSITIVITY RESULT: 12.4 NG/L — SIGNIFICANT CHANGE UP
WBC # BLD: 4.57 K/UL — SIGNIFICANT CHANGE UP (ref 3.8–10.5)
WBC # FLD AUTO: 4.57 K/UL — SIGNIFICANT CHANGE UP (ref 3.8–10.5)

## 2023-07-28 PROCEDURE — 72100 X-RAY EXAM L-S SPINE 2/3 VWS: CPT | Mod: 26

## 2023-07-28 PROCEDURE — 93010 ELECTROCARDIOGRAM REPORT: CPT | Mod: 76

## 2023-07-28 PROCEDURE — 99285 EMERGENCY DEPT VISIT HI MDM: CPT

## 2023-07-28 PROCEDURE — 72050 X-RAY EXAM NECK SPINE 4/5VWS: CPT | Mod: 26

## 2023-07-28 PROCEDURE — 72070 X-RAY EXAM THORAC SPINE 2VWS: CPT | Mod: 26

## 2023-07-28 PROCEDURE — 71275 CT ANGIOGRAPHY CHEST: CPT | Mod: 26,MA

## 2023-07-28 RX ORDER — ASPIRIN/CALCIUM CARB/MAGNESIUM 324 MG
162 TABLET ORAL ONCE
Refills: 0 | Status: COMPLETED | OUTPATIENT
Start: 2023-07-28 | End: 2023-07-28

## 2023-07-28 RX ORDER — ACETAMINOPHEN 500 MG
650 TABLET ORAL EVERY 6 HOURS
Refills: 0 | Status: DISCONTINUED | OUTPATIENT
Start: 2023-07-28 | End: 2023-07-29

## 2023-07-28 RX ORDER — LIDOCAINE 4 G/100G
1 CREAM TOPICAL ONCE
Refills: 0 | Status: COMPLETED | OUTPATIENT
Start: 2023-07-28 | End: 2023-07-28

## 2023-07-28 RX ORDER — KETOROLAC TROMETHAMINE 30 MG/ML
15 SYRINGE (ML) INJECTION ONCE
Refills: 0 | Status: DISCONTINUED | OUTPATIENT
Start: 2023-07-28 | End: 2023-07-28

## 2023-07-28 RX ORDER — GABAPENTIN 400 MG/1
400 CAPSULE ORAL THREE TIMES A DAY
Refills: 0 | Status: DISCONTINUED | OUTPATIENT
Start: 2023-07-28 | End: 2023-07-31

## 2023-07-28 RX ORDER — ACETAMINOPHEN 500 MG
975 TABLET ORAL ONCE
Refills: 0 | Status: COMPLETED | OUTPATIENT
Start: 2023-07-28 | End: 2023-07-28

## 2023-07-28 RX ORDER — PANTOPRAZOLE SODIUM 20 MG/1
40 TABLET, DELAYED RELEASE ORAL
Refills: 0 | Status: DISCONTINUED | OUTPATIENT
Start: 2023-07-28 | End: 2023-07-31

## 2023-07-28 RX ORDER — AMLODIPINE BESYLATE 2.5 MG/1
10 TABLET ORAL DAILY
Refills: 0 | Status: DISCONTINUED | OUTPATIENT
Start: 2023-07-28 | End: 2023-07-31

## 2023-07-28 RX ORDER — POLYETHYLENE GLYCOL 3350 17 G/17G
17 POWDER, FOR SOLUTION ORAL DAILY
Refills: 0 | Status: DISCONTINUED | OUTPATIENT
Start: 2023-07-28 | End: 2023-07-31

## 2023-07-28 RX ORDER — CYCLOBENZAPRINE HYDROCHLORIDE 10 MG/1
10 TABLET, FILM COATED ORAL THREE TIMES A DAY
Refills: 0 | Status: DISCONTINUED | OUTPATIENT
Start: 2023-07-28 | End: 2023-07-31

## 2023-07-28 RX ORDER — MORPHINE SULFATE 50 MG/1
2 CAPSULE, EXTENDED RELEASE ORAL ONCE
Refills: 0 | Status: DISCONTINUED | OUTPATIENT
Start: 2023-07-28 | End: 2023-07-28

## 2023-07-28 RX ORDER — RIVAROXABAN 15 MG-20MG
20 KIT ORAL DAILY
Refills: 0 | Status: DISCONTINUED | OUTPATIENT
Start: 2023-07-28 | End: 2023-07-31

## 2023-07-28 RX ORDER — MORPHINE SULFATE 50 MG/1
4 CAPSULE, EXTENDED RELEASE ORAL ONCE
Refills: 0 | Status: DISCONTINUED | OUTPATIENT
Start: 2023-07-28 | End: 2023-07-28

## 2023-07-28 RX ORDER — METHOCARBAMOL 500 MG/1
750 TABLET, FILM COATED ORAL ONCE
Refills: 0 | Status: COMPLETED | OUTPATIENT
Start: 2023-07-28 | End: 2023-07-28

## 2023-07-28 RX ORDER — OXYCODONE HYDROCHLORIDE 5 MG/1
5 TABLET ORAL EVERY 6 HOURS
Refills: 0 | Status: DISCONTINUED | OUTPATIENT
Start: 2023-07-28 | End: 2023-07-29

## 2023-07-28 RX ORDER — CYCLOBENZAPRINE HYDROCHLORIDE 10 MG/1
10 TABLET, FILM COATED ORAL THREE TIMES A DAY
Refills: 0 | Status: DISCONTINUED | OUTPATIENT
Start: 2023-07-28 | End: 2023-07-28

## 2023-07-28 RX ADMIN — MORPHINE SULFATE 4 MILLIGRAM(S): 50 CAPSULE, EXTENDED RELEASE ORAL at 15:23

## 2023-07-28 RX ADMIN — LIDOCAINE 1 PATCH: 4 CREAM TOPICAL at 14:52

## 2023-07-28 RX ADMIN — MORPHINE SULFATE 2 MILLIGRAM(S): 50 CAPSULE, EXTENDED RELEASE ORAL at 13:04

## 2023-07-28 RX ADMIN — Medication 30 MILLIGRAM(S): at 19:23

## 2023-07-28 RX ADMIN — MORPHINE SULFATE 4 MILLIGRAM(S): 50 CAPSULE, EXTENDED RELEASE ORAL at 14:53

## 2023-07-28 RX ADMIN — Medication 15 MILLIGRAM(S): at 20:47

## 2023-07-28 RX ADMIN — Medication 975 MILLIGRAM(S): at 12:06

## 2023-07-28 RX ADMIN — Medication 975 MILLIGRAM(S): at 12:36

## 2023-07-28 RX ADMIN — METHOCARBAMOL 750 MILLIGRAM(S): 500 TABLET, FILM COATED ORAL at 14:52

## 2023-07-28 RX ADMIN — Medication 162 MILLIGRAM(S): at 12:07

## 2023-07-28 NOTE — ED ADULT NURSE NOTE - BRADEN SCORE (IF 18 OR LESS ACTIVATE SKIN INJURY RISK INCREASED GUIDELINE), MLM
20
Chronic   - Patient takes gabapentin 400mg TID, Cyclobenzaprine 10mg TID  - Stopped taking tizanadine 2 weeks ago   - Continue inpatient  - I-STOP performed, see chart note

## 2023-07-28 NOTE — ED PROVIDER NOTE - PROGRESS NOTE DETAILS
Allred DO: d/w pts pmd Dr Fraser - pt with recurrent chest pain , initial plan was to dc with close outpt follow up,  will obs for serial cardiac enzymes,  Dr Broderick will assume care, pt described pain as left anterior chest tightness, squeezing but attributes it to stress

## 2023-07-28 NOTE — ED PROVIDER NOTE - OBJECTIVE STATEMENT
63M pmhx HTN, cervical laminectomy/fusion 10/2022, PE, asthma, presents with chest pain and shortness of breath and epigastric pain 63M pmhx HTN, cervical laminectomy/fusion 10/2022, PE, asthma, presents with chest pain and shortness of breath and epigastric pain, duration x 2 days, described as feeling short of breath with left anterior chest tightness, no clear provoking/ alleviating factors - states feels that he cannot get deep breath in. No clear exertional component. No fevers. Notes epigastric pain as spasms typically related to low back chronic pain . Denies numbness, weakness or  symptoms

## 2023-07-28 NOTE — ED ADULT TRIAGE NOTE - CHIEF COMPLAINT QUOTE
pt c/o chest pain, left arm pain, tightness and numbness for 2 days. pt also c/o shortness of breath and abdominal pain. history or back issues, recent back surgery with dr resendiz.

## 2023-07-28 NOTE — CHART NOTE - NSCHARTNOTEFT_GEN_A_CORE
Notified by RN stating that patient c/o chronic back pain and mentions that patient takes flexeril 10mg 3x a day PRN and oxycodone 5mg PO Q6 PRN at home. Outpatient meds reviewed.  Flexeril 10mg PO TID as needed for muscle spasms ordered.

## 2023-07-28 NOTE — ED PROVIDER NOTE - PHYSICAL EXAMINATION
Gen: AOx3, NAD  Head: NCAT  ENT: Airway patent, moist mucous membranes, nasal passageways clear, no pharyngeal erythema or exudates, uvula midline, no cervical lymphadenopathy  Cardiac: Normal rate, normal rhythm,   Respiratory: Lungs CTA B/L  Gastrointestinal: Abdomen soft, nontender, nondistended, no rebound, no guarding  MSK: No gross abnormalities, FROM of all four extremities, but + 4/5 L shoulder flexion strength and 3/5 L elbow flexion , b/l LE strength / sensation   HEME: Extremities warm, pulses intact and symmetrical in all four extremities  Skin: No rashes, no lesions  Neuro: No gross neurologic deficits, CN II-XII intact, no facial asymmetry, no dysarthria, no dysmetria, no drift, strength equal in all four extremities, no gait abnormality

## 2023-07-28 NOTE — CONSULT NOTE ADULT - SUBJECTIVE AND OBJECTIVE BOX
63M s/p C2-T1 lami/fusion with Dr. Lopez on 10/25/22 presents to the ED for R Arm pain radiating to his chest and abdominal spasms which he attributes to his lumbar stenosis with associated R Sciatica-like symptoms and R groin pain. According to the patient, his symptoms are not entirely new other than the worsening radiating arm pain to the chest. States that intial surgery was complicated with cardiac thrombi and on xarelto since. Ambulates with cane since surgery. ED work up for chest pain. Actively following Dr. Lopez for his lumbar radiculopathy, with plans for OR    Initial injury was while attempting to start his lawnmovwer and was treated for acute cord like symptoms. Even after his fusion has endorsed abdominal spasms for which he takes gabapentin 400 QID but the spasms have worsened no along with his groin pain. States that flexeril has not helped in the past. Takes nucynta and percocet for pain. Denies any F/C, history of cancer, or incontinence.     PE:  Vital Signs Last 24 Hrs  T(C): 36.6 (07-28-23 @ 15:41), Max: 36.9 (07-28-23 @ 14:39)  T(F): 97.8 (07-28-23 @ 15:41), Max: 98.4 (07-28-23 @ 14:39)  HR: 79 (07-28-23 @ 15:41) (79 - 106)  BP: 131/92 (07-28-23 @ 15:41) (131/92 - 155/88)  BP(mean): 117 (07-28-23 @ 15:41) (117 - 117)  RR: 18 (07-28-23 @ 15:41) (15 - 22)  SpO2: 98% (07-28-23 @ 15:41) (97% - 98%)    General: NAD, resting comforatbly in bed    LUE   Digits contracted but unchanged since surgery  Compartments soft and compressible  No TTP  Motor 3/5 throughout (consistent with post operative exams during admission)  SILT but 1/2 on C6  +Radial Pulse    RUE  NVI  compartments soft and compressible  Motor 5/5 throughout  SILT  +Radial Pulse     BLLE   Venous Stasis and pitting edema on LLE  NVI but SILT 1/2 on L5 of RLE   B/L + SLR but less on LLE    Motor:                   C5                C6              C7               C8           T1   R             5/5                5/5            5/5              5/5          5/5  L             3/5                3/5            3/5              3/5          3/5                    L2                  L3             L4              L5            S1  R            5/5                5/5             5/5            5/5          5/5  L             5/5                5/5            5/5            5/5          5/5    Sensory:            C5         C6         C7      C8       T1        (0=absent, 1=impaired, 2=normal, NT=not testable)  R         2            2           2        2         2  L          2            1           2        2         2               L2          L3         L4      L5       S1         (0=absent, 1=impaired, 2=normal, NT=not testable)  R         2            2            2        2        2  L          2            2           2        1         2    Negative Gilbert, Clonus and Babinski    A/P:   63M  s/p C2-T1 lami/fusion with Dr. Lopez on 10/25/22. Overall non concerning exam. Unsure if his abdominal symptoms is attributed to lumbar. Given patient with limited subjective sensory exam changes, no advanced imaging is needed at this time. Had an MRI at [facility] but unable to access, however patient provided Dr. Lopez with a copy during his follow up.  -PT/OT  -WBAT--continue use of case as needed.  -Pain Control as needed     Ortho Stable for discharge. No orthopaedic surgical intervention needed at this time. Patient to follow up with Dr. Lopez in one week.     Dr. Flores is aware and agrees with the above plan.

## 2023-07-28 NOTE — ED PROVIDER NOTE - CLINICAL SUMMARY MEDICAL DECISION MAKING FREE TEXT BOX
63M pmhx HTN, cervical laminectomy/fusion 10/2022, PE, asthma, presents with chest pain and shortness of breath and epigastric pain, duration x 2 days, described as feeling short of breath with left anterior chest tightness, no clear provoking/ alleviating factors - states feels that he cannot get deep breath in. No clear exertional component. No fevers. Notes epigastric pain as spasms typically related to low back chronic pain . Denies numbness, weakness or  symptoms    rule out PE w/ CTA chest   labs to eval for anemia/ metabolic derangements, serial cardiac enzymes eval for ACS, repeat ekg prn Chest pain, empiric asa, ortho c/s for chronic neck/ back pain

## 2023-07-28 NOTE — ED ADULT NURSE NOTE - OBJECTIVE STATEMENT
Pt is a 63y M AOx4 with a pmh of HTN, cervical laminectomy/fusion 10/2022. Pt reports intermittent sob, chest pain, left arm  tightness and numbness for 2 days. Pt also reports spasms in the lower back that radiates to the abdomen. Pt denies n/v/d, dizziness, or lightheadedness.

## 2023-07-28 NOTE — ED ADULT TRIAGE NOTE - BP NONINVASIVE DIASTOLIC (MM HG)
88 Heme/Onc Internal Medicine Internal Medicine Gastroenterology Internal Medicine Internal Medicine

## 2023-07-28 NOTE — ED ADULT NURSE NOTE - NSFALLRISKINTERV_ED_ALL_ED

## 2023-07-29 PROCEDURE — 72125 CT NECK SPINE W/O DYE: CPT | Mod: 26,MA

## 2023-07-29 PROCEDURE — 72131 CT LUMBAR SPINE W/O DYE: CPT | Mod: 26,MA

## 2023-07-29 RX ORDER — ACETAMINOPHEN 500 MG
650 TABLET ORAL EVERY 6 HOURS
Refills: 0 | Status: DISCONTINUED | OUTPATIENT
Start: 2023-07-29 | End: 2023-07-31

## 2023-07-29 RX ORDER — OXYCODONE HYDROCHLORIDE 5 MG/1
10 TABLET ORAL EVERY 4 HOURS
Refills: 0 | Status: DISCONTINUED | OUTPATIENT
Start: 2023-07-29 | End: 2023-07-31

## 2023-07-29 RX ADMIN — GABAPENTIN 400 MILLIGRAM(S): 400 CAPSULE ORAL at 21:23

## 2023-07-29 RX ADMIN — RIVAROXABAN 20 MILLIGRAM(S): KIT at 11:07

## 2023-07-29 RX ADMIN — PANTOPRAZOLE SODIUM 40 MILLIGRAM(S): 20 TABLET, DELAYED RELEASE ORAL at 06:02

## 2023-07-29 RX ADMIN — AMLODIPINE BESYLATE 10 MILLIGRAM(S): 2.5 TABLET ORAL at 06:01

## 2023-07-29 RX ADMIN — OXYCODONE HYDROCHLORIDE 5 MILLIGRAM(S): 5 TABLET ORAL at 07:39

## 2023-07-29 RX ADMIN — OXYCODONE HYDROCHLORIDE 5 MILLIGRAM(S): 5 TABLET ORAL at 08:27

## 2023-07-29 RX ADMIN — GABAPENTIN 400 MILLIGRAM(S): 400 CAPSULE ORAL at 06:02

## 2023-07-29 RX ADMIN — OXYCODONE HYDROCHLORIDE 10 MILLIGRAM(S): 5 TABLET ORAL at 19:47

## 2023-07-29 RX ADMIN — OXYCODONE HYDROCHLORIDE 10 MILLIGRAM(S): 5 TABLET ORAL at 20:30

## 2023-07-29 RX ADMIN — CYCLOBENZAPRINE HYDROCHLORIDE 10 MILLIGRAM(S): 10 TABLET, FILM COATED ORAL at 21:31

## 2023-07-29 RX ADMIN — GABAPENTIN 400 MILLIGRAM(S): 400 CAPSULE ORAL at 13:20

## 2023-07-29 RX ADMIN — CYCLOBENZAPRINE HYDROCHLORIDE 10 MILLIGRAM(S): 10 TABLET, FILM COATED ORAL at 21:30

## 2023-07-29 RX ADMIN — CYCLOBENZAPRINE HYDROCHLORIDE 10 MILLIGRAM(S): 10 TABLET, FILM COATED ORAL at 11:07

## 2023-07-29 NOTE — PROGRESS NOTE ADULT - SUBJECTIVE AND OBJECTIVE BOX
62 yo man with severe back and cervical issues now presents with spasm and pain.    Vital Signs Last 24 Hrs  T(C): 37 (29 Jul 2023 11:00), Max: 37.1 (28 Jul 2023 21:54)  T(F): 98.6 (29 Jul 2023 11:00), Max: 98.7 (28 Jul 2023 21:54)  HR: 86 (29 Jul 2023 11:00) (60 - 87)  BP: 138/80 (29 Jul 2023 04:32) (118/75 - 161/94)  BP(mean): 93 (28 Jul 2023 19:17) (93 - 117)  RR: 18 (29 Jul 2023 11:00) (15 - 18)  SpO2: 98% (29 Jul 2023 11:00) (96% - 99%)    Parameters below as of 29 Jul 2023 11:00  Patient On (Oxygen Delivery Method): room air    MEDICATIONS  (STANDING):  amLODIPine   Tablet 10 milliGRAM(s) Oral daily  gabapentin 400 milliGRAM(s) Oral three times a day  pantoprazole    Tablet 40 milliGRAM(s) Oral before breakfast  polyethylene glycol 3350 17 Gram(s) Oral daily  rivaroxaban 20 milliGRAM(s) Oral daily    MEDICATIONS  (PRN):  acetaminophen     Tablet .. 650 milliGRAM(s) Oral every 6 hours PRN Moderate Pain (4 - 6)  cyclobenzaprine 10 milliGRAM(s) Oral three times a day PRN Muscle Spasm  oxyCODONE    IR 5 milliGRAM(s) Oral every 6 hours PRN Severe Pain (7 - 10)

## 2023-07-30 RX ADMIN — GABAPENTIN 400 MILLIGRAM(S): 400 CAPSULE ORAL at 14:00

## 2023-07-30 RX ADMIN — CYCLOBENZAPRINE HYDROCHLORIDE 10 MILLIGRAM(S): 10 TABLET, FILM COATED ORAL at 06:34

## 2023-07-30 RX ADMIN — OXYCODONE HYDROCHLORIDE 10 MILLIGRAM(S): 5 TABLET ORAL at 06:34

## 2023-07-30 RX ADMIN — AMLODIPINE BESYLATE 10 MILLIGRAM(S): 2.5 TABLET ORAL at 06:33

## 2023-07-30 RX ADMIN — GABAPENTIN 400 MILLIGRAM(S): 400 CAPSULE ORAL at 21:52

## 2023-07-30 RX ADMIN — POLYETHYLENE GLYCOL 3350 17 GRAM(S): 17 POWDER, FOR SOLUTION ORAL at 11:24

## 2023-07-30 RX ADMIN — OXYCODONE HYDROCHLORIDE 10 MILLIGRAM(S): 5 TABLET ORAL at 07:10

## 2023-07-30 RX ADMIN — GABAPENTIN 400 MILLIGRAM(S): 400 CAPSULE ORAL at 06:33

## 2023-07-30 RX ADMIN — RIVAROXABAN 20 MILLIGRAM(S): KIT at 11:24

## 2023-07-30 RX ADMIN — PANTOPRAZOLE SODIUM 40 MILLIGRAM(S): 20 TABLET, DELAYED RELEASE ORAL at 06:34

## 2023-07-30 NOTE — PROGRESS NOTE ADULT - PROBLEM SELECTOR PROBLEM 1
"Colon cancer screening saves lives, so I've entered an order for you to have a colonoscopy.    Before you can have your colonoscopy, you must schedule a telephone appointment for Pre-Admit Testing ("P.A.T."). During the P.A.T. phone appointment, one of our endoscopy nurses will review your medical history with you and tell you if any additional steps need to be taken to help your test go smoothly. The nurse will give you instructions on preparing for the colonoscopy and let you know what to expect. They will also answer any questions you may have about the test.    Someone from [facility] should be calling you soon to help schedule your P.A.T. phone appointment. If you haven't been contacted within the next 3 business days, you can schedule your P.A.T. phone appointment from your [facility] account or by calling our appointment desk at [phone].    You can learn more about cancer screening by colonoscopy at: [email or web address]       I recommend that you get the new bi-valent COVID-19 vaccine booster that protects against the Omicron sub-variants of COVID-19.    People who are fully vaccinated are much better protected from severe illness (hospitalization and death) from COVID-19 than people who are unvaccinated or not fully vaccinated.    You can learn more about the COVID-19 vaccine and booster shot options at [email or web address].    The quickest and easiest way to schedule an appointment for your COVID-19 booster vaccination is by using [facility] or by calling [phone].    Please take care of yourself. You are important to me.   "
Acute chest pain
Acute chest pain

## 2023-07-30 NOTE — PROGRESS NOTE ADULT - SUBJECTIVE AND OBJECTIVE BOX
64 yo man is stable better pain control. Asleep    Vital Signs Last 24 Hrs  T(C): 36.5 (30 Jul 2023 11:07), Max: 36.9 (29 Jul 2023 15:26)  T(F): 97.7 (30 Jul 2023 11:07), Max: 98.5 (29 Jul 2023 15:26)  HR: 85 (30 Jul 2023 11:07) (62 - 85)  BP: 119/78 (30 Jul 2023 11:07) (114/73 - 143/90)  BP(mean): --  RR: 18 (30 Jul 2023 11:07) (18 - 18)  SpO2: 93% (30 Jul 2023 11:07) (93% - 99%)    Parameters below as of 30 Jul 2023 11:07  Patient On (Oxygen Delivery Method): room air    MEDICATIONS  (STANDING):  amLODIPine   Tablet 10 milliGRAM(s) Oral daily  gabapentin 400 milliGRAM(s) Oral three times a day  pantoprazole    Tablet 40 milliGRAM(s) Oral before breakfast  polyethylene glycol 3350 17 Gram(s) Oral daily  rivaroxaban 20 milliGRAM(s) Oral daily    MEDICATIONS  (PRN):  acetaminophen     Tablet .. 650 milliGRAM(s) Oral every 6 hours PRN Mild Pain (1 - 3)  cyclobenzaprine 10 milliGRAM(s) Oral three times a day PRN Muscle Spasm  oxyCODONE    IR 10 milliGRAM(s) Oral every 4 hours PRN Moderate Pain (4 - 6)

## 2023-07-30 NOTE — PROGRESS NOTE ADULT - ASSESSMENT
62 yo man with pain and spasm due to spinal issues. Increase Oxy
62 yo man with atypical CP. DC for AM?

## 2023-07-30 NOTE — PROGRESS NOTE ADULT - SUBJECTIVE AND OBJECTIVE BOX
Patient seen and examined at bedside. No acute complaints at this time. Pain well controlled. Denies new weakness, numbness or tingling. Denies chest pain, shortness of breath, nausea or vomiting.       LABS:                        13.3   4.57  )-----------( 305      ( 28 Jul 2023 11:47 )             39.2     07-28    144  |  110<H>  |  11  ----------------------------<  124<H>  3.8   |  25  |  0.98    Ca    9.6      28 Jul 2023 11:47  Mg     2.1     07-28    TPro  7.7  /  Alb  4.4  /  TBili  0.5  /  DBili  x   /  AST  27  /  ALT  29  /  AlkPhos  53  07-28    PT/INR - ( 28 Jul 2023 11:47 )   PT: 15.8 sec;   INR: 1.34 ratio         PTT - ( 28 Jul 2023 11:47 )  PTT:31.6 sec  Urinalysis Basic - ( 28 Jul 2023 11:47 )    Color: x / Appearance: x / SG: x / pH: x  Gluc: 124 mg/dL / Ketone: x  / Bili: x / Urobili: x   Blood: x / Protein: x / Nitrite: x   Leuk Esterase: x / RBC: x / WBC x   Sq Epi: x / Non Sq Epi: x / Bacteria: x        VITAL SIGNS:  T(C): 36.8 (07-30-23 @ 04:15), Max: 37 (07-29-23 @ 11:00)  HR: 65 (07-30-23 @ 04:15) (62 - 86)  BP: 114/73 (07-30-23 @ 04:15) (114/73 - 143/90)  RR: 18 (07-30-23 @ 04:15) (18 - 18)  SpO2: 99% (07-30-23 @ 04:15) (97% - 99%)      PE:    General: NAD, resting comfortably in bed  2+ radial pulses  2+ DP Pulses    Motor:                   C5                C6              C7               C8           T1   R             5/5                5/5            5/5              5/5          5/5  L             3/5                3/5            3/5              3/5          3/5                    L2                  L3             L4              L5            S1  R            5/5                5/5             5/5            5/5          5/5  L             5/5                5/5            5/5            5/5          5/5    Sensory:            C5         C6         C7      C8       T1        (0=absent, 1=impaired, 2=normal, NT=not testable)  R         2            2           2        2         2  L          2            1           2        2         2               L2          L3         L4      L5       S1         (0=absent, 1=impaired, 2=normal, NT=not testable)  R         2            2            2        2        2  L          2            2           2        1         2      CT Csp: hardware intact  CT Lsp: mild-mod canal stenosis L2-4, moderate canal stenosis L4-5      A/P:  63M bilateral right upper and lower extremity radicular pain    -pt requires MRI C/Tspine however he is refusing transfer to [place] to obtain one since he cannot tolerate the closed tube and states he can only do open MRI  -plan for possible lumbar surgery during this admission  -please document medical optimization  -PT/OT  -WBAT with assistive devices as needed.  -DVT ppx per primary team  -Pain Control as needed     will discuss with Dr. Flores and advise with changes to plan   Patient seen and examined at bedside. No acute complaints at this time. States abdominal burning pain is still present but LE radiculopathy is resolved. Pain well controlled. Denies new weakness, numbness or tingling. Denies chest pain, shortness of breath, nausea or vomiting.       LABS:                        13.3   4.57  )-----------( 305      ( 28 Jul 2023 11:47 )             39.2     07-28    144  |  110<H>  |  11  ----------------------------<  124<H>  3.8   |  25  |  0.98    Ca    9.6      28 Jul 2023 11:47  Mg     2.1     07-28    TPro  7.7  /  Alb  4.4  /  TBili  0.5  /  DBili  x   /  AST  27  /  ALT  29  /  AlkPhos  53  07-28    PT/INR - ( 28 Jul 2023 11:47 )   PT: 15.8 sec;   INR: 1.34 ratio         PTT - ( 28 Jul 2023 11:47 )  PTT:31.6 sec  Urinalysis Basic - ( 28 Jul 2023 11:47 )    Color: x / Appearance: x / SG: x / pH: x  Gluc: 124 mg/dL / Ketone: x  / Bili: x / Urobili: x   Blood: x / Protein: x / Nitrite: x   Leuk Esterase: x / RBC: x / WBC x   Sq Epi: x / Non Sq Epi: x / Bacteria: x        VITAL SIGNS:  T(C): 36.8 (07-30-23 @ 04:15), Max: 37 (07-29-23 @ 11:00)  HR: 65 (07-30-23 @ 04:15) (62 - 86)  BP: 114/73 (07-30-23 @ 04:15) (114/73 - 143/90)  RR: 18 (07-30-23 @ 04:15) (18 - 18)  SpO2: 99% (07-30-23 @ 04:15) (97% - 99%)      PE:    General: NAD, resting comfortably in bed  2+ radial pulses  2+ DP Pulses  +left Gilbert  +left babinski    Motor:                   C5                C6              C7               C8           T1   R             5/5                5/5            5/5              5/5          5/5  L             4/5                4/5            4/5              5/5          4/5    *chronic weakness                    L2                  L3             L4              L5            S1  R            5/5                5/5             5/5            5/5          5/5  L             4/5                5/5            5/5            5/5          5/5    Sensory:            C5         C6         C7      C8       T1        (0=absent, 1=impaired, 2=normal, NT=not testable)  R         2            2           2        2         2  L          1            1           1        2         2               L2          L3         L4      L5       S1         (0=absent, 1=impaired, 2=normal, NT=not testable)  R         2            2            2        1        2  L          2            2           2        2         2      CT Csp: hardware intact  CT Lsp: mild-mod canal stenosis L2-4, moderate canal stenosis L4-5      A/P:  63M bilateral upper and lower extremity radicular pain, improved    -pt requires MRI C/Tspine however he is refusing transfer to [place] to obtain one since he cannot tolerate the closed tube and states he can only do open MRI  -plan for possible lumbar surgery during this admission  -please document medical optimization  -PT/OT  -WBAT with assistive devices as needed.  -DVT ppx per primary team  -Pain Control as needed     will discuss with Dr. Flores and advise with changes to plan   Patient seen and examined at bedside. No acute complaints at this time. States abdominal burning pain is still present but LE radiculopathy is resolved. Pain well controlled. Denies new weakness, numbness or tingling. Denies chest pain, shortness of breath, nausea or vomiting.       LABS:                        13.3   4.57  )-----------( 305      ( 28 Jul 2023 11:47 )             39.2     07-28    144  |  110<H>  |  11  ----------------------------<  124<H>  3.8   |  25  |  0.98    Ca    9.6      28 Jul 2023 11:47  Mg     2.1     07-28    TPro  7.7  /  Alb  4.4  /  TBili  0.5  /  DBili  x   /  AST  27  /  ALT  29  /  AlkPhos  53  07-28    PT/INR - ( 28 Jul 2023 11:47 )   PT: 15.8 sec;   INR: 1.34 ratio         PTT - ( 28 Jul 2023 11:47 )  PTT:31.6 sec  Urinalysis Basic - ( 28 Jul 2023 11:47 )    Color: x / Appearance: x / SG: x / pH: x  Gluc: 124 mg/dL / Ketone: x  / Bili: x / Urobili: x   Blood: x / Protein: x / Nitrite: x   Leuk Esterase: x / RBC: x / WBC x   Sq Epi: x / Non Sq Epi: x / Bacteria: x        VITAL SIGNS:  T(C): 36.8 (07-30-23 @ 04:15), Max: 37 (07-29-23 @ 11:00)  HR: 65 (07-30-23 @ 04:15) (62 - 86)  BP: 114/73 (07-30-23 @ 04:15) (114/73 - 143/90)  RR: 18 (07-30-23 @ 04:15) (18 - 18)  SpO2: 99% (07-30-23 @ 04:15) (97% - 99%)      PE:    General: NAD, resting comfortably in bed  2+ radial pulses  2+ DP Pulses  +left Gilbert  +left babinski    Motor:                   C5                C6              C7               C8           T1   R             5/5                5/5            5/5              5/5          5/5  L             4/5                4/5            4/5              5/5          4/5    *chronic weakness                    L2                  L3             L4              L5            S1  R            5/5                5/5             5/5            5/5          5/5  L             4/5                5/5            5/5            5/5          5/5    Sensory:            C5         C6         C7      C8       T1        (0=absent, 1=impaired, 2=normal, NT=not testable)  R         2            2           2        2         2  L          1            1           1        2         2               L2          L3         L4      L5       S1         (0=absent, 1=impaired, 2=normal, NT=not testable)  R         2            2            2        1        2  L          2            2           2        2         2      CT Csp: hardware intact  CT Lsp: mild-mod canal stenosis L2-4, moderate canal stenosis L4-5      A/P:  63M bilateral upper and lower extremity radicular pain, improved    -pt requires MRI C/Tspine however he is refusing transfer to [place] to obtain one since he cannot tolerate the closed tube and states he can only do open MRI  -plan for pt to obtain outpatient MRI C/Tspine  -PT/OT  -WBAT with assistive devices as needed.  -DVT ppx per primary team  -Pain Control as needed   -No acute orthopaedic surgical intervention indicated at this time. This patient is orthopaedically stable for discharge.   -Patient to follow up with Dr. Flores as an outpatient for further evaluation and management.   -All of the patient's questions and concerns were answered and addressed.  -please reconsult as needed  discussed with Dr. Flores who agrees with plan

## 2023-07-31 ENCOUNTER — TRANSCRIPTION ENCOUNTER (OUTPATIENT)
Age: 63
End: 2023-07-31

## 2023-07-31 VITALS
TEMPERATURE: 99 F | RESPIRATION RATE: 17 BRPM | SYSTOLIC BLOOD PRESSURE: 146 MMHG | DIASTOLIC BLOOD PRESSURE: 80 MMHG | HEART RATE: 86 BPM | OXYGEN SATURATION: 98 %

## 2023-07-31 RX ORDER — AMLODIPINE BESYLATE 2.5 MG/1
1 TABLET ORAL
Qty: 0 | Refills: 0 | DISCHARGE
Start: 2023-07-31

## 2023-07-31 RX ORDER — CYCLOBENZAPRINE HYDROCHLORIDE 10 MG/1
1 TABLET, FILM COATED ORAL
Qty: 0 | Refills: 0 | DISCHARGE
Start: 2023-07-31

## 2023-07-31 RX ORDER — GABAPENTIN 400 MG/1
1 CAPSULE ORAL
Qty: 0 | Refills: 0 | DISCHARGE
Start: 2023-07-31

## 2023-07-31 RX ORDER — POLYETHYLENE GLYCOL 3350 17 G/17G
17 POWDER, FOR SOLUTION ORAL
Qty: 0 | Refills: 0 | DISCHARGE
Start: 2023-07-31

## 2023-07-31 RX ORDER — RIVAROXABAN 15 MG-20MG
1 KIT ORAL
Qty: 0 | Refills: 0 | DISCHARGE
Start: 2023-07-31

## 2023-07-31 RX ORDER — PANTOPRAZOLE SODIUM 20 MG/1
1 TABLET, DELAYED RELEASE ORAL
Qty: 0 | Refills: 0 | DISCHARGE
Start: 2023-07-31

## 2023-07-31 RX ADMIN — OXYCODONE HYDROCHLORIDE 10 MILLIGRAM(S): 5 TABLET ORAL at 05:25

## 2023-07-31 RX ADMIN — AMLODIPINE BESYLATE 10 MILLIGRAM(S): 2.5 TABLET ORAL at 05:25

## 2023-07-31 RX ADMIN — PANTOPRAZOLE SODIUM 40 MILLIGRAM(S): 20 TABLET, DELAYED RELEASE ORAL at 06:26

## 2023-07-31 RX ADMIN — OXYCODONE HYDROCHLORIDE 10 MILLIGRAM(S): 5 TABLET ORAL at 12:15

## 2023-07-31 RX ADMIN — CYCLOBENZAPRINE HYDROCHLORIDE 10 MILLIGRAM(S): 10 TABLET, FILM COATED ORAL at 10:07

## 2023-07-31 RX ADMIN — OXYCODONE HYDROCHLORIDE 10 MILLIGRAM(S): 5 TABLET ORAL at 06:00

## 2023-07-31 RX ADMIN — OXYCODONE HYDROCHLORIDE 10 MILLIGRAM(S): 5 TABLET ORAL at 01:00

## 2023-07-31 RX ADMIN — GABAPENTIN 400 MILLIGRAM(S): 400 CAPSULE ORAL at 05:25

## 2023-07-31 RX ADMIN — OXYCODONE HYDROCHLORIDE 10 MILLIGRAM(S): 5 TABLET ORAL at 00:22

## 2023-07-31 RX ADMIN — OXYCODONE HYDROCHLORIDE 10 MILLIGRAM(S): 5 TABLET ORAL at 13:44

## 2023-07-31 NOTE — DISCHARGE NOTE NURSING/CASE MANAGEMENT/SOCIAL WORK - NSDCVIVACCINE_GEN_ALL_CORE_FT
influenza, injectable, quadrivalent, preservative free; 23-Nov-2022 11:51; Michelle Galvin); [employer]; [id number] (Exp. Date: 30-Jun-2023); IntraMuscular; Deltoid Right.; 0.5 milliLiter(s); VIS (VIS Published: 06-Aug-2021, VIS Presented: 23-Nov-2022);

## 2023-07-31 NOTE — DISCHARGE NOTE PROVIDER - CARE PROVIDER_API CALL
Ami Lopez  Orthopaedic Surgery  [address] [facility], [place] [address]  Phone: [phone]  Fax: [phone]  Follow Up Time:

## 2023-07-31 NOTE — DISCHARGE NOTE NURSING/CASE MANAGEMENT/SOCIAL WORK - PATIENT PORTAL LINK FT
You can access the FollowMyHealth Patient Portal offered by [facility] by registering at the following website: [email or web address]. By joining [facility]’s FollowMyHealth portal, you will also be able to view your health information using other applications (apps) compatible with our system.

## 2023-07-31 NOTE — DISCHARGE NOTE PROVIDER - NSDCFUSCHEDAPPT_GEN_ALL_CORE_FT
Yunior Chen  [facility]  VASCULAR [address]   Scheduled Appointment: 08/25/2023

## 2023-07-31 NOTE — DISCHARGE NOTE PROVIDER - HOSPITAL COURSE
64 yo man with atypical CP.        Problem/Plan - 1:  ·  Problem: Acute chest pain  Resolved     Problem/Plan - 2:  ·  Problem: History of cervical spinal surgery.     On 7/31/23 this case was reviewed with Dr. Fraser, the patient is medically stable and optimized for discharge. All medications were reviewed and appropriate prescriptions were sent to mutually agreed upon pharmacy.

## 2023-07-31 NOTE — DISCHARGE NOTE PROVIDER - NSDCMRMEDTOKEN_GEN_ALL_CORE_FT
amLODIPine 10 mg oral tablet: 1 tab(s) orally once a day  cyclobenzaprine 10 mg oral tablet: 1 tab(s) orally 3 times a day As needed Muscle Spasm  gabapentin 400 mg oral capsule: 1 cap(s) orally 3 times a day  melatonin 3 mg oral tablet: 2 tab(s) orally once a day (at bedtime)  pantoprazole 40 mg oral delayed release tablet: 1 tab(s) orally once a day (before a meal)  polyethylene glycol 3350 oral powder for reconstitution: 17 gram(s) orally once a day  rivaroxaban 20 mg oral tablet: 1 tab(s) orally once a day  senna leaf extract oral tablet: 2 tab(s) orally once a day (at bedtime)

## 2023-07-31 NOTE — PATIENT PROFILE ADULT - FALL HARM RISK - UNIVERSAL INTERVENTIONS
Bed in lowest position, wheels locked, appropriate side rails in place/Call bell, personal items and telephone in reach/Instruct patient to call for assistance before getting out of bed or chair/Non-slip footwear when patient is out of bed/[place] to call system/Physically safe environment - no spills, clutter or unnecessary equipment/Purposeful Proactive Rounding/Room/bathroom lighting operational, light cord in reach

## 2023-07-31 NOTE — DISCHARGE NOTE NURSING/CASE MANAGEMENT/SOCIAL WORK - NSDCPEFALRISK_GEN_ALL_CORE
For information on Fall & Injury Prevention, visit: [email or web address] OR  [email or web address] OR  https://www.cdc.gov/steadi/patient.html

## 2023-08-15 ENCOUNTER — OUTPATIENT (OUTPATIENT)
Dept: OUTPATIENT SERVICES | Facility: HOSPITAL | Age: 63
LOS: 1 days | End: 2023-08-15
Payer: COMMERCIAL

## 2023-08-15 VITALS
SYSTOLIC BLOOD PRESSURE: 147 MMHG | TEMPERATURE: 98 F | HEART RATE: 90 BPM | RESPIRATION RATE: 16 BRPM | HEIGHT: 69 IN | WEIGHT: 179.9 LBS | DIASTOLIC BLOOD PRESSURE: 88 MMHG | OXYGEN SATURATION: 97 %

## 2023-08-15 DIAGNOSIS — M51.26 OTHER INTERVERTEBRAL DISC DISPLACEMENT, LUMBAR REGION: ICD-10-CM

## 2023-08-15 DIAGNOSIS — Z98.1 ARTHRODESIS STATUS: Chronic | ICD-10-CM

## 2023-08-15 DIAGNOSIS — Z98.890 OTHER SPECIFIED POSTPROCEDURAL STATES: Chronic | ICD-10-CM

## 2023-08-15 DIAGNOSIS — I10 ESSENTIAL (PRIMARY) HYPERTENSION: ICD-10-CM

## 2023-08-15 DIAGNOSIS — Z86.718 PERSONAL HISTORY OF OTHER VENOUS THROMBOSIS AND EMBOLISM: ICD-10-CM

## 2023-08-15 DIAGNOSIS — M48.061 SPINAL STENOSIS, LUMBAR REGION WITHOUT NEUROGENIC CLAUDICATION: ICD-10-CM

## 2023-08-15 LAB
A1C WITH ESTIMATED AVERAGE GLUCOSE RESULT: 6.2 % — HIGH (ref 4–5.6)
ANION GAP SERPL CALC-SCNC: 11 MMOL/L — SIGNIFICANT CHANGE UP (ref 7–14)
BLD GP AB SCN SERPL QL: NEGATIVE — SIGNIFICANT CHANGE UP
BUN SERPL-MCNC: 14 MG/DL — SIGNIFICANT CHANGE UP (ref 7–23)
CALCIUM SERPL-MCNC: 9.5 MG/DL — SIGNIFICANT CHANGE UP (ref 8.4–10.5)
CHLORIDE SERPL-SCNC: 101 MMOL/L — SIGNIFICANT CHANGE UP (ref 98–107)
CO2 SERPL-SCNC: 25 MMOL/L — SIGNIFICANT CHANGE UP (ref 22–31)
CREAT SERPL-MCNC: 0.89 MG/DL — SIGNIFICANT CHANGE UP (ref 0.5–1.3)
EGFR: 96 ML/MIN/1.73M2 — SIGNIFICANT CHANGE UP
ESTIMATED AVERAGE GLUCOSE: 131 — SIGNIFICANT CHANGE UP
GLUCOSE SERPL-MCNC: 106 MG/DL — HIGH (ref 70–99)
HCT VFR BLD CALC: 38.3 % — LOW (ref 39–50)
HGB BLD-MCNC: 12.2 G/DL — LOW (ref 13–17)
MCHC RBC-ENTMCNC: 27.7 PG — SIGNIFICANT CHANGE UP (ref 27–34)
MCHC RBC-ENTMCNC: 31.9 GM/DL — LOW (ref 32–36)
MCV RBC AUTO: 86.8 FL — SIGNIFICANT CHANGE UP (ref 80–100)
NRBC # BLD: 0 /100 WBCS — SIGNIFICANT CHANGE UP (ref 0–0)
NRBC # FLD: 0 K/UL — SIGNIFICANT CHANGE UP (ref 0–0)
PLATELET # BLD AUTO: 248 K/UL — SIGNIFICANT CHANGE UP (ref 150–400)
POTASSIUM SERPL-MCNC: 3.8 MMOL/L — SIGNIFICANT CHANGE UP (ref 3.5–5.3)
POTASSIUM SERPL-SCNC: 3.8 MMOL/L — SIGNIFICANT CHANGE UP (ref 3.5–5.3)
RBC # BLD: 4.41 M/UL — SIGNIFICANT CHANGE UP (ref 4.2–5.8)
RBC # FLD: 14.5 % — SIGNIFICANT CHANGE UP (ref 10.3–14.5)
RH IG SCN BLD-IMP: POSITIVE — SIGNIFICANT CHANGE UP
SODIUM SERPL-SCNC: 137 MMOL/L — SIGNIFICANT CHANGE UP (ref 135–145)
WBC # BLD: 4.1 K/UL — SIGNIFICANT CHANGE UP (ref 3.8–10.5)
WBC # FLD AUTO: 4.1 K/UL — SIGNIFICANT CHANGE UP (ref 3.8–10.5)

## 2023-08-15 PROCEDURE — 93010 ELECTROCARDIOGRAM REPORT: CPT

## 2023-08-15 NOTE — H&P PST ADULT - NSICDXPASTMEDICALHX_GEN_ALL_CORE_FT
PAST MEDICAL HISTORY:  Asthma     HTN (hypertension)      PAST MEDICAL HISTORY:  Asthma     Cervical spinal stenosis     Chronic back pain     H/O blood clots     H/O neuropathy     H/O spinal stenosis     HTN (hypertension)     Localized swelling of both lower legs

## 2023-08-15 NOTE — H&P PST ADULT - MUSCULOSKELETAL COMMENTS
Lower back pain with altered gait - weakness and difficulty with walking Hx lower back pain with radiation and weakness to both legs - pt using cane for support - pain is chronic and "burning " - pt post cervical fusion 10/22 with left arm nerve damage and restriction to ROM of UE and hand -

## 2023-08-15 NOTE — H&P PST ADULT - CARDIOVASCULAR
negative normal/regular rate and rhythm/S1 S2 present/no gallops/no rub/no murmur details… regular rate and rhythm/S1 S2 present/peripheral edema

## 2023-08-15 NOTE — H&P PST ADULT - HEMATOLOGY/LYMPHATICS COMMENTS
Pt hx swelling B/L lower legs left > right - seen by vascular with recent dopplers done and both normal

## 2023-08-15 NOTE — H&P PST ADULT - PROBLEM SELECTOR PLAN 3
Pt to see PCP for preop instructions - email to surgeon informing same and need for cardiac evaluation and Nucynta preop instructions Pt to see PCP for preop instructions for Xarelto - call to surgeon office and informed of need for preop stop date from surgeon - LM for surgeon and also informed of need for cardiac evaluation Pt to see PCP for preop instructions for Xarelto - call to surgeon office and informed of need for preop stop date from surgeon - LM for surgeon who confirmed that pt may stop Xarelto 72 hours preop - Call and LM for PCP of same - also informed of need for cardiac evaluation

## 2023-08-15 NOTE — H&P PST ADULT - HISTORY OF PRESENT ILLNESS
Pt is a 63 yr old male scheduled for Lumbar Decompression L1-5 with Laminectomy with Fusion with Dr Lopez tentatively 8/31/23 - pt c/o of pain with weakness lower back     Pt on Xarelto for hx blood clots " around my heart"  Pt is a 63 yr old male scheduled for Lumbar Decompression L1-5 with Laminectomy with Fusion with Dr Lopez tentatively 8/31/23 - pt c/o of severe pain with weakness of  lower back that radiates to both legs - pt hx cervical fusion 10/22 with postop right arm nerve damage with contraction of shoulder / hand - restriction to ROM - pt on Xarelto for "blood clots " around heart Dx postop 10/22 - pt denies hx cardio evaluation or Cardiologist - pt also on Nucynta for severe chronic pain lower back - up to 9/10 - hx HTN, swelling LE expecially left LE - recent doppler done and was normal

## 2023-08-15 NOTE — H&P PST ADULT - GASTROINTESTINAL
normal/soft/nontender/nondistended/normal active bowel sounds negative soft/normal active bowel sounds

## 2023-08-15 NOTE — H&P PST ADULT - FUNCTIONAL STATUS
4- pt c/o of lower back pain with radiation to right leg with muscle spasms - difficulty walking for any distance or go up 1 flight stairs/4-10 METS

## 2023-08-15 NOTE — H&P PST ADULT - CARDIOVASCULAR COMMENTS
Hx "blood clots " around heart Dx 10/22 post cervical neck surgery - pt on Xarelto since then - no Cardio evaluation Edema b/l LE - left > right

## 2023-08-15 NOTE — H&P PST ADULT - RESPIRATORY
normal/clear to auscultation bilaterally/no wheezes/no rales/no rhonchi clear to auscultation bilaterally

## 2023-08-25 ENCOUNTER — APPOINTMENT (OUTPATIENT)
Dept: VASCULAR SURGERY | Facility: CLINIC | Age: 63
End: 2023-08-25
Payer: COMMERCIAL

## 2023-08-25 VITALS
SYSTOLIC BLOOD PRESSURE: 134 MMHG | HEIGHT: 70.5 IN | HEART RATE: 84 BPM | WEIGHT: 180 LBS | DIASTOLIC BLOOD PRESSURE: 73 MMHG | TEMPERATURE: 97.7 F | OXYGEN SATURATION: 96 % | BODY MASS INDEX: 25.48 KG/M2

## 2023-08-25 DIAGNOSIS — M54.12 RADICULOPATHY, CERVICAL REGION: ICD-10-CM

## 2023-08-25 DIAGNOSIS — Z98.1 ARTHRODESIS STATUS: ICD-10-CM

## 2023-08-25 DIAGNOSIS — M21.512 ACQUIRED CLAWHAND, LEFT HAND: ICD-10-CM

## 2023-08-25 DIAGNOSIS — I73.9 PERIPHERAL VASCULAR DISEASE, UNSPECIFIED: ICD-10-CM

## 2023-08-25 DIAGNOSIS — M79.605 PAIN IN LEFT LEG: ICD-10-CM

## 2023-08-25 DIAGNOSIS — I87.2 VENOUS INSUFFICIENCY (CHRONIC) (PERIPHERAL): ICD-10-CM

## 2023-08-25 DIAGNOSIS — R60.9 EDEMA, UNSPECIFIED: ICD-10-CM

## 2023-08-25 PROBLEM — R22.43 LOCALIZED SWELLING, MASS AND LUMP, LOWER LIMB, BILATERAL: Chronic | Status: ACTIVE | Noted: 2023-08-15

## 2023-08-25 PROBLEM — Z87.39 PERSONAL HISTORY OF OTHER DISEASES OF THE MUSCULOSKELETAL SYSTEM AND CONNECTIVE TISSUE: Chronic | Status: ACTIVE | Noted: 2023-08-15

## 2023-08-25 PROBLEM — M48.02 SPINAL STENOSIS, CERVICAL REGION: Chronic | Status: ACTIVE | Noted: 2023-08-15

## 2023-08-25 PROBLEM — Z86.69 PERSONAL HISTORY OF OTHER DISEASES OF THE NERVOUS SYSTEM AND SENSE ORGANS: Chronic | Status: ACTIVE | Noted: 2023-08-15

## 2023-08-25 PROBLEM — M54.9 DORSALGIA, UNSPECIFIED: Chronic | Status: ACTIVE | Noted: 2023-08-15

## 2023-08-25 PROCEDURE — 99214 OFFICE O/P EST MOD 30 MIN: CPT

## 2023-08-25 NOTE — HISTORY OF PRESENT ILLNESS
[FreeTextEntry1] : The patient is seen and examined for the follow-up of his left lower extremity swelling and pain and discomfort.  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  The patient since the last office visit has seen Dr. Lopez and patient will be undergoing fusion of the lumbar spine in August 31.  Patient is in the process of medical clearance prior to the surgery.  The patient's left lower extremity has improved and has a less swelling feeling better less discomfort and is happy with the progress with the use of the support stockings and conservative treatment.  Patient has significant improvement in his symptoms and discomfort in the left leg since the last visit

## 2023-08-25 NOTE — ASSESSMENT
[FreeTextEntry1] : Improved swelling in the left lower extremity and the edema much less pain and heaviness and discomfort with the use of conservative treatment with stockings.  Patient will be undergoing a's fusion of the lumbar spine on August 31  The patient is advised to wear knee-high surgical support stockings continue to wear them in the perioperative.  Unless advised otherwise by the team.

## 2023-08-25 NOTE — PLAN
[TextEntry] : Patient is advised to continue the stockings and follow-up with me after his discharge from the hospital and rehab for his spine surgery.

## 2023-08-25 NOTE — PHYSICAL EXAM
[1+] : left 1+ [TextEntry] : The patient is examined sitting in standing position.  The left hand remains claw hand.  Left lower extremity swelling is significantly improved.  Bilateral pedal pulses are palpable no calf tenderness the right lower extremity has a mild edema on dorsum of foot and the ankles and the left lower extremity has a moderate edema on the dorsum of the foot as well as the ankle there is no cellulitis no infection.  The left leg is significantly improved

## 2023-08-29 ENCOUNTER — OUTPATIENT (OUTPATIENT)
Dept: OUTPATIENT SERVICES | Facility: HOSPITAL | Age: 63
LOS: 1 days | End: 2023-08-29
Payer: COMMERCIAL

## 2023-08-29 ENCOUNTER — APPOINTMENT (OUTPATIENT)
Dept: CV DIAGNOSTICS | Facility: HOSPITAL | Age: 63
End: 2023-08-29

## 2023-08-29 DIAGNOSIS — Z98.1 ARTHRODESIS STATUS: Chronic | ICD-10-CM

## 2023-08-29 DIAGNOSIS — Z98.890 OTHER SPECIFIED POSTPROCEDURAL STATES: Chronic | ICD-10-CM

## 2023-08-29 DIAGNOSIS — R94.39 ABNORMAL RESULT OF OTHER CARDIOVASCULAR FUNCTION STUDY: ICD-10-CM

## 2023-08-29 DIAGNOSIS — R07.9 CHEST PAIN, UNSPECIFIED: ICD-10-CM

## 2023-08-29 DIAGNOSIS — Z01.818 ENCOUNTER FOR OTHER PREPROCEDURAL EXAMINATION: ICD-10-CM

## 2023-08-29 PROCEDURE — 78452 HT MUSCLE IMAGE SPECT MULT: CPT | Mod: 26,MH

## 2023-08-29 PROCEDURE — 93016 CV STRESS TEST SUPVJ ONLY: CPT | Mod: GC

## 2023-08-29 PROCEDURE — 93018 CV STRESS TEST I&R ONLY: CPT | Mod: GC

## 2023-09-19 ENCOUNTER — INPATIENT (INPATIENT)
Facility: HOSPITAL | Age: 63
LOS: 0 days | Discharge: HOME | DRG: 287 | End: 2023-09-20
Attending: INTERNAL MEDICINE | Admitting: INTERNAL MEDICINE
Payer: COMMERCIAL

## 2023-09-19 ENCOUNTER — TRANSCRIPTION ENCOUNTER (OUTPATIENT)
Age: 63
End: 2023-09-19

## 2023-09-19 VITALS
OXYGEN SATURATION: 98 % | HEART RATE: 91 BPM | TEMPERATURE: 98 F | WEIGHT: 175.05 LBS | RESPIRATION RATE: 16 BRPM | SYSTOLIC BLOOD PRESSURE: 142 MMHG | DIASTOLIC BLOOD PRESSURE: 89 MMHG | HEIGHT: 71 IN

## 2023-09-19 DIAGNOSIS — R94.39 ABNORMAL RESULT OF OTHER CARDIOVASCULAR FUNCTION STUDY: ICD-10-CM

## 2023-09-19 DIAGNOSIS — Z98.1 ARTHRODESIS STATUS: Chronic | ICD-10-CM

## 2023-09-19 DIAGNOSIS — Z98.890 OTHER SPECIFIED POSTPROCEDURAL STATES: Chronic | ICD-10-CM

## 2023-09-19 LAB
ANION GAP SERPL CALC-SCNC: 12 MMOL/L — SIGNIFICANT CHANGE UP (ref 5–17)
BUN SERPL-MCNC: 14 MG/DL — SIGNIFICANT CHANGE UP (ref 7–23)
CALCIUM SERPL-MCNC: 9.6 MG/DL — SIGNIFICANT CHANGE UP (ref 8.4–10.5)
CHLORIDE SERPL-SCNC: 103 MMOL/L — SIGNIFICANT CHANGE UP (ref 96–108)
CO2 SERPL-SCNC: 25 MMOL/L — SIGNIFICANT CHANGE UP (ref 22–31)
CREAT SERPL-MCNC: 0.87 MG/DL — SIGNIFICANT CHANGE UP (ref 0.5–1.3)
EGFR: 97 ML/MIN/1.73M2 — SIGNIFICANT CHANGE UP
GLUCOSE BLDC GLUCOMTR-MCNC: 76 MG/DL — SIGNIFICANT CHANGE UP (ref 70–99)
GLUCOSE SERPL-MCNC: 119 MG/DL — HIGH (ref 70–99)
HCT VFR BLD CALC: 40.8 % — SIGNIFICANT CHANGE UP (ref 39–50)
HGB BLD-MCNC: 12.9 G/DL — LOW (ref 13–17)
MCHC RBC-ENTMCNC: 27.7 PG — SIGNIFICANT CHANGE UP (ref 27–34)
MCHC RBC-ENTMCNC: 31.6 GM/DL — LOW (ref 32–36)
MCV RBC AUTO: 87.7 FL — SIGNIFICANT CHANGE UP (ref 80–100)
NRBC # BLD: 0 /100 WBCS — SIGNIFICANT CHANGE UP (ref 0–0)
PLATELET # BLD AUTO: 302 K/UL — SIGNIFICANT CHANGE UP (ref 150–400)
POTASSIUM SERPL-MCNC: 4.1 MMOL/L — SIGNIFICANT CHANGE UP (ref 3.5–5.3)
POTASSIUM SERPL-SCNC: 4.1 MMOL/L — SIGNIFICANT CHANGE UP (ref 3.5–5.3)
RBC # BLD: 4.65 M/UL — SIGNIFICANT CHANGE UP (ref 4.2–5.8)
RBC # FLD: 14.4 % — SIGNIFICANT CHANGE UP (ref 10.3–14.5)
SODIUM SERPL-SCNC: 140 MMOL/L — SIGNIFICANT CHANGE UP (ref 135–145)
WBC # BLD: 4.63 K/UL — SIGNIFICANT CHANGE UP (ref 3.8–10.5)
WBC # FLD AUTO: 4.63 K/UL — SIGNIFICANT CHANGE UP (ref 3.8–10.5)

## 2023-09-19 PROCEDURE — 93010 ELECTROCARDIOGRAM REPORT: CPT

## 2023-09-19 PROCEDURE — 99152 MOD SED SAME PHYS/QHP 5/>YRS: CPT

## 2023-09-19 PROCEDURE — 93454 CORONARY ARTERY ANGIO S&I: CPT | Mod: 26

## 2023-09-19 RX ORDER — IBUPROFEN 200 MG
1 TABLET ORAL
Refills: 0 | DISCHARGE

## 2023-09-19 RX ORDER — GABAPENTIN 400 MG/1
400 CAPSULE ORAL THREE TIMES A DAY
Refills: 0 | Status: DISCONTINUED | OUTPATIENT
Start: 2023-09-19 | End: 2023-09-20

## 2023-09-19 RX ORDER — ALPRAZOLAM 0.25 MG
1 TABLET ORAL AT BEDTIME
Refills: 0 | Status: DISCONTINUED | OUTPATIENT
Start: 2023-09-19 | End: 2023-09-20

## 2023-09-19 RX ORDER — RIVAROXABAN 15 MG-20MG
1 KIT ORAL
Refills: 0 | DISCHARGE

## 2023-09-19 RX ORDER — AMLODIPINE BESYLATE 2.5 MG/1
10 TABLET ORAL DAILY
Refills: 0 | Status: DISCONTINUED | OUTPATIENT
Start: 2023-09-19 | End: 2023-09-20

## 2023-09-19 RX ADMIN — GABAPENTIN 400 MILLIGRAM(S): 400 CAPSULE ORAL at 21:43

## 2023-09-19 RX ADMIN — Medication 1 MILLIGRAM(S): at 21:43

## 2023-09-19 NOTE — DISCHARGE NOTE PROVIDER - CARE PROVIDER_API CALL
Problem: Patient Care Overview  Goal: Plan of Care Review  Outcome: Ongoing (interventions implemented as appropriate)  Remains free from injury. Denies pain. Fluids and abx running as ordered. Ileostomy producing flatus and liquid stool. KINDRA drain producing milky fluid. Guzman producing small amount of yellow urine. Paced rhythm on heart monitor. Vital signs stable. Chart reviewed. Will continue to monitor.       Roly Joaquin  Cardiovascular Disease  [address] [address]  Phone: [phone]  Fax: [phone]  Follow Up Time: 2 weeks

## 2023-09-19 NOTE — H&P CARDIOLOGY - HEIGHT IN CM
180.34 Protopic Counseling: Patient may experience a mild burning sensation during topical application. Protopic is not approved in children less than 2 years of age. There have been case reports of hematologic and skin malignancies in patients using topical calcineurin inhibitors although causality is questionable.

## 2023-09-19 NOTE — DISCHARGE NOTE PROVIDER - NSDCCPTREATMENT_GEN_ALL_CORE_FT
PRINCIPAL PROCEDURE  Procedure: Left heart cardiac cath  Findings and Treatment: Diagnostic, no intervention

## 2023-09-19 NOTE — DISCHARGE NOTE PROVIDER - NSDCMRMEDTOKEN_GEN_ALL_CORE_FT
ALPRAZolam 0.5 mg oral tablet: 1 orally once a day (at bedtime)  amLODIPine 10 mg oral tablet: 1 tab(s) orally once a day (in the morning)  diclofenac 1% topical gel: Apply topically to affected area once a day as needed LD 8/24/2023  gabapentin 400 mg oral tablet: 1 tab(s) orally 3 times a day  mometasone 0.1% topical cream: Apply topically to affected area once a day as needed  Nucynta 100 mg oral tablet: 1 tab(s) orally 4 times a day  Percocet 10 mg-325 mg oral tablet: 1 orally 4 times a day as needed for  severe pain

## 2023-09-19 NOTE — H&P CARDIOLOGY - HISTORY OF PRESENT ILLNESS
63 yr old male w pmh severe pain with weakness of  lower back that radiates to both legs - pt hx cervical fusion 10/22 with postop right arm nerve damage with contraction of shoulder / hand - restriction to ROM , hx of PE's was formerly on Xarelto ( stopped 2 weeks ago) .Pt also on Nucynta for severe chronic pain lower back, hx HTN. He now presents following an abnormal stress test for a [facility] for pre-op clearance for a lumbar Decompression L1-5 with Laminectomy with Fusion with Dr Lopez.    Cards: Dr. Joaquin

## 2023-09-19 NOTE — H&P CARDIOLOGY - NSICDXPASTMEDICALHX_GEN_ALL_CORE_FT
PAST MEDICAL HISTORY:  Asthma     Cervical spinal stenosis     Chronic back pain     H/O blood clots     H/O neuropathy     H/O spinal stenosis     HTN (hypertension)     Localized swelling of both lower legs     Pulmonary embolism

## 2023-09-19 NOTE — ASU PATIENT PROFILE, ADULT - FALL HARM RISK - UNIVERSAL INTERVENTIONS
Bed in lowest position, wheels locked, appropriate side rails in place/Call bell, personal items and telephone in reach/Instruct patient to call for assistance before getting out of bed or chair/Non-slip footwear when patient is out of bed/[place] to call system/Physically safe environment - no spills, clutter or unnecessary equipment/Purposeful Proactive Rounding/Room/bathroom lighting operational, light cord in reach

## 2023-09-19 NOTE — DISCHARGE NOTE PROVIDER - HOSPITAL COURSE
HPI:  63 yr old male w pmh severe pain with weakness of  lower back that radiates to both legs - pt hx cervical fusion 10/22 with postop right arm nerve damage with contraction of shoulder / hand - restriction to ROM , hx of PE's was formerly on Xarelto (stopped 2 weeks ago). Pt also on Nucynta for severe chronic pain lower back, hx HTN. He now presents following an abnormal stress test for a [facility] for pre-op clearance for a lumbar Decompression L1-5 with Laminectomy with Fusion with Dr Lopez.    Cards: Dr. Joaquin  (19 Sep 2023 12:35)    9/19 diagnostic cardiac cath, no intervention. Right radial artery access site without swelling, bleeding.

## 2023-09-19 NOTE — PATIENT PROFILE ADULT - FALL HARM RISK - UNIVERSAL INTERVENTIONS
Bed in lowest position, wheels locked, appropriate side rails in place/Call bell, personal items and telephone in reach/Instruct patient to call for assistance before getting out of bed or chair/Non-slip footwear when patient is out of bed/[place] to call system/Physically safe environment - no spills, clutter or unnecessary equipment/Purposeful Proactive Rounding/Room/bathroom lighting operational, light cord in reach

## 2023-09-19 NOTE — DISCHARGE NOTE PROVIDER - NSDCCPCAREPLAN_GEN_ALL_CORE_FT
PRINCIPAL DISCHARGE DIAGNOSIS  Diagnosis: Abnormal stress test  Assessment and Plan of Treatment: No heavy lifting or pushing/pulling with procedure arm for 2 weeks. No driving for 2 days. You may shower 24 hours following the procedure but avoid baths/swimming for 1 week. Check your wrist site for bleeding and/or swelling daily following procedure and call your doctor immediately if it occurs or if you experience increased pain at the site. Follow up with your cardiologist in 1-2 weeks. You may call [facility] if you have any questions/concerns regarding your procedure [phone].      SECONDARY DISCHARGE DIAGNOSES  Diagnosis: HTN (hypertension)  Assessment and Plan of Treatment: Continue with your blood pressure medications; eat a heart healthy diet with low salt diet; exercise regularly (consult with your physician or cardiologist first); maintain a heart healthy weight; if you smoke - quit (A resource to help you stop smoking is the [facility] – phone number [phone].); include healthy ways to manage stress. Continue to follow with your primary care physician or cardiologist.

## 2023-09-19 NOTE — DISCHARGE NOTE PROVIDER - NSDCACTIVITY_GEN_ALL_CORE
Showering allowed/Stairs allowed/Walking - Indoors allowed/No heavy lifting/straining/Walking - Outdoors allowed General

## 2023-09-20 VITALS
SYSTOLIC BLOOD PRESSURE: 133 MMHG | DIASTOLIC BLOOD PRESSURE: 95 MMHG | HEART RATE: 70 BPM | TEMPERATURE: 98 F | OXYGEN SATURATION: 99 % | RESPIRATION RATE: 18 BRPM

## 2023-09-20 DIAGNOSIS — R94.39 ABNORMAL RESULT OF OTHER CARDIOVASCULAR FUNCTION STUDY: ICD-10-CM

## 2023-09-20 DIAGNOSIS — I10 ESSENTIAL (PRIMARY) HYPERTENSION: ICD-10-CM

## 2023-09-20 PROCEDURE — C1769: CPT

## 2023-09-20 PROCEDURE — 85027 COMPLETE CBC AUTOMATED: CPT

## 2023-09-20 PROCEDURE — C1887: CPT

## 2023-09-20 PROCEDURE — 80048 BASIC METABOLIC PNL TOTAL CA: CPT

## 2023-09-20 PROCEDURE — C1894: CPT

## 2023-09-20 PROCEDURE — 99231 SBSQ HOSP IP/OBS SF/LOW 25: CPT

## 2023-09-20 PROCEDURE — 93005 ELECTROCARDIOGRAM TRACING: CPT

## 2023-09-20 PROCEDURE — 93454 CORONARY ARTERY ANGIO S&I: CPT

## 2023-09-20 PROCEDURE — 82962 GLUCOSE BLOOD TEST: CPT

## 2023-09-20 RX ADMIN — GABAPENTIN 400 MILLIGRAM(S): 400 CAPSULE ORAL at 06:30

## 2023-09-20 RX ADMIN — AMLODIPINE BESYLATE 10 MILLIGRAM(S): 2.5 TABLET ORAL at 06:30

## 2023-09-20 NOTE — DISCHARGE NOTE NURSING/CASE MANAGEMENT/SOCIAL WORK - PATIENT PORTAL LINK FT
You can access the FollowMyHealth Patient Portal offered by [facility] by registering at the following website: [email or web address]. By joining [email or web address]’s FollowMyHealth portal, you will also be able to view your health information using other applications (apps) compatible with our system.

## 2023-09-20 NOTE — DISCHARGE NOTE NURSING/CASE MANAGEMENT/SOCIAL WORK - NSDCVIVACCINE_GEN_ALL_CORE_FT
influenza, injectable, quadrivalent, preservative free; 23-Nov-2022 11:51; Michelle Galvin); [employer]; [id number] (Exp. Date: 30-Jun-2023); IntraMuscular; Deltoid Right.; 0.5 milliLiter(s); VIS (VIS Published: 06-Aug-2021, VIS Presented: 23-Nov-2022);

## 2023-09-20 NOTE — PROGRESS NOTE ADULT - SUBJECTIVE AND OBJECTIVE BOX
HPI:  63 yr old male w pmh severe pain with weakness of  lower back that radiates to both legs - pt hx cervical fusion 10/22 with postop right arm nerve damage with contraction of shoulder / hand - restriction to ROM , hx of PE's was formerly on Xarelto ( stopped 2 weeks ago) .Pt also on Nucynta for severe chronic pain lower back, hx HTN. He now presents following an abnormal stress test for a [facility] for pre-op clearance for a lumbar Decompression L1-5 with Laminectomy with Fusion with Dr Lopez.    Cards: Dr. Joaquin  (19 Sep 2023 12:35)          Allergies    No Known Drug Allergies  peanuts (Unknown)  eggs (Hives; Rash)    Intolerances        Medications:  ALPRAZolam 1 milliGRAM(s) Oral at bedtime  amLODIPine   Tablet 10 milliGRAM(s) Oral daily  gabapentin 400 milliGRAM(s) Oral three times a day  oxycodone    5 mG/acetaminophen 325 mG 2 Tablet(s) Oral every 6 hours PRN      Vitals:  T(C): 36.7 (23 @ 00:46), Max: 36.9 (23 @ 14:55)  HR: 70 (23 @ 00:46) (64 - 92)  BP: 101/67 (23 @ 00:46) (101/67 - 142/89)  BP(mean): 79 (23 @ 00:46) (78 - 110)  RR: 18 (23 @ 00:46) (16 - 19)  SpO2: 98% (23 @ 00:46) (93% - 100%)  Wt(kg): --  Daily Height in cm: 180.34 (19 Sep 2023 12:35)    Daily Weight in k.4 (19 Sep 2023 12:35)  I&O's Summary    19 Sep 2023 07:01  -  20 Sep 2023 02:55  --------------------------------------------------------  IN: 420 mL / OUT: 500 mL / NET: -80 mL          Physical Exam:  Appearance: Normal  Eyes: PERRL, EOMI  HENT: Normal oral muscosa, NC/AT  Cardiovascular: S1S2, RRR, No M/R/G, no JVD, No Lower extremity edema  Procedural Access Site: No hematoma, Non-tender to palpation, 2+ pulse, No bruit, No Ecchymosis  Respiratory: Clear to auscultation bilaterally  Gastrointestinal: Soft, Non tender, Normal Bowel Sounds  Musculoskeletal: No clubbing, No joint deformity   Neurologic: Non-focal  Lymphatic: No lymphadenopathy  Psychiatry: AAOx3, Mood & affect appropriate  Skin: No rashes, No ecchymoses, No cyanosis        140  |  103  |  14  ----------------------------<  119<H>  4.1   |  25  |  0.87    Ca    9.6      19 Sep 2023 12:40              Lipid panel   Hgb A1c                         12.9   4.63  )-----------( 302      ( 19 Sep 2023 12:40 )             40.8         ECG: SR 73 bpm

## 2023-09-20 NOTE — DISCHARGE NOTE NURSING/CASE MANAGEMENT/SOCIAL WORK - NSDCPEFALRISK_GEN_ALL_CORE
For information on Fall & Injury Prevention, visit: [email or web address]/[email or web address]-[email or web address]-mobility OR  [email or web address]/[email or web address] OR  https://www.cdc.gov/steadi/patient.html

## 2023-10-12 PROBLEM — I26.99 OTHER PULMONARY EMBOLISM WITHOUT ACUTE COR PULMONALE: Chronic | Status: ACTIVE | Noted: 2023-09-19

## 2023-10-16 ENCOUNTER — OUTPATIENT (OUTPATIENT)
Dept: OUTPATIENT SERVICES | Facility: HOSPITAL | Age: 63
LOS: 1 days | End: 2023-10-16

## 2023-10-16 VITALS
DIASTOLIC BLOOD PRESSURE: 80 MMHG | WEIGHT: 177.91 LBS | OXYGEN SATURATION: 99 % | RESPIRATION RATE: 16 BRPM | TEMPERATURE: 98 F | HEIGHT: 67.5 IN | HEART RATE: 84 BPM | SYSTOLIC BLOOD PRESSURE: 148 MMHG

## 2023-10-16 DIAGNOSIS — M48.061 SPINAL STENOSIS, LUMBAR REGION WITHOUT NEUROGENIC CLAUDICATION: ICD-10-CM

## 2023-10-16 DIAGNOSIS — Z98.1 ARTHRODESIS STATUS: Chronic | ICD-10-CM

## 2023-10-16 DIAGNOSIS — M54.50 LOW BACK PAIN, UNSPECIFIED: ICD-10-CM

## 2023-10-16 DIAGNOSIS — Z98.890 OTHER SPECIFIED POSTPROCEDURAL STATES: Chronic | ICD-10-CM

## 2023-10-16 DIAGNOSIS — J45.909 UNSPECIFIED ASTHMA, UNCOMPLICATED: ICD-10-CM

## 2023-10-16 DIAGNOSIS — I10 ESSENTIAL (PRIMARY) HYPERTENSION: ICD-10-CM

## 2023-10-16 LAB
BLD GP AB SCN SERPL QL: NEGATIVE — SIGNIFICANT CHANGE UP
HCT VFR BLD CALC: 37.7 % — LOW (ref 39–50)
HGB BLD-MCNC: 12.1 G/DL — LOW (ref 13–17)
MCHC RBC-ENTMCNC: 28.1 PG — SIGNIFICANT CHANGE UP (ref 27–34)
MCHC RBC-ENTMCNC: 32.1 GM/DL — SIGNIFICANT CHANGE UP (ref 32–36)
MCV RBC AUTO: 87.5 FL — SIGNIFICANT CHANGE UP (ref 80–100)
NRBC # BLD: 0 /100 WBCS — SIGNIFICANT CHANGE UP (ref 0–0)
NRBC # FLD: 0 K/UL — SIGNIFICANT CHANGE UP (ref 0–0)
PLATELET # BLD AUTO: 305 K/UL — SIGNIFICANT CHANGE UP (ref 150–400)
RBC # BLD: 4.31 M/UL — SIGNIFICANT CHANGE UP (ref 4.2–5.8)
RBC # FLD: 15 % — HIGH (ref 10.3–14.5)
RH IG SCN BLD-IMP: POSITIVE — SIGNIFICANT CHANGE UP
WBC # BLD: 3.9 K/UL — SIGNIFICANT CHANGE UP (ref 3.8–10.5)
WBC # FLD AUTO: 3.9 K/UL — SIGNIFICANT CHANGE UP (ref 3.8–10.5)

## 2023-10-16 RX ORDER — SODIUM CHLORIDE 9 MG/ML
3 INJECTION INTRAMUSCULAR; INTRAVENOUS; SUBCUTANEOUS EVERY 8 HOURS
Refills: 0 | Status: DISCONTINUED | OUTPATIENT
Start: 2023-10-31 | End: 2023-11-10

## 2023-10-16 RX ORDER — OXYCODONE AND ACETAMINOPHEN 5; 325 MG/1; MG/1
1 TABLET ORAL
Refills: 0 | DISCHARGE

## 2023-10-16 NOTE — H&P PST ADULT - MUSCULOSKELETAL COMMENTS
reports pain and stiffness in left arm for past year - states he is pending MRI weakness and decreased ROM of left arm, pain with ROM back, 2+left LE pitting edema reports pain and stiffness in left arm for past year - states he is pending MRI, left LE swelling - sees vascular, LE doppler was negative, wears compression stockings

## 2023-10-16 NOTE — H&P PST ADULT - OTHER CARE PROVIDERS
[facility] - Dr. Joaquin Cardio - Dr. Joaquin [phone], Vascular - Dr. Chen ([facility])

## 2023-10-16 NOTE — H&P PST ADULT - NSICDXPASTMEDICALHX_GEN_ALL_CORE_FT
PAST MEDICAL HISTORY:  Asthma     Cervical spinal stenosis     Chronic back pain     H/O neuropathy     H/O spinal stenosis     HTN (hypertension)     Localized swelling of both lower legs     Pulmonary embolism

## 2023-10-16 NOTE — H&P PST ADULT - CARDIOVASCULAR COMMENTS
reports pain in left upper chest near axilla x past few months - s/p recent cardiac workup, stress test was abnormal, had cardiac cath which was normal

## 2023-10-16 NOTE — H&P PST ADULT - HISTORY OF PRESENT ILLNESS
63 year old male with lower back pain radiating down right leg x 1 year. Pt presents today for presurgical evaluation for ...    Pt was scheduled for procedure prior but case was postponed due to abnormal stress test. Pt has since had an angiogram.  63 year old male with lower back pain radiating down right leg x 1 year. Pt presents today for presurgical evaluation for Lumbar Decompression L1-L5 with Laminectomy and Fusion.     Pt was scheduled for procedure prior but case was postponed due to abnormal stress test. Pt has since had an angiogram and states he was cleared by his cardiologist.

## 2023-10-16 NOTE — H&P PST ADULT - PROBLEM SELECTOR PLAN 1
Pre-op instructions provided. Pt verbalized understanding.   Pepcid  for GI prophylaxis.   Pt given detailed verbal and written instructions on chlorhexidine wash. Pt verbalized understanding with teachback.   Pt states he obtained cardiac clearance and is obtaining medical clearance this week - requested copies due to pt's c/o SOB and chest pains.

## 2023-10-16 NOTE — H&P PST ADULT - ATTENDING COMMENTS
patient with severe spinal canal stenosis throughout lumbar spine, degenerative scoliosis, history of cervical cord injury with myelomalacia, arm atrophy, bilateral leg weakness, partial foot drop, patient understands goals are to stabilize the lumbar spine and decompress the severe stenosis.  Informed consent is obtained.  Patient understands he has significant comorbidities and this is a high risk procedure but he is not able to have functional and pain controlled quality of life.  He uses significant opiods without long term relief but is now also not able to ambulate.  he also understands need of rehab and potentially ICU care post surgery.

## 2023-10-16 NOTE — H&P PST ADULT - RESPIRATORY AND THORAX COMMENTS
hx asthma, last used inhaler about 2 weeks ago hx asthma, last used inhaler about 2 weeks ago, hx of PE in 10/2022 - treated with Xarelto

## 2023-10-17 LAB
MRSA PCR RESULT.: SIGNIFICANT CHANGE UP
MRSA PCR RESULT.: SIGNIFICANT CHANGE UP
S AUREUS DNA NOSE QL NAA+PROBE: SIGNIFICANT CHANGE UP
S AUREUS DNA NOSE QL NAA+PROBE: SIGNIFICANT CHANGE UP

## 2023-10-30 ENCOUNTER — TRANSCRIPTION ENCOUNTER (OUTPATIENT)
Age: 63
End: 2023-10-30

## 2023-10-30 NOTE — ASU PATIENT PROFILE, ADULT - NSICDXPASTMEDICALHX_GEN_ALL_CORE_FT
PAST MEDICAL HISTORY:  Asthma     Cervical spinal stenosis     Chronic back pain     H/O neuropathy     H/O spinal stenosis     HTN (hypertension)     Localized swelling of both lower legs     Pulmonary embolism      no

## 2023-10-30 NOTE — ASU PATIENT PROFILE, ADULT - NS SC CAGE ALCOHOL EYE OPENER
[FreeTextEntry1] : 76 year female who comes for 6 months followup. She is using Lorazepam to sleep nightly. She has stress relating to her 's Neurological deterioration. She acknowledges a stress level that is "over the top". She is planning a repeat Echo at [facility] with Dr Quinn. Her head was spinning at home today. She anticipated a high BP. She has palpitations at times when in bed. She lost weight without trying
no

## 2023-10-31 ENCOUNTER — TRANSCRIPTION ENCOUNTER (OUTPATIENT)
Age: 63
End: 2023-10-31

## 2023-10-31 ENCOUNTER — INPATIENT (INPATIENT)
Facility: HOSPITAL | Age: 63
LOS: 9 days | Discharge: NURSING, REHAB OR LONG-TERM CARE | End: 2023-11-10
Attending: ORTHOPAEDIC SURGERY | Admitting: ORTHOPAEDIC SURGERY
Payer: COMMERCIAL

## 2023-10-31 VITALS
TEMPERATURE: 98 F | OXYGEN SATURATION: 96 % | WEIGHT: 177.03 LBS | HEIGHT: 67 IN | SYSTOLIC BLOOD PRESSURE: 153 MMHG | DIASTOLIC BLOOD PRESSURE: 98 MMHG | RESPIRATION RATE: 16 BRPM | HEART RATE: 91 BPM

## 2023-10-31 DIAGNOSIS — M48.061 SPINAL STENOSIS, LUMBAR REGION WITHOUT NEUROGENIC CLAUDICATION: ICD-10-CM

## 2023-10-31 DIAGNOSIS — Z98.890 OTHER SPECIFIED POSTPROCEDURAL STATES: Chronic | ICD-10-CM

## 2023-10-31 DIAGNOSIS — Z98.1 ARTHRODESIS STATUS: Chronic | ICD-10-CM

## 2023-10-31 LAB
ANION GAP SERPL CALC-SCNC: 7 MMOL/L — SIGNIFICANT CHANGE UP (ref 7–14)
ANION GAP SERPL CALC-SCNC: 7 MMOL/L — SIGNIFICANT CHANGE UP (ref 7–14)
APTT BLD: 23.2 SEC — LOW (ref 24.5–35.6)
APTT BLD: 23.2 SEC — LOW (ref 24.5–35.6)
BLOOD GAS ARTERIAL - LYTES,HGB,ICA,LACT RESULT: SIGNIFICANT CHANGE UP
BLOOD GAS ARTERIAL - LYTES,HGB,ICA,LACT RESULT: SIGNIFICANT CHANGE UP
BUN SERPL-MCNC: 8 MG/DL — SIGNIFICANT CHANGE UP (ref 7–23)
BUN SERPL-MCNC: 8 MG/DL — SIGNIFICANT CHANGE UP (ref 7–23)
CALCIUM SERPL-MCNC: 9.8 MG/DL — SIGNIFICANT CHANGE UP (ref 8.4–10.5)
CALCIUM SERPL-MCNC: 9.8 MG/DL — SIGNIFICANT CHANGE UP (ref 8.4–10.5)
CHLORIDE SERPL-SCNC: 109 MMOL/L — HIGH (ref 98–107)
CHLORIDE SERPL-SCNC: 109 MMOL/L — HIGH (ref 98–107)
CO2 SERPL-SCNC: 25 MMOL/L — SIGNIFICANT CHANGE UP (ref 22–31)
CO2 SERPL-SCNC: 25 MMOL/L — SIGNIFICANT CHANGE UP (ref 22–31)
CREAT SERPL-MCNC: 0.79 MG/DL — SIGNIFICANT CHANGE UP (ref 0.5–1.3)
CREAT SERPL-MCNC: 0.79 MG/DL — SIGNIFICANT CHANGE UP (ref 0.5–1.3)
EGFR: 100 ML/MIN/1.73M2 — SIGNIFICANT CHANGE UP
EGFR: 100 ML/MIN/1.73M2 — SIGNIFICANT CHANGE UP
GAS PNL BLDA: SIGNIFICANT CHANGE UP
GLUCOSE BLDC GLUCOMTR-MCNC: 105 MG/DL — HIGH (ref 70–99)
GLUCOSE BLDC GLUCOMTR-MCNC: 105 MG/DL — HIGH (ref 70–99)
GLUCOSE SERPL-MCNC: 143 MG/DL — HIGH (ref 70–99)
GLUCOSE SERPL-MCNC: 143 MG/DL — HIGH (ref 70–99)
HCT VFR BLD CALC: 28.7 % — LOW (ref 39–50)
HCT VFR BLD CALC: 28.7 % — LOW (ref 39–50)
HGB BLD-MCNC: 9.6 G/DL — LOW (ref 13–17)
HGB BLD-MCNC: 9.6 G/DL — LOW (ref 13–17)
INR BLD: 1.08 RATIO — SIGNIFICANT CHANGE UP (ref 0.85–1.18)
INR BLD: 1.08 RATIO — SIGNIFICANT CHANGE UP (ref 0.85–1.18)
MAGNESIUM SERPL-MCNC: 1.8 MG/DL — SIGNIFICANT CHANGE UP (ref 1.6–2.6)
MAGNESIUM SERPL-MCNC: 1.8 MG/DL — SIGNIFICANT CHANGE UP (ref 1.6–2.6)
MCHC RBC-ENTMCNC: 29.3 PG — SIGNIFICANT CHANGE UP (ref 27–34)
MCHC RBC-ENTMCNC: 29.3 PG — SIGNIFICANT CHANGE UP (ref 27–34)
MCHC RBC-ENTMCNC: 33.4 GM/DL — SIGNIFICANT CHANGE UP (ref 32–36)
MCHC RBC-ENTMCNC: 33.4 GM/DL — SIGNIFICANT CHANGE UP (ref 32–36)
MCV RBC AUTO: 87.5 FL — SIGNIFICANT CHANGE UP (ref 80–100)
MCV RBC AUTO: 87.5 FL — SIGNIFICANT CHANGE UP (ref 80–100)
NRBC # BLD: 0 /100 WBCS — SIGNIFICANT CHANGE UP (ref 0–0)
NRBC # BLD: 0 /100 WBCS — SIGNIFICANT CHANGE UP (ref 0–0)
NRBC # FLD: 0 K/UL — SIGNIFICANT CHANGE UP (ref 0–0)
NRBC # FLD: 0 K/UL — SIGNIFICANT CHANGE UP (ref 0–0)
PHOSPHATE SERPL-MCNC: 4.7 MG/DL — HIGH (ref 2.5–4.5)
PHOSPHATE SERPL-MCNC: 4.7 MG/DL — HIGH (ref 2.5–4.5)
PLATELET # BLD AUTO: 178 K/UL — SIGNIFICANT CHANGE UP (ref 150–400)
PLATELET # BLD AUTO: 178 K/UL — SIGNIFICANT CHANGE UP (ref 150–400)
POTASSIUM SERPL-MCNC: 5 MMOL/L — SIGNIFICANT CHANGE UP (ref 3.5–5.3)
POTASSIUM SERPL-MCNC: 5 MMOL/L — SIGNIFICANT CHANGE UP (ref 3.5–5.3)
POTASSIUM SERPL-SCNC: 5 MMOL/L — SIGNIFICANT CHANGE UP (ref 3.5–5.3)
POTASSIUM SERPL-SCNC: 5 MMOL/L — SIGNIFICANT CHANGE UP (ref 3.5–5.3)
PROTHROM AB SERPL-ACNC: 12.2 SEC — SIGNIFICANT CHANGE UP (ref 9.5–13)
PROTHROM AB SERPL-ACNC: 12.2 SEC — SIGNIFICANT CHANGE UP (ref 9.5–13)
RBC # BLD: 3.28 M/UL — LOW (ref 4.2–5.8)
RBC # BLD: 3.28 M/UL — LOW (ref 4.2–5.8)
RBC # FLD: 14.5 % — SIGNIFICANT CHANGE UP (ref 10.3–14.5)
RBC # FLD: 14.5 % — SIGNIFICANT CHANGE UP (ref 10.3–14.5)
SODIUM SERPL-SCNC: 141 MMOL/L — SIGNIFICANT CHANGE UP (ref 135–145)
SODIUM SERPL-SCNC: 141 MMOL/L — SIGNIFICANT CHANGE UP (ref 135–145)
WBC # BLD: 7.56 K/UL — SIGNIFICANT CHANGE UP (ref 3.8–10.5)
WBC # BLD: 7.56 K/UL — SIGNIFICANT CHANGE UP (ref 3.8–10.5)
WBC # FLD AUTO: 7.56 K/UL — SIGNIFICANT CHANGE UP (ref 3.8–10.5)
WBC # FLD AUTO: 7.56 K/UL — SIGNIFICANT CHANGE UP (ref 3.8–10.5)

## 2023-10-31 DEVICE — SCREW LOCKG OPEN TULIP RELINE 5.5MM: Type: IMPLANTABLE DEVICE | Status: FUNCTIONAL

## 2023-10-31 DEVICE — IMPLANTABLE DEVICE: Type: IMPLANTABLE DEVICE | Status: FUNCTIONAL

## 2023-10-31 DEVICE — IMP MIS GUIDEWIRE SHARP DISP: Type: IMPLANTABLE DEVICE | Status: FUNCTIONAL

## 2023-10-31 DEVICE — GRAFT BONE INFUSE KIT LG: Type: IMPLANTABLE DEVICE | Status: FUNCTIONAL

## 2023-10-31 DEVICE — ROD BENDING DIGITIZER: Type: IMPLANTABLE DEVICE | Status: FUNCTIONAL

## 2023-10-31 DEVICE — CONN RELINE-O O-O MED 5-6/5-6MM: Type: IMPLANTABLE DEVICE | Status: FUNCTIONAL

## 2023-10-31 DEVICE — SURGIFLO MATRIX WITH THROMBIN KIT: Type: IMPLANTABLE DEVICE | Status: FUNCTIONAL

## 2023-10-31 DEVICE — IMP IFUSE BEDROCK GRANITE 10.5X90MM: Type: IMPLANTABLE DEVICE | Status: FUNCTIONAL

## 2023-10-31 DEVICE — GRAFT FIBERGRAFT PUTTY LRG 11CC: Type: IMPLANTABLE DEVICE | Status: FUNCTIONAL

## 2023-10-31 DEVICE — BONE WAX 2.5GM: Type: IMPLANTABLE DEVICE | Status: FUNCTIONAL

## 2023-10-31 DEVICE — SCREW RELINEO POLY 6.5X45MM: Type: IMPLANTABLE DEVICE | Status: FUNCTIONAL

## 2023-10-31 DEVICE — SCREW RELINEO POLY 6.5X50MM: Type: IMPLANTABLE DEVICE | Status: FUNCTIONAL

## 2023-10-31 DEVICE — SURGIFOAM PAD 8CM X 12.5CM X 2MM (100C): Type: IMPLANTABLE DEVICE | Status: FUNCTIONAL

## 2023-10-31 RX ORDER — GABAPENTIN 400 MG/1
600 CAPSULE ORAL THREE TIMES A DAY
Refills: 0 | Status: DISCONTINUED | OUTPATIENT
Start: 2023-10-31 | End: 2023-11-01

## 2023-10-31 RX ORDER — HYDROMORPHONE HYDROCHLORIDE 2 MG/ML
30 INJECTION INTRAMUSCULAR; INTRAVENOUS; SUBCUTANEOUS
Refills: 0 | Status: DISCONTINUED | OUTPATIENT
Start: 2023-10-31 | End: 2023-11-01

## 2023-10-31 RX ORDER — FLUTICASONE PROPIONATE AND SALMETEROL 50; 250 UG/1; UG/1
1 POWDER ORAL; RESPIRATORY (INHALATION)
Refills: 0 | DISCHARGE

## 2023-10-31 RX ORDER — METHOCARBAMOL 500 MG/1
750 TABLET, FILM COATED ORAL EVERY 6 HOURS
Refills: 0 | Status: DISCONTINUED | OUTPATIENT
Start: 2023-10-31 | End: 2023-11-01

## 2023-10-31 RX ORDER — AMLODIPINE BESYLATE 2.5 MG/1
10 TABLET ORAL DAILY
Refills: 0 | Status: DISCONTINUED | OUTPATIENT
Start: 2023-11-01 | End: 2023-11-10

## 2023-10-31 RX ORDER — ALBUTEROL 90 UG/1
2 AEROSOL, METERED ORAL EVERY 6 HOURS
Refills: 0 | Status: DISCONTINUED | OUTPATIENT
Start: 2023-10-31 | End: 2023-11-10

## 2023-10-31 RX ORDER — GABAPENTIN 400 MG/1
400 CAPSULE ORAL THREE TIMES A DAY
Refills: 0 | Status: DISCONTINUED | OUTPATIENT
Start: 2023-10-31 | End: 2023-10-31

## 2023-10-31 RX ORDER — ACETAMINOPHEN 500 MG
975 TABLET ORAL ONCE
Refills: 0 | Status: COMPLETED | OUTPATIENT
Start: 2023-10-31 | End: 2023-10-31

## 2023-10-31 RX ORDER — DIPHENHYDRAMINE HCL 50 MG
25 CAPSULE ORAL EVERY 4 HOURS
Refills: 0 | Status: DISCONTINUED | OUTPATIENT
Start: 2023-10-31 | End: 2023-11-01

## 2023-10-31 RX ORDER — TRAMADOL HYDROCHLORIDE 50 MG/1
50 TABLET ORAL ONCE
Refills: 0 | Status: DISCONTINUED | OUTPATIENT
Start: 2023-10-31 | End: 2023-10-31

## 2023-10-31 RX ORDER — CEFAZOLIN SODIUM 1 G
2000 VIAL (EA) INJECTION EVERY 8 HOURS
Refills: 0 | Status: COMPLETED | OUTPATIENT
Start: 2023-10-31 | End: 2023-11-02

## 2023-10-31 RX ORDER — HYDROMORPHONE HYDROCHLORIDE 2 MG/ML
0.5 INJECTION INTRAMUSCULAR; INTRAVENOUS; SUBCUTANEOUS
Refills: 0 | Status: DISCONTINUED | OUTPATIENT
Start: 2023-10-31 | End: 2023-11-01

## 2023-10-31 RX ORDER — SODIUM CHLORIDE 9 MG/ML
1000 INJECTION, SOLUTION INTRAVENOUS
Refills: 0 | Status: DISCONTINUED | OUTPATIENT
Start: 2023-10-31 | End: 2023-11-01

## 2023-10-31 RX ORDER — HYDROMORPHONE HYDROCHLORIDE 2 MG/ML
1 INJECTION INTRAMUSCULAR; INTRAVENOUS; SUBCUTANEOUS ONCE
Refills: 0 | Status: DISCONTINUED | OUTPATIENT
Start: 2023-10-31 | End: 2023-10-31

## 2023-10-31 RX ORDER — LIDOCAINE 4 G/100G
1 CREAM TOPICAL EVERY 24 HOURS
Refills: 0 | Status: DISCONTINUED | OUTPATIENT
Start: 2023-10-31 | End: 2023-11-07

## 2023-10-31 RX ORDER — PANTOPRAZOLE SODIUM 20 MG/1
40 TABLET, DELAYED RELEASE ORAL ONCE
Refills: 0 | Status: COMPLETED | OUTPATIENT
Start: 2023-10-31 | End: 2023-10-31

## 2023-10-31 RX ORDER — CHLORHEXIDINE GLUCONATE 213 G/1000ML
1 SOLUTION TOPICAL ONCE
Refills: 0 | Status: COMPLETED | OUTPATIENT
Start: 2023-10-31 | End: 2023-10-31

## 2023-10-31 RX ORDER — NALOXONE HYDROCHLORIDE 4 MG/.1ML
0.1 SPRAY NASAL
Refills: 0 | Status: DISCONTINUED | OUTPATIENT
Start: 2023-10-31 | End: 2023-11-01

## 2023-10-31 RX ORDER — ACETAMINOPHEN 500 MG
1000 TABLET ORAL EVERY 6 HOURS
Refills: 0 | Status: DISCONTINUED | OUTPATIENT
Start: 2023-10-31 | End: 2023-11-01

## 2023-10-31 RX ORDER — ONDANSETRON 8 MG/1
4 TABLET, FILM COATED ORAL EVERY 6 HOURS
Refills: 0 | Status: DISCONTINUED | OUTPATIENT
Start: 2023-10-31 | End: 2023-11-10

## 2023-10-31 RX ADMIN — HYDROMORPHONE HYDROCHLORIDE 1 MILLIGRAM(S): 2 INJECTION INTRAMUSCULAR; INTRAVENOUS; SUBCUTANEOUS at 20:00

## 2023-10-31 RX ADMIN — Medication 100 MILLIGRAM(S): at 22:15

## 2023-10-31 RX ADMIN — SODIUM CHLORIDE 50 MILLILITER(S): 9 INJECTION, SOLUTION INTRAVENOUS at 20:54

## 2023-10-31 RX ADMIN — TRAMADOL HYDROCHLORIDE 50 MILLIGRAM(S): 50 TABLET ORAL at 07:07

## 2023-10-31 RX ADMIN — Medication 1000 MILLIGRAM(S): at 20:00

## 2023-10-31 RX ADMIN — Medication 975 MILLIGRAM(S): at 07:07

## 2023-10-31 RX ADMIN — CHLORHEXIDINE GLUCONATE 1 APPLICATION(S): 213 SOLUTION TOPICAL at 07:07

## 2023-10-31 RX ADMIN — SODIUM CHLORIDE 3 MILLILITER(S): 9 INJECTION INTRAMUSCULAR; INTRAVENOUS; SUBCUTANEOUS at 21:13

## 2023-10-31 RX ADMIN — HYDROMORPHONE HYDROCHLORIDE 30 MILLILITER(S): 2 INJECTION INTRAMUSCULAR; INTRAVENOUS; SUBCUTANEOUS at 20:53

## 2023-10-31 RX ADMIN — Medication 400 MILLIGRAM(S): at 19:45

## 2023-10-31 RX ADMIN — GABAPENTIN 600 MILLIGRAM(S): 400 CAPSULE ORAL at 22:49

## 2023-10-31 RX ADMIN — HYDROMORPHONE HYDROCHLORIDE 1 MILLIGRAM(S): 2 INJECTION INTRAMUSCULAR; INTRAVENOUS; SUBCUTANEOUS at 19:45

## 2023-10-31 RX ADMIN — PANTOPRAZOLE SODIUM 40 MILLIGRAM(S): 20 TABLET, DELAYED RELEASE ORAL at 07:08

## 2023-10-31 RX ADMIN — METHOCARBAMOL 750 MILLIGRAM(S): 500 TABLET, FILM COATED ORAL at 20:59

## 2023-10-31 RX ADMIN — HYDROMORPHONE HYDROCHLORIDE 30 MILLILITER(S): 2 INJECTION INTRAMUSCULAR; INTRAVENOUS; SUBCUTANEOUS at 22:21

## 2023-10-31 RX ADMIN — Medication 150 MILLIGRAM(S): at 07:07

## 2023-10-31 NOTE — DISCHARGE NOTE PROVIDER - CARE PROVIDER_API CALL
Ami Lopez  Orthopaedic Surgery  [address], [place] [address]  Phone: [phone]  Fax: [phone]  Established Patient  Follow Up Time:

## 2023-10-31 NOTE — DISCHARGE NOTE PROVIDER - NSDCCPCAREPLAN_GEN_ALL_CORE_FT
PRINCIPAL DISCHARGE DIAGNOSIS  Diagnosis: Spinal stenosis of lumbar region  Assessment and Plan of Treatment: IMPORTANT: *DO NOT resume any anticoagulation/blood thinners (Aspirin, Plavix, Eliquis, Coumadin, Xarelto, ect.) until instructed by your surgeon.*  *DO NOT take any NSAIDs (Motrin, Aleve, Advil, Ibuprofen, Celebrex, ect.) until instructed by  your surgeon.*  Diet: Continue regular diet upon discharge.   Activity: No heavy lifting. Avoid straining or excessive activity. DO NOT sit for long periods of time.   -DO NOT bend, twist, or lift heavy objects for at least 3 months.   -DO NOT drive until cleared by your surgeon.   -To reduce strain/pressure on your spine place a pillow under your knees when lying on your back. Place a pillow between your knees when lying on your side.   -When you sit put your feet up on a foot rest. DO NOT lie on your stomach or with your legs flat.  -If you need to bend over, bend at your knees, not your back.  -We encourage you to take many short walks after your surgery to help blood move through your body and to prevent blood clots from forming. If you feel weak or dizzy, sit or lie down right away.   Dressings: Keep dressing clean, dry, and intact. Remove all cotton and yellow gauze 72 hours after surgery. You do not need to put a new dressing on your wound unless there is light drainage. If that occurs place Band-Aids on your wound.   Other Care:  -You may shower 72 hours after surgery but DO NOT soak dressing and/or incision. The water may run over your dressing/incision but DO NOT let the water directly hit your dressing/incision (take a shower with your wound away from the direct stream of water).  NO hot tubes, NO bath tubs, NO swimming pools.

## 2023-10-31 NOTE — BRIEF OPERATIVE NOTE - NSICDXBRIEFPROCEDURE_GEN_ALL_CORE_FT
PROCEDURES:  Fusion, posterior spinal column, lumbar, 4 levels, posterior approach 31-Oct-2023 18:54:22  Yessy Charles

## 2023-10-31 NOTE — DISCHARGE NOTE PROVIDER - NSDCMRMEDTOKEN_GEN_ALL_CORE_FT
Advair Diskus 250 mcg-50 mcg inhalation powder: 1 inhaled once a day pt takes PRN  Albuterol (Eqv-ProAir HFA) 90 mcg/inh inhalation aerosol: 2 puff(s) inhaled every 4 hours as needed for  shortness of breath and/or wheezing  ALPRAZolam 0.5 mg oral tablet: 1 tablet orally once a day (at bedtime) as needed for  anxiety  amLODIPine 10 mg oral tablet: 1 tab(s) orally once a day (in the morning)  diclofenac 1% topical gel: Apply topically to affected area once a day as needed LD 10/23/2023  gabapentin 400 mg oral tablet: 1 tab(s) orally 3 times a day  Lipitor 20 mg oral tablet: 1 tab(s) orally once a day  mometasone 0.1% topical cream: Apply topically to affected area once a day as needed  Nucynta 100 mg oral tablet: 1 tab(s) orally 4 times a day  Percocet 10/325 oral tablet: 1 tab(s) orally 4 times a day as needed for  severe pain   Advair Diskus 250 mcg-50 mcg inhalation powder: 1 inhaled once a day pt takes PRN  Albuterol (Eqv-ProAir HFA) 90 mcg/inh inhalation aerosol: 2 puff(s) inhaled every 4 hours as needed for  shortness of breath and/or wheezing  ALPRAZolam 0.5 mg oral tablet: 1 tablet orally once a day (at bedtime) as needed for  anxiety  amLODIPine 10 mg oral tablet: 1 tab(s) orally once a day (in the morning)  Augmentin 875 mg-125 mg oral tablet: 1 tab(s) orally 2 times a day  gabapentin 400 mg oral tablet: 1 tab(s) orally 3 times a day  heparin: 5,000 milligram(s) subcutaneous every 8 hours  Lipitor 20 mg oral tablet: 1 tab(s) orally once a day  Multiple Vitamins oral tablet: 1 tab(s) orally once a day  Nucynta 100 mg oral tablet: 1 tab(s) orally 4 times a day  Percocet 10/325 oral tablet: 1 tab(s) orally 4 times a day as needed for  severe pain  polyethylene glycol 3350 oral powder for reconstitution: 17 gram(s) orally once a day  senna leaf extract oral tablet: 2 tab(s) orally once a day (at bedtime)  tiZANidine 2 mg oral tablet: 1 tab(s) orally every 6 hours

## 2023-10-31 NOTE — DISCHARGE NOTE PROVIDER - NSDCCPTREATMENT_GEN_ALL_CORE_FT
PRINCIPAL PROCEDURE  Procedure: Fusion, posterior spinal column, lumbar, 4 levels, posterior approach  Findings and Treatment: Pain control:    Standing:         -Acetaminophen 500mg - 2 tabs every 8 hours  As needed:        -Flexeril 5mg - 1 tab three times a day - Take as needed for musle spasms        -Gabapentin 100mg - 1 tab every 8 hours        -Tramadol 50mg - 1 tab every 6 hours - Take only if needed for MODERATE pain       -Oxycodone 5mg - 1 tab every 4-6 hours - Take only if needed for SEVERE or BREAKTHROUGH pain  Oxycodone and Tramadol have been sent to your pharmacy. Please do not drive, operate machinery, or make important decisions while taking these medications.   Other Medications: (Standing)  -Protonix 40mg - 1 tab every 24 hours - to prevent stomach irritation/ulcers  -Senna 8.6mg - 2 pills every 24 hours - stool softener  -Miralax 17g - daily - constipation   Follow up: Please follow up at your prescheduled post-operative follow up appointment with Dr. Lopez for 2 weeks after hospital discharge. Please call with any questions or concerns including fevers/chills, worsening pain, pus from the wounds, redness of the skin, new or worsening trouble when you swallow, worsening hoarsness or trouble speaking, difficulty breathing or heaviness in the chest at [phone].   Please seek immediate care or call 911 if:   -Your bandage begins to soak with blood  -Your surgical wound breaks open  -You have severe back or leg pain  -You cannot control your bladder or bowel movements  -You have a high fever with nausea and vomiting  -You have painful swelling in your neck AND trouble swallowing  -You have new or worsening trouble moving your neck, arms, or legs     PRINCIPAL PROCEDURE  Procedure: Fusion, posterior spinal column, lumbar, 4 levels, posterior approach  Findings and Treatment: This is a 62yo Male with PMH of asthma, neuropathy, PE s/p Csp fusion who presents to [facility] for orthopedic surgery. Patient s/p L1-pelvis PSF and decompression with Dr. Lopez on 10/31/2023. Patient tolerated the procedure well without any intraoperative complications. Postoperatively patient went to SICU for q1hr neurochecks and was downgraded on  Pain Management was consulted and followed along throughout hospital stay. Patient underwent a post op fever workup which revealed possible pneumonia and patient was started on Zosyn on 11/7/2023 and will continue antibiotics until 11/11/2023 (Augmentin upon discharge). Patient tolerated physical therapy, weight bearing as tolerated and pain was controlled. Seen by medical attending for continuity of care and management and cleared for safe discharge. As per surgeon, the patient is stable and ready for discharge. DO NOT take any NSAIDS (motrin, advil, ibuprofen, aleve), Aspirin, Anti-inflammatory medications unless instructed by your orthopedic surgeon to continue. Avoid any heavy lifting, bending, squatting, or twisting motions. Keep dressing/incision clean, dry and intact, may remove dressing two days after discharge and leave incision open to air. Any sutures/staples to be removed on post-op day #14 at your office visit. Please follow up with Dr. Lopez in 2 weeks. Please follow up with your PMD for continuity of care and management as medications may have changed.

## 2023-10-31 NOTE — DISCHARGE NOTE PROVIDER - HOSPITAL COURSE
This is a 64yo Male with PMH of asthma, neuropathy, PE s/p Csp fusion who presents to [facility] for orthopedic surgery. Patient s/p L1-pelvis PSF and decompression with Dr. Lopez on 10/31/2023. Patient tolerated the procedure well without any intraoperative complications. Postoperatively patient went to SICU for q1hr neurochecks. While in SICU ****    ********************************************    Patient tolerated physical therapy, weight bearing as tolerated and pain was controlled. Seen by medical attending for continuity of care and management and cleared for safe discharge. As per surgeon, the patient is stable and ready for discharge. DO NOT take any NSAIDS (motrin, advil, ibuprofen, aleve), Aspirin, Anti-inflammatory medications unless instructed by your orthopedic surgeon to continue. Avoid any heavy lifting, bending, squatting, or twisting motions. Keep dressing/incision clean, dry and intact, may remove dressing two days after discharge and leave incision open to air. Any sutures/staples to be removed on post-op day #14 at your office visit. Please follow up with Dr. Lopez in 2 weeks. Please follow up with your PMD for continuity of care and management as medications may have changed. This is a 64yo Male with PMH of asthma, neuropathy, PE s/p Csp fusion who presents to [facility] for orthopedic surgery. Patient s/p L1-pelvis PSF and decompression with Dr. Lopez on 10/31/2023. Patient tolerated the procedure well without any intraoperative complications. Postoperatively patient went to SICU for q1hr neurochecks and was downgraded on  Pain Management was consulted and followed along throughout hospital stay. Patient underwent a post op fever workup which revealed possible pneumonia and patient was started on Zosyn on 11/7/2023 and will continue antibiotics until 11/11/2023 (Augmentin upon discharge). Patient tolerated physical therapy, weight bearing as tolerated and pain was controlled. Seen by medical attending for continuity of care and management and cleared for safe discharge. As per surgeon, the patient is stable and ready for discharge. DO NOT take any NSAIDS (motrin, advil, ibuprofen, aleve), Aspirin, Anti-inflammatory medications unless instructed by your orthopedic surgeon to continue. Avoid any heavy lifting, bending, squatting, or twisting motions. Keep dressing/incision clean, dry and intact, may remove dressing two days after discharge and leave incision open to air. Any sutures/staples to be removed on post-op day #14 at your office visit. Please follow up with Dr. Lopez in 2 weeks. Please follow up with your PMD for continuity of care and management as medications may have changed.

## 2023-10-31 NOTE — PROGRESS NOTE ADULT - SUBJECTIVE AND OBJECTIVE BOX
Patient Resting without complaints.      Exam:   Gen: Alert/Oriented, No Acute Distress  Pulm: Non-labored breathing  BACK:          Dressing: [x] clean/dry/intact  [ ] Other:           Drains: : YOON SS         Sensation: [x] intact to light touch  [ ] decreased:   Motor:                   C5                C6              C7               C8           T1   R            5/5                5/5            5/5             5/5          5/5  L             3/5               3/5             3/5             3/5          3/5                L2             L3             L4               L5            S1  R         5/5           5/5          5/5             5/5           5/5  L          1/5          1/5           1/5             1/5           2/5    Sensory:            C5         C6         C7      C8       T1        (0=absent, 1=impaired, 2=normal, NT=not testable)  R         2            2           2        2         2  L          2            2           2        2         2               L2          L3         L4      L5       S1         (0=absent, 1=impaired, 2=normal, NT=not testable)  R         2            2            2        2        2  L          2            2           2        2         2           WWP; capillary refill <3 seconds              LABS:                        9.2<L>  8.52  )-----------( 155      ( 01 Nov 2023 00:30 )             27.4<L>    11-01    138  |  103  |  8   ----------------------------<  123<H>  4.2   |  25  |  0.79          Assessment and Plan:  Pt is a  with  63yy/o  Male  POD#0  s/p L1-pelvis PSF, L1-5 decompression. Patient is hemodynamically stable; recovering well from orhtopaedic standpoint.    - q1 hour neurochecks for 24 hours  - ok for subQ H after 48 hours  - f/u LSO brace  -    Multimodal Pain control  -    DVT ppx: Venodynes  - Ancef while drains are in  -    Check AM labs  -    Monitor YOON output  -    Weight bearing status: WBAT  -    PT/OT  -    Dispo pending

## 2023-11-01 LAB
ANION GAP SERPL CALC-SCNC: 10 MMOL/L — SIGNIFICANT CHANGE UP (ref 7–14)
ANION GAP SERPL CALC-SCNC: 10 MMOL/L — SIGNIFICANT CHANGE UP (ref 7–14)
APPEARANCE UR: CLEAR — SIGNIFICANT CHANGE UP
APPEARANCE UR: CLEAR — SIGNIFICANT CHANGE UP
APTT BLD: 19.1 SEC — SIGNIFICANT CHANGE UP (ref 24.5–35.6)
APTT BLD: 19.1 SEC — SIGNIFICANT CHANGE UP (ref 24.5–35.6)
BACTERIA # UR AUTO: NEGATIVE /HPF — SIGNIFICANT CHANGE UP
BACTERIA # UR AUTO: NEGATIVE /HPF — SIGNIFICANT CHANGE UP
BILIRUB UR-MCNC: NEGATIVE — SIGNIFICANT CHANGE UP
BILIRUB UR-MCNC: NEGATIVE — SIGNIFICANT CHANGE UP
BUN SERPL-MCNC: 8 MG/DL — SIGNIFICANT CHANGE UP (ref 7–23)
BUN SERPL-MCNC: 8 MG/DL — SIGNIFICANT CHANGE UP (ref 7–23)
CALCIUM SERPL-MCNC: 9.2 MG/DL — SIGNIFICANT CHANGE UP (ref 8.4–10.5)
CALCIUM SERPL-MCNC: 9.2 MG/DL — SIGNIFICANT CHANGE UP (ref 8.4–10.5)
CAST: 0 /LPF — SIGNIFICANT CHANGE UP (ref 0–4)
CAST: 0 /LPF — SIGNIFICANT CHANGE UP (ref 0–4)
CHLORIDE SERPL-SCNC: 103 MMOL/L — SIGNIFICANT CHANGE UP (ref 98–107)
CHLORIDE SERPL-SCNC: 103 MMOL/L — SIGNIFICANT CHANGE UP (ref 98–107)
CO2 SERPL-SCNC: 25 MMOL/L — SIGNIFICANT CHANGE UP (ref 22–31)
CO2 SERPL-SCNC: 25 MMOL/L — SIGNIFICANT CHANGE UP (ref 22–31)
COLOR SPEC: YELLOW — SIGNIFICANT CHANGE UP
COLOR SPEC: YELLOW — SIGNIFICANT CHANGE UP
CREAT SERPL-MCNC: 0.79 MG/DL — SIGNIFICANT CHANGE UP (ref 0.5–1.3)
CREAT SERPL-MCNC: 0.79 MG/DL — SIGNIFICANT CHANGE UP (ref 0.5–1.3)
DIFF PNL FLD: ABNORMAL
DIFF PNL FLD: ABNORMAL
EGFR: 100 ML/MIN/1.73M2 — SIGNIFICANT CHANGE UP
EGFR: 100 ML/MIN/1.73M2 — SIGNIFICANT CHANGE UP
GLUCOSE SERPL-MCNC: 123 MG/DL — HIGH (ref 70–99)
GLUCOSE SERPL-MCNC: 123 MG/DL — HIGH (ref 70–99)
GLUCOSE UR QL: NEGATIVE MG/DL — SIGNIFICANT CHANGE UP
GLUCOSE UR QL: NEGATIVE MG/DL — SIGNIFICANT CHANGE UP
HCT VFR BLD CALC: 27.4 % — LOW (ref 39–50)
HCT VFR BLD CALC: 27.4 % — LOW (ref 39–50)
HGB BLD-MCNC: 9.2 G/DL — LOW (ref 13–17)
HGB BLD-MCNC: 9.2 G/DL — LOW (ref 13–17)
INR BLD: 1.22 RATIO — HIGH (ref 0.85–1.18)
INR BLD: 1.22 RATIO — HIGH (ref 0.85–1.18)
KETONES UR-MCNC: NEGATIVE MG/DL — SIGNIFICANT CHANGE UP
KETONES UR-MCNC: NEGATIVE MG/DL — SIGNIFICANT CHANGE UP
LEUKOCYTE ESTERASE UR-ACNC: NEGATIVE — SIGNIFICANT CHANGE UP
LEUKOCYTE ESTERASE UR-ACNC: NEGATIVE — SIGNIFICANT CHANGE UP
MAGNESIUM SERPL-MCNC: 1.7 MG/DL — SIGNIFICANT CHANGE UP (ref 1.6–2.6)
MAGNESIUM SERPL-MCNC: 1.7 MG/DL — SIGNIFICANT CHANGE UP (ref 1.6–2.6)
MCHC RBC-ENTMCNC: 29.4 PG — SIGNIFICANT CHANGE UP (ref 27–34)
MCHC RBC-ENTMCNC: 29.4 PG — SIGNIFICANT CHANGE UP (ref 27–34)
MCHC RBC-ENTMCNC: 33.6 GM/DL — SIGNIFICANT CHANGE UP (ref 32–36)
MCHC RBC-ENTMCNC: 33.6 GM/DL — SIGNIFICANT CHANGE UP (ref 32–36)
MCV RBC AUTO: 87.5 FL — SIGNIFICANT CHANGE UP (ref 80–100)
MCV RBC AUTO: 87.5 FL — SIGNIFICANT CHANGE UP (ref 80–100)
NITRITE UR-MCNC: NEGATIVE — SIGNIFICANT CHANGE UP
NITRITE UR-MCNC: NEGATIVE — SIGNIFICANT CHANGE UP
NRBC # BLD: 0 /100 WBCS — SIGNIFICANT CHANGE UP (ref 0–0)
NRBC # BLD: 0 /100 WBCS — SIGNIFICANT CHANGE UP (ref 0–0)
NRBC # FLD: 0 K/UL — SIGNIFICANT CHANGE UP (ref 0–0)
NRBC # FLD: 0 K/UL — SIGNIFICANT CHANGE UP (ref 0–0)
PH UR: 6 — SIGNIFICANT CHANGE UP (ref 5–8)
PH UR: 6 — SIGNIFICANT CHANGE UP (ref 5–8)
PHOSPHATE SERPL-MCNC: 3.3 MG/DL — SIGNIFICANT CHANGE UP (ref 2.5–4.5)
PHOSPHATE SERPL-MCNC: 3.3 MG/DL — SIGNIFICANT CHANGE UP (ref 2.5–4.5)
PLATELET # BLD AUTO: 155 K/UL — SIGNIFICANT CHANGE UP (ref 150–400)
PLATELET # BLD AUTO: 155 K/UL — SIGNIFICANT CHANGE UP (ref 150–400)
POTASSIUM SERPL-MCNC: 4.2 MMOL/L — SIGNIFICANT CHANGE UP (ref 3.5–5.3)
POTASSIUM SERPL-MCNC: 4.2 MMOL/L — SIGNIFICANT CHANGE UP (ref 3.5–5.3)
POTASSIUM SERPL-SCNC: 4.2 MMOL/L — SIGNIFICANT CHANGE UP (ref 3.5–5.3)
POTASSIUM SERPL-SCNC: 4.2 MMOL/L — SIGNIFICANT CHANGE UP (ref 3.5–5.3)
PROT UR-MCNC: NEGATIVE MG/DL — SIGNIFICANT CHANGE UP
PROT UR-MCNC: NEGATIVE MG/DL — SIGNIFICANT CHANGE UP
PROTHROM AB SERPL-ACNC: 13.7 SEC — HIGH (ref 9.5–13)
PROTHROM AB SERPL-ACNC: 13.7 SEC — HIGH (ref 9.5–13)
RBC # BLD: 3.13 M/UL — LOW (ref 4.2–5.8)
RBC # BLD: 3.13 M/UL — LOW (ref 4.2–5.8)
RBC # FLD: 14.7 % — HIGH (ref 10.3–14.5)
RBC # FLD: 14.7 % — HIGH (ref 10.3–14.5)
RBC CASTS # UR COMP ASSIST: 8 /HPF — HIGH (ref 0–4)
RBC CASTS # UR COMP ASSIST: 8 /HPF — HIGH (ref 0–4)
SODIUM SERPL-SCNC: 138 MMOL/L — SIGNIFICANT CHANGE UP (ref 135–145)
SODIUM SERPL-SCNC: 138 MMOL/L — SIGNIFICANT CHANGE UP (ref 135–145)
SP GR SPEC: 1.01 — SIGNIFICANT CHANGE UP (ref 1–1.03)
SP GR SPEC: 1.01 — SIGNIFICANT CHANGE UP (ref 1–1.03)
SQUAMOUS # UR AUTO: 1 /HPF — SIGNIFICANT CHANGE UP (ref 0–5)
SQUAMOUS # UR AUTO: 1 /HPF — SIGNIFICANT CHANGE UP (ref 0–5)
UROBILINOGEN FLD QL: 0.2 MG/DL — SIGNIFICANT CHANGE UP (ref 0.2–1)
UROBILINOGEN FLD QL: 0.2 MG/DL — SIGNIFICANT CHANGE UP (ref 0.2–1)
WBC # BLD: 8.52 K/UL — SIGNIFICANT CHANGE UP (ref 3.8–10.5)
WBC # BLD: 8.52 K/UL — SIGNIFICANT CHANGE UP (ref 3.8–10.5)
WBC # FLD AUTO: 8.52 K/UL — SIGNIFICANT CHANGE UP (ref 3.8–10.5)
WBC # FLD AUTO: 8.52 K/UL — SIGNIFICANT CHANGE UP (ref 3.8–10.5)
WBC UR QL: 2 /HPF — SIGNIFICANT CHANGE UP (ref 0–5)
WBC UR QL: 2 /HPF — SIGNIFICANT CHANGE UP (ref 0–5)

## 2023-11-01 PROCEDURE — 99222 1ST HOSP IP/OBS MODERATE 55: CPT

## 2023-11-01 PROCEDURE — 71045 X-RAY EXAM CHEST 1 VIEW: CPT | Mod: 26

## 2023-11-01 RX ORDER — ATORVASTATIN CALCIUM 80 MG/1
20 TABLET, FILM COATED ORAL AT BEDTIME
Refills: 0 | Status: DISCONTINUED | OUTPATIENT
Start: 2023-11-01 | End: 2023-11-10

## 2023-11-01 RX ORDER — DEXAMETHASONE 0.5 MG/5ML
4 ELIXIR ORAL EVERY 8 HOURS
Refills: 0 | Status: COMPLETED | OUTPATIENT
Start: 2023-11-01 | End: 2023-11-02

## 2023-11-01 RX ORDER — GABAPENTIN 400 MG/1
200 CAPSULE ORAL ONCE
Refills: 0 | Status: COMPLETED | OUTPATIENT
Start: 2023-11-01 | End: 2023-11-01

## 2023-11-01 RX ORDER — MAGNESIUM SULFATE 500 MG/ML
1 VIAL (ML) INJECTION ONCE
Refills: 0 | Status: COMPLETED | OUTPATIENT
Start: 2023-11-01 | End: 2023-11-01

## 2023-11-01 RX ORDER — HYDROMORPHONE HYDROCHLORIDE 2 MG/ML
2 INJECTION INTRAMUSCULAR; INTRAVENOUS; SUBCUTANEOUS ONCE
Refills: 0 | Status: DISCONTINUED | OUTPATIENT
Start: 2023-11-01 | End: 2023-11-02

## 2023-11-01 RX ORDER — ALPRAZOLAM 0.25 MG
0.5 TABLET ORAL AT BEDTIME
Refills: 0 | Status: DISCONTINUED | OUTPATIENT
Start: 2023-11-01 | End: 2023-11-05

## 2023-11-01 RX ORDER — OXYCODONE HYDROCHLORIDE 5 MG/1
10 TABLET ORAL
Refills: 0 | Status: DISCONTINUED | OUTPATIENT
Start: 2023-11-01 | End: 2023-11-04

## 2023-11-01 RX ORDER — OXYCODONE HYDROCHLORIDE 5 MG/1
15 TABLET ORAL
Refills: 0 | Status: DISCONTINUED | OUTPATIENT
Start: 2023-11-01 | End: 2023-11-04

## 2023-11-01 RX ORDER — METHOCARBAMOL 500 MG/1
750 TABLET, FILM COATED ORAL EVERY 6 HOURS
Refills: 0 | Status: DISCONTINUED | OUTPATIENT
Start: 2023-11-01 | End: 2023-11-02

## 2023-11-01 RX ORDER — ACETAMINOPHEN 500 MG
1000 TABLET ORAL EVERY 6 HOURS
Refills: 0 | Status: DISCONTINUED | OUTPATIENT
Start: 2023-11-01 | End: 2023-11-03

## 2023-11-01 RX ORDER — SODIUM CHLORIDE 9 MG/ML
1000 INJECTION INTRAMUSCULAR; INTRAVENOUS; SUBCUTANEOUS
Refills: 0 | Status: DISCONTINUED | OUTPATIENT
Start: 2023-11-01 | End: 2023-11-01

## 2023-11-01 RX ORDER — DEXAMETHASONE 0.5 MG/5ML
4 ELIXIR ORAL EVERY 8 HOURS
Refills: 0 | Status: DISCONTINUED | OUTPATIENT
Start: 2023-11-01 | End: 2023-11-01

## 2023-11-01 RX ORDER — GABAPENTIN 400 MG/1
800 CAPSULE ORAL THREE TIMES A DAY
Refills: 0 | Status: DISCONTINUED | OUTPATIENT
Start: 2023-11-01 | End: 2023-11-10

## 2023-11-01 RX ORDER — CYCLOBENZAPRINE HYDROCHLORIDE 10 MG/1
10 TABLET, FILM COATED ORAL THREE TIMES A DAY
Refills: 0 | Status: DISCONTINUED | OUTPATIENT
Start: 2023-11-01 | End: 2023-11-03

## 2023-11-01 RX ADMIN — Medication 100 MILLIGRAM(S): at 14:17

## 2023-11-01 RX ADMIN — Medication 400 MILLIGRAM(S): at 00:24

## 2023-11-01 RX ADMIN — Medication 1000 MILLIGRAM(S): at 17:13

## 2023-11-01 RX ADMIN — Medication 100 MILLIGRAM(S): at 05:46

## 2023-11-01 RX ADMIN — Medication 1000 MILLIGRAM(S): at 23:31

## 2023-11-01 RX ADMIN — Medication 1000 MILLIGRAM(S): at 11:34

## 2023-11-01 RX ADMIN — OXYCODONE HYDROCHLORIDE 15 MILLIGRAM(S): 5 TABLET ORAL at 12:00

## 2023-11-01 RX ADMIN — OXYCODONE HYDROCHLORIDE 15 MILLIGRAM(S): 5 TABLET ORAL at 21:00

## 2023-11-01 RX ADMIN — GABAPENTIN 600 MILLIGRAM(S): 400 CAPSULE ORAL at 05:45

## 2023-11-01 RX ADMIN — METHOCARBAMOL 750 MILLIGRAM(S): 500 TABLET, FILM COATED ORAL at 12:45

## 2023-11-01 RX ADMIN — HYDROMORPHONE HYDROCHLORIDE 30 MILLILITER(S): 2 INJECTION INTRAMUSCULAR; INTRAVENOUS; SUBCUTANEOUS at 07:19

## 2023-11-01 RX ADMIN — ONDANSETRON 4 MILLIGRAM(S): 8 TABLET, FILM COATED ORAL at 16:46

## 2023-11-01 RX ADMIN — OXYCODONE HYDROCHLORIDE 15 MILLIGRAM(S): 5 TABLET ORAL at 11:30

## 2023-11-01 RX ADMIN — Medication 1000 MILLIGRAM(S): at 00:30

## 2023-11-01 RX ADMIN — Medication 400 MILLIGRAM(S): at 05:45

## 2023-11-01 RX ADMIN — OXYCODONE HYDROCHLORIDE 15 MILLIGRAM(S): 5 TABLET ORAL at 20:00

## 2023-11-01 RX ADMIN — OXYCODONE HYDROCHLORIDE 15 MILLIGRAM(S): 5 TABLET ORAL at 18:00

## 2023-11-01 RX ADMIN — GABAPENTIN 200 MILLIGRAM(S): 400 CAPSULE ORAL at 21:57

## 2023-11-01 RX ADMIN — OXYCODONE HYDROCHLORIDE 15 MILLIGRAM(S): 5 TABLET ORAL at 14:25

## 2023-11-01 RX ADMIN — METHOCARBAMOL 750 MILLIGRAM(S): 500 TABLET, FILM COATED ORAL at 23:32

## 2023-11-01 RX ADMIN — GABAPENTIN 600 MILLIGRAM(S): 400 CAPSULE ORAL at 21:23

## 2023-11-01 RX ADMIN — CYCLOBENZAPRINE HYDROCHLORIDE 10 MILLIGRAM(S): 10 TABLET, FILM COATED ORAL at 21:24

## 2023-11-01 RX ADMIN — HYDROMORPHONE HYDROCHLORIDE 0.5 MILLIGRAM(S): 2 INJECTION INTRAMUSCULAR; INTRAVENOUS; SUBCUTANEOUS at 10:22

## 2023-11-01 RX ADMIN — SODIUM CHLORIDE 3 MILLILITER(S): 9 INJECTION INTRAMUSCULAR; INTRAVENOUS; SUBCUTANEOUS at 14:00

## 2023-11-01 RX ADMIN — ATORVASTATIN CALCIUM 20 MILLIGRAM(S): 80 TABLET, FILM COATED ORAL at 21:24

## 2023-11-01 RX ADMIN — OXYCODONE HYDROCHLORIDE 15 MILLIGRAM(S): 5 TABLET ORAL at 17:31

## 2023-11-01 RX ADMIN — Medication 100 GRAM(S): at 03:09

## 2023-11-01 RX ADMIN — GABAPENTIN 600 MILLIGRAM(S): 400 CAPSULE ORAL at 14:16

## 2023-11-01 RX ADMIN — Medication 100 MILLIGRAM(S): at 21:30

## 2023-11-01 RX ADMIN — LIDOCAINE 1 PATCH: 4 CREAM TOPICAL at 21:33

## 2023-11-01 RX ADMIN — METHOCARBAMOL 750 MILLIGRAM(S): 500 TABLET, FILM COATED ORAL at 17:14

## 2023-11-01 RX ADMIN — SODIUM CHLORIDE 3 MILLILITER(S): 9 INJECTION INTRAMUSCULAR; INTRAVENOUS; SUBCUTANEOUS at 06:06

## 2023-11-01 RX ADMIN — SODIUM CHLORIDE 3 MILLILITER(S): 9 INJECTION INTRAMUSCULAR; INTRAVENOUS; SUBCUTANEOUS at 22:05

## 2023-11-01 RX ADMIN — Medication 0.5 MILLIGRAM(S): at 23:31

## 2023-11-01 RX ADMIN — Medication 4 MILLIGRAM(S): at 21:24

## 2023-11-01 RX ADMIN — OXYCODONE HYDROCHLORIDE 15 MILLIGRAM(S): 5 TABLET ORAL at 14:55

## 2023-11-01 NOTE — PHYSICAL THERAPY INITIAL EVALUATION ADULT - DIAGNOSIS, PT EVAL
Pt s/p L1->Pelvis PSF, L1-L5 Decompression on 10/31/2023; pt presents with decreased strength, decreased balance, decreased sensation, and decreased functional mobility.

## 2023-11-01 NOTE — PROGRESS NOTE ADULT - SUBJECTIVE AND OBJECTIVE BOX
Anesthesia Pain Management Service    SUBJECTIVE: Patient reports severe pain located in his right side. Reports that the pain is constant and burning and the entire right side hurts. Patient with chronic back pain on Percocet 10-325mg 4 times a day, Gabapentin 600mg 4 times a day, Flexeril 10mg 4 times a day. Also rep[orts taking Xanax at bedtime to help with sleep. Nucynta 100mg 4 times a day which was last taken in July 2023, states the office did not do the recertification and he was unable to take Nucynta since July. States the IV PCA does not help with his pain. Requesting to be  switched to Percocet since that works for his pain better. Patient's pain managed outpatient by Dr. Fraser.  Pain Scale Score	At rest: __10/10th Activity: ___ 	[X ] Refer to charted pain scores    THERAPY:    [ ] IV PCA Morphine		[ ] 5 mg/mL	[ ] 1 mg/mL  [X ] IV PCA Hydromorphone	[ ] 5 mg/mL	[X ] 1 mg/mL  [ ] IV PCA Fentanyl		[ ] 50 micrograms/mL    Demand dose __0.2_ lockout __6_ (minutes) Continuous Rate _0__ Total: _8.8  mg used (in past 24 hrs)      MEDICATIONS  (STANDING):  acetaminophen     Tablet .. 1000 milliGRAM(s) Oral every 6 hours  amLODIPine   Tablet 10 milliGRAM(s) Oral daily  atorvastatin 20 milliGRAM(s) Oral at bedtime  ceFAZolin   IVPB 2000 milliGRAM(s) IV Intermittent every 8 hours  gabapentin 600 milliGRAM(s) Oral three times a day  lidocaine   4% Patch 1 Patch Transdermal every 24 hours  methocarbamol 750 milliGRAM(s) Oral every 6 hours  sodium chloride 0.9% lock flush 3 milliLiter(s) IV Push every 8 hours    MEDICATIONS  (PRN):  albuterol    90 MICROgram(s) HFA Inhaler 2 Puff(s) Inhalation every 6 hours PRN Shortness of Breath and/or Wheezing  ondansetron Injectable 4 milliGRAM(s) IV Push every 6 hours PRN Nausea  oxyCODONE    IR 10 milliGRAM(s) Oral every 3 hours PRN Moderate Pain (4 - 6)  oxyCODONE    IR 15 milliGRAM(s) Oral every 3 hours PRN Severe Pain (7 - 10)      OBJECTIVE: Patient laying in bed.    Sedation Score:	[ X] Alert	[ ] Drowsy 	[ ] Arousable	[ ] Asleep	[ ] Unresponsive    Side Effects:	[X ] None	[ ] Nausea	[ ] Vomiting	[ ] Pruritus  		[ ] Other:    Vital Signs Last 24 Hrs  T(C): 38 (01 Nov 2023 08:00), Max: 38 (01 Nov 2023 00:00)  T(F): 100.4 (01 Nov 2023 08:00), Max: 100.4 (01 Nov 2023 00:00)  HR: 109 (01 Nov 2023 12:00) (92 - 109)  BP: --  BP(mean): --  RR: 20 (01 Nov 2023 12:00) (12 - 34)  SpO2: 94% (01 Nov 2023 12:00) (94% - 100%)    Parameters below as of 01 Nov 2023 12:00  Patient On (Oxygen Delivery Method): nasal cannula  O2 Flow (L/min): 3      ASSESSMENT/ PLAN    Therapy to  be:	[ ] Continue   [ X] Discontinued   [X ] Change to prn Analgesics    Documentation and Verification of current medications:   [X] Done	[ ] Not done, not elligible    Comments: IV PCA discontinued. Plan discussed with SICU team. Called Dr. Fraser's office, office closed today. Will attempt calling again tomorrow. PRN Oral/IV opioids and/or Adjuvant non-opioid medication to be ordered at this point.  I STOP Reviewed and found:  Tramadol hcl 50mg tablets, quantity of 56 pills for 8 days. Last dispensed on 10/17/23.  Oxycodone-acetaminophen 10-325mg tablets, quantity of 120 pills for 30 days. Last dispensed on 10/17/23.  Hydromorphone 4mg tablets, quantity of 120 pills for 30 days. Last dispensed on 10/04/23.  Nucynta 100mg tablets, quantity of 120 pills for 30 days. Last dispensed on 07/24/23.  Alprazolam 0.5mg tablets, quantity of 120 pills for 30 days. Last dispensed on 09/22/23.  Progress Note written now but Patient was seen earlier. Anesthesia Pain Management Service    SUBJECTIVE: Patient reports severe pain located in his right side. Reports that the pain is constant and burning and the entire right side hurts along with spasms around the abdomen. Patient with chronic back pain on Percocet 10-325mg 4 times a day, Gabapentin 600mg 4 times a day, Flexeril 10mg 4 times a day. Also rep[orts taking Xanax at bedtime to help with sleep. Nucynta 100mg 4 times a day which was last taken in July 2023, states the office did not do the recertification and he was unable to take Nucynta since July. States the IV PCA does not help with his pain. Requesting to be  switched to Percocet since that works for his pain better. Patient's pain managed outpatient by Dr. Fraser.  Pain Scale Score	At rest: __10/10th Activity: ___ 	[X ] Refer to charted pain scores    THERAPY:    [ ] IV PCA Morphine		[ ] 5 mg/mL	[ ] 1 mg/mL  [X ] IV PCA Hydromorphone	[ ] 5 mg/mL	[X ] 1 mg/mL  [ ] IV PCA Fentanyl		[ ] 50 micrograms/mL    Demand dose __0.2_ lockout __6_ (minutes) Continuous Rate _0__ Total: _8.8  mg used (in past 24 hrs)      MEDICATIONS  (STANDING):  acetaminophen     Tablet .. 1000 milliGRAM(s) Oral every 6 hours  amLODIPine   Tablet 10 milliGRAM(s) Oral daily  atorvastatin 20 milliGRAM(s) Oral at bedtime  ceFAZolin   IVPB 2000 milliGRAM(s) IV Intermittent every 8 hours  gabapentin 600 milliGRAM(s) Oral three times a day  lidocaine   4% Patch 1 Patch Transdermal every 24 hours  methocarbamol 750 milliGRAM(s) Oral every 6 hours  sodium chloride 0.9% lock flush 3 milliLiter(s) IV Push every 8 hours    MEDICATIONS  (PRN):  albuterol    90 MICROgram(s) HFA Inhaler 2 Puff(s) Inhalation every 6 hours PRN Shortness of Breath and/or Wheezing  ondansetron Injectable 4 milliGRAM(s) IV Push every 6 hours PRN Nausea  oxyCODONE    IR 10 milliGRAM(s) Oral every 3 hours PRN Moderate Pain (4 - 6)  oxyCODONE    IR 15 milliGRAM(s) Oral every 3 hours PRN Severe Pain (7 - 10)      OBJECTIVE: Patient laying in bed.    Sedation Score:	[ X] Alert	[ ] Drowsy 	[ ] Arousable	[ ] Asleep	[ ] Unresponsive    Side Effects:	[X ] None	[ ] Nausea	[ ] Vomiting	[ ] Pruritus  		[ ] Other:    Vital Signs Last 24 Hrs  T(C): 38 (01 Nov 2023 08:00), Max: 38 (01 Nov 2023 00:00)  T(F): 100.4 (01 Nov 2023 08:00), Max: 100.4 (01 Nov 2023 00:00)  HR: 109 (01 Nov 2023 12:00) (92 - 109)  BP: --  BP(mean): --  RR: 20 (01 Nov 2023 12:00) (12 - 34)  SpO2: 94% (01 Nov 2023 12:00) (94% - 100%)    Parameters below as of 01 Nov 2023 12:00  Patient On (Oxygen Delivery Method): nasal cannula  O2 Flow (L/min): 3      ASSESSMENT/ PLAN    Therapy to  be:	[ ] Continue   [ X] Discontinued   [X ] Change to prn Analgesics    Documentation and Verification of current medications:   [X] Done	[ ] Not done, not elligible    Comments: IV PCA discontinued. Plan discussed with SICU team. Called Dr. Fraser's office, office closed today. Will attempt calling again tomorrow. PRN Oral/IV opioids and/or Adjuvant non-opioid medication to be ordered at this point.  I STOP Reviewed and found:  Tramadol hcl 50mg tablets, quantity of 56 pills for 8 days. Last dispensed on 10/17/23.  Oxycodone-acetaminophen 10-325mg tablets, quantity of 120 pills for 30 days. Last dispensed on 10/17/23.  Hydromorphone 4mg tablets, quantity of 120 pills for 30 days. Last dispensed on 10/04/23.  Nucynta 100mg tablets, quantity of 120 pills for 30 days. Last dispensed on 07/24/23.  Alprazolam 0.5mg tablets, quantity of 120 pills for 30 days. Last dispensed on 09/22/23.  Progress Note written now but Patient was seen earlier.

## 2023-11-01 NOTE — PHYSICAL THERAPY INITIAL EVALUATION ADULT - GENERAL OBSERVATIONS, REHAB EVAL
Pt encountered in semisupine position, no distress, AxOx4, with +IV, +tele, +pulse oximeter, A. line, YOON drain, and +3L of O2 through nasal cannula. Pt agreeable to participate in PT evaluation. Pt pre-medicated with pain medication.

## 2023-11-01 NOTE — CONSULT NOTE ADULT - ASSESSMENT
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 Now s/p L1-Pelvis posterior spinal fusion, L1-L5 decompression on 10/31/2023. Admitted to SICU for neurochecks, hemodynamic watch.    PLAN:  Neuro:  - q1 hour neurochecks for 24 hours  - Pain control w/ tylenol, dilaudid PCA, robaxin, gabapentin  - LSO brace when in chair and out of bed  - WBAT    Resp:  - Incentive spirometry to prevent atelectasis  - Wean supplemental O2 as able, goal >92%    CV:  - SBP goal > 65  - home amlodipine    GI:   - CLD    Renal:  - Monitor I&Os and electrolytes w/ repletions as necessary    Heme:  - Monitor CBC and coags  - ok for subQ H after 48 hours post op    ID:   - Monitor for clinical evidence of active infection.  - Monitor WBC, temperature.  - Ancef while ortho drain in place.    Endo:   - Monitor glucose    Disposition:  SICU

## 2023-11-01 NOTE — PHYSICAL THERAPY INITIAL EVALUATION ADULT - GROSSLY INTACT, SENSORY
except diminished sensation on LLE; pt reports bilateral numbness/tingling in feet and hands/Grossly Intact

## 2023-11-01 NOTE — CONSULT NOTE ADULT - SUBJECTIVE AND OBJECTIVE BOX
CHIEF COMPLAINT:Patient is a 63y old  Male who presents with a chief complaint of s/p L1-pelvis PSF and decompression  (31 Oct 2023 20:59)      HISTORY OF PRESENT ILLNESS:    63 male with history as below, non obstructive cad is now s/p   L1-pelvis PSF, decompression  denies any chest pain, sob, palpitation, dizziness or syncope.     PAST MEDICAL & SURGICAL HISTORY:  Asthma      HTN (hypertension)      Localized swelling of both lower legs      H/O neuropathy      Chronic back pain      H/O spinal stenosis      Cervical spinal stenosis      Pulmonary embolism      History of fusion of cervical spine      S/P arthroscopy of knee              MEDICATIONS:  amLODIPine   Tablet 10 milliGRAM(s) Oral daily    ceFAZolin   IVPB 2000 milliGRAM(s) IV Intermittent every 8 hours    albuterol    90 MICROgram(s) HFA Inhaler 2 Puff(s) Inhalation every 6 hours PRN  diphenhydrAMINE Injectable 25 milliGRAM(s) IV Push every 4 hours PRN    acetaminophen   IVPB .. 1000 milliGRAM(s) IV Intermittent every 6 hours  gabapentin 600 milliGRAM(s) Oral three times a day  HYDROmorphone PCA (1 mG/mL) 30 milliLiter(s) PCA Continuous PCA Continuous  HYDROmorphone PCA (1 mG/mL) Rescue Clinician Bolus 0.5 milliGRAM(s) IV Push every 15 minutes PRN  methocarbamol 750 milliGRAM(s) Oral every 6 hours PRN  ondansetron Injectable 4 milliGRAM(s) IV Push every 6 hours PRN        lactated ringers. 1000 milliLiter(s) IV Continuous <Continuous>  lidocaine   4% Patch 1 Patch Transdermal every 24 hours  sodium chloride 0.9% lock flush 3 milliLiter(s) IV Push every 8 hours  sodium chloride 0.9%. 1000 milliLiter(s) IV Continuous <Continuous>      FAMILY HISTORY:  FH: HTN (hypertension) (Mother)        Non-contributory    SOCIAL HISTORY:    No tobacco, drugs or etoh    Allergies    eggs (Hives; Rash)  No Known Drug Allergies  peanuts (Unknown)    Intolerances    	    REVIEW OF SYSTEMS:  as above  The rest of the 14 points ROS reviewed and except above they are unremarkable.        PHYSICAL EXAM:  T(C): 37.4 (11-01-23 @ 04:00), Max: 38 (11-01-23 @ 00:00)  HR: 102 (11-01-23 @ 07:00) (92 - 107)  BP: -- 130/80  RR: 18 (11-01-23 @ 07:00) (13 - 34)  SpO2: 98% (11-01-23 @ 07:00) (96% - 100%)  Wt(kg): --  I&O's Summary    31 Oct 2023 07:01  -  01 Nov 2023 07:00  --------------------------------------------------------  IN: 1660 mL / OUT: 2215 mL / NET: -555 mL        JVP: Normal  Neck: supple  Lung: clear   CV: S1 S2 , Murmur:  Abd: soft  Ext: No edema  neuro: Awake / alert  Psych: flat affect  Skin: normal      LABS/DATA:    TELEMETRY: 	    ECG:  	   	  CARDIAC MARKERS:                                      9.2    8.52  )-----------( 155      ( 01 Nov 2023 00:30 )             27.4     11-01    138  |  103  |  8   ----------------------------<  123<H>  4.2   |  25  |  0.79    Ca    9.2      01 Nov 2023 00:30  Phos  3.3     11-01  Mg     1.70     11-01      proBNP:   Lipid Profile:   HgA1c:   TSH:

## 2023-11-01 NOTE — PHYSICAL THERAPY INITIAL EVALUATION ADULT - PASSIVE RANGE OF MOTION EXAMINATION, REHAB EVAL
left shoulder flexion to 90 degrees 2/2 pain, left elbow/hand/wrist WFL/Right UE Passive ROM was WFL (within functional limits)/bilateral lower extremity Passive ROM was WFL (within functional limits)

## 2023-11-01 NOTE — PHYSICAL THERAPY INITIAL EVALUATION ADULT - ACTIVE RANGE OF MOTION EXAMINATION, REHAB EVAL
left shoulder flexion ~40 degrees, left elbow ~0-90 degrees, left wrist WFL, left hand decreased DIP/PIP Extension/Right UE Active ROM was WFL (within functional limits)/Right LE Active ROM was WFL (within functional limits)

## 2023-11-01 NOTE — PHYSICAL THERAPY INITIAL EVALUATION ADULT - PATIENT PROFILE REVIEW, REHAB EVAL
yes No Formal Activity Order in the Computer; Spoke with MAULIK Lopez and MAULIK Irving prior to PT evaluation--> Pt OK for PT consult/OOB activity; Arterial /67mmHg, heart rate 112bpm/yes

## 2023-11-01 NOTE — PHYSICAL THERAPY INITIAL EVALUATION ADULT - PERTINENT HX OF CURRENT PROBLEM, REHAB EVAL
63 year old male with lower back pain radiating down right leg x 1 year. Pt presents today for presurgical evaluation for Lumbar Decompression L1-L5 with Laminectomy and Fusion.

## 2023-11-01 NOTE — PROGRESS NOTE ADULT - SUBJECTIVE AND OBJECTIVE BOX
Pt seen/examined. Doing well. Pain controlled. No acute overnight complaints or events.    T(C): 37.4 (11-01-23 @ 04:00), Max: 38 (11-01-23 @ 00:00)  HR: 92 (11-01-23 @ 04:00) (91 - 107)  BP: 153/98 (10-31-23 @ 06:49) (153/98 - 153/98)  RR: 13 (11-01-23 @ 04:00) (13 - 34)  SpO2: 100% (11-01-23 @ 04:00) (96% - 100%)  Wt(kg): --  - Gen: NAD    Exam:   Gen: Alert/Oriented, No Acute Distress  Pulm: Non-labored breathing  BACK:          Dressing: [x] clean/dry/intact  [ ] Other:           Drains: : YOON SS         Sensation: [x] intact to light touch  [ ] decreased:   Motor:                   C5                C6              C7               C8           T1   R            5/5                5/5            5/5             5/5          5/5  L             3/5               3/5             3/5             3/5          3/5                L2             L3             L4               L5            S1  R         5/5           5/5          5/5             5/5           5/5  L          1/5          1/5           1/5             1/5           2/5    Sensory:            C5         C6         C7      C8       T1        (0=absent, 1=impaired, 2=normal, NT=not testable)  R         2            2           2        2         2  L          2            2           2        2         2               L2          L3         L4      L5       S1         (0=absent, 1=impaired, 2=normal, NT=not testable)  R         2            2            2        2        2  L          2            2           2        2         2           WWP; capillary refill <3 seconds       Pt is a  with  63yy/o  Male  POD#0  s/p L1-pelvis PSF, L1-5 decompression. Patient is hemodynamically stable; recovering well from orhtopaedic standpoint.    - q1 hour neurochecks for 24 hours  - ok for subQ H after 48 hours  - f/u LSO brace  -    Multimodal Pain control  -    DVT ppx: Venodynes  - Ancef while drains are in  -    Check AM labs  -    Monitor YOON output  -    Weight bearing status: WBAT  -    PT/OT  -    Dispo pending

## 2023-11-01 NOTE — CONSULT NOTE ADULT - SUBJECTIVE AND OBJECTIVE BOX
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    Now s/p L1-Pelvis posterior spinal fusion, decompression with laminectomy/fusion on 10/31/2023.      Surgery Information    OR time:      EBL:       UOP:              IV Fluids:       Blood Products:             PAST MEDICAL & SURGICAL HISTORY:  Asthma      HTN (hypertension)      Localized swelling of both lower legs      H/O neuropathy      Chronic back pain      H/O spinal stenosis      Cervical spinal stenosis      Pulmonary embolism      History of fusion of cervical spine      S/P arthroscopy of knee        Home Meds:   Allergies: eggs (Hives; Rash)  No Known Drug Allergies  peanuts (Unknown)    Soc:   Advanced Directives: Presumed Full Code     ROS:    General: Non-Contributory  Skin/Breast: Non-Contributory  Ophthalmologic: Non-Contributory  ENMT: Non-Contributory  Respiratory and Thorax: Non-Contributory  Cardiovascular: Non-Contributory  Gastrointestinal: Non-Contributory  Genitourinary: Non-Contributory  Musculoskeletal: Non-Contributory  Neurological: Non-Contributory  Psychiatric: Non-Contributory  Hematology/Lymphatics: Non-Contributory  Endocrine: Non-Contributory  Allergic/Immunologic: Non-Contributory    CURRENT MEDICATIONS:   --------------------------------------------------------------------------------------  Neurologic Medications  acetaminophen   IVPB .. 1000 milliGRAM(s) IV Intermittent every 6 hours  gabapentin 600 milliGRAM(s) Oral three times a day  HYDROmorphone PCA (1 mG/mL) 30 milliLiter(s) PCA Continuous PCA Continuous  HYDROmorphone PCA (1 mG/mL) Rescue Clinician Bolus 0.5 milliGRAM(s) IV Push every 15 minutes PRN for Pain Scale GREATER THAN 6  methocarbamol 750 milliGRAM(s) Oral every 6 hours PRN Muscle Spasm  ondansetron Injectable 4 milliGRAM(s) IV Push every 6 hours PRN Nausea    Respiratory Medications  albuterol    90 MICROgram(s) HFA Inhaler 2 Puff(s) Inhalation every 6 hours PRN Shortness of Breath and/or Wheezing  diphenhydrAMINE Injectable 25 milliGRAM(s) IV Push every 4 hours PRN Pruritus    Cardiovascular Medications  amLODIPine   Tablet 10 milliGRAM(s) Oral daily    Gastrointestinal Medications  lactated ringers. 1000 milliLiter(s) IV Continuous <Continuous>  magnesium sulfate  IVPB 1 Gram(s) IV Intermittent once  sodium chloride 0.9% lock flush 3 milliLiter(s) IV Push every 8 hours  sodium chloride 0.9%. 1000 milliLiter(s) IV Continuous <Continuous>    Genitourinary Medications    Hematologic/Oncologic Medications    Antimicrobial/Immunologic Medications  ceFAZolin   IVPB 2000 milliGRAM(s) IV Intermittent every 8 hours    Endocrine/Metabolic Medications    Topical/Other Medications  lidocaine   4% Patch 1 Patch Transdermal every 24 hours  naloxone Injectable 0.1 milliGRAM(s) IV Push every 3 minutes PRN For ANY of the following changes in patient status:  A. RR LESS THAN 10 breaths per minute, B. Oxygen saturation LESS THAN 90%, C. Sedation score of 6    --------------------------------------------------------------------------------------    VITAL SIGNS, INS/OUTS (last 24 hours):  --------------------------------------------------------------------------------------  ((Insert SICU Vitals / Is+Os here)) ***  --------------------------------------------------------------------------------------    EXAM:  General/Neuro  RASS:   GCS:   Exam: Normal, NAD, alert, oriented x 3, no focal deficits. PERRLA  ***    Respiratory  Exam: Lungs clear to auscultation, Normal expansion/effort.  ***  [] Tracheostomy   [] Intubated  Mechanical Ventilation:     Cardiovascular  Exam: S1, S2.  Regular rate and rhythm.  Peripheral edema  ***  Cardiac Rhythm: Normal Sinus Rhythm  ECHO:     GI  Exam: Abdomen soft, Non-tender, Non-distended.  Gastrostomy / Jejunostomy tube in place.  Nasogastric tube in place.  Colostomy / Ileostomy.  ***  Wound:   ***  Current Diet:  NPO***      Tubes/Lines/Drains  ***  [x] Peripheral IV  [] Central Venous Line     	[] R	[] L	[] IJ	[] Fem	[] SC        Type:	    Date Placed:   [] Arterial Line		[] R	[] L	[] Fem	[] Rad	[] Ax	Date Placed:   [] PICC:         	[] Midline		[] Mediport           [] Urinary Catheter		Date Placed:     Extremities  Exam: Extremities warm, pink, well-perfused.        Derm:  Exam: Good skin turgor, no skin breakdown.      :   Exam: Bailey catheter in place.     LABS  --------------------------------------------------------------------------------------  ((Insert SICU Labs here))***  --------------------------------------------------------------------------------------    OTHER LABS    IMAGING RESULTS      ASSESSMENT:  63y Male ***    PLAN:   Neurologic:   Respiratory:   Cardiovascular:   Gastrointestinal/Nutrition:   Renal/Genitourinary:   Hematologic:   Infectious Disease:   Lines/Tubes:  Endocrine:   Disposition:     --------------------------------------------------------------------------------------    Critical Care Diagnoses:   Meds  Nucynta for severe chronic pain lower back    SICU Consultation Note  =====================================================  HPI:   63 yr old male with history of HTN, chronic severe back pain that radiates to both legs, s/p cervical fusion Oct 2022 complicated by 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     Now s/p L1-Pelvis posterior spinal fusion, decompression with laminectomy/fusion on 10/31/2023.    SICU consulted for q1 hour neuro checks and hemodynamic monitoring following EBL of 2.5L, transfusion of RBC x2 + FFP x1.   Intraop UOP 2L, given 2 L crystalloid , Albumin 1.5 L    On presentation to SICU, patient with decreased motor function of Left upper extremity, severely decreased motor function of Left Lower extremity and diminished Right lower ext function, with ortho team reporting this is his baseline.       PAST MEDICAL & SURGICAL HISTORY:  Asthma      HTN (hypertension)      Localized swelling of both lower legs      H/O neuropathy      Chronic back pain      H/O spinal stenosis      Cervical spinal stenosis      Pulmonary embolism      History of fusion of cervical spine      S/P arthroscopy of knee        Home Meds:   Allergies: eggs (Hives; Rash)  No Known Drug Allergies  peanuts (Unknown)    Soc:   Advanced Directives: Presumed Full Code     ROS:    General: Non-Contributory  Skin/Breast: Non-Contributory  Ophthalmologic: Non-Contributory  ENMT: Non-Contributory  Respiratory and Thorax: Non-Contributory  Cardiovascular: Non-Contributory  Gastrointestinal: Non-Contributory  Genitourinary: Non-Contributory  Musculoskeletal: Non-Contributory  Neurological: Non-Contributory  Psychiatric: Non-Contributory  Hematology/Lymphatics: Non-Contributory  Endocrine: Non-Contributory  Allergic/Immunologic: Non-Contributory    CURRENT MEDICATIONS:   --------------------------------------------------------------------------------------  Neurologic Medications  acetaminophen   IVPB .. 1000 milliGRAM(s) IV Intermittent every 6 hours  gabapentin 600 milliGRAM(s) Oral three times a day  HYDROmorphone PCA (1 mG/mL) 30 milliLiter(s) PCA Continuous PCA Continuous  HYDROmorphone PCA (1 mG/mL) Rescue Clinician Bolus 0.5 milliGRAM(s) IV Push every 15 minutes PRN for Pain Scale GREATER THAN 6  methocarbamol 750 milliGRAM(s) Oral every 6 hours PRN Muscle Spasm  ondansetron Injectable 4 milliGRAM(s) IV Push every 6 hours PRN Nausea    Respiratory Medications  albuterol    90 MICROgram(s) HFA Inhaler 2 Puff(s) Inhalation every 6 hours PRN Shortness of Breath and/or Wheezing  diphenhydrAMINE Injectable 25 milliGRAM(s) IV Push every 4 hours PRN Pruritus    Cardiovascular Medications  amLODIPine   Tablet 10 milliGRAM(s) Oral daily    Gastrointestinal Medications  lactated ringers. 1000 milliLiter(s) IV Continuous <Continuous>  magnesium sulfate  IVPB 1 Gram(s) IV Intermittent once  sodium chloride 0.9% lock flush 3 milliLiter(s) IV Push every 8 hours  sodium chloride 0.9%. 1000 milliLiter(s) IV Continuous <Continuous>    Genitourinary Medications    Hematologic/Oncologic Medications    Antimicrobial/Immunologic Medications  ceFAZolin   IVPB 2000 milliGRAM(s) IV Intermittent every 8 hours    Endocrine/Metabolic Medications    Topical/Other Medications  lidocaine   4% Patch 1 Patch Transdermal every 24 hours  naloxone Injectable 0.1 milliGRAM(s) IV Push every 3 minutes PRN For ANY of the following changes in patient status:  A. RR LESS THAN 10 breaths per minute, B. Oxygen saturation LESS THAN 90%, C. Sedation score of 6    --------------------------------------------------------------------------------------    VITAL SIGNS, INS/OUTS (last 24 hours):  --------------------------------------------------------------------------------------  ((Insert SICU Vitals / Is+Os here)) ***  --------------------------------------------------------------------------------------    EXAM:  General/Neuro  RASS:   GCS:   Exam: Normal, NAD, alert, oriented x 3, no focal deficits. PERRLA  ***    Respiratory  Exam: Lungs clear to auscultation, Normal expansion/effort.  ***  [] Tracheostomy   [] Intubated  Mechanical Ventilation:     Cardiovascular  Exam: S1, S2.  Regular rate and rhythm.  Peripheral edema  ***  Cardiac Rhythm: Normal Sinus Rhythm  ECHO:     GI  Exam: Abdomen soft, Non-tender, Non-distended.  Gastrostomy / Jejunostomy tube in place.  Nasogastric tube in place.  Colostomy / Ileostomy.  ***  Wound:   ***  Current Diet:  NPO***      Tubes/Lines/Drains  ***  [x] Peripheral IV  [] Central Venous Line     	[] R	[] L	[] IJ	[] Fem	[] SC        Type:	    Date Placed:   [] Arterial Line		[] R	[] L	[] Fem	[] Rad	[] Ax	Date Placed:   [] PICC:         	[] Midline		[] Mediport           [] Urinary Catheter		Date Placed:     Extremities  Exam: Extremities warm, pink, well-perfused.        Derm:  Exam: Good skin turgor, no skin breakdown.      :   Exam: Bailey catheter in place.     LABS  --------------------------------------------------------------------------------------  ((Insert SICU Labs here))***  --------------------------------------------------------------------------------------    OTHER LABS    IMAGING RESULTS      ASSESSMENT:  63y Male ***    PLAN:   Neurologic:   Respiratory:   Cardiovascular:   Gastrointestinal/Nutrition:   Renal/Genitourinary:   Hematologic:   Infectious Disease:   Lines/Tubes:  Endocrine:   Disposition:     --------------------------------------------------------------------------------------    Critical Care Diagnoses:   Meds  Nucynta for severe chronic pain lower back    SICU Consultation Note  =====================================================  HPI:   63 yr old male with history of HTN, chronic severe back pain that radiates to both legs, s/p cervical fusion Oct 2022 complicated by 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     Now s/p L1-Pelvis posterior spinal fusion, decompression with laminectomy/fusion on 10/31/2023.    SICU consulted for q1 hour neuro checks and hemodynamic monitoring following EBL of 2.5L, transfusion of RBC x2 + FFP x1.   Intraop UOP 2L, given 2 L crystalloid , Albumin 1.5 L    On presentation to SICU, patient with decreased motor function of Left upper extremity, severely decreased motor function of Left Lower extremity and diminished Right lower ext function, with ortho team reporting this is his baseline.       PAST MEDICAL & SURGICAL HISTORY:  Asthma      HTN (hypertension)      Localized swelling of both lower legs      H/O neuropathy      Chronic back pain      H/O spinal stenosis      Cervical spinal stenosis      Pulmonary embolism      History of fusion of cervical spine      S/P arthroscopy of knee        Home Meds:   Allergies: eggs (Hives; Rash)  No Known Drug Allergies  peanuts (Unknown)    Soc:   Advanced Directives: Presumed Full Code     ROS:    General: Non-Contributory  Skin/Breast: Non-Contributory  Ophthalmologic: Non-Contributory  ENMT: Non-Contributory  Respiratory and Thorax: Non-Contributory  Cardiovascular: Non-Contributory  Gastrointestinal: Non-Contributory  Genitourinary: Non-Contributory  Musculoskeletal: Non-Contributory  Neurological: Non-Contributory  Psychiatric: Non-Contributory  Hematology/Lymphatics: Non-Contributory  Endocrine: Non-Contributory  Allergic/Immunologic: Non-Contributory    CURRENT MEDICATIONS:   --------------------------------------------------------------------------------------  Neurologic Medications  acetaminophen   IVPB .. 1000 milliGRAM(s) IV Intermittent every 6 hours  gabapentin 600 milliGRAM(s) Oral three times a day  HYDROmorphone PCA (1 mG/mL) 30 milliLiter(s) PCA Continuous PCA Continuous  HYDROmorphone PCA (1 mG/mL) Rescue Clinician Bolus 0.5 milliGRAM(s) IV Push every 15 minutes PRN for Pain Scale GREATER THAN 6  methocarbamol 750 milliGRAM(s) Oral every 6 hours PRN Muscle Spasm  ondansetron Injectable 4 milliGRAM(s) IV Push every 6 hours PRN Nausea    Respiratory Medications  albuterol    90 MICROgram(s) HFA Inhaler 2 Puff(s) Inhalation every 6 hours PRN Shortness of Breath and/or Wheezing  diphenhydrAMINE Injectable 25 milliGRAM(s) IV Push every 4 hours PRN Pruritus    Cardiovascular Medications  amLODIPine   Tablet 10 milliGRAM(s) Oral daily    Gastrointestinal Medications  lactated ringers. 1000 milliLiter(s) IV Continuous <Continuous>  magnesium sulfate  IVPB 1 Gram(s) IV Intermittent once  sodium chloride 0.9% lock flush 3 milliLiter(s) IV Push every 8 hours  sodium chloride 0.9%. 1000 milliLiter(s) IV Continuous <Continuous>    Genitourinary Medications    Hematologic/Oncologic Medications    Antimicrobial/Immunologic Medications  ceFAZolin   IVPB 2000 milliGRAM(s) IV Intermittent every 8 hours    Endocrine/Metabolic Medications    Topical/Other Medications  lidocaine   4% Patch 1 Patch Transdermal every 24 hours  naloxone Injectable 0.1 milliGRAM(s) IV Push every 3 minutes PRN For ANY of the following changes in patient status:  A. RR LESS THAN 10 breaths per minute, B. Oxygen saturation LESS THAN 90%, C. Sedation score of 6    --------------------------------------------------------------------------------------    VITAL SIGNS, INS/OUTS (last 24 hours):  --------------------------------------------------------------------------------------  T(C): 37.4 (11-01-23 @ 04:00), Max: 38 (11-01-23 @ 00:00)  HR: 96 (11-01-23 @ 05:00) (91 - 107)  BP: 153/98 (10-31-23 @ 06:49) (153/98 - 153/98)  BP(mean): --  ABP: 134/67 (11-01-23 @ 05:00) (111/60 - 182/103)  ABP(mean): 89 (11-01-23 @ 05:00) (76 - 130)  RR: 17 (11-01-23 @ 05:00) (13 - 34)  SpO2: 100% (11-01-23 @ 05:00) (96% - 100%)  Wt(kg): --  CVP(mm Hg): --  CI: --  CAPILLARY BLOOD GLUCOSE      POCT Blood Glucose.: 105 mg/dL (31 Oct 2023 06:58)   N/A      10-31 @ 07:01  -  11-01 @ 05:53  --------------------------------------------------------  IN:    IV PiggyBack: 300 mL    Lactated Ringers: 500 mL    Oral Fluid: 400 mL    sodium chloride 0.9%: 150 mL  Total IN: 1350 mL    OUT:    Bulb (mL): 540 mL    Indwelling Catheter - Urethral (mL): 1325 mL  Total OUT: 1865 mL    Total NET: -515 mL        --------------------------------------------------------------------------------------    EXAM:  General/Neuro  Exam: Normal, No acute distress, resting in bed, alert, oriented x 3    Respiratory  Exam: no increased work of breathing, on nasal canula    Cardiovascular  Exam: S1, S2.  Regular rate and rhythm.    Cardiac Rhythm: Normal Sinus Rhythm    GI  Exam: Abdomen soft, Non-tender, Non-distended.  Current Diet:  CLD    Extremities/MSK  Exam: Extremities warm, pink, well-perfused. Lower Extremity weakness L>R. Left upper extremity weakness. Lumbar drain with sanguinous output.    Derm:  Exam: Good skin turgor, no skin breakdown.      :   Exam: Bailey catheter in place.     LABS  --------------------------------------------------------------------------------------  LABS:                        9.2    8.52  )-----------( 155      ( 01 Nov 2023 00:30 )             27.4     11-01    138  |  103  |  8   ----------------------------<  123<H>  4.2   |  25  |  0.79    Ca    9.2      01 Nov 2023 00:30  Phos  3.3     11-01  Mg     1.70     11-01      PT/INR - ( 01 Nov 2023 00:30 )   PT: 13.7 sec;   INR: 1.22 ratio         PTT - ( 01 Nov 2023 00:30 )  PTT:19.1 sec  Urinalysis Basic - ( 01 Nov 2023 00:30 )    Color: x / Appearance: x / SG: x / pH: x  Gluc: 123 mg/dL / Ketone: x  / Bili: x / Urobili: x   Blood: x / Protein: x / Nitrite: x   Leuk Esterase: x / RBC: x / WBC x   Sq Epi: x / Non Sq Epi: x / Bacteria: x        RADIOLOGY & ADDITIONAL STUDIES:  none since admission    --------------------------------------------------------------------------------------

## 2023-11-01 NOTE — PHYSICAL THERAPY INITIAL EVALUATION ADULT - ADDITIONAL COMMENTS
Pt reports that he lives in a private house with his mother and 2 brothers with ~5 steps to enter; (+)bilateral handrails; bedroom is on the first floor;  however he does have a flight of stairs to negotiate to the basement to take a shower; (+)handrail. Prior to hospital admission, pt was completely independent and used a quad cane with household ambulation and used a single axis cane with community ambulation. Pt denies any recent falls. Pt reports that the reason he had the surgery was he had significant pain after trying to get his lawnmower to work and he thinks he "twisted his back".    Pt left comfortable in bed, NAD, all lines intact, all precautions maintained, with call bell in reach, and RN aware of PT evaluation and pt's pain. Pt reports that he lives in a private house with his mother and 2 brothers with ~5 steps to enter; (+)bilateral handrails; bedroom is on the first floor;  however he does have a flight of stairs to negotiate to the basement to take a shower; (+)handrail. Prior to hospital admission, pt was completely independent and used a quad cane with household ambulation and used a single axis cane with community ambulation. He does own a rolling walker if he needs.  Pt denies any recent falls. Pt reports that the reason he had the surgery was he had significant pain after trying to get his lawnmower to work and he thinks he "twisted his back".    Pt left comfortable in bed, NAD, all lines intact, all precautions maintained, with call bell in reach, and RN aware of PT evaluation and pt's pain.

## 2023-11-01 NOTE — CONSULT NOTE ADULT - ASSESSMENT
Non obstructive CAD  asymptomatic  Lipitor 20 mg daily recommended  asa in future once deemed safe     HTN  cont current meds

## 2023-11-01 NOTE — PHYSICAL THERAPY INITIAL EVALUATION ADULT - MANUAL MUSCLE TESTING RESULTS, REHAB EVAL
spinal/surgical precautions; right upper extremity at least 3/5, Left shoulder 2-/5, left elbow 3-/5, left wrist 3-/5, left hand 3-/5, Right lower extremity 3/5, Left quadriceps trace, left hamstring 2-/5, Left ankle 0/5 (drop foot)/grossly assessed due to

## 2023-11-01 NOTE — PHYSICAL THERAPY INITIAL EVALUATION ADULT - CRITERIA FOR SKILLED THERAPEUTIC INTERVENTIONS
within normal limits
impairments found/functional limitations in following categories/risk reduction/prevention/rehab potential

## 2023-11-01 NOTE — PHYSICAL THERAPY INITIAL EVALUATION ADULT - PHYSICAL ASSIST/NONPHYSICAL ASSIST: SUPINE/SIT, REHAB EVAL
Problem: Risk for Spread of Infection  Goal: Prevent transmission of infectious organism to others  Description: Prevent the transmission of infectious organisms to other patients, staff members, and visitors. Outcome: Progressing Towards Goal     Problem: Patient Education:  Go to Education Activity  Goal: Patient/Family Education  Outcome: Progressing Towards Goal     Problem: Falls - Risk of  Goal: *Absence of Falls  Description: Document Collin Ward Fall Risk and appropriate interventions in the flowsheet. Outcome: Progressing Towards Goal  Note: Fall Risk Interventions:  Mobility Interventions: Communicate number of staff needed for ambulation/transfer, Bed/chair exit alarm    Mentation Interventions: Adequate sleep, hydration, pain control, Door open when patient unattended, More frequent rounding, Room close to nurse's station, Update white board    Medication Interventions: Bed/chair exit alarm    Elimination Interventions: Call light in reach, Toileting schedule/hourly rounds, Patient to call for help with toileting needs    History of Falls Interventions: Room close to nurse's station, Door open when patient unattended         Problem: Patient Education: Go to Patient Education Activity  Goal: Patient/Family Education  Outcome: Progressing Towards Goal     Problem: Pressure Injury - Risk of  Goal: *Prevention of pressure injury  Description: Document Kimani Scale and appropriate interventions in the flowsheet.   Outcome: Progressing Towards Goal  Note: Pressure Injury Interventions:  Sensory Interventions: Assess changes in LOC, Check visual cues for pain, Keep linens dry and wrinkle-free, Maintain/enhance activity level, Minimize linen layers    Moisture Interventions: Check for incontinence Q2 hours and as needed, Maintain skin hydration (lotion/cream), Internal/External urinary devices    Activity Interventions: Pressure redistribution bed/mattress(bed type)    Mobility Interventions: Pressure redistribution bed/mattress (bed type), HOB 30 degrees or less, Turn and reposition approx. every two hours(pillow and wedges), Suspension boots    Nutrition Interventions: Offer support with meals,snacks and hydration, Document food/fluid/supplement intake    Friction and Shear Interventions: Sit at 90-degree angle, Minimize layers, HOB 30 degrees or less                Problem: Patient Education: Go to Patient Education Activity  Goal: Patient/Family Education  Outcome: Progressing Towards Goal     Problem: Impaired Skin Integrity/Pressure Injury Treatment  Goal: *Improvement of Existing Pressure Injury  Outcome: Progressing Towards Goal  Goal: *Prevention of pressure injury  Description: Document Kimani Scale and appropriate interventions in the flowsheet. Outcome: Progressing Towards Goal  Note: Pressure Injury Interventions:  Sensory Interventions: Assess changes in LOC, Check visual cues for pain, Keep linens dry and wrinkle-free, Maintain/enhance activity level, Minimize linen layers    Moisture Interventions: Check for incontinence Q2 hours and as needed, Maintain skin hydration (lotion/cream), Internal/External urinary devices    Activity Interventions: Pressure redistribution bed/mattress(bed type)    Mobility Interventions: Pressure redistribution bed/mattress (bed type), HOB 30 degrees or less, Turn and reposition approx.  every two hours(pillow and wedges), Suspension boots    Nutrition Interventions: Offer support with meals,snacks and hydration, Document food/fluid/supplement intake    Friction and Shear Interventions: Sit at 90-degree angle, Minimize layers, HOB 30 degrees or less                Problem: Patient Education: Go to Patient Education Activity  Goal: Patient/Family Education  Outcome: Progressing Towards Goal     Problem: Pain  Goal: *Control of Pain  Outcome: Progressing Towards Goal     Problem: Patient Education: Go to Patient Education Activity  Goal: Patient/Family Education  Outcome: Progressing Towards Goal     Problem: Infection - Risk of, Urinary Catheter-Associated Urinary Tract Infection  Goal: *Absence of infection signs and symptoms  Outcome: Progressing Towards Goal     Problem: Patient Education: Go to Patient Education Activity  Goal: Patient/Family Education  Outcome: Progressing Towards Goal     Problem: Urinary Tract Infection - Adult  Goal: *Absence of infection signs and symptoms  Outcome: Progressing Towards Goal     Problem: Patient Education: Go to Patient Education Activity  Goal: Patient/Family Education  Outcome: Progressing Towards Goal verbal cues/1 person assist

## 2023-11-02 LAB
ANION GAP SERPL CALC-SCNC: 9 MMOL/L — SIGNIFICANT CHANGE UP (ref 7–14)
ANION GAP SERPL CALC-SCNC: 9 MMOL/L — SIGNIFICANT CHANGE UP (ref 7–14)
BLD GP AB SCN SERPL QL: NEGATIVE — SIGNIFICANT CHANGE UP
BLD GP AB SCN SERPL QL: NEGATIVE — SIGNIFICANT CHANGE UP
BUN SERPL-MCNC: 10 MG/DL — SIGNIFICANT CHANGE UP (ref 7–23)
BUN SERPL-MCNC: 10 MG/DL — SIGNIFICANT CHANGE UP (ref 7–23)
CALCIUM SERPL-MCNC: 8.6 MG/DL — SIGNIFICANT CHANGE UP (ref 8.4–10.5)
CALCIUM SERPL-MCNC: 8.6 MG/DL — SIGNIFICANT CHANGE UP (ref 8.4–10.5)
CHLORIDE SERPL-SCNC: 100 MMOL/L — SIGNIFICANT CHANGE UP (ref 98–107)
CHLORIDE SERPL-SCNC: 100 MMOL/L — SIGNIFICANT CHANGE UP (ref 98–107)
CO2 SERPL-SCNC: 27 MMOL/L — SIGNIFICANT CHANGE UP (ref 22–31)
CO2 SERPL-SCNC: 27 MMOL/L — SIGNIFICANT CHANGE UP (ref 22–31)
CREAT SERPL-MCNC: 0.87 MG/DL — SIGNIFICANT CHANGE UP (ref 0.5–1.3)
CREAT SERPL-MCNC: 0.87 MG/DL — SIGNIFICANT CHANGE UP (ref 0.5–1.3)
EGFR: 97 ML/MIN/1.73M2 — SIGNIFICANT CHANGE UP
EGFR: 97 ML/MIN/1.73M2 — SIGNIFICANT CHANGE UP
GLUCOSE SERPL-MCNC: 140 MG/DL — HIGH (ref 70–99)
GLUCOSE SERPL-MCNC: 140 MG/DL — HIGH (ref 70–99)
HCT VFR BLD CALC: 25.5 % — LOW (ref 39–50)
HCT VFR BLD CALC: 25.5 % — LOW (ref 39–50)
HGB BLD-MCNC: 8.5 G/DL — LOW (ref 13–17)
HGB BLD-MCNC: 8.5 G/DL — LOW (ref 13–17)
MAGNESIUM SERPL-MCNC: 1.8 MG/DL — SIGNIFICANT CHANGE UP (ref 1.6–2.6)
MAGNESIUM SERPL-MCNC: 1.8 MG/DL — SIGNIFICANT CHANGE UP (ref 1.6–2.6)
MCHC RBC-ENTMCNC: 28.7 PG — SIGNIFICANT CHANGE UP (ref 27–34)
MCHC RBC-ENTMCNC: 28.7 PG — SIGNIFICANT CHANGE UP (ref 27–34)
MCHC RBC-ENTMCNC: 33.3 GM/DL — SIGNIFICANT CHANGE UP (ref 32–36)
MCHC RBC-ENTMCNC: 33.3 GM/DL — SIGNIFICANT CHANGE UP (ref 32–36)
MCV RBC AUTO: 86.1 FL — SIGNIFICANT CHANGE UP (ref 80–100)
MCV RBC AUTO: 86.1 FL — SIGNIFICANT CHANGE UP (ref 80–100)
NRBC # BLD: 0 /100 WBCS — SIGNIFICANT CHANGE UP (ref 0–0)
NRBC # BLD: 0 /100 WBCS — SIGNIFICANT CHANGE UP (ref 0–0)
NRBC # FLD: 0 K/UL — SIGNIFICANT CHANGE UP (ref 0–0)
NRBC # FLD: 0 K/UL — SIGNIFICANT CHANGE UP (ref 0–0)
PHOSPHATE SERPL-MCNC: 3.1 MG/DL — SIGNIFICANT CHANGE UP (ref 2.5–4.5)
PHOSPHATE SERPL-MCNC: 3.1 MG/DL — SIGNIFICANT CHANGE UP (ref 2.5–4.5)
PLATELET # BLD AUTO: 146 K/UL — LOW (ref 150–400)
PLATELET # BLD AUTO: 146 K/UL — LOW (ref 150–400)
POTASSIUM SERPL-MCNC: 3.8 MMOL/L — SIGNIFICANT CHANGE UP (ref 3.5–5.3)
POTASSIUM SERPL-MCNC: 3.8 MMOL/L — SIGNIFICANT CHANGE UP (ref 3.5–5.3)
POTASSIUM SERPL-SCNC: 3.8 MMOL/L — SIGNIFICANT CHANGE UP (ref 3.5–5.3)
POTASSIUM SERPL-SCNC: 3.8 MMOL/L — SIGNIFICANT CHANGE UP (ref 3.5–5.3)
RBC # BLD: 2.96 M/UL — LOW (ref 4.2–5.8)
RBC # BLD: 2.96 M/UL — LOW (ref 4.2–5.8)
RBC # FLD: 14.5 % — SIGNIFICANT CHANGE UP (ref 10.3–14.5)
RBC # FLD: 14.5 % — SIGNIFICANT CHANGE UP (ref 10.3–14.5)
RH IG SCN BLD-IMP: POSITIVE — SIGNIFICANT CHANGE UP
RH IG SCN BLD-IMP: POSITIVE — SIGNIFICANT CHANGE UP
SODIUM SERPL-SCNC: 136 MMOL/L — SIGNIFICANT CHANGE UP (ref 135–145)
SODIUM SERPL-SCNC: 136 MMOL/L — SIGNIFICANT CHANGE UP (ref 135–145)
WBC # BLD: 11.1 K/UL — HIGH (ref 3.8–10.5)
WBC # BLD: 11.1 K/UL — HIGH (ref 3.8–10.5)
WBC # FLD AUTO: 11.1 K/UL — HIGH (ref 3.8–10.5)
WBC # FLD AUTO: 11.1 K/UL — HIGH (ref 3.8–10.5)

## 2023-11-02 PROCEDURE — 99233 SBSQ HOSP IP/OBS HIGH 50: CPT

## 2023-11-02 PROCEDURE — 72131 CT LUMBAR SPINE W/O DYE: CPT | Mod: 26

## 2023-11-02 RX ORDER — MAGNESIUM SULFATE 500 MG/ML
2 VIAL (ML) INJECTION ONCE
Refills: 0 | Status: COMPLETED | OUTPATIENT
Start: 2023-11-02 | End: 2023-11-02

## 2023-11-02 RX ORDER — POTASSIUM CHLORIDE 20 MEQ
10 PACKET (EA) ORAL
Refills: 0 | Status: COMPLETED | OUTPATIENT
Start: 2023-11-02 | End: 2023-11-02

## 2023-11-02 RX ORDER — HEPARIN SODIUM 5000 [USP'U]/ML
5000 INJECTION INTRAVENOUS; SUBCUTANEOUS EVERY 8 HOURS
Refills: 0 | Status: DISCONTINUED | OUTPATIENT
Start: 2023-11-02 | End: 2023-11-10

## 2023-11-02 RX ADMIN — Medication 1000 MILLIGRAM(S): at 18:19

## 2023-11-02 RX ADMIN — GABAPENTIN 800 MILLIGRAM(S): 400 CAPSULE ORAL at 05:19

## 2023-11-02 RX ADMIN — OXYCODONE HYDROCHLORIDE 15 MILLIGRAM(S): 5 TABLET ORAL at 21:00

## 2023-11-02 RX ADMIN — Medication 100 MILLIGRAM(S): at 15:01

## 2023-11-02 RX ADMIN — OXYCODONE HYDROCHLORIDE 15 MILLIGRAM(S): 5 TABLET ORAL at 17:20

## 2023-11-02 RX ADMIN — METHOCARBAMOL 750 MILLIGRAM(S): 500 TABLET, FILM COATED ORAL at 05:19

## 2023-11-02 RX ADMIN — Medication 100 MILLIGRAM(S): at 05:22

## 2023-11-02 RX ADMIN — SODIUM CHLORIDE 3 MILLILITER(S): 9 INJECTION INTRAMUSCULAR; INTRAVENOUS; SUBCUTANEOUS at 15:20

## 2023-11-02 RX ADMIN — Medication 1000 MILLIGRAM(S): at 05:18

## 2023-11-02 RX ADMIN — CYCLOBENZAPRINE HYDROCHLORIDE 10 MILLIGRAM(S): 10 TABLET, FILM COATED ORAL at 19:08

## 2023-11-02 RX ADMIN — SODIUM CHLORIDE 3 MILLILITER(S): 9 INJECTION INTRAMUSCULAR; INTRAVENOUS; SUBCUTANEOUS at 05:43

## 2023-11-02 RX ADMIN — Medication 100 MILLIEQUIVALENT(S): at 06:48

## 2023-11-02 RX ADMIN — GABAPENTIN 800 MILLIGRAM(S): 400 CAPSULE ORAL at 15:00

## 2023-11-02 RX ADMIN — OXYCODONE HYDROCHLORIDE 15 MILLIGRAM(S): 5 TABLET ORAL at 11:48

## 2023-11-02 RX ADMIN — OXYCODONE HYDROCHLORIDE 15 MILLIGRAM(S): 5 TABLET ORAL at 20:22

## 2023-11-02 RX ADMIN — Medication 0.5 MILLIGRAM(S): at 22:54

## 2023-11-02 RX ADMIN — Medication 100 MILLIEQUIVALENT(S): at 11:20

## 2023-11-02 RX ADMIN — Medication 4 MILLIGRAM(S): at 05:22

## 2023-11-02 RX ADMIN — OXYCODONE HYDROCHLORIDE 10 MILLIGRAM(S): 5 TABLET ORAL at 15:21

## 2023-11-02 RX ADMIN — GABAPENTIN 800 MILLIGRAM(S): 400 CAPSULE ORAL at 21:17

## 2023-11-02 RX ADMIN — HEPARIN SODIUM 5000 UNIT(S): 5000 INJECTION INTRAVENOUS; SUBCUTANEOUS at 21:17

## 2023-11-02 RX ADMIN — SODIUM CHLORIDE 3 MILLILITER(S): 9 INJECTION INTRAMUSCULAR; INTRAVENOUS; SUBCUTANEOUS at 21:36

## 2023-11-02 RX ADMIN — OXYCODONE HYDROCHLORIDE 15 MILLIGRAM(S): 5 TABLET ORAL at 15:21

## 2023-11-02 RX ADMIN — ATORVASTATIN CALCIUM 20 MILLIGRAM(S): 80 TABLET, FILM COATED ORAL at 21:18

## 2023-11-02 RX ADMIN — Medication 1000 MILLIGRAM(S): at 11:22

## 2023-11-02 RX ADMIN — Medication 100 MILLIEQUIVALENT(S): at 12:25

## 2023-11-02 RX ADMIN — OXYCODONE HYDROCHLORIDE 15 MILLIGRAM(S): 5 TABLET ORAL at 16:48

## 2023-11-02 RX ADMIN — OXYCODONE HYDROCHLORIDE 10 MILLIGRAM(S): 5 TABLET ORAL at 08:17

## 2023-11-02 RX ADMIN — Medication 25 GRAM(S): at 08:18

## 2023-11-02 RX ADMIN — Medication 1000 MILLIGRAM(S): at 15:17

## 2023-11-02 RX ADMIN — Medication 4 MILLIGRAM(S): at 14:59

## 2023-11-02 RX ADMIN — Medication 1000 MILLIGRAM(S): at 06:00

## 2023-11-02 RX ADMIN — HEPARIN SODIUM 5000 UNIT(S): 5000 INJECTION INTRAVENOUS; SUBCUTANEOUS at 14:59

## 2023-11-02 RX ADMIN — Medication 1000 MILLIGRAM(S): at 18:00

## 2023-11-02 RX ADMIN — Medication 1000 MILLIGRAM(S): at 00:00

## 2023-11-02 NOTE — OCCUPATIONAL THERAPY INITIAL EVALUATION ADULT - PERTINENT HX OF CURRENT PROBLEM, REHAB EVAL
63 year old male with lower back pain radiating down right leg x 1 year. Now s/p  Lumbar Decompression L1-L5 with Laminectomy and Fusion on 10/31/23. 63 year old male with lower back pain radiating down right leg x 1 year. Now s/p L1-pelvis PSF, L1-5 decompression on 10/31/23.

## 2023-11-02 NOTE — DIETITIAN INITIAL EVALUATION ADULT - OTHER INFO
64 y/o male with hx HTN and spinal stenosis of lumbar region now s/p L-1 - pelvis spinal fusion, decompression with laminectomy/fusion. Visited with pt to obtain nutrition hx. Pt said he has been experiencing a lot of pain from recent surgery, therefore his oral intake has been suboptimal. He also has been having diarrhea likely due to ABX therapy which has also diminished his oral intake. Food preferences taken and menu alternatives discussed. He endorsed that he has food allergies to eggs and peanuts - Food and Nutrition Dept aware. Offered oral nutrition supplementation, however pt declined as he said he thinks his appetite will improve if his pain is better controlled. If diarrhea persists, consider a probiotic to restore gut maría or imodium if warranted. Education deferred at this time as patient is medically acute in the critical care setting. RDN services to remain available as needed.

## 2023-11-02 NOTE — PROGRESS NOTE ADULT - ASSESSMENT
Pt is a with 64y/o Male s/p L1-pelvis PSF, L1-5 decompression on 10/31. Patient is hemodynamically stable.    Plan:  - ok for subQ H after 11/2 PM  - LSO brace  - Multimodal Pain control  - DVT ppx: Venodynes  - Ancef while drains are in  - Monitor YOON output  - Weight bearing status: WBAT  - PT/OT  - Dispo pending  - care per SICU

## 2023-11-02 NOTE — CHART NOTE - NSCHARTNOTEFT_GEN_A_CORE
Patient received bed on 9T 904A. Signed out to ortho resident Dr. Will Boyer. CT lumbar spine non con done, pending official read.

## 2023-11-02 NOTE — OCCUPATIONAL THERAPY INITIAL EVALUATION ADULT - ADDITIONAL COMMENTS
Patient reports that he lives in a private house with his mother and 2 brothers with ~5 steps to enter. Bedroom is in the basement +flight. Patient reports he will be living on the main floor. Prior to hospital admission, patient was completely independent and used a single cane with household ambulation and used a claw cane with community ambulation.

## 2023-11-02 NOTE — OCCUPATIONAL THERAPY INITIAL EVALUATION ADULT - DIAGNOSIS, OT EVAL
s/p Lumbar Decompression L1-L5 with Laminectomy and Fusion; decreased ADL ability; decreased functional mobility s/p L1-pelvis PSF, s/p L1-5 decompression; decreased ADL ability; decreased functional mobility

## 2023-11-02 NOTE — OCCUPATIONAL THERAPY INITIAL EVALUATION ADULT - PRECAUTIONS/LIMITATIONS, REHAB EVAL
3L of oxygen/fall precautions/oxygen therapy device and L/min/spinal precautions/surgical precautions

## 2023-11-02 NOTE — PROGRESS NOTE ADULT - SUBJECTIVE AND OBJECTIVE BOX
Orthopedic Surgery Progress Note     S: Patient seen and examined today. No acute events overnight. Notes that he his still having pain, and his pain regimen was increased overnight. Denies f/c, chest pain, shortness of breath, dizziness.    MEDICATIONS  (STANDING):  acetaminophen     Tablet .. 1000 milliGRAM(s) Oral every 6 hours  amLODIPine   Tablet 10 milliGRAM(s) Oral daily  atorvastatin 20 milliGRAM(s) Oral at bedtime  ceFAZolin   IVPB 2000 milliGRAM(s) IV Intermittent every 8 hours  dexAMETHasone  Injectable 4 milliGRAM(s) IV Push every 8 hours  gabapentin 800 milliGRAM(s) Oral three times a day  lidocaine   4% Patch 1 Patch Transdermal every 24 hours  sodium chloride 0.9% lock flush 3 milliLiter(s) IV Push every 8 hours    MEDICATIONS  (PRN):  albuterol    90 MICROgram(s) HFA Inhaler 2 Puff(s) Inhalation every 6 hours PRN Shortness of Breath and/or Wheezing  ALPRAZolam 0.5 milliGRAM(s) Oral at bedtime PRN anxiety  cyclobenzaprine 10 milliGRAM(s) Oral three times a day PRN Muscle Spasm  ondansetron Injectable 4 milliGRAM(s) IV Push every 6 hours PRN Nausea  oxyCODONE    IR 10 milliGRAM(s) Oral every 3 hours PRN Moderate Pain (4 - 6)  oxyCODONE    IR 15 milliGRAM(s) Oral every 3 hours PRN Severe Pain (7 - 10)      Vital Signs Last 24 Hrs  T(C): 38 (02 Nov 2023 04:00), Max: 39.2 (01 Nov 2023 16:00)  T(F): 100.4 (02 Nov 2023 04:00), Max: 102.5 (01 Nov 2023 16:00)  HR: 96 (02 Nov 2023 06:00) (93 - 110)  BP: 93/58 (02 Nov 2023 06:00) (93/58 - 111/72)  BP(mean): 70 (02 Nov 2023 06:00) (70 - 85)  RR: 19 (02 Nov 2023 06:00) (12 - 28)  SpO2: 100% (02 Nov 2023 06:00) (94% - 100%)    Parameters below as of 02 Nov 2023 04:00  Patient On (Oxygen Delivery Method): nasal cannula  O2 Flow (L/min): 3      10-31-23 @ 07:01  -  11-01-23 @ 07:00  --------------------------------------------------------  IN: 1660 mL / OUT: 2215 mL / NET: -555 mL    11-01-23 @ 07:01  -  11-02-23 @ 06:30  --------------------------------------------------------  IN: 1650 mL / OUT: 3660 mL / NET: -2010 mL        Physical Exam:  Gen: Alert/Oriented, No Acute Distress  Pulm: Non-labored breathing  BACK:          Dressing: [x] clean/dry/intact  [ ] Other:           Drains: YOON SS         Sensation: [x] intact to light touch  [ ] decreased:   Motor:                   C5                C6              C7               C8           T1   R            5/5                5/5            5/5             5/5          5/5  L             3/5               3/5             3/5             3/5          3/5                L2             L3             L4               L5            S1  R         5/5           5/5          5/5             5/5           5/5  L          1/5          1/5           1/5             1/5           2/5    Sensory:            C5         C6         C7      C8       T1        (0=absent, 1=impaired, 2=normal, NT=not testable)  R         2            2           2        2         2  L          2            2           2        2         2               L2          L3         L4      L5       S1         (0=absent, 1=impaired, 2=normal, NT=not testable)  R         2            2            2        2        2  L          2            2           2        2         2           WWP; capillary refill <3 seconds     LABS:                        8.5    11.10 )-----------( 146      ( 02 Nov 2023 02:25 )             25.5     11-02    136  |  100  |  10  ----------------------------<  140<H>  3.8   |  27  |  0.87    Ca    8.6      02 Nov 2023 02:25  Phos  3.1     11-02  Mg     1.80     11-02     Orthopedic Surgery Progress Note     S: Patient seen and examined today. Had fever to 102.1F overnight, received tylenol. Notes that he his still having pain, and his pain regimen was increased overnight. Denies f/c, chest pain, shortness of breath, dizziness.    MEDICATIONS  (STANDING):  acetaminophen     Tablet .. 1000 milliGRAM(s) Oral every 6 hours  amLODIPine   Tablet 10 milliGRAM(s) Oral daily  atorvastatin 20 milliGRAM(s) Oral at bedtime  ceFAZolin   IVPB 2000 milliGRAM(s) IV Intermittent every 8 hours  dexAMETHasone  Injectable 4 milliGRAM(s) IV Push every 8 hours  gabapentin 800 milliGRAM(s) Oral three times a day  lidocaine   4% Patch 1 Patch Transdermal every 24 hours  sodium chloride 0.9% lock flush 3 milliLiter(s) IV Push every 8 hours    MEDICATIONS  (PRN):  albuterol    90 MICROgram(s) HFA Inhaler 2 Puff(s) Inhalation every 6 hours PRN Shortness of Breath and/or Wheezing  ALPRAZolam 0.5 milliGRAM(s) Oral at bedtime PRN anxiety  cyclobenzaprine 10 milliGRAM(s) Oral three times a day PRN Muscle Spasm  ondansetron Injectable 4 milliGRAM(s) IV Push every 6 hours PRN Nausea  oxyCODONE    IR 10 milliGRAM(s) Oral every 3 hours PRN Moderate Pain (4 - 6)  oxyCODONE    IR 15 milliGRAM(s) Oral every 3 hours PRN Severe Pain (7 - 10)      Vital Signs Last 24 Hrs  T(C): 38 (02 Nov 2023 04:00), Max: 39.2 (01 Nov 2023 16:00)  T(F): 100.4 (02 Nov 2023 04:00), Max: 102.5 (01 Nov 2023 16:00)  HR: 96 (02 Nov 2023 06:00) (93 - 110)  BP: 93/58 (02 Nov 2023 06:00) (93/58 - 111/72)  BP(mean): 70 (02 Nov 2023 06:00) (70 - 85)  RR: 19 (02 Nov 2023 06:00) (12 - 28)  SpO2: 100% (02 Nov 2023 06:00) (94% - 100%)    Parameters below as of 02 Nov 2023 04:00  Patient On (Oxygen Delivery Method): nasal cannula  O2 Flow (L/min): 3      10-31-23 @ 07:01  -  11-01-23 @ 07:00  --------------------------------------------------------  IN: 1660 mL / OUT: 2215 mL / NET: -555 mL    11-01-23 @ 07:01  -  11-02-23 @ 06:30  --------------------------------------------------------  IN: 1650 mL / OUT: 3660 mL / NET: -2010 mL        Physical Exam:  Gen: Alert/Oriented, No Acute Distress  Pulm: Non-labored breathing  BACK:          Dressing: [x] clean/dry/intact  [ ] Other:           Drains: YOON SS         Sensation: [x] intact to light touch  [ ] decreased:   Motor:                   C5                C6              C7               C8           T1   R            5/5                5/5            5/5             5/5          5/5  L             3/5               3/5             3/5             3/5          3/5                L2             L3             L4               L5            S1  R         5/5           5/5          5/5             5/5           5/5  L          1/5          1/5           1/5             1/5           2/5    Sensory:            C5         C6         C7      C8       T1        (0=absent, 1=impaired, 2=normal, NT=not testable)  R         2            2           2        2         2  L          2            2           2        2         2               L2          L3         L4      L5       S1         (0=absent, 1=impaired, 2=normal, NT=not testable)  R         2            2            2        2        2  L          2            2           2        2         2           WWP; capillary refill <3 seconds     LABS:                        8.5    11.10 )-----------( 146      ( 02 Nov 2023 02:25 )             25.5     11-02    136  |  100  |  10  ----------------------------<  140<H>  3.8   |  27  |  0.87    Ca    8.6      02 Nov 2023 02:25  Phos  3.1     11-02  Mg     1.80     11-02

## 2023-11-02 NOTE — PROGRESS NOTE ADULT - SUBJECTIVE AND OBJECTIVE BOX
Subjective: Patient seen and examined. No new events except as noted.     SUBJECTIVE/ROS:  nad      MEDICATIONS:  MEDICATIONS  (STANDING):  acetaminophen     Tablet .. 1000 milliGRAM(s) Oral every 6 hours  amLODIPine   Tablet 10 milliGRAM(s) Oral daily  atorvastatin 20 milliGRAM(s) Oral at bedtime  ceFAZolin   IVPB 2000 milliGRAM(s) IV Intermittent every 8 hours  dexAMETHasone  Injectable 4 milliGRAM(s) IV Push every 8 hours  gabapentin 800 milliGRAM(s) Oral three times a day  heparin   Injectable 5000 Unit(s) SubCutaneous every 8 hours  lidocaine   4% Patch 1 Patch Transdermal every 24 hours  magnesium sulfate  IVPB 2 Gram(s) IV Intermittent once  potassium chloride  10 mEq/100 mL IVPB 10 milliEquivalent(s) IV Intermittent every 1 hour  sodium chloride 0.9% lock flush 3 milliLiter(s) IV Push every 8 hours      PHYSICAL EXAM:  T(C): 38 (11-02-23 @ 04:00), Max: 39.2 (11-01-23 @ 16:00)  HR: 96 (11-02-23 @ 07:00) (93 - 110)  BP: 97/57 (11-02-23 @ 07:00) (93/58 - 111/72)  RR: 15 (11-02-23 @ 07:00) (12 - 28)  SpO2: 100% (11-02-23 @ 07:00) (94% - 100%)  Wt(kg): --  I&O's Summary    01 Nov 2023 07:01  -  02 Nov 2023 07:00  --------------------------------------------------------  IN: 1650 mL / OUT: 3660 mL / NET: -2010 mL            JVP: Normal  Neck: supple  Lung: clear   CV: S1 S2 , Murmur:  Abd: soft  Ext: No edema  neuro: Awake / alert  Psych: flat affect  Skin: normal``    LABS/DATA:    CARDIAC MARKERS:                                8.5    11.10 )-----------( 146      ( 02 Nov 2023 02:25 )             25.5     11-02    136  |  100  |  10  ----------------------------<  140<H>  3.8   |  27  |  0.87    Ca    8.6      02 Nov 2023 02:25  Phos  3.1     11-02  Mg     1.80     11-02      proBNP:   Lipid Profile:   HgA1c:   TSH:     TELE:  EKG:

## 2023-11-02 NOTE — DIETITIAN INITIAL EVALUATION ADULT - PERTINENT MEDS FT
MEDICATIONS  (STANDING):  acetaminophen     Tablet .. 1000 milliGRAM(s) Oral every 6 hours  amLODIPine   Tablet 10 milliGRAM(s) Oral daily  atorvastatin 20 milliGRAM(s) Oral at bedtime  ceFAZolin   IVPB 2000 milliGRAM(s) IV Intermittent every 8 hours  dexAMETHasone  Injectable 4 milliGRAM(s) IV Push every 8 hours  gabapentin 800 milliGRAM(s) Oral three times a day  heparin   Injectable 5000 Unit(s) SubCutaneous every 8 hours  lidocaine   4% Patch 1 Patch Transdermal every 24 hours  sodium chloride 0.9% lock flush 3 milliLiter(s) IV Push every 8 hours    MEDICATIONS  (PRN):  albuterol    90 MICROgram(s) HFA Inhaler 2 Puff(s) Inhalation every 6 hours PRN Shortness of Breath and/or Wheezing  ALPRAZolam 0.5 milliGRAM(s) Oral at bedtime PRN anxiety  cyclobenzaprine 10 milliGRAM(s) Oral three times a day PRN Muscle Spasm  ondansetron Injectable 4 milliGRAM(s) IV Push every 6 hours PRN Nausea  oxyCODONE    IR 10 milliGRAM(s) Oral every 3 hours PRN Moderate Pain (4 - 6)  oxyCODONE    IR 15 milliGRAM(s) Oral every 3 hours PRN Severe Pain (7 - 10)

## 2023-11-02 NOTE — PROGRESS NOTE ADULT - SUBJECTIVE AND OBJECTIVE BOX
Follow up consult for Acute Pain Management     SUBJECTIVE:  The patient states he is doing better and pain is controlled. Patient was febrile last night and treating with Tylenol.  		  OBJECTIVE:  Patient is sitting in bed.    Pain Score:   (X) Refer to pain scores    Therapy:	[ ] IV PCA	[ ] Epidural   [ ] s/p Spinal Opioid	[ ] Peripheral nerve block  (x) PRN Oral/IV opioids and or Adjuvant non-opioid medications  	  Vital Signs Last 24 Hrs  T(C): 38 (02 Nov 2023 04:00), Max: 39.2 (01 Nov 2023 16:00)  T(F): 100.4 (02 Nov 2023 04:00), Max: 102.5 (01 Nov 2023 16:00)  HR: 101 (02 Nov 2023 09:00) (94 - 110)  BP: 107/73 (02 Nov 2023 08:00) (93/58 - 111/72)  BP(mean): 84 (02 Nov 2023 08:00) (69 - 85)  RR: 23 (02 Nov 2023 09:00) (12 - 33)  SpO2: 100% (02 Nov 2023 09:00) (94% - 100%)    Parameters below as of 02 Nov 2023 04:00  Patient On (Oxygen Delivery Method): nasal cannula  O2 Flow (L/min): 3      ( x) Alert & Oriented     ( ) No motor/sensory block     ( ) Nausea     ( ) Pruritis     ( ) Headache    ASSESSMENT/ PLAN    Therapy to  be:	[x ] Continue   [ ] Discontinued      Documentation and Verification of current medications:   [X] Done	[ ] Not done, not elligible    Comments:  Patient's pain is better controlled.  Continue current pain regimen. PRN Oral/IV opioids and/or Adjuvant non-opioid medication to be ordered at this point.  Pain service to sign off, no further recommendations for pain medications, at this time.  May call pain service if needed.    Progress Note written now but Patient was seen earlier.

## 2023-11-02 NOTE — PROGRESS NOTE ADULT - SUBJECTIVE AND OBJECTIVE BOX
SICU Daily Progress Note  =====================================================  Interval/Overnight Events:    - restarted home flexeril and prn xanax  - increased gabapentin from 600 to 800mg tid    Allergies: eggs (Hives; Rash)  No Known Drug Allergies  peanuts (Unknown)      MEDICATIONS:   --------------------------------------------------------------------------------------  Neurologic Medications  acetaminophen     Tablet .. 1000 milliGRAM(s) Oral every 6 hours  ALPRAZolam 0.5 milliGRAM(s) Oral at bedtime PRN anxiety  cyclobenzaprine 10 milliGRAM(s) Oral three times a day PRN Muscle Spasm  gabapentin 800 milliGRAM(s) Oral three times a day  HYDROmorphone   Tablet 2 milliGRAM(s) Oral once  methocarbamol 750 milliGRAM(s) Oral every 6 hours  ondansetron Injectable 4 milliGRAM(s) IV Push every 6 hours PRN Nausea  oxyCODONE    IR 10 milliGRAM(s) Oral every 3 hours PRN Moderate Pain (4 - 6)  oxyCODONE    IR 15 milliGRAM(s) Oral every 3 hours PRN Severe Pain (7 - 10)    Respiratory Medications  albuterol    90 MICROgram(s) HFA Inhaler 2 Puff(s) Inhalation every 6 hours PRN Shortness of Breath and/or Wheezing    Cardiovascular Medications  amLODIPine   Tablet 10 milliGRAM(s) Oral daily    Gastrointestinal Medications  sodium chloride 0.9% lock flush 3 milliLiter(s) IV Push every 8 hours    Genitourinary Medications    Hematologic/Oncologic Medications    Antimicrobial/Immunologic Medications  ceFAZolin   IVPB 2000 milliGRAM(s) IV Intermittent every 8 hours    Endocrine/Metabolic Medications  atorvastatin 20 milliGRAM(s) Oral at bedtime  dexAMETHasone  Injectable 4 milliGRAM(s) IV Push every 8 hours    Topical/Other Medications  lidocaine   4% Patch 1 Patch Transdermal every 24 hours    --------------------------------------------------------------------------------------    VITAL SIGNS, INS/OUTS (last 24 hours):  --------------------------------------------------------------------------------------  I&O's Detail    31 Oct 2023 07:01  -  2023 07:00  --------------------------------------------------------  IN:    IV PiggyBack: 450 mL    Lactated Ringers: 600 mL    Oral Fluid: 400 mL    sodium chloride 0.9%: 210 mL  Total IN: 1660 mL    OUT:    Bulb (mL): 540 mL    Indwelling Catheter - Urethral (mL): 1675 mL  Total OUT: 2215 mL    Total NET: -555 mL      2023 07:01  -  2023 00:27  --------------------------------------------------------  IN:    IV PiggyBack: 50 mL    Lactated Ringers: 150 mL    Oral Fluid: 1060 mL    sodium chloride 0.9%: 90 mL  Total IN: 1350 mL    OUT:    Bulb (mL): 440 mL    Indwelling Catheter - Urethral (mL): 550 mL    Voided (mL): 1220 mL  Total OUT: 2210 mL    Total NET: -860 mL        --------------------------------------------------------------------------------------    EXAM  NEUROLOGY  RASS:   	GCS:    Exam: Appears uncomfortable in pain, alert, oriented x3.     HEENT  Exam: Normocephalic, atraumatic, EOMI.     RESPIRATORY  Exam:  Unlabored respiratory effort    CARDIOVASCULAR  Exam: S1, S2.  Regular rate and rhythm.      GI/NUTRITION  Exam: Abdomen soft, Non-tender, Non-distended.       VASCULAR  Exam: Extremities warm, pink, well-perfused.      MUSCULOSKELETAL  Exam: Lower Extremity weakness L>R. Left upper extremity weakness. Lumbar drain with sanguinous output.    SKIN  Exam: Good skin turgor, no skin breakdown.      METABOLIC/FLUIDS/ELECTROLYTES  sodium chloride 0.9% lock flush 3 milliLiter(s) IV Push every 8 hours      HEMATOLOGIC  [x] VTE Prophylaxis:   Transfusions:	[] PRBC	[] Platelets		[] FFP	[] Cryoprecipitate    INFECTIOUS DISEASE  Antimicrobials/Immunologic Medications:  ceFAZolin   IVPB 2000 milliGRAM(s) IV Intermittent every 8 hours    Day # 2     of  2    Tubes/Lines/Drains   [x] Peripheral IV  [] Central Venous Line     	[] R	[] L	[] IJ	[] Fem	[] SC	Date Placed:   [x] Arterial Line		[] R	[] L	[] Fem	[] Rad	[] Ax	Date Placed:   [] PICC		[] Midline		[] Mediport  [x] Urinary Catheter		Date Placed:   [x] Necessity of urinary, arterial, and venous catheters discussed    LABS  --------------------------------------------------------------------------------------  CBC ( @ 00:30)                          9.2<L>                   8.52    )--------------(  155        --    % Neuts, --    % Lymphs, ANC: --                              27.4<L>  CBC (10-31 @ 19:30)                          9.6<L>                   7.56    )--------------(  178        --    % Neuts, --    % Lymphs, ANC: --                              28.7<L>    BMP ( @ 00:30)       138     |  103     |  8     			Ca++ --      Ca 9.2          ---------------------------------( 123<H>		Mg 1.70         4.2     |  25      |  0.79  			Ph 3.3     BMP (10-31 @ 19:30)       141     |  109<H>  |  8     			Ca++ --      Ca 9.8          ---------------------------------( 143<H>		Mg 1.80         5.0     |  25      |  0.79  			Ph 4.7<H>      Coags ( @ 00:30)  aPTT 19.1 / INR 1.22<H> / PT 13.7<H>  Coags (10-31 @ 19:30)  aPTT 23.2<L> / INR 1.08 / PT 12.2      ABG (10-31 @ 19:30)     7.42 / 39 / 88 / 25 / 0.8 / 99.0<H>%     Lactate:    ABG (10-31 @ 16:49)     7.41 / 36 / 279<H> / 23 / -1.6 / 100.0<H>%     Lactate:        Urinalysis ( @ 15:06):     Color: Yellow / Appearance: Clear / S.015 / pH: 6.0 / Gluc: Negative / Ketones: Negative / Bili: Negative / Urobili: 0.2 / Protein :Negative / Nitrites: Negative / Leuk.Est: Negative / RBC: 8<H> / WBC: 2 / Sq Epi:  / Non Sq Epi: 1 / Bacteria Negative     Urinalysis ( @ 00:30):     Color:  / Appearance:  / SG:  / pH:  / Gluc: 123<H> / Ketones:  / Bili:  / Urobili:  / Protein : / Nitrites:  / Leuk.Est:  / RBC:  / WBC:  / Sq Epi:  / Non Sq Epi:  / Bacteria          --------------------------------------------------------------------------------------   SICU Daily Progress Note  =====================================================  Interval events:  - increased holden from 600 to 800 tid  - restarted home flexeril and xanax prn  - q4hr neuro chks. Can list to the [facility]  - [facility] this am  - febrile ON cxr showed R plerual effusion and UA clear  - a.line removed      Allergies: eggs (Hives; Rash)  No Known Drug Allergies  peanuts (Unknown)      MEDICATIONS:   --------------------------------------------------------------------------------------  Neurologic Medications  acetaminophen     Tablet .. 1000 milliGRAM(s) Oral every 6 hours  ALPRAZolam 0.5 milliGRAM(s) Oral at bedtime PRN anxiety  cyclobenzaprine 10 milliGRAM(s) Oral three times a day PRN Muscle Spasm  gabapentin 800 milliGRAM(s) Oral three times a day  HYDROmorphone   Tablet 2 milliGRAM(s) Oral once  methocarbamol 750 milliGRAM(s) Oral every 6 hours  ondansetron Injectable 4 milliGRAM(s) IV Push every 6 hours PRN Nausea  oxyCODONE    IR 10 milliGRAM(s) Oral every 3 hours PRN Moderate Pain (4 - 6)  oxyCODONE    IR 15 milliGRAM(s) Oral every 3 hours PRN Severe Pain (7 - 10)    Respiratory Medications  albuterol    90 MICROgram(s) HFA Inhaler 2 Puff(s) Inhalation every 6 hours PRN Shortness of Breath and/or Wheezing    Cardiovascular Medications  amLODIPine   Tablet 10 milliGRAM(s) Oral daily    Gastrointestinal Medications  sodium chloride 0.9% lock flush 3 milliLiter(s) IV Push every 8 hours    Genitourinary Medications    Hematologic/Oncologic Medications    Antimicrobial/Immunologic Medications  ceFAZolin   IVPB 2000 milliGRAM(s) IV Intermittent every 8 hours    Endocrine/Metabolic Medications  atorvastatin 20 milliGRAM(s) Oral at bedtime  dexAMETHasone  Injectable 4 milliGRAM(s) IV Push every 8 hours    Topical/Other Medications  lidocaine   4% Patch 1 Patch Transdermal every 24 hours    --------------------------------------------------------------------------------------    VITAL SIGNS, INS/OUTS (last 24 hours):  --------------------------------------------------------------------------------------  I&O's Detail    31 Oct 2023 07:  -  2023 07:00  --------------------------------------------------------  IN:    IV PiggyBack: 450 mL    Lactated Ringers: 600 mL    Oral Fluid: 400 mL    sodium chloride 0.9%: 210 mL  Total IN: 1660 mL    OUT:    Bulb (mL): 540 mL    Indwelling Catheter - Urethral (mL): 1675 mL  Total OUT: 2215 mL    Total NET: -555 mL      2023 07:  -  2023 00:27  --------------------------------------------------------  IN:    IV PiggyBack: 50 mL    Lactated Ringers: 150 mL    Oral Fluid: 1060 mL    sodium chloride 0.9%: 90 mL  Total IN: 1350 mL    OUT:    Bulb (mL): 440 mL    Indwelling Catheter - Urethral (mL): 550 mL    Voided (mL): 1220 mL  Total OUT: 2210 mL    Total NET: -860 mL        --------------------------------------------------------------------------------------    EXAM  NEUROLOGY  RASS:   	GCS:    Exam: Appears uncomfortable in pain, alert, oriented x3.     HEENT  Exam: Normocephalic, atraumatic, EOMI.     RESPIRATORY  Exam:  Unlabored respiratory effort    CARDIOVASCULAR  Exam: S1, S2.  Regular rate and rhythm.      GI/NUTRITION  Exam: Abdomen soft, Non-tender, Non-distended.       VASCULAR  Exam: Extremities warm, pink, well-perfused.      MUSCULOSKELETAL  Exam: Lower Extremity weakness L>R. Left upper extremity weakness. Lumbar drain with sanguinous output.    SKIN  Exam: Good skin turgor, no skin breakdown.      METABOLIC/FLUIDS/ELECTROLYTES  sodium chloride 0.9% lock flush 3 milliLiter(s) IV Push every 8 hours      HEMATOLOGIC  [x] VTE Prophylaxis:   Transfusions:	[] PRBC	[] Platelets		[] FFP	[] Cryoprecipitate    INFECTIOUS DISEASE  Antimicrobials/Immunologic Medications:  ceFAZolin   IVPB 2000 milliGRAM(s) IV Intermittent every 8 hours    Day # 2     of  2    Tubes/Lines/Drains   [x] Peripheral IV  [] Central Venous Line     	[] R	[] L	[] IJ	[] Fem	[] SC	Date Placed:   [x] Arterial Line		[] R	[] L	[] Fem	[] Rad	[] Ax	Date Placed:   [] PICC		[] Midline		[] Mediport  [x] Urinary Catheter		Date Placed:   [x] Necessity of urinary, arterial, and venous catheters discussed    LABS  --------------------------------------------------------------------------------------  CBC ( @ 00:30)                          9.2<L>                   8.52    )--------------(  155        --    % Neuts, --    % Lymphs, ANC: --                              27.4<L>  CBC (10-31 @ 19:30)                          9.6<L>                   7.56    )--------------(  178        --    % Neuts, --    % Lymphs, ANC: --                              28.7<L>    BMP ( @ 00:30)       138     |  103     |  8     			Ca++ --      Ca 9.2          ---------------------------------( 123<H>		Mg 1.70         4.2     |  25      |  0.79  			Ph 3.3     BMP (10-31 @ 19:30)       141     |  109<H>  |  8     			Ca++ --      Ca 9.8          ---------------------------------( 143<H>		Mg 1.80         5.0     |  25      |  0.79  			Ph 4.7<H>      Coags ( @ 00:30)  aPTT 19.1 / INR 1.22<H> / PT 13.7<H>  Coags (10-31 @ 19:30)  aPTT 23.2<L> / INR 1.08 / PT 12.2      ABG (10-31 @ 19:30)     7.42 / 39 / 88 / 25 / 0.8 / 99.0<H>%     Lactate:    ABG (10-31 @ 16:49)     7.41 / 36 / 279<H> / 23 / -1.6 / 100.0<H>%     Lactate:        Urinalysis ( @ 15:06):     Color: Yellow / Appearance: Clear / S.015 / pH: 6.0 / Gluc: Negative / Ketones: Negative / Bili: Negative / Urobili: 0.2 / Protein :Negative / Nitrites: Negative / Leuk.Est: Negative / RBC: 8<H> / WBC: 2 / Sq Epi:  / Non Sq Epi: 1 / Bacteria Negative     Urinalysis ( @ 00:30):     Color:  / Appearance:  / SG:  / pH:  / Gluc: 123<H> / Ketones:  / Bili:  / Urobili:  / Protein : / Nitrites:  / Leuk.Est:  / RBC:  / WBC:  / Sq Epi:  / Non Sq Epi:  / Bacteria          --------------------------------------------------------------------------------------   SICU Daily Progress Note  =====================================================  Interval events:  - increased holden from 600 to 800 tid  - restarted home flexeril and xanax prn  - q4hr neuro chks. Can list to the [facility]  - [facility] this am  - febrile ON cxr showed R plerual effusion and UA clear  - a.line removed    Pt denies CP SOB ab pain n/v/d hematuria dysuria hematochezia but does have numbness/tingling of hands and feet.    Allergies: eggs (Hives; Rash)  No Known Drug Allergies  peanuts (Unknown)      MEDICATIONS:   --------------------------------------------------------------------------------------  Neurologic Medications  acetaminophen     Tablet .. 1000 milliGRAM(s) Oral every 6 hours  ALPRAZolam 0.5 milliGRAM(s) Oral at bedtime PRN anxiety  cyclobenzaprine 10 milliGRAM(s) Oral three times a day PRN Muscle Spasm  gabapentin 800 milliGRAM(s) Oral three times a day  HYDROmorphone   Tablet 2 milliGRAM(s) Oral once  methocarbamol 750 milliGRAM(s) Oral every 6 hours  ondansetron Injectable 4 milliGRAM(s) IV Push every 6 hours PRN Nausea  oxyCODONE    IR 10 milliGRAM(s) Oral every 3 hours PRN Moderate Pain (4 - 6)  oxyCODONE    IR 15 milliGRAM(s) Oral every 3 hours PRN Severe Pain (7 - 10)    Respiratory Medications  albuterol    90 MICROgram(s) HFA Inhaler 2 Puff(s) Inhalation every 6 hours PRN Shortness of Breath and/or Wheezing    Cardiovascular Medications  amLODIPine   Tablet 10 milliGRAM(s) Oral daily    Gastrointestinal Medications  sodium chloride 0.9% lock flush 3 milliLiter(s) IV Push every 8 hours    Genitourinary Medications    Hematologic/Oncologic Medications    Antimicrobial/Immunologic Medications  ceFAZolin   IVPB 2000 milliGRAM(s) IV Intermittent every 8 hours    Endocrine/Metabolic Medications  atorvastatin 20 milliGRAM(s) Oral at bedtime  dexAMETHasone  Injectable 4 milliGRAM(s) IV Push every 8 hours    Topical/Other Medications  lidocaine   4% Patch 1 Patch Transdermal every 24 hours    --------------------------------------------------------------------------------------    VITAL SIGNS, INS/OUTS (last 24 hours):  --------------------------------------------------------------------------------------  I&O's Detail    31 Oct 2023 07:01  -  2023 07:00  --------------------------------------------------------  IN:    IV PiggyBack: 450 mL    Lactated Ringers: 600 mL    Oral Fluid: 400 mL    sodium chloride 0.9%: 210 mL  Total IN: 1660 mL    OUT:    Bulb (mL): 540 mL    Indwelling Catheter - Urethral (mL): 1675 mL  Total OUT: 2215 mL    Total NET: -555 mL      2023 07:  -  2023 00:27  --------------------------------------------------------  IN:    IV PiggyBack: 50 mL    Lactated Ringers: 150 mL    Oral Fluid: 1060 mL    sodium chloride 0.9%: 90 mL  Total IN: 1350 mL    OUT:    Bulb (mL): 440 mL    Indwelling Catheter - Urethral (mL): 550 mL    Voided (mL): 1220 mL  Total OUT: 2210 mL    Total NET: -860 mL        --------------------------------------------------------------------------------------    EXAM  NEUROLOGY  RASS:   	GCS:    Exam: alert, oriented x3. 5/5 strength in RUE, limited ROM and 4/5 strength in LUE, limited ROM in RLE, unable to move LLE at hip and knee.    HEENT  Exam: Normocephalic, atraumatic, EOMI.     RESPIRATORY  Exam:  Unlabored respiratory effort    CARDIOVASCULAR  Exam: S1, S2.  Regular rate and rhythm.      GI/NUTRITION  Exam: Abdomen soft, Non-tender, Non-distended.       VASCULAR  Exam: Extremities warm, pink, well-perfused.      MUSCULOSKELETAL  Exam: Lower Extremity weakness L>R. Left upper extremity weakness. Lumbar drain with sanguinous output.    SKIN  Exam: Good skin turgor, no skin breakdown.      METABOLIC/FLUIDS/ELECTROLYTES  sodium chloride 0.9% lock flush 3 milliLiter(s) IV Push every 8 hours      HEMATOLOGIC  [x] VTE Prophylaxis:   Transfusions:	[] PRBC	[] Platelets		[] FFP	[] Cryoprecipitate    INFECTIOUS DISEASE  Antimicrobials/Immunologic Medications:  ceFAZolin   IVPB 2000 milliGRAM(s) IV Intermittent every 8 hours    Day # 2     of  2    Tubes/Lines/Drains   [x] Peripheral IV  [] Central Venous Line     	[] R	[] L	[] IJ	[] Fem	[] SC	Date Placed:   [x] Arterial Line		[] R	[] L	[] Fem	[] Rad	[] Ax	Date Placed:   [] PICC		[] Midline		[] Mediport  [x] Urinary Catheter		Date Placed:   [x] Necessity of urinary, arterial, and venous catheters discussed    LABS  --------------------------------------------------------------------------------------  CBC ( @ 00:30)                          9.2<L>                   8.52    )--------------(  155        --    % Neuts, --    % Lymphs, ANC: --                              27.4<L>  CBC (10-31 @ 19:30)                          9.6<L>                   7.56    )--------------(  178        --    % Neuts, --    % Lymphs, ANC: --                              28.7<L>    BMP ( 00:30)       138     |  103     |  8     			Ca++ --      Ca 9.2          ---------------------------------( 123<H>		Mg 1.70         4.2     |  25      |  0.79  			Ph 3.3     BMP (10-31 @ 19:30)       141     |  109<H>  |  8     			Ca++ --      Ca 9.8          ---------------------------------( 143<H>		Mg 1.80         5.0     |  25      |  0.79  			Ph 4.7<H>      Coags ( 00:30)  aPTT 19.1 / INR 1.22<H> / PT 13.7<H>  Coags (10-31 @ 19:30)  aPTT 23.2<L> / INR 1.08 / PT 12.2      ABG (10-31 @ 19:30)     7.42 / 39 / 88 / 25 / 0.8 / 99.0<H>%     Lactate:    ABG (10-31 @ 16:49)     7.41 / 36 / 279<H> / 23 / -1.6 / 100.0<H>%     Lactate:        Urinalysis ( @ 15:06):     Color: Yellow / Appearance: Clear / S.015 / pH: 6.0 / Gluc: Negative / Ketones: Negative / Bili: Negative / Urobili: 0.2 / Protein :Negative / Nitrites: Negative / Leuk.Est: Negative / RBC: 8<H> / WBC: 2 / Sq Epi:  / Non Sq Epi: 1 / Bacteria Negative     Urinalysis ( @ 00:30):     Color:  / Appearance:  / SG:  / pH:  / Gluc: 123<H> / Ketones:  / Bili:  / Urobili:  / Protein : / Nitrites:  / Leuk.Est:  / RBC:  / WBC:  / Sq Epi:  / Non Sq Epi:  / Bacteria          --------------------------------------------------------------------------------------

## 2023-11-02 NOTE — OCCUPATIONAL THERAPY INITIAL EVALUATION ADULT - RANGE OF MOTION EXAMINATION, UPPER EXTREMITY
left shoulder passive ROM ~0-90 degrees; left elbow ~0-90 degrees, left wrist WFL, left hand decreased DIP/PIP Extension/Right UE Active ROM was WFL (within functional limits)

## 2023-11-02 NOTE — PROGRESS NOTE ADULT - ASSESSMENT
Non obstructive CAD  asymptomatic  Lipitor 20 mg daily recommended  asa in future once deemed safe     HTN  BP on soft side  would hold amlodipine for now

## 2023-11-02 NOTE — DIETITIAN INITIAL EVALUATION ADULT - PERTINENT LABORATORY DATA
11-02    136  |  100  |  10  ----------------------------<  140<H>  3.8   |  27  |  0.87    Ca    8.6      02 Nov 2023 02:25  Phos  3.1     11-02  Mg     1.80     11-02    A1C with Estimated Average Glucose Result: 6.2 % (08-15-23 @ 10:10)

## 2023-11-02 NOTE — PROGRESS NOTE ADULT - ASSESSMENT
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 Now s/p L1-Pelvis posterior spinal fusion, L1-L5 decompression on 10/31/2023. Admitted to SICU for neurochecks, hemodynamic watch.    PLAN:  Neuro:  - q4hr hour neurochecks  - Pain control w/ tylenol, oxycodone, robaxin, flexeril, gabapentin  - LSO brace when in chair and out of bed  - WBAT    Resp:  - Incentive spirometry to prevent atelectasis  - Wean supplemental O2 as able, goal >92%    CV:  - SBP goal > 65  - home amlodipine    GI:   - regular diet    Renal:  - Monitor I&Os and electrolytes w/ repletions as necessary    Heme:  - Monitor CBC and coags  - ok for subQ H after 48 hours post op    ID:   - Monitor for clinical evidence of active infection.  - f/u blood cx. ua negative. cxr clear.  - Monitor WBC, temperature.  - Ancef while ortho drain in place.    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 Now s/p L1-Pelvis posterior spinal fusion, L1-L5 decompression on 10/31/2023. Admitted to SICU for neurochecks, hemodynamic watch.    PLAN:    Neuro:  - q4hr hour neurochecks  - Pain control w/ tylenol, oxycodone, robaxin, flexeril, gabapentin  - LSO brace when in chair and out of bed  - WBAT    Resp:  - Incentive spirometry to prevent atelectasis  - Wean supplemental O2 as able, goal >92%    CV:  - SBP goal > 65  - home amlodipine    GI:   - regular diet    Renal:  - Monitor I&Os and electrolytes w/ repletions as necessary    Heme:  - Monitor CBC and coags  - ok for subQ H after 48 hours post op    ID:   - Monitor for clinical evidence of active infection.  - f/u blood cx. ua negative. cxr clear.  - Monitor WBC, temperature.  - Ancef while ortho drain in place.  - elevated wbc count noted 11/2  Endo:   - Monitor glucose    Disposition:  List to floor

## 2023-11-03 LAB
ANION GAP SERPL CALC-SCNC: 4 MMOL/L — LOW (ref 7–14)
ANION GAP SERPL CALC-SCNC: 4 MMOL/L — LOW (ref 7–14)
BUN SERPL-MCNC: 13 MG/DL — SIGNIFICANT CHANGE UP (ref 7–23)
BUN SERPL-MCNC: 13 MG/DL — SIGNIFICANT CHANGE UP (ref 7–23)
CALCIUM SERPL-MCNC: 8.7 MG/DL — SIGNIFICANT CHANGE UP (ref 8.4–10.5)
CALCIUM SERPL-MCNC: 8.7 MG/DL — SIGNIFICANT CHANGE UP (ref 8.4–10.5)
CHLORIDE SERPL-SCNC: 100 MMOL/L — SIGNIFICANT CHANGE UP (ref 98–107)
CHLORIDE SERPL-SCNC: 100 MMOL/L — SIGNIFICANT CHANGE UP (ref 98–107)
CO2 SERPL-SCNC: 33 MMOL/L — HIGH (ref 22–31)
CO2 SERPL-SCNC: 33 MMOL/L — HIGH (ref 22–31)
CREAT SERPL-MCNC: 0.7 MG/DL — SIGNIFICANT CHANGE UP (ref 0.5–1.3)
CREAT SERPL-MCNC: 0.7 MG/DL — SIGNIFICANT CHANGE UP (ref 0.5–1.3)
EGFR: 104 ML/MIN/1.73M2 — SIGNIFICANT CHANGE UP
EGFR: 104 ML/MIN/1.73M2 — SIGNIFICANT CHANGE UP
GLUCOSE SERPL-MCNC: 132 MG/DL — HIGH (ref 70–99)
GLUCOSE SERPL-MCNC: 132 MG/DL — HIGH (ref 70–99)
HCT VFR BLD CALC: 25.5 % — LOW (ref 39–50)
HCT VFR BLD CALC: 25.5 % — LOW (ref 39–50)
HGB BLD-MCNC: 8.3 G/DL — LOW (ref 13–17)
HGB BLD-MCNC: 8.3 G/DL — LOW (ref 13–17)
MAGNESIUM SERPL-MCNC: 2.4 MG/DL — SIGNIFICANT CHANGE UP (ref 1.6–2.6)
MAGNESIUM SERPL-MCNC: 2.4 MG/DL — SIGNIFICANT CHANGE UP (ref 1.6–2.6)
MCHC RBC-ENTMCNC: 28.7 PG — SIGNIFICANT CHANGE UP (ref 27–34)
MCHC RBC-ENTMCNC: 28.7 PG — SIGNIFICANT CHANGE UP (ref 27–34)
MCHC RBC-ENTMCNC: 32.5 GM/DL — SIGNIFICANT CHANGE UP (ref 32–36)
MCHC RBC-ENTMCNC: 32.5 GM/DL — SIGNIFICANT CHANGE UP (ref 32–36)
MCV RBC AUTO: 88.2 FL — SIGNIFICANT CHANGE UP (ref 80–100)
MCV RBC AUTO: 88.2 FL — SIGNIFICANT CHANGE UP (ref 80–100)
NRBC # BLD: 0 /100 WBCS — SIGNIFICANT CHANGE UP (ref 0–0)
NRBC # BLD: 0 /100 WBCS — SIGNIFICANT CHANGE UP (ref 0–0)
NRBC # FLD: 0 K/UL — SIGNIFICANT CHANGE UP (ref 0–0)
NRBC # FLD: 0 K/UL — SIGNIFICANT CHANGE UP (ref 0–0)
PHOSPHATE SERPL-MCNC: 2.2 MG/DL — LOW (ref 2.5–4.5)
PHOSPHATE SERPL-MCNC: 2.2 MG/DL — LOW (ref 2.5–4.5)
PLATELET # BLD AUTO: 141 K/UL — LOW (ref 150–400)
PLATELET # BLD AUTO: 141 K/UL — LOW (ref 150–400)
POTASSIUM SERPL-MCNC: 4.7 MMOL/L — SIGNIFICANT CHANGE UP (ref 3.5–5.3)
POTASSIUM SERPL-MCNC: 4.7 MMOL/L — SIGNIFICANT CHANGE UP (ref 3.5–5.3)
POTASSIUM SERPL-SCNC: 4.7 MMOL/L — SIGNIFICANT CHANGE UP (ref 3.5–5.3)
POTASSIUM SERPL-SCNC: 4.7 MMOL/L — SIGNIFICANT CHANGE UP (ref 3.5–5.3)
RBC # BLD: 2.89 M/UL — LOW (ref 4.2–5.8)
RBC # BLD: 2.89 M/UL — LOW (ref 4.2–5.8)
RBC # FLD: 14.5 % — SIGNIFICANT CHANGE UP (ref 10.3–14.5)
RBC # FLD: 14.5 % — SIGNIFICANT CHANGE UP (ref 10.3–14.5)
SODIUM SERPL-SCNC: 137 MMOL/L — SIGNIFICANT CHANGE UP (ref 135–145)
SODIUM SERPL-SCNC: 137 MMOL/L — SIGNIFICANT CHANGE UP (ref 135–145)
WBC # BLD: 10.58 K/UL — HIGH (ref 3.8–10.5)
WBC # BLD: 10.58 K/UL — HIGH (ref 3.8–10.5)
WBC # FLD AUTO: 10.58 K/UL — HIGH (ref 3.8–10.5)
WBC # FLD AUTO: 10.58 K/UL — HIGH (ref 3.8–10.5)

## 2023-11-03 RX ORDER — OXYCODONE HYDROCHLORIDE 5 MG/1
10 TABLET ORAL EVERY 12 HOURS
Refills: 0 | Status: DISCONTINUED | OUTPATIENT
Start: 2023-11-03 | End: 2023-11-03

## 2023-11-03 RX ORDER — OXYCODONE HYDROCHLORIDE 5 MG/1
20 TABLET ORAL EVERY 12 HOURS
Refills: 0 | Status: DISCONTINUED | OUTPATIENT
Start: 2023-11-03 | End: 2023-11-03

## 2023-11-03 RX ORDER — ACETAMINOPHEN 500 MG
1000 TABLET ORAL EVERY 8 HOURS
Refills: 0 | Status: DISCONTINUED | OUTPATIENT
Start: 2023-11-03 | End: 2023-11-04

## 2023-11-03 RX ORDER — CYCLOBENZAPRINE HYDROCHLORIDE 10 MG/1
10 TABLET, FILM COATED ORAL THREE TIMES A DAY
Refills: 0 | Status: DISCONTINUED | OUTPATIENT
Start: 2023-11-03 | End: 2023-11-04

## 2023-11-03 RX ORDER — OXYCODONE HYDROCHLORIDE 5 MG/1
10 TABLET ORAL EVERY 12 HOURS
Refills: 0 | Status: DISCONTINUED | OUTPATIENT
Start: 2023-11-03 | End: 2023-11-04

## 2023-11-03 RX ADMIN — OXYCODONE HYDROCHLORIDE 15 MILLIGRAM(S): 5 TABLET ORAL at 11:50

## 2023-11-03 RX ADMIN — GABAPENTIN 800 MILLIGRAM(S): 400 CAPSULE ORAL at 21:21

## 2023-11-03 RX ADMIN — Medication 1000 MILLIGRAM(S): at 11:49

## 2023-11-03 RX ADMIN — OXYCODONE HYDROCHLORIDE 15 MILLIGRAM(S): 5 TABLET ORAL at 06:20

## 2023-11-03 RX ADMIN — SODIUM CHLORIDE 3 MILLILITER(S): 9 INJECTION INTRAMUSCULAR; INTRAVENOUS; SUBCUTANEOUS at 14:24

## 2023-11-03 RX ADMIN — GABAPENTIN 800 MILLIGRAM(S): 400 CAPSULE ORAL at 05:29

## 2023-11-03 RX ADMIN — GABAPENTIN 800 MILLIGRAM(S): 400 CAPSULE ORAL at 13:37

## 2023-11-03 RX ADMIN — Medication 1000 MILLIGRAM(S): at 05:29

## 2023-11-03 RX ADMIN — CYCLOBENZAPRINE HYDROCHLORIDE 10 MILLIGRAM(S): 10 TABLET, FILM COATED ORAL at 13:37

## 2023-11-03 RX ADMIN — OXYCODONE HYDROCHLORIDE 10 MILLIGRAM(S): 5 TABLET ORAL at 19:03

## 2023-11-03 RX ADMIN — OXYCODONE HYDROCHLORIDE 15 MILLIGRAM(S): 5 TABLET ORAL at 00:54

## 2023-11-03 RX ADMIN — HEPARIN SODIUM 5000 UNIT(S): 5000 INJECTION INTRAVENOUS; SUBCUTANEOUS at 21:21

## 2023-11-03 RX ADMIN — HEPARIN SODIUM 5000 UNIT(S): 5000 INJECTION INTRAVENOUS; SUBCUTANEOUS at 05:29

## 2023-11-03 RX ADMIN — OXYCODONE HYDROCHLORIDE 15 MILLIGRAM(S): 5 TABLET ORAL at 08:36

## 2023-11-03 RX ADMIN — OXYCODONE HYDROCHLORIDE 15 MILLIGRAM(S): 5 TABLET ORAL at 05:29

## 2023-11-03 RX ADMIN — ATORVASTATIN CALCIUM 20 MILLIGRAM(S): 80 TABLET, FILM COATED ORAL at 21:21

## 2023-11-03 RX ADMIN — OXYCODONE HYDROCHLORIDE 15 MILLIGRAM(S): 5 TABLET ORAL at 00:04

## 2023-11-03 RX ADMIN — Medication 1000 MILLIGRAM(S): at 06:20

## 2023-11-03 RX ADMIN — OXYCODONE HYDROCHLORIDE 15 MILLIGRAM(S): 5 TABLET ORAL at 09:36

## 2023-11-03 RX ADMIN — Medication 0.5 MILLIGRAM(S): at 17:40

## 2023-11-03 RX ADMIN — SODIUM CHLORIDE 3 MILLILITER(S): 9 INJECTION INTRAMUSCULAR; INTRAVENOUS; SUBCUTANEOUS at 06:48

## 2023-11-03 RX ADMIN — OXYCODONE HYDROCHLORIDE 15 MILLIGRAM(S): 5 TABLET ORAL at 16:56

## 2023-11-03 RX ADMIN — OXYCODONE HYDROCHLORIDE 15 MILLIGRAM(S): 5 TABLET ORAL at 19:01

## 2023-11-03 RX ADMIN — OXYCODONE HYDROCHLORIDE 15 MILLIGRAM(S): 5 TABLET ORAL at 12:50

## 2023-11-03 RX ADMIN — CYCLOBENZAPRINE HYDROCHLORIDE 10 MILLIGRAM(S): 10 TABLET, FILM COATED ORAL at 21:21

## 2023-11-03 RX ADMIN — Medication 1000 MILLIGRAM(S): at 17:40

## 2023-11-03 RX ADMIN — OXYCODONE HYDROCHLORIDE 15 MILLIGRAM(S): 5 TABLET ORAL at 15:56

## 2023-11-03 RX ADMIN — Medication 1000 MILLIGRAM(S): at 00:53

## 2023-11-03 RX ADMIN — HEPARIN SODIUM 5000 UNIT(S): 5000 INJECTION INTRAVENOUS; SUBCUTANEOUS at 13:37

## 2023-11-03 RX ADMIN — Medication 1000 MILLIGRAM(S): at 00:04

## 2023-11-03 RX ADMIN — SODIUM CHLORIDE 3 MILLILITER(S): 9 INJECTION INTRAMUSCULAR; INTRAVENOUS; SUBCUTANEOUS at 22:17

## 2023-11-03 RX ADMIN — OXYCODONE HYDROCHLORIDE 15 MILLIGRAM(S): 5 TABLET ORAL at 23:16

## 2023-11-03 RX ADMIN — OXYCODONE HYDROCHLORIDE 15 MILLIGRAM(S): 5 TABLET ORAL at 22:27

## 2023-11-03 NOTE — PROGRESS NOTE ADULT - SUBJECTIVE AND OBJECTIVE BOX
Subjective: Patient seen and examined. No new events except as noted.     SUBJECTIVE/ROS:  MEDICATIONS:  MEDICATIONS  (STANDING):  acetaminophen     Tablet .. 1000 milliGRAM(s) Oral every 6 hours  amLODIPine   Tablet 10 milliGRAM(s) Oral daily  atorvastatin 20 milliGRAM(s) Oral at bedtime  gabapentin 800 milliGRAM(s) Oral three times a day  heparin   Injectable 5000 Unit(s) SubCutaneous every 8 hours  lidocaine   4% Patch 1 Patch Transdermal every 24 hours  sodium chloride 0.9% lock flush 3 milliLiter(s) IV Push every 8 hours      PHYSICAL EXAM:  T(C): 37 (11-03-23 @ 05:20), Max: 37.2 (11-02-23 @ 08:00)  HR: 89 (11-03-23 @ 05:20) (77 - 105)  BP: 103/70 (11-03-23 @ 05:20) (94/69 - 108/70)  RR: 16 (11-03-23 @ 05:20) (13 - 33)  SpO2: 100% (11-03-23 @ 05:20) (97% - 100%)  Wt(kg): --  I&O's Summary    02 Nov 2023 07:01  -  03 Nov 2023 07:00  --------------------------------------------------------  IN: 1220 mL / OUT: 2480 mL / NET: -1260 mL            JVP: Normal  Neck: supple  Lung: clear   CV: S1 S2 , Murmur:  Abd: soft  Ext: No edema  neuro: Awake / alert  Psych: flat affect  Skin: normal``    LABS/DATA:    CARDIAC MARKERS:                                8.3    10.58 )-----------( 141      ( 03 Nov 2023 05:25 )             25.5     11-03    137  |  100  |  13  ----------------------------<  132<H>  4.7   |  33<H>  |  0.70    Ca    8.7      03 Nov 2023 05:25  Phos  2.2     11-03  Mg     2.40     11-03      proBNP:   Lipid Profile:   HgA1c:   TSH:     TELE:  EKG:

## 2023-11-03 NOTE — PROGRESS NOTE ADULT - SUBJECTIVE AND OBJECTIVE BOX
Pt seen/examined. Doing well. Pain controlled. No acute overnight complaints or events.    T(C): 36.6 (11-03-23 @ 02:05), Max: 37.2 (11-02-23 @ 08:00)  HR: 79 (11-03-23 @ 02:05) (77 - 105)  BP: 106/68 (11-03-23 @ 02:05) (93/58 - 108/70)  RR: 16 (11-03-23 @ 02:05) (13 - 33)  SpO2: 100% (11-03-23 @ 02:05) (97% - 100%)  Wt(kg): --  - Gen: NAD    Physical Exam:  Gen: Alert/Oriented, No Acute Distress  Pulm: Non-labored breathing  BACK:          Dressing: [x] clean/dry/intact  [ ] Other:           Drains: YOON SS         Sensation: [x] intact to light touch  [ ] decreased:   Motor:                   C5                C6              C7               C8           T1   R            5/5                5/5            5/5             5/5          5/5  L             3/5               3/5             3/5             3/5          3/5                L2             L3             L4               L5            S1  R         5/5           5/5          5/5             5/5           5/5  L          1/5          1/5           1/5             1/5           2/5    Sensory:            C5         C6         C7      C8       T1        (0=absent, 1=impaired, 2=normal, NT=not testable)  R         2            2           2        2         2  L          2            2           2        2         2               L2          L3         L4      L5       S1         (0=absent, 1=impaired, 2=normal, NT=not testable)  R         2            2            2        2        2  L          2            2           2        2         2      Pt is a with 62y/o Male s/p L1-pelvis PSF, L1-5 decompression on 10/31. Patient is hemodynamically stable.    Plan:  - DVT ppx - SQH  - LSO brace  - Multimodal Pain control  - Ancef while drains are in  - Monitor YOON output  - Weight bearing status: WBAT  - PT/OT  - Dispo pending  - CT Lsp unremarkable

## 2023-11-03 NOTE — PROGRESS NOTE ADULT - ASSESSMENT
Non obstructive CAD  asymptomatic  Lipitor 20 mg daily recommended  asa in future once deemed safe     HTN  stable

## 2023-11-04 LAB
ANION GAP SERPL CALC-SCNC: 8 MMOL/L — SIGNIFICANT CHANGE UP (ref 7–14)
ANION GAP SERPL CALC-SCNC: 8 MMOL/L — SIGNIFICANT CHANGE UP (ref 7–14)
BASOPHILS # BLD AUTO: 0.06 K/UL — SIGNIFICANT CHANGE UP (ref 0–0.2)
BASOPHILS # BLD AUTO: 0.06 K/UL — SIGNIFICANT CHANGE UP (ref 0–0.2)
BASOPHILS NFR BLD AUTO: 0.7 % — SIGNIFICANT CHANGE UP (ref 0–2)
BASOPHILS NFR BLD AUTO: 0.7 % — SIGNIFICANT CHANGE UP (ref 0–2)
BUN SERPL-MCNC: 13 MG/DL — SIGNIFICANT CHANGE UP (ref 7–23)
BUN SERPL-MCNC: 13 MG/DL — SIGNIFICANT CHANGE UP (ref 7–23)
CALCIUM SERPL-MCNC: 8.5 MG/DL — SIGNIFICANT CHANGE UP (ref 8.4–10.5)
CALCIUM SERPL-MCNC: 8.5 MG/DL — SIGNIFICANT CHANGE UP (ref 8.4–10.5)
CHLORIDE SERPL-SCNC: 95 MMOL/L — LOW (ref 98–107)
CHLORIDE SERPL-SCNC: 95 MMOL/L — LOW (ref 98–107)
CO2 SERPL-SCNC: 30 MMOL/L — SIGNIFICANT CHANGE UP (ref 22–31)
CO2 SERPL-SCNC: 30 MMOL/L — SIGNIFICANT CHANGE UP (ref 22–31)
CREAT SERPL-MCNC: 0.8 MG/DL — SIGNIFICANT CHANGE UP (ref 0.5–1.3)
CREAT SERPL-MCNC: 0.8 MG/DL — SIGNIFICANT CHANGE UP (ref 0.5–1.3)
EGFR: 99 ML/MIN/1.73M2 — SIGNIFICANT CHANGE UP
EGFR: 99 ML/MIN/1.73M2 — SIGNIFICANT CHANGE UP
EOSINOPHIL # BLD AUTO: 0.1 K/UL — SIGNIFICANT CHANGE UP (ref 0–0.5)
EOSINOPHIL # BLD AUTO: 0.1 K/UL — SIGNIFICANT CHANGE UP (ref 0–0.5)
EOSINOPHIL NFR BLD AUTO: 1.2 % — SIGNIFICANT CHANGE UP (ref 0–6)
EOSINOPHIL NFR BLD AUTO: 1.2 % — SIGNIFICANT CHANGE UP (ref 0–6)
GLUCOSE SERPL-MCNC: 104 MG/DL — HIGH (ref 70–99)
GLUCOSE SERPL-MCNC: 104 MG/DL — HIGH (ref 70–99)
HCT VFR BLD CALC: 24.2 % — LOW (ref 39–50)
HCT VFR BLD CALC: 24.2 % — LOW (ref 39–50)
HGB BLD-MCNC: 8.1 G/DL — LOW (ref 13–17)
HGB BLD-MCNC: 8.1 G/DL — LOW (ref 13–17)
IANC: 4.68 K/UL — SIGNIFICANT CHANGE UP (ref 1.8–7.4)
IANC: 4.68 K/UL — SIGNIFICANT CHANGE UP (ref 1.8–7.4)
IMM GRANULOCYTES NFR BLD AUTO: 0.6 % — SIGNIFICANT CHANGE UP (ref 0–0.9)
IMM GRANULOCYTES NFR BLD AUTO: 0.6 % — SIGNIFICANT CHANGE UP (ref 0–0.9)
LYMPHOCYTES # BLD AUTO: 2.48 K/UL — SIGNIFICANT CHANGE UP (ref 1–3.3)
LYMPHOCYTES # BLD AUTO: 2.48 K/UL — SIGNIFICANT CHANGE UP (ref 1–3.3)
LYMPHOCYTES # BLD AUTO: 30.2 % — SIGNIFICANT CHANGE UP (ref 13–44)
LYMPHOCYTES # BLD AUTO: 30.2 % — SIGNIFICANT CHANGE UP (ref 13–44)
MCHC RBC-ENTMCNC: 29.2 PG — SIGNIFICANT CHANGE UP (ref 27–34)
MCHC RBC-ENTMCNC: 29.2 PG — SIGNIFICANT CHANGE UP (ref 27–34)
MCHC RBC-ENTMCNC: 33.5 GM/DL — SIGNIFICANT CHANGE UP (ref 32–36)
MCHC RBC-ENTMCNC: 33.5 GM/DL — SIGNIFICANT CHANGE UP (ref 32–36)
MCV RBC AUTO: 87.4 FL — SIGNIFICANT CHANGE UP (ref 80–100)
MCV RBC AUTO: 87.4 FL — SIGNIFICANT CHANGE UP (ref 80–100)
MONOCYTES # BLD AUTO: 0.83 K/UL — SIGNIFICANT CHANGE UP (ref 0–0.9)
MONOCYTES # BLD AUTO: 0.83 K/UL — SIGNIFICANT CHANGE UP (ref 0–0.9)
MONOCYTES NFR BLD AUTO: 10.1 % — SIGNIFICANT CHANGE UP (ref 2–14)
MONOCYTES NFR BLD AUTO: 10.1 % — SIGNIFICANT CHANGE UP (ref 2–14)
NEUTROPHILS # BLD AUTO: 4.68 K/UL — SIGNIFICANT CHANGE UP (ref 1.8–7.4)
NEUTROPHILS # BLD AUTO: 4.68 K/UL — SIGNIFICANT CHANGE UP (ref 1.8–7.4)
NEUTROPHILS NFR BLD AUTO: 57.2 % — SIGNIFICANT CHANGE UP (ref 43–77)
NEUTROPHILS NFR BLD AUTO: 57.2 % — SIGNIFICANT CHANGE UP (ref 43–77)
NRBC # BLD: 0 /100 WBCS — SIGNIFICANT CHANGE UP (ref 0–0)
NRBC # BLD: 0 /100 WBCS — SIGNIFICANT CHANGE UP (ref 0–0)
NRBC # FLD: 0 K/UL — SIGNIFICANT CHANGE UP (ref 0–0)
NRBC # FLD: 0 K/UL — SIGNIFICANT CHANGE UP (ref 0–0)
PLATELET # BLD AUTO: 189 K/UL — SIGNIFICANT CHANGE UP (ref 150–400)
PLATELET # BLD AUTO: 189 K/UL — SIGNIFICANT CHANGE UP (ref 150–400)
POTASSIUM SERPL-MCNC: 4.1 MMOL/L — SIGNIFICANT CHANGE UP (ref 3.5–5.3)
POTASSIUM SERPL-MCNC: 4.1 MMOL/L — SIGNIFICANT CHANGE UP (ref 3.5–5.3)
POTASSIUM SERPL-SCNC: 4.1 MMOL/L — SIGNIFICANT CHANGE UP (ref 3.5–5.3)
POTASSIUM SERPL-SCNC: 4.1 MMOL/L — SIGNIFICANT CHANGE UP (ref 3.5–5.3)
RBC # BLD: 2.77 M/UL — LOW (ref 4.2–5.8)
RBC # BLD: 2.77 M/UL — LOW (ref 4.2–5.8)
RBC # FLD: 14.2 % — SIGNIFICANT CHANGE UP (ref 10.3–14.5)
RBC # FLD: 14.2 % — SIGNIFICANT CHANGE UP (ref 10.3–14.5)
SODIUM SERPL-SCNC: 133 MMOL/L — LOW (ref 135–145)
SODIUM SERPL-SCNC: 133 MMOL/L — LOW (ref 135–145)
WBC # BLD: 8.2 K/UL — SIGNIFICANT CHANGE UP (ref 3.8–10.5)
WBC # BLD: 8.2 K/UL — SIGNIFICANT CHANGE UP (ref 3.8–10.5)
WBC # FLD AUTO: 8.2 K/UL — SIGNIFICANT CHANGE UP (ref 3.8–10.5)
WBC # FLD AUTO: 8.2 K/UL — SIGNIFICANT CHANGE UP (ref 3.8–10.5)

## 2023-11-04 PROCEDURE — 71045 X-RAY EXAM CHEST 1 VIEW: CPT | Mod: 26

## 2023-11-04 RX ORDER — TIZANIDINE 4 MG/1
2 TABLET ORAL EVERY 6 HOURS
Refills: 0 | Status: DISCONTINUED | OUTPATIENT
Start: 2023-11-06 | End: 2023-11-07

## 2023-11-04 RX ORDER — OXYCODONE HYDROCHLORIDE 5 MG/1
5 TABLET ORAL EVERY 4 HOURS
Refills: 0 | Status: DISCONTINUED | OUTPATIENT
Start: 2023-11-04 | End: 2023-11-07

## 2023-11-04 RX ORDER — OXYCODONE HYDROCHLORIDE 5 MG/1
20 TABLET ORAL EVERY 12 HOURS
Refills: 0 | Status: DISCONTINUED | OUTPATIENT
Start: 2023-11-04 | End: 2023-11-04

## 2023-11-04 RX ORDER — OXYCODONE HYDROCHLORIDE 5 MG/1
15 TABLET ORAL EVERY 12 HOURS
Refills: 0 | Status: DISCONTINUED | OUTPATIENT
Start: 2023-11-04 | End: 2023-11-07

## 2023-11-04 RX ORDER — TIZANIDINE 4 MG/1
2 TABLET ORAL EVERY 6 HOURS
Refills: 0 | Status: COMPLETED | OUTPATIENT
Start: 2023-11-04 | End: 2023-11-06

## 2023-11-04 RX ADMIN — HEPARIN SODIUM 5000 UNIT(S): 5000 INJECTION INTRAVENOUS; SUBCUTANEOUS at 22:41

## 2023-11-04 RX ADMIN — GABAPENTIN 800 MILLIGRAM(S): 400 CAPSULE ORAL at 13:56

## 2023-11-04 RX ADMIN — HEPARIN SODIUM 5000 UNIT(S): 5000 INJECTION INTRAVENOUS; SUBCUTANEOUS at 13:57

## 2023-11-04 RX ADMIN — OXYCODONE HYDROCHLORIDE 10 MILLIGRAM(S): 5 TABLET ORAL at 06:20

## 2023-11-04 RX ADMIN — OXYCODONE HYDROCHLORIDE 15 MILLIGRAM(S): 5 TABLET ORAL at 04:27

## 2023-11-04 RX ADMIN — Medication 1000 MILLIGRAM(S): at 05:38

## 2023-11-04 RX ADMIN — OXYCODONE HYDROCHLORIDE 15 MILLIGRAM(S): 5 TABLET ORAL at 02:11

## 2023-11-04 RX ADMIN — OXYCODONE HYDROCHLORIDE 15 MILLIGRAM(S): 5 TABLET ORAL at 13:00

## 2023-11-04 RX ADMIN — SODIUM CHLORIDE 3 MILLILITER(S): 9 INJECTION INTRAMUSCULAR; INTRAVENOUS; SUBCUTANEOUS at 20:00

## 2023-11-04 RX ADMIN — OXYCODONE HYDROCHLORIDE 15 MILLIGRAM(S): 5 TABLET ORAL at 18:13

## 2023-11-04 RX ADMIN — Medication 0.5 MILLIGRAM(S): at 22:44

## 2023-11-04 RX ADMIN — GABAPENTIN 800 MILLIGRAM(S): 400 CAPSULE ORAL at 05:38

## 2023-11-04 RX ADMIN — HEPARIN SODIUM 5000 UNIT(S): 5000 INJECTION INTRAVENOUS; SUBCUTANEOUS at 05:38

## 2023-11-04 RX ADMIN — OXYCODONE HYDROCHLORIDE 10 MILLIGRAM(S): 5 TABLET ORAL at 05:38

## 2023-11-04 RX ADMIN — CYCLOBENZAPRINE HYDROCHLORIDE 10 MILLIGRAM(S): 10 TABLET, FILM COATED ORAL at 05:38

## 2023-11-04 RX ADMIN — TIZANIDINE 2 MILLIGRAM(S): 4 TABLET ORAL at 19:10

## 2023-11-04 RX ADMIN — ATORVASTATIN CALCIUM 20 MILLIGRAM(S): 80 TABLET, FILM COATED ORAL at 22:41

## 2023-11-04 RX ADMIN — SODIUM CHLORIDE 3 MILLILITER(S): 9 INJECTION INTRAMUSCULAR; INTRAVENOUS; SUBCUTANEOUS at 06:25

## 2023-11-04 RX ADMIN — SODIUM CHLORIDE 3 MILLILITER(S): 9 INJECTION INTRAMUSCULAR; INTRAVENOUS; SUBCUTANEOUS at 13:49

## 2023-11-04 RX ADMIN — Medication 1000 MILLIGRAM(S): at 06:20

## 2023-11-04 RX ADMIN — OXYCODONE HYDROCHLORIDE 15 MILLIGRAM(S): 5 TABLET ORAL at 01:22

## 2023-11-04 RX ADMIN — GABAPENTIN 800 MILLIGRAM(S): 400 CAPSULE ORAL at 22:40

## 2023-11-04 RX ADMIN — OXYCODONE HYDROCHLORIDE 15 MILLIGRAM(S): 5 TABLET ORAL at 05:20

## 2023-11-04 RX ADMIN — OXYCODONE HYDROCHLORIDE 15 MILLIGRAM(S): 5 TABLET ORAL at 12:00

## 2023-11-04 NOTE — PROGRESS NOTE ADULT - SUBJECTIVE AND OBJECTIVE BOX
Orthopedic Surgery Progress Note     S: Patient seen and examined today. No acute events overnight. Pain is well controlled. Denies f/c, chest pain, shortness of breath, dizziness.    MEDICATIONS  (STANDING):  acetaminophen     Tablet .. 1000 milliGRAM(s) Oral every 8 hours  amLODIPine   Tablet 10 milliGRAM(s) Oral daily  atorvastatin 20 milliGRAM(s) Oral at bedtime  cyclobenzaprine 10 milliGRAM(s) Oral three times a day  gabapentin 800 milliGRAM(s) Oral three times a day  heparin   Injectable 5000 Unit(s) SubCutaneous every 8 hours  lidocaine   4% Patch 1 Patch Transdermal every 24 hours  oxyCODONE  ER Tablet 10 milliGRAM(s) Oral every 12 hours  sodium chloride 0.9% lock flush 3 milliLiter(s) IV Push every 8 hours    MEDICATIONS  (PRN):  albuterol    90 MICROgram(s) HFA Inhaler 2 Puff(s) Inhalation every 6 hours PRN Shortness of Breath and/or Wheezing  ALPRAZolam 0.5 milliGRAM(s) Oral at bedtime PRN anxiety  ondansetron Injectable 4 milliGRAM(s) IV Push every 6 hours PRN Nausea  oxyCODONE    IR 10 milliGRAM(s) Oral every 3 hours PRN Moderate Pain (4 - 6)  oxyCODONE    IR 15 milliGRAM(s) Oral every 3 hours PRN Severe Pain (7 - 10)      Physical Exam:  Gen: Alert/Oriented, No Acute Distress  Pulm: Non-labored breathing  BACK:          Dressing: [x] clean/dry/intact  [ ] Other:           Drains: YOON SS         Sensation: [x] intact to light touch  [ ] decreased:   Motor:                   C5                C6              C7               C8           T1   R            5/5                5/5            5/5             5/5          5/5  L             3/5               3/5             3/5             3/5          3/5                L2             L3             L4               L5            S1  R         5/5           5/5          5/5             5/5           5/5  L          1/5          1/5           1/5             1/5           2/5    Sensory:            C5         C6         C7      C8       T1        (0=absent, 1=impaired, 2=normal, NT=not testable)  R         2            2           2        2         2  L          2            2           2        2         2               L2          L3         L4      L5       S1         (0=absent, 1=impaired, 2=normal, NT=not testable)  R         2            2            2        2        2  L          2            2           2        2         2      Vital Signs Last 24 Hrs  T(C): 37.1 (04 Nov 2023 01:37), Max: 37.6 (03 Nov 2023 13:33)  T(F): 98.7 (04 Nov 2023 01:37), Max: 99.6 (03 Nov 2023 13:33)  HR: 95 (04 Nov 2023 01:37) (89 - 100)  BP: 101/62 (04 Nov 2023 01:37) (97/59 - 132/59)  BP(mean): 72 (04 Nov 2023 01:37) (72 - 72)  RR: 18 (04 Nov 2023 01:37) (16 - 18)  SpO2: 98% (04 Nov 2023 01:37) (98% - 100%)    Parameters below as of 04 Nov 2023 01:37  Patient On (Oxygen Delivery Method): nasal cannula  O2 Flow (L/min): 1      11-02-23 @ 07:01  -  11-03-23 @ 07:00  --------------------------------------------------------  IN: 1220 mL / OUT: 2480 mL / NET: -1260 mL    11-03-23 @ 07:01  -  11-04-23 @ 04:50  --------------------------------------------------------  IN: 0 mL / OUT: 1115 mL / NET: -1115 mL                    LABS:                        8.3    10.58 )-----------( 141      ( 03 Nov 2023 05:25 )             25.5     11-03    137  |  100  |  13  ----------------------------<  132<H>  4.7   |  33<H>  |  0.70    Ca    8.7      03 Nov 2023 05:25  Phos  2.2     11-03  Mg     2.40     11-03     Orthopedic Surgery Progress Note     S: Patient seen and examined today. No acute events overnight. Pain is well controlled. Denies f/c, chest pain, shortness of breath, dizziness.    MEDICATIONS  (STANDING):  acetaminophen     Tablet .. 1000 milliGRAM(s) Oral every 8 hours  amLODIPine   Tablet 10 milliGRAM(s) Oral daily  atorvastatin 20 milliGRAM(s) Oral at bedtime  cyclobenzaprine 10 milliGRAM(s) Oral three times a day  gabapentin 800 milliGRAM(s) Oral three times a day  heparin   Injectable 5000 Unit(s) SubCutaneous every 8 hours  lidocaine   4% Patch 1 Patch Transdermal every 24 hours  oxyCODONE  ER Tablet 10 milliGRAM(s) Oral every 12 hours  sodium chloride 0.9% lock flush 3 milliLiter(s) IV Push every 8 hours    MEDICATIONS  (PRN):  albuterol    90 MICROgram(s) HFA Inhaler 2 Puff(s) Inhalation every 6 hours PRN Shortness of Breath and/or Wheezing  ALPRAZolam 0.5 milliGRAM(s) Oral at bedtime PRN anxiety  ondansetron Injectable 4 milliGRAM(s) IV Push every 6 hours PRN Nausea  oxyCODONE    IR 10 milliGRAM(s) Oral every 3 hours PRN Moderate Pain (4 - 6)  oxyCODONE    IR 15 milliGRAM(s) Oral every 3 hours PRN Severe Pain (7 - 10)      Physical Exam:  Gen: Alert/Oriented, No Acute Distress  Pulm: Non-labored breathing  BACK:          Dressing: [x] clean/dry/intact  [ ] Other:           Drains: YOON SS         Sensation: [x] intact to light touch  [ ] decreased:   Motor:                   C5                C6              C7               C8           T1   R            5/5                5/5            5/5             5/5          5/5  L             3/5               3/5             3/5             3/5          3/5                L2             L3             L4               L5            S1  R         5/5           5/5          5/5             5/5           5/5  L          2/5          2/5           2/5             1/5           4/5    Sensory:            C5         C6         C7      C8       T1        (0=absent, 1=impaired, 2=normal, NT=not testable)  R         2            2           2        2         2  L          2            2           2        2         2               L2          L3         L4      L5       S1         (0=absent, 1=impaired, 2=normal, NT=not testable)  R         2            2            2        2        2  L          2            2           2        2         2      Vital Signs Last 24 Hrs  T(C): 37.1 (04 Nov 2023 01:37), Max: 37.6 (03 Nov 2023 13:33)  T(F): 98.7 (04 Nov 2023 01:37), Max: 99.6 (03 Nov 2023 13:33)  HR: 95 (04 Nov 2023 01:37) (89 - 100)  BP: 101/62 (04 Nov 2023 01:37) (97/59 - 132/59)  BP(mean): 72 (04 Nov 2023 01:37) (72 - 72)  RR: 18 (04 Nov 2023 01:37) (16 - 18)  SpO2: 98% (04 Nov 2023 01:37) (98% - 100%)    Parameters below as of 04 Nov 2023 01:37  Patient On (Oxygen Delivery Method): nasal cannula  O2 Flow (L/min): 1      11-02-23 @ 07:01  -  11-03-23 @ 07:00  --------------------------------------------------------  IN: 1220 mL / OUT: 2480 mL / NET: -1260 mL    11-03-23 @ 07:01  -  11-04-23 @ 04:50  --------------------------------------------------------  IN: 0 mL / OUT: 1115 mL / NET: -1115 mL                    LABS:                        8.3    10.58 )-----------( 141      ( 03 Nov 2023 05:25 )             25.5     11-03    137  |  100  |  13  ----------------------------<  132<H>  4.7   |  33<H>  |  0.70    Ca    8.7      03 Nov 2023 05:25  Phos  2.2     11-03  Mg     2.40     11-03

## 2023-11-04 NOTE — PROGRESS NOTE ADULT - SUBJECTIVE AND OBJECTIVE BOX
Follow up consult for Acute Pain Management     SUBJECTIVE:  The patient is complaining of post op, 9/10 lower back pain that is worsened with activity.  Patient is also complaining of anterior right foot numbness and inability of left leg to move against gravity and weakness.  As per ortho team, patient legs were this way prior to surgery and this was his baseline neuropathy.  At home, the patient was taking Percocet 10-325mg, about 4 times a day. Since his surgery, the patient is complaining that the Oxycodone 15mg is not touching his pain and feels that the pain medications are not enough. Despite the pain, the patient is able to get    		  OBJECTIVE:  Patient is lying in bed on his right side.  Mid back incision with dressing with open wound on upper right side of dressing.  Ortho team aware, and wound is from removal of tape.     Pain Score:  9/10  (X) Refer to pain scores    Therapy:	[ ] IV PCA	[ ] Epidural   [ ] s/p Spinal Opioid	[ ] Peripheral nerve block  (x) PRN Oral/IV opioids and or Adjuvant non-opioid medications  	  Vital Signs Last 24 Hrs  T(C): 38.3 (04 Nov 2023 14:00), Max: 38.3 (04 Nov 2023 14:00)  T(F): 101 (04 Nov 2023 14:00), Max: 101 (04 Nov 2023 14:00)  HR: 100 (04 Nov 2023 14:00) (91 - 100)  BP: 113/71 (04 Nov 2023 14:00) (101/62 - 115/79)  BP(mean): 72 (04 Nov 2023 01:37) (72 - 72)  RR: 18 (04 Nov 2023 14:00) (17 - 18)  SpO2: 94% (04 Nov 2023 14:00) (94% - 100%)    Parameters below as of 04 Nov 2023 14:00  Patient On (Oxygen Delivery Method): nasal cannula  O2 Flow (L/min): 2      ( x) Alert & Oriented     ( ) No motor/sensory block     ( ) Nausea     ( ) Pruritis     ( ) Headache    ASSESSMENT/ PLAN    Therapy to  be:	[x ] Continue   [ ] Discontinued      Documentation and Verification of current medications:   [X] Done	[ ] Not done, not elligible    Comments:   Patient's pain is better controlled.  Continue current pain regimen. PRN Oral/IV opioids and/or Adjuvant non-opioid medication to be ordered at this point.  Pain service to sign off, no further recommendations for pain medications, at this time.  May call pain service if needed.    Progress Note written now but Patient was seen earlier.     Follow up consult for Acute Pain Management     SUBJECTIVE:  The patient is complaining of post op, 9/10 lower back pain that is worsened with activity.  Patient is also complaining of anterior right foot numbness and inability of left leg to move against gravity and weakness.  As per ortho team, patient legs were this way prior to surgery and this was his baseline neuropathy.  At home, the patient was taking Percocet 10-325mg, about 4 times a day. Since his surgery, the patient is complaining that the Oxycodone 15mg is not touching his pain and feels that the pain medications are not enough. Despite the pain, the patient is still able to get up with physical therapy.   		  OBJECTIVE:  Patient is lying in bed on his right side.  Mid back incision with dressing with open wound on upper right side of dressing.  Ortho team aware, and wound is from removal of tape.     Pain Score:  9/10  (X) Refer to pain scores    Therapy:	[ ] IV PCA	[ ] Epidural   [ ] s/p Spinal Opioid	[ ] Peripheral nerve block  (x) PRN Oral/IV opioids and or Adjuvant non-opioid medications  	  Vital Signs Last 24 Hrs  T(C): 38.3 (04 Nov 2023 14:00), Max: 38.3 (04 Nov 2023 14:00)  T(F): 101 (04 Nov 2023 14:00), Max: 101 (04 Nov 2023 14:00)  HR: 100 (04 Nov 2023 14:00) (91 - 100)  BP: 113/71 (04 Nov 2023 14:00) (101/62 - 115/79)  BP(mean): 72 (04 Nov 2023 01:37) (72 - 72)  RR: 18 (04 Nov 2023 14:00) (17 - 18)  SpO2: 94% (04 Nov 2023 14:00) (94% - 100%)    Parameters below as of 04 Nov 2023 14:00  Patient On (Oxygen Delivery Method): nasal cannula  O2 Flow (L/min): 2      ( x) Alert & Oriented     ( ) No motor/sensory block     ( ) Nausea     ( ) Pruritis     ( ) Headache    ASSESSMENT/ PLAN    Therapy to  be:	[x ] Continue   [ ] Discontinued      Documentation and Verification of current medications:   [X] Done	[ ] Not done, not elligible    Comments:   Patient's pain is not controlled with current pain regimen. PRN Oral  opioids and/or Adjuvant non-opioid medication to be ordered at this point.    Will increase Oxycontin to 15mg, and changed Oxycodone 15mg to Percocet instead.  Discontinued Flexeril and instead ordered Tizanidine. Recommend Oxycontin be discontinued prior to discharge, and patient gets discharged on Percocet.   Patient to follow up with his pain provider.    Pain service to sign off, no further recommendations for pain medications, at this time.  May call pain service if needed.    Progress Note written now but Patient was seen earlier.

## 2023-11-04 NOTE — PROGRESS NOTE ADULT - SUBJECTIVE AND OBJECTIVE BOX
Subjective: Patient seen and examined. No new events except as noted.     SUBJECTIVE/ROS:  nad      MEDICATIONS:  MEDICATIONS  (STANDING):  amLODIPine   Tablet 10 milliGRAM(s) Oral daily  atorvastatin 20 milliGRAM(s) Oral at bedtime  gabapentin 800 milliGRAM(s) Oral three times a day  heparin   Injectable 5000 Unit(s) SubCutaneous every 8 hours  lidocaine   4% Patch 1 Patch Transdermal every 24 hours  oxyCODONE  ER Tablet 15 milliGRAM(s) Oral every 12 hours  sodium chloride 0.9% lock flush 3 milliLiter(s) IV Push every 8 hours  tiZANidine 2 milliGRAM(s) Oral every 6 hours      PHYSICAL EXAM:  T(C): 38.3 (11-04-23 @ 14:00), Max: 38.3 (11-04-23 @ 14:00)  HR: 100 (11-04-23 @ 14:00) (91 - 100)  BP: 113/71 (11-04-23 @ 14:00) (101/62 - 115/79)  RR: 18 (11-04-23 @ 14:00) (17 - 18)  SpO2: 94% (11-04-23 @ 14:00) (94% - 100%)  Wt(kg): --  I&O's Summary    03 Nov 2023 07:01  -  04 Nov 2023 07:00  --------------------------------------------------------  IN: 0 mL / OUT: 1630 mL / NET: -1630 mL    04 Nov 2023 08:01  -  04 Nov 2023 14:52  --------------------------------------------------------  IN: 240 mL / OUT: 1350 mL / NET: -1110 mL            JVP: Normal  Neck: supple  Lung: clear   CV: S1 S2 , Murmur:  Abd: soft  Ext: No edema  neuro: Awake / alert  Psych: flat affect  Skin: normal``    LABS/DATA:    CARDIAC MARKERS:                                8.1    8.20  )-----------( 189      ( 04 Nov 2023 05:39 )             24.2     11-04    133<L>  |  95<L>  |  13  ----------------------------<  104<H>  4.1   |  30  |  0.80    Ca    8.5      04 Nov 2023 05:39  Phos  2.2     11-03  Mg     2.40     11-03      proBNP:   Lipid Profile:   HgA1c:   TSH:     TELE:  EKG:

## 2023-11-04 NOTE — PROGRESS NOTE ADULT - ASSESSMENT
Pt is a with 64y/o Male s/p L1-pelvis PSF, L1-5 decompression on 10/31. Patient is hemodynamically stable.    Plan:  - DVT ppx - SQH  - LSO brace  - Multimodal Pain control  - Ancef while drains are in  - Monitor YOON output  - Weight bearing status: WBAT  - PT/OT  - Dispo pending EDMOND Charles MD  Orthopaedic Surgery Resident    For all questions, please reach out via the following numbers for the on-call resident; do not reach out via Teams.  [facility] [phone]  [facility]        [phone]  [facility]  [phone]/ [phone]

## 2023-11-04 NOTE — PROGRESS NOTE ADULT - ATTENDING COMMENTS
agree with the ICU management and treatment.  patient will likely need EDMOND placement.  pain control management appreciated.
patient is seen earlier in afternoon.  Family was bedside.  Pain is better controlled.  Chest xray was reviewed pending official report likely atelactasis.  Encouraged incentive spirometry, patient does report some productive cough.  Numbness and radiating pain improved bilateral legs, right leg strength is also improved.  Left leg strength is significantly weak.  AFO to help support the foot.  Hamstring and PF is 4/5.  However DF is weaker from 3/5 at baseline.  PT, likely DC plan to rehab once drain output decreased.
I agree with the detailed interval history, physical, and plan, which I have reviewed and edited where appropriate'; also agree with notes/assessment with my team on service.  I have personally examined the patient.  I was physically present for the key portions of the evaluation and management (E/M) service provided.  I reviewed all the pertinent data.  The patient is a critical care patient with life threatening hemodynamic and metabolic instability in SICU.  The SICU team has a constant risk benefit analyzes discussion and coordinating care with the primary team and all consultants.   The patient is in SICU with the chief complaint and diagnosis mentioned in the note.   The plan will be specified in the note.  63M s/p L1-Pelvis posterior spinal fusion, L1-L5 decompression in SICU for neuro-checks, hemodynamic monitoring.  EXAM  NEUROLOGY  Exam: 5/5 strength in RUE, limited ROM and 4/5 strength in LUE, limited ROM in RLE, unable to move LLE at hip and knee.  RESPIRATORY  Exam: clear  CARDIOVASCULAR  Exam: Regular rate   GI/NUTRITION  Exam: Abdomen soft  VASCULAR  Exam: Extremities warm  PLAN:  Neuro:  - q4hr hour neurochecks  -tylenol, oxycodone, robaxin, flexeril, gabapentin  Resp:  -2Lt NC  CV:  - SBP goal > 65  - amlodipine  GI:   - regular diet  Renal:  - Monito electrolytes   Heme:  - subQ H   ID:   - Ancef   - Monitor glucose    Disposition:  dc floor

## 2023-11-05 LAB
ANION GAP SERPL CALC-SCNC: 11 MMOL/L — SIGNIFICANT CHANGE UP (ref 7–14)
ANION GAP SERPL CALC-SCNC: 11 MMOL/L — SIGNIFICANT CHANGE UP (ref 7–14)
BUN SERPL-MCNC: 13 MG/DL — SIGNIFICANT CHANGE UP (ref 7–23)
BUN SERPL-MCNC: 13 MG/DL — SIGNIFICANT CHANGE UP (ref 7–23)
CALCIUM SERPL-MCNC: 9 MG/DL — SIGNIFICANT CHANGE UP (ref 8.4–10.5)
CALCIUM SERPL-MCNC: 9 MG/DL — SIGNIFICANT CHANGE UP (ref 8.4–10.5)
CHLORIDE SERPL-SCNC: 97 MMOL/L — LOW (ref 98–107)
CHLORIDE SERPL-SCNC: 97 MMOL/L — LOW (ref 98–107)
CO2 SERPL-SCNC: 29 MMOL/L — SIGNIFICANT CHANGE UP (ref 22–31)
CO2 SERPL-SCNC: 29 MMOL/L — SIGNIFICANT CHANGE UP (ref 22–31)
CREAT SERPL-MCNC: 0.75 MG/DL — SIGNIFICANT CHANGE UP (ref 0.5–1.3)
CREAT SERPL-MCNC: 0.75 MG/DL — SIGNIFICANT CHANGE UP (ref 0.5–1.3)
EGFR: 101 ML/MIN/1.73M2 — SIGNIFICANT CHANGE UP
EGFR: 101 ML/MIN/1.73M2 — SIGNIFICANT CHANGE UP
GLUCOSE SERPL-MCNC: 104 MG/DL — HIGH (ref 70–99)
GLUCOSE SERPL-MCNC: 104 MG/DL — HIGH (ref 70–99)
HCT VFR BLD CALC: 26 % — LOW (ref 39–50)
HCT VFR BLD CALC: 26 % — LOW (ref 39–50)
HGB BLD-MCNC: 8.3 G/DL — LOW (ref 13–17)
HGB BLD-MCNC: 8.3 G/DL — LOW (ref 13–17)
MCHC RBC-ENTMCNC: 28.1 PG — SIGNIFICANT CHANGE UP (ref 27–34)
MCHC RBC-ENTMCNC: 28.1 PG — SIGNIFICANT CHANGE UP (ref 27–34)
MCHC RBC-ENTMCNC: 31.9 GM/DL — LOW (ref 32–36)
MCHC RBC-ENTMCNC: 31.9 GM/DL — LOW (ref 32–36)
MCV RBC AUTO: 88.1 FL — SIGNIFICANT CHANGE UP (ref 80–100)
MCV RBC AUTO: 88.1 FL — SIGNIFICANT CHANGE UP (ref 80–100)
NRBC # BLD: 0 /100 WBCS — SIGNIFICANT CHANGE UP (ref 0–0)
NRBC # BLD: 0 /100 WBCS — SIGNIFICANT CHANGE UP (ref 0–0)
NRBC # FLD: 0 K/UL — SIGNIFICANT CHANGE UP (ref 0–0)
NRBC # FLD: 0 K/UL — SIGNIFICANT CHANGE UP (ref 0–0)
PLATELET # BLD AUTO: 242 K/UL — SIGNIFICANT CHANGE UP (ref 150–400)
PLATELET # BLD AUTO: 242 K/UL — SIGNIFICANT CHANGE UP (ref 150–400)
POTASSIUM SERPL-MCNC: 4.7 MMOL/L — SIGNIFICANT CHANGE UP (ref 3.5–5.3)
POTASSIUM SERPL-MCNC: 4.7 MMOL/L — SIGNIFICANT CHANGE UP (ref 3.5–5.3)
POTASSIUM SERPL-SCNC: 4.7 MMOL/L — SIGNIFICANT CHANGE UP (ref 3.5–5.3)
POTASSIUM SERPL-SCNC: 4.7 MMOL/L — SIGNIFICANT CHANGE UP (ref 3.5–5.3)
RBC # BLD: 2.95 M/UL — LOW (ref 4.2–5.8)
RBC # BLD: 2.95 M/UL — LOW (ref 4.2–5.8)
RBC # FLD: 14.2 % — SIGNIFICANT CHANGE UP (ref 10.3–14.5)
RBC # FLD: 14.2 % — SIGNIFICANT CHANGE UP (ref 10.3–14.5)
SODIUM SERPL-SCNC: 137 MMOL/L — SIGNIFICANT CHANGE UP (ref 135–145)
SODIUM SERPL-SCNC: 137 MMOL/L — SIGNIFICANT CHANGE UP (ref 135–145)
WBC # BLD: 6.89 K/UL — SIGNIFICANT CHANGE UP (ref 3.8–10.5)
WBC # BLD: 6.89 K/UL — SIGNIFICANT CHANGE UP (ref 3.8–10.5)
WBC # FLD AUTO: 6.89 K/UL — SIGNIFICANT CHANGE UP (ref 3.8–10.5)
WBC # FLD AUTO: 6.89 K/UL — SIGNIFICANT CHANGE UP (ref 3.8–10.5)

## 2023-11-05 RX ORDER — ACETAMINOPHEN 500 MG
1000 TABLET ORAL ONCE
Refills: 0 | Status: COMPLETED | OUTPATIENT
Start: 2023-11-05 | End: 2023-11-05

## 2023-11-05 RX ADMIN — AMLODIPINE BESYLATE 10 MILLIGRAM(S): 2.5 TABLET ORAL at 05:18

## 2023-11-05 RX ADMIN — SODIUM CHLORIDE 3 MILLILITER(S): 9 INJECTION INTRAMUSCULAR; INTRAVENOUS; SUBCUTANEOUS at 13:13

## 2023-11-05 RX ADMIN — TIZANIDINE 2 MILLIGRAM(S): 4 TABLET ORAL at 12:40

## 2023-11-05 RX ADMIN — TIZANIDINE 2 MILLIGRAM(S): 4 TABLET ORAL at 05:18

## 2023-11-05 RX ADMIN — TIZANIDINE 2 MILLIGRAM(S): 4 TABLET ORAL at 17:33

## 2023-11-05 RX ADMIN — Medication 0.5 MILLIGRAM(S): at 21:35

## 2023-11-05 RX ADMIN — SODIUM CHLORIDE 3 MILLILITER(S): 9 INJECTION INTRAMUSCULAR; INTRAVENOUS; SUBCUTANEOUS at 05:23

## 2023-11-05 RX ADMIN — HEPARIN SODIUM 5000 UNIT(S): 5000 INJECTION INTRAVENOUS; SUBCUTANEOUS at 21:35

## 2023-11-05 RX ADMIN — OXYCODONE HYDROCHLORIDE 5 MILLIGRAM(S): 5 TABLET ORAL at 03:36

## 2023-11-05 RX ADMIN — HEPARIN SODIUM 5000 UNIT(S): 5000 INJECTION INTRAVENOUS; SUBCUTANEOUS at 05:17

## 2023-11-05 RX ADMIN — GABAPENTIN 800 MILLIGRAM(S): 400 CAPSULE ORAL at 21:35

## 2023-11-05 RX ADMIN — ATORVASTATIN CALCIUM 20 MILLIGRAM(S): 80 TABLET, FILM COATED ORAL at 21:36

## 2023-11-05 RX ADMIN — OXYCODONE HYDROCHLORIDE 5 MILLIGRAM(S): 5 TABLET ORAL at 16:39

## 2023-11-05 RX ADMIN — HEPARIN SODIUM 5000 UNIT(S): 5000 INJECTION INTRAVENOUS; SUBCUTANEOUS at 13:19

## 2023-11-05 RX ADMIN — OXYCODONE HYDROCHLORIDE 15 MILLIGRAM(S): 5 TABLET ORAL at 05:46

## 2023-11-05 RX ADMIN — OXYCODONE HYDROCHLORIDE 15 MILLIGRAM(S): 5 TABLET ORAL at 17:33

## 2023-11-05 RX ADMIN — OXYCODONE HYDROCHLORIDE 5 MILLIGRAM(S): 5 TABLET ORAL at 03:06

## 2023-11-05 RX ADMIN — Medication 1000 MILLIGRAM(S): at 13:15

## 2023-11-05 RX ADMIN — SODIUM CHLORIDE 3 MILLILITER(S): 9 INJECTION INTRAMUSCULAR; INTRAVENOUS; SUBCUTANEOUS at 22:47

## 2023-11-05 RX ADMIN — OXYCODONE HYDROCHLORIDE 5 MILLIGRAM(S): 5 TABLET ORAL at 15:46

## 2023-11-05 RX ADMIN — TIZANIDINE 2 MILLIGRAM(S): 4 TABLET ORAL at 01:22

## 2023-11-05 RX ADMIN — Medication 400 MILLIGRAM(S): at 12:40

## 2023-11-05 RX ADMIN — OXYCODONE HYDROCHLORIDE 15 MILLIGRAM(S): 5 TABLET ORAL at 05:16

## 2023-11-05 RX ADMIN — GABAPENTIN 800 MILLIGRAM(S): 400 CAPSULE ORAL at 05:17

## 2023-11-05 RX ADMIN — OXYCODONE HYDROCHLORIDE 15 MILLIGRAM(S): 5 TABLET ORAL at 18:30

## 2023-11-05 NOTE — PROGRESS NOTE ADULT - SUBJECTIVE AND OBJECTIVE BOX
Subjective: Patient seen and examined. No new events except as noted.     SUBJECTIVE/ROS:  nad      MEDICATIONS:  MEDICATIONS  (STANDING):  amLODIPine   Tablet 10 milliGRAM(s) Oral daily  atorvastatin 20 milliGRAM(s) Oral at bedtime  gabapentin 800 milliGRAM(s) Oral three times a day  heparin   Injectable 5000 Unit(s) SubCutaneous every 8 hours  lidocaine   4% Patch 1 Patch Transdermal every 24 hours  oxyCODONE  ER Tablet 15 milliGRAM(s) Oral every 12 hours  sodium chloride 0.9% lock flush 3 milliLiter(s) IV Push every 8 hours  tiZANidine 2 milliGRAM(s) Oral every 6 hours      PHYSICAL EXAM:  T(C): 37.1 (11-05-23 @ 05:00), Max: 38.3 (11-04-23 @ 14:00)  HR: 96 (11-05-23 @ 05:00) (91 - 103)  BP: 118/75 (11-05-23 @ 05:00) (102/79 - 121/72)  RR: 18 (11-05-23 @ 05:00) (17 - 18)  SpO2: 100% (11-05-23 @ 05:00) (94% - 100%)  Wt(kg): --  I&O's Summary    03 Nov 2023 07:01  -  04 Nov 2023 07:00  --------------------------------------------------------  IN: 0 mL / OUT: 1630 mL / NET: -1630 mL    04 Nov 2023 08:01  -  05 Nov 2023 06:43  --------------------------------------------------------  IN: 240 mL / OUT: 2885 mL / NET: -2645 mL            JVP: Normal  Neck: supple  Lung: clear   CV: S1 S2 , Murmur:  Abd: soft  Ext: No edema  neuro: Awake / alert  Psych: flat affect  Skin: normal``    LABS/DATA:    CARDIAC MARKERS:                                8.1    8.20  )-----------( 189      ( 04 Nov 2023 05:39 )             24.2     11-04    133<L>  |  95<L>  |  13  ----------------------------<  104<H>  4.1   |  30  |  0.80    Ca    8.5      04 Nov 2023 05:39      proBNP:   Lipid Profile:   HgA1c:   TSH:     TELE:  EKG:

## 2023-11-05 NOTE — PROGRESS NOTE ADULT - ASSESSMENT
Pt is a with 64y/o Male s/p L1-pelvis PSF, L1-5 decompression on 10/31. Patient is hemodynamically stable.    Plan:  - DVT ppx - SQH  - LSO brace  - Multimodal Pain control  - Ancef while drains are in  - Monitor YOON output  - Weight bearing status: WBAT  - PT/OT  - AFO LLE   - Dispo: EDMOND

## 2023-11-05 NOTE — PROGRESS NOTE ADULT - SUBJECTIVE AND OBJECTIVE BOX
ORTHOPAEDIC PROGRESS NOTE    SUBJECTIVE:  Pt seen and examined at bedside this am.  Doing well.  No acute events overnight.  Pt states pain is well controlled    OBJECTIVE:  Vital Signs Last 24 Hrs  T(C): 37.1 (05 Nov 2023 05:00), Max: 38.3 (04 Nov 2023 14:00)  T(F): 98.8 (05 Nov 2023 05:00), Max: 101 (04 Nov 2023 14:00)  HR: 96 (05 Nov 2023 05:00) (91 - 103)  BP: 118/75 (05 Nov 2023 05:00) (102/79 - 121/72)  BP(mean): --  RR: 18 (05 Nov 2023 05:00) (17 - 18)  SpO2: 100% (05 Nov 2023 05:00) (94% - 100%)    Parameters below as of 05 Nov 2023 05:00  Patient On (Oxygen Delivery Method): room air        Physical Exam:  General: NAD; resting comfrotably in bed  Resp: non labored  BACK:          Dressing: [x] clean/dry/intact  [ ] Other:           Drains: YOON SS         Sensation: [x] intact to light touch  [ ] decreased:   Motor:                   C5                C6              C7               C8           T1   R            5/5                5/5            5/5             5/5          5/5  L             3/5               3/5             3/5             3/5          3/5                L2             L3             L4               L5            S1  R         5/5           5/5          5/5             5/5           5/5  L          2/5          2/5           2/5             1/5           4/5    Sensory:            C5         C6         C7      C8       T1        (0=absent, 1=impaired, 2=normal, NT=not testable)  R         2            2           2        2         2  L          2            2           2        2         2               L2          L3         L4      L5       S1         (0=absent, 1=impaired, 2=normal, NT=not testable)  R         2            2            2        2        2  L          2            2           2        2         2  LABS                        8.1    8.20  )-----------( 189      ( 04 Nov 2023 05:39 )             24.2       11-04    133<L>  |  95<L>  |  13  ----------------------------<  104<H>  4.1   |  30  |  0.80    Ca    8.5      04 Nov 2023 05:39            I&O's Summary    03 Nov 2023 07:01  -  04 Nov 2023 07:00  --------------------------------------------------------  IN: 0 mL / OUT: 1630 mL / NET: -1630 mL    04 Nov 2023 08:01  -  05 Nov 2023 05:33  --------------------------------------------------------  IN: 240 mL / OUT: 2885 mL / NET: -2645 mL

## 2023-11-06 DIAGNOSIS — I25.10 ATHEROSCLEROTIC HEART DISEASE OF NATIVE CORONARY ARTERY WITHOUT ANGINA PECTORIS: ICD-10-CM

## 2023-11-06 DIAGNOSIS — D64.9 ANEMIA, UNSPECIFIED: ICD-10-CM

## 2023-11-06 DIAGNOSIS — Z29.9 ENCOUNTER FOR PROPHYLACTIC MEASURES, UNSPECIFIED: ICD-10-CM

## 2023-11-06 DIAGNOSIS — J98.11 ATELECTASIS: ICD-10-CM

## 2023-11-06 LAB
ANION GAP SERPL CALC-SCNC: 8 MMOL/L — SIGNIFICANT CHANGE UP (ref 7–14)
ANION GAP SERPL CALC-SCNC: 8 MMOL/L — SIGNIFICANT CHANGE UP (ref 7–14)
ANION GAP SERPL CALC-SCNC: 9 MMOL/L — SIGNIFICANT CHANGE UP (ref 7–14)
ANION GAP SERPL CALC-SCNC: 9 MMOL/L — SIGNIFICANT CHANGE UP (ref 7–14)
APPEARANCE UR: CLEAR — SIGNIFICANT CHANGE UP
APPEARANCE UR: CLEAR — SIGNIFICANT CHANGE UP
B PERT DNA SPEC QL NAA+PROBE: SIGNIFICANT CHANGE UP
B PERT DNA SPEC QL NAA+PROBE: SIGNIFICANT CHANGE UP
B PERT+PARAPERT DNA PNL SPEC NAA+PROBE: SIGNIFICANT CHANGE UP
B PERT+PARAPERT DNA PNL SPEC NAA+PROBE: SIGNIFICANT CHANGE UP
BASE EXCESS BLDV CALC-SCNC: 4.5 MMOL/L — HIGH (ref -2–3)
BASE EXCESS BLDV CALC-SCNC: 4.5 MMOL/L — HIGH (ref -2–3)
BILIRUB UR-MCNC: NEGATIVE — SIGNIFICANT CHANGE UP
BILIRUB UR-MCNC: NEGATIVE — SIGNIFICANT CHANGE UP
BORDETELLA PARAPERTUSSIS (RAPRVP): SIGNIFICANT CHANGE UP
BORDETELLA PARAPERTUSSIS (RAPRVP): SIGNIFICANT CHANGE UP
BUN SERPL-MCNC: 14 MG/DL — SIGNIFICANT CHANGE UP (ref 7–23)
BUN SERPL-MCNC: 14 MG/DL — SIGNIFICANT CHANGE UP (ref 7–23)
BUN SERPL-MCNC: 16 MG/DL — SIGNIFICANT CHANGE UP (ref 7–23)
BUN SERPL-MCNC: 16 MG/DL — SIGNIFICANT CHANGE UP (ref 7–23)
C PNEUM DNA SPEC QL NAA+PROBE: SIGNIFICANT CHANGE UP
C PNEUM DNA SPEC QL NAA+PROBE: SIGNIFICANT CHANGE UP
CA-I SERPL-SCNC: 1.22 MMOL/L — SIGNIFICANT CHANGE UP (ref 1.15–1.33)
CA-I SERPL-SCNC: 1.22 MMOL/L — SIGNIFICANT CHANGE UP (ref 1.15–1.33)
CALCIUM SERPL-MCNC: 8.9 MG/DL — SIGNIFICANT CHANGE UP (ref 8.4–10.5)
CALCIUM SERPL-MCNC: 8.9 MG/DL — SIGNIFICANT CHANGE UP (ref 8.4–10.5)
CALCIUM SERPL-MCNC: 9 MG/DL — SIGNIFICANT CHANGE UP (ref 8.4–10.5)
CALCIUM SERPL-MCNC: 9 MG/DL — SIGNIFICANT CHANGE UP (ref 8.4–10.5)
CHLORIDE BLDV-SCNC: 95 MMOL/L — LOW (ref 96–108)
CHLORIDE BLDV-SCNC: 95 MMOL/L — LOW (ref 96–108)
CHLORIDE SERPL-SCNC: 94 MMOL/L — LOW (ref 98–107)
CHLORIDE SERPL-SCNC: 94 MMOL/L — LOW (ref 98–107)
CHLORIDE SERPL-SCNC: 99 MMOL/L — SIGNIFICANT CHANGE UP (ref 98–107)
CHLORIDE SERPL-SCNC: 99 MMOL/L — SIGNIFICANT CHANGE UP (ref 98–107)
CO2 BLDV-SCNC: 31.2 MMOL/L — HIGH (ref 22–26)
CO2 BLDV-SCNC: 31.2 MMOL/L — HIGH (ref 22–26)
CO2 SERPL-SCNC: 28 MMOL/L — SIGNIFICANT CHANGE UP (ref 22–31)
COLOR SPEC: YELLOW — SIGNIFICANT CHANGE UP
COLOR SPEC: YELLOW — SIGNIFICANT CHANGE UP
CREAT SERPL-MCNC: 0.72 MG/DL — SIGNIFICANT CHANGE UP (ref 0.5–1.3)
CREAT SERPL-MCNC: 0.72 MG/DL — SIGNIFICANT CHANGE UP (ref 0.5–1.3)
CREAT SERPL-MCNC: 0.96 MG/DL — SIGNIFICANT CHANGE UP (ref 0.5–1.3)
CREAT SERPL-MCNC: 0.96 MG/DL — SIGNIFICANT CHANGE UP (ref 0.5–1.3)
CULTURE RESULTS: SIGNIFICANT CHANGE UP
DIFF PNL FLD: NEGATIVE — SIGNIFICANT CHANGE UP
DIFF PNL FLD: NEGATIVE — SIGNIFICANT CHANGE UP
EGFR: 103 ML/MIN/1.73M2 — SIGNIFICANT CHANGE UP
EGFR: 103 ML/MIN/1.73M2 — SIGNIFICANT CHANGE UP
EGFR: 89 ML/MIN/1.73M2 — SIGNIFICANT CHANGE UP
EGFR: 89 ML/MIN/1.73M2 — SIGNIFICANT CHANGE UP
FLUAV SUBTYP SPEC NAA+PROBE: SIGNIFICANT CHANGE UP
FLUAV SUBTYP SPEC NAA+PROBE: SIGNIFICANT CHANGE UP
FLUBV RNA SPEC QL NAA+PROBE: SIGNIFICANT CHANGE UP
FLUBV RNA SPEC QL NAA+PROBE: SIGNIFICANT CHANGE UP
GAS PNL BLDV: 130 MMOL/L — LOW (ref 136–145)
GAS PNL BLDV: 130 MMOL/L — LOW (ref 136–145)
GAS PNL BLDV: SIGNIFICANT CHANGE UP
GAS PNL BLDV: SIGNIFICANT CHANGE UP
GLUCOSE BLDV-MCNC: 134 MG/DL — HIGH (ref 70–99)
GLUCOSE BLDV-MCNC: 134 MG/DL — HIGH (ref 70–99)
GLUCOSE SERPL-MCNC: 100 MG/DL — HIGH (ref 70–99)
GLUCOSE SERPL-MCNC: 100 MG/DL — HIGH (ref 70–99)
GLUCOSE SERPL-MCNC: 138 MG/DL — HIGH (ref 70–99)
GLUCOSE SERPL-MCNC: 138 MG/DL — HIGH (ref 70–99)
GLUCOSE UR QL: NEGATIVE MG/DL — SIGNIFICANT CHANGE UP
GLUCOSE UR QL: NEGATIVE MG/DL — SIGNIFICANT CHANGE UP
HADV DNA SPEC QL NAA+PROBE: SIGNIFICANT CHANGE UP
HADV DNA SPEC QL NAA+PROBE: SIGNIFICANT CHANGE UP
HCO3 BLDV-SCNC: 30 MMOL/L — HIGH (ref 22–29)
HCO3 BLDV-SCNC: 30 MMOL/L — HIGH (ref 22–29)
HCOV 229E RNA SPEC QL NAA+PROBE: SIGNIFICANT CHANGE UP
HCOV 229E RNA SPEC QL NAA+PROBE: SIGNIFICANT CHANGE UP
HCOV HKU1 RNA SPEC QL NAA+PROBE: SIGNIFICANT CHANGE UP
HCOV HKU1 RNA SPEC QL NAA+PROBE: SIGNIFICANT CHANGE UP
HCOV NL63 RNA SPEC QL NAA+PROBE: SIGNIFICANT CHANGE UP
HCOV NL63 RNA SPEC QL NAA+PROBE: SIGNIFICANT CHANGE UP
HCOV OC43 RNA SPEC QL NAA+PROBE: SIGNIFICANT CHANGE UP
HCOV OC43 RNA SPEC QL NAA+PROBE: SIGNIFICANT CHANGE UP
HCT VFR BLD CALC: 26.9 % — LOW (ref 39–50)
HCT VFR BLDA CALC: 29 % — LOW (ref 39–51)
HCT VFR BLDA CALC: 29 % — LOW (ref 39–51)
HGB BLD CALC-MCNC: 9.7 G/DL — LOW (ref 12.6–17.4)
HGB BLD CALC-MCNC: 9.7 G/DL — LOW (ref 12.6–17.4)
HGB BLD-MCNC: 8.8 G/DL — LOW (ref 13–17)
HGB BLD-MCNC: 8.8 G/DL — LOW (ref 13–17)
HGB BLD-MCNC: 9 G/DL — LOW (ref 13–17)
HGB BLD-MCNC: 9 G/DL — LOW (ref 13–17)
HMPV RNA SPEC QL NAA+PROBE: SIGNIFICANT CHANGE UP
HMPV RNA SPEC QL NAA+PROBE: SIGNIFICANT CHANGE UP
HPIV1 RNA SPEC QL NAA+PROBE: SIGNIFICANT CHANGE UP
HPIV1 RNA SPEC QL NAA+PROBE: SIGNIFICANT CHANGE UP
HPIV2 RNA SPEC QL NAA+PROBE: SIGNIFICANT CHANGE UP
HPIV2 RNA SPEC QL NAA+PROBE: SIGNIFICANT CHANGE UP
HPIV3 RNA SPEC QL NAA+PROBE: SIGNIFICANT CHANGE UP
HPIV3 RNA SPEC QL NAA+PROBE: SIGNIFICANT CHANGE UP
HPIV4 RNA SPEC QL NAA+PROBE: SIGNIFICANT CHANGE UP
HPIV4 RNA SPEC QL NAA+PROBE: SIGNIFICANT CHANGE UP
KETONES UR-MCNC: NEGATIVE MG/DL — SIGNIFICANT CHANGE UP
KETONES UR-MCNC: NEGATIVE MG/DL — SIGNIFICANT CHANGE UP
LACTATE BLDV-MCNC: 2 MMOL/L — SIGNIFICANT CHANGE UP (ref 0.5–2)
LACTATE BLDV-MCNC: 2 MMOL/L — SIGNIFICANT CHANGE UP (ref 0.5–2)
LACTATE SERPL-SCNC: 1.2 MMOL/L — SIGNIFICANT CHANGE UP (ref 0.5–2)
LACTATE SERPL-SCNC: 1.2 MMOL/L — SIGNIFICANT CHANGE UP (ref 0.5–2)
LEUKOCYTE ESTERASE UR-ACNC: NEGATIVE — SIGNIFICANT CHANGE UP
LEUKOCYTE ESTERASE UR-ACNC: NEGATIVE — SIGNIFICANT CHANGE UP
M PNEUMO DNA SPEC QL NAA+PROBE: SIGNIFICANT CHANGE UP
M PNEUMO DNA SPEC QL NAA+PROBE: SIGNIFICANT CHANGE UP
MAGNESIUM SERPL-MCNC: 2.1 MG/DL — SIGNIFICANT CHANGE UP (ref 1.6–2.6)
MAGNESIUM SERPL-MCNC: 2.1 MG/DL — SIGNIFICANT CHANGE UP (ref 1.6–2.6)
MCHC RBC-ENTMCNC: 28.6 PG — SIGNIFICANT CHANGE UP (ref 27–34)
MCHC RBC-ENTMCNC: 28.6 PG — SIGNIFICANT CHANGE UP (ref 27–34)
MCHC RBC-ENTMCNC: 28.7 PG — SIGNIFICANT CHANGE UP (ref 27–34)
MCHC RBC-ENTMCNC: 28.7 PG — SIGNIFICANT CHANGE UP (ref 27–34)
MCHC RBC-ENTMCNC: 32.7 GM/DL — SIGNIFICANT CHANGE UP (ref 32–36)
MCHC RBC-ENTMCNC: 32.7 GM/DL — SIGNIFICANT CHANGE UP (ref 32–36)
MCHC RBC-ENTMCNC: 33.5 GM/DL — SIGNIFICANT CHANGE UP (ref 32–36)
MCHC RBC-ENTMCNC: 33.5 GM/DL — SIGNIFICANT CHANGE UP (ref 32–36)
MCV RBC AUTO: 85.4 FL — SIGNIFICANT CHANGE UP (ref 80–100)
MCV RBC AUTO: 85.4 FL — SIGNIFICANT CHANGE UP (ref 80–100)
MCV RBC AUTO: 87.6 FL — SIGNIFICANT CHANGE UP (ref 80–100)
MCV RBC AUTO: 87.6 FL — SIGNIFICANT CHANGE UP (ref 80–100)
NITRITE UR-MCNC: NEGATIVE — SIGNIFICANT CHANGE UP
NITRITE UR-MCNC: NEGATIVE — SIGNIFICANT CHANGE UP
NRBC # BLD: 0 /100 WBCS — SIGNIFICANT CHANGE UP (ref 0–0)
NRBC # FLD: 0 K/UL — SIGNIFICANT CHANGE UP (ref 0–0)
PCO2 BLDV: 47 MMHG — SIGNIFICANT CHANGE UP (ref 42–55)
PCO2 BLDV: 47 MMHG — SIGNIFICANT CHANGE UP (ref 42–55)
PH BLDV: 7.41 — SIGNIFICANT CHANGE UP (ref 7.32–7.43)
PH BLDV: 7.41 — SIGNIFICANT CHANGE UP (ref 7.32–7.43)
PH UR: 6.5 — SIGNIFICANT CHANGE UP (ref 5–8)
PH UR: 6.5 — SIGNIFICANT CHANGE UP (ref 5–8)
PHOSPHATE SERPL-MCNC: 2.7 MG/DL — SIGNIFICANT CHANGE UP (ref 2.5–4.5)
PHOSPHATE SERPL-MCNC: 2.7 MG/DL — SIGNIFICANT CHANGE UP (ref 2.5–4.5)
PLATELET # BLD AUTO: 287 K/UL — SIGNIFICANT CHANGE UP (ref 150–400)
PLATELET # BLD AUTO: 287 K/UL — SIGNIFICANT CHANGE UP (ref 150–400)
PLATELET # BLD AUTO: 331 K/UL — SIGNIFICANT CHANGE UP (ref 150–400)
PLATELET # BLD AUTO: 331 K/UL — SIGNIFICANT CHANGE UP (ref 150–400)
PO2 BLDV: <20 MMHG — LOW (ref 25–45)
PO2 BLDV: <20 MMHG — LOW (ref 25–45)
POTASSIUM BLDV-SCNC: 5.2 MMOL/L — HIGH (ref 3.5–5.1)
POTASSIUM BLDV-SCNC: 5.2 MMOL/L — HIGH (ref 3.5–5.1)
POTASSIUM SERPL-MCNC: 4.5 MMOL/L — SIGNIFICANT CHANGE UP (ref 3.5–5.3)
POTASSIUM SERPL-MCNC: 4.5 MMOL/L — SIGNIFICANT CHANGE UP (ref 3.5–5.3)
POTASSIUM SERPL-MCNC: 4.8 MMOL/L — SIGNIFICANT CHANGE UP (ref 3.5–5.3)
POTASSIUM SERPL-MCNC: 4.8 MMOL/L — SIGNIFICANT CHANGE UP (ref 3.5–5.3)
POTASSIUM SERPL-SCNC: 4.5 MMOL/L — SIGNIFICANT CHANGE UP (ref 3.5–5.3)
POTASSIUM SERPL-SCNC: 4.5 MMOL/L — SIGNIFICANT CHANGE UP (ref 3.5–5.3)
POTASSIUM SERPL-SCNC: 4.8 MMOL/L — SIGNIFICANT CHANGE UP (ref 3.5–5.3)
POTASSIUM SERPL-SCNC: 4.8 MMOL/L — SIGNIFICANT CHANGE UP (ref 3.5–5.3)
PROT UR-MCNC: SIGNIFICANT CHANGE UP MG/DL
PROT UR-MCNC: SIGNIFICANT CHANGE UP MG/DL
RAPID RVP RESULT: SIGNIFICANT CHANGE UP
RAPID RVP RESULT: SIGNIFICANT CHANGE UP
RBC # BLD: 3.07 M/UL — LOW (ref 4.2–5.8)
RBC # BLD: 3.07 M/UL — LOW (ref 4.2–5.8)
RBC # BLD: 3.15 M/UL — LOW (ref 4.2–5.8)
RBC # BLD: 3.15 M/UL — LOW (ref 4.2–5.8)
RBC # FLD: 13.8 % — SIGNIFICANT CHANGE UP (ref 10.3–14.5)
RBC # FLD: 13.8 % — SIGNIFICANT CHANGE UP (ref 10.3–14.5)
RBC # FLD: 14 % — SIGNIFICANT CHANGE UP (ref 10.3–14.5)
RBC # FLD: 14 % — SIGNIFICANT CHANGE UP (ref 10.3–14.5)
RSV RNA SPEC QL NAA+PROBE: SIGNIFICANT CHANGE UP
RSV RNA SPEC QL NAA+PROBE: SIGNIFICANT CHANGE UP
RV+EV RNA SPEC QL NAA+PROBE: SIGNIFICANT CHANGE UP
RV+EV RNA SPEC QL NAA+PROBE: SIGNIFICANT CHANGE UP
SAO2 % BLDV: 23.8 % — LOW (ref 67–88)
SAO2 % BLDV: 23.8 % — LOW (ref 67–88)
SARS-COV-2 RNA SPEC QL NAA+PROBE: SIGNIFICANT CHANGE UP
SARS-COV-2 RNA SPEC QL NAA+PROBE: SIGNIFICANT CHANGE UP
SODIUM SERPL-SCNC: 130 MMOL/L — LOW (ref 135–145)
SODIUM SERPL-SCNC: 130 MMOL/L — LOW (ref 135–145)
SODIUM SERPL-SCNC: 136 MMOL/L — SIGNIFICANT CHANGE UP (ref 135–145)
SODIUM SERPL-SCNC: 136 MMOL/L — SIGNIFICANT CHANGE UP (ref 135–145)
SP GR SPEC: 1.02 — SIGNIFICANT CHANGE UP (ref 1–1.03)
SP GR SPEC: 1.02 — SIGNIFICANT CHANGE UP (ref 1–1.03)
SPECIMEN SOURCE: SIGNIFICANT CHANGE UP
UROBILINOGEN FLD QL: 1 MG/DL — SIGNIFICANT CHANGE UP (ref 0.2–1)
UROBILINOGEN FLD QL: 1 MG/DL — SIGNIFICANT CHANGE UP (ref 0.2–1)
WBC # BLD: 6.79 K/UL — SIGNIFICANT CHANGE UP (ref 3.8–10.5)
WBC # BLD: 6.79 K/UL — SIGNIFICANT CHANGE UP (ref 3.8–10.5)
WBC # BLD: 9.13 K/UL — SIGNIFICANT CHANGE UP (ref 3.8–10.5)
WBC # BLD: 9.13 K/UL — SIGNIFICANT CHANGE UP (ref 3.8–10.5)
WBC # FLD AUTO: 6.79 K/UL — SIGNIFICANT CHANGE UP (ref 3.8–10.5)
WBC # FLD AUTO: 6.79 K/UL — SIGNIFICANT CHANGE UP (ref 3.8–10.5)
WBC # FLD AUTO: 9.13 K/UL — SIGNIFICANT CHANGE UP (ref 3.8–10.5)
WBC # FLD AUTO: 9.13 K/UL — SIGNIFICANT CHANGE UP (ref 3.8–10.5)

## 2023-11-06 PROCEDURE — 99223 1ST HOSP IP/OBS HIGH 75: CPT

## 2023-11-06 PROCEDURE — 93010 ELECTROCARDIOGRAM REPORT: CPT

## 2023-11-06 PROCEDURE — 71275 CT ANGIOGRAPHY CHEST: CPT | Mod: 26

## 2023-11-06 PROCEDURE — 71045 X-RAY EXAM CHEST 1 VIEW: CPT | Mod: 26

## 2023-11-06 RX ORDER — SENNA PLUS 8.6 MG/1
2 TABLET ORAL AT BEDTIME
Refills: 0 | Status: DISCONTINUED | OUTPATIENT
Start: 2023-11-06 | End: 2023-11-10

## 2023-11-06 RX ORDER — ACETAMINOPHEN 500 MG
1000 TABLET ORAL ONCE
Refills: 0 | Status: COMPLETED | OUTPATIENT
Start: 2023-11-07 | End: 2023-11-07

## 2023-11-06 RX ORDER — HYDROMORPHONE HYDROCHLORIDE 2 MG/ML
0.5 INJECTION INTRAMUSCULAR; INTRAVENOUS; SUBCUTANEOUS ONCE
Refills: 0 | Status: DISCONTINUED | OUTPATIENT
Start: 2023-11-06 | End: 2023-11-06

## 2023-11-06 RX ORDER — OXYCODONE HYDROCHLORIDE 5 MG/1
10 TABLET ORAL
Refills: 0 | Status: DISCONTINUED | OUTPATIENT
Start: 2023-11-06 | End: 2023-11-07

## 2023-11-06 RX ORDER — ACETAMINOPHEN 500 MG
1000 TABLET ORAL ONCE
Refills: 0 | Status: COMPLETED | OUTPATIENT
Start: 2023-11-06 | End: 2023-11-06

## 2023-11-06 RX ORDER — POLYETHYLENE GLYCOL 3350 17 G/17G
17 POWDER, FOR SOLUTION ORAL ONCE
Refills: 0 | Status: COMPLETED | OUTPATIENT
Start: 2023-11-06 | End: 2023-11-06

## 2023-11-06 RX ORDER — POLYETHYLENE GLYCOL 3350 17 G/17G
17 POWDER, FOR SOLUTION ORAL EVERY 12 HOURS
Refills: 0 | Status: DISCONTINUED | OUTPATIENT
Start: 2023-11-06 | End: 2023-11-08

## 2023-11-06 RX ORDER — DIAZEPAM 5 MG
5 TABLET ORAL ONCE
Refills: 0 | Status: DISCONTINUED | OUTPATIENT
Start: 2023-11-06 | End: 2023-11-06

## 2023-11-06 RX ADMIN — OXYCODONE HYDROCHLORIDE 10 MILLIGRAM(S): 5 TABLET ORAL at 16:48

## 2023-11-06 RX ADMIN — HEPARIN SODIUM 5000 UNIT(S): 5000 INJECTION INTRAVENOUS; SUBCUTANEOUS at 21:23

## 2023-11-06 RX ADMIN — OXYCODONE HYDROCHLORIDE 5 MILLIGRAM(S): 5 TABLET ORAL at 11:00

## 2023-11-06 RX ADMIN — HEPARIN SODIUM 5000 UNIT(S): 5000 INJECTION INTRAVENOUS; SUBCUTANEOUS at 05:32

## 2023-11-06 RX ADMIN — ATORVASTATIN CALCIUM 20 MILLIGRAM(S): 80 TABLET, FILM COATED ORAL at 21:22

## 2023-11-06 RX ADMIN — GABAPENTIN 800 MILLIGRAM(S): 400 CAPSULE ORAL at 21:22

## 2023-11-06 RX ADMIN — GABAPENTIN 800 MILLIGRAM(S): 400 CAPSULE ORAL at 05:33

## 2023-11-06 RX ADMIN — Medication 400 MILLIGRAM(S): at 22:23

## 2023-11-06 RX ADMIN — POLYETHYLENE GLYCOL 3350 17 GRAM(S): 17 POWDER, FOR SOLUTION ORAL at 12:05

## 2023-11-06 RX ADMIN — OXYCODONE HYDROCHLORIDE 10 MILLIGRAM(S): 5 TABLET ORAL at 14:45

## 2023-11-06 RX ADMIN — Medication 1000 MILLIGRAM(S): at 22:40

## 2023-11-06 RX ADMIN — TIZANIDINE 2 MILLIGRAM(S): 4 TABLET ORAL at 06:34

## 2023-11-06 RX ADMIN — TIZANIDINE 2 MILLIGRAM(S): 4 TABLET ORAL at 12:05

## 2023-11-06 RX ADMIN — OXYCODONE HYDROCHLORIDE 15 MILLIGRAM(S): 5 TABLET ORAL at 06:20

## 2023-11-06 RX ADMIN — OXYCODONE HYDROCHLORIDE 15 MILLIGRAM(S): 5 TABLET ORAL at 17:29

## 2023-11-06 RX ADMIN — OXYCODONE HYDROCHLORIDE 10 MILLIGRAM(S): 5 TABLET ORAL at 21:22

## 2023-11-06 RX ADMIN — Medication 400 MILLIGRAM(S): at 14:28

## 2023-11-06 RX ADMIN — SENNA PLUS 2 TABLET(S): 8.6 TABLET ORAL at 21:23

## 2023-11-06 RX ADMIN — TIZANIDINE 2 MILLIGRAM(S): 4 TABLET ORAL at 19:21

## 2023-11-06 RX ADMIN — Medication 1 TABLET(S): at 12:05

## 2023-11-06 RX ADMIN — SODIUM CHLORIDE 3 MILLILITER(S): 9 INJECTION INTRAMUSCULAR; INTRAVENOUS; SUBCUTANEOUS at 13:43

## 2023-11-06 RX ADMIN — OXYCODONE HYDROCHLORIDE 10 MILLIGRAM(S): 5 TABLET ORAL at 13:48

## 2023-11-06 RX ADMIN — GABAPENTIN 800 MILLIGRAM(S): 400 CAPSULE ORAL at 13:49

## 2023-11-06 RX ADMIN — Medication 1000 MILLIGRAM(S): at 15:00

## 2023-11-06 RX ADMIN — OXYCODONE HYDROCHLORIDE 15 MILLIGRAM(S): 5 TABLET ORAL at 18:25

## 2023-11-06 RX ADMIN — OXYCODONE HYDROCHLORIDE 5 MILLIGRAM(S): 5 TABLET ORAL at 10:05

## 2023-11-06 RX ADMIN — SODIUM CHLORIDE 3 MILLILITER(S): 9 INJECTION INTRAMUSCULAR; INTRAVENOUS; SUBCUTANEOUS at 22:30

## 2023-11-06 RX ADMIN — Medication 5 MILLIGRAM(S): at 17:28

## 2023-11-06 RX ADMIN — OXYCODONE HYDROCHLORIDE 10 MILLIGRAM(S): 5 TABLET ORAL at 22:10

## 2023-11-06 RX ADMIN — SODIUM CHLORIDE 3 MILLILITER(S): 9 INJECTION INTRAMUSCULAR; INTRAVENOUS; SUBCUTANEOUS at 06:20

## 2023-11-06 RX ADMIN — OXYCODONE HYDROCHLORIDE 10 MILLIGRAM(S): 5 TABLET ORAL at 17:45

## 2023-11-06 RX ADMIN — OXYCODONE HYDROCHLORIDE 15 MILLIGRAM(S): 5 TABLET ORAL at 05:32

## 2023-11-06 RX ADMIN — HEPARIN SODIUM 5000 UNIT(S): 5000 INJECTION INTRAVENOUS; SUBCUTANEOUS at 13:49

## 2023-11-06 NOTE — CONSULT NOTE ADULT - ATTENDING COMMENTS
Patient with spine surgery requiring sicu for q1 hr neurochecks. Extubated, doing well.    I have personally interviewed when possible and examined the patient, reviewed data and laboratory tests/x-rays and all pertinent electronic images.  I was physically present for the key portions of the evaluation and management (E/M) service provided.   The SICU team has a constant risk benefit analyzes discussion with the primary team, all consultants, House Staff and PA's on all decisions.  These diagnoses are unrelated to the surgical procedure noted above.  I meet with family if needed to get further history, discuss the case and make care decisions for this patient who might not be able to participate.  Time involved in performance of separately billable procedures was not counted toward my critical care time. There is no overlap.  I spent 30 minutes ( 0800Hrs-0915Hrs in AM/ 1600Hrs-1715Hrs in PM, or other time indicated) of critical care time for the diagnoses, assessment, plan and interventions.  This time excludes time spent on separate procedures and teaching.
Patient seen and examined by me. Case discussed with fellow and agree with the resident's findings and plan as documented above.    -wean off oxygen today  -monitor YOON output per primary team  -awaiting brace for left foot drop  -pain not controlled  -case d/w primary team -- getting pain management on board to optimize pain regimen  -f/u pain management recs  -bowel regimen

## 2023-11-06 NOTE — CONSULT NOTE ADULT - SUBJECTIVE AND OBJECTIVE BOX
CHIEF COMPLAINT: Patient is a 63y old  Male who presents with a chief complaint of Spinal stenosis of lumbar region without neurogenic claudication     (02 Nov 2023 13:44)      HPI: 63 year old male with lower back pain radiating down right leg x 1 year. Pt presents today for presurgical evaluation for Lumbar Decompression L1-L5 with Laminectomy and Fusion.     Pt was scheduled for procedure prior but case was postponed due to abnormal stress test. Pt has since had an angiogram and states he was cleared by his cardiologist.  (16 Oct 2023 09:30)      Allergies:  eggs (Hives; Rash)  No Known Drug Allergies  peanuts (Unknown)    Intolerances      HOME MEDICATIONS: [x] Reviewed    MEDICATIONS  (STANDING):  amLODIPine   Tablet 10 milliGRAM(s) Oral daily  atorvastatin 20 milliGRAM(s) Oral at bedtime  gabapentin 800 milliGRAM(s) Oral three times a day  heparin   Injectable 5000 Unit(s) SubCutaneous every 8 hours  lidocaine   4% Patch 1 Patch Transdermal every 24 hours  oxyCODONE  ER Tablet 15 milliGRAM(s) Oral every 12 hours  sodium chloride 0.9% lock flush 3 milliLiter(s) IV Push every 8 hours  tiZANidine 2 milliGRAM(s) Oral every 6 hours    MEDICATIONS  (PRN):  albuterol    90 MICROgram(s) HFA Inhaler 2 Puff(s) Inhalation every 6 hours PRN Shortness of Breath and/or Wheezing  ALPRAZolam 0.5 milliGRAM(s) Oral at bedtime PRN anxiety  ondansetron Injectable 4 milliGRAM(s) IV Push every 6 hours PRN Nausea  oxycodone    5 mG/acetaminophen 325 mG 2 Tablet(s) Oral every 4 hours PRN Moderate to Severe Pain (4 - 10)  oxyCODONE    IR 5 milliGRAM(s) Oral every 4 hours PRN Severe breakthrough Pain (7 - 10)  tiZANidine 2 milliGRAM(s) Oral every 6 hours PRN Muscle Spasm      PAST MEDICAL & SURGICAL HISTORY:  Asthma  HTN (hypertension)  Localized swelling of both lower legs  H/O neuropathy  Chronic back pain  H/O spinal stenosis  Cervical spinal stenosis  Pulmonary embolism  History of fusion of cervical spine  S/P arthroscopy of knee    [x ] Reviewed     SOCIAL HISTORY:  Caffeine use - true   [x] Substance abuse: false  [x] Alcohol use: wine, less than montly.   [x] Tobacco: false, former smoker.     FAMILY HISTORY:  FH: HTN (hypertension) (Mother)  [x] No pertinent family history in first degree relatives     REVIEW OF SYSTEMS:  [x] All other ROS negative  [  ] Unable to obtain due to poor mental status    Vital Signs Last 24 Hrs  T(C): 36.5 (06 Nov 2023 10:03), Max: 37.4 (05 Nov 2023 13:36)  T(F): 97.7 (06 Nov 2023 10:03), Max: 99.3 (05 Nov 2023 13:36)  HR: 65 (06 Nov 2023 10:03) (65 - 98)  BP: 101/60 (06 Nov 2023 10:03) (94/61 - 111/74)  RR: 18 (06 Nov 2023 10:03) (16 - 20)  SpO2: 99% (06 Nov 2023 10:03) (94% - 100%)    Parameters below as of 06 Nov 2023 10:03  Patient On (Oxygen Delivery Method): room air      PHYSICAL EXAM:  GENERAL: NAD  HEAD:  Atraumatic, Normocephalic  EYES: EOMI, conjunctiva and sclera clear  ENMT: Moist mucous membranes  NECK: Supple, No JVD  RESPIRATORY: Clear to auscultation bilaterally; No rales, rhonchi, wheezing, or rubs  CARDIOVASCULAR: Regular rate and rhythm; No murmurs, rubs, or gallops  GASTROINTESTINAL: Soft, Nontender, Nondistended; Bowel sounds present  EXTREMITIES:  2+ Peripheral Pulses, No clubbing, cyanosis, or edema  NERVOUS SYSTEM:  Alert & Oriented X3; No gross sensory deficits  SKIN: Dressing Clean dry and intact. Drain with serosanguinous output.     LABS:                        8.8    6.79  )-----------( 287      ( 06 Nov 2023 05:21 )             26.9     Hemoglobin: 8.8 g/dL (11-06 @ 05:21)  Hemoglobin: 8.3 g/dL (11-05 @ 05:00)  Hemoglobin: 8.1 g/dL (11-04 @ 05:39)  Hemoglobin: 8.3 g/dL (11-03 @ 05:25)  Hemoglobin: 8.5 g/dL (11-02 @ 02:25)    11-06    136  |  99  |  14  ----------------------------<  100<H>  4.5   |  28  |  0.72    Ca    9.0      06 Nov 2023 05:21        Urinalysis Basic - ( 06 Nov 2023 05:21 )    Color: x / Appearance: x / SG: x / pH: x  Gluc: 100 mg/dL / Ketone: x  / Bili: x / Urobili: x   Blood: x / Protein: x / Nitrite: x   Leuk Esterase: x / RBC: x / WBC x   Sq Epi: x / Non Sq Epi: x / Bacteria: x      Microbiology Culture Results:   No growth at 4 days (11-01 @ 16:15)  Culture Results:   No growth at 4 days (11-01 @ 16:15)      RADIOLOGY & ADDITIONAL STUDIES:  < from: Xray Chest 1 View- PORTABLE-Urgent (Xray Chest 1 View- PORTABLE-Urgent .) (11.04.23 @ 16:10) >  FINDINGS:  Cervical hardware appears intact.  Cardiomegaly.  Right lower lobe opacity.  There is no pneumothorax or pleural effusion.  No acute bony abnormality.    IMPRESSION:  Right lower lobe opacity which may represent atelectasis/pneumonia.    < end of copied text >  < from: CT Lumbar Spine No Cont (11.02.23 @ 21:09) >  Comparison is made with the prior CT of 7/29/2023    In the interval since the previous exam there has been a laminectomy from   L1 through L4 with transpedicular screws identified L1-L5 with connecting   rods. Additional screws are identified sacrum extending to the iliac   bones. There is disc space narrowing at L4-5 with endplate sclerosis   similar to the prior examination. Immature bony fusion mass is identified   as well as a drain in place. There is no hardware fracture. Hardware is   in good position. There are no lucencies surrounding the hardware to   suggest loosening or infection. Expected postoperative changes.      IMPRESSION: L1-L4 laminectomies with transpedicular screws and connecting   rods and immature bony fusion mass as well as sacral to iliac fusion   expected postoperative changes..    < end of copied text >      EKG:   Personally Reviewed:  [x] YES     Imaging:   Xray chest Personally Reviewed:  [x] YES               [x ] Consultant(s) Notes Reviewed  [x] Care Discussed with Consultants/Other Providers: Ortho PA - discussed  CHIEF COMPLAINT: Patient is a 63y old  Male who presents with a chief complaint of Spinal stenosis of lumbar region without neurogenic claudication     (02 Nov 2023 13:44)    HPI: 63 year old male with lower back pain radiating down right leg x 1 year. Pt presents today for presurgical evaluation for Lumbar Decompression L1-L5 with Laminectomy and Fusion.     Pt was scheduled for procedure prior but case was postponed due to abnormal stress test. Pt has since had an angiogram and states he was cleared by his cardiologist.     Medicine consulted for comanagement of his care in the hospital.      (16 Oct 2023 09:30)      Allergies:  eggs (Hives; Rash)  No Known Drug Allergies  peanuts (Unknown)    Intolerances      HOME MEDICATIONS: [x] Reviewed    MEDICATIONS  (STANDING):  amLODIPine   Tablet 10 milliGRAM(s) Oral daily  atorvastatin 20 milliGRAM(s) Oral at bedtime  gabapentin 800 milliGRAM(s) Oral three times a day  heparin   Injectable 5000 Unit(s) SubCutaneous every 8 hours  lidocaine   4% Patch 1 Patch Transdermal every 24 hours  oxyCODONE  ER Tablet 15 milliGRAM(s) Oral every 12 hours  sodium chloride 0.9% lock flush 3 milliLiter(s) IV Push every 8 hours  tiZANidine 2 milliGRAM(s) Oral every 6 hours    MEDICATIONS  (PRN):  albuterol    90 MICROgram(s) HFA Inhaler 2 Puff(s) Inhalation every 6 hours PRN Shortness of Breath and/or Wheezing  ALPRAZolam 0.5 milliGRAM(s) Oral at bedtime PRN anxiety  ondansetron Injectable 4 milliGRAM(s) IV Push every 6 hours PRN Nausea  oxycodone    5 mG/acetaminophen 325 mG 2 Tablet(s) Oral every 4 hours PRN Moderate to Severe Pain (4 - 10)  oxyCODONE    IR 5 milliGRAM(s) Oral every 4 hours PRN Severe breakthrough Pain (7 - 10)  tiZANidine 2 milliGRAM(s) Oral every 6 hours PRN Muscle Spasm      PAST MEDICAL & SURGICAL HISTORY:  Asthma  HTN (hypertension)  Localized swelling of both lower legs  H/O neuropathy  Chronic back pain  H/O spinal stenosis  Cervical spinal stenosis  Pulmonary embolism  History of fusion of cervical spine  S/P arthroscopy of knee    [x ] Reviewed     SOCIAL HISTORY:  Caffeine use - true   [x] Substance abuse: false  [x] Alcohol use: wine, less than montly.   [x] Tobacco: false, former smoker.     FAMILY HISTORY:  FH: HTN (hypertension) (Mother)  [x] No pertinent family history in first degree relatives     REVIEW OF SYSTEMS:  [x] All other ROS negative  [  ] Unable to obtain due to poor mental status    Vital Signs Last 24 Hrs  T(C): 36.5 (06 Nov 2023 10:03), Max: 37.4 (05 Nov 2023 13:36)  T(F): 97.7 (06 Nov 2023 10:03), Max: 99.3 (05 Nov 2023 13:36)  HR: 65 (06 Nov 2023 10:03) (65 - 98)  BP: 101/60 (06 Nov 2023 10:03) (94/61 - 111/74)  RR: 18 (06 Nov 2023 10:03) (16 - 20)  SpO2: 99% (06 Nov 2023 10:03) (94% - 100%)    Parameters below as of 06 Nov 2023 10:03  Patient On (Oxygen Delivery Method): room air      PHYSICAL EXAM:  GENERAL: NAD  HEAD:  Atraumatic, Normocephalic  EYES: EOMI, conjunctiva and sclera clear  ENMT: Moist mucous membranes  NECK: Supple, No JVD  RESPIRATORY: Clear to auscultation bilaterally; No rales, rhonchi, wheezing, or rubs  CARDIOVASCULAR: Regular rate and rhythm; No murmurs, rubs, or gallops  GASTROINTESTINAL: Soft, Nontender, Nondistended; Bowel sounds present  EXTREMITIES:  2+ Peripheral Pulses, No clubbing, cyanosis, or edema  NERVOUS SYSTEM:  Alert & Oriented X3; LIMITED range of motion secondary to discomfort.   SKIN: Dressing Clean dry and intact. Drain with serosanguinous output.     LABS:                        8.8    6.79  )-----------( 287      ( 06 Nov 2023 05:21 )             26.9     Hemoglobin: 8.8 g/dL (11-06 @ 05:21)  Hemoglobin: 8.3 g/dL (11-05 @ 05:00)  Hemoglobin: 8.1 g/dL (11-04 @ 05:39)  Hemoglobin: 8.3 g/dL (11-03 @ 05:25)  Hemoglobin: 8.5 g/dL (11-02 @ 02:25)    11-06    136  |  99  |  14  ----------------------------<  100<H>  4.5   |  28  |  0.72    Ca    9.0      06 Nov 2023 05:21        Urinalysis Basic - ( 06 Nov 2023 05:21 )    Color: x / Appearance: x / SG: x / pH: x  Gluc: 100 mg/dL / Ketone: x  / Bili: x / Urobili: x   Blood: x / Protein: x / Nitrite: x   Leuk Esterase: x / RBC: x / WBC x   Sq Epi: x / Non Sq Epi: x / Bacteria: x      Microbiology Culture Results:   No growth at 4 days (11-01 @ 16:15)  Culture Results:   No growth at 4 days (11-01 @ 16:15)      RADIOLOGY & ADDITIONAL STUDIES:  < from: Xray Chest 1 View- PORTABLE-Urgent (Xray Chest 1 View- PORTABLE-Urgent .) (11.04.23 @ 16:10) >  FINDINGS:  Cervical hardware appears intact.  Cardiomegaly.  Right lower lobe opacity.  There is no pneumothorax or pleural effusion.  No acute bony abnormality.    IMPRESSION:  Right lower lobe opacity which may represent atelectasis/pneumonia.    < end of copied text >  < from: CT Lumbar Spine No Cont (11.02.23 @ 21:09) >  Comparison is made with the prior CT of 7/29/2023    In the interval since the previous exam there has been a laminectomy from   L1 through L4 with transpedicular screws identified L1-L5 with connecting   rods. Additional screws are identified sacrum extending to the iliac   bones. There is disc space narrowing at L4-5 with endplate sclerosis   similar to the prior examination. Immature bony fusion mass is identified   as well as a drain in place. There is no hardware fracture. Hardware is   in good position. There are no lucencies surrounding the hardware to   suggest loosening or infection. Expected postoperative changes.      IMPRESSION: L1-L4 laminectomies with transpedicular screws and connecting   rods and immature bony fusion mass as well as sacral to iliac fusion   expected postoperative changes..    < end of copied text >      EKG:   Personally Reviewed:  [x] YES     Imaging:   Xray chest Personally Reviewed:  [x] YES               [x ] Consultant(s) Notes Reviewed  [x] Care Discussed with Consultants/Other Providers: Ortho PA - discussed  CHIEF COMPLAINT: Patient is a 63y old  Male who presents with a chief complaint of Spinal stenosis of lumbar region without neurogenic claudication     (02 Nov 2023 13:44)    HPI: 63 year old male with lower back pain radiating down right leg x 1 year. Pt presents today for presurgical evaluation for Lumbar Decompression L1-L5 with Laminectomy and Fusion. Pt was scheduled for procedure prior but case was postponed due to abnormal stress test. Pt has since had an angiogram and states he was cleared by his cardiologist. Medicine consulted for comanagement of his care in the hospital.    (16 Oct 2023 09:30)    Post-op, patient was transferred to the SICU for q1hr neuro checks. Now on the medicine floors. Patient denies any chest pain or SOB. Patient reports that his pain is not controlled and would like his pain control better managed.      Allergies:  eggs (Hives; Rash)  No Known Drug Allergies  peanuts (Unknown)      HOME MEDICATIONS: [x] Reviewed    MEDICATIONS  (STANDING):  amLODIPine   Tablet 10 milliGRAM(s) Oral daily  atorvastatin 20 milliGRAM(s) Oral at bedtime  gabapentin 800 milliGRAM(s) Oral three times a day  heparin   Injectable 5000 Unit(s) SubCutaneous every 8 hours  lidocaine   4% Patch 1 Patch Transdermal every 24 hours  oxyCODONE  ER Tablet 15 milliGRAM(s) Oral every 12 hours  sodium chloride 0.9% lock flush 3 milliLiter(s) IV Push every 8 hours  tiZANidine 2 milliGRAM(s) Oral every 6 hours    MEDICATIONS  (PRN):  albuterol    90 MICROgram(s) HFA Inhaler 2 Puff(s) Inhalation every 6 hours PRN Shortness of Breath and/or Wheezing  ALPRAZolam 0.5 milliGRAM(s) Oral at bedtime PRN anxiety  ondansetron Injectable 4 milliGRAM(s) IV Push every 6 hours PRN Nausea  oxycodone    5 mG/acetaminophen 325 mG 2 Tablet(s) Oral every 4 hours PRN Moderate to Severe Pain (4 - 10)  oxyCODONE    IR 5 milliGRAM(s) Oral every 4 hours PRN Severe breakthrough Pain (7 - 10)  tiZANidine 2 milliGRAM(s) Oral every 6 hours PRN Muscle Spasm      PAST MEDICAL & SURGICAL HISTORY:  Asthma  HTN (hypertension)  Localized swelling of both lower legs  H/O neuropathy  Chronic back pain  H/O spinal stenosis  Cervical spinal stenosis  Pulmonary embolism  History of fusion of cervical spine  S/P arthroscopy of knee    [x ] Reviewed     SOCIAL HISTORY:  Caffeine use - true   [x] Substance abuse: false  [x] Alcohol use: wine, less than monthly  [x] Tobacco: false, former smoker.     FAMILY HISTORY:  FH: HTN (hypertension) (Mother)  [x] No pertinent family history in first degree relatives     REVIEW OF SYSTEMS:  [x] All other ROS negative  [  ] Unable to obtain due to poor mental status    Vital Signs Last 24 Hrs  T(C): 36.5 (06 Nov 2023 10:03), Max: 37.4 (05 Nov 2023 13:36)  T(F): 97.7 (06 Nov 2023 10:03), Max: 99.3 (05 Nov 2023 13:36)  HR: 65 (06 Nov 2023 10:03) (65 - 98)  BP: 101/60 (06 Nov 2023 10:03) (94/61 - 111/74)  RR: 18 (06 Nov 2023 10:03) (16 - 20)  SpO2: 99% (06 Nov 2023 10:03) (94% - 100%)    Parameters below as of 06 Nov 2023 10:03  Patient On (Oxygen Delivery Method): room air      PHYSICAL EXAM:  GENERAL: NAD  HEAD:  Atraumatic, Normocephalic  EYES: EOMI, conjunctiva and sclera clear  ENMT: Moist mucous membranes  NECK: Supple, No JVD  RESPIRATORY: Clear to auscultation bilaterally; No rales, rhonchi, wheezing, or rubs  CARDIOVASCULAR: Regular rate and rhythm; No murmurs, rubs, or gallops  GASTROINTESTINAL: Soft, Nontender, Nondistended; Bowel sounds present  EXTREMITIES:  2+ Peripheral Pulses, No clubbing, cyanosis, or edema  NERVOUS SYSTEM:  Alert & Oriented X3; LIMITED range of motion secondary to discomfort.   SKIN: Dressing Clean dry and intact. Drain with serosanguinous output.     LABS:                        8.8    6.79  )-----------( 287      ( 06 Nov 2023 05:21 )             26.9     Hemoglobin: 8.8 g/dL (11-06 @ 05:21)  Hemoglobin: 8.3 g/dL (11-05 @ 05:00)  Hemoglobin: 8.1 g/dL (11-04 @ 05:39)  Hemoglobin: 8.3 g/dL (11-03 @ 05:25)  Hemoglobin: 8.5 g/dL (11-02 @ 02:25)    11-06    136  |  99  |  14  ----------------------------<  100<H>  4.5   |  28  |  0.72    Ca    9.0      06 Nov 2023 05:21        Urinalysis Basic - ( 06 Nov 2023 05:21 )    Color: x / Appearance: x / SG: x / pH: x  Gluc: 100 mg/dL / Ketone: x  / Bili: x / Urobili: x   Blood: x / Protein: x / Nitrite: x   Leuk Esterase: x / RBC: x / WBC x   Sq Epi: x / Non Sq Epi: x / Bacteria: x      Microbiology Culture Results:   No growth at 4 days (11-01 @ 16:15)  Culture Results:   No growth at 4 days (11-01 @ 16:15)      RADIOLOGY & ADDITIONAL STUDIES:  < from: Xray Chest 1 View- PORTABLE-Urgent (Xray Chest 1 View- PORTABLE-Urgent .) (11.04.23 @ 16:10) >  FINDINGS:  Cervical hardware appears intact.  Cardiomegaly.  Right lower lobe opacity.  There is no pneumothorax or pleural effusion.  No acute bony abnormality.    IMPRESSION:  Right lower lobe opacity which may represent atelectasis/pneumonia.    < end of copied text >  < from: CT Lumbar Spine No Cont (11.02.23 @ 21:09) >  Comparison is made with the prior CT of 7/29/2023    In the interval since the previous exam there has been a laminectomy from   L1 through L4 with transpedicular screws identified L1-L5 with connecting   rods. Additional screws are identified sacrum extending to the iliac   bones. There is disc space narrowing at L4-5 with endplate sclerosis   similar to the prior examination. Immature bony fusion mass is identified   as well as a drain in place. There is no hardware fracture. Hardware is   in good position. There are no lucencies surrounding the hardware to   suggest loosening or infection. Expected postoperative changes.      IMPRESSION: L1-L4 laminectomies with transpedicular screws and connecting   rods and immature bony fusion mass as well as sacral to iliac fusion   expected postoperative changes..    < end of copied text >      EKG:   Personally Reviewed:  [x] YES     Imaging:   Xray chest Personally Reviewed:  [x] YES               [x ] Consultant(s) Notes Reviewed  [x] Care Discussed with Consultants/Other Providers: Ortho PA - discussed pain management

## 2023-11-06 NOTE — CONSULT NOTE ADULT - TIME BILLING

## 2023-11-06 NOTE — PROGRESS NOTE ADULT - SUBJECTIVE AND OBJECTIVE BOX
Orthopaedic Surgery Progress Note    Subjective:   Patient seen and examined  No acute events overnight  Patient reports pain control could be improved    Objective:  T(C): 36.4 (11-06-23 @ 05:30), Max: 37.4 (11-05-23 @ 13:36)  HR: 80 (11-06-23 @ 05:30) (80 - 98)  BP: 101/63 (11-06-23 @ 05:30) (94/61 - 117/77)  RR: 16 (11-06-23 @ 05:30) (16 - 20)  SpO2: 100% (11-06-23 @ 05:30) (94% - 100%)  Wt(kg): --    11-04 @ 08:01  -  11-05 @ 07:00  --------------------------------------------------------  IN: 360 mL / OUT: 2885 mL / NET: -2525 mL    11-05 @ 07:01  -  11-06 @ 06:37  --------------------------------------------------------  IN: 0 mL / OUT: 4149 mL / NET: -4149 mL        Physical Exam:  General: NAD; resting comfrotably in bed  Resp: non labored  BACK:          Dressings clean, dry, and intact         Drain intact and functioning w/ serosanguinous output  Motor:                   C5                C6              C7               C8           T1   R            5/5                5/5            5/5             5/5          5/5  L             3/5               3/5             3/5             3/5          3/5                L2             L3             L4               L5            S1  R         5/5           5/5          5/5             5/5           5/5  L          2/5          2/5           2/5             1/5           4/5    Sensory:            C5         C6         C7      C8       T1        (0=absent, 1=impaired, 2=normal, NT=not testable)  R         2            2           2        2         2  L          2            2           2        2         2               L2          L3         L4      L5       S1         (0=absent, 1=impaired, 2=normal, NT=not testable)  R         2            2            2        2        2  L          2            2           2        2         2                            8.3    6.89  )-----------( 242      ( 05 Nov 2023 05:00 )             26.0     11-05    137  |  97<L>  |  13  ----------------------------<  104<H>  4.7   |  29  |  0.75    Ca    9.0      05 Nov 2023 05:00        Urinalysis Basic - ( 05 Nov 2023 05:00 )    Color: x / Appearance: x / SG: x / pH: x  Gluc: 104 mg/dL / Ketone: x  / Bili: x / Urobili: x   Blood: x / Protein: x / Nitrite: x   Leuk Esterase: x / RBC: x / WBC x   Sq Epi: x / Non Sq Epi: x / Bacteria: x

## 2023-11-06 NOTE — CONSULT NOTE ADULT - PROBLEM SELECTOR RECOMMENDATION 4
Non obstructive CAD on cath 09/2023   - c/w atorvastatin daily. followed by cardiologist, aspirin therapy once safe.

## 2023-11-06 NOTE — PROVIDER CONTACT NOTE (OTHER) - ACTION/TREATMENT ORDERED:
Provider ordered IV tylenol, chest x-ray, EKG, blood culture, RVP, and more blood works. Order carried out.
CXR order, no new orders at this time

## 2023-11-06 NOTE — CONSULT NOTE ADULT - PROBLEM SELECTOR RECOMMENDATION 6
- Likely from blood loss. Add multivitamin daily. Monitoring for signs of bleeding. Follow up outpatient. Maintain active type and screen.   - Has yearly FOBT which he states was negative, no recent colonoscopy.

## 2023-11-06 NOTE — PROVIDER CONTACT NOTE (OTHER) - ASSESSMENT
Pt A&Ox4, on RA. no acute distress noted
Pt. A&OX4. On room air, O2 administered PRN. Pt. c/o chills, note shivering, temp was high.

## 2023-11-06 NOTE — PROGRESS NOTE ADULT - ASSESSMENT
A/P: 64y/o Male s/p L1-pelvis PSF, L1-5 decompression on 10/31    Plan:  - DVT ppx - SQH  - LSO brace  - Multimodal Pain control  - Ancef while drains are in  - Monitor YOON output  - Weight bearing status: WBAT  - PT/OT  - AFO LLE   - Dispo: [facility]

## 2023-11-06 NOTE — CONSULT NOTE ADULT - PROBLEM SELECTOR RECOMMENDATION 9
on10/31 s/p L1-pelvis PSF, L1-5 decompression.. no complications.  Pain control as per surgery team/pain medicine consultant. Patient states that oxycontin 15mg ER BID wears off.   PT

## 2023-11-06 NOTE — CONSULT NOTE ADULT - PROBLEM SELECTOR RECOMMENDATION 7
DVT prophylaxis - heparin sub q   Added senna and miralax for constipation while on opiods.   Fall risk precautions.  Aspiration precautions.  Dispo rehab

## 2023-11-06 NOTE — CONSULT NOTE ADULT - PROBLEM SELECTOR RECOMMENDATION 5
RLL atelectasis - no elevated wbc, dyspnea, cough, or fever. No signs of pneumonia at this time. Incentive spirometry encouraged.  - Aspiration precautions.

## 2023-11-06 NOTE — PROVIDER CONTACT NOTE (OTHER) - BACKGROUND
s/p L1-pelvis PSF, L1-5 decompression on 10/31
Pt is a with 64y/o Male s/p L1-pelvis PSF, L1-5 decompression on 10/31

## 2023-11-06 NOTE — PROGRESS NOTE ADULT - SUBJECTIVE AND OBJECTIVE BOX
Subjective: Patient seen and examined. No new events except as noted.     SUBJECTIVE/ROS:  nad      MEDICATIONS:  MEDICATIONS  (STANDING):  amLODIPine   Tablet 10 milliGRAM(s) Oral daily  atorvastatin 20 milliGRAM(s) Oral at bedtime  gabapentin 800 milliGRAM(s) Oral three times a day  heparin   Injectable 5000 Unit(s) SubCutaneous every 8 hours  lidocaine   4% Patch 1 Patch Transdermal every 24 hours  oxyCODONE  ER Tablet 15 milliGRAM(s) Oral every 12 hours  sodium chloride 0.9% lock flush 3 milliLiter(s) IV Push every 8 hours  tiZANidine 2 milliGRAM(s) Oral every 6 hours      PHYSICAL EXAM:  T(C): 36.4 (11-06-23 @ 05:30), Max: 37.4 (11-05-23 @ 13:36)  HR: 80 (11-06-23 @ 05:30) (80 - 98)  BP: 111/74 (11-06-23 @ 06:30) (94/61 - 117/77)  RR: 16 (11-06-23 @ 05:30) (16 - 20)  SpO2: 100% (11-06-23 @ 05:30) (94% - 100%)  Wt(kg): --  I&O's Summary    05 Nov 2023 07:01  -  06 Nov 2023 07:00  --------------------------------------------------------  IN: 0 mL / OUT: 4149 mL / NET: -4149 mL            JVP: Normal  Neck: supple  Lung: clear   CV: S1 S2 , Murmur:  Abd: soft  Ext: No edema  neuro: Awake / alert  Psych: flat affect  Skin: normal``    LABS/DATA:    CARDIAC MARKERS:                                8.8    6.79  )-----------( 287      ( 06 Nov 2023 05:21 )             26.9     11-05    137  |  97<L>  |  13  ----------------------------<  104<H>  4.7   |  29  |  0.75    Ca    9.0      05 Nov 2023 05:00      proBNP:   Lipid Profile:   HgA1c:   TSH:     TELE:  EKG:

## 2023-11-06 NOTE — CONSULT NOTE ADULT - ASSESSMENT
#Spinal surgery - on 10/31 s/p L1-pelvis PSF, L1-5 decompression.. no complications. pain control as per surgery team/pain medicine consultant .     #Anemia - Likely from blood loss. Add multivitamin daily. Monitoring for signs of bleeding. Follow up outpatient. Maintain active type and screen.     #RLL atelectasis - no elevated wbc, dyspnea, cough, or fever. No signs of pneumonia at this time. Incentive spirometry encouraged.      #Non obstructive CAD - c/w atorvastatin daily. followed by cardiologist, aspirin therapy once safe.     #DVT prophylaxis -  Mr. 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

## 2023-11-06 NOTE — CONSULT NOTE ADULT - PROBLEM SELECTOR RECOMMENDATION 3
Currently on oxycontin, oxycodone, percocet, tizandidine, gabapentin.   Management as per pain management,     Was on Nucenta as outpatient which ran out around 1 months and half ago.

## 2023-11-07 DIAGNOSIS — R65.10 SYSTEMIC INFLAMMATORY RESPONSE SYNDROME (SIRS) OF NON-INFECTIOUS ORIGIN WITHOUT ACUTE ORGAN DYSFUNCTION: ICD-10-CM

## 2023-11-07 LAB
BASOPHILS # BLD AUTO: 0.08 K/UL — SIGNIFICANT CHANGE UP (ref 0–0.2)
BASOPHILS # BLD AUTO: 0.08 K/UL — SIGNIFICANT CHANGE UP (ref 0–0.2)
BASOPHILS NFR BLD AUTO: 0.8 % — SIGNIFICANT CHANGE UP (ref 0–2)
BASOPHILS NFR BLD AUTO: 0.8 % — SIGNIFICANT CHANGE UP (ref 0–2)
EOSINOPHIL # BLD AUTO: 0.09 K/UL — SIGNIFICANT CHANGE UP (ref 0–0.5)
EOSINOPHIL # BLD AUTO: 0.09 K/UL — SIGNIFICANT CHANGE UP (ref 0–0.5)
EOSINOPHIL NFR BLD AUTO: 0.9 % — SIGNIFICANT CHANGE UP (ref 0–6)
EOSINOPHIL NFR BLD AUTO: 0.9 % — SIGNIFICANT CHANGE UP (ref 0–6)
HCT VFR BLD CALC: 27.7 % — LOW (ref 39–50)
HCT VFR BLD CALC: 27.7 % — LOW (ref 39–50)
HGB BLD-MCNC: 9.3 G/DL — LOW (ref 13–17)
HGB BLD-MCNC: 9.3 G/DL — LOW (ref 13–17)
IANC: 6.21 K/UL — SIGNIFICANT CHANGE UP (ref 1.8–7.4)
IANC: 6.21 K/UL — SIGNIFICANT CHANGE UP (ref 1.8–7.4)
IMM GRANULOCYTES NFR BLD AUTO: 0.8 % — SIGNIFICANT CHANGE UP (ref 0–0.9)
IMM GRANULOCYTES NFR BLD AUTO: 0.8 % — SIGNIFICANT CHANGE UP (ref 0–0.9)
LYMPHOCYTES # BLD AUTO: 2.34 K/UL — SIGNIFICANT CHANGE UP (ref 1–3.3)
LYMPHOCYTES # BLD AUTO: 2.34 K/UL — SIGNIFICANT CHANGE UP (ref 1–3.3)
LYMPHOCYTES # BLD AUTO: 23.7 % — SIGNIFICANT CHANGE UP (ref 13–44)
LYMPHOCYTES # BLD AUTO: 23.7 % — SIGNIFICANT CHANGE UP (ref 13–44)
MCHC RBC-ENTMCNC: 28.4 PG — SIGNIFICANT CHANGE UP (ref 27–34)
MCHC RBC-ENTMCNC: 28.4 PG — SIGNIFICANT CHANGE UP (ref 27–34)
MCHC RBC-ENTMCNC: 33.6 GM/DL — SIGNIFICANT CHANGE UP (ref 32–36)
MCHC RBC-ENTMCNC: 33.6 GM/DL — SIGNIFICANT CHANGE UP (ref 32–36)
MCV RBC AUTO: 84.7 FL — SIGNIFICANT CHANGE UP (ref 80–100)
MCV RBC AUTO: 84.7 FL — SIGNIFICANT CHANGE UP (ref 80–100)
MONOCYTES # BLD AUTO: 1.08 K/UL — HIGH (ref 0–0.9)
MONOCYTES # BLD AUTO: 1.08 K/UL — HIGH (ref 0–0.9)
MONOCYTES NFR BLD AUTO: 10.9 % — SIGNIFICANT CHANGE UP (ref 2–14)
MONOCYTES NFR BLD AUTO: 10.9 % — SIGNIFICANT CHANGE UP (ref 2–14)
NEUTROPHILS # BLD AUTO: 6.21 K/UL — SIGNIFICANT CHANGE UP (ref 1.8–7.4)
NEUTROPHILS # BLD AUTO: 6.21 K/UL — SIGNIFICANT CHANGE UP (ref 1.8–7.4)
NEUTROPHILS NFR BLD AUTO: 62.9 % — SIGNIFICANT CHANGE UP (ref 43–77)
NEUTROPHILS NFR BLD AUTO: 62.9 % — SIGNIFICANT CHANGE UP (ref 43–77)
NRBC # BLD: 0 /100 WBCS — SIGNIFICANT CHANGE UP (ref 0–0)
NRBC # BLD: 0 /100 WBCS — SIGNIFICANT CHANGE UP (ref 0–0)
NRBC # FLD: 0 K/UL — SIGNIFICANT CHANGE UP (ref 0–0)
NRBC # FLD: 0 K/UL — SIGNIFICANT CHANGE UP (ref 0–0)
PLATELET # BLD AUTO: 412 K/UL — HIGH (ref 150–400)
PLATELET # BLD AUTO: 412 K/UL — HIGH (ref 150–400)
PROCALCITONIN SERPL-MCNC: 0.55 NG/ML — HIGH (ref 0.02–0.1)
PROCALCITONIN SERPL-MCNC: 0.55 NG/ML — HIGH (ref 0.02–0.1)
RBC # BLD: 3.27 M/UL — LOW (ref 4.2–5.8)
RBC # BLD: 3.27 M/UL — LOW (ref 4.2–5.8)
RBC # FLD: 13.7 % — SIGNIFICANT CHANGE UP (ref 10.3–14.5)
RBC # FLD: 13.7 % — SIGNIFICANT CHANGE UP (ref 10.3–14.5)
WBC # BLD: 9.88 K/UL — SIGNIFICANT CHANGE UP (ref 3.8–10.5)
WBC # BLD: 9.88 K/UL — SIGNIFICANT CHANGE UP (ref 3.8–10.5)
WBC # FLD AUTO: 9.88 K/UL — SIGNIFICANT CHANGE UP (ref 3.8–10.5)
WBC # FLD AUTO: 9.88 K/UL — SIGNIFICANT CHANGE UP (ref 3.8–10.5)

## 2023-11-07 PROCEDURE — 99233 SBSQ HOSP IP/OBS HIGH 50: CPT

## 2023-11-07 RX ORDER — MORPHINE SULFATE 50 MG/1
6 CAPSULE, EXTENDED RELEASE ORAL ONCE
Refills: 0 | Status: DISCONTINUED | OUTPATIENT
Start: 2023-11-07 | End: 2023-11-07

## 2023-11-07 RX ORDER — TIZANIDINE 4 MG/1
2 TABLET ORAL EVERY 6 HOURS
Refills: 0 | Status: DISCONTINUED | OUTPATIENT
Start: 2023-11-07 | End: 2023-11-10

## 2023-11-07 RX ORDER — ACETAMINOPHEN 500 MG
975 TABLET ORAL EVERY 8 HOURS
Refills: 0 | Status: DISCONTINUED | OUTPATIENT
Start: 2023-11-07 | End: 2023-11-10

## 2023-11-07 RX ORDER — LIDOCAINE 4 G/100G
1 CREAM TOPICAL DAILY
Refills: 0 | Status: DISCONTINUED | OUTPATIENT
Start: 2023-11-07 | End: 2023-11-10

## 2023-11-07 RX ORDER — OXYCODONE HYDROCHLORIDE 5 MG/1
15 TABLET ORAL
Refills: 0 | Status: DISCONTINUED | OUTPATIENT
Start: 2023-11-07 | End: 2023-11-10

## 2023-11-07 RX ORDER — OXYCODONE HYDROCHLORIDE 5 MG/1
15 TABLET ORAL EVERY 4 HOURS
Refills: 0 | Status: DISCONTINUED | OUTPATIENT
Start: 2023-11-07 | End: 2023-11-07

## 2023-11-07 RX ORDER — MORPHINE SULFATE 50 MG/1
2 CAPSULE, EXTENDED RELEASE ORAL ONCE
Refills: 0 | Status: DISCONTINUED | OUTPATIENT
Start: 2023-11-07 | End: 2023-11-07

## 2023-11-07 RX ORDER — OXYCODONE HYDROCHLORIDE 5 MG/1
10 TABLET ORAL
Refills: 0 | Status: DISCONTINUED | OUTPATIENT
Start: 2023-11-07 | End: 2023-11-10

## 2023-11-07 RX ORDER — PIPERACILLIN AND TAZOBACTAM 4; .5 G/20ML; G/20ML
3.38 INJECTION, POWDER, LYOPHILIZED, FOR SOLUTION INTRAVENOUS ONCE
Refills: 0 | Status: COMPLETED | OUTPATIENT
Start: 2023-11-08 | End: 2023-11-08

## 2023-11-07 RX ORDER — MORPHINE SULFATE 50 MG/1
4 CAPSULE, EXTENDED RELEASE ORAL EVERY 4 HOURS
Refills: 0 | Status: DISCONTINUED | OUTPATIENT
Start: 2023-11-07 | End: 2023-11-10

## 2023-11-07 RX ORDER — PIPERACILLIN AND TAZOBACTAM 4; .5 G/20ML; G/20ML
3.38 INJECTION, POWDER, LYOPHILIZED, FOR SOLUTION INTRAVENOUS ONCE
Refills: 0 | Status: COMPLETED | OUTPATIENT
Start: 2023-11-07 | End: 2023-11-07

## 2023-11-07 RX ORDER — PIPERACILLIN AND TAZOBACTAM 4; .5 G/20ML; G/20ML
3.38 INJECTION, POWDER, LYOPHILIZED, FOR SOLUTION INTRAVENOUS EVERY 8 HOURS
Refills: 0 | Status: DISCONTINUED | OUTPATIENT
Start: 2023-11-08 | End: 2023-11-10

## 2023-11-07 RX ORDER — OXYCODONE HYDROCHLORIDE 5 MG/1
10 TABLET ORAL EVERY 4 HOURS
Refills: 0 | Status: DISCONTINUED | OUTPATIENT
Start: 2023-11-07 | End: 2023-11-07

## 2023-11-07 RX ADMIN — Medication 400 MILLIGRAM(S): at 06:34

## 2023-11-07 RX ADMIN — OXYCODONE HYDROCHLORIDE 10 MILLIGRAM(S): 5 TABLET ORAL at 01:30

## 2023-11-07 RX ADMIN — OXYCODONE HYDROCHLORIDE 10 MILLIGRAM(S): 5 TABLET ORAL at 00:39

## 2023-11-07 RX ADMIN — OXYCODONE HYDROCHLORIDE 10 MILLIGRAM(S): 5 TABLET ORAL at 04:30

## 2023-11-07 RX ADMIN — OXYCODONE HYDROCHLORIDE 15 MILLIGRAM(S): 5 TABLET ORAL at 16:40

## 2023-11-07 RX ADMIN — Medication 975 MILLIGRAM(S): at 14:12

## 2023-11-07 RX ADMIN — GABAPENTIN 800 MILLIGRAM(S): 400 CAPSULE ORAL at 14:12

## 2023-11-07 RX ADMIN — Medication 1000 MILLIGRAM(S): at 06:50

## 2023-11-07 RX ADMIN — MORPHINE SULFATE 6 MILLIGRAM(S): 50 CAPSULE, EXTENDED RELEASE ORAL at 12:16

## 2023-11-07 RX ADMIN — OXYCODONE HYDROCHLORIDE 15 MILLIGRAM(S): 5 TABLET ORAL at 22:01

## 2023-11-07 RX ADMIN — SODIUM CHLORIDE 3 MILLILITER(S): 9 INJECTION INTRAMUSCULAR; INTRAVENOUS; SUBCUTANEOUS at 22:00

## 2023-11-07 RX ADMIN — TIZANIDINE 2 MILLIGRAM(S): 4 TABLET ORAL at 14:12

## 2023-11-07 RX ADMIN — GABAPENTIN 800 MILLIGRAM(S): 400 CAPSULE ORAL at 22:00

## 2023-11-07 RX ADMIN — Medication 975 MILLIGRAM(S): at 22:00

## 2023-11-07 RX ADMIN — OXYCODONE HYDROCHLORIDE 15 MILLIGRAM(S): 5 TABLET ORAL at 05:26

## 2023-11-07 RX ADMIN — OXYCODONE HYDROCHLORIDE 10 MILLIGRAM(S): 5 TABLET ORAL at 06:34

## 2023-11-07 RX ADMIN — OXYCODONE HYDROCHLORIDE 15 MILLIGRAM(S): 5 TABLET ORAL at 17:40

## 2023-11-07 RX ADMIN — HEPARIN SODIUM 5000 UNIT(S): 5000 INJECTION INTRAVENOUS; SUBCUTANEOUS at 22:00

## 2023-11-07 RX ADMIN — PIPERACILLIN AND TAZOBACTAM 200 GRAM(S): 4; .5 INJECTION, POWDER, LYOPHILIZED, FOR SOLUTION INTRAVENOUS at 14:11

## 2023-11-07 RX ADMIN — SODIUM CHLORIDE 3 MILLILITER(S): 9 INJECTION INTRAMUSCULAR; INTRAVENOUS; SUBCUTANEOUS at 14:28

## 2023-11-07 RX ADMIN — MORPHINE SULFATE 6 MILLIGRAM(S): 50 CAPSULE, EXTENDED RELEASE ORAL at 13:15

## 2023-11-07 RX ADMIN — OXYCODONE HYDROCHLORIDE 15 MILLIGRAM(S): 5 TABLET ORAL at 23:00

## 2023-11-07 RX ADMIN — HEPARIN SODIUM 5000 UNIT(S): 5000 INJECTION INTRAVENOUS; SUBCUTANEOUS at 14:12

## 2023-11-07 RX ADMIN — OXYCODONE HYDROCHLORIDE 15 MILLIGRAM(S): 5 TABLET ORAL at 06:20

## 2023-11-07 RX ADMIN — SODIUM CHLORIDE 3 MILLILITER(S): 9 INJECTION INTRAMUSCULAR; INTRAVENOUS; SUBCUTANEOUS at 06:10

## 2023-11-07 RX ADMIN — PIPERACILLIN AND TAZOBACTAM 25 GRAM(S): 4; .5 INJECTION, POWDER, LYOPHILIZED, FOR SOLUTION INTRAVENOUS at 17:47

## 2023-11-07 RX ADMIN — Medication 1 TABLET(S): at 12:16

## 2023-11-07 RX ADMIN — HEPARIN SODIUM 5000 UNIT(S): 5000 INJECTION INTRAVENOUS; SUBCUTANEOUS at 05:26

## 2023-11-07 RX ADMIN — GABAPENTIN 800 MILLIGRAM(S): 400 CAPSULE ORAL at 05:26

## 2023-11-07 RX ADMIN — OXYCODONE HYDROCHLORIDE 15 MILLIGRAM(S): 5 TABLET ORAL at 11:40

## 2023-11-07 RX ADMIN — SENNA PLUS 2 TABLET(S): 8.6 TABLET ORAL at 22:00

## 2023-11-07 RX ADMIN — TIZANIDINE 2 MILLIGRAM(S): 4 TABLET ORAL at 22:00

## 2023-11-07 RX ADMIN — Medication 975 MILLIGRAM(S): at 15:00

## 2023-11-07 RX ADMIN — OXYCODONE HYDROCHLORIDE 10 MILLIGRAM(S): 5 TABLET ORAL at 03:39

## 2023-11-07 RX ADMIN — TIZANIDINE 2 MILLIGRAM(S): 4 TABLET ORAL at 08:23

## 2023-11-07 RX ADMIN — ATORVASTATIN CALCIUM 20 MILLIGRAM(S): 80 TABLET, FILM COATED ORAL at 22:00

## 2023-11-07 RX ADMIN — OXYCODONE HYDROCHLORIDE 15 MILLIGRAM(S): 5 TABLET ORAL at 10:44

## 2023-11-07 RX ADMIN — OXYCODONE HYDROCHLORIDE 10 MILLIGRAM(S): 5 TABLET ORAL at 07:15

## 2023-11-07 NOTE — PROGRESS NOTE ADULT - SUBJECTIVE AND OBJECTIVE BOX
Follow up consult for Acute Pain Management     SUBJECTIVE:  The patient states that the Oxycodone 10mg is not enough, and the Oxycodone 15mg works only a little.  Patient is complaining of sharp pain in his lower back, a burning pain in his left leg and anterior part of his right foot that is aggravated by movement.  Patient would like to have Percocet 15mg, but it was explained to the patient that he would be getting 975 of Tylenol every time and he would not be able to get it every 3 hours as needed because it would surpass the daily Tylenol allowance of 4000mg.  Patient states he verbally understands, however he refuses to have any Dilaudid as it does not help.  Patient also refuses any other muscle relaxant, except for Tizanidine and he is willing to try Morphine.    		  OBJECTIVE:  Patient is ambulating with PT to the chair, while groaning in pain     Pain Score:   (X) Refer to pain scores    Therapy:	[ ] IV PCA	[ ] Epidural   [ ] s/p Spinal Opioid	[ ] Peripheral nerve block  (x) PRN Oral/IV opioids and or Adjuvant non-opioid medications  	  Vital Signs Last 24 Hrs  T(C): 36.9 (07 Nov 2023 09:40), Max: 38.1 (06 Nov 2023 21:20)  T(F): 98.4 (07 Nov 2023 09:40), Max: 100.5 (06 Nov 2023 21:20)  HR: 91 (07 Nov 2023 09:40) (82 - 97)  BP: 115/86 (07 Nov 2023 09:40) (103/63 - 127/66)  BP(mean): --  RR: 17 (07 Nov 2023 09:40) (17 - 18)  SpO2: 100% (07 Nov 2023 09:40) (96% - 100%)    Parameters below as of 07 Nov 2023 09:40  Patient On (Oxygen Delivery Method): room air        ( x) Alert & Oriented     ( ) No motor/sensory block     ( ) Nausea     ( ) Pruritis     ( ) Headache    ASSESSMENT/ PLAN    Therapy to  be:	[x ] Continue   [ ] Discontinued      Documentation and Verification of current medications:   [X] Done	[ ] Not done, not elligible    Comments:  Discussed patient with Anesthesia pain attending.  Ordered and given IV Morphine 6mg x1, which offered some relief, but also made him slightly dizzy.  Will reduce dosage of IV Morphine to 4mg, changed Tizanidine to standing with standing Tylenol. Continue current pain regimen. PRN Oral/IV opioids and/or Adjuvant non-opioid medication to be ordered at this point.  Pain service to sign off, no further recommendations for pain medications, at this time.  May call pain service if needed.    Progress Note written now but Patient was seen earlier.

## 2023-11-07 NOTE — PROGRESS NOTE ADULT - SUBJECTIVE AND OBJECTIVE BOX
Subjective: Patient seen and examined. No new events except as noted.     SUBJECTIVE/ROS:  nad      MEDICATIONS:  MEDICATIONS  (STANDING):  amLODIPine   Tablet 10 milliGRAM(s) Oral daily  atorvastatin 20 milliGRAM(s) Oral at bedtime  gabapentin 800 milliGRAM(s) Oral three times a day  heparin   Injectable 5000 Unit(s) SubCutaneous every 8 hours  lidocaine   4% Patch 1 Patch Transdermal every 24 hours  multivitamin 1 Tablet(s) Oral daily  oxyCODONE  ER Tablet 15 milliGRAM(s) Oral every 12 hours  senna 2 Tablet(s) Oral at bedtime  sodium chloride 0.9% lock flush 3 milliLiter(s) IV Push every 8 hours      PHYSICAL EXAM:  T(C): 37.6 (11-07-23 @ 05:25), Max: 38.8 (11-06-23 @ 13:54)  HR: 96 (11-07-23 @ 05:25) (65 - 100)  BP: 103/63 (11-07-23 @ 05:25) (101/60 - 127/66)  RR: 18 (11-07-23 @ 05:25) (18 - 20)  SpO2: 96% (11-07-23 @ 05:25) (93% - 100%)  Wt(kg): --  I&O's Summary    06 Nov 2023 07:01  -  07 Nov 2023 07:00  --------------------------------------------------------  IN: 0 mL / OUT: 3173.5 mL / NET: -3173.5 mL            JVP: Normal  Neck: supple  Lung: clear   CV: S1 S2 , Murmur:  Abd: soft  Ext: No edema  neuro: Awake / alert  Psych: flat affect  Skin: normal``    LABS/DATA:    CARDIAC MARKERS:                                9.0    9.13  )-----------( 331      ( 06 Nov 2023 14:20 )             26.9     11-06    130<L>  |  94<L>  |  16  ----------------------------<  138<H>  4.8   |  28  |  0.96    Ca    8.9      06 Nov 2023 14:20  Phos  2.7     11-06  Mg     2.10     11-06      proBNP:   Lipid Profile:   HgA1c:   TSH:     TELE:  EKG:

## 2023-11-07 NOTE — PROGRESS NOTE ADULT - ASSESSMENT
A/P: 62y/o Male s/p L1-pelvis PSF, L1-5 decompression on 10/31    Plan:  - FU fever workup- negative thus far  - DVT ppx - SQH  - LSO brace  - Multimodal Pain control  - Monitor YOON output  - Weight bearing status: WBAT  - PT/OT  - AFO LLE

## 2023-11-07 NOTE — PROGRESS NOTE ADULT - SUBJECTIVE AND OBJECTIVE BOX
Orthopaedic Surgery Progress Note    Subjective:   Patient seen and examined. No acute events overnight. Continues to complain of inadequate pain control. Patient had a fever overnight, workup is negative so far.     Objective:  T(C): 37.6 (23 @ 05:25), Max: 38.8 (23 @ 13:54)  HR: 96 (23 @ 05:25) (65 - 100)  BP: 103/63 (23 @ 05:25) (101/60 - 127/66)  RR: 18 (23 @ 05:25) (18 - 20)  SpO2: 96% (23 @ 05:25) (93% - 100%)  Wt(kg): --     @ 07:01  -   @ 07:00  --------------------------------------------------------  IN: 0 mL / OUT: 4149 mL / NET: -4149 mL     @ 07:01  -   @ 06:30  --------------------------------------------------------  IN: 0 mL / OUT: 3173.5 mL / NET: -3173.5 mL      Physical Exam:  General: NAD; resting comfrotably in bed  Resp: non labored  BACK:          Dressings clean, dry, and intact         Drain intact and functioning w/ serosanguinous output  Motor:                   C5                C6              C7               C8           T1   R            5/5                5/5            5/5             5/5          5/5  L             3/5               3/5             3/5             3/5          3/5                L2             L3             L4               L5            S1  R         5/5           5/5          5/5             5/5           5/5  L          2/5          2/5           2/5             1/5           4/5    Sensory:            C5         C6         C7      C8       T1        (0=absent, 1=impaired, 2=normal, NT=not testable)  R         2            2           2        2         2  L          2            2           2        2         2               L2          L3         L4      L5       S1         (0=absent, 1=impaired, 2=normal, NT=not testable)  R         2            2            2        2        2  L          2            2           2        2         2                            9.0    9.13  )-----------( 331      ( 2023 14:20 )             26.9     11-06    130<L>  |  94<L>  |  16  ----------------------------<  138<H>  4.8   |  28  |  0.96    Ca    8.9      2023 14:20  Phos  2.7     11-06  Mg     2.10     11-06        Urinalysis Basic - ( 2023 17:35 )    Color: Yellow / Appearance: Clear / S.020 / pH: x  Gluc: x / Ketone: Negative mg/dL  / Bili: Negative / Urobili: 1.0 mg/dL   Blood: x / Protein: Trace mg/dL / Nitrite: Negative   Leuk Esterase: Negative / RBC: x / WBC x   Sq Epi: x / Non Sq Epi: x / Bacteria: x

## 2023-11-07 NOTE — PROGRESS NOTE ADULT - ASSESSMENT
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

## 2023-11-07 NOTE — PROGRESS NOTE ADULT - SUBJECTIVE AND OBJECTIVE BOX
CHIEF COMPLAINT: f/u fever    SUBJECTIVE / OVERNIGHT EVENTS: Patient seen and examined. Patient had another low grade temperature yesterday evening at 21:20. Continues to report some pain control issues.     MEDICATIONS  (STANDING):  acetaminophen     Tablet .. 975 milliGRAM(s) Oral every 8 hours  amLODIPine   Tablet 10 milliGRAM(s) Oral daily  atorvastatin 20 milliGRAM(s) Oral at bedtime  gabapentin 800 milliGRAM(s) Oral three times a day  heparin   Injectable 5000 Unit(s) SubCutaneous every 8 hours  lidocaine   4% Patch 1 Patch Transdermal daily  lidocaine   4% Patch 1 Patch Transdermal daily  morphine  - Injectable 2 milliGRAM(s) IV Push once  multivitamin 1 Tablet(s) Oral daily  senna 2 Tablet(s) Oral at bedtime  sodium chloride 0.9% lock flush 3 milliLiter(s) IV Push every 8 hours  tiZANidine 2 milliGRAM(s) Oral every 6 hours    MEDICATIONS  (PRN):  albuterol    90 MICROgram(s) HFA Inhaler 2 Puff(s) Inhalation every 6 hours PRN Shortness of Breath and/or Wheezing  ALPRAZolam 0.5 milliGRAM(s) Oral at bedtime PRN anxiety  ondansetron Injectable 4 milliGRAM(s) IV Push every 6 hours PRN Nausea  oxyCODONE    IR 10 milliGRAM(s) Oral every 3 hours PRN Moderate Pain (4 - 6)  oxyCODONE    IR 15 milliGRAM(s) Oral every 3 hours PRN Severe Pain (7 - 10)  polyethylene glycol 3350 17 Gram(s) Oral every 12 hours PRN Constipation      VITALS:  T(F): 98.4 (23 @ 09:40), Max: 101.9 (23 @ 13:54)  HR: 91 (23 @ 09:40) (82 - 100)  BP: 115/86 (23 @ 09:40) (103/63 - 127/66)  RR: 17 (23 @ 09:40) (17 - 20)  SpO2: 100% (23 @ 09:40)    PHYSICAL EXAM:  GENERAL: NAD  CHEST/LUNG: Clear to auscultation bilaterally; No wheeze  HEART: Regular rate and rhythm; No murmurs, rubs, or gallops  ABDOMEN: Soft, Nontender, Nondistended; Bowel sounds present  EXTREMITIES:  2+ Peripheral Pulses, No clubbing, cyanosis, or edema  SKIN: Dressing C/D/I; +YOON drain     LABS:              9.3                  x    | x    | x            9.88  >-----------< 412     ------------------------< x                     27.7                 x    | x    | x                                            Ca x     Mg x     Ph x        Urinalysis Basic - ( 2023 17:35 )  Color: Yellow / Appearance: Clear / S.020 / pH: x  Gluc: x / Ketone: Negative mg/dL  / Bili: Negative / Urobili: 1.0 mg/dL   Blood: x / Protein: Trace mg/dL / Nitrite: Negative   Leuk Esterase: Negative / RBC: x / WBC x   Sq Epi: x / Non Sq Epi: x / Bacteria: x  Respiratory Viral Panel with COVID-19 by LALI (23 @ 14:54)   Rapid RVP Result: NotDetec  Culture - Blood (23 @ 16:15)   Specimen Source: .Blood Blood  Culture Results: No growth at 5 days  Culture Results: Culture - Blood (23 @ 16:15)   Specimen Source: .Blood Blood  Culture Results: No growth at 5 days    RADIOLOGY & ADDITIONAL TESTS:  Imaging Personally Reviewed: [x] Yes  < from: CT Angio Chest PE Protocol w/ IV Cont (23 @ 20:43) >  IMPRESSION: No pulmonary embolism detected.  Right basilar parenchymal opacification favored to represent atelectasis,   but consider infection in the appropriate clinical scenario.  < end of copied text >    [ ] Consultant(s) Notes Reviewed:  [x] Care Discussed with Consultants/Other Providers: Orthopedic PA - discussed fever work-up

## 2023-11-07 NOTE — PROGRESS NOTE ADULT - PROBLEM SELECTOR PLAN 2
-Pain well controlled; continue management and pain control per ortho recs with tylenol tid, oxycodone IR prn, zanaflex and neurontin  -c/w bowel regimen: added senna and miralax for constipation while on opiods.   -c/w incentive spirometer use  -c/w PT

## 2023-11-08 DIAGNOSIS — E87.1 HYPO-OSMOLALITY AND HYPONATREMIA: ICD-10-CM

## 2023-11-08 LAB
BASOPHILS # BLD AUTO: 0.07 K/UL — SIGNIFICANT CHANGE UP (ref 0–0.2)
BASOPHILS # BLD AUTO: 0.07 K/UL — SIGNIFICANT CHANGE UP (ref 0–0.2)
BASOPHILS NFR BLD AUTO: 0.8 % — SIGNIFICANT CHANGE UP (ref 0–2)
BASOPHILS NFR BLD AUTO: 0.8 % — SIGNIFICANT CHANGE UP (ref 0–2)
EOSINOPHIL # BLD AUTO: 0.19 K/UL — SIGNIFICANT CHANGE UP (ref 0–0.5)
EOSINOPHIL # BLD AUTO: 0.19 K/UL — SIGNIFICANT CHANGE UP (ref 0–0.5)
EOSINOPHIL NFR BLD AUTO: 2.1 % — SIGNIFICANT CHANGE UP (ref 0–6)
EOSINOPHIL NFR BLD AUTO: 2.1 % — SIGNIFICANT CHANGE UP (ref 0–6)
HCT VFR BLD CALC: 27.2 % — LOW (ref 39–50)
HCT VFR BLD CALC: 27.2 % — LOW (ref 39–50)
HGB BLD-MCNC: 8.7 G/DL — LOW (ref 13–17)
HGB BLD-MCNC: 8.7 G/DL — LOW (ref 13–17)
IANC: 5.49 K/UL — SIGNIFICANT CHANGE UP (ref 1.8–7.4)
IANC: 5.49 K/UL — SIGNIFICANT CHANGE UP (ref 1.8–7.4)
IMM GRANULOCYTES NFR BLD AUTO: 1 % — HIGH (ref 0–0.9)
IMM GRANULOCYTES NFR BLD AUTO: 1 % — HIGH (ref 0–0.9)
LYMPHOCYTES # BLD AUTO: 1.89 K/UL — SIGNIFICANT CHANGE UP (ref 1–3.3)
LYMPHOCYTES # BLD AUTO: 1.89 K/UL — SIGNIFICANT CHANGE UP (ref 1–3.3)
LYMPHOCYTES # BLD AUTO: 21.2 % — SIGNIFICANT CHANGE UP (ref 13–44)
LYMPHOCYTES # BLD AUTO: 21.2 % — SIGNIFICANT CHANGE UP (ref 13–44)
MCHC RBC-ENTMCNC: 28.3 PG — SIGNIFICANT CHANGE UP (ref 27–34)
MCHC RBC-ENTMCNC: 28.3 PG — SIGNIFICANT CHANGE UP (ref 27–34)
MCHC RBC-ENTMCNC: 32 GM/DL — SIGNIFICANT CHANGE UP (ref 32–36)
MCHC RBC-ENTMCNC: 32 GM/DL — SIGNIFICANT CHANGE UP (ref 32–36)
MCV RBC AUTO: 88.6 FL — SIGNIFICANT CHANGE UP (ref 80–100)
MCV RBC AUTO: 88.6 FL — SIGNIFICANT CHANGE UP (ref 80–100)
MONOCYTES # BLD AUTO: 1.2 K/UL — HIGH (ref 0–0.9)
MONOCYTES # BLD AUTO: 1.2 K/UL — HIGH (ref 0–0.9)
MONOCYTES NFR BLD AUTO: 13.4 % — SIGNIFICANT CHANGE UP (ref 2–14)
MONOCYTES NFR BLD AUTO: 13.4 % — SIGNIFICANT CHANGE UP (ref 2–14)
NEUTROPHILS # BLD AUTO: 5.49 K/UL — SIGNIFICANT CHANGE UP (ref 1.8–7.4)
NEUTROPHILS # BLD AUTO: 5.49 K/UL — SIGNIFICANT CHANGE UP (ref 1.8–7.4)
NEUTROPHILS NFR BLD AUTO: 61.5 % — SIGNIFICANT CHANGE UP (ref 43–77)
NEUTROPHILS NFR BLD AUTO: 61.5 % — SIGNIFICANT CHANGE UP (ref 43–77)
NRBC # BLD: 0 /100 WBCS — SIGNIFICANT CHANGE UP (ref 0–0)
NRBC # BLD: 0 /100 WBCS — SIGNIFICANT CHANGE UP (ref 0–0)
NRBC # FLD: 0 K/UL — SIGNIFICANT CHANGE UP (ref 0–0)
NRBC # FLD: 0 K/UL — SIGNIFICANT CHANGE UP (ref 0–0)
PLATELET # BLD AUTO: 375 K/UL — SIGNIFICANT CHANGE UP (ref 150–400)
PLATELET # BLD AUTO: 375 K/UL — SIGNIFICANT CHANGE UP (ref 150–400)
RBC # BLD: 3.07 M/UL — LOW (ref 4.2–5.8)
RBC # BLD: 3.07 M/UL — LOW (ref 4.2–5.8)
RBC # FLD: 14 % — SIGNIFICANT CHANGE UP (ref 10.3–14.5)
RBC # FLD: 14 % — SIGNIFICANT CHANGE UP (ref 10.3–14.5)
WBC # BLD: 8.93 K/UL — SIGNIFICANT CHANGE UP (ref 3.8–10.5)
WBC # BLD: 8.93 K/UL — SIGNIFICANT CHANGE UP (ref 3.8–10.5)
WBC # FLD AUTO: 8.93 K/UL — SIGNIFICANT CHANGE UP (ref 3.8–10.5)
WBC # FLD AUTO: 8.93 K/UL — SIGNIFICANT CHANGE UP (ref 3.8–10.5)

## 2023-11-08 PROCEDURE — 99233 SBSQ HOSP IP/OBS HIGH 50: CPT

## 2023-11-08 PROCEDURE — 99221 1ST HOSP IP/OBS SF/LOW 40: CPT

## 2023-11-08 RX ORDER — POLYETHYLENE GLYCOL 3350 17 G/17G
17 POWDER, FOR SOLUTION ORAL DAILY
Refills: 0 | Status: DISCONTINUED | OUTPATIENT
Start: 2023-11-08 | End: 2023-11-10

## 2023-11-08 RX ADMIN — Medication 975 MILLIGRAM(S): at 14:00

## 2023-11-08 RX ADMIN — HEPARIN SODIUM 5000 UNIT(S): 5000 INJECTION INTRAVENOUS; SUBCUTANEOUS at 13:08

## 2023-11-08 RX ADMIN — TIZANIDINE 2 MILLIGRAM(S): 4 TABLET ORAL at 10:26

## 2023-11-08 RX ADMIN — HEPARIN SODIUM 5000 UNIT(S): 5000 INJECTION INTRAVENOUS; SUBCUTANEOUS at 21:21

## 2023-11-08 RX ADMIN — SODIUM CHLORIDE 3 MILLILITER(S): 9 INJECTION INTRAMUSCULAR; INTRAVENOUS; SUBCUTANEOUS at 06:00

## 2023-11-08 RX ADMIN — OXYCODONE HYDROCHLORIDE 15 MILLIGRAM(S): 5 TABLET ORAL at 15:15

## 2023-11-08 RX ADMIN — OXYCODONE HYDROCHLORIDE 15 MILLIGRAM(S): 5 TABLET ORAL at 06:00

## 2023-11-08 RX ADMIN — OXYCODONE HYDROCHLORIDE 15 MILLIGRAM(S): 5 TABLET ORAL at 23:10

## 2023-11-08 RX ADMIN — ATORVASTATIN CALCIUM 20 MILLIGRAM(S): 80 TABLET, FILM COATED ORAL at 21:20

## 2023-11-08 RX ADMIN — GABAPENTIN 800 MILLIGRAM(S): 400 CAPSULE ORAL at 06:29

## 2023-11-08 RX ADMIN — OXYCODONE HYDROCHLORIDE 15 MILLIGRAM(S): 5 TABLET ORAL at 10:00

## 2023-11-08 RX ADMIN — OXYCODONE HYDROCHLORIDE 15 MILLIGRAM(S): 5 TABLET ORAL at 09:11

## 2023-11-08 RX ADMIN — SODIUM CHLORIDE 3 MILLILITER(S): 9 INJECTION INTRAMUSCULAR; INTRAVENOUS; SUBCUTANEOUS at 22:44

## 2023-11-08 RX ADMIN — Medication 975 MILLIGRAM(S): at 13:08

## 2023-11-08 RX ADMIN — PIPERACILLIN AND TAZOBACTAM 25 GRAM(S): 4; .5 INJECTION, POWDER, LYOPHILIZED, FOR SOLUTION INTRAVENOUS at 00:24

## 2023-11-08 RX ADMIN — Medication 975 MILLIGRAM(S): at 22:05

## 2023-11-08 RX ADMIN — OXYCODONE HYDROCHLORIDE 15 MILLIGRAM(S): 5 TABLET ORAL at 14:16

## 2023-11-08 RX ADMIN — Medication 975 MILLIGRAM(S): at 06:29

## 2023-11-08 RX ADMIN — Medication 1 TABLET(S): at 13:08

## 2023-11-08 RX ADMIN — SENNA PLUS 2 TABLET(S): 8.6 TABLET ORAL at 21:19

## 2023-11-08 RX ADMIN — OXYCODONE HYDROCHLORIDE 15 MILLIGRAM(S): 5 TABLET ORAL at 20:20

## 2023-11-08 RX ADMIN — OXYCODONE HYDROCHLORIDE 15 MILLIGRAM(S): 5 TABLET ORAL at 04:49

## 2023-11-08 RX ADMIN — HEPARIN SODIUM 5000 UNIT(S): 5000 INJECTION INTRAVENOUS; SUBCUTANEOUS at 06:29

## 2023-11-08 RX ADMIN — SODIUM CHLORIDE 3 MILLILITER(S): 9 INJECTION INTRAMUSCULAR; INTRAVENOUS; SUBCUTANEOUS at 13:10

## 2023-11-08 RX ADMIN — OXYCODONE HYDROCHLORIDE 15 MILLIGRAM(S): 5 TABLET ORAL at 19:22

## 2023-11-08 RX ADMIN — AMLODIPINE BESYLATE 10 MILLIGRAM(S): 2.5 TABLET ORAL at 06:29

## 2023-11-08 RX ADMIN — Medication 975 MILLIGRAM(S): at 21:20

## 2023-11-08 RX ADMIN — PIPERACILLIN AND TAZOBACTAM 25 GRAM(S): 4; .5 INJECTION, POWDER, LYOPHILIZED, FOR SOLUTION INTRAVENOUS at 21:18

## 2023-11-08 RX ADMIN — OXYCODONE HYDROCHLORIDE 15 MILLIGRAM(S): 5 TABLET ORAL at 22:22

## 2023-11-08 RX ADMIN — TIZANIDINE 2 MILLIGRAM(S): 4 TABLET ORAL at 16:54

## 2023-11-08 RX ADMIN — PIPERACILLIN AND TAZOBACTAM 25 GRAM(S): 4; .5 INJECTION, POWDER, LYOPHILIZED, FOR SOLUTION INTRAVENOUS at 06:29

## 2023-11-08 RX ADMIN — PIPERACILLIN AND TAZOBACTAM 25 GRAM(S): 4; .5 INJECTION, POWDER, LYOPHILIZED, FOR SOLUTION INTRAVENOUS at 13:09

## 2023-11-08 RX ADMIN — GABAPENTIN 800 MILLIGRAM(S): 400 CAPSULE ORAL at 13:08

## 2023-11-08 RX ADMIN — GABAPENTIN 800 MILLIGRAM(S): 400 CAPSULE ORAL at 21:20

## 2023-11-08 NOTE — PROGRESS NOTE ADULT - ASSESSMENT
A/P: 64y/o Male s/p L1-pelvis PSF, L1-5 decompression on 10/31    Plan:  - Appreciate recs per medicine  - DVT ppx - SQH  - LSO brace  - Multimodal Pain control  - Weight bearing status: WBAT  - PT/OT  - AFO LLE

## 2023-11-08 NOTE — PROGRESS NOTE ADULT - SUBJECTIVE AND OBJECTIVE BOX
[facility] Division of Hospital Medicine  Mary Kay Luu MD  Available via MS Teams  Pager: [phone]    SUBJECTIVE / OVERNIGHT EVENTS:  no events. pt states feels like himself again, able to ambulate and sit on chair yesterday. His pain is controlled with current medications. Pain is worse with movement/rolling and left leg movements.     MEDICATIONS  (STANDING):  acetaminophen     Tablet .. 975 milliGRAM(s) Oral every 8 hours  amLODIPine   Tablet 10 milliGRAM(s) Oral daily  atorvastatin 20 milliGRAM(s) Oral at bedtime  gabapentin 800 milliGRAM(s) Oral three times a day  heparin   Injectable 5000 Unit(s) SubCutaneous every 8 hours  lidocaine   4% Patch 1 Patch Transdermal daily  lidocaine   4% Patch 1 Patch Transdermal daily  multivitamin 1 Tablet(s) Oral daily  piperacillin/tazobactam IVPB.. 3.375 Gram(s) IV Intermittent every 8 hours  senna 2 Tablet(s) Oral at bedtime  sodium chloride 0.9% lock flush 3 milliLiter(s) IV Push every 8 hours  tiZANidine 2 milliGRAM(s) Oral every 6 hours    MEDICATIONS  (PRN):  albuterol    90 MICROgram(s) HFA Inhaler 2 Puff(s) Inhalation every 6 hours PRN Shortness of Breath and/or Wheezing  ALPRAZolam 0.5 milliGRAM(s) Oral at bedtime PRN anxiety  morphine  - Injectable 4 milliGRAM(s) IV Push every 4 hours PRN Severe breakthrough Pain (7 - 10)  ondansetron Injectable 4 milliGRAM(s) IV Push every 6 hours PRN Nausea  oxyCODONE    IR 15 milliGRAM(s) Oral every 3 hours PRN Severe Pain (7 - 10)  oxyCODONE    IR 10 milliGRAM(s) Oral every 3 hours PRN Moderate Pain (4 - 6)  polyethylene glycol 3350 17 Gram(s) Oral every 12 hours PRN Constipation      I&O's Summary    2023 07:01  -  2023 07:00  --------------------------------------------------------  IN: 300 mL / OUT: 2700 mL / NET: -2400 mL    2023 07:01  -  2023 14:06  --------------------------------------------------------  IN: 0 mL / OUT: 1000 mL / NET: -1000 mL        PHYSICAL EXAM:  Vital Signs Last 24 Hrs  T(C): 36.7 (2023 09:40), Max: 37.1 (2023 01:43)  T(F): 98.1 (2023 09:40), Max: 98.7 (2023 01:43)  HR: 90 (2023 09:40) (86 - 92)  BP: 118/90 (2023 09:40) (101/60 - 118/90)  BP(mean): --  RR: 16 (2023 09:40) (16 - 16)  SpO2: 98% (2023 09:40) (96% - 98%)    Parameters below as of 2023 09:40  Patient On (Oxygen Delivery Method): room air      PHYSICAL EXAM:  GENERAL: NAD  CHEST/LUNG: Clear to auscultation bilaterally; No wheeze  HEART: Regular rate and rhythm; No murmurs, rubs, or gallops  ABDOMEN: Soft, Nontender, Nondistended; Bowel sounds present  EXTREMITIES:  2+ Peripheral Pulses, No clubbing, cyanosis, or edema  SKIN: Dressing C/D/I    LABS:                        8.7    8.93  )-----------( 375      ( 2023 05:00 )             27.2     11-06    130<L>  |  94<L>  |  16  ----------------------------<  138<H>  4.8   |  28  |  0.96    Ca    8.9      2023 14:20  Phos  2.7     11-06  Mg     2.10     11-06            Urinalysis Basic - ( 2023 17:35 )    Color: Yellow / Appearance: Clear / S.020 / pH: x  Gluc: x / Ketone: Negative mg/dL  / Bili: Negative / Urobili: 1.0 mg/dL   Blood: x / Protein: Trace mg/dL / Nitrite: Negative   Leuk Esterase: Negative / RBC: x / WBC x   Sq Epi: x / Non Sq Epi: x / Bacteria: x        Culture - Blood (collected 2023 14:40)  Source: .Blood Blood  Preliminary Report (2023 19:01):    No growth at 24 hours    Culture - Blood (collected 2023 14:20)  Source: .Blood Blood  Preliminary Report (2023 19:01):    No growth at 24 hours      SARS-CoV-2: NotDetec (2023 14:54)             [facility] Division of Hospital Medicine  Mary Kay Luu MD  Available via MS Teams  Pager: [phone]    SUBJECTIVE / OVERNIGHT EVENTS:  no events. pt states feels like himself again, able to ambulate and sit on chair yesterday. His pain is controlled with current medications. Pain is worse with movement/rolling and left leg movements. Reports no BM in 4 days.      MEDICATIONS  (STANDING):  acetaminophen     Tablet .. 975 milliGRAM(s) Oral every 8 hours  amLODIPine   Tablet 10 milliGRAM(s) Oral daily  atorvastatin 20 milliGRAM(s) Oral at bedtime  gabapentin 800 milliGRAM(s) Oral three times a day  heparin   Injectable 5000 Unit(s) SubCutaneous every 8 hours  lidocaine   4% Patch 1 Patch Transdermal daily  lidocaine   4% Patch 1 Patch Transdermal daily  multivitamin 1 Tablet(s) Oral daily  piperacillin/tazobactam IVPB.. 3.375 Gram(s) IV Intermittent every 8 hours  senna 2 Tablet(s) Oral at bedtime  sodium chloride 0.9% lock flush 3 milliLiter(s) IV Push every 8 hours  tiZANidine 2 milliGRAM(s) Oral every 6 hours    MEDICATIONS  (PRN):  albuterol    90 MICROgram(s) HFA Inhaler 2 Puff(s) Inhalation every 6 hours PRN Shortness of Breath and/or Wheezing  ALPRAZolam 0.5 milliGRAM(s) Oral at bedtime PRN anxiety  morphine  - Injectable 4 milliGRAM(s) IV Push every 4 hours PRN Severe breakthrough Pain (7 - 10)  ondansetron Injectable 4 milliGRAM(s) IV Push every 6 hours PRN Nausea  oxyCODONE    IR 15 milliGRAM(s) Oral every 3 hours PRN Severe Pain (7 - 10)  oxyCODONE    IR 10 milliGRAM(s) Oral every 3 hours PRN Moderate Pain (4 - 6)  polyethylene glycol 3350 17 Gram(s) Oral every 12 hours PRN Constipation      I&O's Summary    2023 07:01  -  2023 07:00  --------------------------------------------------------  IN: 300 mL / OUT: 2700 mL / NET: -2400 mL    2023 07:01  -  2023 14:06  --------------------------------------------------------  IN: 0 mL / OUT: 1000 mL / NET: -1000 mL        PHYSICAL EXAM:  Vital Signs Last 24 Hrs  T(C): 36.7 (2023 09:40), Max: 37.1 (2023 01:43)  T(F): 98.1 (2023 09:40), Max: 98.7 (2023 01:43)  HR: 90 (2023 09:40) (86 - 92)  BP: 118/90 (2023 09:40) (101/60 - 118/90)  BP(mean): --  RR: 16 (2023 09:40) (16 - 16)  SpO2: 98% (2023 09:40) (96% - 98%)    Parameters below as of 2023 09:40  Patient On (Oxygen Delivery Method): room air      PHYSICAL EXAM:  GENERAL: NAD  CHEST/LUNG: Clear to auscultation bilaterally; No wheeze  HEART: Regular rate and rhythm; No murmurs, rubs, or gallops  ABDOMEN: Soft, Nontender, Nondistended; Bowel sounds present  EXTREMITIES:  2+ Peripheral Pulses, No clubbing, cyanosis, or edema  SKIN: Dressing C/D/I    LABS:                        8.7    8.93  )-----------( 375      ( 2023 05:00 )             27.2     11-06    130<L>  |  94<L>  |  16  ----------------------------<  138<H>  4.8   |  28  |  0.96    Ca    8.9      2023 14:20  Phos  2.7     11-06  Mg     2.10     11-06            Urinalysis Basic - ( 2023 17:35 )    Color: Yellow / Appearance: Clear / S.020 / pH: x  Gluc: x / Ketone: Negative mg/dL  / Bili: Negative / Urobili: 1.0 mg/dL   Blood: x / Protein: Trace mg/dL / Nitrite: Negative   Leuk Esterase: Negative / RBC: x / WBC x   Sq Epi: x / Non Sq Epi: x / Bacteria: x        Culture - Blood (collected 2023 14:40)  Source: .Blood Blood  Preliminary Report (2023 19:01):    No growth at 24 hours    Culture - Blood (collected 2023 14:20)  Source: .Blood Blood  Preliminary Report (2023 19:01):    No growth at 24 hours      SARS-CoV-2: NotDetec (2023 14:54)

## 2023-11-08 NOTE — PROGRESS NOTE ADULT - SUBJECTIVE AND OBJECTIVE BOX
Orthopaedic Surgery Progress Note    Subjective:   Patient seen and examined. No acute events overnight. Pain control improving with adjustments made yesterday    Objective:  T(C): 37.1 (23 @ 01:43), Max: 37.1 (23 @ 14:00)  HR: 92 (23 @ :43) (86 - 92)  BP: 104/60 (23 @ :43) (101/60 - 115/86)  RR: 16 (23 @ :43) (16 - 17)  SpO2: 98% (23 @ :43) (96% - 100%)  Wt(kg): --     @ 07:01  -   @ 07:00  --------------------------------------------------------  IN: 0 mL / OUT: 3173.5 mL / NET: -3173.5 mL     @ 07:01  -   @ 06:53  --------------------------------------------------------  IN: 0 mL / OUT: 2000 mL / NET: -2000 mL        Physical Exam:  General: NAD; resting comfrotably in bed  Resp: non labored  BACK:          Dressings clean, dry, and intact          Motor:                   C5                C6              C7               C8           T1   R            5/5                5/5            5/5             5/5          5/5  L             3/5               3/5             3/5             3/5          3/5                L2             L3             L4               L5            S1  R         5/5           5/5          5/5             5/5           5/5  L          2/5          2/5           2/5             1/5           4/5    Sensory:            C5         C6         C7      C8       T1        (0=absent, 1=impaired, 2=normal, NT=not testable)  R         2            2           2        2         2  L          2            2           2        2         2               L2          L3         L4      L5       S1         (0=absent, 1=impaired, 2=normal, NT=not testable)  R         2            2            2        2        2  L          2            2           2        2         2                              8.7    8.93  )-----------( 375      ( 2023 05:00 )             27.2     11-06    130<L>  |  94<L>  |  16  ----------------------------<  138<H>  4.8   |  28  |  0.96    Ca    8.9      2023 14:20  Phos  2.7     11-  Mg     2.10     11-06        Urinalysis Basic - ( 2023 17:35 )    Color: Yellow / Appearance: Clear / S.020 / pH: x  Gluc: x / Ketone: Negative mg/dL  / Bili: Negative / Urobili: 1.0 mg/dL   Blood: x / Protein: Trace mg/dL / Nitrite: Negative   Leuk Esterase: Negative / RBC: x / WBC x   Sq Epi: x / Non Sq Epi: x / Bacteria: x

## 2023-11-08 NOTE — CONSULT NOTE ADULT - SUBJECTIVE AND OBJECTIVE BOX
HPI:  63 year old male with lower back pain radiating down right leg x 1 year. Pt presents today for presurgical evaluation for Lumbar Decompression L1-L5 with Laminectomy and Fusion.     Pt was scheduled for procedure prior but case was postponed due to abnormal stress test. Pt has since had an angiogram and states he was cleared by his cardiologist.  (16 Oct 2023 09:30). s/p L1-pelvis PSF, L1-5 decompression on 10/31      REVIEW OF SYSTEMS/Subjective: Patient in bed in NAD, no SOB, no n/v, pain controlled.  Constitutional - No fever, No fatigue  HEENT - No visual disturbances  Respiratory - No cough, No shortness of breath  Cardiovascular - No chest pain  Gastrointestinal - No abdominal pain  Genitourinary - No dysuria  Neurological - No headaches, (+)new LLE weakness, chronic LUE weakness  Musculoskeletal - No joint pain, No joint swelling, No muscle pain  Psychiatric - No depression, No anxiety  All other review of systems negative    PAST MEDICAL & SURGICAL HISTORY  Spinal stenosis of lumbar region without neurogenic claudication    FH: HTN (hypertension) (Mother)    Handoff    MEWS Score    Asthma    HTN (hypertension)    Localized swelling of both lower legs    H/O neuropathy    Chronic back pain    H/O blood clots    H/O spinal stenosis    Cervical spinal stenosis    Pulmonary embolism    Spinal stenosis of lumbar region    Spinal stenosis of lumbar region    Fusion, posterior spinal column, lumbar, 4 levels, posterior approach    Spinal stenosis of lumbar region    Spinal stenosis of lumbar region    Asthma    HTN (hypertension)    Chronic lower back pain    CAD (coronary artery disease)    Atelectasis    Anemia    Need for prophylactic measure    Systemic inflammatory response syndrome (SIRS)    Fusion, posterior spinal column, lumbar, 4 levels, posterior approach    History of fusion of cervical spine    H/O arthroscopy    S/P arthroscopy of knee    SysAdmin_VstLnk        SOCIAL HISTORY    Smoking - former  EtOH - Denied       FUNCTIONAL HISTORY  Lives with mother and siblings in a pvt house (+)5 neeta. (-)HHA  Independent in ambulation with SAC, ADL's, transfers prior to hospitalization        FAMILY HISTORY   Reviewed and non-contributory    ALLERGIES  eggs (Hives; Rash)  No Known Drug Allergies  peanuts (Unknown)    VITALS  T(C): 36.7 (23 @ 09:40)  T(F): 98.1 (23 @ 09:40), Max: 98.8 (23 @ 14:00)  HR: 90 (23 @ 09:40) (86 - 92)  BP: 118/90 (23 @ 09:40) (101/60 - 118/90)  RR:  (16 - 16)  SpO2:  (96% - 98%)  Wt(kg): --    PHYSICAL EXAM  Constitutional - NAD, Comfortable  HEENT - NCAT, MMM  Chest - CTA bilaterally  Cardiovascular - RRR, S1S2  Abdomen - BS+, Soft, NTND  Extremities - min left pedal edema, No calf tenderness   Neurologic Exam -                    Cognitive - Awake, Alert, Oriented to self, place, date, year, situation     Communication - Fluent, No dysarthria     Cranial Nerves - CN 2-12 grossly intact     Motor -                     LEFT    UE - ShAB <3/5, EF 4-/5, WE 4/5,  4-4+/5. Intrinsic atrophy                    RIGHT UE - ShAB 5/5, EF 5/5, EE 5/5, WE 5/5,  5/5                    LEFT    LE - HF <3/5, KE <3/5, DF 0/5, PF 4/5                    RIGHT LE - HF 4/5, KE 5/5, DF 5/5, PF 5/5        Reflexes - 0/+1 left KJ, +1/2 right KJ. (+) Babinski's on left     Psychiatric - Affect WNL    CURRENT FUNCTIONAL STATUS: (23)    Bed Mobility  Bed Mobility Training Rolling/Turning: minimum assist (75% patient effort);  1 person assist  Bed Mobility Training Scooting: minimum assist (75% patient effort);  1 person assist  Bed Mobility Training Bridging: minimum assist (75% patient effort);  1 person assist  Bed Mobility Training Sit-to-Supine: minimum assist (75% patient effort);  1 person assist  Bed Mobility Training Supine-to-Sit: minimum assist (75% patient effort);  1 person assist      Sit-Stand Transfer Training  Transfer Training Sit-to-Stand Transfer: minimum assist (75% patient effort);  2 person assist;  weight-bearing as tolerated   rolling walker  Transfer Training Stand-to-Sit Transfer: minimum assist (75% patient effort);  2 person assist;  weight-bearing as tolerated   rolling walker    Gait Training  Gait Training: minimum assist (75% patient effort);  2 person assist;  weight-bearing as tolerated   rolling walker;  25 feet  Gait Analysis: 3-point gait   decreased strength;  25 feet;  rolling walker  Brace/Orthotics Brace/Orthotics: LSO    RECENT LABS/IMAGING                        8.7    8.93  )-----------( 375      ( 2023 05:00 )             27.2     11-06    130<L>  |  94<L>  |  16  ----------------------------<  138<H>  4.8   |  28  |  0.96    Ca    8.9      2023 14:20  Phos  2.7     11-06  Mg     2.10     11-06        Urinalysis Basic - ( 2023 17:35 )    Color: Yellow / Appearance: Clear / S.020 / pH: x  Gluc: x / Ketone: Negative mg/dL  / Bili: Negative / Urobili: 1.0 mg/dL   Blood: x / Protein: Trace mg/dL / Nitrite: Negative   Leuk Esterase: Negative / RBC: x / WBC x   Sq Epi: x / Non Sq Epi: x / Bacteria: x        HbA1C -   TSH -   CHL -   Tri -   HDL -   LDL -     MEDICATIONS   MEDICATIONS  (STANDING):  acetaminophen     Tablet .. 975 milliGRAM(s) Oral every 8 hours  amLODIPine   Tablet 10 milliGRAM(s) Oral daily  atorvastatin 20 milliGRAM(s) Oral at bedtime  gabapentin 800 milliGRAM(s) Oral three times a day  heparin   Injectable 5000 Unit(s) SubCutaneous every 8 hours  lidocaine   4% Patch 1 Patch Transdermal daily  lidocaine   4% Patch 1 Patch Transdermal daily  multivitamin 1 Tablet(s) Oral daily  piperacillin/tazobactam IVPB.. 3.375 Gram(s) IV Intermittent every 8 hours  senna 2 Tablet(s) Oral at bedtime  sodium chloride 0.9% lock flush 3 milliLiter(s) IV Push every 8 hours  tiZANidine 2 milliGRAM(s) Oral every 6 hours    MEDICATIONS  (PRN):  albuterol    90 MICROgram(s) HFA Inhaler 2 Puff(s) Inhalation every 6 hours PRN Shortness of Breath and/or Wheezing  ALPRAZolam 0.5 milliGRAM(s) Oral at bedtime PRN anxiety  morphine  - Injectable 4 milliGRAM(s) IV Push every 4 hours PRN Severe breakthrough Pain (7 - 10)  ondansetron Injectable 4 milliGRAM(s) IV Push every 6 hours PRN Nausea  oxyCODONE    IR 10 milliGRAM(s) Oral every 3 hours PRN Moderate Pain (4 - 6)  oxyCODONE    IR 15 milliGRAM(s) Oral every 3 hours PRN Severe Pain (7 - 10)  polyethylene glycol 3350 17 Gram(s) Oral every 12 hours PRN Constipation      ASSESSMENT/PLAN  The patient is a 62y/o male PMHx HTN, HLD, non obstructive CAD, hx of chronic severe back pain that radiates to both legs, s/p cervical fusion Oct 2022/left cervical radiculopathy/myelopathy s/p L1-pelvis PSF, L1-5 decompression on 10/31 2/2 lumbar spinal stenosis with left lumbar radiculopathy with functional, gait, ADL impairments.    Disposition - Patient is a candidate for restorative inpatient rehab, acute unit. Patient can tolerate 3 hours/day of rehab services.  PT - ROM, Bed mobility, Transfers, Ambulation with assistive device  OT - ADLs, ROM  Precautions - Falls, Cardiac  DVT Prophylaxis - Heparin SQ, IPC  Weight bearing status -WBAT  Skin - Turn Q2hrs  Diet - Regular  P&O evaluation  Pain Management following

## 2023-11-08 NOTE — PROGRESS NOTE ADULT - PROBLEM SELECTOR PLAN 2
-Pain well controlled; continue management and pain control per ortho recs with tylenol tid, oxycodone IR prn, zanaflex and neurontin  -c/w bowel regimen: added senna and miralax for constipation while on opiods.   -c/w incentive spirometer use  -c/w PT -Pain well controlled; continue management and pain control per ortho recs with tylenol tid, oxycodone IR prn, zanaflex and neurontin  -c/w bowel regimen: c/w senna and change miralax to standing for constipation while on opiods. (no BM in 4 days)  -c/w incentive spirometer use  -c/w PT

## 2023-11-08 NOTE — PROGRESS NOTE ADULT - SUBJECTIVE AND OBJECTIVE BOX
Subjective: Patient seen and examined. No new events except as noted.     SUBJECTIVE/ROS:  nad      MEDICATIONS:  MEDICATIONS  (STANDING):  acetaminophen     Tablet .. 975 milliGRAM(s) Oral every 8 hours  amLODIPine   Tablet 10 milliGRAM(s) Oral daily  atorvastatin 20 milliGRAM(s) Oral at bedtime  gabapentin 800 milliGRAM(s) Oral three times a day  heparin   Injectable 5000 Unit(s) SubCutaneous every 8 hours  lidocaine   4% Patch 1 Patch Transdermal daily  lidocaine   4% Patch 1 Patch Transdermal daily  multivitamin 1 Tablet(s) Oral daily  piperacillin/tazobactam IVPB.. 3.375 Gram(s) IV Intermittent every 8 hours  senna 2 Tablet(s) Oral at bedtime  sodium chloride 0.9% lock flush 3 milliLiter(s) IV Push every 8 hours  tiZANidine 2 milliGRAM(s) Oral every 6 hours      PHYSICAL EXAM:  T(C): 37.1 (11-08-23 @ 01:43), Max: 37.1 (11-07-23 @ 14:00)  HR: 92 (11-08-23 @ 01:43) (86 - 92)  BP: 104/60 (11-08-23 @ 01:43) (101/60 - 115/86)  RR: 16 (11-08-23 @ 01:43) (16 - 17)  SpO2: 98% (11-08-23 @ 01:43) (96% - 100%)  Wt(kg): --  I&O's Summary    06 Nov 2023 07:01  -  07 Nov 2023 07:00  --------------------------------------------------------  IN: 0 mL / OUT: 3173.5 mL / NET: -3173.5 mL    07 Nov 2023 07:01  -  08 Nov 2023 06:57  --------------------------------------------------------  IN: 0 mL / OUT: 2000 mL / NET: -2000 mL            JVP: Normal  Neck: supple  Lung: clear   CV: S1 S2 , Murmur:  Abd: soft  Ext: No edema  neuro: Awake / alert  Psych: flat affect  Skin: normal``    LABS/DATA:    CARDIAC MARKERS:                                8.7    8.93  )-----------( 375      ( 08 Nov 2023 05:00 )             27.2     11-06    130<L>  |  94<L>  |  16  ----------------------------<  138<H>  4.8   |  28  |  0.96    Ca    8.9      06 Nov 2023 14:20  Phos  2.7     11-06  Mg     2.10     11-06      proBNP:   Lipid Profile:   HgA1c:   TSH:     TELE:  EKG:

## 2023-11-09 LAB
ANION GAP SERPL CALC-SCNC: 9 MMOL/L — SIGNIFICANT CHANGE UP (ref 7–14)
ANION GAP SERPL CALC-SCNC: 9 MMOL/L — SIGNIFICANT CHANGE UP (ref 7–14)
BUN SERPL-MCNC: 16 MG/DL — SIGNIFICANT CHANGE UP (ref 7–23)
BUN SERPL-MCNC: 16 MG/DL — SIGNIFICANT CHANGE UP (ref 7–23)
CALCIUM SERPL-MCNC: 9.6 MG/DL — SIGNIFICANT CHANGE UP (ref 8.4–10.5)
CALCIUM SERPL-MCNC: 9.6 MG/DL — SIGNIFICANT CHANGE UP (ref 8.4–10.5)
CHLORIDE SERPL-SCNC: 95 MMOL/L — LOW (ref 98–107)
CHLORIDE SERPL-SCNC: 95 MMOL/L — LOW (ref 98–107)
CO2 SERPL-SCNC: 28 MMOL/L — SIGNIFICANT CHANGE UP (ref 22–31)
CO2 SERPL-SCNC: 28 MMOL/L — SIGNIFICANT CHANGE UP (ref 22–31)
CREAT SERPL-MCNC: 0.94 MG/DL — SIGNIFICANT CHANGE UP (ref 0.5–1.3)
CREAT SERPL-MCNC: 0.94 MG/DL — SIGNIFICANT CHANGE UP (ref 0.5–1.3)
EGFR: 91 ML/MIN/1.73M2 — SIGNIFICANT CHANGE UP
EGFR: 91 ML/MIN/1.73M2 — SIGNIFICANT CHANGE UP
GLUCOSE SERPL-MCNC: 107 MG/DL — HIGH (ref 70–99)
GLUCOSE SERPL-MCNC: 107 MG/DL — HIGH (ref 70–99)
POTASSIUM SERPL-MCNC: 4.7 MMOL/L — SIGNIFICANT CHANGE UP (ref 3.5–5.3)
POTASSIUM SERPL-MCNC: 4.7 MMOL/L — SIGNIFICANT CHANGE UP (ref 3.5–5.3)
POTASSIUM SERPL-SCNC: 4.7 MMOL/L — SIGNIFICANT CHANGE UP (ref 3.5–5.3)
POTASSIUM SERPL-SCNC: 4.7 MMOL/L — SIGNIFICANT CHANGE UP (ref 3.5–5.3)
SODIUM SERPL-SCNC: 132 MMOL/L — LOW (ref 135–145)
SODIUM SERPL-SCNC: 132 MMOL/L — LOW (ref 135–145)

## 2023-11-09 PROCEDURE — 99232 SBSQ HOSP IP/OBS MODERATE 35: CPT

## 2023-11-09 RX ADMIN — OXYCODONE HYDROCHLORIDE 15 MILLIGRAM(S): 5 TABLET ORAL at 17:52

## 2023-11-09 RX ADMIN — Medication 975 MILLIGRAM(S): at 06:28

## 2023-11-09 RX ADMIN — OXYCODONE HYDROCHLORIDE 15 MILLIGRAM(S): 5 TABLET ORAL at 14:37

## 2023-11-09 RX ADMIN — Medication 975 MILLIGRAM(S): at 21:24

## 2023-11-09 RX ADMIN — SODIUM CHLORIDE 3 MILLILITER(S): 9 INJECTION INTRAMUSCULAR; INTRAVENOUS; SUBCUTANEOUS at 13:50

## 2023-11-09 RX ADMIN — OXYCODONE HYDROCHLORIDE 15 MILLIGRAM(S): 5 TABLET ORAL at 08:20

## 2023-11-09 RX ADMIN — ATORVASTATIN CALCIUM 20 MILLIGRAM(S): 80 TABLET, FILM COATED ORAL at 21:25

## 2023-11-09 RX ADMIN — PIPERACILLIN AND TAZOBACTAM 25 GRAM(S): 4; .5 INJECTION, POWDER, LYOPHILIZED, FOR SOLUTION INTRAVENOUS at 06:28

## 2023-11-09 RX ADMIN — OXYCODONE HYDROCHLORIDE 15 MILLIGRAM(S): 5 TABLET ORAL at 07:29

## 2023-11-09 RX ADMIN — OXYCODONE HYDROCHLORIDE 15 MILLIGRAM(S): 5 TABLET ORAL at 20:18

## 2023-11-09 RX ADMIN — OXYCODONE HYDROCHLORIDE 15 MILLIGRAM(S): 5 TABLET ORAL at 02:15

## 2023-11-09 RX ADMIN — Medication 975 MILLIGRAM(S): at 07:20

## 2023-11-09 RX ADMIN — HEPARIN SODIUM 5000 UNIT(S): 5000 INJECTION INTRAVENOUS; SUBCUTANEOUS at 13:38

## 2023-11-09 RX ADMIN — OXYCODONE HYDROCHLORIDE 15 MILLIGRAM(S): 5 TABLET ORAL at 16:52

## 2023-11-09 RX ADMIN — Medication 975 MILLIGRAM(S): at 14:38

## 2023-11-09 RX ADMIN — HEPARIN SODIUM 5000 UNIT(S): 5000 INJECTION INTRAVENOUS; SUBCUTANEOUS at 06:28

## 2023-11-09 RX ADMIN — Medication 975 MILLIGRAM(S): at 22:10

## 2023-11-09 RX ADMIN — OXYCODONE HYDROCHLORIDE 15 MILLIGRAM(S): 5 TABLET ORAL at 23:47

## 2023-11-09 RX ADMIN — OXYCODONE HYDROCHLORIDE 15 MILLIGRAM(S): 5 TABLET ORAL at 05:20

## 2023-11-09 RX ADMIN — SODIUM CHLORIDE 3 MILLILITER(S): 9 INJECTION INTRAMUSCULAR; INTRAVENOUS; SUBCUTANEOUS at 22:04

## 2023-11-09 RX ADMIN — OXYCODONE HYDROCHLORIDE 15 MILLIGRAM(S): 5 TABLET ORAL at 04:24

## 2023-11-09 RX ADMIN — GABAPENTIN 800 MILLIGRAM(S): 400 CAPSULE ORAL at 21:25

## 2023-11-09 RX ADMIN — OXYCODONE HYDROCHLORIDE 15 MILLIGRAM(S): 5 TABLET ORAL at 21:10

## 2023-11-09 RX ADMIN — GABAPENTIN 800 MILLIGRAM(S): 400 CAPSULE ORAL at 06:28

## 2023-11-09 RX ADMIN — OXYCODONE HYDROCHLORIDE 15 MILLIGRAM(S): 5 TABLET ORAL at 10:29

## 2023-11-09 RX ADMIN — Medication 975 MILLIGRAM(S): at 13:38

## 2023-11-09 RX ADMIN — SODIUM CHLORIDE 3 MILLILITER(S): 9 INJECTION INTRAMUSCULAR; INTRAVENOUS; SUBCUTANEOUS at 08:47

## 2023-11-09 RX ADMIN — OXYCODONE HYDROCHLORIDE 15 MILLIGRAM(S): 5 TABLET ORAL at 01:24

## 2023-11-09 RX ADMIN — Medication 1 TABLET(S): at 13:39

## 2023-11-09 RX ADMIN — HEPARIN SODIUM 5000 UNIT(S): 5000 INJECTION INTRAVENOUS; SUBCUTANEOUS at 21:25

## 2023-11-09 RX ADMIN — GABAPENTIN 800 MILLIGRAM(S): 400 CAPSULE ORAL at 13:38

## 2023-11-09 RX ADMIN — OXYCODONE HYDROCHLORIDE 15 MILLIGRAM(S): 5 TABLET ORAL at 13:37

## 2023-11-09 RX ADMIN — OXYCODONE HYDROCHLORIDE 15 MILLIGRAM(S): 5 TABLET ORAL at 11:29

## 2023-11-09 RX ADMIN — PIPERACILLIN AND TAZOBACTAM 25 GRAM(S): 4; .5 INJECTION, POWDER, LYOPHILIZED, FOR SOLUTION INTRAVENOUS at 21:24

## 2023-11-09 RX ADMIN — PIPERACILLIN AND TAZOBACTAM 25 GRAM(S): 4; .5 INJECTION, POWDER, LYOPHILIZED, FOR SOLUTION INTRAVENOUS at 13:37

## 2023-11-09 NOTE — PROGRESS NOTE ADULT - SUBJECTIVE AND OBJECTIVE BOX
Subjective: Patient seen and examined. No new events except as noted.     SUBJECTIVE/ROS:  nad    MEDICATIONS:  MEDICATIONS  (STANDING):  acetaminophen     Tablet .. 975 milliGRAM(s) Oral every 8 hours  amLODIPine   Tablet 10 milliGRAM(s) Oral daily  atorvastatin 20 milliGRAM(s) Oral at bedtime  gabapentin 800 milliGRAM(s) Oral three times a day  heparin   Injectable 5000 Unit(s) SubCutaneous every 8 hours  lidocaine   4% Patch 1 Patch Transdermal daily  lidocaine   4% Patch 1 Patch Transdermal daily  multivitamin 1 Tablet(s) Oral daily  piperacillin/tazobactam IVPB.. 3.375 Gram(s) IV Intermittent every 8 hours  polyethylene glycol 3350 17 Gram(s) Oral daily  senna 2 Tablet(s) Oral at bedtime  sodium chloride 0.9% lock flush 3 milliLiter(s) IV Push every 8 hours  tiZANidine 2 milliGRAM(s) Oral every 6 hours      PHYSICAL EXAM:  T(C): 37.1 (11-09-23 @ 02:07), Max: 37.2 (11-08-23 @ 16:53)  HR: 88 (11-09-23 @ 02:07) (86 - 97)  BP: 103/83 (11-09-23 @ 02:07) (103/83 - 144/65)  RR: 18 (11-09-23 @ 02:07) (16 - 18)  SpO2: 96% (11-09-23 @ 02:07) (95% - 100%)  Wt(kg): --  I&O's Summary    08 Nov 2023 07:01  -  09 Nov 2023 07:00  --------------------------------------------------------  IN: 0 mL / OUT: 3050 mL / NET: -3050 mL            JVP: Normal  Neck: supple  Lung: clear   CV: S1 S2 , Murmur:  Abd: soft  Ext: No edema  neuro: Awake / alert  Psych: flat affect  Skin: normal``    LABS/DATA:    CARDIAC MARKERS:                                8.7    8.93  )-----------( 375      ( 08 Nov 2023 05:00 )             27.2           proBNP:   Lipid Profile:   HgA1c:   TSH:     TELE:  EKG:

## 2023-11-09 NOTE — PROGRESS NOTE ADULT - ASSESSMENT
63M h/o HTN, HLD, non obstructive CAD, hx of chronic severe back pain that radiates to both legs, s/p cervical fusion Oct 2022 complicated by right arm nerve damage with contraction of shoulder/hand, history of PE's was formerly on Xarelto who was admitted for L1-L5 decompression surgery s/p SICU stay for neuro checks now back on the floor course c/b sepsis 2/2 PNA.

## 2023-11-09 NOTE — PROGRESS NOTE ADULT - ASSESSMENT
A/P: 64y/o Male s/p L1-pelvis PSF, L1-5 decompression on 10/31    Plan:  - DVT ppx: SQH  - LSO brace, L AFO  - Multimodal Pain control  - Weight bearing status: WBAT  - PT/OT  - Abx: Zosyn  - Dispo: AR

## 2023-11-09 NOTE — PROGRESS NOTE ADULT - SUBJECTIVE AND OBJECTIVE BOX
Orthopedic Surgery Progress Note     S: Patient seen and examined today. No acute events overnight. Still complaining of back pain but has been OOB. Denies f/c, chest pain, shortness of breath, dizziness.    MEDICATIONS  (STANDING):  acetaminophen     Tablet .. 975 milliGRAM(s) Oral every 8 hours  amLODIPine   Tablet 10 milliGRAM(s) Oral daily  atorvastatin 20 milliGRAM(s) Oral at bedtime  gabapentin 800 milliGRAM(s) Oral three times a day  heparin   Injectable 5000 Unit(s) SubCutaneous every 8 hours  lidocaine   4% Patch 1 Patch Transdermal daily  lidocaine   4% Patch 1 Patch Transdermal daily  multivitamin 1 Tablet(s) Oral daily  piperacillin/tazobactam IVPB.. 3.375 Gram(s) IV Intermittent every 8 hours  polyethylene glycol 3350 17 Gram(s) Oral daily  senna 2 Tablet(s) Oral at bedtime  sodium chloride 0.9% lock flush 3 milliLiter(s) IV Push every 8 hours  tiZANidine 2 milliGRAM(s) Oral every 6 hours    MEDICATIONS  (PRN):  albuterol    90 MICROgram(s) HFA Inhaler 2 Puff(s) Inhalation every 6 hours PRN Shortness of Breath and/or Wheezing  morphine  - Injectable 4 milliGRAM(s) IV Push every 4 hours PRN Severe breakthrough Pain (7 - 10)  ondansetron Injectable 4 milliGRAM(s) IV Push every 6 hours PRN Nausea  oxyCODONE    IR 10 milliGRAM(s) Oral every 3 hours PRN Moderate Pain (4 - 6)  oxyCODONE    IR 15 milliGRAM(s) Oral every 3 hours PRN Severe Pain (7 - 10)      Vital Signs Last 24 Hrs  T(C): 37.1 (09 Nov 2023 02:07), Max: 37.2 (08 Nov 2023 16:53)  T(F): 98.8 (09 Nov 2023 02:07), Max: 99 (08 Nov 2023 16:53)  HR: 88 (09 Nov 2023 02:07) (86 - 97)  BP: 103/83 (09 Nov 2023 02:07) (103/83 - 144/65)  BP(mean): --  RR: 18 (09 Nov 2023 02:07) (16 - 18)  SpO2: 96% (09 Nov 2023 02:07) (95% - 100%)    Parameters below as of 09 Nov 2023 02:07  Patient On (Oxygen Delivery Method): room air        11-07-23 @ 07:01  -  11-08-23 @ 07:00  --------------------------------------------------------  IN: 300 mL / OUT: 2700 mL / NET: -2400 mL    11-08-23 @ 07:01  -  11-09-23 @ 06:52  --------------------------------------------------------  IN: 0 mL / OUT: 3050 mL / NET: -3050 mL        Physical Exam:  Gen: NAD  Spine: Incision c/d/i with Aquacel dressing    Motor:                   C5                C6              C7               C8           T1   R            5/5                5/5            5/5             5/5          5/5  L             3/5               3/5             3/5             3/5          3/5                L2             L3             L4               L5            S1  R         5/5           5/5          5/5             5/5           5/5  L          2/5          2/5           2/5             1/5           4/5      Sensory:            C5         C6         C7      C8       T1        (0=absent, 1=impaired, 2=normal, NT=not testable)  R         2            2           2        2         2  L          2            2           2        2         2               L2          L3         L4      L5       S1         (0=absent, 1=impaired, 2=normal, NT=not testable)  R         2            2            2        2        2  L          2            2           2        2         2          LABS:                        8.7    8.93  )-----------( 375      ( 08 Nov 2023 05:00 )             27.2

## 2023-11-09 NOTE — PROGRESS NOTE ADULT - SUBJECTIVE AND OBJECTIVE BOX
CHIEF COMPLAINT: f/u post-op    SUBJECTIVE / OVERNIGHT EVENTS: Patient seen and examined. No acute events overnight. Pain somewhat controlled. SW to discuss rehab options with patient.    MEDICATIONS  (STANDING):  acetaminophen     Tablet .. 975 milliGRAM(s) Oral every 8 hours  amLODIPine   Tablet 10 milliGRAM(s) Oral daily  atorvastatin 20 milliGRAM(s) Oral at bedtime  gabapentin 800 milliGRAM(s) Oral three times a day  heparin   Injectable 5000 Unit(s) SubCutaneous every 8 hours  lidocaine   4% Patch 1 Patch Transdermal daily  lidocaine   4% Patch 1 Patch Transdermal daily  multivitamin 1 Tablet(s) Oral daily  piperacillin/tazobactam IVPB.. 3.375 Gram(s) IV Intermittent every 8 hours  polyethylene glycol 3350 17 Gram(s) Oral daily  senna 2 Tablet(s) Oral at bedtime  sodium chloride 0.9% lock flush 3 milliLiter(s) IV Push every 8 hours  tiZANidine 2 milliGRAM(s) Oral every 6 hours    MEDICATIONS  (PRN):  albuterol    90 MICROgram(s) HFA Inhaler 2 Puff(s) Inhalation every 6 hours PRN Shortness of Breath and/or Wheezing  morphine  - Injectable 4 milliGRAM(s) IV Push every 4 hours PRN Severe breakthrough Pain (7 - 10)  ondansetron Injectable 4 milliGRAM(s) IV Push every 6 hours PRN Nausea  oxyCODONE    IR 10 milliGRAM(s) Oral every 3 hours PRN Moderate Pain (4 - 6)  oxyCODONE    IR 15 milliGRAM(s) Oral every 3 hours PRN Severe Pain (7 - 10)    VITALS:  T(F): 97.2 (11-09-23 @ 09:31), Max: 99 (11-08-23 @ 16:53)  HR: 74 (11-09-23 @ 09:31) (74 - 97)  BP: 116/68 (11-09-23 @ 09:31) (102/64 - 144/65)  RR: 17 (11-09-23 @ 09:31) (16 - 18)  SpO2: 97% (11-09-23 @ 09:31)    PHYSICAL EXAM:  GENERAL: NAD  CHEST/LUNG: Clear to auscultation bilaterally; No wheeze  HEART: Regular rate and rhythm; No murmurs, rubs, or gallops  ABDOMEN: Soft, Nontender, Nondistended; Bowel sounds present  EXTREMITIES:  2+ Peripheral Pulses, No clubbing, cyanosis, or edema  SKIN: Dressing C/D/I    LABS:              x                    132  | 28   | 16           x     >-----------< x       ------------------------< 107                   x                    4.7  | 95   | 0.94                                         Ca 9.6   Mg x     Ph x        Urinalysis Basic - ( 09 Nov 2023 05:40 )  Color: x / Appearance: x / SG: x / pH: x  Gluc: 107 mg/dL / Ketone: x  / Bili: x / Urobili: x   Blood: x / Protein: x / Nitrite: x   Leuk Esterase: x / RBC: x / WBC x   Sq Epi: x / Non Sq Epi: x / Bacteria: x    [ ] Consultant(s) Notes Reviewed:  [x] Care Discussed with Consultants/Other Providers: Orthopedic PA - discussed disposition

## 2023-11-09 NOTE — PROGRESS NOTE ADULT - PROBLEM SELECTOR PLAN 2
-Pain well controlled; continue management and pain control per ortho recs with tylenol tid, lidocaine patch daily, morphine prn, oxycodone IR prn, zanaflex and neurontin  -c/w bowel regimen: c/w senna and change miralax to standing for constipation while on opiods. (no BM in 4 days)  -c/w incentive spirometer use  -c/w PT

## 2023-11-10 ENCOUNTER — TRANSCRIPTION ENCOUNTER (OUTPATIENT)
Age: 63
End: 2023-11-10

## 2023-11-10 ENCOUNTER — INPATIENT (INPATIENT)
Facility: HOSPITAL | Age: 63
LOS: 18 days | Discharge: HOME | DRG: 559 | End: 2023-11-29
Attending: INTERNAL MEDICINE | Admitting: INTERNAL MEDICINE
Payer: COMMERCIAL

## 2023-11-10 VITALS
HEART RATE: 79 BPM | OXYGEN SATURATION: 97 % | DIASTOLIC BLOOD PRESSURE: 68 MMHG | HEIGHT: 70 IN | WEIGHT: 169.32 LBS | TEMPERATURE: 99 F | SYSTOLIC BLOOD PRESSURE: 114 MMHG | RESPIRATION RATE: 16 BRPM

## 2023-11-10 VITALS
SYSTOLIC BLOOD PRESSURE: 110 MMHG | DIASTOLIC BLOOD PRESSURE: 63 MMHG | TEMPERATURE: 99 F | HEART RATE: 75 BPM | OXYGEN SATURATION: 99 % | RESPIRATION RATE: 17 BRPM

## 2023-11-10 DIAGNOSIS — Z98.1 ARTHRODESIS STATUS: ICD-10-CM

## 2023-11-10 DIAGNOSIS — Z98.890 OTHER SPECIFIED POSTPROCEDURAL STATES: Chronic | ICD-10-CM

## 2023-11-10 DIAGNOSIS — Z98.1 ARTHRODESIS STATUS: Chronic | ICD-10-CM

## 2023-11-10 LAB
SARS-COV-2 RNA SPEC QL NAA+PROBE: SIGNIFICANT CHANGE UP
SARS-COV-2 RNA SPEC QL NAA+PROBE: SIGNIFICANT CHANGE UP

## 2023-11-10 PROCEDURE — 99232 SBSQ HOSP IP/OBS MODERATE 35: CPT

## 2023-11-10 RX ORDER — OXYCODONE HYDROCHLORIDE 5 MG/1
10 TABLET ORAL
Refills: 0 | Status: DISCONTINUED | OUTPATIENT
Start: 2023-11-10 | End: 2023-11-15

## 2023-11-10 RX ORDER — LIDOCAINE 4 G/100G
1 CREAM TOPICAL DAILY
Refills: 0 | Status: DISCONTINUED | OUTPATIENT
Start: 2023-11-10 | End: 2023-11-22

## 2023-11-10 RX ORDER — SENNA PLUS 8.6 MG/1
2 TABLET ORAL
Qty: 0 | Refills: 0 | DISCHARGE
Start: 2023-11-10

## 2023-11-10 RX ORDER — SENNA PLUS 8.6 MG/1
2 TABLET ORAL AT BEDTIME
Refills: 0 | Status: DISCONTINUED | OUTPATIENT
Start: 2023-11-10 | End: 2023-11-29

## 2023-11-10 RX ORDER — MOMETASONE FUROATE 1 MG/G
1 CREAM TOPICAL
Refills: 0 | DISCHARGE

## 2023-11-10 RX ORDER — TIZANIDINE 4 MG/1
1 TABLET ORAL
Qty: 0 | Refills: 0 | DISCHARGE
Start: 2023-11-10

## 2023-11-10 RX ORDER — DICLOFENAC SODIUM 30 MG/G
2 GEL TOPICAL
Refills: 0 | DISCHARGE

## 2023-11-10 RX ORDER — NYSTATIN CREAM 100000 [USP'U]/G
1 CREAM TOPICAL
Refills: 0 | Status: DISCONTINUED | OUTPATIENT
Start: 2023-11-10 | End: 2023-11-29

## 2023-11-10 RX ORDER — ALPRAZOLAM 0.25 MG
0.5 TABLET ORAL AT BEDTIME
Refills: 0 | Status: DISCONTINUED | OUTPATIENT
Start: 2023-11-10 | End: 2023-11-15

## 2023-11-10 RX ORDER — TIZANIDINE 4 MG/1
2 TABLET ORAL
Refills: 0 | Status: DISCONTINUED | OUTPATIENT
Start: 2023-11-10 | End: 2023-11-12

## 2023-11-10 RX ORDER — ATORVASTATIN CALCIUM 80 MG/1
20 TABLET, FILM COATED ORAL AT BEDTIME
Refills: 0 | Status: DISCONTINUED | OUTPATIENT
Start: 2023-11-10 | End: 2023-11-29

## 2023-11-10 RX ORDER — POLYETHYLENE GLYCOL 3350 17 G/17G
17 POWDER, FOR SOLUTION ORAL
Qty: 0 | Refills: 0 | DISCHARGE
Start: 2023-11-10

## 2023-11-10 RX ORDER — ALBUTEROL 90 UG/1
2 AEROSOL, METERED ORAL EVERY 6 HOURS
Refills: 0 | Status: DISCONTINUED | OUTPATIENT
Start: 2023-11-10 | End: 2023-11-29

## 2023-11-10 RX ORDER — POLYETHYLENE GLYCOL 3350 17 G/17G
17 POWDER, FOR SOLUTION ORAL DAILY
Refills: 0 | Status: DISCONTINUED | OUTPATIENT
Start: 2023-11-10 | End: 2023-11-11

## 2023-11-10 RX ORDER — AMLODIPINE BESYLATE 2.5 MG/1
10 TABLET ORAL DAILY
Refills: 0 | Status: DISCONTINUED | OUTPATIENT
Start: 2023-11-10 | End: 2023-11-29

## 2023-11-10 RX ORDER — ACETAMINOPHEN 500 MG
975 TABLET ORAL EVERY 8 HOURS
Refills: 0 | Status: DISCONTINUED | OUTPATIENT
Start: 2023-11-10 | End: 2023-11-29

## 2023-11-10 RX ORDER — ONDANSETRON 8 MG/1
4 TABLET, FILM COATED ORAL EVERY 8 HOURS
Refills: 0 | Status: DISCONTINUED | OUTPATIENT
Start: 2023-11-10 | End: 2023-11-29

## 2023-11-10 RX ORDER — OXYCODONE HYDROCHLORIDE 5 MG/1
15 TABLET ORAL
Refills: 0 | Status: DISCONTINUED | OUTPATIENT
Start: 2023-11-10 | End: 2023-11-15

## 2023-11-10 RX ORDER — GABAPENTIN 400 MG/1
800 CAPSULE ORAL THREE TIMES A DAY
Refills: 0 | Status: DISCONTINUED | OUTPATIENT
Start: 2023-11-10 | End: 2023-11-19

## 2023-11-10 RX ORDER — HEPARIN SODIUM 5000 [USP'U]/ML
5000 INJECTION INTRAVENOUS; SUBCUTANEOUS
Qty: 0 | Refills: 0 | DISCHARGE
Start: 2023-11-10

## 2023-11-10 RX ORDER — HEPARIN SODIUM 5000 [USP'U]/ML
5000 INJECTION INTRAVENOUS; SUBCUTANEOUS EVERY 8 HOURS
Refills: 0 | Status: DISCONTINUED | OUTPATIENT
Start: 2023-11-10 | End: 2023-11-17

## 2023-11-10 RX ADMIN — OXYCODONE HYDROCHLORIDE 15 MILLIGRAM(S): 5 TABLET ORAL at 15:25

## 2023-11-10 RX ADMIN — PIPERACILLIN AND TAZOBACTAM 25 GRAM(S): 4; .5 INJECTION, POWDER, LYOPHILIZED, FOR SOLUTION INTRAVENOUS at 05:07

## 2023-11-10 RX ADMIN — Medication 975 MILLIGRAM(S): at 05:08

## 2023-11-10 RX ADMIN — GABAPENTIN 800 MILLIGRAM(S): 400 CAPSULE ORAL at 05:08

## 2023-11-10 RX ADMIN — OXYCODONE HYDROCHLORIDE 15 MILLIGRAM(S): 5 TABLET ORAL at 03:07

## 2023-11-10 RX ADMIN — POLYETHYLENE GLYCOL 3350 17 GRAM(S): 17 POWDER, FOR SOLUTION ORAL at 10:01

## 2023-11-10 RX ADMIN — HEPARIN SODIUM 5000 UNIT(S): 5000 INJECTION INTRAVENOUS; SUBCUTANEOUS at 14:15

## 2023-11-10 RX ADMIN — OXYCODONE HYDROCHLORIDE 15 MILLIGRAM(S): 5 TABLET ORAL at 07:10

## 2023-11-10 RX ADMIN — Medication 975 MILLIGRAM(S): at 06:00

## 2023-11-10 RX ADMIN — Medication 1 TABLET(S): at 12:14

## 2023-11-10 RX ADMIN — OXYCODONE HYDROCHLORIDE 15 MILLIGRAM(S): 5 TABLET ORAL at 11:12

## 2023-11-10 RX ADMIN — SENNA PLUS 2 TABLET(S): 8.6 TABLET ORAL at 21:24

## 2023-11-10 RX ADMIN — HEPARIN SODIUM 5000 UNIT(S): 5000 INJECTION INTRAVENOUS; SUBCUTANEOUS at 21:23

## 2023-11-10 RX ADMIN — SODIUM CHLORIDE 3 MILLILITER(S): 9 INJECTION INTRAMUSCULAR; INTRAVENOUS; SUBCUTANEOUS at 07:01

## 2023-11-10 RX ADMIN — OXYCODONE HYDROCHLORIDE 15 MILLIGRAM(S): 5 TABLET ORAL at 10:12

## 2023-11-10 RX ADMIN — HEPARIN SODIUM 5000 UNIT(S): 5000 INJECTION INTRAVENOUS; SUBCUTANEOUS at 05:08

## 2023-11-10 RX ADMIN — GABAPENTIN 800 MILLIGRAM(S): 400 CAPSULE ORAL at 14:15

## 2023-11-10 RX ADMIN — OXYCODONE HYDROCHLORIDE 15 MILLIGRAM(S): 5 TABLET ORAL at 00:35

## 2023-11-10 RX ADMIN — ATORVASTATIN CALCIUM 20 MILLIGRAM(S): 80 TABLET, FILM COATED ORAL at 21:23

## 2023-11-10 RX ADMIN — TIZANIDINE 2 MILLIGRAM(S): 4 TABLET ORAL at 21:30

## 2023-11-10 RX ADMIN — OXYCODONE HYDROCHLORIDE 15 MILLIGRAM(S): 5 TABLET ORAL at 22:31

## 2023-11-10 RX ADMIN — GABAPENTIN 800 MILLIGRAM(S): 400 CAPSULE ORAL at 21:22

## 2023-11-10 RX ADMIN — OXYCODONE HYDROCHLORIDE 15 MILLIGRAM(S): 5 TABLET ORAL at 04:00

## 2023-11-10 RX ADMIN — SODIUM CHLORIDE 3 MILLILITER(S): 9 INJECTION INTRAMUSCULAR; INTRAVENOUS; SUBCUTANEOUS at 14:21

## 2023-11-10 RX ADMIN — Medication 975 MILLIGRAM(S): at 14:15

## 2023-11-10 RX ADMIN — OXYCODONE HYDROCHLORIDE 15 MILLIGRAM(S): 5 TABLET ORAL at 21:30

## 2023-11-10 RX ADMIN — Medication 975 MILLIGRAM(S): at 15:15

## 2023-11-10 NOTE — H&P ADULT - NSHPREVIEWOFSYSTEMS_GEN_ALL_CORE
REVIEW OF SYSTEMS  Constitutional: No fever, No Chills, No fatigue  HEENT: No eye pain, No visual disturbances, No difficulty hearing   Pulm: No cough,  No shortness of breath  Cardio: No chest pain, No palpitations  GI:  No abdominal pain, No nausea, No vomiting, +constipation  : No dysuria, No frequency, No hematuria  Neuro: No headaches, No memory loss, +tremors  Skin: No itching, No rashes, No lesions

## 2023-11-10 NOTE — PROGRESS NOTE ADULT - PROBLEM SELECTOR PROBLEM 2
Spinal stenosis of lumbar region

## 2023-11-10 NOTE — H&P ADULT - ASSESSMENT
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 Lopez. This surgery was oringally postponed due to abnormal stress test. However patient was cleared by cardiology. After surgery he was admitted to SICU and downgraded to medical floors. Hospital course complicated by fevers 2/2 questionable PNA treated with zosyn Augmentin while in rehab until 11/13). Cultures were negative. Hospital course further complicated by hyponatremia, ABLA and low back pain.   Patient was evaluated by PM&R and therapy for functional deficits, gait/ADL impairments and acute rehabilitation was recommended. 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 Lopez.  Hospital Course complicated by fever 2/2 ? PNA treated with zosyn (augmentin while in rehab until 11/13), hyponatremia, ABLA and chronic low back pain. Patient now admitted for a multidisciplinary rehab program. 11-10-23 @ 15:11      Lumbar stenosis s/p L1-pelvis PSF and decompression  -   - Comprehensive Multidisciplinary Rehab Program: Start comprehensive rehab program of PT/OT/SLP - 3 hours a day, 5 days a week. P&O as needed   - precautions:       HTN  - Norvasc 10mg  - Monitor BP    HLD  - lipitor 20mg      Pain  chronic low back pain  - acute pain management  - Tylenol PRN      Mood / Cognition  - Neuropsychology consult  - Rec therapy    Sleep  - Melatonin PRN     GI / Bowel  - Senna qHS  - Miralax PRN Daily  - GI ppx: none ??     / Bladder  - Continue bladder scans Q8 hours with straight cath for >400cc.  - Toileting schedule every 4 hours    Skin / Pressure injury  - Skin assessment on admission performed [  ] :   - Monitor Incisions:    - Turn q2 hours in bed while awake, air mattress  - nursing to monitor skin qShift  - soft heel protectors  - skin barrier cream PRN    Diet/Dysphagia:  - Diet Consistency: ***  - Supplements: ***  - Aspiration Precautions  - SLP consult for swallow function evaluation and treatment  - Nutrition consult    DVT prophylaxis:   - *****  - SCDs  - Last Doppler on ***       Outpatient Follow-up:  ** Specialty and Name of physician      Code Status/Emergency Contact:      ---------------    Goals: Safe discharge to home  Estimated Length of Stay: 10-14 days  Rehab Potential: Good  Medical Prognosis: Good  Estimated Disposition: Home with home care      PRESCREEN COMPARISON:  I have reviewed the prescreen information and I have found no relevant changes between the preadmission screening and my post admission evaluation.    RATIONALE FOR INPATIENT ADMISSION: Patient demonstrates the following:  [X] Medically appropriate for rehabilitation admission  [X] Has attainable rehab goals with an appropriate initial discharge plan  [X]Has rehabilitation potential (expected to make a significant improvement within a reasonable period of time)  [X] Requires close medical management by a rehab physician, rehab nursing care, Hospitalist and comprehensive interdisciplinary team (including PT, OT and/or SLP, Prosthetics and Orthotics)     KEVEN FISHER is a 63y with  HTN, HLD, CAD (non obstructive), asthma, PE (xarelto stopped 2 months ago), cervical radiculopathy/myelopathy s/p c-spine fusion surgery Oct 2022 (complicated by 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 Lopez.  Hospital Course complicated by fever 2/2 ? PNA treated with zosyn (augmentin while in rehab until 11/13), hyponatremia, ABLA and chronic low back pain. Patient now admitted for a multidisciplinary rehab program. 11-10-23 @ 15:11      Lumbar stenosis s/p L1-pelvis PSF and decompression on 10/31  - continue pain meds as below  - ? LSO brace.   - WBAT  - Comprehensive Multidisciplinary Rehab Program: Start comprehensive rehab program of PT/OT/SLP - 3 hours a day, 5 days a week. P&O as needed   - remove staples/sutures POD 14 on 11/14  - precautions: spine, cardiac, fall, aspiration  -f/u Dr. Lopez outpatient    HTN  - Norvasc 10mg  - Monitor BP    HLD  - lipitor 20mg      Pain  Chronic low back pain  - home regimen: Nucynta (ran out 1-2months ago), Percocet 10/325 q4h, Xanax 0.5mg, gabapentin 400mg TID, tizanidine 2mg q6  - acute pain management followed at [facility]. Recs appreciated   - Oxy 10-15mg q3 for mod/severe.   - gabapentin 800mg TID  - tizanidine 2mg q6 need to call pharmacy   - lidocaine patches      Mood / Cognition  - Neuropsychology consult  - Rec therapy    Sleep  - Melatonin PRN     GI / Bowel  - Senna qHS  - Miralax Daily  - GI ppx: none ??     / Bladder  - Continue bladder scans Q8 hours with straight cath for >400cc.  - Toileting schedule every 4 hours    Skin / Pressure injury  - Skin assessment on admission performed [  ] :   - Monitor Incisions:        Diet:  - Diet Consistency: Regular    DVT prophylaxis:   - heparin 5000mg q8  - SCDs        Outpatient Follow-up:  Ami Lopez  Orthopaedic Surgery  [address] [facility], [address] [place] [address]  Phone: [phone]  Fax: [phone]  Established Patient  Follow Up Time:      Code Status/Emergency Contact:      ---------------    Goals: Safe discharge to home  Estimated Length of Stay: 10-14 days  Rehab Potential: Good  Medical Prognosis: Good  Estimated Disposition: Home with home care      PRESCREEN COMPARISON:  I have reviewed the prescreen information and I have found no relevant changes between the preadmission screening and my post admission evaluation.    RATIONALE FOR INPATIENT ADMISSION: Patient demonstrates the following:  [X] Medically appropriate for rehabilitation admission  [X] Has attainable rehab goals with an appropriate initial discharge plan  [X]Has rehabilitation potential (expected to make a significant improvement within a reasonable period of time)  [X] Requires close medical management by a rehab physician, rehab nursing care, Hospitalist and comprehensive interdisciplinary team (including PT, OT and/or SLP, Prosthetics and Orthotics)     KEVEN FISHER is a 63y with  HTN, HLD, CAD (non obstructive), asthma, PE (xarelto stopped 2 months ago), cervical radiculopathy/myelopathy s/p c-spine fusion surgery Oct 2022 (complicated by 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 Lopez.  Hospital Course complicated by fever 2/2 ? PNA treated with zosyn (augmentin while in rehab until 11/13), hyponatremia, ABLA and chronic low back pain. Patient now admitted for a multidisciplinary rehab program. 11-10-23 @ 15:11      Lumbar stenosis s/p L1-pelvis PSF and decompression on 10/31  - continue pain meds as below  - ? LSO brace.   - WBAT  - Comprehensive Multidisciplinary Rehab Program: Start comprehensive rehab program of PT/OT/SLP - 3 hours a day, 5 days a week. P&O as needed   - remove staples/sutures POD 14 on 11/14  - precautions: spine, cardiac, fall, aspiration  -f/u Dr. Lopez outpatient    Pain  Chronic low back pain  - home regimen: Nucynta (ran out 1-2months ago), Percocet 10/325 q4h, Xanax 0.5mg, gabapentin 400mg TID, tizanidine 2mg q6  - acute pain management followed at [facility]. Recs appreciated   - Oxy 10-15mg q3 for mod/severe.   - gabapentin 800mg TID  - tizanidine 2mg q6 need to call pharmacy   - lidocaine patches    Hyponatremia  - ? SIADH post-op  - Na 130  - medicine to follow    HTN  - Norvasc 10mg  - Monitor BP    HLD  - lipitor 20mg    ABLA 2/2 surgery  - monitor h/h  - Hg 8.7 current    Mood / Cognition  - Neuropsychology consult  - Rec therapy    Sleep  - Melatonin PRN     GI / Bowel  - Senna qHS  - Miralax Daily  - GI ppx: none ??     / Bladder  - Continue bladder scans Q8 hours with straight cath for >400cc.  - Toileting schedule every 4 hours    Skin / Pressure injury  - Skin assessment on admission performed [  ] :   - Monitor Incisions:        Diet:  - Diet Consistency: Regular    DVT prophylaxis:   - heparin 5000mg q8  - SCDs        Outpatient Follow-up:  Ami Lopez  Orthopaedic Surgery  [address] [facility], [address]  [place] [address]  Phone: [phone]  Fax: [phone]  Established Patient  Follow Up Time:      Code Status/Emergency Contact:      ---------------    Goals: Safe discharge to home  Estimated Length of Stay: 10-14 days  Rehab Potential: Good  Medical Prognosis: Good  Estimated Disposition: Home with home care      PRESCREEN COMPARISON:  I have reviewed the prescreen information and I have found no relevant changes between the preadmission screening and my post admission evaluation.    RATIONALE FOR INPATIENT ADMISSION: Patient demonstrates the following:  [X] Medically appropriate for rehabilitation admission  [X] Has attainable rehab goals with an appropriate initial discharge plan  [X]Has rehabilitation potential (expected to make a significant improvement within a reasonable period of time)  [X] Requires close medical management by a rehab physician, rehab nursing care, Hospitalist and comprehensive interdisciplinary team (including PT, OT and/or SLP, Prosthetics and Orthotics)     KEVEN FISHER is a 63y with  HTN, HLD, CAD (non obstructive), asthma, PE (xarelto stopped 2 months ago), cervical radiculopathy/myelopathy s/p c-spine fusion surgery Oct 2022 (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 Lopez.  Hospital Course complicated by fever 2/2 ? PNA treated with zosyn (augmentin while in rehab until 11/13), hyponatremia, ABLA and chronic low back pain. Patient now admitted for a multidisciplinary rehab program. 11-10-23 @ 15:11      Lumbar stenosis s/p L1-pelvis PSF and decompression on 10/31  - continue pain meds as below  - ? LSO brace.   - WBAT  - Comprehensive Multidisciplinary Rehab Program: Start comprehensive rehab program of PT/OT/SLP - 3 hours a day, 5 days a week. P&O as needed   - remove staples/sutures POD 14 on 11/14  - precautions: spine, cardiac, fall, aspiration  -f/u Dr. Lopez outpatient    Pain  Chronic low back pain  - home regimen: Nucynta (ran out 1-2months ago), Percocet 10/325 q4h, Xanax 0.5mg, gabapentin 400mg TID, tizanidine 2mg q6  - acute pain management followed at [facility]. Recs appreciated   - Oxy 10-15mg q3 for mod/severe.   - gabapentin 800mg TID  - tizanidine 2mg q6 need to call pharmacy   - lidocaine patches    Hyponatremia  - ? SIADH post-op  - Na 130  - medicine to follow    HTN  - Norvasc 10mg  - Monitor BP    HLD  - lipitor 20mg    ABLA 2/2 surgery  - Has yearly FOBT which he states was negative, no recent colonoscopy.  - monitor h/h  - Hg 8.7 current    Mood / Cognition  - Neuropsychology consult  - Rec therapy    Sleep  - Melatonin PRN     GI / Bowel  - Senna qHS  - Miralax Daily  - GI ppx: none ??     / Bladder  - Continue bladder scans Q8 hours with straight cath for >400cc.  - Toileting schedule every 4 hours    Skin / Pressure injury  - Skin assessment on admission performed [  ] :   - Monitor Incisions:        Diet:  - Diet Consistency: Regular    DVT prophylaxis:   - heparin 5000mg q8  - SCDs        Outpatient Follow-up:  Ami Lopez  Orthopaedic Surgery  [address] [facility], [address]  [place] [address]  Phone: [phone]  Fax: [phone]  Established Patient  Follow Up Time:      Code Status/Emergency Contact:      ---------------    Goals: Safe discharge to home  Estimated Length of Stay: 10-14 days  Rehab Potential: Good  Medical Prognosis: Good  Estimated Disposition: Home with home care      PRESCREEN COMPARISON:  I have reviewed the prescreen information and I have found no relevant changes between the preadmission screening and my post admission evaluation.    RATIONALE FOR INPATIENT ADMISSION: Patient demonstrates the following:  [X] Medically appropriate for rehabilitation admission  [X] Has attainable rehab goals with an appropriate initial discharge plan  [X]Has rehabilitation potential (expected to make a significant improvement within a reasonable period of time)  [X] Requires close medical management by a rehab physician, rehab nursing care, Hospitalist and comprehensive interdisciplinary team (including PT, OT and/or SLP, Prosthetics and Orthotics)     KEVEN FISHER is a 63y with  HTN, HLD, CAD (non obstructive), asthma, PE (xarelto stopped 2 months ago), cervical radiculopathy/myelopathy s/p c-spine fusion surgery Oct 2022 (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 Lopez.  Hospital Course complicated by fever 2/2 ? PNA treated with zosyn (augmentin while in rehab until 11/13), hyponatremia, ABLA and chronic low back pain. Patient now admitted for a multidisciplinary rehab program. 11-10-23 @ 15:11      Lumbar stenosis s/p L1-pelvis PSF and decompression on 10/31  - continue pain meds as below  - ? LSO brace.   - WBAT  - Comprehensive Multidisciplinary Rehab Program: Start comprehensive rehab program of PT/OT/SLP - 3 hours a day, 5 days a week. P&O as needed   - remove staples/sutures POD 14 on 11/14  - precautions: spine, cardiac, fall, aspiration  - f/u Dr. Lopez outpatient    Pain  Chronic low back pain  - home regimen: Nucynta (ran out 1-2months ago), Percocet 10/325 q4h, Xanax 0.5mg, gabapentin 400mg TID, tizanidine 2mg q6  - acute pain management followed at [facility]. Recs appreciated   - Oxy 10-15mg q3 for mod/severe.   - gabapentin 800mg TID  - tizanidine 2mg, d/c on Q6.  So as not to wake patient - trial 6am, 3pm, 10pm.   - lidocaine patches    Hyponatremia  - ? SIADH post-op  - Na 130  - medicine to follow    HTN  - Norvasc 10mg  - Monitor BP    HLD  - lipitor 20mg    ABLA 2/2 surgery  - Has yearly FOBT which he states was negative, no recent colonoscopy.  - monitor h/h  - Hg 8.7 current    Mood / Cognition  - Neuropsychology consult  - Rec therapy  - 0.5mg Xanax prn Bedtime    Sleep  - Melatonin PRN     GI / Bowel  - Senna qHS  - Miralax Daily     / Bladder  - Continue bladder scans Q8 hours with straight cath for >400cc.  - Condom cath (unable to use urinal by himself)    Skin / Pressure injury  - Skin assessment on admission performed: intact. Aquacel, CDI gauze over drain site.   - Monitor Incisions      Diet:  - Diet Consistency: Regular    DVT prophylaxis:   - heparin 5000mg q8      Outpatient Follow-up:  Ami Lopez  Orthopaedic Surgery  [address] [facility], [address]  [place] [address]  Phone: [phone]  Fax: [phone]  Established Patient  Follow Up Time:      Code Status/Emergency Contact:  mother Pamela Candido [phone]    ---------------    Goals: Safe discharge to home  Estimated Length of Stay: 10-14 days  Rehab Potential: Good  Medical Prognosis: Good  Estimated Disposition: Home with home care      PRESCREEN COMPARISON:  I have reviewed the prescreen information and I have found no relevant changes between the preadmission screening and my post admission evaluation.    RATIONALE FOR INPATIENT ADMISSION: Patient demonstrates the following:  [X] Medically appropriate for rehabilitation admission  [X] Has attainable rehab goals with an appropriate initial discharge plan  [X]Has rehabilitation potential (expected to make a significant improvement within a reasonable period of time)  [X] Requires close medical management by a rehab physician, rehab nursing care, Hospitalist and comprehensive interdisciplinary team (including PT, OT and/or SLP, Prosthetics and Orthotics)     KEVEN FISHER is a 63y with  HTN, HLD, CAD (non obstructive), asthma, PE (xarelto stopped 2 months ago), cervical radiculopathy/myelopathy s/p c-spine fusion surgery Oct 2022 (complicated by left arm nerve damage with LUE contractions), chronic severe back pain that radiates to both legs, who presented to [facility] for scheduled L1-pelvis PSF and decompression on 10/31 with Dr. Lopez.  Hospital Course complicated by fever 2/2 ? PNA treated with zosyn (augmentin while in rehab until 11/13), hyponatremia, ABLA and chronic low back pain. Patient now admitted for a multidisciplinary rehab program. 11-10-23 @ 15:11      Lumbar stenosis s/p L1-pelvis PSF and decompression on 10/31  - continue pain meds as below  - ? LSO brace.   - WBAT  - Comprehensive Multidisciplinary Rehab Program: Start comprehensive rehab program of PT/OT/SLP - 3 hours a day, 5 days a week. P&O as needed   - remove staples/sutures POD 14 on 11/14  - precautions: spine, cardiac, fall, aspiration  - f/u Dr. Lopez outpatient    Pain  Chronic low back pain  - home regimen: Nucynta (ran out 1-2months ago), Percocet 10/325 q4h, Xanax 0.5mg, gabapentin 400mg TID, tizanidine 2mg q6. Patient is requesting resuming his home medication of nucynta, states that family will bring in home does. Will address with patient and pharmacy if medication is brought in.   - acute pain management followed at [facility]. Recs appreciated   - Oxy 10-15mg q3 for mod/severe.   - gabapentin 800mg TID  - tizanidine 2mg, d/c on Q6.  So as not to wake patient - trial 6am, 3pm, 10pm.   - lidocaine patches    Hyponatremia  - ? SIADH post-op  - Na 130  - medicine to follow    ?PNA   - cont augmentin BID (end 11/14)     HTN  - Norvasc 10mg  - Monitor BP    HLD  - lipitor 20mg    ABLA 2/2 surgery  - Has yearly FOBT which he states was negative, no recent colonoscopy.  - monitor h/h  - Hg 8.7 current    Mood / Cognition  - Neuropsychology consult  - Rec therapy  - 0.5mg Xanax prn Bedtime    Sleep  - Melatonin PRN     GI / Bowel  - Senna qHS  - Miralax Daily     / Bladder  - Continue bladder scans Q8 hours with straight cath for >400cc.  - Condom cath (unable to use urinal by himself)    Skin / Pressure injury  - Skin assessment on admission performed: intact. Aquacel, CDI gauze over drain site.   - Monitor Incisions      Diet:  - Diet Consistency: Regular    DVT prophylaxis:   - heparin 5000mg q8      Outpatient Follow-up:  Ami Lopez  Orthopaedic Surgery  [address] [facility], [address] [place] [address]  Phone: [phone]  Fax: [phone]  Established Patient  Follow Up Time:      Code Status/Emergency Contact:  mother Pamela Candido [phone]    ---------------    Goals: Safe discharge to home  Estimated Length of Stay: 10-14 days  Rehab Potential: Good  Medical Prognosis: Good  Estimated Disposition: Home with home care      PRESCREEN COMPARISON:  I have reviewed the prescreen information and I have found no relevant changes between the preadmission screening and my post admission evaluation.    RATIONALE FOR INPATIENT ADMISSION: Patient demonstrates the following:  [X] Medically appropriate for rehabilitation admission  [X] Has attainable rehab goals with an appropriate initial discharge plan  [X]Has rehabilitation potential (expected to make a significant improvement within a reasonable period of time)  [X] Requires close medical management by a rehab physician, rehab nursing care, Hospitalist and comprehensive interdisciplinary team (including PT, OT and/or SLP, Prosthetics and Orthotics)     KEVEN FISHER is a 63y with  HTN, HLD, CAD (non obstructive), asthma, PE (xarelto stopped 2 months ago), cervical radiculopathy/myelopathy s/p c-spine fusion surgery Oct 2022 (complicated by left arm nerve damage with LUE contractions), chronic severe back pain that radiates to both legs, who presented to [facility] for scheduled L1-pelvis PSF and decompression on 10/31 with Dr. Lopez.  Hospital Course complicated by fever 2/2 ? PNA treated with zosyn (augmentin while in rehab until 11/13), hyponatremia, ABLA and chronic low back pain. Patient now admitted for a multidisciplinary rehab program. 11-10-23 @ 15:11      Lumbar stenosis with radiculopathy  s/p L1-pelvis PSF and decompression on 10/31  - continue pain meds as below  - ? LSO brace.   - WBAT  - Comprehensive Multidisciplinary Rehab Program: Start comprehensive rehab program of PT/OT/SLP - 3 hours a day, 5 days a week. P&O as needed   - remove staples/sutures POD 14 on 11/14  - precautions: spine, cardiac, fall, aspiration  - f/u Dr. Lopez outpatient    Pain  Chronic low back pain  - home regimen: Nucynta (ran out 1-2months ago), Percocet 10/325 q4h, Xanax 0.5mg, gabapentin 400mg TID, tizanidine 2mg q6. Patient is requesting resuming his home medication of nucynta, states that family will bring in home does. Will address with patient and pharmacy if medication is brought in.   - acute pain management followed at [facility]. Recs appreciated   - Oxy 10-15mg q3 for mod/severe.   - gabapentin 800mg TID  - tizanidine 2mg, d/c on Q6.  So as not to wake patient - trial 6am, 3pm, 10pm.   - lidocaine patches    Hyponatremia  - ? SIADH post-op  - Na 130  - medicine to follow    ?PNA   - cont augmentin BID (end 11/14)     HTN  - Norvasc 10mg  - Monitor BP    HLD  - lipitor 20mg    ABLA 2/2 surgery  - Has yearly FOBT which he states was negative, no recent colonoscopy.  - monitor h/h  - Hg 8.7 current    Mood / Cognition  - Neuropsychology consult  - Rec therapy  - 0.5mg Xanax prn Bedtime    Sleep  - Melatonin PRN     GI / Bowel  - Senna qHS  - Miralax Daily     / Bladder  - Continue bladder scans Q8 hours with straight cath for >400cc.  - Condom cath (unable to use urinal by himself)    Skin / Pressure injury  - Skin assessment on admission performed: intact. Aquacel, CDI gauze over drain site.   - Monitor Incisions      Diet:  - Diet Consistency: Regular    DVT prophylaxis:   - heparin 5000mg q8      Outpatient Follow-up:  Ami Lopez  Orthopaedic Surgery  [address] [facility], [address] [place] [address]  Phone: [phone]  Fax: [phone]  Established Patient  Follow Up Time:      Code Status/Emergency Contact:  mother Pamela Colónlin [phone]    ---------------    Goals: Safe discharge to home  Estimated Length of Stay: 10-14 days  Rehab Potential: Good  Medical Prognosis: Good  Estimated Disposition: Home with home care      PRESCREEN COMPARISON:  I have reviewed the prescreen information and I have found no relevant changes between the preadmission screening and my post admission evaluation.    RATIONALE FOR INPATIENT ADMISSION: Patient demonstrates the following:  [X] Medically appropriate for rehabilitation admission  [X] Has attainable rehab goals with an appropriate initial discharge plan  [X]Has rehabilitation potential (expected to make a significant improvement within a reasonable period of time)  [X] Requires close medical management by a rehab physician, rehab nursing care, Hospitalist and comprehensive interdisciplinary team (including PT, OT and/or SLP, Prosthetics and Orthotics)

## 2023-11-10 NOTE — PROGRESS NOTE ADULT - PROBLEM SELECTOR PROBLEM 5
CAD (coronary artery disease)
Chronic lower back pain

## 2023-11-10 NOTE — DISCHARGE NOTE NURSING/CASE MANAGEMENT/SOCIAL WORK - NSDCVIVACCINE_GEN_ALL_CORE_FT
influenza, injectable, quadrivalent, preservative free; 23-Nov-2022 11:51; Michelle Galvin); [employer]; [id number] (Exp. Date: 30-Jun-2023); IntraMuscular; Deltoid Right.; 0.5 milliLiter(s); VIS (VIS Published: 06-Aug-2021, VIS Presented: 23-Nov-2022);

## 2023-11-10 NOTE — DISCHARGE NOTE NURSING/CASE MANAGEMENT/SOCIAL WORK - PATIENT PORTAL LINK FT
You can access the FollowMyHealth Patient Portal offered by [facility] by registering at the following website: [email or web address]. By joining [employer]’s FollowMyHealth portal, you will also be able to view your health information using other applications (apps) compatible with our system.

## 2023-11-10 NOTE — PROGRESS NOTE ADULT - PROBLEM SELECTOR PLAN 1
Fevers resolved  Bcx NG, RVP neg, U/A neg  -questionable right basilar parenchymal opacification -- patient however with normal WBC, no cough  -patient at risk for PNA given post-op state and ICU stay, procal elevated  -started on Zosyn 11/7, with resolution of fevers  -will continue zosyn x7 days for possible PNA (augmentin 875mg PO BID on discharge)
Fevers resolved  Bcx NG, RVP neg, U/A neg  -questionable right basilar parenchymal opacification -- patient however with normal WBC, no cough  -patient at risk for PNA given post-op state and ICU stay, procal elevated  -started on Zosyn 11/7, with resolution of fevers  -will continue zosyn x7 days for possible PNA (augmentin 875mg PO BID on discharge)
-fever work-up negative so far  -questionable right basilar parenchymal opacification -- patient however with WBC or cough  -patient at risk for PNA given post-op state and ICU stay   -low threshold to treat with zosyn x5 days (augmentin on dispo)  -will check procalcitonin first and determine whether to start abx or not
Fevers  Bcx NG, RVP neg, U/A neg  -questionable right basilar parenchymal opacification -- patient however with normal WBC, no cough  -patient at risk for PNA given post-op state and ICU stay, procal elevated  -started on Zosyn 11/7, with resolution of fevers  -will continue zosyn x5 days for possible PNA (augmentin on discharge)

## 2023-11-10 NOTE — PROGRESS NOTE ADULT - PROVIDER SPECIALTY LIST ADULT
Anesthesia
Cardiology
Cardiology
Orthopedics
Pain Medicine
Cardiology
Orthopedics
Pain Medicine
SICU
Cardiology
Orthopedics
Hospitalist
Internal Medicine

## 2023-11-10 NOTE — PROGRESS NOTE ADULT - PROBLEM SELECTOR PROBLEM 6
Atelectasis
CAD (coronary artery disease)

## 2023-11-10 NOTE — PROGRESS NOTE ADULT - SUBJECTIVE AND OBJECTIVE BOX
Orthopedic Surgery Progress Note     S: Patient seen and examined today. No acute events overnight. Pain is well controlled. Denies f/c, chest pain, shortness of breath, dizziness.    MEDICATIONS  (STANDING):  acetaminophen     Tablet .. 975 milliGRAM(s) Oral every 8 hours  amLODIPine   Tablet 10 milliGRAM(s) Oral daily  atorvastatin 20 milliGRAM(s) Oral at bedtime  gabapentin 800 milliGRAM(s) Oral three times a day  heparin   Injectable 5000 Unit(s) SubCutaneous every 8 hours  lidocaine   4% Patch 1 Patch Transdermal daily  lidocaine   4% Patch 1 Patch Transdermal daily  multivitamin 1 Tablet(s) Oral daily  piperacillin/tazobactam IVPB.. 3.375 Gram(s) IV Intermittent every 8 hours  polyethylene glycol 3350 17 Gram(s) Oral daily  senna 2 Tablet(s) Oral at bedtime  sodium chloride 0.9% lock flush 3 milliLiter(s) IV Push every 8 hours  tiZANidine 2 milliGRAM(s) Oral every 6 hours    MEDICATIONS  (PRN):  albuterol    90 MICROgram(s) HFA Inhaler 2 Puff(s) Inhalation every 6 hours PRN Shortness of Breath and/or Wheezing  morphine  - Injectable 4 milliGRAM(s) IV Push every 4 hours PRN Severe breakthrough Pain (7 - 10)  ondansetron Injectable 4 milliGRAM(s) IV Push every 6 hours PRN Nausea  oxyCODONE    IR 15 milliGRAM(s) Oral every 3 hours PRN Severe Pain (7 - 10)  oxyCODONE    IR 10 milliGRAM(s) Oral every 3 hours PRN Moderate Pain (4 - 6)      Physical Exam:  Gen: NAD  Spine: Incision c/d/i with Aquacel dressing    Motor:                   C5                C6              C7               C8           T1   R            5/5                5/5            5/5             5/5          5/5  L             3/5               3/5             3/5             3/5          3/5                L2             L3             L4               L5            S1  R         5/5           5/5          5/5             5/5           5/5  L          2/5          2/5           2/5             1/5           4/5      Sensory:            C5         C6         C7      C8       T1        (0=absent, 1=impaired, 2=normal, NT=not testable)  R         2            2           2        2         2  L          2            2           2        2         2               L2          L3         L4      L5       S1         (0=absent, 1=impaired, 2=normal, NT=not testable)  R         2            2            2        2        2  L          2            2           2        2         2    Vital Signs Last 24 Hrs  T(C): 36.7 (10 Nov 2023 01:43), Max: 37.6 (09 Nov 2023 17:58)  T(F): 98 (10 Nov 2023 01:43), Max: 99.7 (09 Nov 2023 17:58)  HR: 79 (10 Nov 2023 01:43) (74 - 95)  BP: 96/59 (10 Nov 2023 01:43) (96/59 - 129/75)  BP(mean): --  RR: 16 (10 Nov 2023 01:43) (16 - 17)  SpO2: 95% (10 Nov 2023 01:43) (95% - 100%)    Parameters below as of 10 Nov 2023 01:43  Patient On (Oxygen Delivery Method): room air        11-08-23 @ 07:01  -  11-09-23 @ 07:00  --------------------------------------------------------  IN: 0 mL / OUT: 3050 mL / NET: -3050 mL    11-09-23 @ 07:01  -  11-10-23 @ 03:00  --------------------------------------------------------  IN: 0 mL / OUT: 3400 mL / NET: -3400 mL        LABS:                        8.7    8.93  )-----------( 375      ( 08 Nov 2023 05:00 )             27.2     11-09    132<L>  |  95<L>  |  16  ----------------------------<  107<H>  4.7   |  28  |  0.94    Ca    9.6      09 Nov 2023 05:40

## 2023-11-10 NOTE — H&P ADULT - ATTENDING COMMENTS
KEVEN FISHER is a 63y with  HTN, HLD, CAD (non obstructive), asthma, PE (xarelto stopped 2 months ago), cervical radiculopathy/myelopathy s/p c-spine fusion surgery Oct 2022 (complicated by left arm nerve damage with LUE contractions), chronic severe back pain that radiates to both legs, who presented to [facility] for scheduled L1-pelvis PSF and decompression on 10/31 with Dr. Lopez.  Hospital Course complicated by fever 2/2 ? PNA treated with zosyn (augmentin while in rehab until 11/13), hyponatremia, ABLA and chronic low back pain. Patient seen and evaluated in chair after PT session this morning. Complains of back pain and chronic left shoulder LUE pain. Is motivated to participate in therapies. Has not had BM in 4 days.      Lumbar stenosis s/p L1-pelvis PSF and decompression on 10/31  - continue pain meds as below  - ? LSO brace. patient does not have at bedside.   - WBAT  - Comprehensive Multidisciplinary Rehab Program: Start comprehensive rehab program of PT/OT/SLP - 3 hours a day, 5 days a week. P&O as needed   - remove staples/sutures POD 14 on 11/14  - precautions: spine, cardiac, fall, aspiration  - f/u Dr. Lopez outpatient    Pain  Chronic low back pain  - home regimen: Nucynta (ran out 1-2months ago), Percocet 10/325 q4h, Xanax 0.5mg, gabapentin 400mg TID, tizanidine 2mg q6  - acute pain management followed at [facility]. Recs appreciated   - Oxy 10-15mg q3 for mod/severe.   - gabapentin 800mg TID  - tizanidine 2mg, d/c on Q6.  So as not to wake patient - trial 6am, 3pm, 10pm.   - lidocaine patches    Hyponatremia  - ? SIADH post-op  - Na 130  - medicine to follow    ?PNA   - cont augmentin BID (end 11/14)     HTN  - Norvasc 10mg  - Monitor BP    HLD  - lipitor 20mg    ABLA 2/2 surgery  - Has yearly FOBT which he states was negative, no recent colonoscopy.  - monitor h/h  - Hg 8.7 current    Mood / Cognition  - Neuropsychology consult  - Rec therapy  - 0.5mg Xanax prn Bedtime    Sleep  - Melatonin PRN     GI / Bowel  - Senna qHS  - Increase miralax to BID  - if pt does not have a BM today, will add dulcolax      / Bladder  - Continue bladder scans Q8 hours with straight cath for >400cc.  - Condom cath (unable to use urinal by himself)    Skin / Pressure injury  - Skin assessment on admission performed: intact. Aquacel, CDI gauze over drain site.   - Monitor Incisions    Diet:  - Diet Consistency: Regular    DVT prophylaxis:   - heparin 5000mg q8 KEVEN FISHER is a 63y with  HTN, HLD, CAD (non obstructive), asthma, PE (xarelto stopped 2 months ago), cervical radiculopathy/myelopathy s/p c-spine fusion surgery Oct 2022 (complicated by left arm nerve damage with LUE contractions), chronic severe back pain that radiates to both legs, who presented to [facility] for scheduled L1-pelvis PSF and decompression on 10/31 with Dr. Lopez.  Hospital Course complicated by fever 2/2 ? PNA treated with zosyn (augmentin while in rehab until 11/13), hyponatremia, ABLA and chronic low back pain. Patient seen and evaluated in chair after PT session this morning. Complains of back pain and chronic left shoulder LUE pain. Is motivated to participate in therapies. Has not had BM in 4 days.      Lumbar stenosis with radiculopathy  s/p L1-pelvis PSF and decompression on 10/31  - continue pain meds as below  - ? LSO brace. patient does not have at bedside.   - WBAT  - Comprehensive Multidisciplinary Rehab Program: Start comprehensive rehab program of PT/OT/SLP - 3 hours a day, 5 days a week. P&O as needed   - remove staples/sutures POD 14 on 11/14  - precautions: spine, cardiac, fall, aspiration  - f/u Dr. Lopez outpatient    Pain  Chronic low back pain  - home regimen: Nucynta (ran out 1-2months ago), Percocet 10/325 q4h, Xanax 0.5mg, gabapentin 400mg TID, tizanidine 2mg q6  - acute pain management followed at [facility]. Recs appreciated   - Oxy 10-15mg q3 for mod/severe.   - gabapentin 800mg TID  - tizanidine 2mg, d/c on Q6.  So as not to wake patient - trial 6am, 3pm, 10pm.   - lidocaine patches    Hyponatremia  - ? SIADH post-op  - Na 130  - medicine to follow    ?PNA   - cont augmentin BID (end 11/14)     HTN  - Norvasc 10mg  - Monitor BP    HLD  - lipitor 20mg    ABLA 2/2 surgery  - Has yearly FOBT which he states was negative, no recent colonoscopy.  - monitor h/h  - Hg 8.7 current    Mood / Cognition  - Neuropsychology consult  - Rec therapy  - 0.5mg Xanax prn Bedtime    Sleep  - Melatonin PRN     GI / Bowel  - Senna qHS  - Increase miralax to BID  - if pt does not have a BM today, will add dulcolax      / Bladder  - Continue bladder scans Q8 hours with straight cath for >400cc.  - Condom cath (unable to use urinal by himself)    Skin / Pressure injury  - Skin assessment on admission performed: intact. Aquacel, CDI gauze over drain site.   - Monitor Incisions    Diet:  - Diet Consistency: Regular    DVT prophylaxis:   - heparin 5000mg q8

## 2023-11-10 NOTE — PROGRESS NOTE ADULT - PROBLEM SELECTOR PLAN 3
Na 130 11/6  -will repeat BMP
Na 130 11/6 - improved to 132  -will repeat BMP
-c/w amlodipine 10mg daily.
Na 130 11/6 - improved to 132  -will repeat BMP

## 2023-11-10 NOTE — DISCHARGE NOTE NURSING/CASE MANAGEMENT/SOCIAL WORK - NSDCPNINST_GEN_ALL_CORE
Maintain back incision and dressing clean dry and intact. Call MD with any signs of infection ie fever, redness, drainage, or with signs of bleeding, unrelieved increased pain, or persistent nausea. Avoid twisting motion and remember to logroll to turn in bed. Continue to drink plenty of fluids. Avoid strenuous activity and constipation which may be a side effect from taking certain medications and narcotics. No heavy lifting greater than 10 pounds, ie. a gallon of milk. Safety and fall prevention measures reinforced. Follow-up with MD in office as instructed.

## 2023-11-10 NOTE — DISCHARGE NOTE NURSING/CASE MANAGEMENT/SOCIAL WORK - NSDCPEFALRISK_GEN_ALL_CORE
For information on Fall & Injury Prevention, visit: [email or web address] OR  [email or web address] OR  https://www.cdc.gov/steadi/patient.html

## 2023-11-10 NOTE — H&P ADULT - NSHPSOCIALHISTORY_GEN_ALL_CORE
SOCIAL HISTORY  Smoking -   EtOH -   Marital Status -       Previous Functional Status:  Patient reports that he lives in a private house with his mother and 2 brothers with ~5 steps to enter. Bedroom is in the basement +flight. Patient reports he will be living on the main floor. Prior to hospital admission, patient was completely independent and used a single cane with household ambulation and used a claw cane with community ambulation.      Current Functional Status:  Bed mobility: Max-A  Transfers: Max-A  Gait / ambulation: patient deferred.   ADL's: UBD mod-A. LBD Dependent. Grooming CG.   Speech: SOCIAL HISTORY  Smoking -  3pack years, quit 30years ago   EtOH - rarely    Previous Functional Status:  Patient reports that he lives in a private house with his mother and 2 brothers with ~5 steps to enter with rail. Bedroom is on the first floor, tub bath on first floor. Walk in shower in basement +flight.  Prior to hospital admission, patient was completely independent and used a single cane with household ambulation and used a claw cane with community ambulation. Owns a walker.     Current Functional Status:  Bed mobility: Max-A  Transfers: Max-A  Gait / ambulation: patient deferred.   ADL's: UBD mod-A. LBD Dependent. Grooming CG.

## 2023-11-10 NOTE — PROGRESS NOTE ADULT - TIME BILLING
Time-based billing (NON-critical care).     More than 50% of the visit was spent counseling and / or coordinating care by the attending physician.      The necessity of the time spent during the encounter on this date of service was due to: documentation in [place], reviewing chart and coordinating care with patient/resident and interdisciplinary staff (such as , social workers, etc) as well as reviewing vitals, laboratory data, radiology, medication list, consultants' recommendations and prior records. Interventions were performed as documented above.
Time-based billing (NON-critical care).     50 minutes spent on total encounter; more than 50% of the visit was spent counseling and / or coordinating care by the attending physician.  The necessity of the time spent during the encounter on this date of service was due to:     documentation in [place], reviewing chart and coordinating care with patient/ACP and interdisciplinary staff (such as , social workers, etc) as well as reviewing vitals, laboratory data, radiology, medication list, consultants' recommendations and prior records. Interventions were performed as documented above.
Time-based billing (NON-critical care).     50 minutes spent on total encounter; more than 50% of the visit was spent counseling and / or coordinating care by the attending physician.  The necessity of the time spent during the encounter on this date of service was due to:     documentation in [place], reviewing chart and coordinating care with patient/ACP and interdisciplinary staff (such as , social workers, etc) as well as reviewing vitals, laboratory data, radiology, medication list, consultants' recommendations and prior records. Interventions were performed as documented above.
Time-based billing (NON-critical care).     50 minutes spent on total encounter; more than 50% of the visit was spent counseling and / or coordinating care by the attending physician.  The necessity of the time spent during the encounter on this date of service was due to:     documentation in [place], reviewing chart and coordinating care with patient/ACP and interdisciplinary staff (such as , social workers, etc) as well as reviewing vitals, laboratory data, radiology, medication list, consultants' recommendations and prior records. Interventions were performed as documented above.

## 2023-11-10 NOTE — PROGRESS NOTE ADULT - PROBLEM SELECTOR PLAN 8
-likely secondary to post-op changes; pt hemodynamically stable; will monitor CBC in am  -Add multivitamin daily. Monitoring for signs of bleeding. Follow up outpatient. Maintain active type and screen.   - Has yearly FOBT which he states was negative, no recent colonoscopy.
-likely secondary to post-op changes; pt hemodynamically stable; will monitor CBC in am  -Add multivitamin daily. Monitoring for signs of bleeding. Follow up outpatient. Maintain active type and screen.   - Has yearly FOBT which he states was negative, no recent colonoscopy.
c/w with heparin subq for DVT prophylaxis   Fall risk precautions.  Aspiration precautions.
-likely secondary to post-op changes; pt hemodynamically stable; will monitor CBC in am  -Add multivitamin daily. Monitoring for signs of bleeding. Follow up outpatient. Maintain active type and screen.   - Has yearly FOBT which he states was negative, no recent colonoscopy.

## 2023-11-10 NOTE — PROGRESS NOTE ADULT - ASSESSMENT
A/P: 64y/o Male s/p L1-pelvis PSF, L1-5 decompression on 10/31    Plan:  - DVT ppx: SQH  - LSO brace, L AFO  - Multimodal Pain control  - Weight bearing status: WBAT  - PT/OT  - Abx: Zosyn  - Dispo: FIDENCIO Charles MD  Orthopaedic Surgery Resident    For all questions, please reach out via the following numbers for the on-call resident; do not reach out via Teams.  [facility] [id number]  [facility]        [id number]  [facility]  [phone]/ [phone]

## 2023-11-10 NOTE — PROGRESS NOTE ADULT - PROBLEM SELECTOR PLAN 5
Non obstructive CAD on cath 09/2023   - c/w atorvastatin daily. followed by cardiologist, aspirin therapy once safe.
-currently on oxycontin, oxycodone, percocet, tizandidine, gabapentin.   -Management as per pain management,   -Was on Nucenta as outpatient which ran out around 1 months and half ago.

## 2023-11-10 NOTE — PROGRESS NOTE ADULT - PROBLEM SELECTOR PLAN 6
Non obstructive CAD on cath 09/2023   - c/w atorvastatin daily. followed by cardiologist, aspirin therapy once safe.
Non obstructive CAD on cath 09/2023   - c/w atorvastatin daily. followed by cardiologist, aspirin therapy once safe.
-presumed RLL atelectasis which could explain fevers but if persists will favor infectious source  -Incentive spirometry encouraged  - Aspiration precautions
Non obstructive CAD on cath 09/2023   - c/w atorvastatin daily. followed by cardiologist, aspirin therapy once safe.

## 2023-11-10 NOTE — H&P ADULT - NSHPPHYSICALEXAM_GEN_ALL_CORE
Vital Signs  T(C): 37.2 (11-10-23 @ 18:48), Max: 37.4 (11-09-23 @ 21:59)  HR: 79 (11-10-23 @ 18:48) (69 - 90)  BP: 114/68 (11-10-23 @ 18:48) (96/59 - 114/68)  RR: 16 (11-10-23 @ 18:48) (16 - 17)  SpO2: 97% (11-10-23 @ 18:48) (95% - 99%)    Gen - NAD, Comfortable  HEENT - NCAT, EOMI, MMM  Neck - Supple, No limited ROM  Pulm - CTAB, No wheeze, No rhonchi, No crackles  Cardiovascular - RRR, S1S2, No m/r/g  Abdomen - Soft, NT/ND, +BS  Extremities - No C/C/E, No calf tenderness. Left shoulder AROM ~30degrees, PROM ~90 degrees  Neuro-     Cognitive - AAOx3     Communication - Fluent, No dysarthria     Attention: Intact      Cranial Nerves - CN 2-12 grossly intact     Motor -                    LEFT    UE - ShAB 5/5, EF 4-/5, EE 4-/5, WE 5/5,  4/5                    RIGHT UE - ShAB 5/5, EF 5/5, EE 5/5, WE 5/5,  4/5                    LEFT    LE - HF 1/5, KE 1/5, DF 0/5, PF 4/5                    RIGHT LE - HF 4/5 (due to back pain), KE 5/5, DF 5/5, PF 5/5        Sensory - diminished sensation left lower extremity stocking distribution compared to right.  Reports numbness of bilateral hands and toes.      Reflexes - Left ankle clonus, Left Gilbert's positive. Right Gilbert's negative. Right patellar reflex 2+.      Tone - normal. Left shoulder contracture.      Temp- decreased temperature sensation LUE  Psychiatric - Mood stable, Affect WNL  Skin: Aquacel, gauze over single drain site.   Wounds: None Present Vital Signs  T(C): 37.2 (11-10-23 @ 18:48), Max: 37.4 (11-09-23 @ 21:59)  HR: 79 (11-10-23 @ 18:48) (69 - 90)  BP: 114/68 (11-10-23 @ 18:48) (96/59 - 114/68)  RR: 16 (11-10-23 @ 18:48) (16 - 17)  SpO2: 97% (11-10-23 @ 18:48) (95% - 99%)    Gen - NAD, Comfortable  HEENT - NCAT, EOMI, MMM  Neck - Supple, No limited ROM  Pulm - CTAB, No wheeze, No rhonchi, No crackles  Cardiovascular - RRR, S1S2, No m/r/g  Abdomen - Soft, NT/ND, +BS  Extremities - No C/C/E, No calf tenderness. Left shoulder AROM ~30degrees, PROM ~90 degrees  Neuro-     Cognitive - AAOx3     Communication - Fluent, No dysarthria     Attention: Intact      Cranial Nerves - CN 2-12 grossly intact     Motor -                    LEFT    UE - ShAB 5/5, EF 4-/5, EE 4-/5, WE 5/5,  4/5, unable to flex or abduct shoulder past 90 deg. left elbow flexion contracture                     RIGHT UE - ShAB 5/5, EF 5/5, EE 5/5, WE 5/5,  4/5                    LEFT    LE - HF 1/5, KE 1/5, DF 0/5, PF 4/5                    RIGHT LE - HF 4/5 (due to back pain), KE 5/5, DF 5/5, PF 5/5        Sensory - diminished sensation left lower extremity stocking distribution compared to right.  Reports numbness of bilateral hands and toes.      Reflexes - Left ankle clonus, Left Gilbert's positive. Right Gilbert's negative. Right patellar reflex 2+.      Tone - normal. Left shoulder contracture.      Temp- decreased temperature sensation LUE  Psychiatric - Mood stable, Affect WNL  Skin: Aquacel, gauze over single drain site.   Wounds: None Present

## 2023-11-10 NOTE — PROGRESS NOTE ADULT - SUBJECTIVE AND OBJECTIVE BOX
Subjective: Patient seen and examined. No new events except as noted.     SUBJECTIVE/ROS:  nad      MEDICATIONS:  MEDICATIONS  (STANDING):  acetaminophen     Tablet .. 975 milliGRAM(s) Oral every 8 hours  amLODIPine   Tablet 10 milliGRAM(s) Oral daily  atorvastatin 20 milliGRAM(s) Oral at bedtime  gabapentin 800 milliGRAM(s) Oral three times a day  heparin   Injectable 5000 Unit(s) SubCutaneous every 8 hours  lidocaine   4% Patch 1 Patch Transdermal daily  lidocaine   4% Patch 1 Patch Transdermal daily  multivitamin 1 Tablet(s) Oral daily  piperacillin/tazobactam IVPB.. 3.375 Gram(s) IV Intermittent every 8 hours  polyethylene glycol 3350 17 Gram(s) Oral daily  senna 2 Tablet(s) Oral at bedtime  sodium chloride 0.9% lock flush 3 milliLiter(s) IV Push every 8 hours  tiZANidine 2 milliGRAM(s) Oral every 6 hours      PHYSICAL EXAM:  T(C): 37.2 (11-10-23 @ 05:05), Max: 37.6 (11-09-23 @ 17:58)  HR: 71 (11-10-23 @ 05:05) (71 - 95)  BP: 106/51 (11-10-23 @ 05:05) (96/59 - 129/75)  RR: 16 (11-10-23 @ 05:05) (16 - 17)  SpO2: 96% (11-10-23 @ 05:05) (95% - 100%)  Wt(kg): --  I&O's Summary    09 Nov 2023 07:01  -  10 Nov 2023 07:00  --------------------------------------------------------  IN: 0 mL / OUT: 3400 mL / NET: -3400 mL            JVP: Normal  Neck: supple  Lung: clear   CV: S1 S2 , Murmur:  Abd: soft  Ext: No edema  neuro: Awake / alert  Psych: flat affect  Skin: normal``    LABS/DATA:    CARDIAC MARKERS:            11-09    132<L>  |  95<L>  |  16  ----------------------------<  107<H>  4.7   |  28  |  0.94    Ca    9.6      09 Nov 2023 05:40      proBNP:   Lipid Profile:   HgA1c:   TSH:     TELE:  EKG:

## 2023-11-10 NOTE — PROGRESS NOTE ADULT - PROBLEM SELECTOR PLAN 9
c/w with heparin subq for DVT prophylaxis   Fall risk precautions.  Aspiration precautions.    10. Dispo:  -awaiting auth from rehab
c/w with heparin subq for DVT prophylaxis   Fall risk precautions.  Aspiration precautions.
c/w with heparin subq for DVT prophylaxis   Fall risk precautions.  Aspiration precautions.

## 2023-11-10 NOTE — H&P ADULT - HISTORY OF PRESENT ILLNESS
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 Lopez. This surgery was oringally postponed due to abnormal stress test. However patient was cleared by cardiology. After surgery he was admitted to SICU and downgraded to medical floors. Hospital course complicated by fevers 2/2 questionable PNA treated with zosyn (until 11/11 and augmentin while in rehab). Cultures were negative. Hospital course further complicated by hyponatremia, ABLA and low back pain.   Patient was evaluated by PM&R and therapy for functional deficits, gait/ADL impairments and acute rehabilitation was recommended. Patient was medically optimized for discharge to [facility] IRU on 11/10   64yo Male with PMH of HTN, HLD, CAD (non obstructive), asthma, PE (xarelto stopped 2 months ago), cervical radiculopathy/myelopathy s/p c-spine fusion surgery Oct 2022 (complicated by right arm nerve damage with RUE contractions), chronic severe back pain that radiates to both legs, who presented to [facility] for scheduled L1-pelvis PSF and decompression on 10/31 with Dr. Lopez. This surgery was oringally postponed due to abnormal stress test. However patient was cleared by cardiology. After surgery he was admitted to SICU and downgraded to medical floors. Hospital course complicated by fevers 2/2 questionable PNA treated with zosyn Augmentin while in rehab until 11/13). Cultures were negative. Hospital course further complicated by hyponatremia, ABLA and low back pain.   Patient was evaluated by PM&R and therapy for functional deficits, gait/ADL impairments and acute rehabilitation was recommended. Patient was medically optimized for discharge to [facility] IRU on 11/10   64yo Male with PMH of HTN, HLD, CAD (non obstructive), asthma, PE (xarelto stopped 2 months ago), cervical radiculopathy/myelopathy s/p c-spine fusion surgery Oct 2022 (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 Lopez. This surgery was oringally postponed due to abnormal stress test. However patient was cleared by cardiology. After surgery he was admitted to SICU and downgraded to medical floors. Hospital course complicated by fevers 2/2 questionable PNA treated with zosyn (augmentin while in rehab until 11/13). Cultures were negative. Hospital course further complicated by hyponatremia, ABLA and low back pain.   Patient was evaluated by PM&R and therapy for functional deficits, gait/ADL impairments and acute rehabilitation was recommended. Patient was medically optimized for discharge to [facility] IRU on 11/10

## 2023-11-10 NOTE — PROGRESS NOTE ADULT - SUBJECTIVE AND OBJECTIVE BOX
CHIEF COMPLAINT: f/u post-op    SUBJECTIVE / OVERNIGHT EVENTS: Patient seen and examined. No acute events overnight. Pain well controlled and patient without any complaints.    MEDICATIONS  (STANDING):  acetaminophen     Tablet .. 975 milliGRAM(s) Oral every 8 hours  amLODIPine   Tablet 10 milliGRAM(s) Oral daily  atorvastatin 20 milliGRAM(s) Oral at bedtime  gabapentin 800 milliGRAM(s) Oral three times a day  heparin   Injectable 5000 Unit(s) SubCutaneous every 8 hours  lidocaine   4% Patch 1 Patch Transdermal daily  lidocaine   4% Patch 1 Patch Transdermal daily  multivitamin 1 Tablet(s) Oral daily  piperacillin/tazobactam IVPB.. 3.375 Gram(s) IV Intermittent every 8 hours  polyethylene glycol 3350 17 Gram(s) Oral daily  senna 2 Tablet(s) Oral at bedtime  sodium chloride 0.9% lock flush 3 milliLiter(s) IV Push every 8 hours  tiZANidine 2 milliGRAM(s) Oral every 6 hours    MEDICATIONS  (PRN):  albuterol    90 MICROgram(s) HFA Inhaler 2 Puff(s) Inhalation every 6 hours PRN Shortness of Breath and/or Wheezing  morphine  - Injectable 4 milliGRAM(s) IV Push every 4 hours PRN Severe breakthrough Pain (7 - 10)  ondansetron Injectable 4 milliGRAM(s) IV Push every 6 hours PRN Nausea  oxyCODONE    IR 10 milliGRAM(s) Oral every 3 hours PRN Moderate Pain (4 - 6)  oxyCODONE    IR 15 milliGRAM(s) Oral every 3 hours PRN Severe Pain (7 - 10)      VITALS:  T(F): 98.1 (11-10-23 @ 10:02), Max: 99.7 (11-09-23 @ 17:58)  HR: 69 (11-10-23 @ 10:02) (69 - 95)  BP: 104/75 (11-10-23 @ 10:02) (96/59 - 129/75)  RR: 16 (11-10-23 @ 10:02) (16 - 17)  SpO2: 99% (11-10-23 @ 10:02)    PHYSICAL EXAM:  GENERAL: NAD  CHEST/LUNG: Clear to auscultation bilaterally; No wheeze  HEART: Regular rate and rhythm; No murmurs, rubs, or gallops  ABDOMEN: Soft, Nontender, Nondistended; Bowel sounds present  EXTREMITIES:  2+ Peripheral Pulses, No clubbing, cyanosis, or edema  SKIN: Dressing C/D/I    LABS:              x                    132  | 28   | 16           x     >-----------< x       ------------------------< 107                   x                    4.7  | 95   | 0.94                                         Ca 9.6   Mg x     Ph x            Urinalysis Basic - ( 09 Nov 2023 05:40 )  Color: x / Appearance: x / SG: x / pH: x  Gluc: 107 mg/dL / Ketone: x  / Bili: x / Urobili: x   Blood: x / Protein: x / Nitrite: x   Leuk Esterase: x / RBC: x / WBC x   Sq Epi: x / Non Sq Epi: x / Bacteria: x    [ ] Consultant(s) Notes Reviewed:  [x] Care Discussed with Consultants/Other Providers: Orthopedic PA - discussed disposition

## 2023-11-10 NOTE — DISCHARGE NOTE NURSING/CASE MANAGEMENT/SOCIAL WORK - NSCORESITESY/N_GEN_A_CORE_RD
Pt to infusion for daily Invanz to treat infection to right index finger. Yvetta Kalia occurred 9/4/20 from a nail gun and finger amputated above the knuckle on 10/7/20.  Just had a right index finger corticotomy and culture done on 12/14/20.     Pt feeling well No

## 2023-11-10 NOTE — H&P ADULT - NSHPLABSRESULTS_GEN_ALL_CORE
LABS:    11-09    132<L>  |  95<L>  |  16  ----------------------------<  107<H>  4.7   |  28  |  0.94    Ca    9.6      09 Nov 2023 05:40      < from: Transthoracic Echocardiogram (11.01.22 @ 14:45) >  Ejection Fraction (Modified Tavera Rule): 50 %  CONCLUSIONS:  1. Aortic valve not well visualized; appears calcified.  2. Increased relative wall thickness with normal left ventricular mass index, consistent with concentric left ventricular remodeling.  3. Endocardium not well visualized;grossly mild segmental left ventricular systolic function. Possible basal inferior  and basal inferolateral wall hypokinesis.  4. The right ventricle is not well visualized; grossly normal right ventricular systolic function.  < end of copied text >    < from: CT Lumbar Spine No Cont (07.29.23 @ 12:48) >  MPRESSION:  1. No vertebral fracture is recognized.  2. Advanced widespread lumbar degenerative disc disease appears greatest   at L4-L5. High-grade degenerative central canal and foraminal stenosis   greatest at L4-L5  3. Transitional morphology at the lumbar sacral junction  < end of copied text >    < from: CT Lumbar Spine No Cont (11.02.23 @ 21:09) >  IMPRESSION: L1-L4 laminectomies with transpedicular screws and connecting   rods and immature bony fusion mass as well as sacral to iliac fusion   expected postoperative changes..  < end of copied text >    < from: Xray Chest 1 View- PORTABLE-Urgent (Xray Chest 1 View- PORTABLE-Urgent .) (11.04.23 @ 16:10) >  IMPRESSION:  Right lower lobe opacity which may represent atelectasis/pneumonia.  < end of copied text >    < from: CT Angio Chest PE Protocol w/ IV Cont (11.06.23 @ 20:43) >  IMPRESSION:  No pulmonary embolism detected.  Right basilar parenchymal opacification favored to represent atelectasis, but consider infection in the appropriate clinical scenario.  < end of copied text > LABS:    WBC Count: 8.93 K/uL  RBC Count: 3.07 M/uL  Hemoglobin: 8.7 g/dL  Hematocrit: 27.2 %  Mean Cell Volume: 88.6 fL  Mean Cell Hemoglobin: 28.3 pg  Mean Cell Hemoglobin Conc: 32.0 gm/dL  Red Cell Distrib Width: 14.0 %  Platelet Count - Automated: 375 K/uL      11-09    132<L>  |  95<L>  |  16  ----------------------------<  107<H>  4.7   |  28  |  0.94    Ca    9.6      09 Nov 2023 05:40      < from: Transthoracic Echocardiogram (11.01.22 @ 14:45) >  Ejection Fraction (Modified Tavera Rule): 50 %  CONCLUSIONS:  1. Aortic valve not well visualized; appears calcified.  2. Increased relative wall thickness with normal left ventricular mass index, consistent with concentric left ventricular remodeling.  3. Endocardium not well visualized;grossly mild segmental left ventricular systolic function. Possible basal inferior  and basal inferolateral wall hypokinesis.  4. The right ventricle is not well visualized; grossly normal right ventricular systolic function.  < end of copied text >    < from: CT Lumbar Spine No Cont (07.29.23 @ 12:48) >  MPRESSION:  1. No vertebral fracture is recognized.  2. Advanced widespread lumbar degenerative disc disease appears greatest   at L4-L5. High-grade degenerative central canal and foraminal stenosis   greatest at L4-L5  3. Transitional morphology at the lumbar sacral junction  < end of copied text >    < from: CT Lumbar Spine No Cont (11.02.23 @ 21:09) >  IMPRESSION: L1-L4 laminectomies with transpedicular screws and connecting   rods and immature bony fusion mass as well as sacral to iliac fusion   expected postoperative changes..  < end of copied text >    < from: Xray Chest 1 View- PORTABLE-Urgent (Xray Chest 1 View- PORTABLE-Urgent .) (11.04.23 @ 16:10) >  IMPRESSION:  Right lower lobe opacity which may represent atelectasis/pneumonia.  < end of copied text >    < from: CT Angio Chest PE Protocol w/ IV Cont (11.06.23 @ 20:43) >  IMPRESSION:  No pulmonary embolism detected.  Right basilar parenchymal opacification favored to represent atelectasis, but consider infection in the appropriate clinical scenario.  < end of copied text >

## 2023-11-10 NOTE — PROGRESS NOTE ADULT - PROBLEM SELECTOR PLAN 4
-currently on oxycontin, oxycodone, percocet, tizandidine, gabapentin.   -Management as per pain management,   -Was on Nucenta as outpatient which ran out around 1 months and half ago.
-c/w amlodipine 10mg daily.

## 2023-11-10 NOTE — PROGRESS NOTE ADULT - PROBLEM SELECTOR PLAN 7
-presumed RLL atelectasis which could explain fevers but if persists will favor infectious source  -Incentive spirometry encouraged  - Aspiration precautions
-likely secondary to post-op changes; pt hemodynamically stable; will monitor CBC in am  -Add multivitamin daily. Monitoring for signs of bleeding. Follow up outpatient. Maintain active type and screen.   - Has yearly FOBT which he states was negative, no recent colonoscopy.
-Incentive spirometry encouraged  - Aspiration precautions
-Incentive spirometry encouraged  - Aspiration precautions

## 2023-11-11 LAB
ALBUMIN SERPL ELPH-MCNC: 2.8 G/DL — LOW (ref 3.3–5)
ALBUMIN SERPL ELPH-MCNC: 2.8 G/DL — LOW (ref 3.3–5)
ALP SERPL-CCNC: 61 U/L — SIGNIFICANT CHANGE UP (ref 40–120)
ALP SERPL-CCNC: 61 U/L — SIGNIFICANT CHANGE UP (ref 40–120)
ALT FLD-CCNC: 25 U/L — SIGNIFICANT CHANGE UP (ref 10–45)
ALT FLD-CCNC: 25 U/L — SIGNIFICANT CHANGE UP (ref 10–45)
ANION GAP SERPL CALC-SCNC: 10 MMOL/L — SIGNIFICANT CHANGE UP (ref 5–17)
ANION GAP SERPL CALC-SCNC: 10 MMOL/L — SIGNIFICANT CHANGE UP (ref 5–17)
AST SERPL-CCNC: 21 U/L — SIGNIFICANT CHANGE UP (ref 10–40)
AST SERPL-CCNC: 21 U/L — SIGNIFICANT CHANGE UP (ref 10–40)
BASOPHILS # BLD AUTO: 0.05 K/UL — SIGNIFICANT CHANGE UP (ref 0–0.2)
BASOPHILS # BLD AUTO: 0.05 K/UL — SIGNIFICANT CHANGE UP (ref 0–0.2)
BASOPHILS NFR BLD AUTO: 0.5 % — SIGNIFICANT CHANGE UP (ref 0–2)
BASOPHILS NFR BLD AUTO: 0.5 % — SIGNIFICANT CHANGE UP (ref 0–2)
BILIRUB SERPL-MCNC: 0.3 MG/DL — SIGNIFICANT CHANGE UP (ref 0.2–1.2)
BILIRUB SERPL-MCNC: 0.3 MG/DL — SIGNIFICANT CHANGE UP (ref 0.2–1.2)
BUN SERPL-MCNC: 21 MG/DL — SIGNIFICANT CHANGE UP (ref 7–23)
BUN SERPL-MCNC: 21 MG/DL — SIGNIFICANT CHANGE UP (ref 7–23)
CALCIUM SERPL-MCNC: 9.5 MG/DL — SIGNIFICANT CHANGE UP (ref 8.4–10.5)
CALCIUM SERPL-MCNC: 9.5 MG/DL — SIGNIFICANT CHANGE UP (ref 8.4–10.5)
CHLORIDE SERPL-SCNC: 94 MMOL/L — LOW (ref 96–108)
CHLORIDE SERPL-SCNC: 94 MMOL/L — LOW (ref 96–108)
CO2 SERPL-SCNC: 26 MMOL/L — SIGNIFICANT CHANGE UP (ref 22–31)
CO2 SERPL-SCNC: 26 MMOL/L — SIGNIFICANT CHANGE UP (ref 22–31)
CREAT SERPL-MCNC: 0.96 MG/DL — SIGNIFICANT CHANGE UP (ref 0.5–1.3)
CREAT SERPL-MCNC: 0.96 MG/DL — SIGNIFICANT CHANGE UP (ref 0.5–1.3)
CULTURE RESULTS: SIGNIFICANT CHANGE UP
EGFR: 89 ML/MIN/1.73M2 — SIGNIFICANT CHANGE UP
EGFR: 89 ML/MIN/1.73M2 — SIGNIFICANT CHANGE UP
EOSINOPHIL # BLD AUTO: 0.08 K/UL — SIGNIFICANT CHANGE UP (ref 0–0.5)
EOSINOPHIL # BLD AUTO: 0.08 K/UL — SIGNIFICANT CHANGE UP (ref 0–0.5)
EOSINOPHIL NFR BLD AUTO: 0.8 % — SIGNIFICANT CHANGE UP (ref 0–6)
EOSINOPHIL NFR BLD AUTO: 0.8 % — SIGNIFICANT CHANGE UP (ref 0–6)
FERRITIN SERPL-MCNC: 192 NG/ML — SIGNIFICANT CHANGE UP (ref 30–400)
FERRITIN SERPL-MCNC: 192 NG/ML — SIGNIFICANT CHANGE UP (ref 30–400)
GLUCOSE SERPL-MCNC: 107 MG/DL — HIGH (ref 70–99)
GLUCOSE SERPL-MCNC: 107 MG/DL — HIGH (ref 70–99)
HCT VFR BLD CALC: 27.1 % — LOW (ref 39–50)
HCT VFR BLD CALC: 27.1 % — LOW (ref 39–50)
HGB BLD-MCNC: 8.9 G/DL — LOW (ref 13–17)
HGB BLD-MCNC: 8.9 G/DL — LOW (ref 13–17)
IMM GRANULOCYTES NFR BLD AUTO: 1 % — HIGH (ref 0–0.9)
IMM GRANULOCYTES NFR BLD AUTO: 1 % — HIGH (ref 0–0.9)
IRON SATN MFR SERPL: 21 UG/DL — LOW (ref 45–165)
IRON SATN MFR SERPL: 21 UG/DL — LOW (ref 45–165)
IRON SATN MFR SERPL: 8 % — LOW (ref 16–55)
IRON SATN MFR SERPL: 8 % — LOW (ref 16–55)
LYMPHOCYTES # BLD AUTO: 1.84 K/UL — SIGNIFICANT CHANGE UP (ref 1–3.3)
LYMPHOCYTES # BLD AUTO: 1.84 K/UL — SIGNIFICANT CHANGE UP (ref 1–3.3)
LYMPHOCYTES # BLD AUTO: 17.9 % — SIGNIFICANT CHANGE UP (ref 13–44)
LYMPHOCYTES # BLD AUTO: 17.9 % — SIGNIFICANT CHANGE UP (ref 13–44)
MCHC RBC-ENTMCNC: 28.6 PG — SIGNIFICANT CHANGE UP (ref 27–34)
MCHC RBC-ENTMCNC: 28.6 PG — SIGNIFICANT CHANGE UP (ref 27–34)
MCHC RBC-ENTMCNC: 32.8 GM/DL — SIGNIFICANT CHANGE UP (ref 32–36)
MCHC RBC-ENTMCNC: 32.8 GM/DL — SIGNIFICANT CHANGE UP (ref 32–36)
MCV RBC AUTO: 87.1 FL — SIGNIFICANT CHANGE UP (ref 80–100)
MCV RBC AUTO: 87.1 FL — SIGNIFICANT CHANGE UP (ref 80–100)
MONOCYTES # BLD AUTO: 0.86 K/UL — SIGNIFICANT CHANGE UP (ref 0–0.9)
MONOCYTES # BLD AUTO: 0.86 K/UL — SIGNIFICANT CHANGE UP (ref 0–0.9)
MONOCYTES NFR BLD AUTO: 8.4 % — SIGNIFICANT CHANGE UP (ref 2–14)
MONOCYTES NFR BLD AUTO: 8.4 % — SIGNIFICANT CHANGE UP (ref 2–14)
NEUTROPHILS # BLD AUTO: 7.33 K/UL — SIGNIFICANT CHANGE UP (ref 1.8–7.4)
NEUTROPHILS # BLD AUTO: 7.33 K/UL — SIGNIFICANT CHANGE UP (ref 1.8–7.4)
NEUTROPHILS NFR BLD AUTO: 71.4 % — SIGNIFICANT CHANGE UP (ref 43–77)
NEUTROPHILS NFR BLD AUTO: 71.4 % — SIGNIFICANT CHANGE UP (ref 43–77)
NRBC # BLD: 0 /100 WBCS — SIGNIFICANT CHANGE UP (ref 0–0)
NRBC # BLD: 0 /100 WBCS — SIGNIFICANT CHANGE UP (ref 0–0)
OSMOLALITY SERPL: 285 MOSMOL/KG — SIGNIFICANT CHANGE UP (ref 280–301)
OSMOLALITY SERPL: 285 MOSMOL/KG — SIGNIFICANT CHANGE UP (ref 280–301)
PLATELET # BLD AUTO: 513 K/UL — HIGH (ref 150–400)
PLATELET # BLD AUTO: 513 K/UL — HIGH (ref 150–400)
POTASSIUM SERPL-MCNC: 5.3 MMOL/L — SIGNIFICANT CHANGE UP (ref 3.5–5.3)
POTASSIUM SERPL-MCNC: 5.3 MMOL/L — SIGNIFICANT CHANGE UP (ref 3.5–5.3)
POTASSIUM SERPL-SCNC: 5.3 MMOL/L — SIGNIFICANT CHANGE UP (ref 3.5–5.3)
POTASSIUM SERPL-SCNC: 5.3 MMOL/L — SIGNIFICANT CHANGE UP (ref 3.5–5.3)
PROT SERPL-MCNC: 7.2 G/DL — SIGNIFICANT CHANGE UP (ref 6–8.3)
PROT SERPL-MCNC: 7.2 G/DL — SIGNIFICANT CHANGE UP (ref 6–8.3)
RBC # BLD: 3.11 M/UL — LOW (ref 4.2–5.8)
RBC # BLD: 3.11 M/UL — LOW (ref 4.2–5.8)
RBC # FLD: 14 % — SIGNIFICANT CHANGE UP (ref 10.3–14.5)
RBC # FLD: 14 % — SIGNIFICANT CHANGE UP (ref 10.3–14.5)
SODIUM SERPL-SCNC: 130 MMOL/L — LOW (ref 135–145)
SODIUM SERPL-SCNC: 130 MMOL/L — LOW (ref 135–145)
SPECIMEN SOURCE: SIGNIFICANT CHANGE UP
TIBC SERPL-MCNC: 271 UG/DL — SIGNIFICANT CHANGE UP (ref 220–430)
TIBC SERPL-MCNC: 271 UG/DL — SIGNIFICANT CHANGE UP (ref 220–430)
UIBC SERPL-MCNC: 251 UG/DL — SIGNIFICANT CHANGE UP (ref 110–370)
UIBC SERPL-MCNC: 251 UG/DL — SIGNIFICANT CHANGE UP (ref 110–370)
WBC # BLD: 10.26 K/UL — SIGNIFICANT CHANGE UP (ref 3.8–10.5)
WBC # BLD: 10.26 K/UL — SIGNIFICANT CHANGE UP (ref 3.8–10.5)
WBC # FLD AUTO: 10.26 K/UL — SIGNIFICANT CHANGE UP (ref 3.8–10.5)
WBC # FLD AUTO: 10.26 K/UL — SIGNIFICANT CHANGE UP (ref 3.8–10.5)

## 2023-11-11 PROCEDURE — 99223 1ST HOSP IP/OBS HIGH 75: CPT

## 2023-11-11 PROCEDURE — 99222 1ST HOSP IP/OBS MODERATE 55: CPT

## 2023-11-11 PROCEDURE — 93010 ELECTROCARDIOGRAM REPORT: CPT

## 2023-11-11 RX ORDER — POLYETHYLENE GLYCOL 3350 17 G/17G
17 POWDER, FOR SOLUTION ORAL
Refills: 0 | Status: DISCONTINUED | OUTPATIENT
Start: 2023-11-11 | End: 2023-11-20

## 2023-11-11 RX ORDER — LACTULOSE 10 G/15ML
10 SOLUTION ORAL THREE TIMES A DAY
Refills: 0 | Status: COMPLETED | OUTPATIENT
Start: 2023-11-11 | End: 2023-11-12

## 2023-11-11 RX ORDER — INFLUENZA VIRUS VACCINE 15; 15; 15; 15 UG/.5ML; UG/.5ML; UG/.5ML; UG/.5ML
0.5 SUSPENSION INTRAMUSCULAR ONCE
Refills: 0 | Status: DISCONTINUED | OUTPATIENT
Start: 2023-11-11 | End: 2023-11-11

## 2023-11-11 RX ADMIN — NYSTATIN CREAM 1 APPLICATION(S): 100000 CREAM TOPICAL at 06:02

## 2023-11-11 RX ADMIN — GABAPENTIN 800 MILLIGRAM(S): 400 CAPSULE ORAL at 06:02

## 2023-11-11 RX ADMIN — OXYCODONE HYDROCHLORIDE 15 MILLIGRAM(S): 5 TABLET ORAL at 15:53

## 2023-11-11 RX ADMIN — OXYCODONE HYDROCHLORIDE 10 MILLIGRAM(S): 5 TABLET ORAL at 21:31

## 2023-11-11 RX ADMIN — Medication 1 TABLET(S): at 18:59

## 2023-11-11 RX ADMIN — OXYCODONE HYDROCHLORIDE 15 MILLIGRAM(S): 5 TABLET ORAL at 09:55

## 2023-11-11 RX ADMIN — OXYCODONE HYDROCHLORIDE 15 MILLIGRAM(S): 5 TABLET ORAL at 02:42

## 2023-11-11 RX ADMIN — OXYCODONE HYDROCHLORIDE 10 MILLIGRAM(S): 5 TABLET ORAL at 22:30

## 2023-11-11 RX ADMIN — GABAPENTIN 800 MILLIGRAM(S): 400 CAPSULE ORAL at 21:31

## 2023-11-11 RX ADMIN — TIZANIDINE 2 MILLIGRAM(S): 4 TABLET ORAL at 06:08

## 2023-11-11 RX ADMIN — AMLODIPINE BESYLATE 10 MILLIGRAM(S): 2.5 TABLET ORAL at 06:02

## 2023-11-11 RX ADMIN — HEPARIN SODIUM 5000 UNIT(S): 5000 INJECTION INTRAVENOUS; SUBCUTANEOUS at 21:31

## 2023-11-11 RX ADMIN — HEPARIN SODIUM 5000 UNIT(S): 5000 INJECTION INTRAVENOUS; SUBCUTANEOUS at 06:01

## 2023-11-11 RX ADMIN — POLYETHYLENE GLYCOL 3350 17 GRAM(S): 17 POWDER, FOR SOLUTION ORAL at 13:32

## 2023-11-11 RX ADMIN — OXYCODONE HYDROCHLORIDE 15 MILLIGRAM(S): 5 TABLET ORAL at 13:03

## 2023-11-11 RX ADMIN — GABAPENTIN 800 MILLIGRAM(S): 400 CAPSULE ORAL at 15:53

## 2023-11-11 RX ADMIN — OXYCODONE HYDROCHLORIDE 15 MILLIGRAM(S): 5 TABLET ORAL at 09:02

## 2023-11-11 RX ADMIN — LACTULOSE 10 GRAM(S): 10 SOLUTION ORAL at 21:31

## 2023-11-11 RX ADMIN — HEPARIN SODIUM 5000 UNIT(S): 5000 INJECTION INTRAVENOUS; SUBCUTANEOUS at 15:52

## 2023-11-11 RX ADMIN — OXYCODONE HYDROCHLORIDE 15 MILLIGRAM(S): 5 TABLET ORAL at 03:42

## 2023-11-11 RX ADMIN — OXYCODONE HYDROCHLORIDE 15 MILLIGRAM(S): 5 TABLET ORAL at 06:01

## 2023-11-11 RX ADMIN — TIZANIDINE 2 MILLIGRAM(S): 4 TABLET ORAL at 21:32

## 2023-11-11 RX ADMIN — TIZANIDINE 2 MILLIGRAM(S): 4 TABLET ORAL at 15:52

## 2023-11-11 RX ADMIN — Medication 1 TABLET(S): at 06:02

## 2023-11-11 RX ADMIN — OXYCODONE HYDROCHLORIDE 15 MILLIGRAM(S): 5 TABLET ORAL at 07:01

## 2023-11-11 RX ADMIN — ATORVASTATIN CALCIUM 20 MILLIGRAM(S): 80 TABLET, FILM COATED ORAL at 21:31

## 2023-11-11 RX ADMIN — SENNA PLUS 2 TABLET(S): 8.6 TABLET ORAL at 21:32

## 2023-11-11 RX ADMIN — OXYCODONE HYDROCHLORIDE 15 MILLIGRAM(S): 5 TABLET ORAL at 16:42

## 2023-11-11 RX ADMIN — Medication 975 MILLIGRAM(S): at 12:19

## 2023-11-11 RX ADMIN — POLYETHYLENE GLYCOL 3350 17 GRAM(S): 17 POWDER, FOR SOLUTION ORAL at 18:59

## 2023-11-11 RX ADMIN — Medication 975 MILLIGRAM(S): at 13:03

## 2023-11-11 RX ADMIN — OXYCODONE HYDROCHLORIDE 15 MILLIGRAM(S): 5 TABLET ORAL at 12:19

## 2023-11-11 NOTE — PROVIDER CONTACT NOTE (OTHER) - ASSESSMENT
VS as documented, denies chest pain, SOB. Pt does not appear to be in distress, pt stated he had similar episodes of tightness that resolved on their own.

## 2023-11-11 NOTE — PATIENT PROFILE ADULT - FUNCTIONAL ASSESSMENT - DAILY ACTIVITY 1.
72 y/o M with PMHx of HTN and COPD (active smoker) presents to ED with chronic dry cough secondary to smoking. Pt appears well and in no acute distress. no respiratory distress. He has history of being uncooperative with staff especially surrounding his discharge. Pt eloped without signed AMA. 72 y/o M with PMHx of HTN and COPD (active smoker) presents to ED with chronic dry cough secondary to smoking. Pt appears well and in no acute distress. no respiratory distress. He has history of being uncooperative with staff especially surrounding his discharge. Pt offered nebulizer treatment/albuterol MDI. Pt eloped without signing AMA 3 = A little assistance

## 2023-11-11 NOTE — PATIENT PROFILE ADULT - FALL HARM RISK - HARM RISK INTERVENTIONS

## 2023-11-11 NOTE — CONSULT NOTE ADULT - ASSESSMENT
#L1-L5 PSF and decompression due to lumbar stenosis  #chronic back pain  #s/p cervical fusion 2022 c/b left arm nerve damage with LUE contraction  - continue comprehensive rehab program  - remove staples 11/14  - ISTOP reviewed, patient reports he is pending pre-auth for nucynta and had stopped taking it 2 months ago. Per ISTOP, he was prescribed nucynta 100mg q6h in 7/2023. Advised patient to follow up with his prescriber and pharamacy and bring it in to hospital  - c/w pain managent per primary team. discussed with rehab attending -- may increase gabapentin or tizanidine to optimize pain control    #hyponatremia  - sodium 130, f/u serum osm and urine sodium  - may be due to pain vs. post op SIADH.     #essential HTN  - c/w norvasc 10mg po daily    #HLD  - c/w lipitor 20mg po daily     #normocytic anemia  - hb 8.7, stable  - follow up iron panel and ferritin    #constipation  - continue with senna and miralax  - add on lactulose 10mg po TID for one day (ordered)    #aspiration pneumonia  - complete hospital course of augmentin until 11/14.    heparin sq  full code

## 2023-11-12 LAB
SODIUM UR-SCNC: 34 MMOL/L — SIGNIFICANT CHANGE UP
SODIUM UR-SCNC: 34 MMOL/L — SIGNIFICANT CHANGE UP

## 2023-11-12 PROCEDURE — 99232 SBSQ HOSP IP/OBS MODERATE 35: CPT

## 2023-11-12 PROCEDURE — 99233 SBSQ HOSP IP/OBS HIGH 50: CPT

## 2023-11-12 RX ORDER — TIZANIDINE 4 MG/1
4 TABLET ORAL AT BEDTIME
Refills: 0 | Status: DISCONTINUED | OUTPATIENT
Start: 2023-11-12 | End: 2023-11-21

## 2023-11-12 RX ORDER — TIZANIDINE 4 MG/1
2 TABLET ORAL
Refills: 0 | Status: DISCONTINUED | OUTPATIENT
Start: 2023-11-12 | End: 2023-11-17

## 2023-11-12 RX ADMIN — OXYCODONE HYDROCHLORIDE 15 MILLIGRAM(S): 5 TABLET ORAL at 02:12

## 2023-11-12 RX ADMIN — Medication 975 MILLIGRAM(S): at 05:59

## 2023-11-12 RX ADMIN — TIZANIDINE 4 MILLIGRAM(S): 4 TABLET ORAL at 21:58

## 2023-11-12 RX ADMIN — GABAPENTIN 800 MILLIGRAM(S): 400 CAPSULE ORAL at 21:58

## 2023-11-12 RX ADMIN — OXYCODONE HYDROCHLORIDE 15 MILLIGRAM(S): 5 TABLET ORAL at 18:37

## 2023-11-12 RX ADMIN — TIZANIDINE 2 MILLIGRAM(S): 4 TABLET ORAL at 05:58

## 2023-11-12 RX ADMIN — GABAPENTIN 800 MILLIGRAM(S): 400 CAPSULE ORAL at 05:58

## 2023-11-12 RX ADMIN — Medication 1 TABLET(S): at 05:58

## 2023-11-12 RX ADMIN — Medication 975 MILLIGRAM(S): at 21:59

## 2023-11-12 RX ADMIN — Medication 975 MILLIGRAM(S): at 14:57

## 2023-11-12 RX ADMIN — POLYETHYLENE GLYCOL 3350 17 GRAM(S): 17 POWDER, FOR SOLUTION ORAL at 18:37

## 2023-11-12 RX ADMIN — OXYCODONE HYDROCHLORIDE 15 MILLIGRAM(S): 5 TABLET ORAL at 19:31

## 2023-11-12 RX ADMIN — Medication 975 MILLIGRAM(S): at 22:45

## 2023-11-12 RX ADMIN — LACTULOSE 10 GRAM(S): 10 SOLUTION ORAL at 05:59

## 2023-11-12 RX ADMIN — AMLODIPINE BESYLATE 10 MILLIGRAM(S): 2.5 TABLET ORAL at 05:59

## 2023-11-12 RX ADMIN — Medication 1 TABLET(S): at 18:37

## 2023-11-12 RX ADMIN — ATORVASTATIN CALCIUM 20 MILLIGRAM(S): 80 TABLET, FILM COATED ORAL at 21:59

## 2023-11-12 RX ADMIN — HEPARIN SODIUM 5000 UNIT(S): 5000 INJECTION INTRAVENOUS; SUBCUTANEOUS at 14:58

## 2023-11-12 RX ADMIN — NYSTATIN CREAM 1 APPLICATION(S): 100000 CREAM TOPICAL at 05:59

## 2023-11-12 RX ADMIN — OXYCODONE HYDROCHLORIDE 15 MILLIGRAM(S): 5 TABLET ORAL at 08:49

## 2023-11-12 RX ADMIN — Medication 1 TABLET(S): at 14:58

## 2023-11-12 RX ADMIN — OXYCODONE HYDROCHLORIDE 15 MILLIGRAM(S): 5 TABLET ORAL at 03:10

## 2023-11-12 RX ADMIN — Medication 0.5 MILLIGRAM(S): at 19:54

## 2023-11-12 RX ADMIN — GABAPENTIN 800 MILLIGRAM(S): 400 CAPSULE ORAL at 14:58

## 2023-11-12 RX ADMIN — HEPARIN SODIUM 5000 UNIT(S): 5000 INJECTION INTRAVENOUS; SUBCUTANEOUS at 05:58

## 2023-11-12 RX ADMIN — Medication 975 MILLIGRAM(S): at 15:27

## 2023-11-12 RX ADMIN — Medication 975 MILLIGRAM(S): at 06:50

## 2023-11-12 RX ADMIN — HEPARIN SODIUM 5000 UNIT(S): 5000 INJECTION INTRAVENOUS; SUBCUTANEOUS at 21:58

## 2023-11-12 RX ADMIN — POLYETHYLENE GLYCOL 3350 17 GRAM(S): 17 POWDER, FOR SOLUTION ORAL at 05:59

## 2023-11-12 RX ADMIN — SENNA PLUS 2 TABLET(S): 8.6 TABLET ORAL at 21:59

## 2023-11-12 RX ADMIN — LACTULOSE 10 GRAM(S): 10 SOLUTION ORAL at 14:58

## 2023-11-12 RX ADMIN — TIZANIDINE 2 MILLIGRAM(S): 4 TABLET ORAL at 14:57

## 2023-11-12 RX ADMIN — OXYCODONE HYDROCHLORIDE 15 MILLIGRAM(S): 5 TABLET ORAL at 09:36

## 2023-11-12 NOTE — PROGRESS NOTE ADULT - ASSESSMENT
#L1-L5 PSF and decompression due to lumbar stenosis  #chronic back pain  #s/p cervical fusion 2022 c/b left arm nerve damage with LUE contraction  - continue comprehensive rehab program  - remove staples 11/14  - ISTOP reviewed, patient reports he is pending pre-auth for nucynta and had stopped taking it 2 months ago. Per ISTOP, he was prescribed nucynta 100mg q6h in 7/2023. Advised patient to follow up with his prescriber and pharamacy and bring it in to hospital  - c/w pain managent per primary team. discussed with rehab attending -- will increase gabapentin or switch to lyrica; will increase tizanidien    #hyponatremia  - sodium 130  - serum osm 285, urine sodium is 34  - noted with slightly elevated protein gap 4.4 -- outpatient follow up  - may be due to pain vs. post op changes    #essential HTN  - c/w norvasc 10mg po daily    #HLD  - c/w lipitor 20mg po daily     #normocytic anemia  - hb 8.7, stable  - iron panel and ferritin consistent with MARIA DOLORES -- start ferrous sulfate after patient has BM    #constipation  - continue with senna and miralax  - add on lactulose 10mg po TID for one day (ordered) -- continue for today  - start dulcolax  - consider movantik if formulary for suspected opioid induced constipaton.    #aspiration pneumonia  - complete hospital course of augmentin until 11/14.    heparin sq  full code

## 2023-11-12 NOTE — PROGRESS NOTE ADULT - SUBJECTIVE AND OBJECTIVE BOX
Cc: Gait dysfunction    HPI: Patient seen and examined. States that last night, he woke up due to increased spasticity in his left hand and left upper extremity that spread to his pectoral region. He was evaluated for concern of left chest tightness, and had an EKG done. This morning, he confirms that the inciting factor was the spasm of his left hand and LUE. He was eventually able to manually relax his left hand. Continues to have his baseline low back pain. Was requesting switch from oxycodone to nucynta. I-stop reveals patient has had various opioid prescriptions over the past year. Nucynta was last prescribed on 7/21/23.   No chest pain, no N/V, no Fevers/Chills. No other new ROS  Has been tolerating rehabilitation program.      acetaminophen     Tablet .. 975 milliGRAM(s) Oral every 8 hours  albuterol    90 MICROgram(s) HFA Inhaler 2 Puff(s) Inhalation every 6 hours PRN  ALPRAZolam 0.5 milliGRAM(s) Oral at bedtime PRN  amLODIPine   Tablet 10 milliGRAM(s) Oral daily  amoxicillin  875 milliGRAM(s)/clavulanate 1 Tablet(s) Oral two times a day  atorvastatin 20 milliGRAM(s) Oral at bedtime  bisacodyl Suppository 10 milliGRAM(s) Rectal daily PRN  gabapentin 800 milliGRAM(s) Oral three times a day  heparin   Injectable 5000 Unit(s) SubCutaneous every 8 hours  lactulose Syrup 10 Gram(s) Oral three times a day  lidocaine   4% Patch 1 Patch Transdermal daily  lidocaine   4% Patch 1 Patch Transdermal daily  multivitamin 1 Tablet(s) Oral daily  nystatin Powder 1 Application(s) Topical two times a day  ondansetron   Disintegrating Tablet 4 milliGRAM(s) Oral every 8 hours PRN  oxyCODONE    IR 10 milliGRAM(s) Oral every 3 hours PRN  oxyCODONE    IR 15 milliGRAM(s) Oral every 3 hours PRN  polyethylene glycol 3350 17 Gram(s) Oral two times a day  senna 2 Tablet(s) Oral at bedtime  tiZANidine 2 milliGRAM(s) Oral <User Schedule>  tiZANidine 4 milliGRAM(s) Oral at bedtime          T(C): 37.2 (11-12-23 @ 08:57), Max: 37.2 (11-12-23 @ 08:57)  T(F): 98.9 (11-12-23 @ 08:57), Max: 98.9 (11-12-23 @ 08:57)  HR: 57 (11-12-23 @ 08:57) (57 - 86)  BP: 125/55 (11-12-23 @ 08:57) (116/60 - 125/55)  RR: 16 (11-12-23 @ 08:57) (16 - 18)  SpO2: 96% (11-12-23 @ 08:57) (94% - 96%)                    In NAD  HEENT- EOMI  Heart- RRR, S1S2  Lungs- CTA bl.  Abd- + BS, NT, mild distention   Ext- +left hand and LUE spasticity  Neuro- Exam unchanged          Imp: Patient with diagnosis of    lumbar stenosis s/p L1-pelvis PSF and decompression      admitted for comprehensive acute rehabilitation.    Plan:  - Continue PT/OT  - DVT prophylaxis - heparin subq  - Skin- Turn q2h, check skin daily, monitor lumbar incision site. aquacel intact, C/D/I  - spasticity - increase bedtime tizanidine to 4mg. Continue 2mg dose at 6am and 3pm. Monitor LFTs  - pain - consider increasing gabapentin to 900mg TID, then titrating to 1200mg TID vs transition to trial of pregabalin for pain. Would limit opioid use in setting of constipation  - constipation - like opioid induced vs neurogenic bowel. No BM in 5 days despite senna, miralax. Added lactulose x1 yesterday without BM. Will use suppository today. If no BM, then consider enema.   - Patient is stable to continue current rehabilitation program.

## 2023-11-12 NOTE — ASU PREOP CHECKLIST - SITE MARKED BY SURGEON
History and Physical          Subjective     HPI: Paulie Lorenzo is a 37 y.o. male with a PMHx of THC use who presented to the ED with c/o severe abd pain that started yesterday associated with nausea and poor appetite. Movement and palpitation make it worse. It is currently 8/10 and sharp. He denies constipation, diarrhea, sob, cp, fever. He did not vomit until medications given in the ED. No prior abdominal surgeries. No home medications. His father and grandfather  of cancer. His father  at 61 after a 7 year higgins with stomach cancer. In the ED, VSS. Labs with Cr 1.36. CXR negative. CTAP revealed Focal sigmoid colon wall thickening with luminal narrowing and confluent partially calcified mesenteric mass extremely concerning for colon cancer with adjacent partially calcified adenopathy or direct extension into the mesentery. Small hypodense lesions in the liver are indeterminate. GI and GS were consulted. PMHx:  Past Medical History:   Diagnosis Date    Marijuana use        PSurgHx:  History reviewed. No pertinent surgical history. SocialHx:  Social History     Socioeconomic History    Marital status: Single     Spouse name: None    Number of children: None    Years of education: None    Highest education level: None   Tobacco Use    Smoking status: Never    Smokeless tobacco: Never   Substance and Sexual Activity    Alcohol use: Yes     Comment: socially    Drug use: Yes     Types: Marijuana [place])       FamilyHx:  Family History   Problem Relation Age of Onset    Stomach Cancer Father 54        Passed away 62 yo    Cancer Paternal Grandfather        Home Medications:  Prior to Admission medications    Not on File   None    Allergies:  No Known Allergies     Review of Systems:  CONST: no weight loss/gain, no fever or chills, no fatigue  Eyes: No change in vision, no itching or drainage  ENT: No earache, no tinnitus, no sore throat or sinus congestion.    PULM: No shortness of breath, no cough n/a

## 2023-11-12 NOTE — PROGRESS NOTE ADULT - SUBJECTIVE AND OBJECTIVE BOX
some left hand spasms with radiation to upper arm and chest      PHYSICAL EXAM:    Vital Signs Last 24 Hrs  T(F): 98.9 (12 Nov 2023 08:57), Max: 98.9 (12 Nov 2023 08:57)  HR: 57 (12 Nov 2023 08:57) (57 - 86)  BP: 125/55 (12 Nov 2023 08:57) (116/60 - 125/55)  RR: 16 (12 Nov 2023 08:57) (16 - 18)  SpO2: 96% (12 Nov 2023 08:57) (94% - 96%)  I&O's Summary    11 Nov 2023 07:01  -  12 Nov 2023 07:00  --------------------------------------------------------  IN: 0 mL / OUT: 900 mL / NET: -900 mL        GENERAL: NAD  HEENT: NCAT  CHEST/LUNG: No increased WOB, Clear to percussion bilaterally; No rales, rhonchi, wheezing  HEART: Regular rate and rhythm; No murmurs  ABDOMEN: Soft, Nontender, Nondistended; Bowel sounds present  MUSCULOSKELETAL/EXTREMITIES:  2+ Peripheral Pulses, No LE edema; LEFT hand contraction  PSYCH: Appropriate affect, Alert & Oriented    LABS:                        8.9    10.26 )-----------( 513      ( 11 Nov 2023 06:33 )             27.1       11-11    130  |  94  |  21  ----------------------------<  107  5.3   |  26  |  0.96    Ca    9.5      11 Nov 2023 06:33    TPro  7.2  /  Alb  2.8  /  TBili  0.3  /  DBili  x   /  AST  21  /  ALT  25  /  AlkPhos  61  11-11                                  Urinalysis Basic - ( 11 Nov 2023 06:33 )    Color: x / Appearance: x / SG: x / pH: x  Gluc: 107 mg/dL / Ketone: x  / Bili: x / Urobili: x   Blood: x / Protein: x / Nitrite: x   Leuk Esterase: x / RBC: x / WBC x   Sq Epi: x / Non Sq Epi: x / Bacteria: x        Culture - Blood (collected 06 Nov 2023 14:40)  Source: .Blood Blood  Final Report (11 Nov 2023 19:00):    No growth at 5 days    Culture - Blood (collected 06 Nov 2023 14:20)  Source: .Blood Blood  Final Report (11 Nov 2023 19:00):    No growth at 5 days      COVID-19 PCR: NotDetec (11-10-23 @ 21:08)      MEDICATIONS  (STANDING):  acetaminophen     Tablet .. 975 milliGRAM(s) Oral every 8 hours  amLODIPine   Tablet 10 milliGRAM(s) Oral daily  amoxicillin  875 milliGRAM(s)/clavulanate 1 Tablet(s) Oral two times a day  atorvastatin 20 milliGRAM(s) Oral at bedtime  gabapentin 800 milliGRAM(s) Oral three times a day  heparin   Injectable 5000 Unit(s) SubCutaneous every 8 hours  lactulose Syrup 10 Gram(s) Oral three times a day  lidocaine   4% Patch 1 Patch Transdermal daily  lidocaine   4% Patch 1 Patch Transdermal daily  multivitamin 1 Tablet(s) Oral daily  nystatin Powder 1 Application(s) Topical two times a day  polyethylene glycol 3350 17 Gram(s) Oral two times a day  senna 2 Tablet(s) Oral at bedtime  tiZANidine 2 milliGRAM(s) Oral <User Schedule>  tiZANidine 4 milliGRAM(s) Oral at bedtime    MEDICATIONS  (PRN):  albuterol    90 MICROgram(s) HFA Inhaler 2 Puff(s) Inhalation every 6 hours PRN Shortness of Breath and/or Wheezing  ALPRAZolam 0.5 milliGRAM(s) Oral at bedtime PRN anxiety  bisacodyl Suppository 10 milliGRAM(s) Rectal daily PRN Constipation  ondansetron   Disintegrating Tablet 4 milliGRAM(s) Oral every 8 hours PRN Nausea and/or Vomiting  oxyCODONE    IR 15 milliGRAM(s) Oral every 3 hours PRN Severe Pain (7 - 10)  oxyCODONE    IR 10 milliGRAM(s) Oral every 3 hours PRN Moderate Pain (4 - 6)      Care Discussed with Consultants/Other Providers: Yes

## 2023-11-13 LAB
ALBUMIN SERPL ELPH-MCNC: 2.9 G/DL — LOW (ref 3.3–5)
ALBUMIN SERPL ELPH-MCNC: 2.9 G/DL — LOW (ref 3.3–5)
ALP SERPL-CCNC: 75 U/L — SIGNIFICANT CHANGE UP (ref 40–120)
ALP SERPL-CCNC: 75 U/L — SIGNIFICANT CHANGE UP (ref 40–120)
ALT FLD-CCNC: 23 U/L — SIGNIFICANT CHANGE UP (ref 10–45)
ALT FLD-CCNC: 23 U/L — SIGNIFICANT CHANGE UP (ref 10–45)
ANION GAP SERPL CALC-SCNC: 8 MMOL/L — SIGNIFICANT CHANGE UP (ref 5–17)
ANION GAP SERPL CALC-SCNC: 8 MMOL/L — SIGNIFICANT CHANGE UP (ref 5–17)
AST SERPL-CCNC: 21 U/L — SIGNIFICANT CHANGE UP (ref 10–40)
AST SERPL-CCNC: 21 U/L — SIGNIFICANT CHANGE UP (ref 10–40)
BASOPHILS # BLD AUTO: 0.06 K/UL — SIGNIFICANT CHANGE UP (ref 0–0.2)
BASOPHILS # BLD AUTO: 0.06 K/UL — SIGNIFICANT CHANGE UP (ref 0–0.2)
BASOPHILS NFR BLD AUTO: 0.6 % — SIGNIFICANT CHANGE UP (ref 0–2)
BASOPHILS NFR BLD AUTO: 0.6 % — SIGNIFICANT CHANGE UP (ref 0–2)
BILIRUB SERPL-MCNC: 0.3 MG/DL — SIGNIFICANT CHANGE UP (ref 0.2–1.2)
BILIRUB SERPL-MCNC: 0.3 MG/DL — SIGNIFICANT CHANGE UP (ref 0.2–1.2)
BUN SERPL-MCNC: 19 MG/DL — SIGNIFICANT CHANGE UP (ref 7–23)
BUN SERPL-MCNC: 19 MG/DL — SIGNIFICANT CHANGE UP (ref 7–23)
CALCIUM SERPL-MCNC: 9.7 MG/DL — SIGNIFICANT CHANGE UP (ref 8.4–10.5)
CALCIUM SERPL-MCNC: 9.7 MG/DL — SIGNIFICANT CHANGE UP (ref 8.4–10.5)
CHLORIDE SERPL-SCNC: 95 MMOL/L — LOW (ref 96–108)
CHLORIDE SERPL-SCNC: 95 MMOL/L — LOW (ref 96–108)
CO2 SERPL-SCNC: 28 MMOL/L — SIGNIFICANT CHANGE UP (ref 22–31)
CO2 SERPL-SCNC: 28 MMOL/L — SIGNIFICANT CHANGE UP (ref 22–31)
CREAT SERPL-MCNC: 0.96 MG/DL — SIGNIFICANT CHANGE UP (ref 0.5–1.3)
CREAT SERPL-MCNC: 0.96 MG/DL — SIGNIFICANT CHANGE UP (ref 0.5–1.3)
EGFR: 88 ML/MIN/1.73M2 — SIGNIFICANT CHANGE UP
EGFR: 88 ML/MIN/1.73M2 — SIGNIFICANT CHANGE UP
EOSINOPHIL # BLD AUTO: 0.1 K/UL — SIGNIFICANT CHANGE UP (ref 0–0.5)
EOSINOPHIL # BLD AUTO: 0.1 K/UL — SIGNIFICANT CHANGE UP (ref 0–0.5)
EOSINOPHIL NFR BLD AUTO: 1 % — SIGNIFICANT CHANGE UP (ref 0–6)
EOSINOPHIL NFR BLD AUTO: 1 % — SIGNIFICANT CHANGE UP (ref 0–6)
GLUCOSE SERPL-MCNC: 125 MG/DL — HIGH (ref 70–99)
GLUCOSE SERPL-MCNC: 125 MG/DL — HIGH (ref 70–99)
HCT VFR BLD CALC: 27.7 % — LOW (ref 39–50)
HCT VFR BLD CALC: 27.7 % — LOW (ref 39–50)
HGB BLD-MCNC: 8.9 G/DL — LOW (ref 13–17)
HGB BLD-MCNC: 8.9 G/DL — LOW (ref 13–17)
IMM GRANULOCYTES NFR BLD AUTO: 0.5 % — SIGNIFICANT CHANGE UP (ref 0–0.9)
IMM GRANULOCYTES NFR BLD AUTO: 0.5 % — SIGNIFICANT CHANGE UP (ref 0–0.9)
LYMPHOCYTES # BLD AUTO: 1.57 K/UL — SIGNIFICANT CHANGE UP (ref 1–3.3)
LYMPHOCYTES # BLD AUTO: 1.57 K/UL — SIGNIFICANT CHANGE UP (ref 1–3.3)
LYMPHOCYTES # BLD AUTO: 16.4 % — SIGNIFICANT CHANGE UP (ref 13–44)
LYMPHOCYTES # BLD AUTO: 16.4 % — SIGNIFICANT CHANGE UP (ref 13–44)
MCHC RBC-ENTMCNC: 27.9 PG — SIGNIFICANT CHANGE UP (ref 27–34)
MCHC RBC-ENTMCNC: 27.9 PG — SIGNIFICANT CHANGE UP (ref 27–34)
MCHC RBC-ENTMCNC: 32.1 GM/DL — SIGNIFICANT CHANGE UP (ref 32–36)
MCHC RBC-ENTMCNC: 32.1 GM/DL — SIGNIFICANT CHANGE UP (ref 32–36)
MCV RBC AUTO: 86.8 FL — SIGNIFICANT CHANGE UP (ref 80–100)
MCV RBC AUTO: 86.8 FL — SIGNIFICANT CHANGE UP (ref 80–100)
MONOCYTES # BLD AUTO: 0.61 K/UL — SIGNIFICANT CHANGE UP (ref 0–0.9)
MONOCYTES # BLD AUTO: 0.61 K/UL — SIGNIFICANT CHANGE UP (ref 0–0.9)
MONOCYTES NFR BLD AUTO: 6.4 % — SIGNIFICANT CHANGE UP (ref 2–14)
MONOCYTES NFR BLD AUTO: 6.4 % — SIGNIFICANT CHANGE UP (ref 2–14)
NEUTROPHILS # BLD AUTO: 7.19 K/UL — SIGNIFICANT CHANGE UP (ref 1.8–7.4)
NEUTROPHILS # BLD AUTO: 7.19 K/UL — SIGNIFICANT CHANGE UP (ref 1.8–7.4)
NEUTROPHILS NFR BLD AUTO: 75.1 % — SIGNIFICANT CHANGE UP (ref 43–77)
NEUTROPHILS NFR BLD AUTO: 75.1 % — SIGNIFICANT CHANGE UP (ref 43–77)
NRBC # BLD: 0 /100 WBCS — SIGNIFICANT CHANGE UP (ref 0–0)
NRBC # BLD: 0 /100 WBCS — SIGNIFICANT CHANGE UP (ref 0–0)
PLATELET # BLD AUTO: 546 K/UL — HIGH (ref 150–400)
PLATELET # BLD AUTO: 546 K/UL — HIGH (ref 150–400)
POTASSIUM SERPL-MCNC: 4.5 MMOL/L — SIGNIFICANT CHANGE UP (ref 3.5–5.3)
POTASSIUM SERPL-MCNC: 4.5 MMOL/L — SIGNIFICANT CHANGE UP (ref 3.5–5.3)
POTASSIUM SERPL-SCNC: 4.5 MMOL/L — SIGNIFICANT CHANGE UP (ref 3.5–5.3)
POTASSIUM SERPL-SCNC: 4.5 MMOL/L — SIGNIFICANT CHANGE UP (ref 3.5–5.3)
PROT SERPL-MCNC: 7.4 G/DL — SIGNIFICANT CHANGE UP (ref 6–8.3)
PROT SERPL-MCNC: 7.4 G/DL — SIGNIFICANT CHANGE UP (ref 6–8.3)
RBC # BLD: 3.19 M/UL — LOW (ref 4.2–5.8)
RBC # BLD: 3.19 M/UL — LOW (ref 4.2–5.8)
RBC # FLD: 13.3 % — SIGNIFICANT CHANGE UP (ref 10.3–14.5)
RBC # FLD: 13.3 % — SIGNIFICANT CHANGE UP (ref 10.3–14.5)
SODIUM SERPL-SCNC: 131 MMOL/L — LOW (ref 135–145)
SODIUM SERPL-SCNC: 131 MMOL/L — LOW (ref 135–145)
WBC # BLD: 9.58 K/UL — SIGNIFICANT CHANGE UP (ref 3.8–10.5)
WBC # BLD: 9.58 K/UL — SIGNIFICANT CHANGE UP (ref 3.8–10.5)
WBC # FLD AUTO: 9.58 K/UL — SIGNIFICANT CHANGE UP (ref 3.8–10.5)
WBC # FLD AUTO: 9.58 K/UL — SIGNIFICANT CHANGE UP (ref 3.8–10.5)

## 2023-11-13 PROCEDURE — 99232 SBSQ HOSP IP/OBS MODERATE 35: CPT

## 2023-11-13 PROCEDURE — 90791 PSYCH DIAGNOSTIC EVALUATION: CPT

## 2023-11-13 RX ORDER — BACITRACIN ZINC 500 UNIT/G
1 OINTMENT IN PACKET (EA) TOPICAL
Refills: 0 | Status: COMPLETED | OUTPATIENT
Start: 2023-11-13 | End: 2023-11-16

## 2023-11-13 RX ORDER — TRAMADOL HYDROCHLORIDE 50 MG/1
50 TABLET ORAL
Refills: 0 | Status: DISCONTINUED | OUTPATIENT
Start: 2023-11-13 | End: 2023-11-14

## 2023-11-13 RX ADMIN — OXYCODONE HYDROCHLORIDE 15 MILLIGRAM(S): 5 TABLET ORAL at 04:30

## 2023-11-13 RX ADMIN — OXYCODONE HYDROCHLORIDE 15 MILLIGRAM(S): 5 TABLET ORAL at 03:40

## 2023-11-13 RX ADMIN — Medication 975 MILLIGRAM(S): at 05:24

## 2023-11-13 RX ADMIN — Medication 1 TABLET(S): at 12:09

## 2023-11-13 RX ADMIN — OXYCODONE HYDROCHLORIDE 15 MILLIGRAM(S): 5 TABLET ORAL at 09:41

## 2023-11-13 RX ADMIN — NYSTATIN CREAM 1 APPLICATION(S): 100000 CREAM TOPICAL at 05:36

## 2023-11-13 RX ADMIN — Medication 975 MILLIGRAM(S): at 06:00

## 2023-11-13 RX ADMIN — HEPARIN SODIUM 5000 UNIT(S): 5000 INJECTION INTRAVENOUS; SUBCUTANEOUS at 21:34

## 2023-11-13 RX ADMIN — Medication 1 TABLET(S): at 17:33

## 2023-11-13 RX ADMIN — GABAPENTIN 800 MILLIGRAM(S): 400 CAPSULE ORAL at 14:08

## 2023-11-13 RX ADMIN — OXYCODONE HYDROCHLORIDE 15 MILLIGRAM(S): 5 TABLET ORAL at 10:41

## 2023-11-13 RX ADMIN — SENNA PLUS 2 TABLET(S): 8.6 TABLET ORAL at 21:35

## 2023-11-13 RX ADMIN — POLYETHYLENE GLYCOL 3350 17 GRAM(S): 17 POWDER, FOR SOLUTION ORAL at 05:25

## 2023-11-13 RX ADMIN — TIZANIDINE 2 MILLIGRAM(S): 4 TABLET ORAL at 14:08

## 2023-11-13 RX ADMIN — Medication 975 MILLIGRAM(S): at 21:33

## 2023-11-13 RX ADMIN — TRAMADOL HYDROCHLORIDE 50 MILLIGRAM(S): 50 TABLET ORAL at 18:16

## 2023-11-13 RX ADMIN — GABAPENTIN 800 MILLIGRAM(S): 400 CAPSULE ORAL at 05:23

## 2023-11-13 RX ADMIN — Medication 0.5 MILLIGRAM(S): at 14:50

## 2023-11-13 RX ADMIN — TRAMADOL HYDROCHLORIDE 50 MILLIGRAM(S): 50 TABLET ORAL at 17:33

## 2023-11-13 RX ADMIN — Medication 1 APPLICATION(S): at 17:37

## 2023-11-13 RX ADMIN — OXYCODONE HYDROCHLORIDE 15 MILLIGRAM(S): 5 TABLET ORAL at 20:34

## 2023-11-13 RX ADMIN — NYSTATIN CREAM 1 APPLICATION(S): 100000 CREAM TOPICAL at 17:29

## 2023-11-13 RX ADMIN — HEPARIN SODIUM 5000 UNIT(S): 5000 INJECTION INTRAVENOUS; SUBCUTANEOUS at 05:24

## 2023-11-13 RX ADMIN — TIZANIDINE 4 MILLIGRAM(S): 4 TABLET ORAL at 21:33

## 2023-11-13 RX ADMIN — Medication 1 TABLET(S): at 05:23

## 2023-11-13 RX ADMIN — OXYCODONE HYDROCHLORIDE 15 MILLIGRAM(S): 5 TABLET ORAL at 19:34

## 2023-11-13 RX ADMIN — ATORVASTATIN CALCIUM 20 MILLIGRAM(S): 80 TABLET, FILM COATED ORAL at 21:34

## 2023-11-13 RX ADMIN — Medication 975 MILLIGRAM(S): at 22:33

## 2023-11-13 RX ADMIN — AMLODIPINE BESYLATE 10 MILLIGRAM(S): 2.5 TABLET ORAL at 06:19

## 2023-11-13 RX ADMIN — Medication 975 MILLIGRAM(S): at 14:08

## 2023-11-13 RX ADMIN — TIZANIDINE 2 MILLIGRAM(S): 4 TABLET ORAL at 05:23

## 2023-11-13 RX ADMIN — GABAPENTIN 800 MILLIGRAM(S): 400 CAPSULE ORAL at 21:34

## 2023-11-13 RX ADMIN — Medication 975 MILLIGRAM(S): at 15:08

## 2023-11-13 RX ADMIN — HEPARIN SODIUM 5000 UNIT(S): 5000 INJECTION INTRAVENOUS; SUBCUTANEOUS at 14:08

## 2023-11-13 NOTE — CONSULT NOTE ADULT - CONSULT REASON
medicine consult
Difficulty with hospital course and situation
Medical Necessity Information: It is in your best interest to select a reason for this procedure from the list below. All of these items fulfill various CMS LCD requirements except the new and changing color options.
Include Z78.9 (Other Specified Conditions Influencing Health Status) As An Associated Diagnosis?: No
Medical Necessity Clause: This procedure was medically necessary because the lesion that was treated was:
Detail Level: Detailed
Size Of Lesion In Cm: 0.3
X Size Of Lesion In Cm (Optional): 0
Anesthesia Type: 1% lidocaine with epinephrine
Hemostasis: Drysol
Wound Care: Petrolatum
Consent was obtained from the patient. The risks and benefits to therapy were discussed in detail. Specifically, the risks of infection, scarring, bleeding, prolonged wound healing, incomplete removal, allergy to anesthesia, nerve injury and recurrence were addressed. Prior to the procedure, the treatment site was clearly identified and confirmed by the patient. All components of Universal Protocol/PAUSE Rule completed.
Render Post-Care Instructions In Note?: yes
Post-Care Instructions: I reviewed with the patient in detail post-care instructions. Patient is to keep the biopsy site dry overnight, and then apply bacitracin twice daily until healed. Patient may apply hydrogen peroxide soaks to remove any crusting.
Size Of Lesion In Cm: 0.5

## 2023-11-13 NOTE — CONSULT NOTE ADULT - REASON FOR ADMISSION
lumbar stenosis s/p L1-pelvis PSF and decompression
Lumbar stenosis s/p L1-pelvis PSF and decompression

## 2023-11-13 NOTE — PROGRESS NOTE ADULT - SUBJECTIVE AND OBJECTIVE BOX
Subjective  Reports acute on chronic back pain, despite current analgesic regimen  Reports being on Tapentadol for so many years with good pain control  However, his insurance had recently declined authorization for this medication, prior to his lumbar spine surgery, with resultant progression of his pain and poor QoL  He is tolerating oral diet  Has intermittent nausea  He gave phone contact for his pain mgt physician Dr Vaughn Bach, whom I spoke with and obtained collateral information and pain mgt recs    RSO--No chest or abd pain, no headache, LBM 11/13  Back pain, no bleeding muscle spasms, minimal     Therapy  Engaged for assessment and therapy  Pain symptoms lower back and legs limiting his engagement      ICU Vital Signs Last 24 Hrs  T(C): 36.8 (13 Nov 2023 08:02), Max: 36.9 (12 Nov 2023 19:32)  T(F): 98.2 (13 Nov 2023 08:02), Max: 98.5 (12 Nov 2023 19:32)  HR: 75 (13 Nov 2023 08:02) (75 - 83)  BP: 102/65 (13 Nov 2023 08:02) (102/65 - 115/72)  RR: 16 (13 Nov 2023 08:02) (16 - 16)  SpO2: 96% (13 Nov 2023 08:02) (96% - 96%)  O2 Parameters below as of 13 Nov 2023 08:02  Patient On (Oxygen Delivery Method): room air      EXAM  Gen - Comfortable  HEENT - NAD  Neck - Supple,   Pulm - Clear  Cardiovascular - RRR, S1S2, No m/r/g  Abdomen - Soft, non  tender, +BS  Extremities - No C/C/E, No calf tenderness. Left shoulder AROM ~30degrees, PROM ~90 degrees      Neuro-     Cognitive - AAOx3     Communication - Fluent, No dysarthria     Attention: Intact      Cranial Nerves - CN 2-12 grossly intact     Motor -                    LEFT    UE - ShAB 5/5, EF 4-/5, EE 4-/5, WE 5/5,  4/5, unable to flex or abduct shoulder past 90 deg. left elbow flexion contracture                     RIGHT UE - ShAB 5/5, EF 5/5, EE 5/5, WE 5/5,  4/5                    LEFT    LE - HF 1/5, KE 1/5, DF 0/5, PF 4/5                    RIGHT LE - HF 4/5 (due to back pain), KE 5/5, DF 5/5, PF 5/5        Sensory - diminished sensation left lower extremity stocking distribution compared to right.  Reports numbness of bilateral hands and toes.      Reflexes - Left ankle clonus, Left Gilbert's positive. Right Gilbert's negative. Right patellar reflex 2+.      Tone - normal. Left shoulder contracture.      Temp- decreased temperature sensation LUE  Psychiatric - Mood stable, Affect WNL  Skin: Aquacel, gauze over single drain site.   Wounds: None aquacel over lumbar spine surgical area, no bleeding      RECENT LABS/IMAGING                        8.9    9.58  )-----------( 546      ( 13 Nov 2023 06:00 )             27.7     11-13    131<L>  |  95<L>  |  19  ----------------------------<  125<H>  4.5   |  28  |  0.96    Ca    9.7      13 Nov 2023 06:00    TPro  7.4  /  Alb  2.9<L>  /  TBili  0.3  /  DBili  x   /  AST  21  /  ALT  23  /  AlkPhos  75  11-13      Urinalysis Basic - ( 13 Nov 2023 06:00 )    Color: x / Appearance: x / SG: x / pH: x  Gluc: 125 mg/dL / Ketone: x  / Bili: x / Urobili: x   Blood: x / Protein: x / Nitrite: x   Leuk Esterase: x / RBC: x / WBC x   Sq Epi: x / Non Sq Epi: x / Bacteria: x    MEDICATIONS  (STANDING):  acetaminophen     Tablet .. 975 milliGRAM(s) Oral every 8 hours  amLODIPine   Tablet 10 milliGRAM(s) Oral daily  amoxicillin  875 milliGRAM(s)/clavulanate 1 Tablet(s) Oral two times a day  atorvastatin 20 milliGRAM(s) Oral at bedtime  gabapentin 800 milliGRAM(s) Oral three times a day  heparin   Injectable 5000 Unit(s) SubCutaneous every 8 hours  lidocaine   4% Patch 1 Patch Transdermal daily  lidocaine   4% Patch 1 Patch Transdermal daily  multivitamin 1 Tablet(s) Oral daily  nystatin Powder 1 Application(s) Topical two times a day  polyethylene glycol 3350 17 Gram(s) Oral two times a day  senna 2 Tablet(s) Oral at bedtime  tiZANidine 2 milliGRAM(s) Oral <User Schedule>  tiZANidine 4 milliGRAM(s) Oral at bedtime    MEDICATIONS  (PRN):  albuterol    90 MICROgram(s) HFA Inhaler 2 Puff(s) Inhalation every 6 hours PRN Shortness of Breath and/or Wheezing  ALPRAZolam 0.5 milliGRAM(s) Oral at bedtime PRN anxiety  bisacodyl Suppository 10 milliGRAM(s) Rectal daily PRN Constipation  ondansetron   Disintegrating Tablet 4 milliGRAM(s) Oral every 8 hours PRN Nausea and/or Vomiting  oxyCODONE    IR 15 milliGRAM(s) Oral every 3 hours PRN Severe Pain (7 - 10)  oxyCODONE    IR 10 milliGRAM(s) Oral every 3 hours PRN Moderate Pain (4 - 6)

## 2023-11-13 NOTE — CONSULT NOTE ADULT - SUBJECTIVE AND OBJECTIVE BOX
Patient seen alone at bedside for 55-minute psychology consultation. Neuropsychologist is introduced as a member of the rehabilitation team and the purpose of the session is explained. Patient agrees to participate.     Per patient, he is here for rehabilitation of recent lower back surgery. He had prior cervical fusion in 2022. He notes chronic pain and stresses a desire to "be comfortable." He indicates motivation to participate in rehabilitation and work towards improving his physical functioning.     Medical records are reviewed. Per records: 63-year-old, male, with PMH of HTN, HLD, CAD (non obstructive), asthma, PE (xarelto stopped 2 months ago), cervical radiculopathy/myelopathy s/p c-spine fusion surgery Oct 2022 (complicated by right arm nerve damage with RUE contractions), chronic severe back pain that radiates to both legs, who presented to [facility] for scheduled L1-pelvis PSF and decompression on 10/31 with Dr. Lopez. This surgery was oringally postponed due to abnormal stress test. However patient was cleared by cardiology. After surgery he was admitted to SICU and downgraded to medical floors. Hospital course complicated by fevers 2/2 questionable PNA treated with zosyn (augmentin while in rehab until 11/13). Cultures were negative. Hospital course further complicated by hyponatremia, ABLA and low back pain.     Patient was evaluated by PM&R and therapy for functional deficits, gait/ADL impairments and acute rehabilitation was recommended. Patient was medically optimized for discharge to [facility] IRU on 11/10.
Patient is a 63y old  Male who presents with a chief complaint of lumbar stenosis s/p L1-pelvis PSF and decompression (11 Nov 2023 08:50)      HPI:  62yo Male with PMH of HTN, HLD, CAD (non obstructive), asthma, PE (xarelto stopped 2 months ago), cervical radiculopathy/myelopathy s/p c-spine fusion surgery Oct 2022 (complicated by right arm nerve damage with RUE contractions), chronic severe back pain that radiates to both legs, who presented to [facility] for scheduled L1-pelvis PSF and decompression on 10/31 with Dr. Lopez. This surgery was oringally postponed due to abnormal stress test. However patient was cleared by cardiology. After surgery he was admitted to SICU and downgraded to medical floors. Hospital course complicated by fevers 2/2 questionable PNA treated with zosyn (augmentin while in rehab until 11/13). Cultures were negative. Hospital course further complicated by hyponatremia, ABLA and low back pain.   Patient was evaluated by PM&R and therapy for functional deficits, gait/ADL impairments and acute rehabilitation was recommended. Patient was medically optimized for discharge to [facility] IRU on 11/10   (10 Nov 2023 14:03)      TODAY:  Patient seen and examined in NAD  No overnight event    PAST MEDICAL & SURGICAL HISTORY:  Asthma      HTN (hypertension)      Localized swelling of both lower legs      H/O neuropathy      Chronic back pain      H/O spinal stenosis      Cervical spinal stenosis      Pulmonary embolism      History of fusion of cervical spine      S/P arthroscopy of knee        Mother: - at age - living, HLD          Substance Use (street drugs): (x  ) never used  (  ) other:  Tobacco Usage:  ( x  ) never smoked   (   ) former smoker   (   ) current smoker  (     ) pack year        ALLERGIES:  Allergies    No Known Drug Allergies  peanuts (Unknown)  eggs (Hives; Rash)    Intolerances        MEDICATIONS  (STANDING):  acetaminophen     Tablet .. 975 milliGRAM(s) Oral every 8 hours  amLODIPine   Tablet 10 milliGRAM(s) Oral daily  amoxicillin  875 milliGRAM(s)/clavulanate 1 Tablet(s) Oral two times a day  atorvastatin 20 milliGRAM(s) Oral at bedtime  gabapentin 800 milliGRAM(s) Oral three times a day  heparin   Injectable 5000 Unit(s) SubCutaneous every 8 hours  lidocaine   4% Patch 1 Patch Transdermal daily  lidocaine   4% Patch 1 Patch Transdermal daily  multivitamin 1 Tablet(s) Oral daily  nystatin Powder 1 Application(s) Topical two times a day  polyethylene glycol 3350 17 Gram(s) Oral two times a day  senna 2 Tablet(s) Oral at bedtime  tiZANidine 2 milliGRAM(s) Oral <User Schedule>    MEDICATIONS  (PRN):  albuterol    90 MICROgram(s) HFA Inhaler 2 Puff(s) Inhalation every 6 hours PRN Shortness of Breath and/or Wheezing  ALPRAZolam 0.5 milliGRAM(s) Oral at bedtime PRN anxiety  bisacodyl Suppository 10 milliGRAM(s) Rectal daily PRN Constipation  ondansetron   Disintegrating Tablet 4 milliGRAM(s) Oral every 8 hours PRN Nausea and/or Vomiting  oxyCODONE    IR 10 milliGRAM(s) Oral every 3 hours PRN Moderate Pain (4 - 6)  oxyCODONE    IR 15 milliGRAM(s) Oral every 3 hours PRN Severe Pain (7 - 10)      REVIEW OF SYSTEMS:  CONSTITUTIONAL: No fever, weight loss, or fatigue  EYES: No eye pain, visual disturbances, or discharge  RESPIRATORY: No cough, wheezing, chills or hemoptysis; No shortness of breath  CARDIOVASCULAR: No chest pain, palpitations, dizziness, or leg swelling  GASTROINTESTINAL: No abdominal or epigastric pain. No nausea, vomiting, or hematemesis; No diarrhea or constipation. No melena or hematochezia.  GENITOURINARY: No dysuria, frequency, hematuria, or incontinence  NEUROLOGICAL: No headaches, memory loss, loss of strength, POSITIVE numbness AND TINGLING OF THE LEFT HAND, or tremors  SKIN: No itching, burning, rashes, or lesions   MUSCULOSKELETAL: No joint pain or swelling; No muscle, back, or extremity pain  PSYCHIATRIC: No depression, anxiety, mood swings, or difficulty sleeping    ALL OTHER SYSTEMS REVIEWED AND ARE NEGATIVE    VITAL SIGNS:  T(C): 37.3 (11-11-23 @ 08:33), Max: 37.3 (11-10-23 @ 14:21)  HR: 76 (11-11-23 @ 08:33) (56 - 79)  BP: 106/70 (11-11-23 @ 08:33) (103/66 - 114/68)  RR: 16 (11-11-23 @ 08:33) (16 - 17)  SpO2: 97% (11-11-23 @ 08:33) (94% - 99%)  Wt(kg): --Vital Signs Last 24 Hrs  T(C): 37.3 (11 Nov 2023 08:33), Max: 37.3 (10 Nov 2023 14:21)  T(F): 99.1 (11 Nov 2023 08:33), Max: 99.2 (10 Nov 2023 14:21)  HR: 76 (11 Nov 2023 08:33) (56 - 79)  BP: 106/70 (11 Nov 2023 08:33) (103/66 - 114/68)  BP(mean): --  RR: 16 (11 Nov 2023 08:33) (16 - 17)  SpO2: 97% (11 Nov 2023 08:33) (94% - 99%)    Parameters below as of 11 Nov 2023 08:33  Patient On (Oxygen Delivery Method): room air        PHYSICAL EXAM:  GENERAL: NAD  HENT:  Atraumatic, Normocephalic; No tonsillar erythema, exudates, ulcers, or enlargement in oropharynx; Moist mucous membranes  EYES: EOMI, PERRLA, conjunctiva and sclera clear; no lid-lag  NECK: Supple, No JVD, Normal thyroid  NERVOUS SYSTEM:  Motor Strength 3/5 B/L upper and lower extremities; CN II-XII intact  CHEST/LUNG: Clear to percussion bilaterally; No rales, rhonchi, wheezing, or rubs; normal respiratory effort, no intercostal retractions  HEART: Regular rate and rhythm; No murmurs, rubs, or gallops; No peripheral edema  ABDOMEN: Soft, Nontender, Nondistended; Bowel sounds present; No HSM  EXTREMITIES:  2+ Peripheral Pulses, No clubbing, cyanosis; LEFT hand contracted  SKIN: No rashes or lesions; normal temperature and turgor   PSYCH: Appropriate affect; Alert & Oriented x 3; Good judgement/insight    LABS:                        8.9    10.26 )-----------( 513      ( 11 Nov 2023 06:33 )             27.1     11-11    130<L>  |  94<L>  |  21  ----------------------------<  107<H>  5.3   |  26  |  0.96    Ca    9.5      11 Nov 2023 06:33    TPro  7.2  /  Alb  2.8<L>  /  TBili  0.3  /  DBili  x   /  AST  21  /  ALT  25  /  AlkPhos  61  11-11      Urinalysis Basic - ( 11 Nov 2023 06:33 )    Color: x / Appearance: x / SG: x / pH: x  Gluc: 107 mg/dL / Ketone: x  / Bili: x / Urobili: x   Blood: x / Protein: x / Nitrite: x   Leuk Esterase: x / RBC: x / WBC x   Sq Epi: x / Non Sq Epi: x / Bacteria: x       CAPILLARY BLOOD GLUCOSE            Urinalysis Basic - ( 11 Nov 2023 06:33 )    Color: x / Appearance: x / SG: x / pH: x  Gluc: 107 mg/dL / Ketone: x  / Bili: x / Urobili: x   Blood: x / Protein: x / Nitrite: x   Leuk Esterase: x / RBC: x / WBC x   Sq Epi: x / Non Sq Epi: x / Bacteria: x          Previous Consultant(s) Notes Reviewed:  YES  Care Discussed with Consultants/Other Providers YES  Previous Imaging Personally Reviewed:  YES

## 2023-11-13 NOTE — PROGRESS NOTE ADULT - SUBJECTIVE AND OBJECTIVE BOX
still with abominal contractions, and left upper extremity pain though somewhat improved with increasing tizanidine and gabapentin  had 1BM yesterday.      PHYSICAL EXAM:    Vital Signs Last 24 Hrs  T(F): 98.2 (13 Nov 2023 08:02), Max: 98.5 (12 Nov 2023 19:32)  HR: 75 (13 Nov 2023 08:02) (75 - 83)  BP: 102/65 (13 Nov 2023 08:02) (102/65 - 115/72)  RR: 16 (13 Nov 2023 08:02) (16 - 16)  SpO2: 96% (13 Nov 2023 08:02) (96% - 96%)  I&O's Summary      GENERAL: NAD  HEENT: NCAT  CHEST/LUNG: No increased WOB, Clear to percussion bilaterally; No rales, rhonchi, wheezing  HEART: Regular rate and rhythm; No murmurs  ABDOMEN: Soft, Nontender, Nondistended; Bowel sounds present  MUSCULOSKELETAL/EXTREMITIES:  2+ Peripheral Pulses, No LE edema  PSYCH: Appropriate affect, Alert & Oriented    LABS:                        8.9    9.58  )-----------( 546      ( 13 Nov 2023 06:00 )             27.7       11-13    131  |  95  |  19  ----------------------------<  125  4.5   |  28  |  0.96    Ca    9.7      13 Nov 2023 06:00    TPro  7.4  /  Alb  2.9  /  TBili  0.3  /  DBili  x   /  AST  21  /  ALT  23  /  AlkPhos  75  11-13                                  Urinalysis Basic - ( 13 Nov 2023 06:00 )    Color: x / Appearance: x / SG: x / pH: x  Gluc: 125 mg/dL / Ketone: x  / Bili: x / Urobili: x   Blood: x / Protein: x / Nitrite: x   Leuk Esterase: x / RBC: x / WBC x   Sq Epi: x / Non Sq Epi: x / Bacteria: x        Culture - Blood (collected 06 Nov 2023 14:40)  Source: .Blood Blood  Final Report (11 Nov 2023 19:00):    No growth at 5 days    Culture - Blood (collected 06 Nov 2023 14:20)  Source: .Blood Blood  Final Report (11 Nov 2023 19:00):    No growth at 5 days      COVID-19 PCR: NotDetec (11-10-23 @ 21:08)      MEDICATIONS  (STANDING):  acetaminophen     Tablet .. 975 milliGRAM(s) Oral every 8 hours  amLODIPine   Tablet 10 milliGRAM(s) Oral daily  amoxicillin  875 milliGRAM(s)/clavulanate 1 Tablet(s) Oral two times a day  atorvastatin 20 milliGRAM(s) Oral at bedtime  gabapentin 800 milliGRAM(s) Oral three times a day  heparin   Injectable 5000 Unit(s) SubCutaneous every 8 hours  lidocaine   4% Patch 1 Patch Transdermal daily  lidocaine   4% Patch 1 Patch Transdermal daily  multivitamin 1 Tablet(s) Oral daily  nystatin Powder 1 Application(s) Topical two times a day  polyethylene glycol 3350 17 Gram(s) Oral two times a day  senna 2 Tablet(s) Oral at bedtime  tiZANidine 2 milliGRAM(s) Oral <User Schedule>  tiZANidine 4 milliGRAM(s) Oral at bedtime    MEDICATIONS  (PRN):  albuterol    90 MICROgram(s) HFA Inhaler 2 Puff(s) Inhalation every 6 hours PRN Shortness of Breath and/or Wheezing  ALPRAZolam 0.5 milliGRAM(s) Oral at bedtime PRN anxiety  bisacodyl Suppository 10 milliGRAM(s) Rectal daily PRN Constipation  ondansetron   Disintegrating Tablet 4 milliGRAM(s) Oral every 8 hours PRN Nausea and/or Vomiting  oxyCODONE    IR 15 milliGRAM(s) Oral every 3 hours PRN Severe Pain (7 - 10)  oxyCODONE    IR 10 milliGRAM(s) Oral every 3 hours PRN Moderate Pain (4 - 6)      Care Discussed with Consultants/Other Providers: Yes

## 2023-11-13 NOTE — PROGRESS NOTE ADULT - ATTENDING COMMENTS
KEVEN FISHER is a 63y with  HTN, HLD, CAD (non obstructive), asthma, PE (xarelto stopped 2 months ago), cervical radiculopathy/myelopathy s/p c-spine fusion surgery Oct 2022 (complicated by left arm nerve damage with LUE contractions), chronic severe back pain that radiates to both legs, who presented to [facility] for scheduled L1-pelvis PSF and decompression on 10/31 with Dr. Lopez.  Hospital Course complicated by fever 2/2 ? PNA treated with zosyn (augmentin while in rehab until 11/13), hyponatremia, ABLA and chronic low back pain. Patient seen and evaluated in chair after PT session this morning. Complains of back pain and chronic left shoulder LUE pain. Is motivated to participate in therapies. Has not had BM in 4 days.      Lumbar stenosis s/p L1-pelvis PSF and decompression on 10/31  - continue pain meds as below  - ? LSO brace. patient does not have at bedside.   - WBAT  - Comprehensive Multidisciplinary Rehab Program: Start comprehensive rehab program of PT/OT/SLP - 3 hours a day, 5 days a week. P&O as needed   - remove staples/sutures POD 14 on 11/14  - precautions: spine, cardiac, fall, aspiration  - f/u Dr. Lopez outpatient    Pain  Chronic low back pain  - home regimen: Nucynta (ran out 1-2months ago), Percocet 10/325 q4h, Xanax 0.5mg, gabapentin 400mg TID, tizanidine 2mg q6  - acute pain management followed at [facility]. Recs appreciated   - Oxy 10-15mg q3 for mod/severe.   - gabapentin 800mg TID  - tizanidine 2mg, d/c on Q6.  So as not to wake patient - trial 6am, 3pm, 10pm.   - lidocaine patches    Hyponatremia  - ? SIADH post-op  - Na 130  - medicine to follow    ?PNA   - cont augmentin BID (end 11/14)     HTN  - Norvasc 10mg  - Monitor BP    HLD  - lipitor 20mg    ABLA 2/2 surgery  - Has yearly FOBT which he states was negative, no recent colonoscopy.  - monitor h/h  - Hg 8.7 current    Mood / Cognition  - Neuropsychology consult  - Rec therapy  - 0.5mg Xanax prn Bedtime    Sleep  - Melatonin PRN     GI / Bowel  - Senna qHS  - Increase miralax to BID  - if pt does not have a BM today, will add dulcolax      / Bladder  - Continue bladder scans Q8 hours with straight cath for >400cc.  - Condom cath (unable to use urinal by himself)    Skin / Pressure injury  - Skin assessment on admission performed: intact. Aquacel, CDI gauze over drain site.   - Monitor Incisions    Diet:  - Diet Consistency: Regular    DVT prophylaxis:   - heparin 5000mg q8

## 2023-11-13 NOTE — PROGRESS NOTE ADULT - ASSESSMENT
#L1-L5 PSF and decompression due to lumbar stenosis  #chronic back pain  #s/p cervical fusion 2022 c/b left arm nerve damage with LUE contraction  - continue comprehensive rehab program  - remove staples 11/14  - ISTOP reviewed on 11/12, patient reports he is pending pre-auth for nucynta and had stopped taking it 2 months ago. Per ISTOP, he was prescribed nucynta 100mg q6h in 7/2023. Advised patient to follow up with his prescriber and pharamacy and bring it in to hospital  - c/w pain managent per primary team. continue increased gabapentin and increased tizanidien doses     #hyponatremia  - sodium 131  - serum osm 285, urine sodium is 34  - noted with slightly elevated protein gap 4.4 -- outpatient follow up  - may be due to pain vs. post op changes    #essential HTN  - c/w norvasc 10mg po daily    #HLD  - c/w lipitor 20mg po daily     #normocytic anemia  - hb 8.9 stable  - iron panel and ferritin consistent with MARIA DOLORES -- start ferrous sulfate once patient's pain management is optimized.    #constipation  - continue with senna and miralax  - last BM 11/12  - dulcolax PRN and lactulose PRN if no BM >3 days.  - consider movantik if formulary if no BM with above     #aspiration pneumonia  - complete hospital course of augmentin until 11/14.    heparin sq  full code

## 2023-11-13 NOTE — CONSULT NOTE ADULT - ASSESSMENT
Patient is seated in wheelchair at bedside. Family/visitors are not present. Patient is alert and engaged. Speech is clear and animated. He reports 9 out of 10 pain presently (nursing administers pain medication during this session). Appetite is good. Sleep is reduced due to pain.       Emotional functioning: Mood is variable/affect animated, at times irritable. He notes being previously independent and enjoys helping others in his family. He expresses a desire to continue engaging prior activities of interest (e.g., socializing with family and friends, physical activity, etc.). He indicates being accustomed to pain management with Nucynta for the past 15 years, and is presently uncomfortable and frustrated by current pain management.     On self-report screening of recent mood, his responses suggest minimal anxiety (YOVANY-7 = 2/21) and minimal depression (PHQ-9 = 2/21). He indicates thoughts of despair in the past year due to physical issues/limitations prior to his fusion in 2022. Presently, he denies ideation, plan, or intent to harm self/other. He is a former smoker (stopped over 40 years-ago). He uses ETOH infrequently. He denies prior psychiatric history.     Psychosocial history: He was born and raised in [place]. He completed an associate's degree. In 2018, he retired from a 30 year career with the [employer]. He resides with his mother and 2 brothers. He reports a large support system of family and friends.     Impression: Patient with adjustment difficulty associated with current health concerns. Patient reports longstanding chronic pain and associated concerns regarding pain management. He denies mood features, except within the setting of physical discomfort. He indicates a desire to engage in acute rehabilitation and is hopeful that his situation will improve over time.     Psychoeducation is provided regarding the rehabilitation process, perception of control, and adaptive coping. Support and encouragement are provided. He expresses appreciation for the session.     Plan: Individual psychotherapy follow-up to address adjustment. Patient in agreement with plan. Neuropsychology remains available through discharge.   Patient is seated in wheelchair at bedside. Family/visitors are not present. Patient is alert and engaged. Speech is clear and animated. He reports 9 out of 10 pain presently (nursing administers pain medication during this session). Appetite is good. Sleep is reduced due to pain.       Emotional functioning: Mood is variable/affect animated, at times irritable. He notes being previously independent and enjoys helping others in his family. He expresses a desire to continue engaging prior activities of interest (e.g., socializing with family and friends, physical activity, etc.). He indicates being accustomed to pain management with Nucynta for the past 15 years.     On self-report screening of recent mood, his responses suggest minimal anxiety (YOVANY-7 = 2/21) and minimal depression (PHQ-9 = 2/21). He indicates thoughts of despair in the past year due to physical issues/limitations prior to his fusion in 2022. Presently, he denies ideation, plan, or intent to harm self/other. He is a former smoker (stopped over 40 years-ago). He uses ETOH infrequently. He denies prior psychiatric history.     Psychosocial history: He was born and raised in [place]. He completed an associate's degree. In 2018, he retired from a 30 year career with the [employer]. He resides with his mother and 2 brothers. He reports a large support system of family and friends.     Impression: Patient with adjustment difficulty associated with current health concerns. Patient reports longstanding chronic pain and associated concerns regarding pain management. He denies mood features, except within the setting of physical discomfort. He indicates a desire to engage in acute rehabilitation and is hopeful that his situation will improve over time.     Psychoeducation is provided regarding the rehabilitation process, perception of control, and adaptive coping. Support and encouragement are provided. He expresses appreciation for the session.     Plan: Individual psychotherapy follow-up to address adjustment. Patient in agreement with plan. Neuropsychology remains available through discharge.

## 2023-11-13 NOTE — PROGRESS NOTE ADULT - ASSESSMENT
KEVEN REY is a 63y with  HTN, HLD, CAD (non obstructive), asthma, PE (xarelto stopped 2 months ago), cervical radiculopathy/myelopathy s/p c-spine fusion surgery Oct 2022 (complicated by left arm nerve damage with LUE contractions), chronic severe back pain that radiates to both legs, who presented to [facility] for scheduled L1-pelvis PSF and decompression on 10/31 with Dr. Lopez.  Hospital Course complicated by fever 2/2 ? PNA treated with zosyn (augmentin while in rehab until 11/13), hyponatremia, ABLA and chronic low back pain. Patient now admitted for a multidisciplinary rehab program. 11-10-23 @ 15:11    * Chronic uncontrolled pain, difficulty engaging int herapy  * Discussed with his pain mgt physician, will add tramadol, to current analgesic regimen  * Monitor bowel movements  * B/L LE weakness L> R and Left arm weakness  * Psychotherapy for pain mgt appreciated  * Low Na---fluid restriction and monitor     Lumbar stenosis with radiculopathy  s/p L1-pelvis PSF and decompression on 10/31  - Wear LSO brace. and LEFT AFO when OOB  - WBAT  - Comprehensive Multidisciplinary Rehab Program: Start comprehensive rehab program of PT/OT/SLP - 3 hours a day, 5 days a week. P&O as needed   - remove staples/sutures POD 14 on 11/14  - precautions: spine, cardiac, fall, aspiration  - f/u Dr. Lopez outpatient    Pain  Chronic low back pain  - home regimen: Nucynta (ran out 1-2months ago), Percocet 10/325 q4h, Xanax 0.5mg, gabapentin 400mg TID, tizanidine 2mg q6. Patient is requesting resuming his home medication of nucynta, states that family will bring in home does. Will address with patient and pharmacy if medication is brought in.   - acute pain management followed at [facility]. Recs appreciated   - Oxy 10-15mg q3 for mod/severe.   - gabapentin 800mg TID  - tizanidine 2mg, d/c on Q6.  So as not to wake patient - trial 6am, 3pm, 10pm.   - lidocaine patches  11/13--D/W Dr Vaughn Bach -Ph    (Emergency ph  -)-(patient's pain mgt physician)---Patient had good pain control with Tapentadol 100mg QID for several years, until insurance denial of approval few months ago, despite 3 pre auth attempts,  Patient is compliant, no opioid aberrant behavior, I discussed current pain mgt regimen with oxycodone, he recommended adding tramadol up to 100mg bid, but we will commence with 50mg bid for now    Hyponatremia--* Low Na---fluid restriction 1-1.5l/day  and monitor   - SIADH post-op  - Na 130  --F/u hospitalist recs     PNA   - cont augmentin BID (end 11/14)     HTN  - Norvasc 10mg    HLD  - lipitor 20mg    ABLA 2/2 surgery  - Has yearly FOBT which he states was negative, no recent colonoscopy.    Mood / Cognition  - Neuropsychology consult recs appreciated--patient finds this beneficial   - Rec therapy  - 0.5mg Xanax prn Bedtime    Sleep  - Melatonin PRN     GI / Bowel  - Senna qHS  - Miralax Daily     / Bladder  - Continue bladder scans Q8 hours with straight cath for >400cc.  - Condom cath (unable to use urinal by himself)    Skin / Pressure injury  - Skin --t. Aquacel, CDI gauze over drain site.   - Monitor Incisions    Diet:  - Diet Consistency: Regular    DVT prophylaxis:   - heparin 5000mg q8    Code Status/Emergency Contact:  mother Pamela Rey [phone]      * labs 11/13--Unremarkable --stable anemia, normal renal and liver function     Outpatient Follow-up:  Ami Lopez  Orthopaedic Surgery  [address] [facility], [address] [place] [address]  Phone: [phone]  Fax: [phone]  Established Patient  Follow Up Time:      Pain management  Dr Vaughn Bach -Ph    (Emergency ph  [phone]      Non critical care  Time based billing   Spent 62 mins--patient review, discussion of test results, liaison with other providers, including his pain mgt physician, pain mgt discussion, liaison with psychology, care co ordination

## 2023-11-14 PROCEDURE — 99232 SBSQ HOSP IP/OBS MODERATE 35: CPT

## 2023-11-14 PROCEDURE — 99231 SBSQ HOSP IP/OBS SF/LOW 25: CPT

## 2023-11-14 RX ORDER — LACTULOSE 10 G/15ML
10 SOLUTION ORAL ONCE
Refills: 0 | Status: DISCONTINUED | OUTPATIENT
Start: 2023-11-14 | End: 2023-11-14

## 2023-11-14 RX ORDER — TRAMADOL HYDROCHLORIDE 50 MG/1
50 TABLET ORAL THREE TIMES A DAY
Refills: 0 | Status: DISCONTINUED | OUTPATIENT
Start: 2023-11-14 | End: 2023-11-14

## 2023-11-14 RX ORDER — LACTULOSE 10 G/15ML
10 SOLUTION ORAL ONCE
Refills: 0 | Status: COMPLETED | OUTPATIENT
Start: 2023-11-14 | End: 2023-11-14

## 2023-11-14 RX ORDER — TRAMADOL HYDROCHLORIDE 50 MG/1
50 TABLET ORAL THREE TIMES A DAY
Refills: 0 | Status: DISCONTINUED | OUTPATIENT
Start: 2023-11-14 | End: 2023-11-15

## 2023-11-14 RX ADMIN — Medication 1 APPLICATION(S): at 05:42

## 2023-11-14 RX ADMIN — Medication 975 MILLIGRAM(S): at 05:41

## 2023-11-14 RX ADMIN — TRAMADOL HYDROCHLORIDE 50 MILLIGRAM(S): 50 TABLET ORAL at 23:48

## 2023-11-14 RX ADMIN — GABAPENTIN 800 MILLIGRAM(S): 400 CAPSULE ORAL at 15:24

## 2023-11-14 RX ADMIN — AMLODIPINE BESYLATE 10 MILLIGRAM(S): 2.5 TABLET ORAL at 05:41

## 2023-11-14 RX ADMIN — ATORVASTATIN CALCIUM 20 MILLIGRAM(S): 80 TABLET, FILM COATED ORAL at 22:49

## 2023-11-14 RX ADMIN — Medication 975 MILLIGRAM(S): at 23:48

## 2023-11-14 RX ADMIN — TRAMADOL HYDROCHLORIDE 50 MILLIGRAM(S): 50 TABLET ORAL at 22:48

## 2023-11-14 RX ADMIN — OXYCODONE HYDROCHLORIDE 15 MILLIGRAM(S): 5 TABLET ORAL at 01:49

## 2023-11-14 RX ADMIN — Medication 1 TABLET(S): at 17:09

## 2023-11-14 RX ADMIN — HEPARIN SODIUM 5000 UNIT(S): 5000 INJECTION INTRAVENOUS; SUBCUTANEOUS at 05:41

## 2023-11-14 RX ADMIN — TRAMADOL HYDROCHLORIDE 50 MILLIGRAM(S): 50 TABLET ORAL at 16:24

## 2023-11-14 RX ADMIN — TRAMADOL HYDROCHLORIDE 50 MILLIGRAM(S): 50 TABLET ORAL at 05:41

## 2023-11-14 RX ADMIN — HEPARIN SODIUM 5000 UNIT(S): 5000 INJECTION INTRAVENOUS; SUBCUTANEOUS at 22:49

## 2023-11-14 RX ADMIN — OXYCODONE HYDROCHLORIDE 15 MILLIGRAM(S): 5 TABLET ORAL at 14:07

## 2023-11-14 RX ADMIN — OXYCODONE HYDROCHLORIDE 15 MILLIGRAM(S): 5 TABLET ORAL at 21:25

## 2023-11-14 RX ADMIN — OXYCODONE HYDROCHLORIDE 15 MILLIGRAM(S): 5 TABLET ORAL at 09:33

## 2023-11-14 RX ADMIN — TRAMADOL HYDROCHLORIDE 50 MILLIGRAM(S): 50 TABLET ORAL at 06:37

## 2023-11-14 RX ADMIN — OXYCODONE HYDROCHLORIDE 15 MILLIGRAM(S): 5 TABLET ORAL at 13:09

## 2023-11-14 RX ADMIN — TRAMADOL HYDROCHLORIDE 50 MILLIGRAM(S): 50 TABLET ORAL at 15:24

## 2023-11-14 RX ADMIN — TIZANIDINE 2 MILLIGRAM(S): 4 TABLET ORAL at 15:23

## 2023-11-14 RX ADMIN — OXYCODONE HYDROCHLORIDE 15 MILLIGRAM(S): 5 TABLET ORAL at 00:49

## 2023-11-14 RX ADMIN — POLYETHYLENE GLYCOL 3350 17 GRAM(S): 17 POWDER, FOR SOLUTION ORAL at 05:42

## 2023-11-14 RX ADMIN — OXYCODONE HYDROCHLORIDE 15 MILLIGRAM(S): 5 TABLET ORAL at 08:33

## 2023-11-14 RX ADMIN — GABAPENTIN 800 MILLIGRAM(S): 400 CAPSULE ORAL at 22:48

## 2023-11-14 RX ADMIN — Medication 1 TABLET(S): at 15:22

## 2023-11-14 RX ADMIN — HEPARIN SODIUM 5000 UNIT(S): 5000 INJECTION INTRAVENOUS; SUBCUTANEOUS at 15:22

## 2023-11-14 RX ADMIN — NYSTATIN CREAM 1 APPLICATION(S): 100000 CREAM TOPICAL at 05:42

## 2023-11-14 RX ADMIN — OXYCODONE HYDROCHLORIDE 15 MILLIGRAM(S): 5 TABLET ORAL at 20:25

## 2023-11-14 RX ADMIN — GABAPENTIN 800 MILLIGRAM(S): 400 CAPSULE ORAL at 05:42

## 2023-11-14 RX ADMIN — Medication 1 APPLICATION(S): at 17:10

## 2023-11-14 RX ADMIN — TIZANIDINE 2 MILLIGRAM(S): 4 TABLET ORAL at 05:41

## 2023-11-14 RX ADMIN — SENNA PLUS 2 TABLET(S): 8.6 TABLET ORAL at 22:49

## 2023-11-14 RX ADMIN — NYSTATIN CREAM 1 APPLICATION(S): 100000 CREAM TOPICAL at 17:09

## 2023-11-14 RX ADMIN — Medication 975 MILLIGRAM(S): at 22:48

## 2023-11-14 RX ADMIN — TIZANIDINE 4 MILLIGRAM(S): 4 TABLET ORAL at 22:49

## 2023-11-14 RX ADMIN — Medication 975 MILLIGRAM(S): at 06:37

## 2023-11-14 RX ADMIN — LACTULOSE 10 GRAM(S): 10 SOLUTION ORAL at 20:49

## 2023-11-14 RX ADMIN — Medication 1 TABLET(S): at 05:41

## 2023-11-14 NOTE — DIETITIAN INITIAL EVALUATION ADULT - ORAL INTAKE PTA/DIET HISTORY
Pt reports good PO intake PTA. Eats 3 meals/day + snacks. Pt reports doing a lot of cooking at home, does not follow a specific diet but monitors salt intake PTA per pt. Pt reports poor PO intake @ previous hospital.

## 2023-11-14 NOTE — DIETITIAN INITIAL EVALUATION ADULT - PERTINENT MEDS FT
MEDICATIONS  (STANDING):  acetaminophen     Tablet .. 975 milliGRAM(s) Oral every 8 hours  amLODIPine   Tablet 10 milliGRAM(s) Oral daily  amoxicillin  875 milliGRAM(s)/clavulanate 1 Tablet(s) Oral two times a day  atorvastatin 20 milliGRAM(s) Oral at bedtime  bacitracin   Ointment 1 Application(s) Topical two times a day  gabapentin 800 milliGRAM(s) Oral three times a day  heparin   Injectable 5000 Unit(s) SubCutaneous every 8 hours  lidocaine   4% Patch 1 Patch Transdermal daily  lidocaine   4% Patch 1 Patch Transdermal daily  multivitamin 1 Tablet(s) Oral daily  nystatin Powder 1 Application(s) Topical two times a day  polyethylene glycol 3350 17 Gram(s) Oral two times a day  senna 2 Tablet(s) Oral at bedtime  tiZANidine 2 milliGRAM(s) Oral <User Schedule>  tiZANidine 4 milliGRAM(s) Oral at bedtime  traMADol 50 milliGRAM(s) Oral three times a day    MEDICATIONS  (PRN):  albuterol    90 MICROgram(s) HFA Inhaler 2 Puff(s) Inhalation every 6 hours PRN Shortness of Breath and/or Wheezing  ALPRAZolam 0.5 milliGRAM(s) Oral at bedtime PRN anxiety  bisacodyl Suppository 10 milliGRAM(s) Rectal daily PRN Constipation  ondansetron   Disintegrating Tablet 4 milliGRAM(s) Oral every 8 hours PRN Nausea and/or Vomiting  oxyCODONE    IR 15 milliGRAM(s) Oral every 3 hours PRN Severe Pain (7 - 10)  oxyCODONE    IR 10 milliGRAM(s) Oral every 3 hours PRN Moderate Pain (4 - 6)

## 2023-11-14 NOTE — PROGRESS NOTE ADULT - SUBJECTIVE AND OBJECTIVE BOX
Subjective  Seen and examined  Reports no acute distress  However, noted penile pain around skin cut when voiding, due to pressure from urina  Tolerating diet   Reports some benefit with analgesic treatment with low dose tramadol    RSO--No chest or abd pain, no headache, LBM 11/13  Back pain, no bleeding muscle spasms, minimal     Therapy  Engaged for assessment and therapy  Worked on muscle stretching    Vital Signs Last 24 Hrs  T(C): 36.7 (14 Nov 2023 08:23), Max: 37 (13 Nov 2023 19:28)  T(F): 98.1 (14 Nov 2023 08:23), Max: 98.6 (13 Nov 2023 19:28)  HR: 85 (14 Nov 2023 08:23) (72 - 90)  BP: 100/69 (14 Nov 2023 08:23) (100/69 - 121/76)  RR: 16 (14 Nov 2023 08:23) (16 - 16)  SpO2: 95% (14 Nov 2023 08:23) (95% - 95%)  O2 Parameters below as of 14 Nov 2023 08:23  Patient On (Oxygen Delivery Method): room air      EXAM  Gen - Comfortable  HEENT - NAD  Neck - Supple,   Pulm - Clear  Cardiovascular - RRR, S1S2, No m/r/g  Abdomen - Soft, non  tender, +BS  Extremities - No C/C/E, No calf tenderness. Left shoulder AROM ~30degrees, PROM ~90 degrees    Neuro-     Cognitive - AAOx3     Communication - Fluent, No dysarthria     Attention: Intact      Cranial Nerves - CN 2-12 grossly intact     Motor -                    LEFT    UE - ShAB 5/5, EF 4-/5, EE 4-/5, WE 5/5,  4/5, unable to flex or abduct shoulder past 90 deg. left elbow flexion contracture                     RIGHT UE - ShAB 5/5, EF 5/5, EE 5/5, WE 5/5,  4/5                    LEFT    LE - HF 1/5, KE 1/5, DF 0/5, PF 4/5                    RIGHT LE - HF 4/5 (due to back pain), KE 5/5, DF 5/5, PF 5/5        Sensory - diminished sensation left lower extremity stocking distribution compared to right.  Reports numbness of bilateral hands and toes.      Tone - normal. Left shoulder contracture.      Temp- decreased temperature sensation LUE  Psychiatric - Mood stable, Affect WNL  Skin: Aquacel, gauze over single drain site.   Wounds: None aquacel over lumbar spine surgical area, no bleeding      RECENT LABS/IMAGING                        8.9    9.58  )-----------( 546      ( 13 Nov 2023 06:00 )             27.7     11-13    131<L>  |  95<L>  |  19  ----------------------------<  125<H>  4.5   |  28  |  0.96    Ca    9.7      13 Nov 2023 06:00    TPro  7.4  /  Alb  2.9<L>  /  TBili  0.3  /  DBili  x   /  AST  21  /  ALT  23  /  AlkPhos  75  11-13      Urinalysis Basic - ( 13 Nov 2023 06:00 )    Color: x / Appearance: x / SG: x / pH: x  Gluc: 125 mg/dL / Ketone: x  / Bili: x / Urobili: x   Blood: x / Protein: x / Nitrite: x   Leuk Esterase: x / RBC: x / WBC x   Sq Epi: x / Non Sq Epi: x / Bacteria: x    MEDICATIONS  (STANDING):  acetaminophen     Tablet .. 975 milliGRAM(s) Oral every 8 hours  amLODIPine   Tablet 10 milliGRAM(s) Oral daily  amoxicillin  875 milliGRAM(s)/clavulanate 1 Tablet(s) Oral two times a day  atorvastatin 20 milliGRAM(s) Oral at bedtime  bacitracin   Ointment 1 Application(s) Topical two times a day  gabapentin 800 milliGRAM(s) Oral three times a day  heparin   Injectable 5000 Unit(s) SubCutaneous every 8 hours  lidocaine   4% Patch 1 Patch Transdermal daily  lidocaine   4% Patch 1 Patch Transdermal daily  multivitamin 1 Tablet(s) Oral daily  nystatin Powder 1 Application(s) Topical two times a day  polyethylene glycol 3350 17 Gram(s) Oral two times a day  senna 2 Tablet(s) Oral at bedtime  tiZANidine 2 milliGRAM(s) Oral <User Schedule>  tiZANidine 4 milliGRAM(s) Oral at bedtime  traMADol 50 milliGRAM(s) Oral three times a day    MEDICATIONS  (PRN):  albuterol    90 MICROgram(s) HFA Inhaler 2 Puff(s) Inhalation every 6 hours PRN Shortness of Breath and/or Wheezing  ALPRAZolam 0.5 milliGRAM(s) Oral at bedtime PRN anxiety  bisacodyl Suppository 10 milliGRAM(s) Rectal daily PRN Constipation  ondansetron   Disintegrating Tablet 4 milliGRAM(s) Oral every 8 hours PRN Nausea and/or Vomiting  oxyCODONE    IR 15 milliGRAM(s) Oral every 3 hours PRN Severe Pain (7 - 10)  oxyCODONE    IR 10 milliGRAM(s) Oral every 3 hours PRN Moderate Pain (4 - 6)           Subjective  Seen and examined  Reports no acute distress  However, noted penile pain around skin cut when voiding, due to pressure from urina  Tolerating diet   Reports some benefit with analgesic treatment with low dose tramadol    RSO--No chest or abd pain, no headache, LBM 11/13  Back pain, no bleeding muscle spasms, minimal     Therapy  Engaged for assessment and therapy  Worked on muscle stretching  IDT today     Vital Signs Last 24 Hrs  T(C): 36.7 (14 Nov 2023 08:23), Max: 37 (13 Nov 2023 19:28)  T(F): 98.1 (14 Nov 2023 08:23), Max: 98.6 (13 Nov 2023 19:28)  HR: 85 (14 Nov 2023 08:23) (72 - 90)  BP: 100/69 (14 Nov 2023 08:23) (100/69 - 121/76)  RR: 16 (14 Nov 2023 08:23) (16 - 16)  SpO2: 95% (14 Nov 2023 08:23) (95% - 95%)  O2 Parameters below as of 14 Nov 2023 08:23  Patient On (Oxygen Delivery Method): room air      EXAM  Gen - Comfortable  HEENT - NAD  Neck - Supple,   Pulm - Clear  Cardiovascular - RRR, S1S2, No m/r/g  Abdomen - Soft, non  tender, +BS  Extremities - No C/C/E, No calf tenderness. Left shoulder AROM ~30degrees, PROM ~90 degrees    Neuro-     Cognitive - AAOx3     Communication - Fluent, No dysarthria     Attention: Intact      Cranial Nerves - CN 2-12 grossly intact     Motor -                    LEFT    UE - ShAB 5/5, EF 4-/5, EE 4-/5, WE 5/5,  4/5, unable to flex or abduct shoulder past 90 deg. left elbow flexion contracture                     RIGHT UE - ShAB 5/5, EF 5/5, EE 5/5, WE 5/5,  4/5                    LEFT    LE - HF 1/5, KE 1/5, DF 0/5, PF 4/5                    RIGHT LE - HF 4/5 (due to back pain), KE 5/5, DF 5/5, PF 5/5        Sensory - diminished sensation left lower extremity stocking distribution compared to right.  Reports numbness of bilateral hands and toes.      Tone - normal. Left shoulder contracture.      Temp- decreased temperature sensation LUE  Psychiatric - Mood stable, Affect WNL  Skin: Aquacel, gauze over single drain site.   Wounds: None aquacel over lumbar spine surgical area, no bleeding      RECENT LABS/IMAGING                        8.9    9.58  )-----------( 546      ( 13 Nov 2023 06:00 )             27.7     11-13    131<L>  |  95<L>  |  19  ----------------------------<  125<H>  4.5   |  28  |  0.96    Ca    9.7      13 Nov 2023 06:00    TPro  7.4  /  Alb  2.9<L>  /  TBili  0.3  /  DBili  x   /  AST  21  /  ALT  23  /  AlkPhos  75  11-13      Urinalysis Basic - ( 13 Nov 2023 06:00 )    Color: x / Appearance: x / SG: x / pH: x  Gluc: 125 mg/dL / Ketone: x  / Bili: x / Urobili: x   Blood: x / Protein: x / Nitrite: x   Leuk Esterase: x / RBC: x / WBC x   Sq Epi: x / Non Sq Epi: x / Bacteria: x    MEDICATIONS  (STANDING):  acetaminophen     Tablet .. 975 milliGRAM(s) Oral every 8 hours  amLODIPine   Tablet 10 milliGRAM(s) Oral daily  amoxicillin  875 milliGRAM(s)/clavulanate 1 Tablet(s) Oral two times a day  atorvastatin 20 milliGRAM(s) Oral at bedtime  bacitracin   Ointment 1 Application(s) Topical two times a day  gabapentin 800 milliGRAM(s) Oral three times a day  heparin   Injectable 5000 Unit(s) SubCutaneous every 8 hours  lidocaine   4% Patch 1 Patch Transdermal daily  lidocaine   4% Patch 1 Patch Transdermal daily  multivitamin 1 Tablet(s) Oral daily  nystatin Powder 1 Application(s) Topical two times a day  polyethylene glycol 3350 17 Gram(s) Oral two times a day  senna 2 Tablet(s) Oral at bedtime  tiZANidine 2 milliGRAM(s) Oral <User Schedule>  tiZANidine 4 milliGRAM(s) Oral at bedtime  traMADol 50 milliGRAM(s) Oral three times a day    MEDICATIONS  (PRN):  albuterol    90 MICROgram(s) HFA Inhaler 2 Puff(s) Inhalation every 6 hours PRN Shortness of Breath and/or Wheezing  ALPRAZolam 0.5 milliGRAM(s) Oral at bedtime PRN anxiety  bisacodyl Suppository 10 milliGRAM(s) Rectal daily PRN Constipation  ondansetron   Disintegrating Tablet 4 milliGRAM(s) Oral every 8 hours PRN Nausea and/or Vomiting  oxyCODONE    IR 15 milliGRAM(s) Oral every 3 hours PRN Severe Pain (7 - 10)  oxyCODONE    IR 10 milliGRAM(s) Oral every 3 hours PRN Moderate Pain (4 - 6)

## 2023-11-14 NOTE — DIETITIAN INITIAL EVALUATION ADULT - EDUCATION DIETARY MODIFICATIONS
BROUGHT BACK TO BED #5 AND TRIAGED. REPORT GIVEN TO SANDRA teach back/(2) meets goals/outcomes/verbalization

## 2023-11-14 NOTE — PROGRESS NOTE ADULT - SUBJECTIVE AND OBJECTIVE BOX
PHYSICAL EXAM:    Vital Signs Last 24 Hrs  T(F): 98.1 (14 Nov 2023 08:23), Max: 98.6 (13 Nov 2023 19:28)  HR: 85 (14 Nov 2023 08:23) (72 - 90)  BP: 100/69 (14 Nov 2023 08:23) (100/69 - 121/76)  RR: 16 (14 Nov 2023 08:23) (16 - 16)  SpO2: 95% (14 Nov 2023 08:23) (95% - 95%)  I&O's Summary    13 Nov 2023 07:01  -  14 Nov 2023 07:00  --------------------------------------------------------  IN: 0 mL / OUT: 700 mL / NET: -700 mL        GENERAL: NAD  HEENT: NCAT  CHEST/LUNG: No increased WOB, Clear to percussion bilaterally; No rales, rhonchi, wheezing  HEART: Regular rate and rhythm; No murmurs  ABDOMEN: Soft, Nontender, Nondistended; Bowel sounds present  MUSCULOSKELETAL/EXTREMITIES:  2+ Peripheral Pulses, No LE edema  PSYCH: Appropriate affect, Alert & Oriented    LABS:                        8.9    9.58  )-----------( 546      ( 13 Nov 2023 06:00 )             27.7       11-13    131  |  95  |  19  ----------------------------<  125  4.5   |  28  |  0.96    Ca    9.7      13 Nov 2023 06:00    TPro  7.4  /  Alb  2.9  /  TBili  0.3  /  DBili  x   /  AST  21  /  ALT  23  /  AlkPhos  75  11-13                                  Urinalysis Basic - ( 13 Nov 2023 06:00 )    Color: x / Appearance: x / SG: x / pH: x  Gluc: 125 mg/dL / Ketone: x  / Bili: x / Urobili: x   Blood: x / Protein: x / Nitrite: x   Leuk Esterase: x / RBC: x / WBC x   Sq Epi: x / Non Sq Epi: x / Bacteria: x        COVID-19 PCR: NotDetec (11-10-23 @ 21:08)      MEDICATIONS  (STANDING):  acetaminophen     Tablet .. 975 milliGRAM(s) Oral every 8 hours  amLODIPine   Tablet 10 milliGRAM(s) Oral daily  amoxicillin  875 milliGRAM(s)/clavulanate 1 Tablet(s) Oral two times a day  atorvastatin 20 milliGRAM(s) Oral at bedtime  bacitracin   Ointment 1 Application(s) Topical two times a day  gabapentin 800 milliGRAM(s) Oral three times a day  heparin   Injectable 5000 Unit(s) SubCutaneous every 8 hours  lidocaine   4% Patch 1 Patch Transdermal daily  lidocaine   4% Patch 1 Patch Transdermal daily  multivitamin 1 Tablet(s) Oral daily  nystatin Powder 1 Application(s) Topical two times a day  polyethylene glycol 3350 17 Gram(s) Oral two times a day  senna 2 Tablet(s) Oral at bedtime  tiZANidine 2 milliGRAM(s) Oral <User Schedule>  tiZANidine 4 milliGRAM(s) Oral at bedtime  traMADol 50 milliGRAM(s) Oral three times a day    MEDICATIONS  (PRN):  albuterol    90 MICROgram(s) HFA Inhaler 2 Puff(s) Inhalation every 6 hours PRN Shortness of Breath and/or Wheezing  ALPRAZolam 0.5 milliGRAM(s) Oral at bedtime PRN anxiety  bisacodyl Suppository 10 milliGRAM(s) Rectal daily PRN Constipation  ondansetron   Disintegrating Tablet 4 milliGRAM(s) Oral every 8 hours PRN Nausea and/or Vomiting  oxyCODONE    IR 15 milliGRAM(s) Oral every 3 hours PRN Severe Pain (7 - 10)  oxyCODONE    IR 10 milliGRAM(s) Oral every 3 hours PRN Moderate Pain (4 - 6)      Care Discussed with Consultants/Other Providers: Yes   no acute events overnight  some improvement of the LEFT hand pain  new onset penile skin irritation from [place] condom cathetr      PHYSICAL EXAM:    Vital Signs Last 24 Hrs  T(F): 98.1 (14 Nov 2023 08:23), Max: 98.6 (13 Nov 2023 19:28)  HR: 85 (14 Nov 2023 08:23) (72 - 90)  BP: 100/69 (14 Nov 2023 08:23) (100/69 - 121/76)  RR: 16 (14 Nov 2023 08:23) (16 - 16)  SpO2: 95% (14 Nov 2023 08:23) (95% - 95%)  I&O's Summary    13 Nov 2023 07:01  -  14 Nov 2023 07:00  --------------------------------------------------------  IN: 0 mL / OUT: 700 mL / NET: -700 mL        GENERAL: NAD  HEENT: NCAT  CHEST/LUNG: No increased WOB, Clear to percussion bilaterally; No rales, rhonchi, wheezing  HEART: Regular rate and rhythm; No murmurs  ABDOMEN: Soft, Nontender, Nondistended; Bowel sounds present  MUSCULOSKELETAL/EXTREMITIES:  2+ Peripheral Pulses, No LE edema; LEFT hand contracted but able to open  PSYCH: Appropriate affect, Alert & Oriented    LABS:                        8.9    9.58  )-----------( 546      ( 13 Nov 2023 06:00 )             27.7       11-13    131  |  95  |  19  ----------------------------<  125  4.5   |  28  |  0.96    Ca    9.7      13 Nov 2023 06:00    TPro  7.4  /  Alb  2.9  /  TBili  0.3  /  DBili  x   /  AST  21  /  ALT  23  /  AlkPhos  75  11-13                                  Urinalysis Basic - ( 13 Nov 2023 06:00 )    Color: x / Appearance: x / SG: x / pH: x  Gluc: 125 mg/dL / Ketone: x  / Bili: x / Urobili: x   Blood: x / Protein: x / Nitrite: x   Leuk Esterase: x / RBC: x / WBC x   Sq Epi: x / Non Sq Epi: x / Bacteria: x        COVID-19 PCR: NotDetec (11-10-23 @ 21:08)      MEDICATIONS  (STANDING):  acetaminophen     Tablet .. 975 milliGRAM(s) Oral every 8 hours  amLODIPine   Tablet 10 milliGRAM(s) Oral daily  amoxicillin  875 milliGRAM(s)/clavulanate 1 Tablet(s) Oral two times a day  atorvastatin 20 milliGRAM(s) Oral at bedtime  bacitracin   Ointment 1 Application(s) Topical two times a day  gabapentin 800 milliGRAM(s) Oral three times a day  heparin   Injectable 5000 Unit(s) SubCutaneous every 8 hours  lidocaine   4% Patch 1 Patch Transdermal daily  lidocaine   4% Patch 1 Patch Transdermal daily  multivitamin 1 Tablet(s) Oral daily  nystatin Powder 1 Application(s) Topical two times a day  polyethylene glycol 3350 17 Gram(s) Oral two times a day  senna 2 Tablet(s) Oral at bedtime  tiZANidine 2 milliGRAM(s) Oral <User Schedule>  tiZANidine 4 milliGRAM(s) Oral at bedtime  traMADol 50 milliGRAM(s) Oral three times a day    MEDICATIONS  (PRN):  albuterol    90 MICROgram(s) HFA Inhaler 2 Puff(s) Inhalation every 6 hours PRN Shortness of Breath and/or Wheezing  ALPRAZolam 0.5 milliGRAM(s) Oral at bedtime PRN anxiety  bisacodyl Suppository 10 milliGRAM(s) Rectal daily PRN Constipation  ondansetron   Disintegrating Tablet 4 milliGRAM(s) Oral every 8 hours PRN Nausea and/or Vomiting  oxyCODONE    IR 15 milliGRAM(s) Oral every 3 hours PRN Severe Pain (7 - 10)  oxyCODONE    IR 10 milliGRAM(s) Oral every 3 hours PRN Moderate Pain (4 - 6)      Care Discussed with Consultants/Other Providers: Yes

## 2023-11-14 NOTE — PROGRESS NOTE ADULT - ASSESSMENT
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 Lopez.  Hospital Course complicated by fever 2/2 ? PNA treated with zosyn (augmentin while in rehab until 11/13), hyponatremia, ABLA and chronic low back pain. Patient now admitted for a multidisciplinary rehab program. 11-10-23 @ 15:11    * Chronic pain--increase tramadol to tid  * B/L LE weakness L> R and Left arm weakness    Lumbar stenosis with radiculopathy  s/p L1-pelvis PSF and decompression on 10/31  - Wear LSO brace. and LEFT AFO when OOB  - WBAT  - Comprehensive Multidisciplinary Rehab Program: Start comprehensive rehab program of PT/OT/SLP - 3 hours a day, 5 days a week. P&O as needed   - remove staples/sutures POD 14 on 11/14  - precautions: spine, cardiac, fall, aspiration  - f/u Dr. Lopez outpatient    Pain  Chronic low back pain  - home regimen: Nucynta (ran out 1-2months ago), Percocet 10/325 q4h, Xanax 0.5mg, gabapentin 400mg TID, tizanidine 2mg q6. Patient is requesting resuming his home medication of nucynta, states that family will bring in home does. Will address with patient and pharmacy if medication is brought in.   - acute pain management followed at [facility]. Recs appreciated   - Oxy 10-15mg q3 for mod/severe.   - gabapentin 800mg TID  - tizanidine 2mg, d/c on Q6.  So as not to wake patient - trial 6am, 3pm, 10pm.   - lidocaine patches  11/13--D/W Dr Vaughn Bach -Ph    (Emergency ph  -)-(patient's pain mgt physician)---Patient had good pain control with Tapentadol 100mg QID for several years, until insurance denial of approval few months ago, despite 3 pre auth attempts,  Tramadol 50mg TID    Hyponatremia--* Low Na---fluid restriction 1-1.5l/day  and monitor   - SIADH post-op  - Na 130  --F/u hospitalist recs     PNA   - cont augmentin BID (end 11/14), completed      HTN  - Norvasc 10mg    HLD  - lipitor 20mg    ABLA 2/2 surgery  - Has yearly FOBT which he states was negative, no recent colonoscopy.    Mood / Cognition  - Neuropsychology consult recs appreciated--patient finds this beneficial   - Rec therapy  - 0.5mg Xanax prn Bedtime    Sleep  - Melatonin PRN     GI / Bowel  - Senna qHS  - Miralax Daily     / Bladder  - Continue bladder scans Q8 hours with straight cath for >400cc.  - Condom cath (unable to use urinal by himself)    Skin / Pressure injury  - Skin --t. Aquacel, CDI gauze over drain site.   - Monitor Incisions    Diet:  - Diet Consistency: Regular    DVT prophylaxis:   - heparin 5000mg q8    Code Status/Emergency Contact:  mother Pamela Rey [phone]      * labs 11/13--Unremarkable --stable anemia, normal renal and liver function     Outpatient Follow-up:  Ami Lopez  Orthopaedic Surgery  [address] [facility], [address] [place] [address]  Phone: [phone]  Fax: [phone]  Established Patient  Follow Up Time:      Pain management  Dr Vaughn Bach -Ph  [phone]  (Emergency ph  -)-(     KEVEN REY is a 63y with  HTN, HLD, CAD (non obstructive), asthma, PE (xarelto stopped 2 months ago), cervical radiculopathy/myelopathy s/p c-spine fusion surgery Oct 2022 (complicated by 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 Lopez.  Hospital Course complicated by fever 2/2 ? PNA treated with zosyn (augmentin while in rehab until 11/13), hyponatremia, ABLA and chronic low back pain. Patient now admitted for a multidisciplinary rehab program. 11-10-23 @ 15:11    * Chronic pain--increase tramadol to tid  * B/L LE weakness L> R and Left arm weakness    Lumbar stenosis with radiculopathy  s/p L1-pelvis PSF and decompression on 10/31  - Wear LSO brace. and LEFT AFO when OOB  - WBAT  - Comprehensive Multidisciplinary Rehab Program: Start comprehensive rehab program of PT/OT/SLP - 3 hours a day, 5 days a week. P&O as needed   - remove staples/sutures POD 14 on 11/14  - precautions: spine, cardiac, fall, aspiration  - f/u Dr. Lopez outpatient    Pain  Chronic low back pain  - home regimen: Nucynta (ran out 1-2months ago), Percocet 10/325 q4h, Xanax 0.5mg, gabapentin 400mg TID, tizanidine 2mg q6. Patient is requesting resuming his home medication of nucynta, states that family will bring in home does. Will address with patient and pharmacy if medication is brought in.   - acute pain management followed at [facility]. Recs appreciated   - Oxy 10-15mg q3 for mod/severe.   - gabapentin 800mg TID  - tizanidine 2mg, d/c on Q6.  So as not to wake patient - trial 6am, 3pm, 10pm.   - lidocaine patches  11/13--D/W Dr Vaughn Bach -Ph    (Emergency ph  -)-(patient's pain mgt physician)---Patient had good pain control with Tapentadol 100mg QID for several years, until insurance denial of approval few months ago, despite 3 pre auth attempts,  Tramadol 50mg TID    Hyponatremia--* Low Na---fluid restriction 1-1.5l/day  and monitor   - SIADH post-op  - Na 130  --F/u hospitalist recs     PNA   - cont augmentin BID (end 11/14), completed      HTN  - Norvasc 10mg    HLD  - lipitor 20mg    ABLA 2/2 surgery  - Has yearly FOBT which he states was negative, no recent colonoscopy.    Mood / Cognition  - Neuropsychology consult recs appreciated--patient finds this beneficial   - Rec therapy  - 0.5mg Xanax prn Bedtime    Sleep  - Melatonin PRN     GI / Bowel  - Senna qHS  - Miralax Daily     / Bladder  - Continue bladder scans Q8 hours with straight cath for >400cc.  - Condom cath (unable to use urinal by himself)    Skin / Pressure injury  - Skin --t. Aquacel, CDI gauze over drain site.   - Monitor Incisions    Diet:  - Diet Consistency: Regular    DVT prophylaxis:   - heparin 5000mg q8    Code Status/Emergency Contact:  mother Pamela Rey [phone]    * labs 11/13--Unremarkable --stable anemia, normal renal and liver function     IDT 11/14  living with mother and 2 brothers, and other family members  Has a walk in shower, 15 steps to house entrance, has many family members to help  Set up to eating,  Mod assistance for bed transfers, max assistance for lower body dressing, mod assistance for upper body dressing  Ambulating 10 ft on the grab bar  DME--Grab bar, commode and shower chair  Barriers--yet to practice shower transfers, chronic pain, muscle weakness  Goal--supervision for ADLs  Est dc 11/29 to home      Outpatient Follow-up:  Ami Lopez  Orthopaedic Surgery  [address] [facility], [address]  [place] [address]  Phone: [phone]  Fax: [phone]  Established Patient  Follow Up Time:      Pain management  Dr Vaughn Bach -Ph    (Emergency ph  -)-(

## 2023-11-14 NOTE — DIETITIAN INITIAL EVALUATION ADULT - NS FNS DIET ORDER
POD #1 s/p lap converted to open left hemicolectomy   -Sips of CLD   -Multi-modal pain control - allergy to morphine and dilaudid noted   -Out of bed to chair  -Encourage IS use   -Discontinue kemp   -Restart home PO medications  -Pepcid, lovenox   -Replace lytes as necessary    Regular

## 2023-11-14 NOTE — PROGRESS NOTE ADULT - ASSESSMENT
#L1-L5 PSF and decompression due to lumbar stenosis  #chronic back pain  #s/p cervical fusion 2022 c/b left arm nerve damage with LUE contraction  - continue comprehensive rehab program  - remove staples 11/14  - pain improved after adding tramadol 50mg po TID, increased gabapentin and increased tizanidine. Also has oxy 10 /15 qPRN moderate/severe pain. Discussed with PM&R attending.  - ISTOP reviewed on 11/12, patient reports he is pending pre-auth for nucynta and had stopped taking it 2 months ago. Per ISTOP, he was prescribed nucynta 100mg q6h in 7/2023. Advised patient to follow up with his prescriber and pharamacy and bring it in to hospital    #hyponatremia  - sodium 131 on 11/13  - serum osm 285, urine sodium is 34  - noted with slightly elevated protein gap 4.4 -- outpatient follow up  - may be due to pain vs. post op changes    #essential HTN  - c/w norvasc 10mg po daily    #HLD  - c/w lipitor 20mg po daily     #normocytic anemia  - hb 8.9 stable  - iron panel and ferritin consistent with MARIA DOLORES -- start ferrous sulfate once patient's pain management is optimized.    #constipation  - continue with senna and miralax  - last BM 11/12  - dulcolax PRN and lactulose PRN if no BM >3 days.  - consider movantik if formulary if no BM with above     #aspiration pneumonia  - complete hospital course of augmentin until 11/14.    heparin sq  full code

## 2023-11-14 NOTE — DIETITIAN INITIAL EVALUATION ADULT - OTHER INFO
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 Lopez.  Hospital Course complicated by fever 2/2 ? PNA treated with zosyn (augmentin while in rehab until 11/13), hyponatremia, ABLA and chronic low back pain. Patient now admitted for a multidisciplinary rehab program.    Pt tolerating a regular diet with good PO intake, observed during lunch eating >75% of meal. Pt reports good PO intake since admission to [facility]. Pt reports poor PO intake @ [facility] due to dislike of the food. Pt reports UBW of 180 lbs. Current weight is 169.3 lbs. Pt attributes weight loss due to poor appetite/PO intake at previous hospital. Discussed food preferences to optimize PO intake. Pt receptive to Ensure Plus High Protein (provides 350 kcal, 20 g protein/serving) BID. Pt confirms allergy to peanuts & eggs (but can eat foods containing eggs i.e. pancakes). Pt has a hx of HTN, CAD but pt noted w/ hyponatremia currently therefore will defer DASH/TLC diet until sodium WNL. Denies nausea, vomiting, diarrhea, constipation currently.

## 2023-11-14 NOTE — DIETITIAN INITIAL EVALUATION ADULT - PERTINENT LABORATORY DATA
11-13    131<L>  |  95<L>  |  19  ----------------------------<  125<H>  4.5   |  28  |  0.96    Ca    9.7      13 Nov 2023 06:00    TPro  7.4  /  Alb  2.9<L>  /  TBili  0.3  /  DBili  x   /  AST  21  /  ALT  23  /  AlkPhos  75  11-13  A1C with Estimated Average Glucose Result: 6.2 % (08-15-23 @ 10:10)

## 2023-11-15 PROCEDURE — 99232 SBSQ HOSP IP/OBS MODERATE 35: CPT

## 2023-11-15 RX ORDER — ALPRAZOLAM 0.25 MG
0.5 TABLET ORAL AT BEDTIME
Refills: 0 | Status: DISCONTINUED | OUTPATIENT
Start: 2023-11-15 | End: 2023-11-21

## 2023-11-15 RX ORDER — OXYCODONE HYDROCHLORIDE 5 MG/1
15 TABLET ORAL
Refills: 0 | Status: DISCONTINUED | OUTPATIENT
Start: 2023-11-15 | End: 2023-11-21

## 2023-11-15 RX ORDER — TRAMADOL HYDROCHLORIDE 50 MG/1
50 TABLET ORAL THREE TIMES A DAY
Refills: 0 | Status: DISCONTINUED | OUTPATIENT
Start: 2023-11-15 | End: 2023-11-15

## 2023-11-15 RX ORDER — TAPENTADOL HYDROCHLORIDE 50 MG/1
100 TABLET, FILM COATED ORAL
Refills: 0 | Status: DISCONTINUED | OUTPATIENT
Start: 2023-11-15 | End: 2023-11-21

## 2023-11-15 RX ORDER — OXYCODONE HYDROCHLORIDE 5 MG/1
10 TABLET ORAL
Refills: 0 | Status: DISCONTINUED | OUTPATIENT
Start: 2023-11-15 | End: 2023-11-15

## 2023-11-15 RX ADMIN — HEPARIN SODIUM 5000 UNIT(S): 5000 INJECTION INTRAVENOUS; SUBCUTANEOUS at 21:17

## 2023-11-15 RX ADMIN — TIZANIDINE 4 MILLIGRAM(S): 4 TABLET ORAL at 21:17

## 2023-11-15 RX ADMIN — GABAPENTIN 800 MILLIGRAM(S): 400 CAPSULE ORAL at 05:06

## 2023-11-15 RX ADMIN — OXYCODONE HYDROCHLORIDE 15 MILLIGRAM(S): 5 TABLET ORAL at 00:47

## 2023-11-15 RX ADMIN — TRAMADOL HYDROCHLORIDE 50 MILLIGRAM(S): 50 TABLET ORAL at 05:05

## 2023-11-15 RX ADMIN — Medication 975 MILLIGRAM(S): at 06:05

## 2023-11-15 RX ADMIN — OXYCODONE HYDROCHLORIDE 15 MILLIGRAM(S): 5 TABLET ORAL at 01:47

## 2023-11-15 RX ADMIN — Medication 1 APPLICATION(S): at 18:02

## 2023-11-15 RX ADMIN — NYSTATIN CREAM 1 APPLICATION(S): 100000 CREAM TOPICAL at 05:06

## 2023-11-15 RX ADMIN — TRAMADOL HYDROCHLORIDE 50 MILLIGRAM(S): 50 TABLET ORAL at 06:05

## 2023-11-15 RX ADMIN — TAPENTADOL HYDROCHLORIDE 100 MILLIGRAM(S): 50 TABLET, FILM COATED ORAL at 20:31

## 2023-11-15 RX ADMIN — SENNA PLUS 2 TABLET(S): 8.6 TABLET ORAL at 21:18

## 2023-11-15 RX ADMIN — HEPARIN SODIUM 5000 UNIT(S): 5000 INJECTION INTRAVENOUS; SUBCUTANEOUS at 05:05

## 2023-11-15 RX ADMIN — Medication 0.5 MILLIGRAM(S): at 22:28

## 2023-11-15 RX ADMIN — OXYCODONE HYDROCHLORIDE 15 MILLIGRAM(S): 5 TABLET ORAL at 14:55

## 2023-11-15 RX ADMIN — Medication 975 MILLIGRAM(S): at 22:28

## 2023-11-15 RX ADMIN — OXYCODONE HYDROCHLORIDE 15 MILLIGRAM(S): 5 TABLET ORAL at 15:55

## 2023-11-15 RX ADMIN — AMLODIPINE BESYLATE 10 MILLIGRAM(S): 2.5 TABLET ORAL at 05:05

## 2023-11-15 RX ADMIN — Medication 1 TABLET(S): at 14:56

## 2023-11-15 RX ADMIN — ATORVASTATIN CALCIUM 20 MILLIGRAM(S): 80 TABLET, FILM COATED ORAL at 21:16

## 2023-11-15 RX ADMIN — OXYCODONE HYDROCHLORIDE 15 MILLIGRAM(S): 5 TABLET ORAL at 21:17

## 2023-11-15 RX ADMIN — TIZANIDINE 2 MILLIGRAM(S): 4 TABLET ORAL at 14:55

## 2023-11-15 RX ADMIN — TIZANIDINE 2 MILLIGRAM(S): 4 TABLET ORAL at 05:05

## 2023-11-15 RX ADMIN — HEPARIN SODIUM 5000 UNIT(S): 5000 INJECTION INTRAVENOUS; SUBCUTANEOUS at 14:56

## 2023-11-15 RX ADMIN — Medication 975 MILLIGRAM(S): at 05:05

## 2023-11-15 RX ADMIN — OXYCODONE HYDROCHLORIDE 15 MILLIGRAM(S): 5 TABLET ORAL at 10:01

## 2023-11-15 RX ADMIN — GABAPENTIN 800 MILLIGRAM(S): 400 CAPSULE ORAL at 14:57

## 2023-11-15 RX ADMIN — Medication 1 APPLICATION(S): at 05:05

## 2023-11-15 RX ADMIN — OXYCODONE HYDROCHLORIDE 15 MILLIGRAM(S): 5 TABLET ORAL at 09:01

## 2023-11-15 RX ADMIN — GABAPENTIN 800 MILLIGRAM(S): 400 CAPSULE ORAL at 21:16

## 2023-11-15 RX ADMIN — OXYCODONE HYDROCHLORIDE 15 MILLIGRAM(S): 5 TABLET ORAL at 22:17

## 2023-11-15 RX ADMIN — TAPENTADOL HYDROCHLORIDE 100 MILLIGRAM(S): 50 TABLET, FILM COATED ORAL at 19:31

## 2023-11-15 RX ADMIN — Medication 975 MILLIGRAM(S): at 23:28

## 2023-11-15 NOTE — PROGRESS NOTE ADULT - ASSESSMENT
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 Lopez.  Hospital Course complicated by fever 2/2 ? PNA treated with zosyn (augmentin while in rehab until 11/13), hyponatremia, ABLA and chronic low back pain. Patient now admitted for a multidisciplinary rehab program. 11-10-23 @ 15:11    * Chronic pain-no response to tramadol--will d/c  * Will commence Tapentadol after identification by pharamacy   (as confirmed by his pain mgt physician--he had been on this med 100mg qid with good pain control up to few months ago, when insurance declined approval)  * B/L LE weakness L> R and Left arm weakness  * Constipation--monitor BM,    Lumbar stenosis with radiculopathy  s/p L1-pelvis PSF and decompression on 10/31  - Wear LSO brace. and LEFT AFO when OOB  - WBAT  - Comprehensive Multidisciplinary Rehab Program: Start comprehensive rehab program of PT/OT/SLP - 3 hours a day, 5 days a week. P&O as needed   - remove staples/sutures POD 14 on 11/14  - precautions: spine, cardiac, fall, aspiration  - f/u Dr. Lopez outpatient    Pain  Chronic low back pain  - home regimen: Nucynta (ran out 1-2months ago), Percocet 10/325 q4h, Xanax 0.5mg, gabapentin 400mg TID, tizanidine 2mg q6. Patient is requesting resuming his home medication of nucynta, states that family will bring in home does. Will address with patient and pharmacy if medication is brought in.   - acute pain management followed at [facility]. Recs appreciated   - Oxy 10-15mg q3 for mod/severe.   - gabapentin 800mg TID  - tizanidine 2mg, d/c on Q6.  So as not to wake patient - trial 6am, 3pm, 10pm.   - lidocaine patches  11/13--D/W Dr Vaughn Bach -Ph    (Emergency ph  -)-(patient's pain mgt physician)---Patient had good pain control with Tapentadol 100mg QID for several years, until insurance denial of approval few months ago, despite 3 pre auth attempts,  Tramadol 50mg TID--D/KEKE 11/15, No response  * Will commence Tapentadol after identification by pharamacy   (as confirmed by his pain mgt physician--he had been on this med 100mg qid with good pain control up to few months ago, when insurance declined approval)    Hyponatremia--* Low Na---fluid restriction 1-1.5l/day  and monitor   - SIADH post-op  - Na 130  --F/u hospitalist recs     PNA   - cont augmentin BID (end 11/14), completed      HTN  - Norvasc 10mg    HLD  - lipitor 20mg    ABLA 2/2 surgery  - Has yearly FOBT which he states was negative, no recent colonoscopy.    Mood / Cognition  - Neuropsychology consult recs appreciated--patient finds this beneficial   - Rec therapy  - 0.5mg Xanax prn Bedtime    Sleep  - Melatonin PRN     GI / Bowel  - Senna qHS  - Miralax Daily  --lactulose prn      / Bladder  - Continue bladder scans Q8 hours with straight cath for >400cc.  - Condom cath (unable to use urinal by himself)    Skin / Pressure injury  - Skin --t. Aquacel, CDI gauze over drain site.   - Monitor Incisions    Diet:  - Diet Consistency: Regular    DVT prophylaxis:   - heparin 5000mg q8    Code Status/Emergency Contact:  mother Pamela Rey [phone]    * labs 11/13--Unremarkable --stable anemia, normal renal and liver function     IDT 11/14  living with mother and 2 brothers, and other family members  Has a walk in shower, 15 steps to house entrance, has many family members to help  Set up to eating,  Mod assistance for bed transfers, max assistance for lower body dressing, mod assistance for upper body dressing  Ambulating 10 ft on the grab bar  DME--Grab bar, commode and shower chair  Barriers--yet to practice shower transfers, chronic pain, muscle weakness  Goal--supervision for ADLs  Est dc 11/29 to home      Outpatient Follow-up:  Ami Lopez  Orthopaedic Surgery  [address] [facility], [address]  [place] [address]  Phone: [phone]  Fax: [phone]  Established Patient  Follow Up Time:    Pain management  Dr Vaughn Bach -Ph    (Emergency ph  -)-(  (Pain controlled with Tapentadol 100mg qid with good pain control up to few months ago, when insurance declined approval)

## 2023-11-15 NOTE — PROGRESS NOTE ADULT - SUBJECTIVE AND OBJECTIVE BOX
Subjective  Seen and examined, observed in therapy  Report persistent generalized pain, no benefit with increased dose of tramadol  Agrees to stopping tramadol and optimizing oxycodone,   If not response, trail of long and short acting oxycodone  Required lactulose prior to BM yesterday    Nursing staff report that patient received information that his analgesic med--Nucinta (Tapentadol) had been approved by his insurance and his family member will be bringing it to the unit    RSO--No chest or abd pain, no headache, LBM 11/14  Back pain, no bleeding muscle spasms, minimal     Therapy  Engaged for assessment and therapy  Worked on muscle stretching  Overt weakness of extremities left side and right leg apparent  Despite use of Left AFO and back brace, LE motor strength remains limited   Generalized pain limited progress in therapy     ICU Vital Signs Last 24 Hrs  T(C): 36.7 (15 Nov 2023 08:53), Max: 36.7 (14 Nov 2023 19:11)  T(F): 98.1 (15 Nov 2023 08:53), Max: 98.1 (15 Nov 2023 08:53)  HR: 87 (15 Nov 2023 08:53) (79 - 94)  BP: 110/80 (15 Nov 2023 08:53) (110/80 - 121/70)  RR: 16 (15 Nov 2023 08:53) (16 - 16)  SpO2: 96% (15 Nov 2023 08:53) (93% - 96%)  O2 Parameters below as of 15 Nov 2023 08:53  Patient On (Oxygen Delivery Method): room air    EXAM  Gen - mild distress due to pain symptoms   HEENT - NAD  Neck - Supple,   Pulm - Clear  Cardiovascular - RRR, S1S2, No m/r/g  Abdomen - Soft, non  tender, +BS  Extremities - No C/C/E, No calf tenderness. Left shoulder AROM ~30degrees, PROM ~90 degrees    Neuro-     Cognitive - AAOx3     Communication - Fluent, No dysarthria     Attention: Intact      Cranial Nerves - CN 2-12 grossly intact     Motor -                    LEFT    UE - ShAB 5/5, EF 4-/5, EE 4-/5, WE 5/5,  4/5, unable to flex or abduct shoulder past 90 deg. left elbow flexion contracture                     RIGHT UE - ShAB 5/5, EF 5/5, EE 5/5, WE 5/5,  4/5                    LEFT    LE - HF 1/5, KE 1/5, DF 0/5, PF 4/5                    RIGHT LE - HF 4/5 (due to back pain), KE 5/5, DF 5/5, PF 5/5        Sensory - diminished sensation left lower extremity stocking distribution compared to right.  Reports numbness of bilateral hands and toes.      Tone - normal. Left shoulder contracture.      Temp- decreased temperature sensation LUE  Psychiatric - Mood stable, Affect WNL  Skin: Aquacel, gauze over single drain site.   Wounds: None aquacel over lumbar spine surgical area, no bleeding      RECENT LABS/IMAGING                        8.9    9.58  )-----------( 546      ( 13 Nov 2023 06:00 )             27.7     11-13    131<L>  |  95<L>  |  19  ----------------------------<  125<H>  4.5   |  28  |  0.96    Ca    9.7      13 Nov 2023 06:00    TPro  7.4  /  Alb  2.9<L>  /  TBili  0.3  /  DBili  x   /  AST  21  /  ALT  23  /  AlkPhos  75  11-13      Urinalysis Basic - ( 13 Nov 2023 06:00 )    Color: x / Appearance: x / SG: x / pH: x  Gluc: 125 mg/dL / Ketone: x  / Bili: x / Urobili: x   Blood: x / Protein: x / Nitrite: x   Leuk Esterase: x / RBC: x / WBC x   Sq Epi: x / Non Sq Epi: x / Bacteria: x    MEDICATIONS  (STANDING):  acetaminophen     Tablet .. 975 milliGRAM(s) Oral every 8 hours  amLODIPine   Tablet 10 milliGRAM(s) Oral daily  atorvastatin 20 milliGRAM(s) Oral at bedtime  bacitracin   Ointment 1 Application(s) Topical two times a day  gabapentin 800 milliGRAM(s) Oral three times a day  heparin   Injectable 5000 Unit(s) SubCutaneous every 8 hours  lidocaine   4% Patch 1 Patch Transdermal daily  lidocaine   4% Patch 1 Patch Transdermal daily  multivitamin 1 Tablet(s) Oral daily  nystatin Powder 1 Application(s) Topical two times a day  polyethylene glycol 3350 17 Gram(s) Oral two times a day  senna 2 Tablet(s) Oral at bedtime  tiZANidine 2 milliGRAM(s) Oral <User Schedule>  tiZANidine 4 milliGRAM(s) Oral at bedtime  traMADol 50 milliGRAM(s) Oral three times a day    MEDICATIONS  (PRN):  albuterol    90 MICROgram(s) HFA Inhaler 2 Puff(s) Inhalation every 6 hours PRN Shortness of Breath and/or Wheezing  ALPRAZolam 0.5 milliGRAM(s) Oral at bedtime PRN anxiety  bisacodyl Suppository 10 milliGRAM(s) Rectal daily PRN Constipation  ondansetron   Disintegrating Tablet 4 milliGRAM(s) Oral every 8 hours PRN Nausea and/or Vomiting  oxyCODONE    IR 15 milliGRAM(s) Oral every 3 hours PRN Severe Pain (7 - 10)  oxyCODONE    IR 10 milliGRAM(s) Oral every 3 hours PRN Moderate Pain (4 - 6)

## 2023-11-15 NOTE — PROGRESS NOTE ADULT - ASSESSMENT
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 Reporting some worsened pain today. Also has oxy 10 /15 qPRN moderate/severe pain. Discussed with PM&R attending.  - ISTOP reviewed on 11/12, patient reports he is pending pre-auth for nucynta and had stopped taking it 2 months ago. Per ISTOP, he was prescribed nucynta 100mg q6h in 7/2023. Advised patient to follow up with his prescriber and pharamacy and bring it in to hospital    #hyponatremia  - sodium 131 on 11/13  - serum osm 285, urine sodium is 34  - noted with slightly elevated protein gap 4.4 -- outpatient follow up  - may be due to pain vs. post op changes    #essential HTN  - c/w norvasc 10mg po daily    #HLD  - c/w lipitor 20mg po daily     #normocytic anemia  - hb 8.9 stable  - iron panel and ferritin consistent with MARIA DOLORES -- start ferrous sulfate once patient's pain management is optimized    #constipation  - continue with senna and miralax  - last BM 11/12  - dulcolax PRN and lactulose PRN if no BM >3 days.  - consider movantik if formulary if no BM with above     #aspiration pneumonia  - complete hospital course of augmentin until 11/14.    heparin sq  full code

## 2023-11-15 NOTE — PROGRESS NOTE ADULT - SUBJECTIVE AND OBJECTIVE BOX
no acute events overnight  pain still uncontrolled      PHYSICAL EXAM:    Vital Signs Last 24 Hrs  T(F): 98.1 (15 Nov 2023 08:53), Max: 98.1 (15 Nov 2023 08:53)  HR: 87 (15 Nov 2023 08:53) (79 - 94)  BP: 110/80 (15 Nov 2023 08:53) (110/80 - 121/70)  RR: 16 (15 Nov 2023 08:53) (16 - 16)  SpO2: 96% (15 Nov 2023 08:53) (93% - 96%)  I&O's Summary      GENERAL: NAD  HEENT: NCAT  CHEST/LUNG: No increased WOB, Clear to percussion bilaterally; No rales, rhonchi, wheezing  HEART: Regular rate and rhythm; No murmurs  ABDOMEN: Soft, Nontender, Nondistended; Bowel sounds present  MUSCULOSKELETAL/EXTREMITIES:  2+ Peripheral Pulses, No LE edema; contracted RIGHT hand  PSYCH: Appropriate affect, Alert & Oriented    LABS:                        8.9    9.58  )-----------( 546      ( 13 Nov 2023 06:00 )             27.7       11-13    131  |  95  |  19  ----------------------------<  125  4.5   |  28  |  0.96    Ca    9.7      13 Nov 2023 06:00    TPro  7.4  /  Alb  2.9  /  TBili  0.3  /  DBili  x   /  AST  21  /  ALT  23  /  AlkPhos  75  11-13                                  Urinalysis Basic - ( 13 Nov 2023 06:00 )    Color: x / Appearance: x / SG: x / pH: x  Gluc: 125 mg/dL / Ketone: x  / Bili: x / Urobili: x   Blood: x / Protein: x / Nitrite: x   Leuk Esterase: x / RBC: x / WBC x   Sq Epi: x / Non Sq Epi: x / Bacteria: x        COVID-19 PCR: NotDetec (11-10-23 @ 21:08)      MEDICATIONS  (STANDING):  acetaminophen     Tablet .. 975 milliGRAM(s) Oral every 8 hours  amLODIPine   Tablet 10 milliGRAM(s) Oral daily  atorvastatin 20 milliGRAM(s) Oral at bedtime  bacitracin   Ointment 1 Application(s) Topical two times a day  gabapentin 800 milliGRAM(s) Oral three times a day  heparin   Injectable 5000 Unit(s) SubCutaneous every 8 hours  lidocaine   4% Patch 1 Patch Transdermal daily  lidocaine   4% Patch 1 Patch Transdermal daily  multivitamin 1 Tablet(s) Oral daily  nystatin Powder 1 Application(s) Topical two times a day  polyethylene glycol 3350 17 Gram(s) Oral two times a day  senna 2 Tablet(s) Oral at bedtime  tiZANidine 2 milliGRAM(s) Oral <User Schedule>  tiZANidine 4 milliGRAM(s) Oral at bedtime    MEDICATIONS  (PRN):  albuterol    90 MICROgram(s) HFA Inhaler 2 Puff(s) Inhalation every 6 hours PRN Shortness of Breath and/or Wheezing  ALPRAZolam 0.5 milliGRAM(s) Oral at bedtime PRN anxiety  bisacodyl Suppository 10 milliGRAM(s) Rectal daily PRN Constipation  ondansetron   Disintegrating Tablet 4 milliGRAM(s) Oral every 8 hours PRN Nausea and/or Vomiting  oxyCODONE    IR 15 milliGRAM(s) Oral every 3 hours PRN Severe Pain (7 - 10)  oxyCODONE    IR 10 milliGRAM(s) Oral every 3 hours PRN Moderate Pain (4 - 6)      Care Discussed with Consultants/Other Providers: Yes

## 2023-11-16 LAB
ALBUMIN SERPL ELPH-MCNC: 3.1 G/DL — LOW (ref 3.3–5)
ALBUMIN SERPL ELPH-MCNC: 3.1 G/DL — LOW (ref 3.3–5)
ALP SERPL-CCNC: 84 U/L — SIGNIFICANT CHANGE UP (ref 40–120)
ALP SERPL-CCNC: 84 U/L — SIGNIFICANT CHANGE UP (ref 40–120)
ALT FLD-CCNC: 27 U/L — SIGNIFICANT CHANGE UP (ref 10–45)
ALT FLD-CCNC: 27 U/L — SIGNIFICANT CHANGE UP (ref 10–45)
ANION GAP SERPL CALC-SCNC: 9 MMOL/L — SIGNIFICANT CHANGE UP (ref 5–17)
ANION GAP SERPL CALC-SCNC: 9 MMOL/L — SIGNIFICANT CHANGE UP (ref 5–17)
AST SERPL-CCNC: 23 U/L — SIGNIFICANT CHANGE UP (ref 10–40)
AST SERPL-CCNC: 23 U/L — SIGNIFICANT CHANGE UP (ref 10–40)
BASOPHILS # BLD AUTO: 0.05 K/UL — SIGNIFICANT CHANGE UP (ref 0–0.2)
BASOPHILS # BLD AUTO: 0.05 K/UL — SIGNIFICANT CHANGE UP (ref 0–0.2)
BASOPHILS NFR BLD AUTO: 0.6 % — SIGNIFICANT CHANGE UP (ref 0–2)
BASOPHILS NFR BLD AUTO: 0.6 % — SIGNIFICANT CHANGE UP (ref 0–2)
BILIRUB SERPL-MCNC: 0.4 MG/DL — SIGNIFICANT CHANGE UP (ref 0.2–1.2)
BILIRUB SERPL-MCNC: 0.4 MG/DL — SIGNIFICANT CHANGE UP (ref 0.2–1.2)
BUN SERPL-MCNC: 16 MG/DL — SIGNIFICANT CHANGE UP (ref 7–23)
BUN SERPL-MCNC: 16 MG/DL — SIGNIFICANT CHANGE UP (ref 7–23)
CALCIUM SERPL-MCNC: 9.8 MG/DL — SIGNIFICANT CHANGE UP (ref 8.4–10.5)
CALCIUM SERPL-MCNC: 9.8 MG/DL — SIGNIFICANT CHANGE UP (ref 8.4–10.5)
CHLORIDE SERPL-SCNC: 97 MMOL/L — SIGNIFICANT CHANGE UP (ref 96–108)
CHLORIDE SERPL-SCNC: 97 MMOL/L — SIGNIFICANT CHANGE UP (ref 96–108)
CO2 SERPL-SCNC: 29 MMOL/L — SIGNIFICANT CHANGE UP (ref 22–31)
CO2 SERPL-SCNC: 29 MMOL/L — SIGNIFICANT CHANGE UP (ref 22–31)
CREAT SERPL-MCNC: 0.83 MG/DL — SIGNIFICANT CHANGE UP (ref 0.5–1.3)
CREAT SERPL-MCNC: 0.83 MG/DL — SIGNIFICANT CHANGE UP (ref 0.5–1.3)
EGFR: 98 ML/MIN/1.73M2 — SIGNIFICANT CHANGE UP
EGFR: 98 ML/MIN/1.73M2 — SIGNIFICANT CHANGE UP
EOSINOPHIL # BLD AUTO: 0.07 K/UL — SIGNIFICANT CHANGE UP (ref 0–0.5)
EOSINOPHIL # BLD AUTO: 0.07 K/UL — SIGNIFICANT CHANGE UP (ref 0–0.5)
EOSINOPHIL NFR BLD AUTO: 0.9 % — SIGNIFICANT CHANGE UP (ref 0–6)
EOSINOPHIL NFR BLD AUTO: 0.9 % — SIGNIFICANT CHANGE UP (ref 0–6)
GLUCOSE SERPL-MCNC: 122 MG/DL — HIGH (ref 70–99)
GLUCOSE SERPL-MCNC: 122 MG/DL — HIGH (ref 70–99)
HCT VFR BLD CALC: 27.8 % — LOW (ref 39–50)
HCT VFR BLD CALC: 27.8 % — LOW (ref 39–50)
HGB BLD-MCNC: 9 G/DL — LOW (ref 13–17)
HGB BLD-MCNC: 9 G/DL — LOW (ref 13–17)
IMM GRANULOCYTES NFR BLD AUTO: 0.5 % — SIGNIFICANT CHANGE UP (ref 0–0.9)
IMM GRANULOCYTES NFR BLD AUTO: 0.5 % — SIGNIFICANT CHANGE UP (ref 0–0.9)
LYMPHOCYTES # BLD AUTO: 1.42 K/UL — SIGNIFICANT CHANGE UP (ref 1–3.3)
LYMPHOCYTES # BLD AUTO: 1.42 K/UL — SIGNIFICANT CHANGE UP (ref 1–3.3)
LYMPHOCYTES # BLD AUTO: 17.5 % — SIGNIFICANT CHANGE UP (ref 13–44)
LYMPHOCYTES # BLD AUTO: 17.5 % — SIGNIFICANT CHANGE UP (ref 13–44)
MCHC RBC-ENTMCNC: 28.5 PG — SIGNIFICANT CHANGE UP (ref 27–34)
MCHC RBC-ENTMCNC: 28.5 PG — SIGNIFICANT CHANGE UP (ref 27–34)
MCHC RBC-ENTMCNC: 32.4 GM/DL — SIGNIFICANT CHANGE UP (ref 32–36)
MCHC RBC-ENTMCNC: 32.4 GM/DL — SIGNIFICANT CHANGE UP (ref 32–36)
MCV RBC AUTO: 88 FL — SIGNIFICANT CHANGE UP (ref 80–100)
MCV RBC AUTO: 88 FL — SIGNIFICANT CHANGE UP (ref 80–100)
MONOCYTES # BLD AUTO: 0.51 K/UL — SIGNIFICANT CHANGE UP (ref 0–0.9)
MONOCYTES # BLD AUTO: 0.51 K/UL — SIGNIFICANT CHANGE UP (ref 0–0.9)
MONOCYTES NFR BLD AUTO: 6.3 % — SIGNIFICANT CHANGE UP (ref 2–14)
MONOCYTES NFR BLD AUTO: 6.3 % — SIGNIFICANT CHANGE UP (ref 2–14)
NEUTROPHILS # BLD AUTO: 6.04 K/UL — SIGNIFICANT CHANGE UP (ref 1.8–7.4)
NEUTROPHILS # BLD AUTO: 6.04 K/UL — SIGNIFICANT CHANGE UP (ref 1.8–7.4)
NEUTROPHILS NFR BLD AUTO: 74.2 % — SIGNIFICANT CHANGE UP (ref 43–77)
NEUTROPHILS NFR BLD AUTO: 74.2 % — SIGNIFICANT CHANGE UP (ref 43–77)
NRBC # BLD: 0 /100 WBCS — SIGNIFICANT CHANGE UP (ref 0–0)
NRBC # BLD: 0 /100 WBCS — SIGNIFICANT CHANGE UP (ref 0–0)
PLATELET # BLD AUTO: 455 K/UL — HIGH (ref 150–400)
PLATELET # BLD AUTO: 455 K/UL — HIGH (ref 150–400)
POTASSIUM SERPL-MCNC: 4 MMOL/L — SIGNIFICANT CHANGE UP (ref 3.5–5.3)
POTASSIUM SERPL-MCNC: 4 MMOL/L — SIGNIFICANT CHANGE UP (ref 3.5–5.3)
POTASSIUM SERPL-SCNC: 4 MMOL/L — SIGNIFICANT CHANGE UP (ref 3.5–5.3)
POTASSIUM SERPL-SCNC: 4 MMOL/L — SIGNIFICANT CHANGE UP (ref 3.5–5.3)
PROT SERPL-MCNC: 7.4 G/DL — SIGNIFICANT CHANGE UP (ref 6–8.3)
PROT SERPL-MCNC: 7.4 G/DL — SIGNIFICANT CHANGE UP (ref 6–8.3)
RBC # BLD: 3.16 M/UL — LOW (ref 4.2–5.8)
RBC # BLD: 3.16 M/UL — LOW (ref 4.2–5.8)
RBC # FLD: 13.6 % — SIGNIFICANT CHANGE UP (ref 10.3–14.5)
RBC # FLD: 13.6 % — SIGNIFICANT CHANGE UP (ref 10.3–14.5)
SODIUM SERPL-SCNC: 135 MMOL/L — SIGNIFICANT CHANGE UP (ref 135–145)
SODIUM SERPL-SCNC: 135 MMOL/L — SIGNIFICANT CHANGE UP (ref 135–145)
WBC # BLD: 8.13 K/UL — SIGNIFICANT CHANGE UP (ref 3.8–10.5)
WBC # BLD: 8.13 K/UL — SIGNIFICANT CHANGE UP (ref 3.8–10.5)
WBC # FLD AUTO: 8.13 K/UL — SIGNIFICANT CHANGE UP (ref 3.8–10.5)
WBC # FLD AUTO: 8.13 K/UL — SIGNIFICANT CHANGE UP (ref 3.8–10.5)

## 2023-11-16 PROCEDURE — 99232 SBSQ HOSP IP/OBS MODERATE 35: CPT

## 2023-11-16 RX ADMIN — OXYCODONE HYDROCHLORIDE 15 MILLIGRAM(S): 5 TABLET ORAL at 05:32

## 2023-11-16 RX ADMIN — NYSTATIN CREAM 1 APPLICATION(S): 100000 CREAM TOPICAL at 05:39

## 2023-11-16 RX ADMIN — TAPENTADOL HYDROCHLORIDE 100 MILLIGRAM(S): 50 TABLET, FILM COATED ORAL at 16:16

## 2023-11-16 RX ADMIN — HEPARIN SODIUM 5000 UNIT(S): 5000 INJECTION INTRAVENOUS; SUBCUTANEOUS at 16:18

## 2023-11-16 RX ADMIN — POLYETHYLENE GLYCOL 3350 17 GRAM(S): 17 POWDER, FOR SOLUTION ORAL at 05:31

## 2023-11-16 RX ADMIN — ATORVASTATIN CALCIUM 20 MILLIGRAM(S): 80 TABLET, FILM COATED ORAL at 21:38

## 2023-11-16 RX ADMIN — AMLODIPINE BESYLATE 10 MILLIGRAM(S): 2.5 TABLET ORAL at 05:31

## 2023-11-16 RX ADMIN — SENNA PLUS 2 TABLET(S): 8.6 TABLET ORAL at 21:39

## 2023-11-16 RX ADMIN — TIZANIDINE 2 MILLIGRAM(S): 4 TABLET ORAL at 16:18

## 2023-11-16 RX ADMIN — TAPENTADOL HYDROCHLORIDE 100 MILLIGRAM(S): 50 TABLET, FILM COATED ORAL at 21:38

## 2023-11-16 RX ADMIN — TAPENTADOL HYDROCHLORIDE 100 MILLIGRAM(S): 50 TABLET, FILM COATED ORAL at 08:17

## 2023-11-16 RX ADMIN — GABAPENTIN 800 MILLIGRAM(S): 400 CAPSULE ORAL at 21:38

## 2023-11-16 RX ADMIN — TAPENTADOL HYDROCHLORIDE 100 MILLIGRAM(S): 50 TABLET, FILM COATED ORAL at 12:08

## 2023-11-16 RX ADMIN — GABAPENTIN 800 MILLIGRAM(S): 400 CAPSULE ORAL at 16:16

## 2023-11-16 RX ADMIN — Medication 975 MILLIGRAM(S): at 07:04

## 2023-11-16 RX ADMIN — TIZANIDINE 2 MILLIGRAM(S): 4 TABLET ORAL at 05:31

## 2023-11-16 RX ADMIN — Medication 0.5 MILLIGRAM(S): at 14:12

## 2023-11-16 RX ADMIN — OXYCODONE HYDROCHLORIDE 15 MILLIGRAM(S): 5 TABLET ORAL at 06:32

## 2023-11-16 RX ADMIN — OXYCODONE HYDROCHLORIDE 15 MILLIGRAM(S): 5 TABLET ORAL at 18:47

## 2023-11-16 RX ADMIN — GABAPENTIN 800 MILLIGRAM(S): 400 CAPSULE ORAL at 05:31

## 2023-11-16 RX ADMIN — Medication 1 TABLET(S): at 12:08

## 2023-11-16 RX ADMIN — HEPARIN SODIUM 5000 UNIT(S): 5000 INJECTION INTRAVENOUS; SUBCUTANEOUS at 05:32

## 2023-11-16 RX ADMIN — TIZANIDINE 4 MILLIGRAM(S): 4 TABLET ORAL at 21:38

## 2023-11-16 RX ADMIN — Medication 1 APPLICATION(S): at 05:29

## 2023-11-16 RX ADMIN — HEPARIN SODIUM 5000 UNIT(S): 5000 INJECTION INTRAVENOUS; SUBCUTANEOUS at 21:39

## 2023-11-16 RX ADMIN — Medication 975 MILLIGRAM(S): at 07:37

## 2023-11-16 RX ADMIN — Medication 975 MILLIGRAM(S): at 16:18

## 2023-11-16 RX ADMIN — Medication 975 MILLIGRAM(S): at 17:07

## 2023-11-16 NOTE — PROGRESS NOTE ADULT - SUBJECTIVE AND OBJECTIVE BOX
no acute events overnight  able to obtain his nucynta from outpatient doctor.      PHYSICAL EXAM:    Vital Signs Last 24 Hrs  T(F): 99.1 (16 Nov 2023 08:19), Max: 99.1 (16 Nov 2023 08:19)  HR: 87 (16 Nov 2023 08:19) (87 - 91)  BP: 102/68 (16 Nov 2023 08:19) (102/68 - 127/78)  RR: 16 (16 Nov 2023 08:19) (16 - 16)  SpO2: 96% (16 Nov 2023 08:19) (95% - 100%)  I&O's Summary      GENERAL: NAD  HEENT: NCAT  CHEST/LUNG: No increased WOB, Clear to percussion bilaterally; No rales, rhonchi, wheezing  HEART: Regular rate and rhythm; No murmurs  ABDOMEN: Soft, Nontender, Nondistended; Bowel sounds present  MUSCULOSKELETAL/EXTREMITIES:  2+ Peripheral Pulses, No LE edema; LEFT hand contraction  PSYCH: Appropriate affect, Alert & Orientedx3    LABS:                        9.0    8.13  )-----------( 455      ( 16 Nov 2023 07:11 )             27.8       11-16    135  |  97  |  16  ----------------------------<  122  4.0   |  29  |  0.83    Ca    9.8      16 Nov 2023 07:11    TPro  7.4  /  Alb  3.1  /  TBili  0.4  /  DBili  x   /  AST  23  /  ALT  27  /  AlkPhos  84  11-16                                  Urinalysis Basic - ( 16 Nov 2023 07:11 )    Color: x / Appearance: x / SG: x / pH: x  Gluc: 122 mg/dL / Ketone: x  / Bili: x / Urobili: x   Blood: x / Protein: x / Nitrite: x   Leuk Esterase: x / RBC: x / WBC x   Sq Epi: x / Non Sq Epi: x / Bacteria: x        COVID-19 PCR: NotDetec (11-10-23 @ 21:08)      MEDICATIONS  (STANDING):  acetaminophen     Tablet .. 975 milliGRAM(s) Oral every 8 hours  amLODIPine   Tablet 10 milliGRAM(s) Oral daily  atorvastatin 20 milliGRAM(s) Oral at bedtime  gabapentin 800 milliGRAM(s) Oral three times a day  heparin   Injectable 5000 Unit(s) SubCutaneous every 8 hours  lidocaine   4% Patch 1 Patch Transdermal daily  lidocaine   4% Patch 1 Patch Transdermal daily  multivitamin 1 Tablet(s) Oral daily  nystatin Powder 1 Application(s) Topical two times a day  polyethylene glycol 3350 17 Gram(s) Oral two times a day  senna 2 Tablet(s) Oral at bedtime  tapentadol 100 milliGRAM(s) Oral <User Schedule>  tiZANidine 2 milliGRAM(s) Oral <User Schedule>  tiZANidine 4 milliGRAM(s) Oral at bedtime    MEDICATIONS  (PRN):  albuterol    90 MICROgram(s) HFA Inhaler 2 Puff(s) Inhalation every 6 hours PRN Shortness of Breath and/or Wheezing  ALPRAZolam 0.5 milliGRAM(s) Oral at bedtime PRN anxiety  bisacodyl Suppository 10 milliGRAM(s) Rectal daily PRN Constipation  ondansetron   Disintegrating Tablet 4 milliGRAM(s) Oral every 8 hours PRN Nausea and/or Vomiting  oxyCODONE    IR 15 milliGRAM(s) Oral every 3 hours PRN Severe Pain (7 - 10)      Care Discussed with Consultants/Other Providers: Yes

## 2023-11-16 NOTE — PROGRESS NOTE ADULT - SUBJECTIVE AND OBJECTIVE BOX
Subjective  Seen and examined, observed in therapy.  Reports improved pain control, with recommencement of Nucinta  Has intermittent mod intensity pain  Agreed to current analgesic regimen with removal of oxy dose for mod pain    Nursing staff report that patient received information that his analgesic med--Nucinta (Tapentadol) had been approved by his insurance and his family member will be bringing it to the unit    ROS--No chest or abd pain, no headache, LBM 11/16  Back pain controlled, no bleeding muscle spasms, minimal     Therapy  Engaged for assessment and therapy  Will continue working on LE muscle strengthening  Overt weakness of extremities left side and right leg apparent  Despite use of Left AFO and back brace, LE motor strength remains limited   Generalized pain limited progress in therapy     ICU Vital Signs Last 24 Hrs  T(C): 37.3 (16 Nov 2023 08:19), Max: 37.3 (16 Nov 2023 08:19)  T(F): 99.1 (16 Nov 2023 08:19), Max: 99.1 (16 Nov 2023 08:19)  HR: 87 (16 Nov 2023 08:19) (87 - 91)  BP: 102/68 (16 Nov 2023 08:19) (102/68 - 127/78)  RR: 16 (16 Nov 2023 08:19) (16 - 16)  SpO2: 96% (16 Nov 2023 08:19) (95% - 100%)  O2 Parameters below as of 16 Nov 2023 08:19  Patient On (Oxygen Delivery Method): room air    EXAM  Gen - Cheerful, interactive   HEENT - NAD  Neck - Supple,   Pulm - Clear  Cardiovascular - RRR, S1S2, No m/r/g  Abdomen - Soft, non  tender, +BS  Extremities - No C/C/E, No calf tenderness. Left shoulder AROM ~30degrees, PROM ~90 degrees, gradually improving    Neuro-     Cognitive - AAOx3     Communication - Fluent, No dysarthria     Attention: Intact      Cranial Nerves - CN 2-12 grossly intact     Motor -                    LEFT    UE - ShAB 5/5, EF 4-/5, EE 4-/5, WE 5/5,  4/5, unable to flex or abduct shoulder past 90 deg. left elbow flexion contracture                     RIGHT UE - ShAB 5/5, EF 5/5, EE 5/5, WE 5/5,  4/5                    LEFT    LE - HF 1/5, KE 1/5, DF 0/5, PF 4/5                    RIGHT LE - HF 4/5 (due to back pain), KE 5/5, DF 5/5, PF 5/5        Sensory - diminished sensation left lower extremity stocking distribution compared to right.  Reports numbness of bilateral hands and toes.      Tone - normal. Left shoulder contracture.      Temp- still decreased temperature sensation LUE  Psychiatric - Mood stable, Affect WNL  Skin: Aquacel, gauze over single drain site.   Wounds: None aquacel over lumbar spine surgical area, no bleeding    RECENT LABS/IMAGING                        9.0    8.13  )-----------( 455      ( 16 Nov 2023 07:11 )             27.8     11-16    135  |  97  |  16  ----------------------------<  122<H>  4.0   |  29  |  0.83    Ca    9.8      16 Nov 2023 07:11    TPro  7.4  /  Alb  3.1<L>  /  TBili  0.4  /  DBili  x   /  AST  23  /  ALT  27  /  AlkPhos  84  11-16      Urinalysis Basic - ( 16 Nov 2023 07:11 )    Color: x / Appearance: x / SG: x / pH: x  Gluc: 122 mg/dL / Ketone: x  / Bili: x / Urobili: x   Blood: x / Protein: x / Nitrite: x   Leuk Esterase: x / RBC: x / WBC x   Sq Epi: x / Non Sq Epi: x / Bacteria: x      MEDICATIONS  (STANDING):  acetaminophen     Tablet .. 975 milliGRAM(s) Oral every 8 hours  amLODIPine   Tablet 10 milliGRAM(s) Oral daily  atorvastatin 20 milliGRAM(s) Oral at bedtime  gabapentin 800 milliGRAM(s) Oral three times a day  heparin   Injectable 5000 Unit(s) SubCutaneous every 8 hours  lidocaine   4% Patch 1 Patch Transdermal daily  lidocaine   4% Patch 1 Patch Transdermal daily  multivitamin 1 Tablet(s) Oral daily  nystatin Powder 1 Application(s) Topical two times a day  polyethylene glycol 3350 17 Gram(s) Oral two times a day  senna 2 Tablet(s) Oral at bedtime  tapentadol 100 milliGRAM(s) Oral <User Schedule>  tiZANidine 2 milliGRAM(s) Oral <User Schedule>  tiZANidine 4 milliGRAM(s) Oral at bedtime    MEDICATIONS  (PRN):  albuterol    90 MICROgram(s) HFA Inhaler 2 Puff(s) Inhalation every 6 hours PRN Shortness of Breath and/or Wheezing  ALPRAZolam 0.5 milliGRAM(s) Oral at bedtime PRN anxiety  bisacodyl Suppository 10 milliGRAM(s) Rectal daily PRN Constipation  ondansetron   Disintegrating Tablet 4 milliGRAM(s) Oral every 8 hours PRN Nausea and/or Vomiting  oxyCODONE    IR 15 milliGRAM(s) Oral every 3 hours PRN Severe Pain (7 - 10)

## 2023-11-16 NOTE — PROGRESS NOTE ADULT - ASSESSMENT
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 patient reports he was able to obtain nucynta from outpatient physician  - - ISTOP reviewed on 11/12    #hyponatremia, improved  - sodium 135 today.   - elevated protein gap -- needs further outpatient workup  - hyponatremia may be due to pain vs. post op changes    #essential HTN  - c/w norvasc 10mg po daily    #HLD  - c/w lipitor 20mg po daily     #normocytic anemia  - hb 8.9 stable  - iron panel and ferritin consistent with MARIA DOLORES -- start ferrous sulfate once patient's pain management is optimized    #constipation  - continue with senna and miralax  - dulcolax PRN and lactulose PRN if no BM >3 days.  - consider movantik if formulary if no BM with above     #aspiration pneumonia  - completed augmentin on 11/14    heparin sq  full code

## 2023-11-16 NOTE — PROGRESS NOTE ADULT - ASSESSMENT
KEVEN REY is a 63y with  HTN, HLD, CAD (non obstructive), asthma, PE (xarelto stopped 2 months ago), cervical radiculopathy/myelopathy s/p c-spine fusion surgery Oct 2022 (complicated by left arm nerve damage with LUE contractions), chronic severe back pain that radiates to both legs, who presented to [facility] for scheduled L1-pelvis PSF and decompression on 10/31 with Dr. Lopez.  Hospital Course complicated by fever 2/2 ? PNA treated with zosyn (augmentin while in rehab until 11/13), hyponatremia, ABLA and chronic low back pain. Patient now admitted for a multidisciplinary rehab program. 11-10-23 @ 15:11    * Chronic pain-no response to tramadol--will d/c  * improved pain control  continue Tapentadol   * B/L LE weakness L> R and Left arm weakness  * Labs unremarkable     Lumbar stenosis with radiculopathy  s/p L1-pelvis PSF and decompression on 10/31  - Wear LSO brace. and LEFT AFO when OOB  - WBAT  - Comprehensive Multidisciplinary Rehab Program: Start comprehensive rehab program of PT/OT/SLP - 3 hours a day, 5 days a week. P&O as needed   - precautions: spine, cardiac, fall, aspiration  - f/u Dr. Lopez outpatient    Pain  Chronic low back pain  - home regimen: Nucynta 100mg qis, Xanax 0.5mg, gabapentin 400mg TID, tizanidine 2mg q6. Patient is requesting resuming his home medication of nucynta, states that family will bring in home does.   - acute pain management followed at [facility]. Recs appreciated   - gabapentin 800mg TID  - tizanidine 2mg, d/c on Q6.  So as not to wake patient - trial 6am, 3pm, 10pm.   - lidocaine patches  -Oxycodone 15mg prn for severe pain  * 11/15--Recontinued Tapentadol (nucinta) 100mg bid (home med)       Hyponatremia--* Low Na---fluid restriction 1-1.5l/day  and monitor   - SIADH post-op  - Na 130  --F/u hospitalist recs     PNA   - cont augmentin BID (end 11/14), completed      HTN  - Norvasc 10mg    HLD  - lipitor 20mg    ABLA 2/2 surgery  - Has yearly FOBT which he states was negative, no recent colonoscopy.    Mood / Cognition  - Neuropsychology consult recs appreciated--patient finds this beneficial   - Rec therapy  - 0.5mg Xanax prn Bedtime    Sleep  - Melatonin PRN     GI / Bowel  - Senna qHS  - Miralax Daily  --lactulose prn      / Bladder  - Continue bladder scans Q8 hours with straight cath for >400cc.  - Condom cath (unable to use urinal by himself)    Skin / Pressure injury  - Skin --t. Aquacel, CDI gauze over drain site.   - Monitor Incisions    Diet:  - Diet Consistency: Regular    DVT prophylaxis:   - heparin 5000mg q8    Code Status/Emergency Contact:  mother Pamela Rey [phone]    * labs 11/16--Unremarkable --stable anemia, normal renal and liver function     IDT 11/14  living with mother and 2 brothers, and other family members  Has a walk in shower, 15 steps to house entrance, has many family members to help  Set up to eating,  Mod assistance for bed transfers, max assistance for lower body dressing, mod assistance for upper body dressing  Ambulating 10 ft on the grab bar  DME--Grab bar, commode and shower chair  Barriers--yet to practice shower transfers, chronic pain, muscle weakness  Goal--supervision for ADLs  [facility] 11/29 to home      Outpatient Follow-up:  Ami Lopez  Orthopaedic Surgery  [address] [facility], [address] [place] [address]  Phone: [phone]  Fax: [phone]  Established Patient  Follow Up Time:    Pain management  Dr Vaughn Bach -Ph    (Emergency ph  -)-(  (Pain controlled with Tapentadol 100mg qid with good pain control up to few months ago, when insurance declined approval)  11/15--Nucinta approved by insurance and recommended

## 2023-11-17 PROCEDURE — 99232 SBSQ HOSP IP/OBS MODERATE 35: CPT

## 2023-11-17 RX ORDER — ENOXAPARIN SODIUM 100 MG/ML
40 INJECTION SUBCUTANEOUS EVERY 24 HOURS
Refills: 0 | Status: DISCONTINUED | OUTPATIENT
Start: 2023-11-17 | End: 2023-11-29

## 2023-11-17 RX ORDER — TIZANIDINE 4 MG/1
4 TABLET ORAL
Refills: 0 | Status: DISCONTINUED | OUTPATIENT
Start: 2023-11-17 | End: 2023-11-29

## 2023-11-17 RX ADMIN — TAPENTADOL HYDROCHLORIDE 100 MILLIGRAM(S): 50 TABLET, FILM COATED ORAL at 12:08

## 2023-11-17 RX ADMIN — Medication 975 MILLIGRAM(S): at 05:45

## 2023-11-17 RX ADMIN — TAPENTADOL HYDROCHLORIDE 100 MILLIGRAM(S): 50 TABLET, FILM COATED ORAL at 21:00

## 2023-11-17 RX ADMIN — TIZANIDINE 2 MILLIGRAM(S): 4 TABLET ORAL at 06:09

## 2023-11-17 RX ADMIN — TAPENTADOL HYDROCHLORIDE 100 MILLIGRAM(S): 50 TABLET, FILM COATED ORAL at 08:02

## 2023-11-17 RX ADMIN — OXYCODONE HYDROCHLORIDE 15 MILLIGRAM(S): 5 TABLET ORAL at 01:20

## 2023-11-17 RX ADMIN — OXYCODONE HYDROCHLORIDE 15 MILLIGRAM(S): 5 TABLET ORAL at 10:23

## 2023-11-17 RX ADMIN — GABAPENTIN 800 MILLIGRAM(S): 400 CAPSULE ORAL at 05:00

## 2023-11-17 RX ADMIN — TAPENTADOL HYDROCHLORIDE 100 MILLIGRAM(S): 50 TABLET, FILM COATED ORAL at 08:17

## 2023-11-17 RX ADMIN — NYSTATIN CREAM 1 APPLICATION(S): 100000 CREAM TOPICAL at 05:01

## 2023-11-17 RX ADMIN — SENNA PLUS 2 TABLET(S): 8.6 TABLET ORAL at 21:59

## 2023-11-17 RX ADMIN — Medication 975 MILLIGRAM(S): at 14:14

## 2023-11-17 RX ADMIN — TAPENTADOL HYDROCHLORIDE 100 MILLIGRAM(S): 50 TABLET, FILM COATED ORAL at 20:04

## 2023-11-17 RX ADMIN — ATORVASTATIN CALCIUM 20 MILLIGRAM(S): 80 TABLET, FILM COATED ORAL at 21:59

## 2023-11-17 RX ADMIN — GABAPENTIN 800 MILLIGRAM(S): 400 CAPSULE ORAL at 21:58

## 2023-11-17 RX ADMIN — Medication 975 MILLIGRAM(S): at 22:45

## 2023-11-17 RX ADMIN — TAPENTADOL HYDROCHLORIDE 100 MILLIGRAM(S): 50 TABLET, FILM COATED ORAL at 16:26

## 2023-11-17 RX ADMIN — ENOXAPARIN SODIUM 40 MILLIGRAM(S): 100 INJECTION SUBCUTANEOUS at 14:14

## 2023-11-17 RX ADMIN — Medication 975 MILLIGRAM(S): at 05:00

## 2023-11-17 RX ADMIN — TIZANIDINE 4 MILLIGRAM(S): 4 TABLET ORAL at 21:59

## 2023-11-17 RX ADMIN — OXYCODONE HYDROCHLORIDE 15 MILLIGRAM(S): 5 TABLET ORAL at 01:50

## 2023-11-17 RX ADMIN — HEPARIN SODIUM 5000 UNIT(S): 5000 INJECTION INTRAVENOUS; SUBCUTANEOUS at 05:00

## 2023-11-17 RX ADMIN — NYSTATIN CREAM 1 APPLICATION(S): 100000 CREAM TOPICAL at 17:33

## 2023-11-17 RX ADMIN — POLYETHYLENE GLYCOL 3350 17 GRAM(S): 17 POWDER, FOR SOLUTION ORAL at 17:33

## 2023-11-17 RX ADMIN — TIZANIDINE 4 MILLIGRAM(S): 4 TABLET ORAL at 14:14

## 2023-11-17 RX ADMIN — Medication 1 TABLET(S): at 12:16

## 2023-11-17 RX ADMIN — Medication 975 MILLIGRAM(S): at 21:59

## 2023-11-17 RX ADMIN — TAPENTADOL HYDROCHLORIDE 100 MILLIGRAM(S): 50 TABLET, FILM COATED ORAL at 12:16

## 2023-11-17 RX ADMIN — OXYCODONE HYDROCHLORIDE 15 MILLIGRAM(S): 5 TABLET ORAL at 11:05

## 2023-11-17 RX ADMIN — GABAPENTIN 800 MILLIGRAM(S): 400 CAPSULE ORAL at 14:14

## 2023-11-17 RX ADMIN — AMLODIPINE BESYLATE 10 MILLIGRAM(S): 2.5 TABLET ORAL at 05:00

## 2023-11-17 NOTE — PROGRESS NOTE ADULT - ASSESSMENT
KEVEN REY is a 63y with  HTN, HLD, CAD (non obstructive), asthma, PE (xarelto stopped 2 months ago), cervical radiculopathy/myelopathy s/p c-spine fusion surgery Oct 2022 (complicated by left arm nerve damage with LUE contractions), chronic severe back pain that radiates to both legs, who presented to [facility] for scheduled L1-pelvis PSF and decompression on 10/31 with Dr. Lopez.  Hospital Course complicated by fever 2/2 ? PNA treated with zosyn (augmentin while in rehab until 11/13), hyponatremia, ABLA and chronic low back pain. Patient now admitted for a multidisciplinary rehab program. 11-10-23 @ 15:11    * Spasticity left arm and leg--increase tizanidine dose  * B/L LE weakness L> R and Left arm weakness    Lumbar stenosis with radiculopathy  s/p L1-pelvis PSF and decompression on 10/31  - Wear LSO brace. and LEFT AFO when OOB  - WBAT  - Comprehensive Multidisciplinary Rehab Program: Start comprehensive rehab program of PT/OT/SLP - 3 hours a day, 5 days a week. P&O as needed   - precautions: spine, cardiac, fall, aspiration  - f/u Dr. Lopez outpatient    Pain  Chronic low back pain  - home regimen: Nucynta 100mg qis, Xanax 0.5mg, gabapentin 400mg TID, tizanidine 2mg q6. Patient is requesting resuming his home medication of nucynta, states that family will bring in home does.   - acute pain management followed at [facility]. Recs appreciated   - gabapentin 800mg TID  - tizanidine 2mg, d/c on Q6.  So as not to wake patient - trial 6am, 3pm, 10pm. --increased to 4mg TID 11/17  - lidocaine patches  -Oxycodone 15mg prn for severe pain  * 11/15--Recontinued Tapentadol (nucinta) 100mg bid (home med)       Hyponatremia--* Low Na---fluid restriction 1-1.5l/day  and monitor   - SIADH post-op  - Na 130  --F/u hospitalist recs     PNA   - cont augmentin BID (end 11/14), completed      HTN  - Norvasc 10mg    HLD  - lipitor 20mg    ABLA 2/2 surgery  - Has yearly FOBT which he states was negative, no recent colonoscopy.    Mood / Cognition  - Neuropsychology consult recs appreciated--patient finds this beneficial   - Rec therapy  - 0.5mg Xanax prn Bedtime    Sleep  - Melatonin PRN     GI / Bowel  - Senna qHS  - Miralax Daily  --lactulose prn      / Bladder  - Continue bladder scans Q8 hours with straight cath for >400cc.  - Condom cath (unable to use urinal by himself)    Skin / Pressure injury  - Skin --tMilind Kramerel, CDI gauze over drain site.   - Monitor Incisions    Diet:  - Diet Consistency: Regular    DVT prophylaxis:   - heparin 5000mg q8    Code Status/Emergency Contact:  mother Pamela Rey [phone]    * labs 11/16--Unremarkable --stable anemia, normal renal and liver function     IDT 11/14  living with mother and 2 brothers, and other family members  Has a walk in shower, 15 steps to house entrance, has many family members to help  Set up to eating,  Mod assistance for bed transfers, max assistance for lower body dressing, mod assistance for upper body dressing  Ambulating 10 ft on the grab bar  DME--Grab bar, commode and shower chair  Barriers--yet to practice shower transfers, chronic pain, muscle weakness  Goal--supervision for ADLs  [facility] 11/29 to home      Outpatient Follow-up:  Ami Lopez  Orthopaedic Surgery  [address] [facility], [address] [place] [address]  Phone: [phone]  Fax: [phone]  Established Patient  Follow Up Time:    Pain management  Dr Vaughn Bach -Ph    (Emergency ph  -)-(  (Pain controlled with Tapentadol 100mg qid with good pain control up to few months ago, when insurance declined approval)  11/15--Nucinta approved by insurance and recommended

## 2023-11-17 NOTE — PROGRESS NOTE ADULT - SUBJECTIVE AND OBJECTIVE BOX
in good spirits -- has nucynta  still with spasms of the LEFT hand, and left foot      PHYSICAL EXAM:    Vital Signs Last 24 Hrs  T(F): 97.6 (17 Nov 2023 08:00), Max: 98.6 (16 Nov 2023 21:39)  HR: 92 (17 Nov 2023 08:00) (85 - 92)  BP: 142/90 (17 Nov 2023 08:00) (107/66 - 142/90)  RR: 16 (17 Nov 2023 08:00) (16 - 16)  SpO2: 95% (17 Nov 2023 08:00) (93% - 96%)  I&O's Summary      GENERAL: NAD  HEENT: NCAT  CHEST/LUNG: No increased WOB, Clear to percussion bilaterally; No rales, rhonchi, wheezing  HEART: Regular rate and rhythm; No murmurs  ABDOMEN: Soft, Nontender, Nondistended; Bowel sounds present  MUSCULOSKELETAL/EXTREMITIES:  2+ Peripheral Pulses, No LE edema +left hand contracted.  PSYCH: Appropriate affect, Alert & Oriented    LABS:                        9.0    8.13  )-----------( 455      ( 16 Nov 2023 07:11 )             27.8       11-16    135  |  97  |  16  ----------------------------<  122  4.0   |  29  |  0.83    Ca    9.8      16 Nov 2023 07:11    TPro  7.4  /  Alb  3.1  /  TBili  0.4  /  DBili  x   /  AST  23  /  ALT  27  /  AlkPhos  84  11-16                                  Urinalysis Basic - ( 16 Nov 2023 07:11 )    Color: x / Appearance: x / SG: x / pH: x  Gluc: 122 mg/dL / Ketone: x  / Bili: x / Urobili: x   Blood: x / Protein: x / Nitrite: x   Leuk Esterase: x / RBC: x / WBC x   Sq Epi: x / Non Sq Epi: x / Bacteria: x        COVID-19 PCR: NotDetec (11-10-23 @ 21:08)      MEDICATIONS  (STANDING):  acetaminophen     Tablet .. 975 milliGRAM(s) Oral every 8 hours  amLODIPine   Tablet 10 milliGRAM(s) Oral daily  atorvastatin 20 milliGRAM(s) Oral at bedtime  enoxaparin Injectable 40 milliGRAM(s) SubCutaneous every 24 hours  gabapentin 800 milliGRAM(s) Oral three times a day  lidocaine   4% Patch 1 Patch Transdermal daily  lidocaine   4% Patch 1 Patch Transdermal daily  multivitamin 1 Tablet(s) Oral daily  nystatin Powder 1 Application(s) Topical two times a day  polyethylene glycol 3350 17 Gram(s) Oral two times a day  senna 2 Tablet(s) Oral at bedtime  tapentadol 100 milliGRAM(s) Oral <User Schedule>  tiZANidine 4 milliGRAM(s) Oral <User Schedule>  tiZANidine 4 milliGRAM(s) Oral at bedtime    MEDICATIONS  (PRN):  albuterol    90 MICROgram(s) HFA Inhaler 2 Puff(s) Inhalation every 6 hours PRN Shortness of Breath and/or Wheezing  ALPRAZolam 0.5 milliGRAM(s) Oral at bedtime PRN anxiety  bisacodyl Suppository 10 milliGRAM(s) Rectal daily PRN Constipation  ondansetron   Disintegrating Tablet 4 milliGRAM(s) Oral every 8 hours PRN Nausea and/or Vomiting  oxyCODONE    IR 15 milliGRAM(s) Oral every 3 hours PRN Severe Pain (7 - 10)      Care Discussed with Consultants/Other Providers: Yes

## 2023-11-17 NOTE — PROGRESS NOTE ADULT - ASSESSMENT
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 patient reports he was able to obtain nucynta from outpatient physician  - - ISTOP reviewed on 11/12    #hyponatremia, improved  - sodium 135.   - elevated protein gap -- needs further outpatient workup  - hyponatremia may be due to pain vs. post op changes vs. elevated protein gap    #essential HTN  - c/w norvasc 10mg po daily    #HLD  - c/w lipitor 20mg po daily     #normocytic anemia  - hb 8.9 stable  - iron panel and ferritin consistent with MARIA DOLORES -- start ferrous sulfate once patient's pain management is optimized    #constipation  - continue with senna and miralax    #aspiration pneumonia  - completed augmentin on 11/14    heparin sq  full code

## 2023-11-17 NOTE — PROGRESS NOTE ADULT - SUBJECTIVE AND OBJECTIVE BOX
Subjective  Seen and examined, observed in therapy.  Reports increase intensity of spasms left leg late night yesterday  Tolerating oral diet      ROS--No chest or abd pain, no headache, LBM 11/17  Back pain controlled, no bleeding muscle spasms, minimal     Therapy  Happy with his continued improvement, ambulating with walker, ace wrap to left ankle with AFO in situ  Engaged for assessment and therapy  Will continue working on LE muscle strengthening  Overt weakness of extremities left side and right leg apparent  Despite use of Left AFO and back brace, LE motor strength remains limited   Generalized pain limited progress in therapy      Vital Signs Last 24 Hrs  T(C): 36.4 (17 Nov 2023 08:00), Max: 37 (16 Nov 2023 21:39)  T(F): 97.6 (17 Nov 2023 08:00), Max: 98.6 (16 Nov 2023 21:39)  HR: 92 (17 Nov 2023 08:00) (85 - 92)  BP: 142/90 (17 Nov 2023 08:00) (107/66 - 142/90)  RR: 16 (17 Nov 2023 08:00) (16 - 16)  SpO2: 95% (17 Nov 2023 08:00) (93% - 96%)  O2 Parameters below as of 17 Nov 2023 08:00  Patient On (Oxygen Delivery Method): room air      EXAM  Gen - Comfortable  HEENT - NAD  Neck - Supple,   Pulm - Clear  Cardiovascular - RRR, S1S2, No m/r/g  Abdomen - Soft, non  tender, +BS  Extremities - No C/C/E, No calf tenderness. Left shoulder AROM improving    Neuro-     Cognitive - AAOx3     Communication - Fluent, No dysarthria     Attention: Intact      Cranial Nerves - CN 2-12 grossly intact     Motor -                    LEFT    UE - ShAB 5/5, EF 4-/5, EE 4-/5, WE 5/5,  4/5, unable to flex or abduct shoulder past 90 deg. left elbow flexion contracture                     RIGHT UE - ShAB 5/5, EF 5/5, EE 5/5, WE 5/5,  4/5                    LEFT    LE - HF 1/5, KE 1/5, DF 0/5, PF 4/5                    RIGHT LE - HF 4/5 (due to back pain), KE 5/5, DF 5/5, PF 5/5        Sensory - diminished sensation left lower extremity stocking distribution compared to right.      Tone - normal. Left shoulder spasticity.  Spasticity left distal arm/hand, MAS 3/4     Temp- still decreased temperature sensation LUE  Psychiatric - Mood stable, Affect WNL  Skin: Aquacel, gauze over single drain site.   Wounds: None aquacel over lumbar spine surgical area, no bleeding    RECENT LABS/IMAGING                        9.0    8.13  )-----------( 455      ( 16 Nov 2023 07:11 )             27.8     11-16    135  |  97  |  16  ----------------------------<  122<H>  4.0   |  29  |  0.83    Ca    9.8      16 Nov 2023 07:11    TPro  7.4  /  Alb  3.1<L>  /  TBili  0.4  /  DBili  x   /  AST  23  /  ALT  27  /  AlkPhos  84  11-16      Urinalysis Basic - ( 16 Nov 2023 07:11 )    Color: x / Appearance: x / SG: x / pH: x  Gluc: 122 mg/dL / Ketone: x  / Bili: x / Urobili: x   Blood: x / Protein: x / Nitrite: x   Leuk Esterase: x / RBC: x / WBC x   Sq Epi: x / Non Sq Epi: x / Bacteria: x      MEDICATIONS  (STANDING):  acetaminophen     Tablet .. 975 milliGRAM(s) Oral every 8 hours  amLODIPine   Tablet 10 milliGRAM(s) Oral daily  atorvastatin 20 milliGRAM(s) Oral at bedtime  enoxaparin Injectable 40 milliGRAM(s) SubCutaneous every 24 hours  gabapentin 800 milliGRAM(s) Oral three times a day  lidocaine   4% Patch 1 Patch Transdermal daily  lidocaine   4% Patch 1 Patch Transdermal daily  multivitamin 1 Tablet(s) Oral daily  nystatin Powder 1 Application(s) Topical two times a day  polyethylene glycol 3350 17 Gram(s) Oral two times a day  senna 2 Tablet(s) Oral at bedtime  tapentadol 100 milliGRAM(s) Oral <User Schedule>  tiZANidine 4 milliGRAM(s) Oral <User Schedule>  tiZANidine 4 milliGRAM(s) Oral at bedtime    MEDICATIONS  (PRN):  albuterol    90 MICROgram(s) HFA Inhaler 2 Puff(s) Inhalation every 6 hours PRN Shortness of Breath and/or Wheezing  ALPRAZolam 0.5 milliGRAM(s) Oral at bedtime PRN anxiety  bisacodyl Suppository 10 milliGRAM(s) Rectal daily PRN Constipation  ondansetron   Disintegrating Tablet 4 milliGRAM(s) Oral every 8 hours PRN Nausea and/or Vomiting  oxyCODONE    IR 15 milliGRAM(s) Oral every 3 hours PRN Severe Pain (7 - 10)

## 2023-11-18 PROCEDURE — 99232 SBSQ HOSP IP/OBS MODERATE 35: CPT

## 2023-11-18 PROCEDURE — 99233 SBSQ HOSP IP/OBS HIGH 50: CPT

## 2023-11-18 RX ADMIN — Medication 1 TABLET(S): at 12:13

## 2023-11-18 RX ADMIN — Medication 975 MILLIGRAM(S): at 23:20

## 2023-11-18 RX ADMIN — OXYCODONE HYDROCHLORIDE 15 MILLIGRAM(S): 5 TABLET ORAL at 00:28

## 2023-11-18 RX ADMIN — TIZANIDINE 4 MILLIGRAM(S): 4 TABLET ORAL at 14:30

## 2023-11-18 RX ADMIN — POLYETHYLENE GLYCOL 3350 17 GRAM(S): 17 POWDER, FOR SOLUTION ORAL at 05:00

## 2023-11-18 RX ADMIN — ATORVASTATIN CALCIUM 20 MILLIGRAM(S): 80 TABLET, FILM COATED ORAL at 20:20

## 2023-11-18 RX ADMIN — GABAPENTIN 800 MILLIGRAM(S): 400 CAPSULE ORAL at 22:21

## 2023-11-18 RX ADMIN — TAPENTADOL HYDROCHLORIDE 100 MILLIGRAM(S): 50 TABLET, FILM COATED ORAL at 04:51

## 2023-11-18 RX ADMIN — Medication 975 MILLIGRAM(S): at 06:40

## 2023-11-18 RX ADMIN — TAPENTADOL HYDROCHLORIDE 100 MILLIGRAM(S): 50 TABLET, FILM COATED ORAL at 16:28

## 2023-11-18 RX ADMIN — Medication 975 MILLIGRAM(S): at 22:23

## 2023-11-18 RX ADMIN — OXYCODONE HYDROCHLORIDE 15 MILLIGRAM(S): 5 TABLET ORAL at 14:30

## 2023-11-18 RX ADMIN — TAPENTADOL HYDROCHLORIDE 100 MILLIGRAM(S): 50 TABLET, FILM COATED ORAL at 20:20

## 2023-11-18 RX ADMIN — SENNA PLUS 2 TABLET(S): 8.6 TABLET ORAL at 22:22

## 2023-11-18 RX ADMIN — TIZANIDINE 4 MILLIGRAM(S): 4 TABLET ORAL at 22:22

## 2023-11-18 RX ADMIN — TIZANIDINE 4 MILLIGRAM(S): 4 TABLET ORAL at 07:41

## 2023-11-18 RX ADMIN — OXYCODONE HYDROCHLORIDE 15 MILLIGRAM(S): 5 TABLET ORAL at 15:15

## 2023-11-18 RX ADMIN — TAPENTADOL HYDROCHLORIDE 100 MILLIGRAM(S): 50 TABLET, FILM COATED ORAL at 07:41

## 2023-11-18 RX ADMIN — OXYCODONE HYDROCHLORIDE 15 MILLIGRAM(S): 5 TABLET ORAL at 05:50

## 2023-11-18 RX ADMIN — OXYCODONE HYDROCHLORIDE 15 MILLIGRAM(S): 5 TABLET ORAL at 04:51

## 2023-11-18 RX ADMIN — NYSTATIN CREAM 1 APPLICATION(S): 100000 CREAM TOPICAL at 05:00

## 2023-11-18 RX ADMIN — Medication 975 MILLIGRAM(S): at 14:30

## 2023-11-18 RX ADMIN — TAPENTADOL HYDROCHLORIDE 100 MILLIGRAM(S): 50 TABLET, FILM COATED ORAL at 23:20

## 2023-11-18 RX ADMIN — GABAPENTIN 800 MILLIGRAM(S): 400 CAPSULE ORAL at 05:56

## 2023-11-18 RX ADMIN — OXYCODONE HYDROCHLORIDE 15 MILLIGRAM(S): 5 TABLET ORAL at 01:15

## 2023-11-18 RX ADMIN — ENOXAPARIN SODIUM 40 MILLIGRAM(S): 100 INJECTION SUBCUTANEOUS at 14:30

## 2023-11-18 RX ADMIN — Medication 975 MILLIGRAM(S): at 05:56

## 2023-11-18 RX ADMIN — AMLODIPINE BESYLATE 10 MILLIGRAM(S): 2.5 TABLET ORAL at 05:00

## 2023-11-18 RX ADMIN — TAPENTADOL HYDROCHLORIDE 100 MILLIGRAM(S): 50 TABLET, FILM COATED ORAL at 12:13

## 2023-11-18 RX ADMIN — GABAPENTIN 800 MILLIGRAM(S): 400 CAPSULE ORAL at 14:30

## 2023-11-18 NOTE — PROGRESS NOTE ADULT - SUBJECTIVE AND OBJECTIVE BOX
Cc: Gait dysfunction    HPI: Patient with no new medical issues overnight.  Pain controlled "up and down" at times has been an "eight"  , no chest pain, no N/V, no Fevers/Chills. No other new ROS  Has been tolerating rehabilitation program.    acetaminophen     Tablet .. 975 milliGRAM(s) Oral every 8 hours  albuterol    90 MICROgram(s) HFA Inhaler 2 Puff(s) Inhalation every 6 hours PRN  ALPRAZolam 0.5 milliGRAM(s) Oral at bedtime PRN  amLODIPine   Tablet 10 milliGRAM(s) Oral daily  atorvastatin 20 milliGRAM(s) Oral at bedtime  bisacodyl Suppository 10 milliGRAM(s) Rectal daily PRN  enoxaparin Injectable 40 milliGRAM(s) SubCutaneous every 24 hours  gabapentin 800 milliGRAM(s) Oral three times a day  lidocaine   4% Patch 1 Patch Transdermal daily  lidocaine   4% Patch 1 Patch Transdermal daily  multivitamin 1 Tablet(s) Oral daily  nystatin Powder 1 Application(s) Topical two times a day  ondansetron   Disintegrating Tablet 4 milliGRAM(s) Oral every 8 hours PRN  oxyCODONE    IR 15 milliGRAM(s) Oral every 3 hours PRN  polyethylene glycol 3350 17 Gram(s) Oral two times a day  senna 2 Tablet(s) Oral at bedtime  tapentadol 100 milliGRAM(s) Oral <User Schedule>  tiZANidine 4 milliGRAM(s) Oral <User Schedule>  tiZANidine 4 milliGRAM(s) Oral at bedtime      T(C): 37.2 (11-18-23 @ 07:37), Max: 37.3 (11-17-23 @ 20:04)  HR: 78 (11-18-23 @ 07:37) (78 - 91)  BP: 101/64 (11-18-23 @ 07:37) (101/64 - 117/80)  RR: 16 (11-18-23 @ 07:37) (16 - 16)  SpO2: 96% (11-18-23 @ 07:37) (93% - 96%)    In NAD  HEENT- EOMI  Heart- RRR, S1S2  Lungs- CTA bl.  Abd- + BS, NT  Ext- No calf pain  Neuro- Exam unchanged

## 2023-11-18 NOTE — PROGRESS NOTE ADULT - ASSESSMENT
Imp: Patient with diagnosis of    s/p lumbar fusion      admitted for comprehensive acute rehabilitation.    Plan:  - Continue PT/OT/SLP  - DVT prophylaxis  - Skin- Turn q2h, check skin daily  - Continue current medications; patient medically stable.   -Active issues-   - Patient is stable to continue current rehabilitation program.   - pain controlled noted nucynta, oxycodone, encouraged patient to request medications PRN

## 2023-11-18 NOTE — PROGRESS NOTE ADULT - ASSESSMENT
63y with  HTN, HLD, CAD (non obstructive), asthma, PE (xarelto stopped 2 months ago), cervical radiculopathy/myelopathy s/p c-spine fusion surgery Oct 2022 (complicated by left arm nerve damage with LUE contractions), chronic severe back pain that radiates to both legs, who presented to [facility] for scheduled L1-pelvis PSF and decompression on 10/31 with Dr. Lopez.  Hospital Course complicated by fever 2/2 ? PNA treated with zosyn (augmentin while in rehab until 11/13), hyponatremia, ABLA and chronic low back pain. Patient now admitted for a multidisciplinary rehab program. 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 patient reports he was able to obtain nucynta from outpatient physician  - - ISTOP reviewed on 11/12    #hyponatremia, improved  - sodium 135.   - elevated protein gap -- needs further outpatient workup  - hyponatremia may be due to pain vs. post op changes vs. elevated protein gap    #essential HTN  - c/w norvasc 10mg po daily    #HLD  - c/w lipitor 20mg po daily     #normocytic anemia  - hb 8.9 stable  - iron panel and ferritin consistent with MARIA DOLORES -- start ferrous sulfate once patient's pain management is optimized    #constipation  - continue with senna and miralax    #aspiration pneumonia  - completed augmentin on 11/14    # Severe protein-calorie malnutrition.  - nutrition on board    heparin sq  full code      d/w rehab team

## 2023-11-18 NOTE — PROGRESS NOTE ADULT - SUBJECTIVE AND OBJECTIVE BOX
Medicine Progress Note    Patient is a 63y old  Male who presents with a chief complaint of lumbar stenosis s/p L1-pelvis PSF and decompression (18 Nov 2023 11:35)      SUBJECTIVE / OVERNIGHT EVENTS:  seen and examined  Chart reviewed  No overnight events  Limb weakness improving with therapy  BM+  pain controlled with meds  No complaints    ADDITIONAL REVIEW OF SYSTEMS:  denied fever/chills/CP/SOB/cough/palpitation/dizziness/abdominal pian/nausea/vomiting/diarrhoea/constipation/dysuria/leg or calf pain/headaches.all other ROS neg    MEDICATIONS  (STANDING):  acetaminophen     Tablet .. 975 milliGRAM(s) Oral every 8 hours  amLODIPine   Tablet 10 milliGRAM(s) Oral daily  atorvastatin 20 milliGRAM(s) Oral at bedtime  enoxaparin Injectable 40 milliGRAM(s) SubCutaneous every 24 hours  gabapentin 800 milliGRAM(s) Oral three times a day  lidocaine   4% Patch 1 Patch Transdermal daily  lidocaine   4% Patch 1 Patch Transdermal daily  multivitamin 1 Tablet(s) Oral daily  nystatin Powder 1 Application(s) Topical two times a day  polyethylene glycol 3350 17 Gram(s) Oral two times a day  senna 2 Tablet(s) Oral at bedtime  tapentadol 100 milliGRAM(s) Oral <User Schedule>  tiZANidine 4 milliGRAM(s) Oral <User Schedule>  tiZANidine 4 milliGRAM(s) Oral at bedtime    MEDICATIONS  (PRN):  albuterol    90 MICROgram(s) HFA Inhaler 2 Puff(s) Inhalation every 6 hours PRN Shortness of Breath and/or Wheezing  ALPRAZolam 0.5 milliGRAM(s) Oral at bedtime PRN anxiety  bisacodyl Suppository 10 milliGRAM(s) Rectal daily PRN Constipation  ondansetron   Disintegrating Tablet 4 milliGRAM(s) Oral every 8 hours PRN Nausea and/or Vomiting  oxyCODONE    IR 15 milliGRAM(s) Oral every 3 hours PRN Severe Pain (7 - 10)    CAPILLARY BLOOD GLUCOSE        I&O's Summary      PHYSICAL EXAM:  Vital Signs Last 24 Hrs  T(C): 37.2 (18 Nov 2023 07:37), Max: 37.3 (17 Nov 2023 20:04)  T(F): 99 (18 Nov 2023 07:37), Max: 99.1 (17 Nov 2023 20:04)  HR: 78 (18 Nov 2023 07:37) (78 - 91)  BP: 101/64 (18 Nov 2023 07:37) (101/64 - 117/80)  BP(mean): --  RR: 16 (18 Nov 2023 07:37) (16 - 16)  SpO2: 96% (18 Nov 2023 07:37) (93% - 96%)    Parameters below as of 18 Nov 2023 07:37  Patient On (Oxygen Delivery Method): room air    GENERAL: Not in distress. Alert    HEENT: clear conjuctiva, MMM. no pallor or icterus  CARDIOVASCULAR: RRR S1, S2. No murmur/rubs/gallop  LUNGS: BLAE+, no rales, no wheezing, no rhonchi.    ABDOMEN: ND. Soft,  NT, no guarding / rebound / rigidity. BS normoactive  BACK: No spine tenderness.  EXTREMITIES: no edema. no leg or calf TP.  SKIN: warm and dry  PSYCHIATRIC: Calm.  No agitation.  CNS: AAO*3. moves limbs, follows commands. left sided weakness    LABS:                    COVID-19 PCR: NotDetec (10 Nov 2023 21:08)  SARS-CoV-2: NotDetec (06 Nov 2023 14:54)      RADIOLOGY & ADDITIONAL TESTS:  Imaging from Last 24 Hours:    Electrocardiogram/QTc Interval:    COORDINATION OF CARE:  Care Discussed with Consultants/Other Providers:

## 2023-11-19 PROCEDURE — 99232 SBSQ HOSP IP/OBS MODERATE 35: CPT

## 2023-11-19 RX ORDER — GABAPENTIN 400 MG/1
900 CAPSULE ORAL THREE TIMES A DAY
Refills: 0 | Status: DISCONTINUED | OUTPATIENT
Start: 2023-11-19 | End: 2023-11-29

## 2023-11-19 RX ADMIN — GABAPENTIN 900 MILLIGRAM(S): 400 CAPSULE ORAL at 14:46

## 2023-11-19 RX ADMIN — TAPENTADOL HYDROCHLORIDE 100 MILLIGRAM(S): 50 TABLET, FILM COATED ORAL at 20:53

## 2023-11-19 RX ADMIN — TIZANIDINE 4 MILLIGRAM(S): 4 TABLET ORAL at 14:46

## 2023-11-19 RX ADMIN — OXYCODONE HYDROCHLORIDE 15 MILLIGRAM(S): 5 TABLET ORAL at 06:00

## 2023-11-19 RX ADMIN — ENOXAPARIN SODIUM 40 MILLIGRAM(S): 100 INJECTION SUBCUTANEOUS at 14:46

## 2023-11-19 RX ADMIN — Medication 975 MILLIGRAM(S): at 14:46

## 2023-11-19 RX ADMIN — Medication 975 MILLIGRAM(S): at 07:00

## 2023-11-19 RX ADMIN — NYSTATIN CREAM 1 APPLICATION(S): 100000 CREAM TOPICAL at 16:35

## 2023-11-19 RX ADMIN — OXYCODONE HYDROCHLORIDE 15 MILLIGRAM(S): 5 TABLET ORAL at 00:13

## 2023-11-19 RX ADMIN — TAPENTADOL HYDROCHLORIDE 100 MILLIGRAM(S): 50 TABLET, FILM COATED ORAL at 17:36

## 2023-11-19 RX ADMIN — GABAPENTIN 900 MILLIGRAM(S): 400 CAPSULE ORAL at 21:33

## 2023-11-19 RX ADMIN — TAPENTADOL HYDROCHLORIDE 100 MILLIGRAM(S): 50 TABLET, FILM COATED ORAL at 12:06

## 2023-11-19 RX ADMIN — TAPENTADOL HYDROCHLORIDE 100 MILLIGRAM(S): 50 TABLET, FILM COATED ORAL at 13:06

## 2023-11-19 RX ADMIN — Medication 975 MILLIGRAM(S): at 22:33

## 2023-11-19 RX ADMIN — AMLODIPINE BESYLATE 10 MILLIGRAM(S): 2.5 TABLET ORAL at 05:02

## 2023-11-19 RX ADMIN — ATORVASTATIN CALCIUM 20 MILLIGRAM(S): 80 TABLET, FILM COATED ORAL at 21:33

## 2023-11-19 RX ADMIN — Medication 975 MILLIGRAM(S): at 21:33

## 2023-11-19 RX ADMIN — NYSTATIN CREAM 1 APPLICATION(S): 100000 CREAM TOPICAL at 05:03

## 2023-11-19 RX ADMIN — OXYCODONE HYDROCHLORIDE 15 MILLIGRAM(S): 5 TABLET ORAL at 23:35

## 2023-11-19 RX ADMIN — TAPENTADOL HYDROCHLORIDE 100 MILLIGRAM(S): 50 TABLET, FILM COATED ORAL at 19:53

## 2023-11-19 RX ADMIN — OXYCODONE HYDROCHLORIDE 15 MILLIGRAM(S): 5 TABLET ORAL at 05:03

## 2023-11-19 RX ADMIN — Medication 1 TABLET(S): at 12:06

## 2023-11-19 RX ADMIN — TAPENTADOL HYDROCHLORIDE 100 MILLIGRAM(S): 50 TABLET, FILM COATED ORAL at 07:49

## 2023-11-19 RX ADMIN — TAPENTADOL HYDROCHLORIDE 100 MILLIGRAM(S): 50 TABLET, FILM COATED ORAL at 16:36

## 2023-11-19 RX ADMIN — GABAPENTIN 800 MILLIGRAM(S): 400 CAPSULE ORAL at 06:09

## 2023-11-19 RX ADMIN — LIDOCAINE 1 PATCH: 4 CREAM TOPICAL at 18:05

## 2023-11-19 RX ADMIN — POLYETHYLENE GLYCOL 3350 17 GRAM(S): 17 POWDER, FOR SOLUTION ORAL at 05:03

## 2023-11-19 RX ADMIN — Medication 975 MILLIGRAM(S): at 06:09

## 2023-11-19 RX ADMIN — TIZANIDINE 4 MILLIGRAM(S): 4 TABLET ORAL at 21:33

## 2023-11-19 RX ADMIN — TAPENTADOL HYDROCHLORIDE 100 MILLIGRAM(S): 50 TABLET, FILM COATED ORAL at 00:12

## 2023-11-19 RX ADMIN — Medication 975 MILLIGRAM(S): at 15:46

## 2023-11-19 RX ADMIN — TIZANIDINE 4 MILLIGRAM(S): 4 TABLET ORAL at 07:48

## 2023-11-19 RX ADMIN — LIDOCAINE 1 PATCH: 4 CREAM TOPICAL at 16:37

## 2023-11-19 RX ADMIN — OXYCODONE HYDROCHLORIDE 15 MILLIGRAM(S): 5 TABLET ORAL at 01:10

## 2023-11-19 NOTE — PROGRESS NOTE ADULT - ASSESSMENT
63y with  HTN, HLD, CAD (non obstructive), asthma, PE (xarelto stopped 2 months ago), cervical radiculopathy/myelopathy s/p c-spine fusion surgery Oct 2022 (complicated by left arm nerve damage with LUE contractions), chronic severe back pain that radiates to both legs, who presented to [facility] for scheduled L1-pelvis PSF and decompression on 10/31 with Dr. Lopez.  Hospital Course complicated by fever 2/2 ? PNA treated with zosyn (augmentin while in rehab until 11/13), hyponatremia, ABLA and chronic low back pain. Patient now admitted for a multidisciplinary rehab program. 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 patient reports he was able to obtain nucynta from outpatient physician  - - ISTOP reviewed on 11/12    #hyponatremia, improved  - sodium 135.   - elevated protein gap -- needs further outpatient workup  - hyponatremia may be due to pain vs. post op changes vs. elevated protein gap    #essential HTN  - c/w norvasc 10mg po daily    #HLD  - c/w lipitor 20mg po daily     #normocytic anemia  - hb 8.9 stable  - iron panel and ferritin consistent with MARIA DOLORES -- start ferrous sulfate once patient's pain management is optimized    #constipation  - continue with senna and miralax    #aspiration pneumonia  - completed augmentin on 11/14    # Severe protein-calorie malnutrition.  - nutrition on board    heparin sq  full code      d/w rehab team

## 2023-11-19 NOTE — PROGRESS NOTE ADULT - ASSESSMENT
Imp: Patient with diagnosis of   s/p lumbosacral fusion       admitted for comprehensive acute rehabilitation.    Plan:  - Continue PT/OT/SLP  - DVT prophylaxis  - Skin- Turn q2h, check skin daily  - Continue current medications; patient medically stable.   -Active issues-   - Patient is stable to continue current rehabilitation program.   - c/o worsening chronic cervical radicular pain, mostly LUE, C5, C6; based on GFR may increase gabapentin, uptitrated to 900mg TID  - primary team ought to consider further cervical spine imaging and/or outpatient specialist follow up

## 2023-11-19 NOTE — PROGRESS NOTE ADULT - SUBJECTIVE AND OBJECTIVE BOX
Cc: Gait dysfunction    HPI: Patient with no new medical issues overnight.  Pain controlled, no chest pain, no N/V, no Fevers/Chills. No other new ROS  Has been tolerating rehabilitation program.    acetaminophen     Tablet .. 975 milliGRAM(s) Oral every 8 hours  albuterol    90 MICROgram(s) HFA Inhaler 2 Puff(s) Inhalation every 6 hours PRN  ALPRAZolam 0.5 milliGRAM(s) Oral at bedtime PRN  amLODIPine   Tablet 10 milliGRAM(s) Oral daily  atorvastatin 20 milliGRAM(s) Oral at bedtime  bisacodyl Suppository 10 milliGRAM(s) Rectal daily PRN  enoxaparin Injectable 40 milliGRAM(s) SubCutaneous every 24 hours  gabapentin 900 milliGRAM(s) Oral three times a day  lidocaine   4% Patch 1 Patch Transdermal daily  lidocaine   4% Patch 1 Patch Transdermal daily  multivitamin 1 Tablet(s) Oral daily  nystatin Powder 1 Application(s) Topical two times a day  ondansetron   Disintegrating Tablet 4 milliGRAM(s) Oral every 8 hours PRN  oxyCODONE    IR 15 milliGRAM(s) Oral every 3 hours PRN  polyethylene glycol 3350 17 Gram(s) Oral two times a day  senna 2 Tablet(s) Oral at bedtime  tapentadol 100 milliGRAM(s) Oral <User Schedule>  tiZANidine 4 milliGRAM(s) Oral <User Schedule>  tiZANidine 4 milliGRAM(s) Oral at bedtime      T(C): 37.1 (11-19-23 @ 07:51), Max: 37.1 (11-19-23 @ 07:51)  HR: 83 (11-19-23 @ 07:51) (81 - 88)  BP: 115/65 (11-19-23 @ 07:51) (115/65 - 124/77)  RR: 16 (11-19-23 @ 07:51) (16 - 16)  SpO2: 96% (11-19-23 @ 07:51) (93% - 97%)    In NAD  HEENT- EOMI  Heart- RRR, S1S2  Lungs- CTA bl.  Abd- + BS, NT  Ext- No calf pain  Neuro- Exam unchanged

## 2023-11-19 NOTE — PROGRESS NOTE ADULT - SUBJECTIVE AND OBJECTIVE BOX
Medicine Progress Note    Patient is a 63y old  Male who presents with a chief complaint of lumbar stenosis s/p L1-pelvis PSF and decompression (18 Nov 2023 14:27)      SUBJECTIVE / OVERNIGHT EVENTS:  seen and examined  Chart reviewed  No overnight events  Limb weakness improving with therapy  BM+  No pain  No complaints    ADDITIONAL REVIEW OF SYSTEMS:  denied fever/chills/CP/SOB/cough/palpitation/dizziness/abdominal pian/nausea/vomiting/diarrhoea/constipation/dysuria/leg or calf pain/headaches.all other ROS neg    MEDICATIONS  (STANDING):  acetaminophen     Tablet .. 975 milliGRAM(s) Oral every 8 hours  amLODIPine   Tablet 10 milliGRAM(s) Oral daily  atorvastatin 20 milliGRAM(s) Oral at bedtime  enoxaparin Injectable 40 milliGRAM(s) SubCutaneous every 24 hours  gabapentin 800 milliGRAM(s) Oral three times a day  lidocaine   4% Patch 1 Patch Transdermal daily  lidocaine   4% Patch 1 Patch Transdermal daily  multivitamin 1 Tablet(s) Oral daily  nystatin Powder 1 Application(s) Topical two times a day  polyethylene glycol 3350 17 Gram(s) Oral two times a day  senna 2 Tablet(s) Oral at bedtime  tapentadol 100 milliGRAM(s) Oral <User Schedule>  tiZANidine 4 milliGRAM(s) Oral <User Schedule>  tiZANidine 4 milliGRAM(s) Oral at bedtime    MEDICATIONS  (PRN):  albuterol    90 MICROgram(s) HFA Inhaler 2 Puff(s) Inhalation every 6 hours PRN Shortness of Breath and/or Wheezing  ALPRAZolam 0.5 milliGRAM(s) Oral at bedtime PRN anxiety  bisacodyl Suppository 10 milliGRAM(s) Rectal daily PRN Constipation  ondansetron   Disintegrating Tablet 4 milliGRAM(s) Oral every 8 hours PRN Nausea and/or Vomiting  oxyCODONE    IR 15 milliGRAM(s) Oral every 3 hours PRN Severe Pain (7 - 10)    CAPILLARY BLOOD GLUCOSE        I&O's Summary      PHYSICAL EXAM:  Vital Signs Last 24 Hrs  T(C): 37.1 (19 Nov 2023 07:51), Max: 37.1 (19 Nov 2023 07:51)  T(F): 98.7 (19 Nov 2023 07:51), Max: 98.7 (19 Nov 2023 07:51)  HR: 83 (19 Nov 2023 07:51) (81 - 88)  BP: 115/65 (19 Nov 2023 07:51) (115/65 - 124/77)  BP(mean): --  RR: 16 (19 Nov 2023 07:51) (16 - 16)  SpO2: 96% (19 Nov 2023 07:51) (93% - 97%)    Parameters below as of 19 Nov 2023 07:51  Patient On (Oxygen Delivery Method): room air        GENERAL: Not in distress. Alert    HEENT: clear conjuctiva, MMM. no pallor or icterus  CARDIOVASCULAR: RRR S1, S2. No murmur/rubs/gallop  LUNGS: BLAE+, no rales, no wheezing, no rhonchi.    ABDOMEN: ND. Soft,  NT, no guarding / rebound / rigidity. BS normoactive  BACK: No spine tenderness.  EXTREMITIES: no edema. no leg or calf TP.  SKIN: warm and dry  PSYCHIATRIC: Calm.  No agitation.  CNS: AAO*3. moves limbs, follows commands. left sided weakness    LABS:                    COVID-19 PCR: NotDetec (10 Nov 2023 21:08)  SARS-CoV-2: NotDetec (06 Nov 2023 14:54)      RADIOLOGY & ADDITIONAL TESTS:  Imaging from Last 24 Hours:    Electrocardiogram/QTc Interval:    COORDINATION OF CARE:  Care Discussed with Consultants/Other Providers:

## 2023-11-20 LAB
ALBUMIN SERPL ELPH-MCNC: 3.1 G/DL — LOW (ref 3.3–5)
ALBUMIN SERPL ELPH-MCNC: 3.1 G/DL — LOW (ref 3.3–5)
ALP SERPL-CCNC: 93 U/L — SIGNIFICANT CHANGE UP (ref 40–120)
ALP SERPL-CCNC: 93 U/L — SIGNIFICANT CHANGE UP (ref 40–120)
ALT FLD-CCNC: 25 U/L — SIGNIFICANT CHANGE UP (ref 10–45)
ALT FLD-CCNC: 25 U/L — SIGNIFICANT CHANGE UP (ref 10–45)
ANION GAP SERPL CALC-SCNC: 9 MMOL/L — SIGNIFICANT CHANGE UP (ref 5–17)
ANION GAP SERPL CALC-SCNC: 9 MMOL/L — SIGNIFICANT CHANGE UP (ref 5–17)
AST SERPL-CCNC: 22 U/L — SIGNIFICANT CHANGE UP (ref 10–40)
AST SERPL-CCNC: 22 U/L — SIGNIFICANT CHANGE UP (ref 10–40)
BASOPHILS # BLD AUTO: 0.05 K/UL — SIGNIFICANT CHANGE UP (ref 0–0.2)
BASOPHILS # BLD AUTO: 0.05 K/UL — SIGNIFICANT CHANGE UP (ref 0–0.2)
BASOPHILS NFR BLD AUTO: 0.8 % — SIGNIFICANT CHANGE UP (ref 0–2)
BASOPHILS NFR BLD AUTO: 0.8 % — SIGNIFICANT CHANGE UP (ref 0–2)
BILIRUB SERPL-MCNC: 0.2 MG/DL — SIGNIFICANT CHANGE UP (ref 0.2–1.2)
BILIRUB SERPL-MCNC: 0.2 MG/DL — SIGNIFICANT CHANGE UP (ref 0.2–1.2)
BUN SERPL-MCNC: 19 MG/DL — SIGNIFICANT CHANGE UP (ref 7–23)
BUN SERPL-MCNC: 19 MG/DL — SIGNIFICANT CHANGE UP (ref 7–23)
CALCIUM SERPL-MCNC: 9.8 MG/DL — SIGNIFICANT CHANGE UP (ref 8.4–10.5)
CALCIUM SERPL-MCNC: 9.8 MG/DL — SIGNIFICANT CHANGE UP (ref 8.4–10.5)
CHLORIDE SERPL-SCNC: 97 MMOL/L — SIGNIFICANT CHANGE UP (ref 96–108)
CHLORIDE SERPL-SCNC: 97 MMOL/L — SIGNIFICANT CHANGE UP (ref 96–108)
CO2 SERPL-SCNC: 29 MMOL/L — SIGNIFICANT CHANGE UP (ref 22–31)
CO2 SERPL-SCNC: 29 MMOL/L — SIGNIFICANT CHANGE UP (ref 22–31)
CREAT SERPL-MCNC: 0.85 MG/DL — SIGNIFICANT CHANGE UP (ref 0.5–1.3)
CREAT SERPL-MCNC: 0.85 MG/DL — SIGNIFICANT CHANGE UP (ref 0.5–1.3)
EGFR: 98 ML/MIN/1.73M2 — SIGNIFICANT CHANGE UP
EGFR: 98 ML/MIN/1.73M2 — SIGNIFICANT CHANGE UP
EOSINOPHIL # BLD AUTO: 0.1 K/UL — SIGNIFICANT CHANGE UP (ref 0–0.5)
EOSINOPHIL # BLD AUTO: 0.1 K/UL — SIGNIFICANT CHANGE UP (ref 0–0.5)
EOSINOPHIL NFR BLD AUTO: 1.7 % — SIGNIFICANT CHANGE UP (ref 0–6)
EOSINOPHIL NFR BLD AUTO: 1.7 % — SIGNIFICANT CHANGE UP (ref 0–6)
GLUCOSE SERPL-MCNC: 111 MG/DL — HIGH (ref 70–99)
GLUCOSE SERPL-MCNC: 111 MG/DL — HIGH (ref 70–99)
HCT VFR BLD CALC: 28.3 % — LOW (ref 39–50)
HCT VFR BLD CALC: 28.3 % — LOW (ref 39–50)
HGB BLD-MCNC: 9.1 G/DL — LOW (ref 13–17)
HGB BLD-MCNC: 9.1 G/DL — LOW (ref 13–17)
IMM GRANULOCYTES NFR BLD AUTO: 0.3 % — SIGNIFICANT CHANGE UP (ref 0–0.9)
IMM GRANULOCYTES NFR BLD AUTO: 0.3 % — SIGNIFICANT CHANGE UP (ref 0–0.9)
LYMPHOCYTES # BLD AUTO: 1.38 K/UL — SIGNIFICANT CHANGE UP (ref 1–3.3)
LYMPHOCYTES # BLD AUTO: 1.38 K/UL — SIGNIFICANT CHANGE UP (ref 1–3.3)
LYMPHOCYTES # BLD AUTO: 23.2 % — SIGNIFICANT CHANGE UP (ref 13–44)
LYMPHOCYTES # BLD AUTO: 23.2 % — SIGNIFICANT CHANGE UP (ref 13–44)
MCHC RBC-ENTMCNC: 28.3 PG — SIGNIFICANT CHANGE UP (ref 27–34)
MCHC RBC-ENTMCNC: 28.3 PG — SIGNIFICANT CHANGE UP (ref 27–34)
MCHC RBC-ENTMCNC: 32.2 GM/DL — SIGNIFICANT CHANGE UP (ref 32–36)
MCHC RBC-ENTMCNC: 32.2 GM/DL — SIGNIFICANT CHANGE UP (ref 32–36)
MCV RBC AUTO: 87.9 FL — SIGNIFICANT CHANGE UP (ref 80–100)
MCV RBC AUTO: 87.9 FL — SIGNIFICANT CHANGE UP (ref 80–100)
MONOCYTES # BLD AUTO: 0.45 K/UL — SIGNIFICANT CHANGE UP (ref 0–0.9)
MONOCYTES # BLD AUTO: 0.45 K/UL — SIGNIFICANT CHANGE UP (ref 0–0.9)
MONOCYTES NFR BLD AUTO: 7.6 % — SIGNIFICANT CHANGE UP (ref 2–14)
MONOCYTES NFR BLD AUTO: 7.6 % — SIGNIFICANT CHANGE UP (ref 2–14)
NEUTROPHILS # BLD AUTO: 3.95 K/UL — SIGNIFICANT CHANGE UP (ref 1.8–7.4)
NEUTROPHILS # BLD AUTO: 3.95 K/UL — SIGNIFICANT CHANGE UP (ref 1.8–7.4)
NEUTROPHILS NFR BLD AUTO: 66.4 % — SIGNIFICANT CHANGE UP (ref 43–77)
NEUTROPHILS NFR BLD AUTO: 66.4 % — SIGNIFICANT CHANGE UP (ref 43–77)
NRBC # BLD: 0 /100 WBCS — SIGNIFICANT CHANGE UP (ref 0–0)
NRBC # BLD: 0 /100 WBCS — SIGNIFICANT CHANGE UP (ref 0–0)
PLATELET # BLD AUTO: 413 K/UL — HIGH (ref 150–400)
PLATELET # BLD AUTO: 413 K/UL — HIGH (ref 150–400)
POTASSIUM SERPL-MCNC: 4.3 MMOL/L — SIGNIFICANT CHANGE UP (ref 3.5–5.3)
POTASSIUM SERPL-MCNC: 4.3 MMOL/L — SIGNIFICANT CHANGE UP (ref 3.5–5.3)
POTASSIUM SERPL-SCNC: 4.3 MMOL/L — SIGNIFICANT CHANGE UP (ref 3.5–5.3)
POTASSIUM SERPL-SCNC: 4.3 MMOL/L — SIGNIFICANT CHANGE UP (ref 3.5–5.3)
PROT SERPL-MCNC: 7.4 G/DL — SIGNIFICANT CHANGE UP (ref 6–8.3)
PROT SERPL-MCNC: 7.4 G/DL — SIGNIFICANT CHANGE UP (ref 6–8.3)
RBC # BLD: 3.22 M/UL — LOW (ref 4.2–5.8)
RBC # BLD: 3.22 M/UL — LOW (ref 4.2–5.8)
RBC # FLD: 13.6 % — SIGNIFICANT CHANGE UP (ref 10.3–14.5)
RBC # FLD: 13.6 % — SIGNIFICANT CHANGE UP (ref 10.3–14.5)
SODIUM SERPL-SCNC: 135 MMOL/L — SIGNIFICANT CHANGE UP (ref 135–145)
SODIUM SERPL-SCNC: 135 MMOL/L — SIGNIFICANT CHANGE UP (ref 135–145)
WBC # BLD: 5.95 K/UL — SIGNIFICANT CHANGE UP (ref 3.8–10.5)
WBC # BLD: 5.95 K/UL — SIGNIFICANT CHANGE UP (ref 3.8–10.5)
WBC # FLD AUTO: 5.95 K/UL — SIGNIFICANT CHANGE UP (ref 3.8–10.5)
WBC # FLD AUTO: 5.95 K/UL — SIGNIFICANT CHANGE UP (ref 3.8–10.5)

## 2023-11-20 PROCEDURE — 99232 SBSQ HOSP IP/OBS MODERATE 35: CPT

## 2023-11-20 RX ORDER — POLYETHYLENE GLYCOL 3350 17 G/17G
17 POWDER, FOR SOLUTION ORAL
Refills: 0 | Status: DISCONTINUED | OUTPATIENT
Start: 2023-11-20 | End: 2023-11-29

## 2023-11-20 RX ADMIN — LIDOCAINE 1 PATCH: 4 CREAM TOPICAL at 04:00

## 2023-11-20 RX ADMIN — OXYCODONE HYDROCHLORIDE 15 MILLIGRAM(S): 5 TABLET ORAL at 00:35

## 2023-11-20 RX ADMIN — TAPENTADOL HYDROCHLORIDE 100 MILLIGRAM(S): 50 TABLET, FILM COATED ORAL at 16:18

## 2023-11-20 RX ADMIN — Medication 975 MILLIGRAM(S): at 06:05

## 2023-11-20 RX ADMIN — GABAPENTIN 900 MILLIGRAM(S): 400 CAPSULE ORAL at 22:44

## 2023-11-20 RX ADMIN — Medication 975 MILLIGRAM(S): at 17:11

## 2023-11-20 RX ADMIN — TIZANIDINE 4 MILLIGRAM(S): 4 TABLET ORAL at 16:19

## 2023-11-20 RX ADMIN — Medication 975 MILLIGRAM(S): at 07:04

## 2023-11-20 RX ADMIN — ATORVASTATIN CALCIUM 20 MILLIGRAM(S): 80 TABLET, FILM COATED ORAL at 22:43

## 2023-11-20 RX ADMIN — TAPENTADOL HYDROCHLORIDE 100 MILLIGRAM(S): 50 TABLET, FILM COATED ORAL at 13:00

## 2023-11-20 RX ADMIN — Medication 975 MILLIGRAM(S): at 16:19

## 2023-11-20 RX ADMIN — TIZANIDINE 4 MILLIGRAM(S): 4 TABLET ORAL at 08:04

## 2023-11-20 RX ADMIN — Medication 1 TABLET(S): at 11:54

## 2023-11-20 RX ADMIN — GABAPENTIN 900 MILLIGRAM(S): 400 CAPSULE ORAL at 06:04

## 2023-11-20 RX ADMIN — TAPENTADOL HYDROCHLORIDE 100 MILLIGRAM(S): 50 TABLET, FILM COATED ORAL at 19:55

## 2023-11-20 RX ADMIN — Medication 0.5 MILLIGRAM(S): at 18:27

## 2023-11-20 RX ADMIN — GABAPENTIN 900 MILLIGRAM(S): 400 CAPSULE ORAL at 16:18

## 2023-11-20 RX ADMIN — TAPENTADOL HYDROCHLORIDE 100 MILLIGRAM(S): 50 TABLET, FILM COATED ORAL at 11:53

## 2023-11-20 RX ADMIN — ENOXAPARIN SODIUM 40 MILLIGRAM(S): 100 INJECTION SUBCUTANEOUS at 16:18

## 2023-11-20 RX ADMIN — TIZANIDINE 4 MILLIGRAM(S): 4 TABLET ORAL at 22:43

## 2023-11-20 RX ADMIN — Medication 975 MILLIGRAM(S): at 22:41

## 2023-11-20 RX ADMIN — NYSTATIN CREAM 1 APPLICATION(S): 100000 CREAM TOPICAL at 06:05

## 2023-11-20 RX ADMIN — AMLODIPINE BESYLATE 10 MILLIGRAM(S): 2.5 TABLET ORAL at 06:05

## 2023-11-20 RX ADMIN — TAPENTADOL HYDROCHLORIDE 100 MILLIGRAM(S): 50 TABLET, FILM COATED ORAL at 08:04

## 2023-11-20 RX ADMIN — NYSTATIN CREAM 1 APPLICATION(S): 100000 CREAM TOPICAL at 17:11

## 2023-11-20 NOTE — PROGRESS NOTE ADULT - SUBJECTIVE AND OBJECTIVE BOX
Medicine Progress Note    Patient is a 63y old  Male who presents with a chief complaint of lumbar stenosis s/p L1-pelvis PSF and decompression (20 Nov 2023 10:08)      SUBJECTIVE / OVERNIGHT EVENTS:  seen and examined  Chart reviewed  No overnight events  Limb weakness improving with therapy  BM+  pain and muscle spasm controlled with meds  No complaints    ADDITIONAL REVIEW OF SYSTEMS:  denied fever/chills/CP/SOB/cough/palpitation/dizziness/abdominal pian/nausea/vomiting/diarrhoea/constipation/dysuria/leg or calf pain/headaches.all other ROS neg    MEDICATIONS  (STANDING):  acetaminophen     Tablet .. 975 milliGRAM(s) Oral every 8 hours  amLODIPine   Tablet 10 milliGRAM(s) Oral daily  atorvastatin 20 milliGRAM(s) Oral at bedtime  enoxaparin Injectable 40 milliGRAM(s) SubCutaneous every 24 hours  gabapentin 900 milliGRAM(s) Oral three times a day  lidocaine   4% Patch 1 Patch Transdermal daily  lidocaine   4% Patch 1 Patch Transdermal daily  multivitamin 1 Tablet(s) Oral daily  nystatin Powder 1 Application(s) Topical two times a day  polyethylene glycol 3350 17 Gram(s) Oral two times a day  senna 2 Tablet(s) Oral at bedtime  tapentadol 100 milliGRAM(s) Oral <User Schedule>  tiZANidine 4 milliGRAM(s) Oral at bedtime  tiZANidine 4 milliGRAM(s) Oral <User Schedule>    MEDICATIONS  (PRN):  albuterol    90 MICROgram(s) HFA Inhaler 2 Puff(s) Inhalation every 6 hours PRN Shortness of Breath and/or Wheezing  ALPRAZolam 0.5 milliGRAM(s) Oral at bedtime PRN anxiety  bisacodyl Suppository 10 milliGRAM(s) Rectal daily PRN Constipation  ondansetron   Disintegrating Tablet 4 milliGRAM(s) Oral every 8 hours PRN Nausea and/or Vomiting  oxyCODONE    IR 15 milliGRAM(s) Oral every 3 hours PRN Severe Pain (7 - 10)    CAPILLARY BLOOD GLUCOSE        I&O's Summary    19 Nov 2023 07:01  -  20 Nov 2023 07:00  --------------------------------------------------------  IN: 0 mL / OUT: 400 mL / NET: -400 mL        PHYSICAL EXAM:  Vital Signs Last 24 Hrs  T(C): 37.1 (19 Nov 2023 19:50), Max: 37.1 (19 Nov 2023 19:50)  T(F): 98.8 (19 Nov 2023 19:50), Max: 98.8 (19 Nov 2023 19:50)  HR: 84 (20 Nov 2023 06:03) (84 - 86)  BP: 116/69 (20 Nov 2023 06:03) (110/65 - 116/69)  BP(mean): --  RR: 16 (19 Nov 2023 23:33) (16 - 16)  SpO2: 96% (19 Nov 2023 23:33) (96% - 98%)    Parameters below as of 19 Nov 2023 23:33  Patient On (Oxygen Delivery Method): room air        GENERAL: Not in distress. Alert    HEENT: clear conjuctiva, MMM. no pallor or icterus  CARDIOVASCULAR: RRR S1, S2. No murmur/rubs/gallop  LUNGS: BLAE+, no rales, no wheezing, no rhonchi.    ABDOMEN: ND. Soft,  NT, no guarding / rebound / rigidity. BS normoactive  BACK: No spine tenderness.  EXTREMITIES: no edema. no leg or calf TP.  SKIN: warm and dry  PSYCHIATRIC: Calm.  No agitation.  CNS: AAO*3. moves limbs, follows commands. left sided weakness  LABS:                        9.1    5.95  )-----------( 413      ( 20 Nov 2023 06:33 )             28.3     11-20    135  |  97  |  19  ----------------------------<  111<H>  4.3   |  29  |  0.85    Ca    9.8      20 Nov 2023 06:33    TPro  7.4  /  Alb  3.1<L>  /  TBili  0.2  /  DBili  x   /  AST  22  /  ALT  25  /  AlkPhos  93  11-20          Urinalysis Basic - ( 20 Nov 2023 06:33 )    Color: x / Appearance: x / SG: x / pH: x  Gluc: 111 mg/dL / Ketone: x  / Bili: x / Urobili: x   Blood: x / Protein: x / Nitrite: x   Leuk Esterase: x / RBC: x / WBC x   Sq Epi: x / Non Sq Epi: x / Bacteria: x        COVID-19 PCR: NotDetec (10 Nov 2023 21:08)  SARS-CoV-2: NotDetec (06 Nov 2023 14:54)      RADIOLOGY & ADDITIONAL TESTS:  Imaging from Last 24 Hours:    Electrocardiogram/QTc Interval:    COORDINATION OF CARE:  Care Discussed with Consultants/Other Providers:

## 2023-11-20 NOTE — PROGRESS NOTE ADULT - SUBJECTIVE AND OBJECTIVE BOX
Subjective  Seen and examined,   Reports no interval med events  Spasms signiticantly reduced  Tolerating increased dose of gabapentin   Tolerating oral diet      ROS--No chest or abd pain, no headache, LBM 11/19  Back pain controlled, no bleeding muscle spasms, minimal     Therapy  Making gradual progress with therapy  Left leg weakness still significant.    Vital Signs Last 24 Hrs  T(C): 37.1 (19 Nov 2023 19:50), Max: 37.1 (19 Nov 2023 19:50)  T(F): 98.8 (19 Nov 2023 19:50), Max: 98.8 (19 Nov 2023 19:50)  HR: 84 (20 Nov 2023 06:03) (84 - 86)  BP: 116/69 (20 Nov 2023 06:03) (110/65 - 116/69)  RR: 16 (19 Nov 2023 23:33) (16 - 16)  SpO2: 96% (19 Nov 2023 23:33) (96% - 98%)  O2 Parameters below as of 19 Nov 2023 23:33  Patient On (Oxygen Delivery Method): room air        EXAM  Gen - Comfortable  HEENT - NAD  Neck - Supple,   Pulm - Clear  Cardiovascular - RRR, S1S2, No m/r/g  Abdomen - Soft, non  tender, +BS  Extremities - No C/C/E, No calf tenderness. Left shoulder AROM improving, but still significantly stiff    Neuro-     Cognitive - AAOx3     Communication - Fluent, No dysarthria     Attention: Intact      Cranial Nerves - CN 2-12 grossly intact     Motor -                    LEFT    UE - ShAB 5/5, EF 4-/5, EE 4-/5, WE 5/5,  4/5, unable to flex or abduct shoulder past 90 deg. left elbow flexion contracture                     RIGHT UE - ShAB 5/5, EF 5/5, EE 5/5, WE 5/5,  4/5                    LEFT    LE - HF 1/5, KE 1/5, DF 0/5, PF 4/5                    RIGHT LE - HF 4/5 (due to back pain), KE 5/5, DF 5/5, PF 5/5        Sensory - diminished sensation left lower extremity stocking distribution compared to right.      Tone - normal. Left shoulder spasticity.  Spasticity left distal arm/hand, MAS 3/4     Temp- still decreased temperature sensation LUE  Psychiatric - Mood stable, Affect WNL  Skin: Aquacel, gauze over single drain site.   Wounds: Thoracolumbar spine, open to air     RECENT LABS/IMAGING                        9.1    5.95  )-----------( 413      ( 20 Nov 2023 06:33 )             28.3     11-20    135  |  97  |  19  ----------------------------<  111<H>  4.3   |  29  |  0.85    Ca    9.8      20 Nov 2023 06:33    TPro  7.4  /  Alb  3.1<L>  /  TBili  0.2  /  DBili  x   /  AST  22  /  ALT  25  /  AlkPhos  93  11-20      Urinalysis Basic - ( 20 Nov 2023 06:33 )    Color: x / Appearance: x / SG: x / pH: x  Gluc: 111 mg/dL / Ketone: x  / Bili: x / Urobili: x   Blood: x / Protein: x / Nitrite: x   Leuk Esterase: x / RBC: x / WBC x   Sq Epi: x / Non Sq Epi: x / Bacteria: x      MEDICATIONS  (STANDING):  acetaminophen     Tablet .. 975 milliGRAM(s) Oral every 8 hours  amLODIPine   Tablet 10 milliGRAM(s) Oral daily  atorvastatin 20 milliGRAM(s) Oral at bedtime  enoxaparin Injectable 40 milliGRAM(s) SubCutaneous every 24 hours  gabapentin 900 milliGRAM(s) Oral three times a day  lidocaine   4% Patch 1 Patch Transdermal daily  lidocaine   4% Patch 1 Patch Transdermal daily  multivitamin 1 Tablet(s) Oral daily  nystatin Powder 1 Application(s) Topical two times a day  polyethylene glycol 3350 17 Gram(s) Oral two times a day  senna 2 Tablet(s) Oral at bedtime  tapentadol 100 milliGRAM(s) Oral <User Schedule>  tiZANidine 4 milliGRAM(s) Oral at bedtime  tiZANidine 4 milliGRAM(s) Oral <User Schedule>    MEDICATIONS  (PRN):  albuterol    90 MICROgram(s) HFA Inhaler 2 Puff(s) Inhalation every 6 hours PRN Shortness of Breath and/or Wheezing  ALPRAZolam 0.5 milliGRAM(s) Oral at bedtime PRN anxiety  bisacodyl Suppository 10 milliGRAM(s) Rectal daily PRN Constipation  ondansetron   Disintegrating Tablet 4 milliGRAM(s) Oral every 8 hours PRN Nausea and/or Vomiting  oxyCODONE    IR 15 milliGRAM(s) Oral every 3 hours PRN Severe Pain (7 - 10)

## 2023-11-20 NOTE — PROGRESS NOTE ADULT - ASSESSMENT
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 Lopez.  Hospital Course complicated by fever 2/2 ? PNA treated with zosyn (augmentin while in rehab until 11/13), hyponatremia, ABLA and chronic low back pain. Patient now admitted for a multidisciplinary rehab program. 11-10-23      #L1-L5 PSF and decompression due to lumbar stenosis  #chronic back pain, muscle spasm  #s/p cervical fusion 2022 c/b left arm nerve damage with LUE contraction  - continue comprehensive rehab program  - pain management per primary team. patient reports he was able to obtain nucynta from outpatient physician  - - ISTOP reviewed on 11/12    #hyponatremia,   - improved  - elevated protein gap -- needs further outpatient workup  - hyponatremia may be due to pain vs. post op changes vs. elevated protein gap    #essential HTN  - c/w norvasc 10mg po daily    #HLD  - c/w lipitor 20mg po daily     #normocytic anemia  - hb 8.9 stable  - iron panel and ferritin consistent with MARIA DOLORES -- start ferrous sulfate once patient's pain management is optimized    #constipation  - senna and miralax  - laxatives per primary    #aspiration pneumonia  - completed augmentin on 11/14    # Severe protein-calorie malnutrition.  - nutrition on board    heparin sq  full code      d/w rehab team

## 2023-11-21 PROCEDURE — 99232 SBSQ HOSP IP/OBS MODERATE 35: CPT

## 2023-11-21 RX ORDER — TIZANIDINE 4 MG/1
6 TABLET ORAL AT BEDTIME
Refills: 0 | Status: DISCONTINUED | OUTPATIENT
Start: 2023-11-21 | End: 2023-11-29

## 2023-11-21 RX ORDER — OXYCODONE HYDROCHLORIDE 5 MG/1
15 TABLET ORAL
Refills: 0 | Status: DISCONTINUED | OUTPATIENT
Start: 2023-11-21 | End: 2023-11-28

## 2023-11-21 RX ORDER — TAPENTADOL HYDROCHLORIDE 50 MG/1
100 TABLET, FILM COATED ORAL
Refills: 0 | Status: DISCONTINUED | OUTPATIENT
Start: 2023-11-21 | End: 2023-11-28

## 2023-11-21 RX ORDER — ALPRAZOLAM 0.25 MG
0.5 TABLET ORAL AT BEDTIME
Refills: 0 | Status: DISCONTINUED | OUTPATIENT
Start: 2023-11-21 | End: 2023-11-26

## 2023-11-21 RX ADMIN — TAPENTADOL HYDROCHLORIDE 100 MILLIGRAM(S): 50 TABLET, FILM COATED ORAL at 20:04

## 2023-11-21 RX ADMIN — TAPENTADOL HYDROCHLORIDE 100 MILLIGRAM(S): 50 TABLET, FILM COATED ORAL at 16:06

## 2023-11-21 RX ADMIN — GABAPENTIN 900 MILLIGRAM(S): 400 CAPSULE ORAL at 21:05

## 2023-11-21 RX ADMIN — OXYCODONE HYDROCHLORIDE 15 MILLIGRAM(S): 5 TABLET ORAL at 14:30

## 2023-11-21 RX ADMIN — TAPENTADOL HYDROCHLORIDE 100 MILLIGRAM(S): 50 TABLET, FILM COATED ORAL at 17:30

## 2023-11-21 RX ADMIN — Medication 975 MILLIGRAM(S): at 21:05

## 2023-11-21 RX ADMIN — NYSTATIN CREAM 1 APPLICATION(S): 100000 CREAM TOPICAL at 18:30

## 2023-11-21 RX ADMIN — OXYCODONE HYDROCHLORIDE 15 MILLIGRAM(S): 5 TABLET ORAL at 13:53

## 2023-11-21 RX ADMIN — TAPENTADOL HYDROCHLORIDE 100 MILLIGRAM(S): 50 TABLET, FILM COATED ORAL at 14:00

## 2023-11-21 RX ADMIN — OXYCODONE HYDROCHLORIDE 15 MILLIGRAM(S): 5 TABLET ORAL at 03:11

## 2023-11-21 RX ADMIN — TIZANIDINE 4 MILLIGRAM(S): 4 TABLET ORAL at 08:39

## 2023-11-21 RX ADMIN — NYSTATIN CREAM 1 APPLICATION(S): 100000 CREAM TOPICAL at 05:42

## 2023-11-21 RX ADMIN — OXYCODONE HYDROCHLORIDE 15 MILLIGRAM(S): 5 TABLET ORAL at 04:25

## 2023-11-21 RX ADMIN — Medication 975 MILLIGRAM(S): at 05:40

## 2023-11-21 RX ADMIN — TAPENTADOL HYDROCHLORIDE 100 MILLIGRAM(S): 50 TABLET, FILM COATED ORAL at 08:17

## 2023-11-21 RX ADMIN — TIZANIDINE 4 MILLIGRAM(S): 4 TABLET ORAL at 15:59

## 2023-11-21 RX ADMIN — TIZANIDINE 6 MILLIGRAM(S): 4 TABLET ORAL at 21:06

## 2023-11-21 RX ADMIN — GABAPENTIN 900 MILLIGRAM(S): 400 CAPSULE ORAL at 05:41

## 2023-11-21 RX ADMIN — TAPENTADOL HYDROCHLORIDE 100 MILLIGRAM(S): 50 TABLET, FILM COATED ORAL at 11:47

## 2023-11-21 RX ADMIN — TAPENTADOL HYDROCHLORIDE 100 MILLIGRAM(S): 50 TABLET, FILM COATED ORAL at 21:04

## 2023-11-21 RX ADMIN — ATORVASTATIN CALCIUM 20 MILLIGRAM(S): 80 TABLET, FILM COATED ORAL at 21:05

## 2023-11-21 RX ADMIN — Medication 975 MILLIGRAM(S): at 22:05

## 2023-11-21 RX ADMIN — Medication 1 TABLET(S): at 11:48

## 2023-11-21 RX ADMIN — AMLODIPINE BESYLATE 10 MILLIGRAM(S): 2.5 TABLET ORAL at 05:41

## 2023-11-21 RX ADMIN — ENOXAPARIN SODIUM 40 MILLIGRAM(S): 100 INJECTION SUBCUTANEOUS at 16:00

## 2023-11-21 NOTE — PROGRESS NOTE ADULT - SUBJECTIVE AND OBJECTIVE BOX
Interval History  Patient seen and examined at bedside. No acute events noted.  Patient endorses left lower back pain last night which has improved today. No other associated symptoms. Rest of ROS is negative.    ALLERGIES:  No Known Drug Allergies  peanuts (Unknown)  eggs (Hives; Rash)    MEDICATIONS  (STANDING):  acetaminophen     Tablet .. 975 milliGRAM(s) Oral every 8 hours  amLODIPine   Tablet 10 milliGRAM(s) Oral daily  atorvastatin 20 milliGRAM(s) Oral at bedtime  enoxaparin Injectable 40 milliGRAM(s) SubCutaneous every 24 hours  gabapentin 900 milliGRAM(s) Oral three times a day  lidocaine   4% Patch 1 Patch Transdermal daily  lidocaine   4% Patch 1 Patch Transdermal daily  multivitamin 1 Tablet(s) Oral daily  nystatin Powder 1 Application(s) Topical two times a day  polyethylene glycol 3350 17 Gram(s) Oral two times a day  senna 2 Tablet(s) Oral at bedtime  tapentadol 100 milliGRAM(s) Oral <User Schedule>  tiZANidine 6 milliGRAM(s) Oral at bedtime  tiZANidine 4 milliGRAM(s) Oral <User Schedule>    MEDICATIONS  (PRN):  albuterol    90 MICROgram(s) HFA Inhaler 2 Puff(s) Inhalation every 6 hours PRN Shortness of Breath and/or Wheezing  ALPRAZolam 0.5 milliGRAM(s) Oral at bedtime PRN anxiety  bisacodyl Suppository 10 milliGRAM(s) Rectal daily PRN Constipation  ondansetron   Disintegrating Tablet 4 milliGRAM(s) Oral every 8 hours PRN Nausea and/or Vomiting  oxyCODONE    IR 15 milliGRAM(s) Oral every 3 hours PRN Severe Pain (7 - 10)    Vital Signs Last 24 Hrs  T(F): 98.6 (21 Nov 2023 08:31), Max: 98.6 (20 Nov 2023 19:57)  HR: 88 (21 Nov 2023 08:31) (86 - 95)  BP: 120/76 (21 Nov 2023 08:31) (108/70 - 124/77)  RR: 16 (21 Nov 2023 08:31) (16 - 16)  SpO2: 96% (21 Nov 2023 08:31) (95% - 97%)  I&O's Summary    BMI (kg/m2): 38.8 (11-18-23 @ 07:37)    PHYSICAL EXAM:  GENERAL: NAD  HEENT: NCAT  CHEST/LUNG: Clear to percussion bilaterally; No rales, rhonchi, wheezing  HEART: Regular rate and rhythm; No murmurs  ABDOMEN: Soft, Nontender, Nondistended; Bowel sounds present  MUSCULOSKELETAL/EXTREMITIES:  2+ Peripheral Pulses, No LE edema  LSO brace in place, left lower paraspinal muscle tenderness on palpation, no rash  PSYCH: Appropriate affect  NEURO: Alert & Oriented, 4/5 LUE, 1/5 LLE, 5/5 RUE/RLE     LABS:                        9.1    5.95  )-----------( 413      ( 20 Nov 2023 06:33 )             28.3       11-20    135  |  97  |  19  ----------------------------<  111  4.3   |  29  |  0.85    Ca    9.8      20 Nov 2023 06:33    TPro  7.4  /  Alb  3.1  /  TBili  0.2  /  DBili  x   /  AST  22  /  ALT  25  /  AlkPhos  93  11-20     Urinalysis Basic - ( 20 Nov 2023 06:33 )    Color: x / Appearance: x / SG: x / pH: x  Gluc: 111 mg/dL / Ketone: x  / Bili: x / Urobili: x   Blood: x / Protein: x / Nitrite: x   Leuk Esterase: x / RBC: x / WBC x   Sq Epi: x / Non Sq Epi: x / Bacteria: x    COVID-19 PCR: NotDetec (11-10-23 @ 21:08)

## 2023-11-21 NOTE — PROGRESS NOTE ADULT - SUBJECTIVE AND OBJECTIVE BOX
Subjective  Patient seen and examined at bedside this AM with Dr. IRVING  Admits to sleeping well.  Experienced intermittent L hip/flank discomfort.  Discussed increasing Tizanidine at HS; and evaluation for L AFO.    ROS--No chest or abd pain, no headache, LBM 11/120  Back pain controlled, no bleeding muscle spasms, minimal     Therapy  Making gradual progress with therapy  Left leg weakness still significant.      Vital Signs Last 24 Hrs  T(C): 37 (21 Nov 2023 08:31), Max: 37 (20 Nov 2023 19:57)  T(F): 98.6 (21 Nov 2023 08:31), Max: 98.6 (20 Nov 2023 19:57)  HR: 88 (21 Nov 2023 08:31) (86 - 95)  BP: 120/76 (21 Nov 2023 08:31) (108/70 - 124/77)  BP(mean): --  RR: 16 (21 Nov 2023 08:31) (16 - 16)  SpO2: 96% (21 Nov 2023 08:31) (95% - 97%)      EXAM  Gen - Comfortable  HEENT - NAD  Neck - Supple,   Pulm - Clear  Cardiovascular - RRR, S1S2, No m/r/g  Abdomen - Soft, non  tender, +BS  Extremities - No C/C/E, No calf tenderness. Left shoulder AROM improving, but still significantly stiff    Neuro-     Cognitive - AAOx3     Communication - Fluent, No dysarthria     Attention: Intact      Cranial Nerves - CN 2-12 grossly intact     Motor -                    LEFT    UE - ShAB 5/5, EF 4-/5, EE 4-/5, WE 5/5,  4/5, unable to flex or abduct shoulder past 90 deg. left elbow flexion contracture                     RIGHT UE - ShAB 5/5, EF 5/5, EE 5/5, WE 5/5,  4/5                    LEFT    LE - HF 1/5, KE 1/5, DF 0/5, PF 4/5                    RIGHT LE - HF 4/5 (due to back pain), KE 5/5, DF 5/5, PF 5/5        Sensory - diminished sensation left lower extremity stocking distribution compared to right.      Tone - normal. Left shoulder spasticity.  Spasticity left distal arm/hand, MAS 3/4     Temp- still decreased temperature sensation LUE  Psychiatric - Mood stable, Affect WNL  Skin: Aquacel, gauze over single drain site.   Wounds: Thoracolumbar spine, open to air     RECENT LABS/IMAGING                        9.1    5.95  )-----------( 413      ( 20 Nov 2023 06:33 )             28.3     11-20    135  |  97  |  19  ----------------------------<  111<H>  4.3   |  29  |  0.85    Ca    9.8      20 Nov 2023 06:33    TPro  7.4  /  Alb  3.1<L>  /  TBili  0.2  /  DBili  x   /  AST  22  /  ALT  25  /  AlkPhos  93  11-20      Urinalysis Basic - ( 20 Nov 2023 06:33 )    Color: x / Appearance: x / SG: x / pH: x  Gluc: 111 mg/dL / Ketone: x  / Bili: x / Urobili: x   Blood: x / Protein: x / Nitrite: x   Leuk Esterase: x / RBC: x / WBC x   Sq Epi: x / Non Sq Epi: x / Bacteria: x      MEDICATIONS  (STANDING):  acetaminophen     Tablet .. 975 milliGRAM(s) Oral every 8 hours  amLODIPine   Tablet 10 milliGRAM(s) Oral daily  atorvastatin 20 milliGRAM(s) Oral at bedtime  enoxaparin Injectable 40 milliGRAM(s) SubCutaneous every 24 hours  gabapentin 900 milliGRAM(s) Oral three times a day  lidocaine   4% Patch 1 Patch Transdermal daily  lidocaine   4% Patch 1 Patch Transdermal daily  multivitamin 1 Tablet(s) Oral daily  nystatin Powder 1 Application(s) Topical two times a day  polyethylene glycol 3350 17 Gram(s) Oral two times a day  senna 2 Tablet(s) Oral at bedtime  tapentadol 100 milliGRAM(s) Oral <User Schedule>  tiZANidine 4 milliGRAM(s) Oral <User Schedule>  tiZANidine 6 milliGRAM(s) Oral at bedtime    MEDICATIONS  (PRN):  albuterol    90 MICROgram(s) HFA Inhaler 2 Puff(s) Inhalation every 6 hours PRN Shortness of Breath and/or Wheezing  ALPRAZolam 0.5 milliGRAM(s) Oral at bedtime PRN anxiety  bisacodyl Suppository 10 milliGRAM(s) Rectal daily PRN Constipation  ondansetron   Disintegrating Tablet 4 milliGRAM(s) Oral every 8 hours PRN Nausea and/or Vomiting  oxyCODONE    IR 15 milliGRAM(s) Oral every 3 hours PRN Severe Pain (7 - 10)       Subjective  Patient seen and examined at bedside this AM with Dr. Peters  Admits to sleeping well.  Experienced intermittent L hip/flank discomfort.  Discussed increasing Tizanidine at HS; and evaluation for L AFO.  He reports improved appetite, since commencement of glucerna, and requests a script for this on discharge    ROS--No chest or abd pain, no headache, LBM 11/120  Back pain controlled, no bleeding muscle spasms, minimal     Therapy  Making gradual progress with therapy  Left leg weakness still significant.      Vital Signs Last 24 Hrs  T(C): 37 (21 Nov 2023 08:31), Max: 37 (20 Nov 2023 19:57)  T(F): 98.6 (21 Nov 2023 08:31), Max: 98.6 (20 Nov 2023 19:57)  HR: 88 (21 Nov 2023 08:31) (86 - 95)  BP: 120/76 (21 Nov 2023 08:31) (108/70 - 124/77)  BP(mean): --  RR: 16 (21 Nov 2023 08:31) (16 - 16)  SpO2: 96% (21 Nov 2023 08:31) (95% - 97%)      EXAM  Gen - Comfortable  HEENT - NAD  Neck - Supple,   Pulm - Clear  Cardiovascular - RRR, S1S2, No m/r/g  Abdomen - Soft, non  tender, +BS  Extremities - No C/C/E, No calf tenderness. Left shoulder AROM improving, but still significantly stiff    Neuro-     Cognitive - AAOx3     Communication - Fluent, No dysarthria     Attention: Intact      Cranial Nerves - CN 2-12 grossly intact     Motor -                    LEFT    UE - ShAB 5/5, EF 4-/5, EE 4-/5, WE 5/5,  4/5, unable to flex or abduct shoulder past 90 deg. left elbow flexion contracture                     RIGHT UE - ShAB 5/5, EF 5/5, EE 5/5, WE 5/5,  4/5                    LEFT    LE - HF 1/5, KE 1/5, DF 0/5, PF 4/5                    RIGHT LE - HF 4/5 (due to back pain), KE 5/5, DF 5/5, PF 5/5        Sensory - diminished sensation left lower extremity stocking distribution compared to right.      Tone - normal. Left shoulder spasticity.  Spasticity left distal arm/hand, MAS 3/4     Temp- still decreased temperature sensation LUE  Psychiatric - Mood stable, Affect WNL  Skin: Aquacel, gauze over single drain site.   Wounds: Thoracolumbar spine, open to air     RECENT LABS/IMAGING                        9.1    5.95  )-----------( 413      ( 20 Nov 2023 06:33 )             28.3     11-20    135  |  97  |  19  ----------------------------<  111<H>  4.3   |  29  |  0.85    Ca    9.8      20 Nov 2023 06:33    TPro  7.4  /  Alb  3.1<L>  /  TBili  0.2  /  DBili  x   /  AST  22  /  ALT  25  /  AlkPhos  93  11-20      Urinalysis Basic - ( 20 Nov 2023 06:33 )  Color: x / Appearance: x / SG: x / pH: x  Gluc: 111 mg/dL / Ketone: x  / Bili: x / Urobili: x   Blood: x / Protein: x / Nitrite: x   Leuk Esterase: x / RBC: x / WBC x   Sq Epi: x / Non Sq Epi: x / Bacteria: x      MEDICATIONS  (STANDING):  acetaminophen     Tablet .. 975 milliGRAM(s) Oral every 8 hours  amLODIPine   Tablet 10 milliGRAM(s) Oral daily  atorvastatin 20 milliGRAM(s) Oral at bedtime  enoxaparin Injectable 40 milliGRAM(s) SubCutaneous every 24 hours  gabapentin 900 milliGRAM(s) Oral three times a day  lidocaine   4% Patch 1 Patch Transdermal daily  lidocaine   4% Patch 1 Patch Transdermal daily  multivitamin 1 Tablet(s) Oral daily  nystatin Powder 1 Application(s) Topical two times a day  polyethylene glycol 3350 17 Gram(s) Oral two times a day  senna 2 Tablet(s) Oral at bedtime  tapentadol 100 milliGRAM(s) Oral <User Schedule>  tiZANidine 4 milliGRAM(s) Oral <User Schedule>  tiZANidine 6 milliGRAM(s) Oral at bedtime    MEDICATIONS  (PRN):  albuterol    90 MICROgram(s) HFA Inhaler 2 Puff(s) Inhalation every 6 hours PRN Shortness of Breath and/or Wheezing  ALPRAZolam 0.5 milliGRAM(s) Oral at bedtime PRN anxiety  bisacodyl Suppository 10 milliGRAM(s) Rectal daily PRN Constipation  ondansetron   Disintegrating Tablet 4 milliGRAM(s) Oral every 8 hours PRN Nausea and/or Vomiting  oxyCODONE    IR 15 milliGRAM(s) Oral every 3 hours PRN Severe Pain (7 - 10)

## 2023-11-21 NOTE — PROGRESS NOTE ADULT - ASSESSMENT
63 year old male with  HTN, HLD, CAD (non obstructive), asthma, PE (xarelto stopped 2 months ago), cervical radiculopathy/myelopathy s/p c-spine fusion surgery Oct 2022 (complicated by left arm nerve damage with LUE contractions), chronic severe back pain that radiates to both legs, who presented to [facility] for scheduled L1-pelvis PSF and decompression on 10/31 with Dr. Lopez.  Hospital Course complicated by fever 2/2 ? PNA treated with zosyn (augmentin while in rehab until 11/13), hyponatremia, ABLA and chronic low back pain. Patient now admitted for a multidisciplinary rehab program. 11-10-23    #L1-L5 PSF and decompression due to lumbar stenosis  #chronic back pain, muscle spasm  #s/p cervical fusion 2022 c/b left arm nerve damage with LUE contraction  - left lower back pain 2/2 muscle spasm  - continue comprehensive rehab program  - pain management per primary team. patient reports he was able to obtain nucynta from outpatient physician  - ISTOP reviewed on 11/12    #hyponatremia (resolved)  - elevated protein gap -- needs further outpatient workup  - hyponatremia may be due to pain vs. post op changes vs. elevated protein gap  - monitor BMP    #essential HTN  - c/w norvasc 10mg po daily  - BP controlled    #HLD  - c/w lipitor 20mg po daily     #normocytic anemia  - H/H stable  - iron panel and ferritin consistent with MARIA DOLORES -- start ferrous sulfate once patient's pain management is optimized  - Outpatient work up     #constipation  - bowel regimen per primary team  - Monitor BM's    #aspiration pneumonia  - completed augmentin on 11/14  - albuterol inh PRN     # Severe protein-calorie malnutrition.  - nutrition on board    # DVT PPx  - Lovenox SC

## 2023-11-21 NOTE — PROGRESS NOTE ADULT - ASSESSMENT
APNEA OF PREMATURITY    • Patient will remain without apneic episodes Progressing        CARDIOVASCULAR SYSTEM    • Maintain optimal cardiac output and hemodynamic stability Progressing        FEEDING    • Infant will tolerate full feedings Progressing KEVEN REY is a 63y with  HTN, HLD, CAD (non obstructive), asthma, PE (xarelto stopped 2 months ago), cervical radiculopathy/myelopathy s/p c-spine fusion surgery Oct 2022 (complicated by left arm nerve damage with LUE contractions), chronic severe back pain that radiates to both legs, who presented to [facility] for scheduled L1-pelvis PSF and decompression on 10/31 with Dr. Lopez.  Hospital Course complicated by fever 2/2 ? PNA treated with zosyn (augmentin while in rehab until 11/13), hyponatremia, ABLA and chronic low back pain. Patient now admitted for a multidisciplinary rehab program. 11-10-23 @ 15:11    * Spasticity left arm and leg--Tizanidine increased to 6mg at HS   * B/L LE weakness L> R and Left arm weakness  * Await L AFO for L foot eversion     Lumbar stenosis with radiculopathy  s/p L1-pelvis PSF and decompression on 10/31  - Wear LSO brace. and LEFT AFO when OOB  - WBAT  - Comprehensive Multidisciplinary Rehab Program: Start comprehensive rehab program of PT/OT/SLP - 3 hours a day, 5 days a week. P&O as needed   - precautions: spine, cardiac, fall, aspiration  - f/u Dr. Lopez outpatient  - L foot eversion, await L AFO with orthotist     Pain  Chronic low back pain  - home regimen: Nucynta 100mg qis, Xanax 0.5mg, gabapentin 400mg TID, tizanidine 2mg q6. Patient is requesting resuming his home medication of nucynta, states that family will bring in home does.   - acute pain management followed at [facility]. Recs appreciated   - Gabapentin 800mg TID, increased to 900mg tid 11/18  - Tizanidine 4 mg BID and Tizanidine 6mg at HS   - lidocaine patches  -Oxycodone 15mg prn for severe pain  -11/15--Recontinued Tapentadol (nucinta) 100mg bid (home med)     Hyponatremia--* Low Na---fluid restriction 1-1.5l/day  and monitor   - SIADH post-op  - Na 130  --F/u hospitalist recs     PNA   - cont augmentin BID (end 11/14), completed      HTN  - Norvasc 10mg    HLD  - lipitor 20mg    ABLA 2/2 surgery  - Has yearly FOBT which he states was negative, no recent colonoscopy.    Mood / Cognition  - Neuropsychology consult recs appreciated--patient finds this beneficial   - Rec therapy  - 0.5mg Xanax prn Bedtime    Sleep  - Melatonin PRN     GI / Bowel  - Senna qHS  - Miralax Daily  --lactulose prn      / Bladder  - Continue bladder scans Q8 hours with straight cath for >400cc.  - Condom cath (unable to use urinal by himself)    Skin / Pressure injury  - Skin --t. Aquacel, CDI gauze over drain site.   - Monitor Incisions    Diet:  - Diet Consistency: Regular    DVT prophylaxis:   - heparin 5000mg q8  ---------------------------------------  Code Status/Emergency Contact:   Pamela Rey [phone]  ---------------------------------------  * labs 11/20--Unremarkable --stable anemia, normal renal and liver function   ---------------------------------------  IDT 11/14  living with mother and 2 brothers, and other family members  Has a walk in shower, 15 steps to house entrance, has many family members to help  Set up to eating,  Mod assistance for bed transfers, max assistance for lower body dressing, mod assistance for upper body dressing  Ambulating 10 ft on the grab bar  DME--Grab bar, commode and shower chair  Barriers--yet to practice shower transfers, chronic pain, muscle weakness  Goal--supervision for ADLs  Est dc 11/29 to home  ---------------------------------------  Outpatient Follow-up:    Ami Lopez  Orthopaedic Surgery  [address] [facility], [address] [place] [id number]  Phone: [phone]  Fax: [phone]  Established Patient  Follow Up Time:    Pain management  Dr Vaughn Bach -Ph    (Emergency ph  -)-(  (Pain controlled with Tapentadol 100mg qid with good pain control up to few months ago, when insurance declined approval)  11/15--Nucinta approved by insurance and recommended    KEVEN REY is a 63y with  HTN, HLD, CAD (non obstructive), asthma, PE (xarelto stopped 2 months ago), cervical radiculopathy/myelopathy s/p c-spine fusion surgery Oct 2022 (complicated by left arm nerve damage with LUE contractions), chronic severe back pain that radiates to both legs, who presented to [facility] for scheduled L1-pelvis PSF and decompression on 10/31 with Dr. Lopez.  Hospital Course complicated by fever 2/2 ? PNA treated with zosyn (augmentin while in rehab until 11/13), hyponatremia, ABLA and chronic low back pain. Patient now admitted for a multidisciplinary rehab program. 11-10-23 @ 15:11    * Spasticity left arm and leg--Tizanidine increased to 6mg at HS   * B/L LE weakness L> R and Left arm weakness  * Await L AFO for L foot weakness with eversion     Lumbar stenosis with radiculopathy  s/p L1-pelvis PSF and decompression on 10/31  - Wear LSO brace. and LEFT AFO when OOB  - WBAT  - Comprehensive Multidisciplinary Rehab Program: Start comprehensive rehab program of PT/OT,  3 hours a day, 5 days a week. P&O as needed   - precautions: spine, cardiac, fall, aspiration  - f/u Dr. Lopez outpatient  - Left foot drop with eversion, await L AFO with orthotist       JUSTIFICATION FOR Left Foot AFO  Dx: Lumbar radiculopathy and left foot drop    Patient has hx of Cervical spine disc disease s/p surgery, Thoraco/lumbar radiculopathy s/p laminectomy  Motor function test showed left leg -Hip flexion 1/5, Knee extension 1/5, Dorsiflexion  0/5, Planter flexion 4/5  Left upper extremity 3/5, distally at hands  2+/5  Right side 5/5  Left foot weakness, foot eversion during trail at ambulation with his current Left AFO. Left knee buckling noted during therapy, hence ambulating minimally 50ft with moderate assistance  Despite use of his current Carbon Fiber left foot brace, his balance and stability remains impaired. His progress in rehabilitation is adversely being affected by this  He requires a SOLID LEFT FOOT AFO to reduce risk of falls, improve ambulation and reduce pain  He will require this brace for at least 8hr a day and more that 6 months       MMT    Pain  Chronic low back pain  - home regimen: Nucynta 100mg qis, Xanax 0.5mg, gabapentin 400mg TID, tizanidine 2mg q6. Patient is requesting resuming his home medication of nucynta, states that family will bring in home does.   - acute pain management followed at [facility]. Recs appreciated   - Gabapentin 800mg TID, increased to 900mg tid 11/18  - Tizanidine 4 mg BID and Tizanidine 6mg at HS   - lidocaine patches  -Oxycodone 15mg prn for severe pain  -11/15--Recontinued Tapentadol (nucinta) 100mg bid (home med)     Hyponatremia, due to SIADH post op--resolved, Na normalized     HTN  - Norvasc 10mg    HLD  - lipitor 20mg    ABLA 2/2 surgery  - Has yearly FOBT which he states was negative, no recent colonoscopy.    Mood / Cognition  - Neuropsychology consult recs appreciated--patient finds this beneficial   - Rec therapy  - 0.5mg Xanax prn Bedtime    Sleep  - Melatonin PRN     GI / Bowel  - Senna qHS  - Miralax Daily  --lactulose prn      / Bladder  - Continue bladder scans Q8 hours with straight cath for >400cc.  - Condom cath (unable to use urinal by himself)    Skin / Pressure injury  - Skin --t. Aquacel, CDI gauze over drain site.   - Monitor Incisions    Diet:  - Diet Consistency: Regular  -Food and Nutrition f/u regarding request for glucerna on d/c     DVT prophylaxis:   - heparin 5000mg q8  ---------------------------------------  Code Status/Emergency Contact:  Mother Pamela Rey [phone]  ---------------------------------------  * labs 11/20--Unremarkable --stable anemia, normal renal and liver function     Liaison with other providers  11/21--Orthotics provider, we called on phone, discussed patient's deficits and sent consult for left foot AFO     ---------------------------------------  IDT 11/14  living with mother and 2 brothers, and other family members  Has a walk in shower, 15 steps to house entrance, has many family members to help  Set up to eating,  Mod assistance for bed transfers, max assistance for lower body dressing, mod assistance for upper body dressing  Ambulating 10 ft on the grab bar  DME--Grab bar, commode and shower chair  Barriers--yet to practice shower transfers, chronic pain, muscle weakness  Goal--supervision for ADLs  Est dc 11/29 to home  ---------------------------------------  Outpatient Follow-up:    Ami Lopez  Orthopaedic Surgery  [address] [facility], [place] [address]  Phone: [phone]  Fax: [phone]  Established Patient  Follow Up Time:    Pain management  Dr Vaughn Bach -Ph    (Emergency ph  -)-(  (Pain controlled with Tapentadol 100mg qid with good pain control up to few months ago, when insurance declined approval)  11/15--Nucinta approved by insurance and recommended    KEVEN REY is a 63y with  HTN, HLD, CAD (non obstructive), asthma, PE (xarelto stopped 2 months ago), cervical radiculopathy/myelopathy s/p c-spine fusion surgery Oct 2022 (complicated by left arm nerve damage with LUE contractions), chronic severe back pain that radiates to both legs, who presented to [facility] for scheduled L1-pelvis PSF and decompression on 10/31 with Dr. Lopez.  Hospital Course complicated by fever 2/2 ? PNA treated with zosyn (augmentin while in rehab until 11/13), hyponatremia, ABLA and chronic low back pain. Patient now admitted for a multidisciplinary rehab program. 11-10-23 @ 15:11    * Spasticity left arm and leg--Tizanidine increased to 6mg at HS   * B/L LE weakness L> R and Left arm weakness  * Await L AFO for L foot weakness with eversion     Lumbar stenosis with radiculopathy  s/p L1-pelvis PSF and decompression on 10/31  - Wear LSO brace. and LEFT AFO when OOB  - WBAT  - Comprehensive Multidisciplinary Rehab Program: Start comprehensive rehab program of PT/OT,  3 hours a day, 5 days a week. P&O as needed   - precautions: spine, cardiac, fall, aspiration  - f/u Dr. Lopez outpatient  - Left foot drop with eversion, await L AFO with orthotist       JUSTIFICATION FOR Left Foot AFO  Dx: Lumbar radiculopathy and left foot drop    Patient has hx of Cervical spine disc disease s/p surgery, Thoraco/lumbar radiculopathy s/p laminectomy  Motor function test showed left leg -Hip flexion 1/5, Knee extension 1/5, Dorsiflexion  0/5, Planter flexion 4/5  Left upper extremity 3/5, distally at hands  2+/5  Right side 5/5  Left foot weakness, foot eversion during trail at ambulation with his current Left AFO. Left knee buckling noted during therapy, hence ambulating minimally 50ft with moderate assistance  Despite use of his current Carbon Fiber left foot brace, his balance and stability remains impaired. His progress in rehabilitation is adversely being affected by this  He requires a SOLID LEFT FOOT AFO to reduce risk of falls, improve ambulation and reduce pain  He will require this brace for at least 8hr a day and more that 6 months       MMT    Pain  Chronic low back pain  - home regimen: Nucynta 100mg qis, Xanax 0.5mg, gabapentin 400mg TID, tizanidine 2mg q6. Patient is requesting resuming his home medication of nucynta, states that family will bring in home does.   - acute pain management followed at [facility]. Recs appreciated   - Gabapentin 800mg TID, increased to 900mg tid 11/18  - Tizanidine 4 mg BID and Tizanidine 6mg at HS   - lidocaine patches  -Oxycodone 15mg prn for severe pain  -11/15--Recontinued Tapentadol (nucinta) 100mg bid (home med)     Hyponatremia, due to SIADH post op--resolved, Na normalized     HTN  - Norvasc 10mg    HLD  - lipitor 20mg    ABLA 2/2 surgery  - Has yearly FOBT which he states was negative, no recent colonoscopy.    Mood / Cognition  - Neuropsychology consult recs appreciated--patient finds this beneficial   - Rec therapy  - 0.5mg Xanax prn Bedtime    Sleep  - Melatonin PRN     GI / Bowel  - Senna qHS  - Miralax Daily  --lactulose prn      / Bladder  - Continue bladder scans Q8 hours with straight cath for >400cc.  - Condom cath (unable to use urinal by himself)    Skin / Pressure injury  - Skin --t. Aquacel, CDI gauze over drain site.   - Monitor Incisions    Diet:  - Diet Consistency: Regular  -Food and Nutrition f/u regarding request for glucerna on d/c     DVT prophylaxis:   - heparin 5000mg q8  ---------------------------------------  Code Status/Emergency Contact:  Mother Pamela Rey [phone]  ---------------------------------------  * labs 11/20--Unremarkable --stable anemia, normal renal and liver function     Liaison with other providers  11/21--Orthotics provider, we called on phone, discussed patient's deficits and sent consult for left foot AFO   ---------------------------------------  IDT conference on 11/21  Social Work: Supportive mother and brother.  SLP:--   OT: Setup for eating/grooming. Min-mod A for LBD. Min A for toileting and UBD. Mod A for shower transfer. CG for toilet transfer.  PT: CG/CS for transfers. Amb with RW, WCF on long distance, with CG/CS. 8 steps with 1 HR with min A.  RT: --  DME: Has all DME (RW and cane).  Barriers: L flank pain.  Functional level on DC: Supervision to mod I.   TDD: 11/29 to home  ---------------------------------------  Outpatient Follow-up:    Ami Lopez  Orthopaedic Surgery  [address] [place] [address], [place] [address]  Phone: [phone]  Fax: [phone]  Established Patient  Follow Up Time:    Pain management  Dr Vaughn Bach -Ph    (Emergency ph  -)-(  (Pain controlled with Tapentadol 100mg qid with good pain control up to few months ago, when insurance declined approval)  11/15--Nucinta approved by insurance and recommended

## 2023-11-22 LAB
APPEARANCE UR: CLEAR — SIGNIFICANT CHANGE UP
APPEARANCE UR: CLEAR — SIGNIFICANT CHANGE UP
BILIRUB UR-MCNC: NEGATIVE — SIGNIFICANT CHANGE UP
BILIRUB UR-MCNC: NEGATIVE — SIGNIFICANT CHANGE UP
COLOR SPEC: YELLOW — SIGNIFICANT CHANGE UP
COLOR SPEC: YELLOW — SIGNIFICANT CHANGE UP
DIFF PNL FLD: NEGATIVE — SIGNIFICANT CHANGE UP
DIFF PNL FLD: NEGATIVE — SIGNIFICANT CHANGE UP
GLUCOSE UR QL: NEGATIVE MG/DL — SIGNIFICANT CHANGE UP
GLUCOSE UR QL: NEGATIVE MG/DL — SIGNIFICANT CHANGE UP
KETONES UR-MCNC: ABNORMAL MG/DL
KETONES UR-MCNC: ABNORMAL MG/DL
LEUKOCYTE ESTERASE UR-ACNC: NEGATIVE — SIGNIFICANT CHANGE UP
LEUKOCYTE ESTERASE UR-ACNC: NEGATIVE — SIGNIFICANT CHANGE UP
NITRITE UR-MCNC: NEGATIVE — SIGNIFICANT CHANGE UP
NITRITE UR-MCNC: NEGATIVE — SIGNIFICANT CHANGE UP
PH UR: 5.5 — SIGNIFICANT CHANGE UP (ref 5–8)
PH UR: 5.5 — SIGNIFICANT CHANGE UP (ref 5–8)
PROT UR-MCNC: NEGATIVE MG/DL — SIGNIFICANT CHANGE UP
PROT UR-MCNC: NEGATIVE MG/DL — SIGNIFICANT CHANGE UP
SP GR SPEC: 1.02 — SIGNIFICANT CHANGE UP (ref 1–1.03)
SP GR SPEC: 1.02 — SIGNIFICANT CHANGE UP (ref 1–1.03)
UROBILINOGEN FLD QL: 1 E.U./DL — SIGNIFICANT CHANGE UP (ref 0.2–1)
UROBILINOGEN FLD QL: 1 E.U./DL — SIGNIFICANT CHANGE UP (ref 0.2–1)

## 2023-11-22 PROCEDURE — 99232 SBSQ HOSP IP/OBS MODERATE 35: CPT

## 2023-11-22 RX ORDER — DICLOFENAC SODIUM 30 MG/G
2 GEL TOPICAL
Refills: 0 | Status: COMPLETED | OUTPATIENT
Start: 2023-11-22 | End: 2023-11-29

## 2023-11-22 RX ADMIN — POLYETHYLENE GLYCOL 3350 17 GRAM(S): 17 POWDER, FOR SOLUTION ORAL at 17:32

## 2023-11-22 RX ADMIN — TAPENTADOL HYDROCHLORIDE 100 MILLIGRAM(S): 50 TABLET, FILM COATED ORAL at 20:12

## 2023-11-22 RX ADMIN — TAPENTADOL HYDROCHLORIDE 100 MILLIGRAM(S): 50 TABLET, FILM COATED ORAL at 16:12

## 2023-11-22 RX ADMIN — GABAPENTIN 900 MILLIGRAM(S): 400 CAPSULE ORAL at 13:39

## 2023-11-22 RX ADMIN — TIZANIDINE 4 MILLIGRAM(S): 4 TABLET ORAL at 07:58

## 2023-11-22 RX ADMIN — Medication 975 MILLIGRAM(S): at 22:40

## 2023-11-22 RX ADMIN — Medication 975 MILLIGRAM(S): at 13:40

## 2023-11-22 RX ADMIN — GABAPENTIN 900 MILLIGRAM(S): 400 CAPSULE ORAL at 21:49

## 2023-11-22 RX ADMIN — TAPENTADOL HYDROCHLORIDE 100 MILLIGRAM(S): 50 TABLET, FILM COATED ORAL at 07:58

## 2023-11-22 RX ADMIN — Medication 975 MILLIGRAM(S): at 05:28

## 2023-11-22 RX ADMIN — TIZANIDINE 6 MILLIGRAM(S): 4 TABLET ORAL at 21:43

## 2023-11-22 RX ADMIN — DICLOFENAC SODIUM 2 GRAM(S): 30 GEL TOPICAL at 12:33

## 2023-11-22 RX ADMIN — TAPENTADOL HYDROCHLORIDE 100 MILLIGRAM(S): 50 TABLET, FILM COATED ORAL at 12:33

## 2023-11-22 RX ADMIN — AMLODIPINE BESYLATE 10 MILLIGRAM(S): 2.5 TABLET ORAL at 05:28

## 2023-11-22 RX ADMIN — OXYCODONE HYDROCHLORIDE 15 MILLIGRAM(S): 5 TABLET ORAL at 13:40

## 2023-11-22 RX ADMIN — ENOXAPARIN SODIUM 40 MILLIGRAM(S): 100 INJECTION SUBCUTANEOUS at 13:40

## 2023-11-22 RX ADMIN — SENNA PLUS 2 TABLET(S): 8.6 TABLET ORAL at 21:43

## 2023-11-22 RX ADMIN — OXYCODONE HYDROCHLORIDE 15 MILLIGRAM(S): 5 TABLET ORAL at 03:55

## 2023-11-22 RX ADMIN — Medication 975 MILLIGRAM(S): at 21:44

## 2023-11-22 RX ADMIN — OXYCODONE HYDROCHLORIDE 15 MILLIGRAM(S): 5 TABLET ORAL at 04:55

## 2023-11-22 RX ADMIN — Medication 1 TABLET(S): at 12:33

## 2023-11-22 RX ADMIN — TIZANIDINE 4 MILLIGRAM(S): 4 TABLET ORAL at 16:12

## 2023-11-22 RX ADMIN — NYSTATIN CREAM 1 APPLICATION(S): 100000 CREAM TOPICAL at 17:32

## 2023-11-22 RX ADMIN — NYSTATIN CREAM 1 APPLICATION(S): 100000 CREAM TOPICAL at 05:29

## 2023-11-22 RX ADMIN — ATORVASTATIN CALCIUM 20 MILLIGRAM(S): 80 TABLET, FILM COATED ORAL at 21:43

## 2023-11-22 RX ADMIN — GABAPENTIN 900 MILLIGRAM(S): 400 CAPSULE ORAL at 05:28

## 2023-11-22 RX ADMIN — OXYCODONE HYDROCHLORIDE 15 MILLIGRAM(S): 5 TABLET ORAL at 14:24

## 2023-11-22 RX ADMIN — TAPENTADOL HYDROCHLORIDE 100 MILLIGRAM(S): 50 TABLET, FILM COATED ORAL at 21:10

## 2023-11-22 RX ADMIN — DICLOFENAC SODIUM 2 GRAM(S): 30 GEL TOPICAL at 21:43

## 2023-11-22 NOTE — PROGRESS NOTE ADULT - ASSESSMENT
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 Lopez.  Hospital Course complicated by fever 2/2 ? PNA treated with zosyn (augmentin while in rehab until 11/13), hyponatremia, ABLA and chronic low back pain. Patient now admitted for a multidisciplinary rehab program. 11-10-23 @ 15:11    * UA for urinary frequency   * Diclofenac gel BID for 7 days to L flank  * Spasticity in LUE- L shoulder and L wrist stretching and ROM to be performed in therapy  * Await L AFO for L foot weakness with eversion     Lumbar stenosis with radiculopathy  s/p L1-pelvis PSF and decompression on 10/31  - Wear LSO brace. and LEFT AFO when OOB  - WBAT  - Comprehensive Multidisciplinary Rehab Program: Start comprehensive rehab program of PT/OT,  3 hours a day, 5 days a week. P&O as needed   - precautions: spine, cardiac, fall, aspiration  - f/u Dr. Lopez outpatient  - Left foot drop with eversion, await L AFO with orthotist   - L shoulder and wrist stretching and ROM in therapy       JUSTIFICATION FOR Left Foot AFO  Dx: Lumbar radiculopathy and left foot drop    Patient has hx of Cervical spine disc disease s/p surgery, Thoraco/lumbar radiculopathy s/p laminectomy  Motor function test showed left leg -Hip flexion 1/5, Knee extension 1/5, Dorsiflexion  0/5, Planter flexion 4/5  Left upper extremity 3/5, distally at hands  2+/5  Right side 5/5  Left foot weakness, foot eversion during trail at ambulation with his current Left AFO. Left knee buckling noted during therapy, hence ambulating minimally 50ft with moderate assistance  Despite use of his current Carbon Fiber left foot brace, his balance and stability remains impaired. His progress in rehabilitation is adversely being affected by this  He requires a SOLID LEFT FOOT AFO to reduce risk of falls, improve ambulation and reduce pain  He will require this brace for at least 8hr a day and more that 6 months       Pain  Chronic low back pain  - home regimen: Nucynta 100mg qis, Xanax 0.5mg, gabapentin 400mg TID, tizanidine 2mg q6. Patient is requesting resuming his home medication of nucynta, states that family will bring in home does.   - acute pain management followed at [facility]. Recs appreciated   - Gabapentin 800mg TID, increased to 900mg tid 11/18  - Tizanidine 4 mg BID and Tizanidine 6mg at HS   - Lidocaine patches DCd on 11/22 (no effect/no relief)  -Oxycodone 15mg prn for severe pain  -11/15--Recontinued Tapentadol (nucinta) 100mg bid (home med)   - Diclofenac gel BID for 7 days to L flank    Hyponatremia, due to SIADH post op--resolved, Na normalized     HTN  - Norvasc 10mg    HLD  - lipitor 20mg    ABLA 2/2 surgery  - Has yearly FOBT which he states was negative, no recent colonoscopy.    Mood / Cognition  - Neuropsychology consult recs appreciated--patient finds this beneficial   - Rec therapy  - 0.5mg Xanax prn Bedtime    Sleep  - Melatonin PRN     GI / Bowel  - Senna qHS  - Miralax Daily  --lactulose prn      / Bladder  - Continue bladder scans Q8 hours with straight cath for >400cc.  - Condom cath (unable to use urinal by himself)  - UA for urinary frequency     Skin / Pressure injury  - Skin --t. Aquacel, CDI gauze over drain site.   - Monitor Incisions    Diet:  - Diet Consistency: Regular  -Food and Nutrition f/u regarding request for glucerna on d/c     DVT prophylaxis:   - heparin 5000mg q8  ---------------------------------------  Code Status/Emergency Contact:  Mother Pamela Rey [phone]  ---------------------------------------  * labs 11/20--Unremarkable --stable anemia, normal renal and liver function     Liaison with other providers  11/21--Orthotics provider, we called on phone, discussed patient's deficits and sent consult for left foot AFO   ---------------------------------------  IDT conference on 11/21  Social Work: Supportive mother and brother.  SLP:--   OT: Setup for eating/grooming. Min-mod A for LBD. Min A for toileting and UBD. Mod A for shower transfer. CG for toilet transfer.  PT: CG/CS for transfers. Amb with RW, WCF on long distance, with CG/CS. 8 steps with 1 HR with min A.  RT: --  DME: Has all DME (RW and cane).  Barriers: L flank pain.  Functional level on DC: Supervision to mod I.   TDD: 11/29 to home  ---------------------------------------  Outpatient Follow-up:    Ami Lopez  Orthopaedic Surgery  [address] [facility], [address] [place] [address]  Phone: [phone]  Fax: [phone]  Established Patient  Follow Up Time:    Pain management  Dr Vaughn Bach -Ph    (Emergency ph  -)-(  (Pain controlled with Tapentadol 100mg qid with good pain control up to few months ago, when insurance declined approval)  11/15--Nucinta approved by insurance and recommended    KEVEN REY is a 63y with  HTN, HLD, CAD (non obstructive), asthma, PE (xarelto stopped 2 months ago), cervical radiculopathy/myelopathy s/p c-spine fusion surgery Oct 2022 (complicated by 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 Lopez.  Hospital Course complicated by fever 2/2 ? PNA treated with zosyn (augmentin while in rehab until 11/13), hyponatremia, ABLA and chronic low back pain. Patient now admitted for a multidisciplinary rehab program. 11-10-23 @ 15:11    * UA for urinary frequency   * Diclofenac gel BID for 7 days to L flank  * Spasticity in LUE- L shoulder and L wrist stretching and ROM to be performed in therapy  * Measured by orthotist yesterday for --Await L AFO for L foot weakness with eversion     Lumbar stenosis with radiculopathy  s/p L1-pelvis PSF and decompression on 10/31  - Wear LSO brace. and LEFT AFO when OOB  - WBAT  - Comprehensive Multidisciplinary Rehab Program: Start comprehensive rehab program of PT/OT,  3 hours a day, 5 days a week. P&O as needed   - precautions: spine, cardiac, fall, aspiration  - f/u Dr. Lopez outpatient  - Left foot drop with eversion, await L AFO with orthotist   - L shoulder and wrist stretching and ROM in therapy       JUSTIFICATION FOR Left Foot AFO  Dx: Lumbar radiculopathy and left foot drop    Patient has hx of Cervical spine disc disease s/p surgery, Thoraco/lumbar radiculopathy s/p laminectomy  Motor function test showed left leg -Hip flexion 1/5, Knee extension 1/5, Dorsiflexion  0/5, Planter flexion 4/5  Left upper extremity 3/5, distally at hands  2+/5  Right side 5/5  Left foot weakness, foot eversion during trail at ambulation with his current Left AFO. Left knee buckling noted during therapy, hence ambulating minimally 50ft with moderate assistance  Despite use of his current Carbon Fiber left foot brace, his balance and stability remains impaired. His progress in rehabilitation is adversely being affected by this  He requires a SOLID LEFT FOOT AFO to reduce risk of falls, improve ambulation and reduce pain  He will require this brace for at least 8hr a day and more that 6 months       Pain  Chronic low back pain  - home regimen: Nucynta 100mg qis, Xanax 0.5mg, gabapentin 400mg TID, tizanidine 2mg q6. Patient is requesting resuming his home medication of nucynta, states that family will bring in home does.   - acute pain management followed at [facility]. Recs appreciated   - Gabapentin 800mg TID, increased to 900mg tid 11/18  - Tizanidine 4 mg BID and Tizanidine 6mg at HS   - Lidocaine patches DCd on 11/22 (no effect/no relief)  -Oxycodone 15mg prn for severe pain  -11/15--Recontinued Tapentadol (nucinta) 100mg bid (home med)   - Diclofenac gel BID for 7 days to L flank    Hyponatremia, due to SIADH post op--resolved, Na normalized     HTN  - Norvasc 10mg    HLD  - lipitor 20mg    ABLA 2/2 surgery  - Has yearly FOBT which he states was negative, no recent colonoscopy.    Mood / Cognition  - Neuropsychology consult recs appreciated--patient finds this beneficial   - Rec therapy  - 0.5mg Xanax prn Bedtime    Sleep  - Melatonin PRN     GI / Bowel  - Senna qHS  - Miralax Daily  --lactulose prn      / Bladder  - Continue bladder scans Q8 hours with straight cath for >400cc.  - Condom cath (unable to use urinal by himself)  - UA for urinary frequency     Skin / Pressure injury  - Skin --t. Aquacel, CDI gauze over drain site.   - Monitor Incisions    Diet:  - Diet Consistency: Regular  -Food and Nutrition f/u regarding request for glucerna on d/c     DVT prophylaxis:   - heparin 5000mg q8  ---------------------------------------  Code Status/Emergency Contact:  Mother Pamela Rey [phone]  ---------------------------------------  * labs 11/20--Unremarkable --stable anemia, normal renal and liver function     Liaison with other providers  11/21--Orthotics provider, we called on phone, discussed patient's deficits and sent consult for left foot AFO   ---------------------------------------  IDT conference on 11/21  Social Work: Supportive mother and brother.  SLP:--   OT: Setup for eating/grooming. Min-mod A for LBD. Min A for toileting and UBD. Mod A for shower transfer. CG for toilet transfer.  PT: CG/CS for transfers. Amb with RW, WCF on long distance, with CG/CS. 8 steps with 1 HR with min A.  RT: --  DME: Has all DME (RW and cane).  Barriers: L flank pain.  Functional level on DC: Supervision to mod I.   TDD: 11/29 to home  ---------------------------------------  Outpatient Follow-up:    Ami Lopez  Orthopaedic Surgery  [address] [facility], [address] [place] [address]  Phone: [phone]  Fax: [phone]  Established Patient  Follow Up Time:    Pain management  Dr Vaughn Bach -Ph    (Emergency ph  -)-(  (Pain controlled with Tapentadol 100mg qid with good pain control up to few months ago, when insurance declined approval)  11/15--Nucinta approved by insurance and recommended    KEVEN REY is a 63y with  HTN, HLD, CAD (non obstructive), asthma, PE (xarelto stopped 2 months ago), cervical radiculopathy/myelopathy s/p c-spine fusion surgery Oct 2022 (complicated by left arm nerve damage with LUE contractions), chronic severe back pain that radiates to both legs, who presented to [facility] for scheduled L1-pelvis PSF and decompression on 10/31 with Dr. Lopez.  Hospital Course complicated by fever 2/2 ? PNA treated with zosyn (augmentin while in rehab until 11/13), hyponatremia, ABLA and chronic low back pain. Patient now admitted for a multidisciplinary rehab program. 11-10-23 @ 15:11    * UA for urinary frequency   * Diclofenac gel BID for 7 days to L flank  * Spasticity in LUE- L shoulder and L wrist stretching and ROM to be performed in therapy  * Measured by orthotist yesterday for --Await L AFO for L foot weakness with eversion     Lumbar stenosis with radiculopathy  s/p L1-pelvis PSF and decompression on 10/31  - Wear LSO brace. and LEFT AFO when OOB  - WBAT  - Comprehensive Multidisciplinary Rehab Program: Start comprehensive rehab program of PT/OT,  3 hours a day, 5 days a week. P&O as needed   - precautions: spine, cardiac, fall, aspiration  - f/u Dr. Lopez outpatient  - Left foot drop with eversion, await L AFO with orthotist   - L shoulder and wrist stretching and ROM in therapy       JUSTIFICATION FOR Left Foot AFO  Dx: Lumbar radiculopathy and left foot drop    Patient has hx of Cervical spine disc disease s/p surgery, Thoraco/lumbar radiculopathy s/p laminectomy  Motor function test showed left leg -Hip flexion 1/5, Knee extension 1/5, Dorsiflexion  0/5, Planter flexion 4/5  Left upper extremity 3/5, distally at hands  2+/5  Right side 5/5  Left foot weakness, foot eversion during trail at ambulation with his current Left AFO. Left knee buckling noted during therapy, hence ambulating minimally 50ft with moderate assistance  Despite use of his current Carbon Fiber left foot brace, his balance and stability remains impaired. His progress in rehabilitation is adversely being affected by this  He requires a SOLID LEFT FOOT AFO to reduce risk of falls, improve ambulation and reduce pain  He will require this brace for at least 8hr a day and more that 6 months       Pain  Chronic low back pain  - home regimen: Nucynta 100mg qis, Xanax 0.5mg, gabapentin 400mg TID, tizanidine 2mg q6. Patient is requesting resuming his home medication of nucynta, states that family will bring in home does.   - acute pain management followed at [facility]. Recs appreciated   - Gabapentin 800mg TID, increased to 900mg tid 11/18  - Tizanidine 4 mg BID and Tizanidine 6mg at HS   - Lidocaine patches DCd on 11/22 (no effect/no relief)  -Oxycodone 15mg prn for severe pain  -11/15--Recontinued Tapentadol (nucinta) 100mg bid (home med)   - Diclofenac gel BID for 7 days to L flank    Hyponatremia, due to SIADH post op--resolved, Na normalized     HTN  - Norvasc 10mg    HLD  - lipitor 20mg    ABLA 2/2 surgery  - Has yearly FOBT which he states was negative, no recent colonoscopy.    Mood / Cognition  - Neuropsychology consult recs appreciated--patient finds this beneficial   - Rec therapy  - 0.5mg Xanax prn Bedtime    Sleep  - Melatonin PRN     GI / Bowel  - Senna qHS  - Miralax Daily  --lactulose prn      / Bladder  - Continue bladder scans Q8 hours with straight cath for >400cc.  - Condom cath (unable to use urinal by himself)  - UA for urinary frequency     Skin / Pressure injury  - Skin --t. Aquacel, CDI gauze over drain site.   - Monitor Incisions    Diet:  - Diet Consistency: Regular  -Food and Nutrition f/u regarding request for glucerna on d/c     DVT prophylaxis:   - heparin 5000mg q8  ---------------------------------------  Code Status/Emergency Contact:  Mother Pamela Rey [phone]  ---------------------------------------  * labs 11/20--Unremarkable --stable anemia, normal renal and liver function     Liaison with other providers  11/21--Orthotics provider, we called on phone, discussed patient's deficits and sent consult for left foot AFO   ---------------------------------------  IDT conference on 11/21  Social Work: Supportive mother and brother.  SLP:--   OT: Setup for eating/grooming. Min-mod A for LBD. Min A for toileting and UBD. Mod A for shower transfer. CG for toilet transfer.  PT: CG/CS for transfers. Amb with RW, WCF on long distance, with CG/CS. 8 steps with 1 HR with min A.  RT: --n/a  DME: Has all DME (RW and cane).  Barriers: L flank pain.  Functional level on DC: Supervision to mod I.   TDD: 11/29 to home  ---------------------------------------  Outpatient Follow-up:    Ami Lopez  Orthopaedic Surgery  [address] [facility], [address] [place] [address]  Phone: [phone]  Fax: [phone]  Established Patient  Follow Up Time:    Pain management  Dr Vaughn Bach -Ph    (Emergency ph  -)-(  (Pain controlled with Tapentadol 100mg qid with good pain control up to few months ago, when insurance declined approval)  11/15--Nucinta approved by insurance and recommended

## 2023-11-22 NOTE — PROGRESS NOTE ADULT - ASSESSMENT
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 Lopez.  Hospital Course complicated by fever 2/2 ? PNA treated with zosyn (augmentin while in rehab until 11/13), hyponatremia, ABLA and chronic low back pain. Patient now admitted for a multidisciplinary rehab program. 11-10-23    #L1-L5 PSF and decompression due to lumbar stenosis  #chronic back pain, muscle spasm  #s/p cervical fusion 2022 c/b left arm nerve damage with LUE contraction  - left lower back pain 2/2 muscle spasm  - continue comprehensive rehab program  - pain management per primary team. patient reports he was able to obtain nucynta from outpatient physician  - ISTOP reviewed on 11/12    #Urinary frequency  - Urinary frequency for one night  - UA negative   - if symptoms persists, start flomax 0.4mg    #hyponatremia (resolved)  - monitor BMP    #essential HTN  - c/w norvasc 10mg po daily  - BP controlled    #HLD  - c/w lipitor 20mg po daily     #normocytic anemia  - H/H stable  - iron panel and ferritin consistent with MARIA DOLORES -- start ferrous sulfate once patient's pain management is optimized  - Outpatient work up     #constipation  - bowel regimen per primary team  - Monitor BM's    #aspiration pneumonia  - completed augmentin on 11/14  - albuterol inh PRN     # Severe protein-calorie malnutrition.  - nutrition on board    # DVT PPx  - Lovenox SC
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
## 2023-11-22 NOTE — PROGRESS NOTE ADULT - SUBJECTIVE AND OBJECTIVE BOX
Patient is a 63y old  Male who presents with a chief complaint of lumbar stenosis s/p L1-pelvis PSF and decompression (2023 09:50)      Subjective and overnight events:  Patient seen and examined at bedside. +urinary frequency, no dysuria. + left sided ribs pain. no fever, chills, sob, cp, abd pain.    ALLERGIES:  No Known Drug Allergies  peanuts (Unknown)  eggs (Hives; Rash)    MEDICATIONS  (STANDING):  acetaminophen     Tablet .. 975 milliGRAM(s) Oral every 8 hours  amLODIPine   Tablet 10 milliGRAM(s) Oral daily  atorvastatin 20 milliGRAM(s) Oral at bedtime  diclofenac sodium 1% Gel 2 Gram(s) Topical two times a day  enoxaparin Injectable 40 milliGRAM(s) SubCutaneous every 24 hours  gabapentin 900 milliGRAM(s) Oral three times a day  multivitamin 1 Tablet(s) Oral daily  nystatin Powder 1 Application(s) Topical two times a day  polyethylene glycol 3350 17 Gram(s) Oral two times a day  senna 2 Tablet(s) Oral at bedtime  tapentadol 100 milliGRAM(s) Oral <User Schedule>  tiZANidine 6 milliGRAM(s) Oral at bedtime  tiZANidine 4 milliGRAM(s) Oral <User Schedule>    MEDICATIONS  (PRN):  albuterol    90 MICROgram(s) HFA Inhaler 2 Puff(s) Inhalation every 6 hours PRN Shortness of Breath and/or Wheezing  ALPRAZolam 0.5 milliGRAM(s) Oral at bedtime PRN anxiety  bisacodyl Suppository 10 milliGRAM(s) Rectal daily PRN Constipation  ondansetron   Disintegrating Tablet 4 milliGRAM(s) Oral every 8 hours PRN Nausea and/or Vomiting  oxyCODONE    IR 15 milliGRAM(s) Oral every 3 hours PRN Severe Pain (7 - 10)    Vital Signs Last 24 Hrs  T(F): 98.4 (2023 07:55), Max: 98.5 (2023 20:07)  HR: 91 (2023 13:40) (90 - 98)  BP: 108/67 (2023 07:55) (108/67 - 130/80)  RR: 16 (2023 13:40) (15 - 16)  SpO2: 96% (2023 13:40) (96% - 98%)  I&O's Summary    2023 07:01  -  2023 07:00  --------------------------------------------------------  IN: 0 mL / OUT: 400 mL / NET: -400 mL      PHYSICAL EXAM:  General: NAD, A/O x 3  ENT: MMM  Neck: Supple, No JVD  Lungs: Clear to auscultation bilaterally  Cardio: RRR, S1/S2, No murmurs  Abdomen: Soft, Nontender, Nondistended; Bowel sounds present  Extremities: No calf tenderness, No pitting edema    LABS:                        9.1    5.95  )-----------( 413      ( 2023 06:33 )             28.3     11-20    135  |  97  |  19  ----------------------------<  111  4.3   |  29  |  0.85    Ca    9.8      2023 06:33    TPro  7.4  /  Alb  3.1  /  TBili  0.2  /  DBili  x   /  AST  22  /  ALT  25  /  AlkPhos  93  11-20          Urinalysis Basic - ( 2023 13:15 )    Color: Yellow / Appearance: Clear / S.024 / pH: x  Gluc: x / Ketone: Trace mg/dL  / Bili: Negative / Urobili: 1.0 E.U./dL   Blood: x / Protein: Negative mg/dL / Nitrite: Negative   Leuk Esterase: Negative / RBC: x / WBC x   Sq Epi: x / Non Sq Epi: x / Bacteria: x        COVID-19 PCR: NotDetec (11-10-23 @ 21:08)      RADIOLOGY & ADDITIONAL TESTS:    Care Discussed with Consultants/Other Providers:

## 2023-11-23 PROCEDURE — 99232 SBSQ HOSP IP/OBS MODERATE 35: CPT

## 2023-11-23 RX ADMIN — GABAPENTIN 900 MILLIGRAM(S): 400 CAPSULE ORAL at 20:58

## 2023-11-23 RX ADMIN — OXYCODONE HYDROCHLORIDE 15 MILLIGRAM(S): 5 TABLET ORAL at 14:29

## 2023-11-23 RX ADMIN — TAPENTADOL HYDROCHLORIDE 100 MILLIGRAM(S): 50 TABLET, FILM COATED ORAL at 16:42

## 2023-11-23 RX ADMIN — OXYCODONE HYDROCHLORIDE 15 MILLIGRAM(S): 5 TABLET ORAL at 15:15

## 2023-11-23 RX ADMIN — Medication 1 TABLET(S): at 11:59

## 2023-11-23 RX ADMIN — TAPENTADOL HYDROCHLORIDE 100 MILLIGRAM(S): 50 TABLET, FILM COATED ORAL at 07:30

## 2023-11-23 RX ADMIN — TIZANIDINE 4 MILLIGRAM(S): 4 TABLET ORAL at 07:30

## 2023-11-23 RX ADMIN — TAPENTADOL HYDROCHLORIDE 100 MILLIGRAM(S): 50 TABLET, FILM COATED ORAL at 21:40

## 2023-11-23 RX ADMIN — GABAPENTIN 900 MILLIGRAM(S): 400 CAPSULE ORAL at 14:29

## 2023-11-23 RX ADMIN — GABAPENTIN 900 MILLIGRAM(S): 400 CAPSULE ORAL at 06:15

## 2023-11-23 RX ADMIN — Medication 975 MILLIGRAM(S): at 23:41

## 2023-11-23 RX ADMIN — POLYETHYLENE GLYCOL 3350 17 GRAM(S): 17 POWDER, FOR SOLUTION ORAL at 06:16

## 2023-11-23 RX ADMIN — NYSTATIN CREAM 1 APPLICATION(S): 100000 CREAM TOPICAL at 06:16

## 2023-11-23 RX ADMIN — TIZANIDINE 6 MILLIGRAM(S): 4 TABLET ORAL at 20:59

## 2023-11-23 RX ADMIN — TAPENTADOL HYDROCHLORIDE 100 MILLIGRAM(S): 50 TABLET, FILM COATED ORAL at 20:37

## 2023-11-23 RX ADMIN — TAPENTADOL HYDROCHLORIDE 100 MILLIGRAM(S): 50 TABLET, FILM COATED ORAL at 12:00

## 2023-11-23 RX ADMIN — AMLODIPINE BESYLATE 10 MILLIGRAM(S): 2.5 TABLET ORAL at 06:17

## 2023-11-23 RX ADMIN — Medication 975 MILLIGRAM(S): at 14:28

## 2023-11-23 RX ADMIN — ATORVASTATIN CALCIUM 20 MILLIGRAM(S): 80 TABLET, FILM COATED ORAL at 20:59

## 2023-11-23 RX ADMIN — DICLOFENAC SODIUM 2 GRAM(S): 30 GEL TOPICAL at 21:00

## 2023-11-23 RX ADMIN — DICLOFENAC SODIUM 2 GRAM(S): 30 GEL TOPICAL at 06:15

## 2023-11-23 RX ADMIN — TIZANIDINE 4 MILLIGRAM(S): 4 TABLET ORAL at 14:28

## 2023-11-23 RX ADMIN — ENOXAPARIN SODIUM 40 MILLIGRAM(S): 100 INJECTION SUBCUTANEOUS at 14:29

## 2023-11-23 NOTE — PROGRESS NOTE ADULT - SUBJECTIVE AND OBJECTIVE BOX
Cc: Impaired mobility     HPI: Patient with no new medical issues today.  Resting comfortably.    Has been tolerating rehabilitation program.    acetaminophen     Tablet .. 975 milliGRAM(s) Oral every 8 hours  albuterol    90 MICROgram(s) HFA Inhaler 2 Puff(s) Inhalation every 6 hours PRN  ALPRAZolam 0.5 milliGRAM(s) Oral at bedtime PRN  amLODIPine   Tablet 10 milliGRAM(s) Oral daily  atorvastatin 20 milliGRAM(s) Oral at bedtime  bisacodyl Suppository 10 milliGRAM(s) Rectal daily PRN  diclofenac sodium 1% Gel 2 Gram(s) Topical two times a day  enoxaparin Injectable 40 milliGRAM(s) SubCutaneous every 24 hours  gabapentin 900 milliGRAM(s) Oral three times a day  multivitamin 1 Tablet(s) Oral daily  nystatin Powder 1 Application(s) Topical two times a day  ondansetron   Disintegrating Tablet 4 milliGRAM(s) Oral every 8 hours PRN  oxyCODONE    IR 15 milliGRAM(s) Oral every 3 hours PRN  polyethylene glycol 3350 17 Gram(s) Oral two times a day  senna 2 Tablet(s) Oral at bedtime  tapentadol 100 milliGRAM(s) Oral <User Schedule>  tiZANidine 6 milliGRAM(s) Oral at bedtime  tiZANidine 4 milliGRAM(s) Oral <User Schedule>      T(C): 37.2 (11-23-23 @ 07:22), Max: 37.2 (11-23-23 @ 07:22)  HR: 89 (11-23-23 @ 07:22) (86 - 91)  BP: 106/77 (11-23-23 @ 07:22) (106/77 - 117/67)  RR: 16 (11-23-23 @ 07:22) (16 - 16)  SpO2: 95% (11-23-23 @ 07:22) (95% - 98%)    In NAD  HEENT- EOMI  Heart- No cyanosis   Lungs- No respiratory distress   Abd- + BS, NT  Ext- No calf pain  Neuro- Exam unchanged    Imp: Patient with diagnosis of Lumbar stenosis with radiculopathy  s/p L1-pelvis PSF and decompression admitted for comprehensive acute rehabilitation.    Plan:  -Impaired mobility - Continue PT/OT  - DVT prophylaxis - Lovenox   -HTN - amlodipine  -Neuropathic pain - gabapentin    - Skin- Turn q2h, check skin daily  - Continue current medications; patient medically stable.   - Patient is stable to continue current rehabilitation program.

## 2023-11-23 NOTE — PROGRESS NOTE ADULT - SUBJECTIVE AND OBJECTIVE BOX
Medicine Progress Note    Patient is a 63y old  Male who presents with a chief complaint of lumbar stenosis s/p L1-pelvis PSF and decompression (2023 10:10)      SUBJECTIVE / OVERNIGHT EVENTS:  seen and examined  Chart reviewed  No overnight events  Limb weakness improving with therapy  BM+  muscle spasm over the right flank interfering therapy. says he was overdoing therapy to get better soon though therapist rec not to.       ADDITIONAL REVIEW OF SYSTEMS:  denied fever/chills/CP/SOB/cough/palpitation/dizziness/abdominal pian/nausea/vomiting/diarrhoea/constipation/dysuria/leg or calf pain/headaches.all other ROS neg    MEDICATIONS  (STANDING):  acetaminophen     Tablet .. 975 milliGRAM(s) Oral every 8 hours  amLODIPine   Tablet 10 milliGRAM(s) Oral daily  atorvastatin 20 milliGRAM(s) Oral at bedtime  diclofenac sodium 1% Gel 2 Gram(s) Topical two times a day  enoxaparin Injectable 40 milliGRAM(s) SubCutaneous every 24 hours  gabapentin 900 milliGRAM(s) Oral three times a day  multivitamin 1 Tablet(s) Oral daily  nystatin Powder 1 Application(s) Topical two times a day  polyethylene glycol 3350 17 Gram(s) Oral two times a day  senna 2 Tablet(s) Oral at bedtime  tapentadol 100 milliGRAM(s) Oral <User Schedule>  tiZANidine 6 milliGRAM(s) Oral at bedtime  tiZANidine 4 milliGRAM(s) Oral <User Schedule>    MEDICATIONS  (PRN):  albuterol    90 MICROgram(s) HFA Inhaler 2 Puff(s) Inhalation every 6 hours PRN Shortness of Breath and/or Wheezing  ALPRAZolam 0.5 milliGRAM(s) Oral at bedtime PRN anxiety  bisacodyl Suppository 10 milliGRAM(s) Rectal daily PRN Constipation  ondansetron   Disintegrating Tablet 4 milliGRAM(s) Oral every 8 hours PRN Nausea and/or Vomiting  oxyCODONE    IR 15 milliGRAM(s) Oral every 3 hours PRN Severe Pain (7 - 10)    CAPILLARY BLOOD GLUCOSE        I&O's Summary      PHYSICAL EXAM:  Vital Signs Last 24 Hrs  T(C): 37.2 (2023 07:22), Max: 37.2 (2023 07:22)  T(F): 99 (2023 07:22), Max: 99 (2023 07:22)  HR: 89 (2023 07:22) (86 - 91)  BP: 106/77 (2023 07:22) (106/77 - 117/67)  BP(mean): --  RR: 16 (2023 07:22) (16 - 16)  SpO2: 95% (2023 07:22) (95% - 98%)    Parameters below as of 2023 07:22  Patient On (Oxygen Delivery Method): room air    GENERAL: Not in distress. Alert    HEENT: clear conjuctiva, MMM. no pallor or icterus  CARDIOVASCULAR: RRR S1, S2. No murmur/rubs/gallop  LUNGS: BLAE+, no rales, no wheezing, no rhonchi.    ABDOMEN: ND. Soft,  NT, no guarding / rebound / rigidity. BS normoactive  BACK: No spine tenderness. muscle spasm left flank with TP  EXTREMITIES: no edema. no leg or calf TP.  SKIN: warm and dry  PSYCHIATRIC: Calm.  No agitation.  CNS: AAO. moves limbs, follows commands    LABS:                Urinalysis Basic - ( 2023 13:15 )    Color: Yellow / Appearance: Clear / S.024 / pH: x  Gluc: x / Ketone: Trace mg/dL  / Bili: Negative / Urobili: 1.0 E.U./dL   Blood: x / Protein: Negative mg/dL / Nitrite: Negative   Leuk Esterase: Negative / RBC: x / WBC x   Sq Epi: x / Non Sq Epi: x / Bacteria: x        COVID-19 PCR: [facility] (10 Nov 2023 21:08)  SARS-CoV-2: NotDetec (2023 14:54)      RADIOLOGY & ADDITIONAL TESTS:  Imaging from Last 24 Hours:    Electrocardiogram/QTc Interval:    COORDINATION OF CARE:  Care Discussed with Consultants/Other Providers:

## 2023-11-24 LAB
ALBUMIN SERPL ELPH-MCNC: 3.3 G/DL — SIGNIFICANT CHANGE UP (ref 3.3–5)
ALBUMIN SERPL ELPH-MCNC: 3.3 G/DL — SIGNIFICANT CHANGE UP (ref 3.3–5)
ALP SERPL-CCNC: 103 U/L — SIGNIFICANT CHANGE UP (ref 40–120)
ALP SERPL-CCNC: 103 U/L — SIGNIFICANT CHANGE UP (ref 40–120)
ALT FLD-CCNC: 26 U/L — SIGNIFICANT CHANGE UP (ref 10–45)
ALT FLD-CCNC: 26 U/L — SIGNIFICANT CHANGE UP (ref 10–45)
ANION GAP SERPL CALC-SCNC: 10 MMOL/L — SIGNIFICANT CHANGE UP (ref 5–17)
ANION GAP SERPL CALC-SCNC: 10 MMOL/L — SIGNIFICANT CHANGE UP (ref 5–17)
AST SERPL-CCNC: 19 U/L — SIGNIFICANT CHANGE UP (ref 10–40)
AST SERPL-CCNC: 19 U/L — SIGNIFICANT CHANGE UP (ref 10–40)
BASOPHILS # BLD AUTO: 0.05 K/UL — SIGNIFICANT CHANGE UP (ref 0–0.2)
BASOPHILS # BLD AUTO: 0.05 K/UL — SIGNIFICANT CHANGE UP (ref 0–0.2)
BASOPHILS NFR BLD AUTO: 0.9 % — SIGNIFICANT CHANGE UP (ref 0–2)
BASOPHILS NFR BLD AUTO: 0.9 % — SIGNIFICANT CHANGE UP (ref 0–2)
BILIRUB SERPL-MCNC: 0.3 MG/DL — SIGNIFICANT CHANGE UP (ref 0.2–1.2)
BILIRUB SERPL-MCNC: 0.3 MG/DL — SIGNIFICANT CHANGE UP (ref 0.2–1.2)
BUN SERPL-MCNC: 22 MG/DL — SIGNIFICANT CHANGE UP (ref 7–23)
BUN SERPL-MCNC: 22 MG/DL — SIGNIFICANT CHANGE UP (ref 7–23)
CALCIUM SERPL-MCNC: 10 MG/DL — SIGNIFICANT CHANGE UP (ref 8.4–10.5)
CALCIUM SERPL-MCNC: 10 MG/DL — SIGNIFICANT CHANGE UP (ref 8.4–10.5)
CHLORIDE SERPL-SCNC: 97 MMOL/L — SIGNIFICANT CHANGE UP (ref 96–108)
CHLORIDE SERPL-SCNC: 97 MMOL/L — SIGNIFICANT CHANGE UP (ref 96–108)
CO2 SERPL-SCNC: 28 MMOL/L — SIGNIFICANT CHANGE UP (ref 22–31)
CO2 SERPL-SCNC: 28 MMOL/L — SIGNIFICANT CHANGE UP (ref 22–31)
CREAT SERPL-MCNC: 0.91 MG/DL — SIGNIFICANT CHANGE UP (ref 0.5–1.3)
CREAT SERPL-MCNC: 0.91 MG/DL — SIGNIFICANT CHANGE UP (ref 0.5–1.3)
EGFR: 94 ML/MIN/1.73M2 — SIGNIFICANT CHANGE UP
EGFR: 94 ML/MIN/1.73M2 — SIGNIFICANT CHANGE UP
EOSINOPHIL # BLD AUTO: 0.09 K/UL — SIGNIFICANT CHANGE UP (ref 0–0.5)
EOSINOPHIL # BLD AUTO: 0.09 K/UL — SIGNIFICANT CHANGE UP (ref 0–0.5)
EOSINOPHIL NFR BLD AUTO: 1.6 % — SIGNIFICANT CHANGE UP (ref 0–6)
EOSINOPHIL NFR BLD AUTO: 1.6 % — SIGNIFICANT CHANGE UP (ref 0–6)
GLUCOSE BLDC GLUCOMTR-MCNC: 166 MG/DL — HIGH (ref 70–99)
GLUCOSE BLDC GLUCOMTR-MCNC: 166 MG/DL — HIGH (ref 70–99)
GLUCOSE SERPL-MCNC: 115 MG/DL — HIGH (ref 70–99)
GLUCOSE SERPL-MCNC: 115 MG/DL — HIGH (ref 70–99)
HCT VFR BLD CALC: 27.3 % — LOW (ref 39–50)
HCT VFR BLD CALC: 27.3 % — LOW (ref 39–50)
HGB BLD-MCNC: 8.8 G/DL — LOW (ref 13–17)
HGB BLD-MCNC: 8.8 G/DL — LOW (ref 13–17)
IMM GRANULOCYTES NFR BLD AUTO: 0.3 % — SIGNIFICANT CHANGE UP (ref 0–0.9)
IMM GRANULOCYTES NFR BLD AUTO: 0.3 % — SIGNIFICANT CHANGE UP (ref 0–0.9)
LYMPHOCYTES # BLD AUTO: 1.75 K/UL — SIGNIFICANT CHANGE UP (ref 1–3.3)
LYMPHOCYTES # BLD AUTO: 1.75 K/UL — SIGNIFICANT CHANGE UP (ref 1–3.3)
LYMPHOCYTES # BLD AUTO: 30.2 % — SIGNIFICANT CHANGE UP (ref 13–44)
LYMPHOCYTES # BLD AUTO: 30.2 % — SIGNIFICANT CHANGE UP (ref 13–44)
MCHC RBC-ENTMCNC: 27.8 PG — SIGNIFICANT CHANGE UP (ref 27–34)
MCHC RBC-ENTMCNC: 27.8 PG — SIGNIFICANT CHANGE UP (ref 27–34)
MCHC RBC-ENTMCNC: 32.2 GM/DL — SIGNIFICANT CHANGE UP (ref 32–36)
MCHC RBC-ENTMCNC: 32.2 GM/DL — SIGNIFICANT CHANGE UP (ref 32–36)
MCV RBC AUTO: 86.4 FL — SIGNIFICANT CHANGE UP (ref 80–100)
MCV RBC AUTO: 86.4 FL — SIGNIFICANT CHANGE UP (ref 80–100)
MONOCYTES # BLD AUTO: 0.58 K/UL — SIGNIFICANT CHANGE UP (ref 0–0.9)
MONOCYTES # BLD AUTO: 0.58 K/UL — SIGNIFICANT CHANGE UP (ref 0–0.9)
MONOCYTES NFR BLD AUTO: 10 % — SIGNIFICANT CHANGE UP (ref 2–14)
MONOCYTES NFR BLD AUTO: 10 % — SIGNIFICANT CHANGE UP (ref 2–14)
NEUTROPHILS # BLD AUTO: 3.31 K/UL — SIGNIFICANT CHANGE UP (ref 1.8–7.4)
NEUTROPHILS # BLD AUTO: 3.31 K/UL — SIGNIFICANT CHANGE UP (ref 1.8–7.4)
NEUTROPHILS NFR BLD AUTO: 57 % — SIGNIFICANT CHANGE UP (ref 43–77)
NEUTROPHILS NFR BLD AUTO: 57 % — SIGNIFICANT CHANGE UP (ref 43–77)
NRBC # BLD: 0 /100 WBCS — SIGNIFICANT CHANGE UP (ref 0–0)
NRBC # BLD: 0 /100 WBCS — SIGNIFICANT CHANGE UP (ref 0–0)
PLATELET # BLD AUTO: 352 K/UL — SIGNIFICANT CHANGE UP (ref 150–400)
PLATELET # BLD AUTO: 352 K/UL — SIGNIFICANT CHANGE UP (ref 150–400)
POTASSIUM SERPL-MCNC: 4.6 MMOL/L — SIGNIFICANT CHANGE UP (ref 3.5–5.3)
POTASSIUM SERPL-MCNC: 4.6 MMOL/L — SIGNIFICANT CHANGE UP (ref 3.5–5.3)
POTASSIUM SERPL-SCNC: 4.6 MMOL/L — SIGNIFICANT CHANGE UP (ref 3.5–5.3)
POTASSIUM SERPL-SCNC: 4.6 MMOL/L — SIGNIFICANT CHANGE UP (ref 3.5–5.3)
PROT SERPL-MCNC: 7.5 G/DL — SIGNIFICANT CHANGE UP (ref 6–8.3)
PROT SERPL-MCNC: 7.5 G/DL — SIGNIFICANT CHANGE UP (ref 6–8.3)
RBC # BLD: 3.16 M/UL — LOW (ref 4.2–5.8)
RBC # BLD: 3.16 M/UL — LOW (ref 4.2–5.8)
RBC # FLD: 13.5 % — SIGNIFICANT CHANGE UP (ref 10.3–14.5)
RBC # FLD: 13.5 % — SIGNIFICANT CHANGE UP (ref 10.3–14.5)
SODIUM SERPL-SCNC: 135 MMOL/L — SIGNIFICANT CHANGE UP (ref 135–145)
SODIUM SERPL-SCNC: 135 MMOL/L — SIGNIFICANT CHANGE UP (ref 135–145)
WBC # BLD: 5.8 K/UL — SIGNIFICANT CHANGE UP (ref 3.8–10.5)
WBC # BLD: 5.8 K/UL — SIGNIFICANT CHANGE UP (ref 3.8–10.5)
WBC # FLD AUTO: 5.8 K/UL — SIGNIFICANT CHANGE UP (ref 3.8–10.5)
WBC # FLD AUTO: 5.8 K/UL — SIGNIFICANT CHANGE UP (ref 3.8–10.5)

## 2023-11-24 PROCEDURE — 99232 SBSQ HOSP IP/OBS MODERATE 35: CPT

## 2023-11-24 RX ADMIN — TAPENTADOL HYDROCHLORIDE 100 MILLIGRAM(S): 50 TABLET, FILM COATED ORAL at 16:28

## 2023-11-24 RX ADMIN — OXYCODONE HYDROCHLORIDE 15 MILLIGRAM(S): 5 TABLET ORAL at 05:00

## 2023-11-24 RX ADMIN — Medication 975 MILLIGRAM(S): at 06:13

## 2023-11-24 RX ADMIN — TAPENTADOL HYDROCHLORIDE 100 MILLIGRAM(S): 50 TABLET, FILM COATED ORAL at 07:45

## 2023-11-24 RX ADMIN — TIZANIDINE 6 MILLIGRAM(S): 4 TABLET ORAL at 21:26

## 2023-11-24 RX ADMIN — TIZANIDINE 4 MILLIGRAM(S): 4 TABLET ORAL at 14:04

## 2023-11-24 RX ADMIN — TAPENTADOL HYDROCHLORIDE 100 MILLIGRAM(S): 50 TABLET, FILM COATED ORAL at 20:07

## 2023-11-24 RX ADMIN — OXYCODONE HYDROCHLORIDE 15 MILLIGRAM(S): 5 TABLET ORAL at 14:02

## 2023-11-24 RX ADMIN — OXYCODONE HYDROCHLORIDE 15 MILLIGRAM(S): 5 TABLET ORAL at 00:25

## 2023-11-24 RX ADMIN — OXYCODONE HYDROCHLORIDE 15 MILLIGRAM(S): 5 TABLET ORAL at 03:47

## 2023-11-24 RX ADMIN — NYSTATIN CREAM 1 APPLICATION(S): 100000 CREAM TOPICAL at 06:11

## 2023-11-24 RX ADMIN — GABAPENTIN 900 MILLIGRAM(S): 400 CAPSULE ORAL at 21:25

## 2023-11-24 RX ADMIN — DICLOFENAC SODIUM 2 GRAM(S): 30 GEL TOPICAL at 20:06

## 2023-11-24 RX ADMIN — GABAPENTIN 900 MILLIGRAM(S): 400 CAPSULE ORAL at 06:13

## 2023-11-24 RX ADMIN — AMLODIPINE BESYLATE 10 MILLIGRAM(S): 2.5 TABLET ORAL at 06:13

## 2023-11-24 RX ADMIN — Medication 975 MILLIGRAM(S): at 14:04

## 2023-11-24 RX ADMIN — OXYCODONE HYDROCHLORIDE 15 MILLIGRAM(S): 5 TABLET ORAL at 14:50

## 2023-11-24 RX ADMIN — DICLOFENAC SODIUM 2 GRAM(S): 30 GEL TOPICAL at 06:13

## 2023-11-24 RX ADMIN — Medication 975 MILLIGRAM(S): at 22:34

## 2023-11-24 RX ADMIN — OXYCODONE HYDROCHLORIDE 15 MILLIGRAM(S): 5 TABLET ORAL at 01:15

## 2023-11-24 RX ADMIN — ATORVASTATIN CALCIUM 20 MILLIGRAM(S): 80 TABLET, FILM COATED ORAL at 21:25

## 2023-11-24 RX ADMIN — POLYETHYLENE GLYCOL 3350 17 GRAM(S): 17 POWDER, FOR SOLUTION ORAL at 17:20

## 2023-11-24 RX ADMIN — ENOXAPARIN SODIUM 40 MILLIGRAM(S): 100 INJECTION SUBCUTANEOUS at 15:01

## 2023-11-24 RX ADMIN — Medication 1 TABLET(S): at 12:03

## 2023-11-24 RX ADMIN — GABAPENTIN 900 MILLIGRAM(S): 400 CAPSULE ORAL at 14:03

## 2023-11-24 RX ADMIN — Medication 975 MILLIGRAM(S): at 06:47

## 2023-11-24 RX ADMIN — NYSTATIN CREAM 1 APPLICATION(S): 100000 CREAM TOPICAL at 17:19

## 2023-11-24 RX ADMIN — TIZANIDINE 4 MILLIGRAM(S): 4 TABLET ORAL at 07:45

## 2023-11-24 RX ADMIN — TAPENTADOL HYDROCHLORIDE 100 MILLIGRAM(S): 50 TABLET, FILM COATED ORAL at 12:03

## 2023-11-24 NOTE — PROGRESS NOTE ADULT - SUBJECTIVE AND OBJECTIVE BOX
Medicine Progress Note    Patient is a 63y old  Male who presents with a chief complaint of lumbar stenosis s/p L1-pelvis PSF and decompression (24 Nov 2023 10:03)      SUBJECTIVE / OVERNIGHT EVENTS:  seen and examined  Chart reviewed  No overnight events  Limb weakness improving with therapy  BM+  pain controlled with meds  No complaints    ADDITIONAL REVIEW OF SYSTEMS:  denied fever/chills/CP/SOB/cough/palpitation/dizziness/abdominal pian/nausea/vomiting/diarrhoea/constipation/dysuria/leg or calf pain/headaches.all other ROS neg    MEDICATIONS  (STANDING):  acetaminophen     Tablet .. 975 milliGRAM(s) Oral every 8 hours  amLODIPine   Tablet 10 milliGRAM(s) Oral daily  atorvastatin 20 milliGRAM(s) Oral at bedtime  diclofenac sodium 1% Gel 2 Gram(s) Topical two times a day  enoxaparin Injectable 40 milliGRAM(s) SubCutaneous every 24 hours  gabapentin 900 milliGRAM(s) Oral three times a day  multivitamin 1 Tablet(s) Oral daily  nystatin Powder 1 Application(s) Topical two times a day  polyethylene glycol 3350 17 Gram(s) Oral two times a day  senna 2 Tablet(s) Oral at bedtime  tapentadol 100 milliGRAM(s) Oral <User Schedule>  tiZANidine 6 milliGRAM(s) Oral at bedtime  tiZANidine 4 milliGRAM(s) Oral <User Schedule>    MEDICATIONS  (PRN):  albuterol    90 MICROgram(s) HFA Inhaler 2 Puff(s) Inhalation every 6 hours PRN Shortness of Breath and/or Wheezing  ALPRAZolam 0.5 milliGRAM(s) Oral at bedtime PRN anxiety  bisacodyl Suppository 10 milliGRAM(s) Rectal daily PRN Constipation  ondansetron   Disintegrating Tablet 4 milliGRAM(s) Oral every 8 hours PRN Nausea and/or Vomiting  oxyCODONE    IR 15 milliGRAM(s) Oral every 3 hours PRN Severe Pain (7 - 10)    CAPILLARY BLOOD GLUCOSE      POCT Blood Glucose.: 166 mg/dL (24 Nov 2023 07:53)    I&O's Summary      PHYSICAL EXAM:  Vital Signs Last 24 Hrs  T(C): 36.7 (24 Nov 2023 07:47), Max: 37.2 (23 Nov 2023 19:46)  T(F): 98.1 (24 Nov 2023 07:47), Max: 99 (23 Nov 2023 19:46)  HR: 78 (24 Nov 2023 07:47) (78 - 97)  BP: 122/69 (24 Nov 2023 07:47) (104/67 - 122/69)  BP(mean): --  RR: 16 (24 Nov 2023 07:47) (16 - 16)  SpO2: 96% (24 Nov 2023 07:47) (95% - 98%)    Parameters below as of 24 Nov 2023 07:47  Patient On (Oxygen Delivery Method): room air    GENERAL: Not in distress. Alert    HEENT: clear conjuctiva, MMM. no pallor or icterus  CARDIOVASCULAR: RRR S1, S2. No murmur/rubs/gallop  LUNGS: BLAE+, no rales, no wheezing, no rhonchi.    ABDOMEN: ND. Soft,  NT, no guarding / rebound / rigidity. BS normoactive  BACK: No spine tenderness. muscle spasm left flank with TP  EXTREMITIES: no edema. no leg or calf TP.  SKIN: warm and dry  PSYCHIATRIC: Calm.  No agitation.  CNS: AAO. moves limbs, follows commands    LABS:                        8.8    5.80  )-----------( 352      ( 24 Nov 2023 08:25 )             27.3     11-24    135  |  97  |  22  ----------------------------<  115<H>  4.6   |  28  |  0.91    Ca    10.0      24 Nov 2023 08:25    TPro  7.5  /  Alb  3.3  /  TBili  0.3  /  DBili  x   /  AST  19  /  ALT  26  /  AlkPhos  103  11-24          Urinalysis Basic - ( 24 Nov 2023 08:25 )    Color: x / Appearance: x / SG: x / pH: x  Gluc: 115 mg/dL / Ketone: x  / Bili: x / Urobili: x   Blood: x / Protein: x / Nitrite: x   Leuk Esterase: x / RBC: x / WBC x   Sq Epi: x / Non Sq Epi: x / Bacteria: x        COVID-19 PCR: NotDetec (10 Nov 2023 21:08)  SARS-CoV-2: NotDetec (06 Nov 2023 14:54)      RADIOLOGY & ADDITIONAL TESTS:  Imaging from Last 24 Hours:    Electrocardiogram/QTc Interval:    COORDINATION OF CARE:  Care Discussed with Consultants/Other Providers:

## 2023-11-24 NOTE — PROGRESS NOTE ADULT - ASSESSMENT
KEVEN REY is a 63y with  HTN, HLD, CAD (non obstructive), asthma, PE (xarelto stopped 2 months ago), cervical radiculopathy/myelopathy s/p c-spine fusion surgery Oct 2022 (complicated by left arm nerve damage with LUE contractions), chronic severe back pain that radiates to both legs, who presented to [facility] for scheduled L1-pelvis PSF and decompression on 10/31 with Dr. Lopez.  Hospital Course complicated by fever 2/2 ? PNA treated with zosyn (augmentin while in rehab until 11/13), hyponatremia, ABLA and chronic low back pain. Patient now admitted for a multidisciplinary rehab program. 11-10-23 @ 15:11    * Left flank pain responding to treatment, Left leg extension strength improving  * Spasticity in LUE- L shoulder and L wrist stretching and ROM to be performed in therapy  * Await L AFO for L foot weakness with eversion   * labs discussed, unremarkable    Lumbar stenosis with radiculopathy  s/p L1-pelvis PSF and decompression on 10/31  - Wear LSO brace. and LEFT AFO when OOB  - WBAT  - Comprehensive Multidisciplinary Rehab Program: Start comprehensive rehab program of PT/OT,  3 hours a day, 5 days a week. P&O as needed   - precautions: spine, cardiac, fall, aspiration  - f/u Dr. Lopez outpatient  - Left foot drop with eversion, await L AFO with orthotist   - L shoulder and wrist stretching and ROM in therapy       JUSTIFICATION FOR Left Foot AFO  Dx: Lumbar radiculopathy and left foot drop    Patient has hx of Cervical spine disc disease s/p surgery, Thoraco/lumbar radiculopathy s/p laminectomy  Motor function test showed left leg -Hip flexion 1/5, Knee extension 1/5, Dorsiflexion  0/5, Planter flexion 4/5  Left upper extremity 3/5, distally at hands  2+/5  Right side 5/5  Left foot weakness, foot eversion during trail at ambulation with his current Left AFO. Left knee buckling noted during therapy, hence ambulating minimally 50ft with moderate assistance  Despite use of his current Carbon Fiber left foot brace, his balance and stability remains impaired. His progress in rehabilitation is adversely being affected by this  He requires a SOLID LEFT FOOT AFO to reduce risk of falls, improve ambulation and reduce pain  He will require this brace for at least 8hr a day and more that 6 months       Pain  Chronic low back pain  - home regimen: Nucynta 100mg qis, Xanax 0.5mg, gabapentin 400mg TID, tizanidine 2mg q6. Patient is requesting resuming his home medication of nucynta, states that family will bring in home does.   - acute pain management followed at [facility]. Recs appreciated   - Gabapentin 800mg TID, increased to 900mg tid 11/18  - Tizanidine 4 mg BID and Tizanidine 6mg at HS   - Lidocaine patches DCd on 11/22 (no effect/no relief)  -Oxycodone 15mg prn for severe pain  -11/15--Recontinued Tapentadol (nucinta) 100mg bid (home med)   - Diclofenac gel BID for 7 days to L flank    Hyponatremia, due to SIADH post op--resolved, Na normalized     HTN  - Norvasc 10mg    HLD  - lipitor 20mg    ABLA 2/2 surgery  - Has yearly FOBT which he states was negative, no recent colonoscopy.    Mood / Cognition  - Neuropsychology consult recs appreciated--patient finds this beneficial   - Rec therapy  - 0.5mg Xanax prn Bedtime    Sleep  - Melatonin PRN     GI / Bowel  - Senna qHS  - Miralax Daily  --lactulose prn      / Bladder  - Continue bladder scans Q8 hours with straight cath for >400cc.  - Condom cath (unable to use urinal by himself)  - UA for urinary frequency     Skin / Pressure injury  - Skin --t. Aquacel, CDI gauze over drain site.   - Monitor Incisions    Diet:  - Diet Consistency: Regular  -Food and Nutrition f/u regarding request for glucerna on d/c     DVT prophylaxis:   - heparin 5000mg q8  ---------------------------------------  Code Status/Emergency Contact:  Mother Pamela Rey [phone]  ---------------------------------------  * labs 11/24--Unremarkable --stable anemia, normal renal and liver function     Liaison with other providers  11/21--Orthotics provider, we called on phone, discussed patient's deficits and sent consult for left foot AFO   ---------------------------------------  IDT conference on 11/21  Social Work: Supportive mother and brother.  SLP:--   OT: Setup for eating/grooming. Min-mod A for LBD. Min A for toileting and UBD. Mod A for shower transfer. CG for toilet transfer.  PT: CG/CS for transfers. Amb with RW, WCF on long distance, with CG/CS. 8 steps with 1 HR with min A.  RT: --n/a  DME: Has all DME (RW and cane).  Barriers: L flank pain.  Functional level on DC: Supervision to mod I.   TDD: 11/29 to home  ---------------------------------------  Outpatient Follow-up:    Ami Lopez  Orthopaedic Surgery  [address], [address]  [place] [address]  Phone: [phone]  Fax: [phone]  Established Patient  Follow Up Time:    Pain management  Dr Vaughn Bach -Ph    (Emergency ph  -)-(  (Pain controlled with Tapentadol 100mg qid with good pain control up to few months ago, when insurance declined approval)  11/15--Nucinta approved by insurance and recommended

## 2023-11-24 NOTE — PROGRESS NOTE ADULT - SUBJECTIVE AND OBJECTIVE BOX
Subjective  Patient seen and examined at bedside this AM with Dr. Peters  Admits to sleeping well. improving with therapy  Urinalysis for previous frequency is unremarkable  Await AFO delivery. Left foot  Left side spasm responded to increased dose of tizanidine     ROS--No chest or abd pain, no headache, LBM 11/23  Left flank pain controlled, no bleeding muscle spasms, minimal     Therapy  Making gradual progress with therapy  Left leg weakness slightly improving, with improved left knee extension  Will continue working on stairs negotiating    Vital Signs Last 24 Hrs  T(C): 36.7 (24 Nov 2023 07:47), Max: 37.2 (23 Nov 2023 19:46)  T(F): 98.1 (24 Nov 2023 07:47), Max: 99 (23 Nov 2023 19:46)  HR: 78 (24 Nov 2023 07:47) (78 - 97)  BP: 122/69 (24 Nov 2023 07:47) (104/67 - 122/69)  RR: 16 (24 Nov 2023 07:47) (16 - 16)  SpO2: 96% (24 Nov 2023 07:47) (95% - 98%)  O2 Parameters below as of 24 Nov 2023 07:47  Patient On (Oxygen Delivery Method): room air    EXAM  Gen - Comfortable  HEENT - NAD  Neck - Supple,   Pulm - Clear  Cardiovascular - RRR, S1S2, No m/r/g  Abdomen - Soft, non  tender, +BS  Extremities - No C/C/E, No calf tenderness. Left shoulder AROM improving, but still significantly stiff    Neuro-     Cognitive - AAOx3     Communication - Fluent, No dysarthria     Attention: Intact      Cranial Nerves - CN 2-12 grossly intact     Motor -                    LEFT    UE - ShAB 5/5, EF 4-/5, EE 4-/5, WE 5/5,  4/5, unable to flex or abduct shoulder past 90 deg. left elbow flexion contracture                     RIGHT UE - ShAB 5/5, EF 5/5, EE 5/5, WE 5/5,  4/5                    LEFT    LE - HF 1/5, KE 1/5, DF 0/5, PF 4/5                    RIGHT LE - HF 4/5 (due to back pain), KE 5/5, DF 5/5, PF 5/5        Sensory - diminished sensation left lower extremity stocking distribution compared to right.      Tone - normal. Left shoulder spasticity.  Spasticity left distal arm/hand, MAS 3/4     Temp- still decreased temperature sensation LUE  Psychiatric - Mood stable, Affect WNL  Skin: Aquacel, gauze over single drain site.   Wounds: Thoracolumbar spine, open to air     RECENT LABS/IMAGING                        8.8    5.80  )-----------( 352      ( 24 Nov 2023 08:25 )             27.3     11-24    135  |  97  |  22  ----------------------------<  115<H>  4.6   |  28  |  0.91    Ca    10.0      24 Nov 2023 08:25    TPro  7.5  /  Alb  3.3  /  TBili  0.3  /  DBili  x   /  AST  19  /  ALT  26  /  AlkPhos  103  11-24      Urinalysis Basic - ( 24 Nov 2023 08:25 )    Color: x / Appearance: x / SG: x / pH: x  Gluc: 115 mg/dL / Ketone: x  / Bili: x / Urobili: x   Blood: x / Protein: x / Nitrite: x   Leuk Esterase: x / RBC: x / WBC x   Sq Epi: x / Non Sq Epi: x / Bacteria: x    MEDICATIONS  (STANDING):  acetaminophen     Tablet .. 975 milliGRAM(s) Oral every 8 hours  amLODIPine   Tablet 10 milliGRAM(s) Oral daily  atorvastatin 20 milliGRAM(s) Oral at bedtime  diclofenac sodium 1% Gel 2 Gram(s) Topical two times a day  enoxaparin Injectable 40 milliGRAM(s) SubCutaneous every 24 hours  gabapentin 900 milliGRAM(s) Oral three times a day  multivitamin 1 Tablet(s) Oral daily  nystatin Powder 1 Application(s) Topical two times a day  polyethylene glycol 3350 17 Gram(s) Oral two times a day  senna 2 Tablet(s) Oral at bedtime  tapentadol 100 milliGRAM(s) Oral <User Schedule>  tiZANidine 6 milliGRAM(s) Oral at bedtime  tiZANidine 4 milliGRAM(s) Oral <User Schedule>    MEDICATIONS  (PRN):  albuterol    90 MICROgram(s) HFA Inhaler 2 Puff(s) Inhalation every 6 hours PRN Shortness of Breath and/or Wheezing  ALPRAZolam 0.5 milliGRAM(s) Oral at bedtime PRN anxiety  bisacodyl Suppository 10 milliGRAM(s) Rectal daily PRN Constipation  ondansetron   Disintegrating Tablet 4 milliGRAM(s) Oral every 8 hours PRN Nausea and/or Vomiting  oxyCODONE    IR 15 milliGRAM(s) Oral every 3 hours PRN Severe Pain (7 - 10)

## 2023-11-24 NOTE — PROGRESS NOTE ADULT - ASSESSMENT
63 year old male with  HTN, HLD, CAD (non obstructive), asthma, PE (xarelto stopped 2 months ago), cervical radiculopathy/myelopathy s/p c-spine fusion surgery Oct 2022 (complicated by left arm nerve damage with LUE contractions), chronic severe back pain that radiates to both legs, who presented to [facility] for scheduled L1-pelvis PSF and decompression on 10/31 with Dr. Lopez.  Hospital Course complicated by fever 2/2 ? PNA treated with zosyn (augmentin while in rehab until 11/13), hyponatremia, ABLA and chronic low back pain. Patient now admitted for a multidisciplinary rehab program. 11-10-23    #L1-L5 PSF and decompression due to lumbar stenosis  #chronic back pain, muscle spasm  #s/p cervical fusion 2022 c/b left arm nerve damage with LUE contraction  - left lower back pain 2/2 muscle spasm  - continue comprehensive rehab program  - pain management per primary team. patient reports he was able to obtain nucynta from outpatient physician  - ISTOP reviewed on 11/12  - advised to follow therapist and not to overdo    #Urinary frequency  - Urinary frequency for one night  - UA negative   - if symptoms persists, start flomax 0.4mg    #hyponatremia (resolved)  - monitor BMP    #essential HTN  - c/w norvasc 10mg po daily  - BP controlled    #HLD  - c/w lipitor 20mg po daily     #normocytic anemia  - H/H stable  - iron panel and ferritin consistent with MARIA DOLORES -- start ferrous sulfate once patient's pain management is optimized  - Outpatient work up     #constipation  - bowel regimen per primary team  - Monitor BM's    #aspiration pneumonia  - completed augmentin on 11/14  - albuterol inh PRN     # Severe protein-calorie malnutrition.  - nutrition on board    # DVT PPx  - Lovenox SC     d/w rehab team at [facility]

## 2023-11-25 PROCEDURE — 99232 SBSQ HOSP IP/OBS MODERATE 35: CPT

## 2023-11-25 RX ADMIN — TAPENTADOL HYDROCHLORIDE 100 MILLIGRAM(S): 50 TABLET, FILM COATED ORAL at 23:33

## 2023-11-25 RX ADMIN — Medication 975 MILLIGRAM(S): at 06:04

## 2023-11-25 RX ADMIN — ATORVASTATIN CALCIUM 20 MILLIGRAM(S): 80 TABLET, FILM COATED ORAL at 22:35

## 2023-11-25 RX ADMIN — GABAPENTIN 900 MILLIGRAM(S): 400 CAPSULE ORAL at 22:34

## 2023-11-25 RX ADMIN — AMLODIPINE BESYLATE 10 MILLIGRAM(S): 2.5 TABLET ORAL at 06:05

## 2023-11-25 RX ADMIN — TIZANIDINE 4 MILLIGRAM(S): 4 TABLET ORAL at 15:26

## 2023-11-25 RX ADMIN — TAPENTADOL HYDROCHLORIDE 100 MILLIGRAM(S): 50 TABLET, FILM COATED ORAL at 20:14

## 2023-11-25 RX ADMIN — TIZANIDINE 6 MILLIGRAM(S): 4 TABLET ORAL at 22:35

## 2023-11-25 RX ADMIN — OXYCODONE HYDROCHLORIDE 15 MILLIGRAM(S): 5 TABLET ORAL at 03:42

## 2023-11-25 RX ADMIN — TAPENTADOL HYDROCHLORIDE 100 MILLIGRAM(S): 50 TABLET, FILM COATED ORAL at 22:23

## 2023-11-25 RX ADMIN — TAPENTADOL HYDROCHLORIDE 100 MILLIGRAM(S): 50 TABLET, FILM COATED ORAL at 08:16

## 2023-11-25 RX ADMIN — Medication 975 MILLIGRAM(S): at 00:05

## 2023-11-25 RX ADMIN — Medication 975 MILLIGRAM(S): at 15:26

## 2023-11-25 RX ADMIN — Medication 975 MILLIGRAM(S): at 06:41

## 2023-11-25 RX ADMIN — TAPENTADOL HYDROCHLORIDE 100 MILLIGRAM(S): 50 TABLET, FILM COATED ORAL at 12:33

## 2023-11-25 RX ADMIN — Medication 975 MILLIGRAM(S): at 16:00

## 2023-11-25 RX ADMIN — TIZANIDINE 4 MILLIGRAM(S): 4 TABLET ORAL at 08:16

## 2023-11-25 RX ADMIN — NYSTATIN CREAM 1 APPLICATION(S): 100000 CREAM TOPICAL at 18:13

## 2023-11-25 RX ADMIN — OXYCODONE HYDROCHLORIDE 15 MILLIGRAM(S): 5 TABLET ORAL at 18:09

## 2023-11-25 RX ADMIN — ENOXAPARIN SODIUM 40 MILLIGRAM(S): 100 INJECTION SUBCUTANEOUS at 15:26

## 2023-11-25 RX ADMIN — GABAPENTIN 900 MILLIGRAM(S): 400 CAPSULE ORAL at 06:04

## 2023-11-25 RX ADMIN — OXYCODONE HYDROCHLORIDE 15 MILLIGRAM(S): 5 TABLET ORAL at 22:36

## 2023-11-25 RX ADMIN — GABAPENTIN 900 MILLIGRAM(S): 400 CAPSULE ORAL at 15:26

## 2023-11-25 RX ADMIN — DICLOFENAC SODIUM 2 GRAM(S): 30 GEL TOPICAL at 06:05

## 2023-11-25 RX ADMIN — DICLOFENAC SODIUM 2 GRAM(S): 30 GEL TOPICAL at 18:09

## 2023-11-25 RX ADMIN — OXYCODONE HYDROCHLORIDE 15 MILLIGRAM(S): 5 TABLET ORAL at 02:57

## 2023-11-25 RX ADMIN — Medication 1 TABLET(S): at 12:37

## 2023-11-25 RX ADMIN — TAPENTADOL HYDROCHLORIDE 100 MILLIGRAM(S): 50 TABLET, FILM COATED ORAL at 16:22

## 2023-11-25 RX ADMIN — OXYCODONE HYDROCHLORIDE 15 MILLIGRAM(S): 5 TABLET ORAL at 23:22

## 2023-11-25 RX ADMIN — NYSTATIN CREAM 1 APPLICATION(S): 100000 CREAM TOPICAL at 05:55

## 2023-11-25 NOTE — PROGRESS NOTE ADULT - ASSESSMENT
63 year old male with  HTN, HLD, CAD (non obstructive), asthma, PE (xarelto stopped 2 months ago), cervical radiculopathy/myelopathy s/p c-spine fusion surgery Oct 2022 (complicated by left arm nerve damage with LUE contractions), chronic severe back pain that radiates to both legs, who presented to [facility] for scheduled L1-pelvis PSF and decompression on 10/31 with Dr. Lopez.  Hospital Course complicated by fever 2/2 ? PNA treated with zosyn (augmentin while in rehab until 11/13), hyponatremia, ABLA and chronic low back pain. Patient now admitted for a multidisciplinary rehab program. 11-10-23      # transient hot flushes and claustrophobia  - improves    #L1-L5 PSF and decompression due to lumbar stenosis  #chronic back pain, muscle spasm  #s/p cervical fusion 2022 c/b left arm nerve damage with LUE contraction  - left lower back pain 2/2 muscle spasm  - continue comprehensive rehab program  - pain management per primary team. patient reports he was able to obtain nucynta from outpatient physician  - ISTOP reviewed on 11/12  - advised to follow therapist and not to overdo    #Urinary frequency  - Urinary frequency for one night  - UA negative   - if symptoms persists, start flomax 0.4mg    #hyponatremia (resolved)  - monitor BMP    #essential HTN  - c/w norvasc 10mg po daily  - BP controlled    #HLD  - c/w lipitor 20mg po daily     #normocytic anemia  - H/H stable  - iron panel and ferritin consistent with MARIA DOLORES -- start ferrous sulfate once patient's pain management is optimized  - Outpatient work up     #constipation  - bowel regimen per primary team  - Monitor BM's    #aspiration pneumonia  - completed augmentin on 11/14  - albuterol inh PRN     # Severe protein-calorie malnutrition.  - nutrition on board    # DVT PPx  - Lovenox SC     d/w rehab team

## 2023-11-25 NOTE — PROGRESS NOTE ADULT - SUBJECTIVE AND OBJECTIVE BOX
Cc: Impaired mobility     HPI: Patient with no new medical issues today.  Resting comfortably.  Reports having a bowel movement yesterday.   Has been tolerating rehabilitation program.    acetaminophen     Tablet .. 975 milliGRAM(s) Oral every 8 hours  albuterol    90 MICROgram(s) HFA Inhaler 2 Puff(s) Inhalation every 6 hours PRN  ALPRAZolam 0.5 milliGRAM(s) Oral at bedtime PRN  amLODIPine   Tablet 10 milliGRAM(s) Oral daily  atorvastatin 20 milliGRAM(s) Oral at bedtime  bisacodyl Suppository 10 milliGRAM(s) Rectal daily PRN  diclofenac sodium 1% Gel 2 Gram(s) Topical two times a day  enoxaparin Injectable 40 milliGRAM(s) SubCutaneous every 24 hours  gabapentin 900 milliGRAM(s) Oral three times a day  multivitamin 1 Tablet(s) Oral daily  nystatin Powder 1 Application(s) Topical two times a day  ondansetron   Disintegrating Tablet 4 milliGRAM(s) Oral every 8 hours PRN  oxyCODONE    IR 15 milliGRAM(s) Oral every 3 hours PRN  polyethylene glycol 3350 17 Gram(s) Oral two times a day  senna 2 Tablet(s) Oral at bedtime  tapentadol 100 milliGRAM(s) Oral <User Schedule>  tiZANidine 6 milliGRAM(s) Oral at bedtime  tiZANidine 4 milliGRAM(s) Oral <User Schedule>      T(C): 37 (11-25-23 @ 08:17), Max: 37.4 (11-24-23 @ 19:20)  HR: 94 (11-25-23 @ 08:17) (83 - 94)  BP: 105/73 (11-25-23 @ 08:17) (105/73 - 116/66)  RR: 16 (11-25-23 @ 08:17) (16 - 16)  SpO2: 95% (11-25-23 @ 08:17) (95% - 98%)    In NAD  HEENT- EOMI  Heart- No cyanosis  Lungs- No respiratory distress   Abd- + BS, NT  Ext- No calf pain  Neuro- Exam unchanged                          8.8    5.80  )-----------( 352      ( 24 Nov 2023 08:25 )             27.3     11-24    135  |  97  |  22  ----------------------------<  115<H>  4.6   |  28  |  0.91    Ca    10.0      24 Nov 2023 08:25    TPro  7.5  /  Alb  3.3  /  TBili  0.3  /  DBili  x   /  AST  19  /  ALT  26  /  AlkPhos  103  11-24      Imp: Patient with diagnosis of Lumbar stenosis with radiculopathy s/p L1-pelvis PSF and decompression admitted for comprehensive acute rehabilitation.    Plan:  -Impaired mobility - Continue PT/OT  - DVT prophylaxis - Lovenox   -HTN - amlodipine  -Neuropathic pain - gabapentin    -Muscle spasms - Tizanidine   - Skin- Turn q2h, check skin daily  - Continue current medications; patient medically stable.   - Patient is stable to continue current rehabilitation program.

## 2023-11-25 NOTE — PROGRESS NOTE ADULT - SUBJECTIVE AND OBJECTIVE BOX
Medicine Progress Note    Patient is a 63y old  Male who presents with a chief complaint of lumbar stenosis s/p L1-pelvis PSF and decompression (25 Nov 2023 09:00)      SUBJECTIVE / OVERNIGHT EVENTS:  hot flushes and claustrophobia yesterday lasted 10 min. felt better after getting out of room. dd not recur  pain controlled with meds  no new complaints    ADDITIONAL REVIEW OF SYSTEMS:  denied fever/chills/CP/SOB/cough/palpitation/dizziness/abdominal pian/nausea/vomiting/diarrhoea/constipation/dysuria/leg or calf pain/headaches.all other ROS neg      MEDICATIONS  (STANDING):  acetaminophen     Tablet .. 975 milliGRAM(s) Oral every 8 hours  amLODIPine   Tablet 10 milliGRAM(s) Oral daily  atorvastatin 20 milliGRAM(s) Oral at bedtime  diclofenac sodium 1% Gel 2 Gram(s) Topical two times a day  enoxaparin Injectable 40 milliGRAM(s) SubCutaneous every 24 hours  gabapentin 900 milliGRAM(s) Oral three times a day  multivitamin 1 Tablet(s) Oral daily  nystatin Powder 1 Application(s) Topical two times a day  polyethylene glycol 3350 17 Gram(s) Oral two times a day  senna 2 Tablet(s) Oral at bedtime  tapentadol 100 milliGRAM(s) Oral <User Schedule>  tiZANidine 6 milliGRAM(s) Oral at bedtime  tiZANidine 4 milliGRAM(s) Oral <User Schedule>    MEDICATIONS  (PRN):  albuterol    90 MICROgram(s) HFA Inhaler 2 Puff(s) Inhalation every 6 hours PRN Shortness of Breath and/or Wheezing  ALPRAZolam 0.5 milliGRAM(s) Oral at bedtime PRN anxiety  bisacodyl Suppository 10 milliGRAM(s) Rectal daily PRN Constipation  ondansetron   Disintegrating Tablet 4 milliGRAM(s) Oral every 8 hours PRN Nausea and/or Vomiting  oxyCODONE    IR 15 milliGRAM(s) Oral every 3 hours PRN Severe Pain (7 - 10)    CAPILLARY BLOOD GLUCOSE        I&O's Summary      PHYSICAL EXAM:  Vital Signs Last 24 Hrs  T(C): 37 (25 Nov 2023 08:17), Max: 37.4 (24 Nov 2023 19:20)  T(F): 98.6 (25 Nov 2023 08:17), Max: 99.3 (24 Nov 2023 19:20)  HR: 94 (25 Nov 2023 08:17) (83 - 94)  BP: 105/73 (25 Nov 2023 08:17) (105/73 - 116/66)  BP(mean): --  RR: 16 (25 Nov 2023 08:17) (16 - 16)  SpO2: 95% (25 Nov 2023 08:17) (95% - 98%)    Parameters below as of 25 Nov 2023 08:17  Patient On (Oxygen Delivery Method): room air    GENERAL: Not in distress. Alert    HEENT: clear conjuctiva, MMM. no pallor or icterus  CARDIOVASCULAR: RRR S1, S2. No murmur/rubs/gallop  LUNGS: BLAE+, no rales, no wheezing, no rhonchi.    ABDOMEN: ND. Soft,  NT, no guarding / rebound / rigidity. BS normoactive  BACK: No spine tenderness.  EXTREMITIES: no edema. no leg or calf TP. LUE contractures and reduced ROM  SKIN: warm and dry  PSYCHIATRIC: Calm.  No agitation.  CNS: AAO. moves limbs, follows commands. LUE and LLE weakness    LABS:                        8.8    5.80  )-----------( 352      ( 24 Nov 2023 08:25 )             27.3     11-24    135  |  97  |  22  ----------------------------<  115<H>  4.6   |  28  |  0.91    Ca    10.0      24 Nov 2023 08:25    TPro  7.5  /  Alb  3.3  /  TBili  0.3  /  DBili  x   /  AST  19  /  ALT  26  /  AlkPhos  103  11-24          Urinalysis Basic - ( 24 Nov 2023 08:25 )    Color: x / Appearance: x / SG: x / pH: x  Gluc: 115 mg/dL / Ketone: x  / Bili: x / Urobili: x   Blood: x / Protein: x / Nitrite: x   Leuk Esterase: x / RBC: x / WBC x   Sq Epi: x / Non Sq Epi: x / Bacteria: x        COVID-19 PCR: NotDetec (10 Nov 2023 21:08)  SARS-CoV-2: NotDetec (06 Nov 2023 14:54)      RADIOLOGY & ADDITIONAL TESTS:  Imaging from Last 24 Hours:    Electrocardiogram/QTc Interval:    COORDINATION OF CARE:  Care Discussed with Consultants/Other Providers:

## 2023-11-26 PROCEDURE — 99232 SBSQ HOSP IP/OBS MODERATE 35: CPT

## 2023-11-26 RX ORDER — DIAZEPAM 5 MG
5 TABLET ORAL AT BEDTIME
Refills: 0 | Status: DISCONTINUED | OUTPATIENT
Start: 2023-11-26 | End: 2023-11-29

## 2023-11-26 RX ADMIN — GABAPENTIN 900 MILLIGRAM(S): 400 CAPSULE ORAL at 22:31

## 2023-11-26 RX ADMIN — TIZANIDINE 6 MILLIGRAM(S): 4 TABLET ORAL at 22:33

## 2023-11-26 RX ADMIN — Medication 975 MILLIGRAM(S): at 22:33

## 2023-11-26 RX ADMIN — DICLOFENAC SODIUM 2 GRAM(S): 30 GEL TOPICAL at 06:08

## 2023-11-26 RX ADMIN — TIZANIDINE 4 MILLIGRAM(S): 4 TABLET ORAL at 14:06

## 2023-11-26 RX ADMIN — Medication 5 MILLIGRAM(S): at 22:31

## 2023-11-26 RX ADMIN — TAPENTADOL HYDROCHLORIDE 100 MILLIGRAM(S): 50 TABLET, FILM COATED ORAL at 21:20

## 2023-11-26 RX ADMIN — TAPENTADOL HYDROCHLORIDE 100 MILLIGRAM(S): 50 TABLET, FILM COATED ORAL at 15:59

## 2023-11-26 RX ADMIN — Medication 975 MILLIGRAM(S): at 06:07

## 2023-11-26 RX ADMIN — Medication 975 MILLIGRAM(S): at 13:29

## 2023-11-26 RX ADMIN — GABAPENTIN 900 MILLIGRAM(S): 400 CAPSULE ORAL at 13:30

## 2023-11-26 RX ADMIN — ENOXAPARIN SODIUM 40 MILLIGRAM(S): 100 INJECTION SUBCUTANEOUS at 13:29

## 2023-11-26 RX ADMIN — TAPENTADOL HYDROCHLORIDE 100 MILLIGRAM(S): 50 TABLET, FILM COATED ORAL at 11:54

## 2023-11-26 RX ADMIN — TIZANIDINE 4 MILLIGRAM(S): 4 TABLET ORAL at 08:41

## 2023-11-26 RX ADMIN — Medication 1 TABLET(S): at 11:54

## 2023-11-26 RX ADMIN — TAPENTADOL HYDROCHLORIDE 100 MILLIGRAM(S): 50 TABLET, FILM COATED ORAL at 20:18

## 2023-11-26 RX ADMIN — NYSTATIN CREAM 1 APPLICATION(S): 100000 CREAM TOPICAL at 06:06

## 2023-11-26 RX ADMIN — TAPENTADOL HYDROCHLORIDE 100 MILLIGRAM(S): 50 TABLET, FILM COATED ORAL at 08:41

## 2023-11-26 RX ADMIN — OXYCODONE HYDROCHLORIDE 15 MILLIGRAM(S): 5 TABLET ORAL at 14:06

## 2023-11-26 RX ADMIN — GABAPENTIN 900 MILLIGRAM(S): 400 CAPSULE ORAL at 06:07

## 2023-11-26 RX ADMIN — TAPENTADOL HYDROCHLORIDE 100 MILLIGRAM(S): 50 TABLET, FILM COATED ORAL at 12:54

## 2023-11-26 RX ADMIN — Medication 975 MILLIGRAM(S): at 14:29

## 2023-11-26 RX ADMIN — NYSTATIN CREAM 1 APPLICATION(S): 100000 CREAM TOPICAL at 17:12

## 2023-11-26 RX ADMIN — Medication 975 MILLIGRAM(S): at 07:00

## 2023-11-26 RX ADMIN — AMLODIPINE BESYLATE 10 MILLIGRAM(S): 2.5 TABLET ORAL at 06:07

## 2023-11-26 RX ADMIN — ATORVASTATIN CALCIUM 20 MILLIGRAM(S): 80 TABLET, FILM COATED ORAL at 22:31

## 2023-11-26 RX ADMIN — DICLOFENAC SODIUM 2 GRAM(S): 30 GEL TOPICAL at 17:12

## 2023-11-26 NOTE — PROGRESS NOTE ADULT - SUBJECTIVE AND OBJECTIVE BOX
Medicine Progress Note    Patient is a 63y old  Male who presents with a chief complaint of lumbar stenosis s/p L1-pelvis PSF and decompression (26 Nov 2023 09:01)      SUBJECTIVE / OVERNIGHT EVENTS:  reported unable to sleep at night due to muscle spasm even though he takes muscle relaxant and pain med at night. says xanax 2 tab helped in the past.   no other complaints    ADDITIONAL REVIEW OF SYSTEMS  denied fever/chills/CP/SOB/cough/palpitation/dizziness/abdominal pian/nausea/vomiting/diarrhoea/constipation/dysuria/leg or calf pain/headaches.all other ROS neg    MEDICATIONS  (STANDING):  acetaminophen     Tablet .. 975 milliGRAM(s) Oral every 8 hours  amLODIPine   Tablet 10 milliGRAM(s) Oral daily  atorvastatin 20 milliGRAM(s) Oral at bedtime  diclofenac sodium 1% Gel 2 Gram(s) Topical two times a day  enoxaparin Injectable 40 milliGRAM(s) SubCutaneous every 24 hours  gabapentin 900 milliGRAM(s) Oral three times a day  multivitamin 1 Tablet(s) Oral daily  nystatin Powder 1 Application(s) Topical two times a day  polyethylene glycol 3350 17 Gram(s) Oral two times a day  senna 2 Tablet(s) Oral at bedtime  tapentadol 100 milliGRAM(s) Oral <User Schedule>  tiZANidine 6 milliGRAM(s) Oral at bedtime  tiZANidine 4 milliGRAM(s) Oral <User Schedule>    MEDICATIONS  (PRN):  albuterol    90 MICROgram(s) HFA Inhaler 2 Puff(s) Inhalation every 6 hours PRN Shortness of Breath and/or Wheezing  ALPRAZolam 0.5 milliGRAM(s) Oral at bedtime PRN anxiety  bisacodyl Suppository 10 milliGRAM(s) Rectal daily PRN Constipation  ondansetron   Disintegrating Tablet 4 milliGRAM(s) Oral every 8 hours PRN Nausea and/or Vomiting  oxyCODONE    IR 15 milliGRAM(s) Oral every 3 hours PRN Severe Pain (7 - 10)    CAPILLARY BLOOD GLUCOSE        I&O's Summary      PHYSICAL EXAM:  Vital Signs Last 24 Hrs  T(C): 36.7 (26 Nov 2023 09:03), Max: 36.7 (25 Nov 2023 20:12)  T(F): 98 (26 Nov 2023 09:03), Max: 98.1 (25 Nov 2023 20:12)  HR: 98 (26 Nov 2023 09:03) (83 - 98)  BP: 121/66 (26 Nov 2023 09:03) (105/73 - 124/77)  BP(mean): --  RR: 16 (26 Nov 2023 09:03) (16 - 16)  SpO2: 95% (26 Nov 2023 09:03) (95% - 96%)    Parameters below as of 26 Nov 2023 09:03  Patient On (Oxygen Delivery Method): room air    GENERAL: Not in distress. Alert    HEENT: clear conjuctiva, MMM. no pallor or icterus  CARDIOVASCULAR: RRR S1, S2. No murmur/rubs/gallop  LUNGS: BLAE+, no rales, no wheezing, no rhonchi.    ABDOMEN: ND. Soft,  NT, no guarding / rebound / rigidity. BS normoactive  BACK: No spine tenderness. b/l paravertebral muscle spasm over lower , Lumbar region.   EXTREMITIES: no edema. no leg or calf TP.  SKIN: warm and dry  PSYCHIATRIC: Calm.  No agitation.  CNS: AAO*3. moves limbs, follows commands. LUE and LLE weakness    LABS:                    COVID-19 PCR: NotDetec (10 Nov 2023 21:08)  SARS-CoV-2: NotDetec (06 Nov 2023 14:54)      RADIOLOGY & ADDITIONAL TESTS:  Imaging from Last 24 Hours:    Electrocardiogram/QTc Interval:    COORDINATION OF CARE:  Care Discussed with Consultants/Other Providers:

## 2023-11-26 NOTE — PROGRESS NOTE ADULT - ASSESSMENT
63 year old male with  HTN, HLD, CAD (non obstructive), asthma, PE (xarelto stopped 2 months ago), cervical radiculopathy/myelopathy s/p c-spine fusion surgery Oct 2022 (complicated by left arm nerve damage with LUE contractions), chronic severe back pain that radiates to both legs, who presented to [facility] for scheduled L1-pelvis PSF and decompression on 10/31 with Dr. Lopez.  Hospital Course complicated by fever 2/2 ? PNA treated with zosyn (augmentin while in rehab until 11/13), hyponatremia, ABLA and chronic low back pain. Patient now admitted for a multidisciplinary rehab program. 11-10-23      # transient hot flushes and claustrophobia  - improves    #L1-L5 PSF and decompression due to lumbar stenosis  #chronic back pain, muscle spasm  #s/p cervical fusion 2022 c/b left arm nerve damage with LUE contraction  - left lower back pain 2/2 muscle spasm  - continue comprehensive rehab program  - pain management per primary team. patient reports he was able to obtain nucynta from outpatient physician.   - patient reports unable to sleep at night due to muscle spasm even though taking muscle relaxants and pain meds. says xanax 2 tab helped sleep in the past. consider valium 5 mg at h/s PRN for muscle spasm. would leave on rehab team.   - ISTOP reviewed on 11/12  - advised to follow therapist and not to overdo    #Urinary frequency  - Urinary frequency for one night  - UA negative   - if symptoms persists, start flomax 0.4mg    #hyponatremia (resolved)  - monitor BMP    #essential HTN  - c/w norvasc 10mg po daily  - BP controlled    #HLD  - c/w lipitor 20mg po daily     #normocytic anemia  - H/H stable  - iron panel and ferritin consistent with MARIA DOLORES -- start ferrous sulfate once patient's pain management is optimized  - Outpatient work up     #constipation  - bowel regimen per primary team  - Monitor BM's    #aspiration pneumonia  - completed augmentin on 11/14  - albuterol inh PRN     # Severe protein-calorie malnutrition.  - nutrition on board    # DVT PPx  - Lovenox SC     d/w rehab team at [facility]

## 2023-11-26 NOTE — PROGRESS NOTE ADULT - SUBJECTIVE AND OBJECTIVE BOX
Cc: Impaired mobility     HPI:  Denies any new weakness or numbness.  Pain is stable.   Has been tolerating rehabilitation program.    acetaminophen     Tablet .. 975 milliGRAM(s) Oral every 8 hours  albuterol    90 MICROgram(s) HFA Inhaler 2 Puff(s) Inhalation every 6 hours PRN  ALPRAZolam 0.5 milliGRAM(s) Oral at bedtime PRN  amLODIPine   Tablet 10 milliGRAM(s) Oral daily  atorvastatin 20 milliGRAM(s) Oral at bedtime  bisacodyl Suppository 10 milliGRAM(s) Rectal daily PRN  diclofenac sodium 1% Gel 2 Gram(s) Topical two times a day  enoxaparin Injectable 40 milliGRAM(s) SubCutaneous every 24 hours  gabapentin 900 milliGRAM(s) Oral three times a day  multivitamin 1 Tablet(s) Oral daily  nystatin Powder 1 Application(s) Topical two times a day  ondansetron   Disintegrating Tablet 4 milliGRAM(s) Oral every 8 hours PRN  oxyCODONE    IR 15 milliGRAM(s) Oral every 3 hours PRN  polyethylene glycol 3350 17 Gram(s) Oral two times a day  senna 2 Tablet(s) Oral at bedtime  tapentadol 100 milliGRAM(s) Oral <User Schedule>  tiZANidine 6 milliGRAM(s) Oral at bedtime  tiZANidine 4 milliGRAM(s) Oral <User Schedule>      T(C): 36.7 (11-25-23 @ 20:12), Max: 36.7 (11-25-23 @ 20:12)  HR: 86 (11-26-23 @ 06:04) (83 - 90)  BP: 117/77 (11-26-23 @ 06:04) (105/73 - 124/77)  RR: 16 (11-26-23 @ 06:04) (16 - 16)  SpO2: 96% (11-26-23 @ 06:04) (95% - 96%)    In NAD  HEENT- EOMI  Heart- No cyanosis   Lungs- No respiratory distress   Abd- + BS, NT  Ext- No calf pain  Neuro- Exam unchanged      Imp: Patient with diagnosis of Lumbar stenosis with radiculopathy s/p L1-pelvis PSF and decompression admitted for comprehensive acute rehabilitation.    Plan:  -Impaired mobility - Continue PT/OT  - DVT prophylaxis - Lovenox   -HTN - amlodipine  -Neuropathic pain - gabapentin    -Muscle spasms - Tizanidine   -Constipation -  Miralax   - Skin- Turn q2h, check skin daily  - Continue current medications; patient medically stable.   - Patient is stable to continue current rehabilitation program.

## 2023-11-27 LAB
ALBUMIN SERPL ELPH-MCNC: 3.1 G/DL — LOW (ref 3.3–5)
ALBUMIN SERPL ELPH-MCNC: 3.1 G/DL — LOW (ref 3.3–5)
ALP SERPL-CCNC: 105 U/L — SIGNIFICANT CHANGE UP (ref 40–120)
ALP SERPL-CCNC: 105 U/L — SIGNIFICANT CHANGE UP (ref 40–120)
ALT FLD-CCNC: 21 U/L — SIGNIFICANT CHANGE UP (ref 10–45)
ALT FLD-CCNC: 21 U/L — SIGNIFICANT CHANGE UP (ref 10–45)
ANION GAP SERPL CALC-SCNC: 6 MMOL/L — SIGNIFICANT CHANGE UP (ref 5–17)
ANION GAP SERPL CALC-SCNC: 6 MMOL/L — SIGNIFICANT CHANGE UP (ref 5–17)
AST SERPL-CCNC: 22 U/L — SIGNIFICANT CHANGE UP (ref 10–40)
AST SERPL-CCNC: 22 U/L — SIGNIFICANT CHANGE UP (ref 10–40)
BASOPHILS # BLD AUTO: 0.04 K/UL — SIGNIFICANT CHANGE UP (ref 0–0.2)
BASOPHILS # BLD AUTO: 0.04 K/UL — SIGNIFICANT CHANGE UP (ref 0–0.2)
BASOPHILS NFR BLD AUTO: 0.7 % — SIGNIFICANT CHANGE UP (ref 0–2)
BASOPHILS NFR BLD AUTO: 0.7 % — SIGNIFICANT CHANGE UP (ref 0–2)
BILIRUB SERPL-MCNC: 0.2 MG/DL — SIGNIFICANT CHANGE UP (ref 0.2–1.2)
BILIRUB SERPL-MCNC: 0.2 MG/DL — SIGNIFICANT CHANGE UP (ref 0.2–1.2)
BUN SERPL-MCNC: 23 MG/DL — SIGNIFICANT CHANGE UP (ref 7–23)
BUN SERPL-MCNC: 23 MG/DL — SIGNIFICANT CHANGE UP (ref 7–23)
CALCIUM SERPL-MCNC: 9.9 MG/DL — SIGNIFICANT CHANGE UP (ref 8.4–10.5)
CALCIUM SERPL-MCNC: 9.9 MG/DL — SIGNIFICANT CHANGE UP (ref 8.4–10.5)
CHLORIDE SERPL-SCNC: 100 MMOL/L — SIGNIFICANT CHANGE UP (ref 96–108)
CHLORIDE SERPL-SCNC: 100 MMOL/L — SIGNIFICANT CHANGE UP (ref 96–108)
CO2 SERPL-SCNC: 31 MMOL/L — SIGNIFICANT CHANGE UP (ref 22–31)
CO2 SERPL-SCNC: 31 MMOL/L — SIGNIFICANT CHANGE UP (ref 22–31)
CREAT SERPL-MCNC: 0.74 MG/DL — SIGNIFICANT CHANGE UP (ref 0.5–1.3)
CREAT SERPL-MCNC: 0.74 MG/DL — SIGNIFICANT CHANGE UP (ref 0.5–1.3)
EGFR: 102 ML/MIN/1.73M2 — SIGNIFICANT CHANGE UP
EGFR: 102 ML/MIN/1.73M2 — SIGNIFICANT CHANGE UP
EOSINOPHIL # BLD AUTO: 0.13 K/UL — SIGNIFICANT CHANGE UP (ref 0–0.5)
EOSINOPHIL # BLD AUTO: 0.13 K/UL — SIGNIFICANT CHANGE UP (ref 0–0.5)
EOSINOPHIL NFR BLD AUTO: 2.3 % — SIGNIFICANT CHANGE UP (ref 0–6)
EOSINOPHIL NFR BLD AUTO: 2.3 % — SIGNIFICANT CHANGE UP (ref 0–6)
GLUCOSE SERPL-MCNC: 111 MG/DL — HIGH (ref 70–99)
GLUCOSE SERPL-MCNC: 111 MG/DL — HIGH (ref 70–99)
HCT VFR BLD CALC: 29.3 % — LOW (ref 39–50)
HCT VFR BLD CALC: 29.3 % — LOW (ref 39–50)
HGB BLD-MCNC: 9.2 G/DL — LOW (ref 13–17)
HGB BLD-MCNC: 9.2 G/DL — LOW (ref 13–17)
IMM GRANULOCYTES NFR BLD AUTO: 0.3 % — SIGNIFICANT CHANGE UP (ref 0–0.9)
IMM GRANULOCYTES NFR BLD AUTO: 0.3 % — SIGNIFICANT CHANGE UP (ref 0–0.9)
LYMPHOCYTES # BLD AUTO: 1.65 K/UL — SIGNIFICANT CHANGE UP (ref 1–3.3)
LYMPHOCYTES # BLD AUTO: 1.65 K/UL — SIGNIFICANT CHANGE UP (ref 1–3.3)
LYMPHOCYTES # BLD AUTO: 28.6 % — SIGNIFICANT CHANGE UP (ref 13–44)
LYMPHOCYTES # BLD AUTO: 28.6 % — SIGNIFICANT CHANGE UP (ref 13–44)
MCHC RBC-ENTMCNC: 27.4 PG — SIGNIFICANT CHANGE UP (ref 27–34)
MCHC RBC-ENTMCNC: 27.4 PG — SIGNIFICANT CHANGE UP (ref 27–34)
MCHC RBC-ENTMCNC: 31.4 GM/DL — LOW (ref 32–36)
MCHC RBC-ENTMCNC: 31.4 GM/DL — LOW (ref 32–36)
MCV RBC AUTO: 87.2 FL — SIGNIFICANT CHANGE UP (ref 80–100)
MCV RBC AUTO: 87.2 FL — SIGNIFICANT CHANGE UP (ref 80–100)
MONOCYTES # BLD AUTO: 0.53 K/UL — SIGNIFICANT CHANGE UP (ref 0–0.9)
MONOCYTES # BLD AUTO: 0.53 K/UL — SIGNIFICANT CHANGE UP (ref 0–0.9)
MONOCYTES NFR BLD AUTO: 9.2 % — SIGNIFICANT CHANGE UP (ref 2–14)
MONOCYTES NFR BLD AUTO: 9.2 % — SIGNIFICANT CHANGE UP (ref 2–14)
NEUTROPHILS # BLD AUTO: 3.39 K/UL — SIGNIFICANT CHANGE UP (ref 1.8–7.4)
NEUTROPHILS # BLD AUTO: 3.39 K/UL — SIGNIFICANT CHANGE UP (ref 1.8–7.4)
NEUTROPHILS NFR BLD AUTO: 58.9 % — SIGNIFICANT CHANGE UP (ref 43–77)
NEUTROPHILS NFR BLD AUTO: 58.9 % — SIGNIFICANT CHANGE UP (ref 43–77)
NRBC # BLD: 0 /100 WBCS — SIGNIFICANT CHANGE UP (ref 0–0)
NRBC # BLD: 0 /100 WBCS — SIGNIFICANT CHANGE UP (ref 0–0)
PLATELET # BLD AUTO: 365 K/UL — SIGNIFICANT CHANGE UP (ref 150–400)
PLATELET # BLD AUTO: 365 K/UL — SIGNIFICANT CHANGE UP (ref 150–400)
POTASSIUM SERPL-MCNC: 4.3 MMOL/L — SIGNIFICANT CHANGE UP (ref 3.5–5.3)
POTASSIUM SERPL-MCNC: 4.3 MMOL/L — SIGNIFICANT CHANGE UP (ref 3.5–5.3)
POTASSIUM SERPL-SCNC: 4.3 MMOL/L — SIGNIFICANT CHANGE UP (ref 3.5–5.3)
POTASSIUM SERPL-SCNC: 4.3 MMOL/L — SIGNIFICANT CHANGE UP (ref 3.5–5.3)
PROT SERPL-MCNC: 7.2 G/DL — SIGNIFICANT CHANGE UP (ref 6–8.3)
PROT SERPL-MCNC: 7.2 G/DL — SIGNIFICANT CHANGE UP (ref 6–8.3)
RBC # BLD: 3.36 M/UL — LOW (ref 4.2–5.8)
RBC # BLD: 3.36 M/UL — LOW (ref 4.2–5.8)
RBC # FLD: 13.6 % — SIGNIFICANT CHANGE UP (ref 10.3–14.5)
RBC # FLD: 13.6 % — SIGNIFICANT CHANGE UP (ref 10.3–14.5)
SODIUM SERPL-SCNC: 137 MMOL/L — SIGNIFICANT CHANGE UP (ref 135–145)
SODIUM SERPL-SCNC: 137 MMOL/L — SIGNIFICANT CHANGE UP (ref 135–145)
WBC # BLD: 5.76 K/UL — SIGNIFICANT CHANGE UP (ref 3.8–10.5)
WBC # BLD: 5.76 K/UL — SIGNIFICANT CHANGE UP (ref 3.8–10.5)
WBC # FLD AUTO: 5.76 K/UL — SIGNIFICANT CHANGE UP (ref 3.8–10.5)
WBC # FLD AUTO: 5.76 K/UL — SIGNIFICANT CHANGE UP (ref 3.8–10.5)

## 2023-11-27 PROCEDURE — 99232 SBSQ HOSP IP/OBS MODERATE 35: CPT

## 2023-11-27 RX ADMIN — Medication 1 TABLET(S): at 11:06

## 2023-11-27 RX ADMIN — Medication 975 MILLIGRAM(S): at 21:35

## 2023-11-27 RX ADMIN — GABAPENTIN 900 MILLIGRAM(S): 400 CAPSULE ORAL at 21:37

## 2023-11-27 RX ADMIN — TIZANIDINE 4 MILLIGRAM(S): 4 TABLET ORAL at 08:34

## 2023-11-27 RX ADMIN — TIZANIDINE 6 MILLIGRAM(S): 4 TABLET ORAL at 21:36

## 2023-11-27 RX ADMIN — GABAPENTIN 900 MILLIGRAM(S): 400 CAPSULE ORAL at 05:19

## 2023-11-27 RX ADMIN — TAPENTADOL HYDROCHLORIDE 100 MILLIGRAM(S): 50 TABLET, FILM COATED ORAL at 14:00

## 2023-11-27 RX ADMIN — SENNA PLUS 2 TABLET(S): 8.6 TABLET ORAL at 21:37

## 2023-11-27 RX ADMIN — TAPENTADOL HYDROCHLORIDE 100 MILLIGRAM(S): 50 TABLET, FILM COATED ORAL at 11:50

## 2023-11-27 RX ADMIN — TAPENTADOL HYDROCHLORIDE 100 MILLIGRAM(S): 50 TABLET, FILM COATED ORAL at 08:33

## 2023-11-27 RX ADMIN — ENOXAPARIN SODIUM 40 MILLIGRAM(S): 100 INJECTION SUBCUTANEOUS at 13:25

## 2023-11-27 RX ADMIN — TAPENTADOL HYDROCHLORIDE 100 MILLIGRAM(S): 50 TABLET, FILM COATED ORAL at 22:41

## 2023-11-27 RX ADMIN — OXYCODONE HYDROCHLORIDE 15 MILLIGRAM(S): 5 TABLET ORAL at 13:18

## 2023-11-27 RX ADMIN — OXYCODONE HYDROCHLORIDE 15 MILLIGRAM(S): 5 TABLET ORAL at 14:00

## 2023-11-27 RX ADMIN — TAPENTADOL HYDROCHLORIDE 100 MILLIGRAM(S): 50 TABLET, FILM COATED ORAL at 17:00

## 2023-11-27 RX ADMIN — DICLOFENAC SODIUM 2 GRAM(S): 30 GEL TOPICAL at 17:20

## 2023-11-27 RX ADMIN — TAPENTADOL HYDROCHLORIDE 100 MILLIGRAM(S): 50 TABLET, FILM COATED ORAL at 20:05

## 2023-11-27 RX ADMIN — TIZANIDINE 4 MILLIGRAM(S): 4 TABLET ORAL at 16:29

## 2023-11-27 RX ADMIN — TAPENTADOL HYDROCHLORIDE 100 MILLIGRAM(S): 50 TABLET, FILM COATED ORAL at 16:29

## 2023-11-27 RX ADMIN — AMLODIPINE BESYLATE 10 MILLIGRAM(S): 2.5 TABLET ORAL at 05:19

## 2023-11-27 RX ADMIN — ATORVASTATIN CALCIUM 20 MILLIGRAM(S): 80 TABLET, FILM COATED ORAL at 21:37

## 2023-11-27 RX ADMIN — NYSTATIN CREAM 1 APPLICATION(S): 100000 CREAM TOPICAL at 08:32

## 2023-11-27 RX ADMIN — GABAPENTIN 900 MILLIGRAM(S): 400 CAPSULE ORAL at 13:18

## 2023-11-27 RX ADMIN — Medication 975 MILLIGRAM(S): at 22:05

## 2023-11-27 RX ADMIN — Medication 5 MILLIGRAM(S): at 21:37

## 2023-11-27 RX ADMIN — Medication 975 MILLIGRAM(S): at 05:19

## 2023-11-27 NOTE — PROGRESS NOTE ADULT - SUBJECTIVE AND OBJECTIVE BOX
Patient is a 63y old  Male who presents with a chief complaint of lumbar stenosis s/p L1-pelvis PSF and decompression (26 Nov 2023 11:50)    Patient seen and examined at bedside. No acute overnight events. Low back pain improving. Slept well last night with valium     ALLERGIES:  No Known Drug Allergies  peanuts (Unknown)  eggs (Hives; Rash)    MEDICATIONS  (STANDING):  acetaminophen     Tablet .. 975 milliGRAM(s) Oral every 8 hours  amLODIPine   Tablet 10 milliGRAM(s) Oral daily  atorvastatin 20 milliGRAM(s) Oral at bedtime  diazepam    Tablet 5 milliGRAM(s) Oral at bedtime  diclofenac sodium 1% Gel 2 Gram(s) Topical two times a day  enoxaparin Injectable 40 milliGRAM(s) SubCutaneous every 24 hours  gabapentin 900 milliGRAM(s) Oral three times a day  multivitamin 1 Tablet(s) Oral daily  nystatin Powder 1 Application(s) Topical two times a day  polyethylene glycol 3350 17 Gram(s) Oral two times a day  senna 2 Tablet(s) Oral at bedtime  tapentadol 100 milliGRAM(s) Oral <User Schedule>  tiZANidine 6 milliGRAM(s) Oral at bedtime  tiZANidine 4 milliGRAM(s) Oral <User Schedule>    MEDICATIONS  (PRN):  albuterol    90 MICROgram(s) HFA Inhaler 2 Puff(s) Inhalation every 6 hours PRN Shortness of Breath and/or Wheezing  bisacodyl Suppository 10 milliGRAM(s) Rectal daily PRN Constipation  ondansetron   Disintegrating Tablet 4 milliGRAM(s) Oral every 8 hours PRN Nausea and/or Vomiting  oxyCODONE    IR 15 milliGRAM(s) Oral every 3 hours PRN Severe Pain (7 - 10)    Vital Signs Last 24 Hrs  T(F): 98.6 (26 Nov 2023 20:20), Max: 98.6 (26 Nov 2023 20:20)  HR: 94 (27 Nov 2023 05:17) (85 - 94)  BP: 119/78 (27 Nov 2023 05:17) (104/63 - 119/78)  RR: 16 (27 Nov 2023 05:17) (16 - 16)  SpO2: 96% (27 Nov 2023 05:17) (96% - 97%)  I&O's Summary    PHYSICAL EXAM:  General: NAD, A/O x 3  ENT: MMM, no tonsilar exudate  Neck: Supple, No JVD  Lungs: Clear to auscultation bilaterally, no wheezes. Good air entry bilaterally   Cardio: RRR, S1/S2, No murmurs  Abdomen: Soft, Nontender, Nondistended; Bowel sounds present  Extremities: No calf tenderness, No pitting edema    LABS:                        9.2    5.76  )-----------( 365      ( 27 Nov 2023 05:58 )             29.3       11-27    137  |  100  |  23  ----------------------------<  111  4.3   |  31  |  0.74    Ca    9.9      27 Nov 2023 05:58    TPro  7.2  /  Alb  3.1  /  TBili  0.2  /  DBili  x   /  AST  22  /  ALT  21  /  AlkPhos  105  11-27     Urinalysis Basic - ( 27 Nov 2023 05:58 )    Color: x / Appearance: x / SG: x / pH: x  Gluc: 111 mg/dL / Ketone: x  / Bili: x / Urobili: x   Blood: x / Protein: x / Nitrite: x   Leuk Esterase: x / RBC: x / WBC x   Sq Epi: x / Non Sq Epi: x / Bacteria: x    COVID-19 PCR: NotDetec (11-10-23 @ 21:08)    RADIOLOGY & ADDITIONAL TESTS:     Care Discussed with Consultants/Other Providers:

## 2023-11-27 NOTE — PROGRESS NOTE ADULT - SUBJECTIVE AND OBJECTIVE BOX
Subjective  Patient seen and examined at bedside and observed in therapy   Admits to markedly improved sleep with qhs diazepam commenced by week end team  Reports markedly reduced pain left flank  Had normal BM yesterday      ROS--No chest or abd pain, no headache, LBM 11/27    Therapy  Observed, ambulating with walker increasing distance,   Left foot external rotation still apparent, had one episode of left knee buckling, otherwise has improvement with ambulatory distance   Will continue working on stairs negotiating    Vital Signs Last 24 Hrs  T(C): 36.7 (27 Nov 2023 09:51), Max: 37 (26 Nov 2023 20:20)  T(F): 98 (27 Nov 2023 09:51), Max: 98.6 (26 Nov 2023 20:20)  HR: 88 (27 Nov 2023 09:51) (85 - 94)  BP: 109/77 (27 Nov 2023 09:51) (104/63 - 119/78)  RR: 18 (27 Nov 2023 09:51) (16 - 18)  SpO2: 96% (27 Nov 2023 09:51) (96% - 97%)  O2 Parameters below as of 27 Nov 2023 09:51  Patient On (Oxygen Delivery Method): room air    EXAM  Gen - Comfortable  HEENT - NAD  Neck - Supple,   Pulm - Clear  Cardiovascular - RRR, S1S2, No m/r/g  Abdomen - Soft, non  tender, +BS  Extremities - No C/C/E, No calf tenderness. Left shoulder AROM improving, but still significantly stiff  Left foot, ext rotated at rest    Neuro-     Cognitive - AAOx3     Communication - Fluent, No dysarthria     Attention: Intact      Cranial Nerves - CN 2-12 grossly intact     Motor -                    LEFT    UE - ShAB 5/5, EF 4-/5, EE 4-/5, WE 5/5,  4/5, unable to flex or abduct shoulder past 90 deg. left elbow flexion contracture                     RIGHT UE - ShAB 5/5, EF 5/5, EE 5/5, WE 5/5,  4/5                    LEFT    LE - HF 1/5, KE 1/5, DF 0/5, PF 4/5                    RIGHT LE - HF 4/5 (due to back pain), KE 5/5, DF 5/5, PF 5/5        Sensory - diminished sensation left lower extremity stocking distribution compared to right.      Tone - normal. Left shoulder spasticity.  Spasticity left distal arm/hand, MAS 3/4     Temp- still decreased temperature sensation LUE  Psychiatric - Mood stable, Affect WNL  Wounds: Thoracolumbar spine, open to air     RECENT LABS/IMAGING                9.2    5.76  )-----------( 365      ( 27 Nov 2023 05:58 )             29.3     11-27    137  |  100  |  23  ----------------------------<  111<H>  4.3   |  31  |  0.74    Ca    9.9      27 Nov 2023 05:58    TPro  7.2  /  Alb  3.1<L>  /  TBili  0.2  /  DBili  x   /  AST  22  /  ALT  21  /  AlkPhos  105  11-27      Urinalysis Basic - ( 27 Nov 2023 05:58 )  Color: x / Appearance: x / SG: x / pH: x  Gluc: 111 mg/dL / Ketone: x  / Bili: x / Urobili: x   Blood: x / Protein: x / Nitrite: x   Leuk Esterase: x / RBC: x / WBC x   Sq Epi: x / Non Sq Epi: x / Bacteria: x    MEDICATIONS  (STANDING):  acetaminophen     Tablet .. 975 milliGRAM(s) Oral every 8 hours  amLODIPine   Tablet 10 milliGRAM(s) Oral daily  atorvastatin 20 milliGRAM(s) Oral at bedtime  diazepam    Tablet 5 milliGRAM(s) Oral at bedtime  diclofenac sodium 1% Gel 2 Gram(s) Topical two times a day  enoxaparin Injectable 40 milliGRAM(s) SubCutaneous every 24 hours  gabapentin 900 milliGRAM(s) Oral three times a day  multivitamin 1 Tablet(s) Oral daily  nystatin Powder 1 Application(s) Topical two times a day  polyethylene glycol 3350 17 Gram(s) Oral two times a day  senna 2 Tablet(s) Oral at bedtime  tapentadol 100 milliGRAM(s) Oral <User Schedule>  tiZANidine 6 milliGRAM(s) Oral at bedtime  tiZANidine 4 milliGRAM(s) Oral <User Schedule>    MEDICATIONS  (PRN):  albuterol    90 MICROgram(s) HFA Inhaler 2 Puff(s) Inhalation every 6 hours PRN Shortness of Breath and/or Wheezing  bisacodyl Suppository 10 milliGRAM(s) Rectal daily PRN Constipation  ondansetron   Disintegrating Tablet 4 milliGRAM(s) Oral every 8 hours PRN Nausea and/or Vomiting  oxyCODONE    IR 15 milliGRAM(s) Oral every 3 hours PRN Severe Pain (7 - 10)

## 2023-11-27 NOTE — PROGRESS NOTE ADULT - ASSESSMENT
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 Lopez.  Hospital Course complicated by fever 2/2 ? PNA treated with zosyn (augmentin while in rehab until 11/13), hyponatremia, ABLA and chronic low back pain. Patient now admitted for a multidisciplinary rehab program. 11-10-23    # transient hot flushes and claustrophobia  - improves    #L1-L5 PSF and decompression due to lumbar stenosis  #chronic back pain, muscle spasm  #s/p cervical fusion 2022 c/b left arm nerve damage with LUE contraction  - left lower back pain 2/2 muscle spasm  - continue comprehensive rehab program  - pain management per primary team. patient reports he was able to obtain nucynta from outpatient physician.   - ISTOP reviewed on 11/12    #hyponatremia (resolved)  - monitor BMP    #essential HTN  - c/w norvasc 10mg po daily  - BP controlled    #HLD  - c/w lipitor 20mg po daily     #normocytic anemia  - H/H stable  - iron panel and ferritin consistent with MARIA DOLORES -- start ferrous sulfate once patient's pain management is optimized  - Outpatient work up     #constipation  - bowel regimen per primary team  - Monitor BM's    #aspiration pneumonia  - completed augmentin on 11/14  - albuterol inh PRN     # Severe protein-calorie malnutrition.  - nutrition on board    # DVT PPx  - Lovenox SC

## 2023-11-27 NOTE — PROGRESS NOTE ADULT - ASSESSMENT
KEVEN REY is a 63y with  HTN, HLD, CAD (non obstructive), asthma, PE (xarelto stopped 2 months ago), cervical radiculopathy/myelopathy s/p c-spine fusion surgery Oct 2022 (complicated by left arm nerve damage with LUE contractions), chronic severe back pain that radiates to both legs, who presented to [facility] for scheduled L1-pelvis PSF and decompression on 10/31 with Dr. Lopez.  Hospital Course complicated by fever 2/2 ? PNA treated with zosyn (augmentin while in rehab until 11/13), hyponatremia, ABLA and chronic low back pain. Patient now admitted for a multidisciplinary rehab program. 11-10-23 @ 15:11    * Improvement with progressive ambulation noted  * Spasticity in LUE- L shoulder and L wrist stretching and ROM to be performed in therapy  * Await L AFO for L foot weakness with eversion   * labs discussed, unremarkable  * Tolerating diazepam 5mg qhs for insomnia and muscle spasms    Lumbar stenosis with radiculopathy  s/p L1-pelvis PSF and decompression on 10/31  - Wear LSO brace. and LEFT AFO when OOB  - WBAT  - Comprehensive Multidisciplinary Rehab Program: Start comprehensive rehab program of PT/OT,  3 hours a day, 5 days a week. P&O as needed   - precautions: spine, cardiac, fall, aspiration  - f/u Dr. Lopez outpatient  - Left foot drop with eversion, await L AFO with orthotist   - L shoulder and wrist stretching and ROM in therapy       JUSTIFICATION FOR Left Foot AFO  Dx: Lumbar radiculopathy and left foot drop    Patient has hx of Cervical spine disc disease s/p surgery, Thoraco/lumbar radiculopathy s/p laminectomy  Motor function test showed left leg -Hip flexion 1/5, Knee extension 1/5, Dorsiflexion  0/5, Planter flexion 4/5  Left upper extremity 3/5, distally at hands  2+/5  Right side 5/5  Left foot weakness, foot eversion during trail at ambulation with his current Left AFO. Left knee buckling noted during therapy, hence ambulating minimally 50ft with moderate assistance  Despite use of his current Carbon Fiber left foot brace, his balance and stability remains impaired. His progress in rehabilitation is adversely being affected by this  He requires a SOLID LEFT FOOT AFO to reduce risk of falls, improve ambulation and reduce pain  He will require this brace for at least 8hr a day and more that 6 months       Pain  Chronic low back pain  - home regimen: Nucynta 100mg qis, Xanax 0.5mg, gabapentin 400mg TID, tizanidine 2mg q6. Patient is requesting resuming his home medication of nucynta, states that family will bring in home does.   - acute pain management followed at [facility]. Recs appreciated   - Gabapentin 800mg TID, increased to 900mg tid 11/18  - Tizanidine 4 mg BID and Tizanidine 6mg at HS   -Oxycodone 15mg prn for severe pain  -11/15--Recontinued Tapentadol (nucinta) 100mg bid (home med)   - Diclofenac gel BID for 7 days to L flank  -11/26--Diazepam added by week end team for muscle spasms and insomnia--responding      HTN  - Norvasc 10mg    HLD  - lipitor 20mg    ABLA 2/2 surgery  - Has yearly FOBT which he states was negative, no recent colonoscopy.    Mood / Cognition  - Neuropsychology consult recs appreciated--patient finds this beneficial   - Rec therapy  - 0.5mg Xanax prn Bedtime    Sleep  - Melatonin PRN     GI / Bowel  - Senna qHS  - Miralax Daily  --lactulose prn      / Bladder  - Continue bladder scans Q8 hours with straight cath for >400cc.  - Condom cath (unable to use urinal by himself)  - UA for urinary frequency UA -VE    Skin / Pressure injury  - Skin --t. Aquacel, CDI gauze over drain site.   - Monitor Incisions    Diet:  - Diet Consistency: Regular  -Food and Nutrition f/u regarding request for glucerna on d/c     DVT prophylaxis:   - heparin 5000mg q8  ---------------------------------------  Code Status/Emergency Contact:  Mother Pamela Rey [phone]  ---------------------------------------  * labs 11/27--Unremarkable --stable anemia, normal renal and liver function     Liaison with other providers  11/21--Orthotics provider, we called on phone, discussed patient's deficits and sent consult for left foot AFO   ---------------------------------------  IDT conference on 11/21  Social Work: Supportive mother and brother.  SLP:--   OT: Setup for eating/grooming. Min-mod A for LBD. Min A for toileting and UBD. Mod A for shower transfer. CG for toilet transfer.  PT: CG/CS for transfers. Amb with RW, WCF on long distance, with CG/CS. 8 steps with 1 HR with min A.  RT: --n/a  DME: Has all DME (RW and cane).  Barriers: L flank pain.  Functional level on DC: Supervision to mod I.   TDD: 11/29 to home  ---------------------------------------  Outpatient Follow-up:    Ami Lopez  Orthopaedic Surgery  [address], [address]  [place] [address]  Phone: [phone]  Fax: [phone]  Established Patient  Follow Up Time:    Pain management  Dr Vaughn Bach -Ph    (Emergency ph  -)-(  (Pain controlled with Tapentadol 100mg qid with good pain control up to few months ago, when insurance declined approval)  11/15--Nucinta approved by insurance and recommended       Time based billing   Spent 62 mins, patient review, observation in therapy, discussion of lab results, care co ordination

## 2023-11-28 ENCOUNTER — TRANSCRIPTION ENCOUNTER (OUTPATIENT)
Age: 63
End: 2023-11-28

## 2023-11-28 PROCEDURE — 99232 SBSQ HOSP IP/OBS MODERATE 35: CPT

## 2023-11-28 RX ORDER — TIZANIDINE 4 MG/1
3 TABLET ORAL
Qty: 90 | Refills: 0
Start: 2023-11-28 | End: 2023-12-27

## 2023-11-28 RX ORDER — AMLODIPINE BESYLATE 2.5 MG/1
1 TABLET ORAL
Refills: 0 | DISCHARGE

## 2023-11-28 RX ORDER — TAPENTADOL HYDROCHLORIDE 50 MG/1
1 TABLET, FILM COATED ORAL
Qty: 0 | Refills: 0 | DISCHARGE
Start: 2023-11-28

## 2023-11-28 RX ORDER — OXYCODONE HYDROCHLORIDE 5 MG/1
15 TABLET ORAL
Refills: 0 | Status: DISCONTINUED | OUTPATIENT
Start: 2023-11-28 | End: 2023-11-29

## 2023-11-28 RX ORDER — AMLODIPINE BESYLATE 2.5 MG/1
1 TABLET ORAL
Qty: 30 | Refills: 0
Start: 2023-11-28 | End: 2023-12-27

## 2023-11-28 RX ORDER — ALPRAZOLAM 0.25 MG
1 TABLET ORAL
Refills: 0 | DISCHARGE

## 2023-11-28 RX ORDER — TIZANIDINE 4 MG/1
1 TABLET ORAL
Qty: 60 | Refills: 0
Start: 2023-11-28 | End: 2023-12-27

## 2023-11-28 RX ORDER — ATORVASTATIN CALCIUM 80 MG/1
1 TABLET, FILM COATED ORAL
Qty: 30 | Refills: 0
Start: 2023-11-28 | End: 2023-12-27

## 2023-11-28 RX ORDER — DIAZEPAM 5 MG
1 TABLET ORAL
Qty: 30 | Refills: 0
Start: 2023-11-28 | End: 2023-12-27

## 2023-11-28 RX ORDER — TAPENTADOL HYDROCHLORIDE 50 MG/1
1 TABLET, FILM COATED ORAL
Refills: 0 | DISCHARGE

## 2023-11-28 RX ORDER — SENNA PLUS 8.6 MG/1
2 TABLET ORAL
Qty: 0 | Refills: 0 | DISCHARGE
Start: 2023-11-28

## 2023-11-28 RX ORDER — OXYCODONE AND ACETAMINOPHEN 5; 325 MG/1; MG/1
1 TABLET ORAL
Refills: 0 | DISCHARGE

## 2023-11-28 RX ORDER — DICLOFENAC SODIUM 30 MG/G
1 GEL TOPICAL
Qty: 0 | Refills: 0 | DISCHARGE
Start: 2023-11-28

## 2023-11-28 RX ORDER — OXYCODONE HYDROCHLORIDE 5 MG/1
1 TABLET ORAL
Qty: 21 | Refills: 0
Start: 2023-11-28 | End: 2023-12-04

## 2023-11-28 RX ORDER — ATORVASTATIN CALCIUM 80 MG/1
1 TABLET, FILM COATED ORAL
Refills: 0 | DISCHARGE

## 2023-11-28 RX ORDER — ALBUTEROL 90 UG/1
2 AEROSOL, METERED ORAL
Refills: 0 | DISCHARGE

## 2023-11-28 RX ORDER — TAPENTADOL HYDROCHLORIDE 50 MG/1
100 TABLET, FILM COATED ORAL
Refills: 0 | Status: DISCONTINUED | OUTPATIENT
Start: 2023-11-28 | End: 2023-11-29

## 2023-11-28 RX ORDER — ACETAMINOPHEN 500 MG
3 TABLET ORAL
Qty: 0 | Refills: 0 | DISCHARGE
Start: 2023-11-28

## 2023-11-28 RX ORDER — GABAPENTIN 400 MG/1
3 CAPSULE ORAL
Qty: 270 | Refills: 0
Start: 2023-11-28 | End: 2023-12-27

## 2023-11-28 RX ORDER — POLYETHYLENE GLYCOL 3350 17 G/17G
17 POWDER, FOR SOLUTION ORAL
Qty: 0 | Refills: 0 | DISCHARGE
Start: 2023-11-28

## 2023-11-28 RX ORDER — GABAPENTIN 400 MG/1
1 CAPSULE ORAL
Refills: 0 | DISCHARGE

## 2023-11-28 RX ADMIN — Medication 975 MILLIGRAM(S): at 15:00

## 2023-11-28 RX ADMIN — OXYCODONE HYDROCHLORIDE 15 MILLIGRAM(S): 5 TABLET ORAL at 03:45

## 2023-11-28 RX ADMIN — TAPENTADOL HYDROCHLORIDE 100 MILLIGRAM(S): 50 TABLET, FILM COATED ORAL at 16:16

## 2023-11-28 RX ADMIN — Medication 975 MILLIGRAM(S): at 21:40

## 2023-11-28 RX ADMIN — TIZANIDINE 6 MILLIGRAM(S): 4 TABLET ORAL at 21:41

## 2023-11-28 RX ADMIN — TAPENTADOL HYDROCHLORIDE 100 MILLIGRAM(S): 50 TABLET, FILM COATED ORAL at 12:19

## 2023-11-28 RX ADMIN — OXYCODONE HYDROCHLORIDE 15 MILLIGRAM(S): 5 TABLET ORAL at 03:15

## 2023-11-28 RX ADMIN — DICLOFENAC SODIUM 2 GRAM(S): 30 GEL TOPICAL at 17:15

## 2023-11-28 RX ADMIN — TIZANIDINE 4 MILLIGRAM(S): 4 TABLET ORAL at 14:04

## 2023-11-28 RX ADMIN — TAPENTADOL HYDROCHLORIDE 100 MILLIGRAM(S): 50 TABLET, FILM COATED ORAL at 14:00

## 2023-11-28 RX ADMIN — TAPENTADOL HYDROCHLORIDE 100 MILLIGRAM(S): 50 TABLET, FILM COATED ORAL at 20:40

## 2023-11-28 RX ADMIN — TAPENTADOL HYDROCHLORIDE 100 MILLIGRAM(S): 50 TABLET, FILM COATED ORAL at 08:05

## 2023-11-28 RX ADMIN — Medication 1 TABLET(S): at 12:19

## 2023-11-28 RX ADMIN — AMLODIPINE BESYLATE 10 MILLIGRAM(S): 2.5 TABLET ORAL at 05:30

## 2023-11-28 RX ADMIN — NYSTATIN CREAM 1 APPLICATION(S): 100000 CREAM TOPICAL at 05:31

## 2023-11-28 RX ADMIN — ENOXAPARIN SODIUM 40 MILLIGRAM(S): 100 INJECTION SUBCUTANEOUS at 14:05

## 2023-11-28 RX ADMIN — NYSTATIN CREAM 1 APPLICATION(S): 100000 CREAM TOPICAL at 17:15

## 2023-11-28 RX ADMIN — Medication 975 MILLIGRAM(S): at 05:30

## 2023-11-28 RX ADMIN — GABAPENTIN 900 MILLIGRAM(S): 400 CAPSULE ORAL at 14:04

## 2023-11-28 RX ADMIN — TIZANIDINE 4 MILLIGRAM(S): 4 TABLET ORAL at 08:04

## 2023-11-28 RX ADMIN — TAPENTADOL HYDROCHLORIDE 100 MILLIGRAM(S): 50 TABLET, FILM COATED ORAL at 19:40

## 2023-11-28 RX ADMIN — Medication 5 MILLIGRAM(S): at 21:40

## 2023-11-28 RX ADMIN — GABAPENTIN 900 MILLIGRAM(S): 400 CAPSULE ORAL at 05:32

## 2023-11-28 RX ADMIN — ATORVASTATIN CALCIUM 20 MILLIGRAM(S): 80 TABLET, FILM COATED ORAL at 21:40

## 2023-11-28 RX ADMIN — DICLOFENAC SODIUM 2 GRAM(S): 30 GEL TOPICAL at 05:30

## 2023-11-28 RX ADMIN — Medication 975 MILLIGRAM(S): at 14:05

## 2023-11-28 RX ADMIN — Medication 975 MILLIGRAM(S): at 22:40

## 2023-11-28 RX ADMIN — TAPENTADOL HYDROCHLORIDE 100 MILLIGRAM(S): 50 TABLET, FILM COATED ORAL at 03:31

## 2023-11-28 RX ADMIN — Medication 975 MILLIGRAM(S): at 06:00

## 2023-11-28 RX ADMIN — TAPENTADOL HYDROCHLORIDE 100 MILLIGRAM(S): 50 TABLET, FILM COATED ORAL at 17:00

## 2023-11-28 RX ADMIN — GABAPENTIN 900 MILLIGRAM(S): 400 CAPSULE ORAL at 21:40

## 2023-11-28 NOTE — PROGRESS NOTE ADULT - ASSESSMENT
KEVEN REY is a 63y with  HTN, HLD, CAD (non obstructive), asthma, PE (xarelto stopped 2 months ago), cervical radiculopathy/myelopathy s/p c-spine fusion surgery Oct 2022 (complicated by left arm nerve damage with LUE contractions), chronic severe back pain that radiates to both legs, who presented to [facility] for scheduled L1-pelvis PSF and decompression on 10/31 with Dr. Lopez.  Hospital Course complicated by fever 2/2 ? PNA treated with zosyn (augmentin while in rehab until 11/13), hyponatremia, ABLA and chronic low back pain. Patient now admitted for a multidisciplinary rehab program. 11-10-23 @ 15:11    * Plan for DC home tomorrow- 11/29  * Therapy to provide squeeze ball for L hand   * Await L AFO for L foot weakness with eversion   * Tolerating diazepam 5mg qhs for insomnia and muscle spasms    Lumbar stenosis with radiculopathy  s/p L1-pelvis PSF and decompression on 10/31  - Wear LSO brace. and LEFT AFO when OOB  - WBAT  - Comprehensive Multidisciplinary Rehab Program: Start comprehensive rehab program of PT/OT,  3 hours a day, 5 days a week. P&O as needed   - precautions: spine, cardiac, fall, aspiration  - f/u Dr. Lopez outpatient  - Left foot drop with eversion, await L AFO with orthotist   - L shoulder and wrist stretching and ROM in therapy   - Squeeze ball for L hand strength       JUSTIFICATION FOR Left Foot AFO  Dx: Lumbar radiculopathy and left foot drop    Patient has hx of Cervical spine disc disease s/p surgery, Thoraco/lumbar radiculopathy s/p laminectomy  Motor function test showed left leg -Hip flexion 1/5, Knee extension 1/5, Dorsiflexion  0/5, Planter flexion 4/5  Left upper extremity 3/5, distally at hands  2+/5  Right side 5/5  Left foot weakness, foot eversion during trail at ambulation with his current Left AFO. Left knee buckling noted during therapy, hence ambulating minimally 50ft with moderate assistance  Despite use of his current Carbon Fiber left foot brace, his balance and stability remains impaired. His progress in rehabilitation is adversely being affected by this  He requires a SOLID LEFT FOOT AFO to reduce risk of falls, improve ambulation and reduce pain  He will require this brace for at least 8hr a day and more that 6 months       Pain  Chronic low back pain  - home regimen: Nucynta 100mg qis, Xanax 0.5mg, gabapentin 400mg TID, tizanidine 2mg q6. Patient is requesting resuming his home medication of nucynta, states that family will bring in home does.   - acute pain management followed at [facility]. Recs appreciated   - Gabapentin 800mg TID, increased to 900mg tid 11/18  - Tizanidine 4 mg BID and Tizanidine 6mg at HS   -Oxycodone 15mg prn for severe pain  -11/15--Recontinued Tapentadol (nucinta) 100mg bid (home med)   - Diclofenac gel BID for 7 days to L flank  -11/26--Diazepam added by week end team for muscle spasms and insomnia--responding      HTN  - Norvasc 10mg    HLD  - lipitor 20mg    ABLA 2/2 surgery  - Has yearly FOBT which he states was negative, no recent colonoscopy.    Mood / Cognition  - Neuropsychology consult recs appreciated--patient finds this beneficial   - Rec therapy  - 0.5mg Xanax prn Bedtime    Sleep  - Melatonin PRN     GI / Bowel  - Senna qHS  - Miralax Daily  --lactulose prn      / Bladder  - Continue bladder scans Q8 hours with straight cath for >400cc.  - Condom cath (unable to use urinal by himself)  - UA for urinary frequency UA -VE    Skin / Pressure injury  - Skin --t. Aquacel, CDI gauze over drain site.   - Monitor Incisions    Diet:  - Diet Consistency: Regular  -Food and Nutrition f/u regarding request for glucerna on d/c     DVT prophylaxis:   - heparin 5000mg q8  ---------------------------------------  Code Status/Emergency Contact:  Mother Pamela Rey [phone]  ---------------------------------------  * labs 11/27--Unremarkable --stable anemia, normal renal and liver function     Liaison with other providers  11/21--Orthotics provider, we called on phone, discussed patient's deficits and sent consult for left foot AFO   ---------------------------------------  IDT conference on 11/21  Social Work: Supportive mother and brother.  SLP:--   OT: Setup for eating/grooming. Min-mod A for LBD. Min A for toileting and UBD. Mod A for shower transfer. CG for toilet transfer.  PT: CG/CS for transfers. Amb with RW, WCF on long distance, with CG/CS. 8 steps with 1 HR with min A.  RT: --n/a  DME: Has all DME (RW and cane).  Barriers: L flank pain.  Functional level on DC: Supervision to mod I.   TDD: 11/29 to home  ---------------------------------------  Outpatient Follow-up:    Ami Lopez  Orthopaedic Surgery  [address], [address]  [place] [address]  Phone: [phone]  Fax: [phone]  Established Patient  Follow Up Time:    Pain management  Dr Vaughn Bach -Ph    (Emergency ph  -)-(  (Pain controlled with Tapentadol 100mg qid with good pain control up to few months ago, when insurance declined approval)  11/15--Nucinta approved by insurance and recommended       Time based billing   Spent 62 mins, patient review, observation in therapy, discussion of lab results, care co ordination  KEVEN REY is a 63y with  HTN, HLD, CAD (non obstructive), asthma, PE (xarelto stopped 2 months ago), cervical radiculopathy/myelopathy s/p c-spine fusion surgery Oct 2022 (complicated by left arm nerve damage with LUE contractions), chronic severe back pain that radiates to both legs, who presented to [facility] for scheduled L1-pelvis PSF and decompression on 10/31 with Dr. Lopez.  Hospital Course complicated by fever 2/2 ? PNA treated with zosyn (augmentin while in rehab until 11/13), hyponatremia, ABLA and chronic low back pain. Patient now admitted for a multidisciplinary rehab program. 11-10-23 @ 15:11    * Plan for DC home tomorrow- 11/29  * Therapy to provide squeeze ball for L hand   * Await L AFO for L foot weakness with eversion   * Tolerating diazepam 5mg qhs for insomnia and muscle spasms    Lumbar stenosis with radiculopathy  s/p L1-pelvis PSF and decompression on 10/31  - Wear LSO brace. and LEFT AFO when OOB  - WBAT  - Comprehensive Multidisciplinary Rehab Program: Start comprehensive rehab program of PT/OT,  3 hours a day, 5 days a week. P&O as needed   - precautions: spine, cardiac, fall, aspiration  - f/u Dr. Lopez outpatient  - Left foot drop with eversion, await L AFO with orthotist   - L shoulder and wrist stretching and ROM in therapy   - Squeeze ball for L hand strength       JUSTIFICATION FOR Left Foot AFO  Dx: Lumbar radiculopathy and left foot drop    Patient has hx of Cervical spine disc disease s/p surgery, Thoraco/lumbar radiculopathy s/p laminectomy  Motor function test showed left leg -Hip flexion 1/5, Knee extension 1/5, Dorsiflexion  0/5, Planter flexion 4/5  Left upper extremity 3/5, distally at hands  2+/5  Right side 5/5  Left foot weakness, foot eversion during trail at ambulation with his current Left AFO. Left knee buckling noted during therapy, hence ambulating minimally 50ft with moderate assistance  Despite use of his current Carbon Fiber left foot brace, his balance and stability remains impaired. His progress in rehabilitation is adversely being affected by this  He requires a SOLID LEFT FOOT AFO to reduce risk of falls, improve ambulation and reduce pain  He will require this brace for at least 8hr a day and more that 6 months       Pain  Chronic low back pain  - home regimen: Nucynta 100mg qis, Xanax 0.5mg, gabapentin 400mg TID, tizanidine 2mg q6. Patient is requesting resuming his home medication of nucynta, states that family will bring in home does.   - acute pain management followed at [facility]. Recs appreciated   - Gabapentin 800mg TID, increased to 900mg tid 11/18  - Tizanidine 4 mg BID and Tizanidine 6mg at HS   -Oxycodone 15mg prn for severe pain  -11/15--Recontinued Tapentadol (nucinta) 100mg bid (home med)   - Diclofenac gel BID for 7 days to L flank  -11/26--Diazepam added by week end team for muscle spasms and insomnia--responding      HTN  - Norvasc 10mg    HLD  - lipitor 20mg    ABLA 2/2 surgery  - Has yearly FOBT which he states was negative, no recent colonoscopy.    Mood / Cognition  - Neuropsychology consult recs appreciated--patient finds this beneficial   - Rec therapy  - 0.5mg Xanax prn Bedtime    Sleep  - Melatonin PRN     GI / Bowel  - Senna qHS  - Miralax Daily  --lactulose prn      / Bladder  - Continue bladder scans Q8 hours with straight cath for >400cc.  - Condom cath (unable to use urinal by himself)  - UA for urinary frequency UA -VE    Skin / Pressure injury  - Skin --t. Aquacel, CDI gauze over drain site.   - Monitor Incisions    Diet:  - Diet Consistency: Regular  -Food and Nutrition f/u regarding request for glucerna on d/c     DVT prophylaxis:   - heparin 5000mg q8  ---------------------------------------  Code Status/Emergency Contact:  Mother Pamela Rey [phone]  ---------------------------------------  * labs 11/27--Unremarkable --stable anemia, normal renal and liver function     Liaison with other providers  11/21--Orthotics provider, we called on phone, discussed patient's deficits and sent consult for left foot AFO   ---------------------------------------  IDT conference on 11/28  Social Work: --   SLP: --  OT: CG/CS for ADLs/ transfers. Min A for LBD/footwear.  PT: Sup for transfers. Sup ambulation with RW 200ft with LLE AFO. 8 steps with 1 HR CGA.  RT: Parking permit, painting.   DME: Has cane and RW at home.   Barriers: Await L foot AFO  TDD: 11/29 to home  ---------------------------------------  Outpatient Follow-up:    Ami Lopez  Orthopaedic Surgery  [address] [facility], [place] [address]  Phone: [phone]  Fax: [phone]  Established Patient  Follow Up Time:    Pain management  Dr Vaughn Bach -Ph    (Emergency ph  -)-(  (Pain controlled with Tapentadol 100mg qid with good pain control up to few months ago, when insurance declined approval)  11/15--Nucinta approved by insurance and recommended       Time based billing   Spent 62 mins, patient review, observation in therapy, discussion of lab results, care co ordination  KEVEN REY is a 63y with  HTN, HLD, CAD (non obstructive), asthma, PE (xarelto stopped 2 months ago), cervical radiculopathy/myelopathy s/p c-spine fusion surgery Oct 2022 (complicated by left arm nerve damage with LUE contractions), chronic severe back pain that radiates to both legs, who presented to [facility] for scheduled L1-pelvis PSF and decompression on 10/31 with Dr. Lopez.  Hospital Course complicated by fever 2/2 ? PNA treated with zosyn (augmentin while in rehab until 11/13), hyponatremia, ABLA and chronic low back pain. Patient now admitted for a multidisciplinary rehab program. 11-10-23 @ 15:11    * Plan for DC home tomorrow- 11/29  * Therapy to provide squeeze ball for L hand   * Await L AFO for L foot weakness with eversion   * Tolerating diazepam 5mg qhs for insomnia and muscle spasms    Lumbar stenosis with radiculopathy  s/p L1-pelvis PSF and decompression on 10/31  - Wear LSO brace. and LEFT AFO when OOB  - WBAT  - Comprehensive Multidisciplinary Rehab Program: Start comprehensive rehab program of PT/OT,  3 hours a day, 5 days a week. P&O as needed   - precautions: spine, cardiac, fall, aspiration  - f/u Dr. Lopez outpatient  - Left foot drop with eversion, await L AFO with orthotist   - L shoulder and wrist stretching and ROM in therapy   - Squeeze ball for L hand strength       JUSTIFICATION FOR Left Foot AFO  Dx: Lumbar radiculopathy and left foot drop    Patient has hx of Cervical spine disc disease s/p surgery, Thoraco/lumbar radiculopathy s/p laminectomy  Motor function test showed left leg -Hip flexion 1/5, Knee extension 1/5, Dorsiflexion  0/5, Planter flexion 4/5  Left upper extremity 3/5, distally at hands  2+/5  Right side 5/5  Left foot weakness, foot eversion during trail at ambulation with his current Left AFO. Left knee buckling noted during therapy, hence ambulating minimally 50ft with moderate assistance  Despite use of his current Carbon Fiber left foot brace, his balance and stability remains impaired. His progress in rehabilitation is adversely being affected by this  He requires a SOLID LEFT FOOT AFO to reduce risk of falls, improve ambulation and reduce pain  He will require this brace for at least 8hr a day and more that 6 months       Pain  Chronic low back pain  - home regimen: Nucynta 100mg qis, Xanax 0.5mg, gabapentin 400mg TID, tizanidine 2mg q6. Patient is requesting resuming his home medication of nucynta, states that family will bring in home does.   - acute pain management followed at [facility]. Recs appreciated   - Gabapentin 800mg TID, increased to 900mg tid 11/18  - Tizanidine 4 mg BID and Tizanidine 6mg at HS   -Oxycodone 15mg prn for severe pain  -11/15--Recontinued Tapentadol (nucinta) 100mg bid (home med)   - Diclofenac gel BID for 7 days to L flank  -11/26--Diazepam added by week end team for muscle spasms and insomnia--responding      HTN  - Norvasc 10mg    HLD  - lipitor 20mg    ABLA 2/2 surgery  - Has yearly FOBT which he states was negative, no recent colonoscopy.    Mood / Cognition  - Neuropsychology consult recs appreciated--patient finds this beneficial   - Rec therapy  - 0.5mg Xanax prn Bedtime    Sleep  - Melatonin PRN     GI / Bowel  - Senna qHS  - Miralax Daily  --lactulose prn      / Bladder  - Continue bladder scans Q8 hours with straight cath for >400cc.  - Condom cath (unable to use urinal by himself)  - UA for urinary frequency UA -VE    Skin / Pressure injury  - Skin --t. Aquacel, CDI gauze over drain site.   - Monitor Incisions    Diet:  - Diet Consistency: Regular  -Food and Nutrition f/u regarding request for glucerna on d/c     DVT prophylaxis:   - heparin 5000mg q8  ---------------------------------------  Code Status/Emergency Contact:  Mother Pamela Rey [phone]  ---------------------------------------  * labs 11/27--Unremarkable --stable anemia, normal renal and liver function     Liaison with other providers  11/21--Orthotics provider, we called on phone, discussed patient's deficits and sent consult for left foot AFO   ---------------------------------------  IDT conference on 11/28  Social Work: --   SLP: --  OT: CG/CS for ADLs/ transfers. Min A for LBD/footwear.  PT: Sup for transfers. Sup ambulation with RW 200ft with LLE AFO. 8 steps with 1 HR CGA.  RT: Parking permit, painting.   DME: Has cane and RW at home.   Barriers: Await L foot AFO  TDD: 11/29 to home  ---------------------------------------  Outpatient Follow-up:    Ami Lopez  Orthopaedic Surgery  [address] [facility], [place] [address]  Phone: [phone]  Fax: [phone]  Established Patient  Follow Up Time:    Pain management  Dr Vaughn Bach -Ph    (Emergency ph  -)-(  (Pain controlled with Tapentadol 100mg qid with good pain control up to few months ago, when insurance declined approval)  11/15--Nucinta approved by insurance and recommended

## 2023-11-28 NOTE — DISCHARGE NOTE PROVIDER - NSDCCPCAREPLAN_GEN_ALL_CORE_FT
PRINCIPAL DISCHARGE DIAGNOSIS  Diagnosis: Status post arthrodesis  Assessment and Plan of Treatment: You presented to the hospital for a scheduled spine procedure with Dr. Lopez. You were sent to  acute rehab to help improve your strength and overall function. Please follow up with the following providers listed in this packet for further management.      SECONDARY DISCHARGE DIAGNOSES  Diagnosis: Pain management  Assessment and Plan of Treatment: Please continue to take your analgesic regimen as prescribed, and follow up with your Pain Management doctor to make an appointment upon discharge from rehab.

## 2023-11-28 NOTE — DISCHARGE NOTE PROVIDER - PROVIDER TOKENS
PROVIDER:[TOKEN:[[id number]],FOLLOWUP:[1 month]],PROVIDER:[TOKEN:[[id number]],FOLLOWUP:[1 week]]

## 2023-11-28 NOTE — PROGRESS NOTE ADULT - SUBJECTIVE AND OBJECTIVE BOX
Subjective  Patient seen and examined in therapy this AM with Dr. IRVING  Admits to sleeping well.  L flank pain overall improving.  Excited for DC home tomorrow.     ROS--No chest or abd pain, no headache, LBM 11/27    Therapy  Observed, ambulating with walker increasing distance,   Left foot external rotation still apparent, had one episode of left knee buckling, otherwise has improvement with ambulatory distance   Will continue working on stairs negotiating  Therapy to provide squeeze ball for L hand     Vital Signs Last 24 Hrs  T(C): 37 (27 Nov 2023 20:05), Max: 37 (27 Nov 2023 20:05)  T(F): 98.6 (27 Nov 2023 20:05), Max: 98.6 (27 Nov 2023 20:05)  HR: 96 (28 Nov 2023 05:35) (94 - 96)  BP: 128/89 (28 Nov 2023 05:35) (116/65 - 128/89)  BP(mean): --  RR: 16 (27 Nov 2023 20:05) (16 - 16)  SpO2: 95% (27 Nov 2023 20:05) (95% - 95%)    EXAM  Gen - Comfortable  HEENT - NAD  Neck - Supple,   Pulm - Clear  Cardiovascular - RRR, S1S2, No m/r/g  Abdomen - Soft, non  tender, +BS  Extremities - No C/C/E, No calf tenderness. Left shoulder AROM improving, but still significantly stiff  Left foot, ext rotated at rest    Neuro-     Cognitive - AAOx3     Communication - Fluent, No dysarthria     Attention: Intact      Cranial Nerves - CN 2-12 grossly intact     Motor -                    LEFT    UE - ShAB 5/5, EF 4-/5, EE 4-/5, WE 5/5,  4/5, unable to flex or abduct shoulder past 90 deg. left elbow flexion contracture                     RIGHT UE - ShAB 5/5, EF 5/5, EE 5/5, WE 5/5,  4/5                    LEFT    LE - HF 1/5, KE 1/5, DF 0/5, PF 4/5                    RIGHT LE - HF 4/5 (due to back pain), KE 5/5, DF 5/5, PF 5/5        Sensory - diminished sensation left lower extremity stocking distribution compared to right.      Tone - normal. Left shoulder spasticity.  Spasticity left distal arm/hand, MAS 3/4     Temp- still decreased temperature sensation LUE  Psychiatric - Mood stable, Affect WNL  Wounds: Thoracolumbar spine, open to air     RECENT LABS/IMAGING                9.2    5.76  )-----------( 365      ( 27 Nov 2023 05:58 )             29.3     11-27    137  |  100  |  23  ----------------------------<  111<H>  4.3   |  31  |  0.74    Ca    9.9      27 Nov 2023 05:58    TPro  7.2  /  Alb  3.1<L>  /  TBili  0.2  /  DBili  x   /  AST  22  /  ALT  21  /  AlkPhos  105  11-27      Urinalysis Basic - ( 27 Nov 2023 05:58 )  Color: x / Appearance: x / SG: x / pH: x  Gluc: 111 mg/dL / Ketone: x  / Bili: x / Urobili: x   Blood: x / Protein: x / Nitrite: x   Leuk Esterase: x / RBC: x / WBC x   Sq Epi: x / Non Sq Epi: x / Bacteria: x    MEDICATIONS  (STANDING):  acetaminophen     Tablet .. 975 milliGRAM(s) Oral every 8 hours  amLODIPine   Tablet 10 milliGRAM(s) Oral daily  atorvastatin 20 milliGRAM(s) Oral at bedtime  diazepam    Tablet 5 milliGRAM(s) Oral at bedtime  diclofenac sodium 1% Gel 2 Gram(s) Topical two times a day  enoxaparin Injectable 40 milliGRAM(s) SubCutaneous every 24 hours  gabapentin 900 milliGRAM(s) Oral three times a day  multivitamin 1 Tablet(s) Oral daily  nystatin Powder 1 Application(s) Topical two times a day  polyethylene glycol 3350 17 Gram(s) Oral two times a day  senna 2 Tablet(s) Oral at bedtime  tapentadol 100 milliGRAM(s) Oral <User Schedule>  tiZANidine 4 milliGRAM(s) Oral <User Schedule>  tiZANidine 6 milliGRAM(s) Oral at bedtime    MEDICATIONS  (PRN):  albuterol    90 MICROgram(s) HFA Inhaler 2 Puff(s) Inhalation every 6 hours PRN Shortness of Breath and/or Wheezing  bisacodyl Suppository 10 milliGRAM(s) Rectal daily PRN Constipation  ondansetron   Disintegrating Tablet 4 milliGRAM(s) Oral every 8 hours PRN Nausea and/or Vomiting  oxyCODONE    IR 15 milliGRAM(s) Oral every 3 hours PRN Severe Pain (7 - 10)     Subjective  Patient seen and examined in therapy this AM with Dr. IRVING  Admits to sleeping well.  L flank pain overall improving.  Excited for DC home tomorrow.     ROS--No chest or abd pain, no headache, LBM 11/27    Therapy  Observed, ambulating with walker increasing distance,   Left foot external rotation still apparent, had one episode of left knee buckling, otherwise has improvement with ambulatory distance   Will continue working on stairs negotiating  Therapy to provide squeeze ball for L hand     Vital Signs Last 24 Hrs  T(C): 37 (27 Nov 2023 20:05), Max: 37 (27 Nov 2023 20:05)  T(F): 98.6 (27 Nov 2023 20:05), Max: 98.6 (27 Nov 2023 20:05)  HR: 96 (28 Nov 2023 05:35) (94 - 96)  BP: 128/89 (28 Nov 2023 05:35) (116/65 - 128/89)  BP(mean): --  RR: 16 (27 Nov 2023 20:05) (16 - 16)  SpO2: 95% (27 Nov 2023 20:05) (95% - 95%)    EXAM  Gen - Comfortable  HEENT - NAD  Neck - Supple,   Pulm - Clear  Cardiovascular - RRR, S1S2, No m/r/g  Abdomen - Soft, non  tender, +BS  Extremities - No C/C/E, No calf tenderness. Left shoulder AROM improving,   Left foot, ext rotated at rest    Neuro-     Cognitive - AAOx3     Communication - Fluent, No dysarthria     Attention: Intact      Cranial Nerves - CN 2-12 grossly intact     Motor -                    LEFT    UE - ShAB 5/5, EF 4-/5, EE 4-/5, WE 5/5,  4/5, unable to flex or abduct shoulder past 90 deg. left elbow flexion contracture                     RIGHT UE - ShAB 5/5, EF 5/5, EE 5/5, WE 5/5,  4/5                    LEFT    LE - HF 1/5, KE 1/5, DF 0/5, PF 4/5                    RIGHT LE - HF 4/5 (due to back pain), KE 5/5, DF 5/5, PF 5/5        Sensory - diminished sensation left lower extremity stocking distribution compared to right.      Tone - normal. Left shoulder spasticity.  Spasticity left distal arm/hand, MAS 3/4     Temp- still decreased temperature sensation LUE  Psychiatric - Mood stable, Affect WNL  Wounds: Thoracolumbar spine, open to air     RECENT LABS/IMAGING                9.2    5.76  )-----------( 365      ( 27 Nov 2023 05:58 )             29.3     11-27    137  |  100  |  23  ----------------------------<  111<H>  4.3   |  31  |  0.74    Ca    9.9      27 Nov 2023 05:58    TPro  7.2  /  Alb  3.1<L>  /  TBili  0.2  /  DBili  x   /  AST  22  /  ALT  21  /  AlkPhos  105  11-27      Urinalysis Basic - ( 27 Nov 2023 05:58 )  Color: x / Appearance: x / SG: x / pH: x  Gluc: 111 mg/dL / Ketone: x  / Bili: x / Urobili: x   Blood: x / Protein: x / Nitrite: x   Leuk Esterase: x / RBC: x / WBC x   Sq Epi: x / Non Sq Epi: x / Bacteria: x    MEDICATIONS  (STANDING):  acetaminophen     Tablet .. 975 milliGRAM(s) Oral every 8 hours  amLODIPine   Tablet 10 milliGRAM(s) Oral daily  atorvastatin 20 milliGRAM(s) Oral at bedtime  diazepam    Tablet 5 milliGRAM(s) Oral at bedtime  diclofenac sodium 1% Gel 2 Gram(s) Topical two times a day  enoxaparin Injectable 40 milliGRAM(s) SubCutaneous every 24 hours  gabapentin 900 milliGRAM(s) Oral three times a day  multivitamin 1 Tablet(s) Oral daily  nystatin Powder 1 Application(s) Topical two times a day  polyethylene glycol 3350 17 Gram(s) Oral two times a day  senna 2 Tablet(s) Oral at bedtime  tapentadol 100 milliGRAM(s) Oral <User Schedule>  tiZANidine 4 milliGRAM(s) Oral <User Schedule>  tiZANidine 6 milliGRAM(s) Oral at bedtime    MEDICATIONS  (PRN):  albuterol    90 MICROgram(s) HFA Inhaler 2 Puff(s) Inhalation every 6 hours PRN Shortness of Breath and/or Wheezing  bisacodyl Suppository 10 milliGRAM(s) Rectal daily PRN Constipation  ondansetron   Disintegrating Tablet 4 milliGRAM(s) Oral every 8 hours PRN Nausea and/or Vomiting  oxyCODONE    IR 15 milliGRAM(s) Oral every 3 hours PRN Severe Pain (7 - 10)

## 2023-11-28 NOTE — DISCHARGE NOTE NURSING/CASE MANAGEMENT/SOCIAL WORK - PATIENT PORTAL LINK FT
You can access the FollowMyHealth Patient Portal offered by [facility] by registering at the following website: [email or web address]. By joining [employer]’s FollowMyHealth portal, you will also be able to view your health information using other applications (apps) compatible with our system.

## 2023-11-28 NOTE — DISCHARGE NOTE PROVIDER - NSDCMRMEDTOKEN_GEN_ALL_CORE_FT
acetaminophen 325 mg oral tablet: 3 tab(s) orally every 8 hours  Advair Diskus 250 mcg-50 mcg inhalation powder: 1 inhaled once a day pt takes PRN  amLODIPine 10 mg oral tablet: 1 tab(s) orally once a day  atorvastatin 20 mg oral tablet: 1 tab(s) orally once a day (at bedtime)  bisacodyl 10 mg rectal suppository: 1 suppository(ies) rectal once a day As needed Constipation  diazePAM 5 mg oral tablet: 1 tab(s) orally once a day (at bedtime) MDD: 1  diclofenac 1% topical gel: Apply topically to affected area 2 times a day To affected area. Use for 1 week.  gabapentin 300 mg oral capsule: 3 cap(s) orally 3 times a day MDD: 9  Multiple Vitamins oral tablet: 1 tab(s) orally once a day  Occupational Therapy Initial Evaluation and Treatment: Occupational Therapy Initial Evaluation and Treatment  2-3x/week for 8 weeks   S/p Lumbar radiculopathy  ICD-0: M54. 16  1 MDD: 1  Orthotic Evaluation for L AFO: Orthotic Evaluation for L AFO  Lumbar Radiculopathy  ICD: M54. 16  NATE: 99 months  oxyCODONE 15 mg oral tablet: 1 tab(s) orally 3 times a day as needed for Severe Pain (7 - 10) MDD: 3  Physical Therapy Initial Evaluation and Treatment: Physical Therapy Initial Evaluation and Treatment  2-3x/week for 8 weeks   S/p Lumbar radiculopathy  ICD-10: M54. 16 MDD: 1  polyethylene glycol 3350 oral powder for reconstitution: 17 gram(s) orally 2 times a day  senna leaf extract oral tablet: 2 tab(s) orally once a day (at bedtime)  tapentadol 100 mg oral tablet: 1 tab(s) orally 4 times a day Please take at 8AM, 12PM, 4PM, 8PM  tiZANidine 2 mg oral tablet: 3 tab(s) orally once a day (at bedtime)  tiZANidine 4 mg oral tablet: 1 tab(s) orally 2 times a day Please take at 8AM and 3PM

## 2023-11-28 NOTE — DISCHARGE NOTE NURSING/CASE MANAGEMENT/SOCIAL WORK - NSDCVIVACCINE_GEN_ALL_CORE_FT
influenza, injectable, quadrivalent, preservative free; 23-Nov-2022 11:51; Michelle Galvin); [employer]; [id number] (Exp. Date: 30-Jun-2023); IntraMuscular; Deltoid Right.; 0.5 milliLiter(s); VIS (VIS Published: 06-Aug-2021, VIS Presented: 23-Nov-2022);

## 2023-11-28 NOTE — CHART NOTE - NSCHARTNOTEFT_GEN_A_CORE
IDT conference on 11/21  Social Work: Supportive mother and brother.  SLP:--   OT: Setup for eating/grooming. Min-mod A for LBD. Min A for toileting and UBD. Mod A for shower transfer. CG for toilet transfer.  PT: CG/CS for transfers. Amb with RW, WCF on long distance, with CG/CS. 8 steps with 1 HR with min A.  RT: --  DME: Has all DME (RW and cane).  Barriers: L flank pain.  Functional level on DC: Supervision to mod I.   TDD: 11/29 to home
PDI	First Name	Last Name	Birth Date	Gender	Street Address	[facility]	Zip Code  LUCIO Rey	1960	Male	[address]	[facility]	[address]    Prescription Information      PDI Filter:    PDI	My Rx	Current Rx	Drug Type	Rx Written	Rx Dispensed	Drug	Quantity	Days Supply	Prescriber Name	Prescriber ANNIKA #	Payment Method	Dispenser  A	N	Y	O	10/24/2023	10/25/2023	hydrocodone-acetaminophen  mg tablet	120	30	[facility]	[id number]	[facility] #  A	N	N	O	10/17/2023	10/17/2023	tramadol hcl 50 mg tablet	56	8	[facility]	[id number]	[facility] #  A	N	Y	O	10/17/2023	10/17/2023	oxycodone-acetaminophen  mg tab	120	30	KennyVaughn morel MD	[id number]	[facility] #  A	N	N	O	09/16/2023	10/04/2023	hydromorphone 4 mg tablet	120	30	[facility]	[id number]	[facility] #  A	N	N	O	09/21/2023	09/22/2023	oxycodone-acetaminophen  mg tab	120	30	KennyVaughn morel MD	[id number]	[facility] #  A	N	N	B	09/21/2023	09/22/2023	alprazolam 0.5 mg tablet	120	30	UrbanVaughn morel MD	[id number]	[facility] #  A	N	N	B	08/19/2023	08/25/2023	alprazolam 0.5 mg tablet	120	30	[facility]	[id number]	[facility] #  A	N	N	O	08/19/2023	08/25/2023	oxycodone-acetaminophen  mg tab	120	30	[facility]	[id number]	[facility] #  A	N	N	O	08/22/2023	08/25/2023	hydromorphone 4 mg tablet	120	30	[facility]	[id number]	[facility] #  A	N	N	O	07/21/2023	07/24/2023	nucynta 100 mg tablet	120	30	[facility]	[id number]	[facility] #  A	N	N	O	07/21/2023	07/24/2023	oxycodone-acetaminophen  mg tab	120	30	Vaughn Fraser MD	[id number]	[employer]	[facility] #  A	N	N	B	07/21/2023	07/21/2023	alprazolam 0.5 mg tablet	120	30	Vaughn Fraser MD	[id number]	Insurance	[facility] #  A	N	N	O	06/26/2023	06/26/2023	nucynta 100 mg tablet	120	30	Vaughn Fraser MD	[id number]	[facility]
IDT conference on 11/28  Social Work: --   SLP: --  OT: CG/CS for ADLs/ transfers. Min A for LBD/footwear.  PT: Sup for transfers. Sup ambulation with RW 200ft with LLE AFO. 8 steps with 1 HR CGA.  RT: Parking permit, painting.   DME: Has cane and RW at home.   Barriers: Await L foot AFO  TDD: 11/29 to home
Met with patient briefly (<10 minutes) for supportive session. Patient indicates he is making progress and feeling well. He notes feeling "comfortable." Session is ended, as PCA arrives to provide patient care.     Neuropsychology will re-attempt on next business day and remains available.
Nutrition Follow Up Note  Source: Medical Record [X] Patient [X] Family [ ]         Diet: Regular, Ensure Plus High Protein (provides 350 kcal, 20 g protein/serving) BID  Pt tolerating diet with fair-good PO intake, eating % of meals Per nursing flowsheets. Pt reports good PO intake/appetite. Discussed current diet. Pt reports good acceptance of Ensure Plus High Protein (provides 350 kcal, 20 g protein/serving). Denies nausea, vomiting, diarrhea, constipation.     Enteral/Parenteral Nutrition: N/A    Current Weight: 169.3 lbs (11-10)    Pertinent Medications: MEDICATIONS  (STANDING):  acetaminophen     Tablet .. 975 milliGRAM(s) Oral every 8 hours  amLODIPine   Tablet 10 milliGRAM(s) Oral daily  atorvastatin 20 milliGRAM(s) Oral at bedtime  diazepam    Tablet 5 milliGRAM(s) Oral at bedtime  diclofenac sodium 1% Gel 2 Gram(s) Topical two times a day  enoxaparin Injectable 40 milliGRAM(s) SubCutaneous every 24 hours  gabapentin 900 milliGRAM(s) Oral three times a day  multivitamin 1 Tablet(s) Oral daily  nystatin Powder 1 Application(s) Topical two times a day  polyethylene glycol 3350 17 Gram(s) Oral two times a day  senna 2 Tablet(s) Oral at bedtime  tapentadol 100 milliGRAM(s) Oral <User Schedule>  tiZANidine 6 milliGRAM(s) Oral at bedtime  tiZANidine 4 milliGRAM(s) Oral <User Schedule>    MEDICATIONS  (PRN):  albuterol    90 MICROgram(s) HFA Inhaler 2 Puff(s) Inhalation every 6 hours PRN Shortness of Breath and/or Wheezing  bisacodyl Suppository 10 milliGRAM(s) Rectal daily PRN Constipation  ondansetron   Disintegrating Tablet 4 milliGRAM(s) Oral every 8 hours PRN Nausea and/or Vomiting  oxyCODONE    IR 15 milliGRAM(s) Oral every 3 hours PRN Severe Pain (7 - 10)      Pertinent Labs:  11-27 Na137 mmol/L Glu 111 mg/dL<H> K+ 4.3 mmol/L Cr  0.74 mg/dL BUN 23 mg/dL 11-27 Alb 3.1 g/dL<L>        Skin: surgical incision, Moisture associated dermatitis per nursing flow sheets.     Edema: No edema per nursing flow sheets     Last BM: on 11-27 Per nursing flowsheets     Estimated Needs:   [X] No Change since Previous Assessment  [ ] Recalculated:     Previous Nutrition Diagnosis:   Severe malnutrition    Nutrition Diagnosis is [X] Ongoing  - addressed with Ensure Plus High Protein (provides 350 kcal, 20 g protein/serving) BID, MVI    New Nutrition Diagnosis: [X] Not Applicable  [ ] Inadequate Protein Energy Intake   [ ] Inadequate Oral Intake   [ ] Excessive Energy Intake   [ ] Increased Nutrient Needs   [ ] Obesity   [ ] Altered GI Function   [ ] Unintended Weight Loss   [ ] Food & Nutrition Related Knowledge Deficit  [ ] Limited Adherence to nutrition related recommendations   [ ] Malnutrition      Interventions:   1. Recommend continuing with current plan of care  2. Encourage PO intake  3. Obtain and honor food preferences as able    Monitoring & Evaluation:   [X] Weights   [X] PO Intake   [X] Follow Up (Per Protocol)  [X] Tolerance to Diet Prescription   [X] Other: Labs    RD Remains Available.  Raisa Puentes RD
Nutrition Follow Up Note  Source: Medical Record [X] Patient [X] Family [ ]         Diet: Regular, Ensure Plus High Protein (provides 350 kcal, 20 g protein/serving) BID  Pt tolerating diet with good PO intake, eating % of meals per nursing flow sheets. Pt reports good acceptance of Ensure Plus High Protein (provides 350 kcal, 20 g protein/serving). Discussed current diet/generally healthy eating. Denies nausea, vomiting, diarrhea, constipation.     Enteral/Parenteral Nutrition: N/A    Current Weight: 169.3 lbs (11-10)    Pertinent Medications: MEDICATIONS  (STANDING):  acetaminophen     Tablet .. 975 milliGRAM(s) Oral every 8 hours  amLODIPine   Tablet 10 milliGRAM(s) Oral daily  atorvastatin 20 milliGRAM(s) Oral at bedtime  enoxaparin Injectable 40 milliGRAM(s) SubCutaneous every 24 hours  gabapentin 900 milliGRAM(s) Oral three times a day  lidocaine   4% Patch 1 Patch Transdermal daily  lidocaine   4% Patch 1 Patch Transdermal daily  multivitamin 1 Tablet(s) Oral daily  nystatin Powder 1 Application(s) Topical two times a day  polyethylene glycol 3350 17 Gram(s) Oral two times a day  senna 2 Tablet(s) Oral at bedtime  tapentadol 100 milliGRAM(s) Oral <User Schedule>  tiZANidine 4 milliGRAM(s) Oral at bedtime  tiZANidine 4 milliGRAM(s) Oral <User Schedule>    MEDICATIONS  (PRN):  albuterol    90 MICROgram(s) HFA Inhaler 2 Puff(s) Inhalation every 6 hours PRN Shortness of Breath and/or Wheezing  ALPRAZolam 0.5 milliGRAM(s) Oral at bedtime PRN anxiety  bisacodyl Suppository 10 milliGRAM(s) Rectal daily PRN Constipation  ondansetron   Disintegrating Tablet 4 milliGRAM(s) Oral every 8 hours PRN Nausea and/or Vomiting  oxyCODONE    IR 15 milliGRAM(s) Oral every 3 hours PRN Severe Pain (7 - 10)      Pertinent Labs:  11-20 Na135 mmol/L Glu 111 mg/dL<H> K+ 4.3 mmol/L Cr  0.85 mg/dL BUN 19 mg/dL 11-20 Alb 3.1 g/dL<L>        Skin: surgical incision, Moisture associated dermatitis per nursing flow sheets.     Edema: No edema per nursing flow sheets     Last BM: on 11-19 Per nursing flowsheets     Estimated Needs:   [X] No Change since Previous Assessment  [ ] Recalculated:     Previous Nutrition Diagnosis:   Severe malnutrition    Nutrition Diagnosis is [X] Ongoing  - addressed with Ensure Plus High Protein (provides 350 kcal, 20 g protein/serving) BID, MVI    New Nutrition Diagnosis: [X] Not Applicable  [ ] Inadequate Protein Energy Intake   [ ] Inadequate Oral Intake   [ ] Excessive Energy Intake   [ ] Increased Nutrient Needs   [ ] Obesity   [ ] Altered GI Function   [ ] Unintended Weight Loss   [ ] Food & Nutrition Related Knowledge Deficit  [ ] Limited Adherence to nutrition related recommendations   [ ] Malnutrition      Interventions:   1. Recommend continuing with current plan of care   2. Encourage PO intake  3. Obtain and honor food preferences  4. Ongoing diet education    Monitoring & Evaluation:   [X] Weights   [X] PO Intake   [X] Follow Up (Per Protocol)  [X] Tolerance to Diet Prescription   [X] Other: Labs     RD Remains Available.  Raisa Puentes RD
Pt seen in PT gym with therapist to evaluate and formulate Rx for custom molded afo for foot drop on left side.  Pt will need safo with lateral flare and flexible footplate to toes to allow motion of third rocker and stabilize knee.  Pt has significant proximal weakness causing externally rotated foot. Pt has narrow base of gait due to severe external rotation.   He will continue to use his lso for support while OOB.   His existing prefab CF afo is not controlling footdrop and external rotation.  is recommending a total contact custom afo be fabricated.  Pt has tentative discharge date of 11/29/23.  Pt will follow up in our office after discharge if afo is not ready      Brennen Anderson CO  [phone]
[facility] Interdisciplinary Plan of Care    REHABILITATION DIAGNOSIS:  Lumbar stenosis with radiculopathy s/p  Laminectomy and fusion    COMORBIDITIES/COMPLICATING CONDITIONS IMPACTING REHABILITATION:  HEALTH ISSUES - PROBLEM Dx:    Chronic back pain   HTN   Cervical stenosis s/p decompression  Left arm and b/l LE weakness       PAST MEDICAL & SURGICAL HISTORY:  Asthma      HTN (hypertension)      Localized swelling of both lower legs      H/O neuropathy      Chronic back pain      H/O spinal stenosis      Cervical spinal stenosis      Pulmonary embolism      History of fusion of cervical spine      S/P arthroscopy of knee          Based upon consideration of the patient's impairments, functional status, complicating conditions and any other contributing factors and after information garnered from the assessments of all therapy disciplines involved in treating the patient and other pertinent clinicians:    INTERDISCIPLINARY REHABILITATION INTERVENTIONS:    [ X  ] Transfer Training  [ X  ] Bed Mobility  [ X  ] Therapeutic Exercise  [ X ] Balance/Coordination Exercises  [ X ] Locomotion retraining  [ X  ] Stairs  [  X ] Functional Transfer Training  [   ] Bowel/Bladder program  [ x  ] Pain Management  [   ] Skin/Wound Care  [   ] Visual/Perceptual Training  [   x] Therapeutic Recreation Activities  [   ] Neuromuscular Re-education  [ X  ] Activities of Daily Living  [   ] Speech Exercise  [   ] Swallowing Exercises  [   ] Vital Stim  [   ] Dietary Supplements  [   ] Calorie Count  [   ] Cognitive Exercises  [   ] Congnitive/Linguistic Treatment  [   ] Behavior Program  [   ] Neuropsych Therapy  [ X  ] Patient/Family Counseling  [ X ] Family Training  [ X  ] Community Re-entry  [   ] Orthotic Evaluation  [   ] Prosthetic Eval/Training    MEDICAL PROGNOSIS:  Fair     REHAB POTENTIAL:  Fair   EXPECTED DAILY THERAPY:         PT: 1.5 hr        OT: 1.5 hr        ST: n/a        P&O: continue TLSO and Left AFO     EXPECTED INTENSITY OF PROGRAM:  3 hrs / Day    EXPECTED FREQUENCY OF PROGRAM: 5 Days/ Week    ESTIMATED LOS:  [  ] 5-7 Days  [  ] 7-10 Days  [ ] 10- 14 Days  [ X ] 14- 18 Days  [  ] 18- 21 Days    ESTIMATED DISPOSITION:  [  ] Home   [ X ] Home with Outpatient Therapies  [  ] Home with Home Therapies  [  ] Assisted Living  [  ] Nursing Home  [  ] Long Term Acute Care    INTERDISCIPLINARY FUNCTIONAL OUTCOMES/GOALS:         Gait/Mobility: 5 with gait device        Transfers: 5       ADLs: 5       Functional Transfers: 5       Medication Management: 6       Communication: 6       Cognitive: 6       Dysphagia: 6       Bladder 6       Bowel: 6     Functional Independent Measures:   7 = Independent  6 = Modified Independent  5 = Supervision  4 = Minimal Assist/ Contact Guard  3 = Moderate Assistance  2 = Maximum Assistance  1 = Total Assistance  0 = Unable to assess

## 2023-11-28 NOTE — DISCHARGE NOTE PROVIDER - NSDCFUADDAPPT_GEN_ALL_CORE_FT
Please follow up with your PCP as soon as possible.    Please call to make an appointment with:  Pain management  Dr Vaughn Bach -  [phone]     If you are in need of a PCP or a specialist in your area: contact the [facility] Physician Referral Service at [phone].

## 2023-11-28 NOTE — DISCHARGE NOTE PROVIDER - HOSPITAL COURSE
HPI:  62yo Male with PMH of HTN, HLD, CAD (non obstructive), asthma, PE (xarelto stopped 2 months ago), cervical radiculopathy/myelopathy s/p c-spine fusion surgery Oct 2022 (complicated by right arm nerve damage with RUE contractions), chronic severe back pain that radiates to both legs, who presented to [facility] for scheduled L1-pelvis PSF and decompression on 10/31 with Dr. Lopez. This surgery was oringally postponed due to abnormal stress test. However patient was cleared by cardiology. After surgery he was admitted to SICU and downgraded to medical floors. Hospital course complicated by fevers 2/2 questionable PNA treated with zosyn (augmentin while in rehab until 11/13). Cultures were negative. Hospital course further complicated by hyponatremia, ABLA and low back pain.   Patient was evaluated by PM&R and therapy for functional deficits, gait/ADL impairments and acute rehabilitation was recommended. Patient was medically optimized for discharge to [facility] IRU on 11/10   (10 Nov 2023 14:03)      Patient was evaluated by PM&R and therapy for gait/ADL impairments and recommended acute rehabilitation. Patient was medically optimized for discharge to [facility] on 11/10/23. Admitted with gait instability, ADL, and functional impairments.     Rehab course significant for:      All other medical co-morbidities were stable. Patient tolerated course of inpatient PT/OT/SLP rehab with significant improvements and met rehab goals prior to discharge. Patient was medically cleared on 11/29/23 for discharge to home with home care. HPI:  64yo Male with PMH of HTN, HLD, CAD (non obstructive), asthma, PE (xarelto stopped 2 months ago), cervical radiculopathy/myelopathy s/p c-spine fusion surgery Oct 2022 (complicated by right arm nerve damage with RUE contractions), chronic severe back pain that radiates to both legs, who presented to [facility] for scheduled L1-pelvis PSF and decompression on 10/31 with Dr. Lopez. This surgery was oringally postponed due to abnormal stress test. However patient was cleared by cardiology. After surgery he was admitted to SICU and downgraded to medical floors. Hospital course complicated by fevers 2/2 questionable PNA treated with zosyn (augmentin while in rehab until 11/13). Cultures were negative. Hospital course further complicated by hyponatremia, ABLA and low back pain.   Patient was evaluated by PM&R and therapy for functional deficits, gait/ADL impairments and acute rehabilitation was recommended. Patient was medically optimized for discharge to [facility] IRU on 11/10   (10 Nov 2023 14:03)      Patient was evaluated by PM&R and therapy for gait/ADL impairments and recommended acute rehabilitation. Patient was medically optimized for discharge to [facility] on 11/10/23. Admitted with gait instability, ADL, and functional impairments.     Rehab course significant for:  11/19: increased gabapentin to 900TID, safe as GFR above 79mL/minute; primary team ought consider outpatient imaging/follow up for left > right cervical radiculopathy  11/21: Tizanidine to 6mg at HS. L solid AFO.   11/22: Lidocaine patch DCd. UA for urinary frequency; resulted negative. Diclofenac gel BID to L flank. L shoulder/wrist ROM and stretching.   11/28: Please provide squeeze ball for L hand.     All other medical co-morbidities were stable. Patient tolerated course of inpatient PT/OT/SLP rehab with significant improvements and met rehab goals prior to discharge. Patient was medically cleared on 11/29/23 for discharge to home with home care. HPI:  64yo Male with PMH of HTN, HLD, CAD (non obstructive), asthma, PE (xarelto stopped 2 months ago), cervical radiculopathy/myelopathy s/p c-spine fusion surgery Oct 2022 (complicated by right arm nerve damage with RUE contractions), chronic severe back pain that radiates to both legs, who presented to [facility] for scheduled L1-pelvis PSF and decompression on 10/31 with Dr. Lopez. This surgery was oringally postponed due to abnormal stress test. However patient was cleared by cardiology. After surgery he was admitted to SICU and downgraded to medical floors. Hospital course complicated by fevers 2/2 questionable PNA treated with zosyn (augmentin while in rehab until 11/13). Cultures were negative. Hospital course further complicated by hyponatremia, ABLA and low back pain.   Patient was evaluated by PM&R and therapy for functional deficits, gait/ADL impairments and acute rehabilitation was recommended. Patient was medically optimized for discharge to [facility] IRU on 11/10   (10 Nov 2023 14:03)      Patient was evaluated by PM&R and therapy for gait/ADL impairments and recommended acute rehabilitation. Patient was medically optimized for discharge to [facility] on 11/10/23. Admitted with gait instability, ADL, and functional impairments.     Rehab course significant for:  11/19: increased gabapentin to 900TID, safe as GFR above 79mL/minute; primary team ought consider outpatient imaging/follow up for left > right cervical radiculopathy  11/21: Tizanidine to 6mg at HS. L solid AFO.   11/22: Lidocaine patch DCd. UA for urinary frequency; resulted negative. Diclofenac gel BID to L flank. L shoulder/wrist ROM and stretching.   11/28: Please provide squeeze ball for L hand.     You made sustained progress in therapy, with improved ROM left leg, especially knee extension  Left ankle strength still weak, you will f/u with orthotist for delivery of left solid AFO  Pain symptoms controlled and you will f/u with your pain mgt with whom we collaborated during your treatment  You will also f/u with your neurosurgeon/ortho spine for cervical DJD with left arm weakness and atrophy.      IDT conference on 11/28  Social Work: --living with family, has good support  SLP: --N/A  OT: CG/CS for ADLs/ transfers. Min A for LBD/footwear.  PT: Sup for transfers. Sup ambulation with RW 200ft with LLE AFO. 8 steps with 1 HR CGA.  RT: Parking permit, painting.   DME: Has cane and RW at home.   Barriers: Await L foot AFO  TDD: 11/29 to home    All other medical co-morbidities were stable. Patient tolerated course of inpatient PT/OT/SLP rehab with significant improvements and met rehab goals prior to discharge. Patient was medically cleared on 11/29/23 for discharge to home with home care.

## 2023-11-28 NOTE — PROGRESS NOTE ADULT - ASSESSMENT
63 year old male with  HTN, HLD, CAD (non obstructive), asthma, PE (xarelto stopped 2 months ago), cervical radiculopathy/myelopathy s/p c-spine fusion surgery Oct 2022 (complicated by left arm nerve damage with LUE contractions), chronic severe back pain that radiates to both legs, who presented to [facility] for scheduled L1-pelvis PSF and decompression on 10/31 with Dr. Lopez.  Hospital Course complicated by fever 2/2 ? PNA treated with zosyn (augmentin while in rehab until 11/13), hyponatremia, ABLA and chronic low back pain. Patient now admitted for a multidisciplinary rehab program. 11-10-23    # transient hot flushes and claustrophobia  - improving/stable    #L1-L5 PSF and decompression due to lumbar stenosis  #chronic back pain, muscle spasm  #s/p cervical fusion 2022 c/b left arm nerve damage with LUE contraction  - left lower back pain 2/2 muscle spasm  - continue comprehensive rehab program  - pain management per primary team. patient reports he was able to obtain nucynta from outpatient physician.   - ISTOP reviewed on 11/12    #hyponatremia (resolved)  - monitor BMP    #essential HTN  - c/w norvasc 10mg po daily  - BP controlled    #HLD  - c/w lipitor 20mg po daily     #normocytic anemia  - H/H stable  - iron panel and ferritin consistent with MARIA DOLORES -- start ferrous sulfate once patient's pain management is optimized  - Outpatient work up     #constipation  - bowel regimen per primary team  - Monitor BM's    #aspiration pneumonia  - completed augmentin on 11/14  - albuterol inh PRN     # Severe protein-calorie malnutrition.  - nutrition on board    # DVT PPx  - Lovenox SC

## 2023-11-28 NOTE — DISCHARGE NOTE NURSING/CASE MANAGEMENT/SOCIAL WORK - NSDCPEFALRISK_GEN_ALL_CORE
For information on Fall & Injury Prevention, visit: [email or web address] OR  [email or web address] OR  https://www.cdc.gov/steadi/patient.html

## 2023-11-28 NOTE — DISCHARGE NOTE PROVIDER - CARE PROVIDER_API CALL
Tara ePters  Physical/Rehab Medicine  [address] [place] [address]  Phone: [phone]  Fax: [phone]  Follow Up Time: 1 month    Ami Lopez  Orthopaedic Surgery  [address] [facility], [address] [address]  Phone: [phone]  Fax: [phone]  Follow Up Time: 1 week

## 2023-11-28 NOTE — DISCHARGE NOTE NURSING/CASE MANAGEMENT/SOCIAL WORK - NSDCFUADDAPPT_GEN_ALL_CORE_FT
Please follow up with your PCP as soon as possible.    Please call to make an appointment with:  Pain management  Dr Vaughn Bach -  [phone]     If you are in need of a PCP or a specialist in your area: contact the [facility] Physician Referral Service at [phone].

## 2023-11-28 NOTE — CHART NOTE - NSCHARTNOTESELECT_GEN_ALL_CORE
Event Note
IDT/Off Service Note
IPOC/Event Note
Neuropsychology
Nutrition Services
Nutrition Services
IDT Conference/Off Service Note
ISTOP/Event Note
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
## 2023-11-29 VITALS
TEMPERATURE: 98 F | RESPIRATION RATE: 16 BRPM | OXYGEN SATURATION: 97 % | DIASTOLIC BLOOD PRESSURE: 74 MMHG | HEART RATE: 91 BPM | SYSTOLIC BLOOD PRESSURE: 126 MMHG

## 2023-11-29 PROCEDURE — 82962 GLUCOSE BLOOD TEST: CPT

## 2023-11-29 PROCEDURE — 97112 NEUROMUSCULAR REEDUCATION: CPT

## 2023-11-29 PROCEDURE — 99232 SBSQ HOSP IP/OBS MODERATE 35: CPT

## 2023-11-29 PROCEDURE — 83540 ASSAY OF IRON: CPT

## 2023-11-29 PROCEDURE — 93005 ELECTROCARDIOGRAM TRACING: CPT

## 2023-11-29 PROCEDURE — 97535 SELF CARE MNGMENT TRAINING: CPT

## 2023-11-29 PROCEDURE — 84300 ASSAY OF URINE SODIUM: CPT

## 2023-11-29 PROCEDURE — 97167 OT EVAL HIGH COMPLEX 60 MIN: CPT

## 2023-11-29 PROCEDURE — 36415 COLL VENOUS BLD VENIPUNCTURE: CPT

## 2023-11-29 PROCEDURE — 80053 COMPREHEN METABOLIC PANEL: CPT

## 2023-11-29 PROCEDURE — 83550 IRON BINDING TEST: CPT

## 2023-11-29 PROCEDURE — 97140 MANUAL THERAPY 1/> REGIONS: CPT

## 2023-11-29 PROCEDURE — 87635 SARS-COV-2 COVID-19 AMP PRB: CPT

## 2023-11-29 PROCEDURE — 82728 ASSAY OF FERRITIN: CPT

## 2023-11-29 PROCEDURE — 97530 THERAPEUTIC ACTIVITIES: CPT

## 2023-11-29 PROCEDURE — 97116 GAIT TRAINING THERAPY: CPT

## 2023-11-29 PROCEDURE — 83930 ASSAY OF BLOOD OSMOLALITY: CPT

## 2023-11-29 PROCEDURE — 97163 PT EVAL HIGH COMPLEX 45 MIN: CPT

## 2023-11-29 PROCEDURE — 97110 THERAPEUTIC EXERCISES: CPT

## 2023-11-29 PROCEDURE — 85025 COMPLETE CBC W/AUTO DIFF WBC: CPT

## 2023-11-29 RX ADMIN — AMLODIPINE BESYLATE 10 MILLIGRAM(S): 2.5 TABLET ORAL at 05:20

## 2023-11-29 RX ADMIN — NYSTATIN CREAM 1 APPLICATION(S): 100000 CREAM TOPICAL at 05:21

## 2023-11-29 RX ADMIN — Medication 1 TABLET(S): at 13:09

## 2023-11-29 RX ADMIN — TAPENTADOL HYDROCHLORIDE 100 MILLIGRAM(S): 50 TABLET, FILM COATED ORAL at 11:03

## 2023-11-29 RX ADMIN — GABAPENTIN 900 MILLIGRAM(S): 400 CAPSULE ORAL at 13:08

## 2023-11-29 RX ADMIN — GABAPENTIN 900 MILLIGRAM(S): 400 CAPSULE ORAL at 05:19

## 2023-11-29 RX ADMIN — Medication 975 MILLIGRAM(S): at 13:09

## 2023-11-29 RX ADMIN — TIZANIDINE 4 MILLIGRAM(S): 4 TABLET ORAL at 08:07

## 2023-11-29 RX ADMIN — TAPENTADOL HYDROCHLORIDE 100 MILLIGRAM(S): 50 TABLET, FILM COATED ORAL at 08:07

## 2023-11-29 RX ADMIN — Medication 975 MILLIGRAM(S): at 05:20

## 2023-11-29 RX ADMIN — Medication 975 MILLIGRAM(S): at 06:20

## 2023-11-29 NOTE — PROGRESS NOTE ADULT - ASSESSMENT
KEVEN REY is a 63y with  HTN, HLD, CAD (non obstructive), asthma, PE (xarelto stopped 2 months ago), cervical radiculopathy/myelopathy s/p c-spine fusion surgery Oct 2022 (complicated by 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 Lopez.  Hospital Course complicated by fever 2/2 ? PNA treated with zosyn (augmentin while in rehab until 11/13), hyponatremia, ABLA and chronic low back pain. Patient now admitted for a multidisciplinary rehab program. 11-10-23 @ 15:11    * Plan for DC home- today  * Await L AFO for L foot weakness with eversion --will liaise with orthotist for delivery    Lumbar stenosis with radiculopathy  s/p L1-pelvis PSF and decompression on 10/31  - Wear LSO brace. and LEFT AFO when OOB  - WBAT  - Comprehensive Multidisciplinary Rehab Program: Start comprehensive rehab program of PT/OT,  3 hours a day, 5 days a week. P&O as needed   - precautions: spine, cardiac, fall, aspiration  - f/u Dr. Lopez outpatient  - Left foot drop with eversion, await L AFO with orthotist   - L shoulder and wrist stretching and ROM in therapy   - Squeeze ball for L hand strength       JUSTIFICATION FOR Left Foot AFO  Dx: Lumbar radiculopathy and left foot drop    Patient has hx of Cervical spine disc disease s/p surgery, Thoraco/lumbar radiculopathy s/p laminectomy  Motor function test showed left leg -Hip flexion 1/5, Knee extension 1/5, Dorsiflexion  0/5, Planter flexion 4/5  Left upper extremity 3/5, distally at hands  2+/5  Right side 5/5  Left foot weakness, foot eversion during trail at ambulation with his current Left AFO. Left knee buckling noted during therapy, hence ambulating minimally 50ft with moderate assistance  Despite use of his current Carbon Fiber left foot brace, his balance and stability remains impaired. His progress in rehabilitation is adversely being affected by this  He requires a SOLID LEFT FOOT AFO to reduce risk of falls, improve ambulation and reduce pain  He will require this brace for at least 8hr a day and more that 6 months       Pain  Chronic low back pain  - home regimen: Nucynta 100mg qis, Xanax 0.5mg, gabapentin 400mg TID, tizanidine 2mg q6. Patient is requesting resuming his home medication of nucynta, states that family will bring in home does.   - acute pain management followed at [facility]. Recs appreciated   - Gabapentin 800mg TID, increased to 900mg tid 11/18  - Tizanidine 4 mg BID and Tizanidine 6mg at HS   -Oxycodone 15mg prn for severe pain  -11/15--Recontinued Tapentadol (nucinta) 100mg bid (home med)   - Diclofenac gel BID for 7 days to L flank  -11/26--Diazepam added by week end team for muscle spasms and insomnia--responding      HTN  - Norvasc 10mg    HLD  - lipitor 20mg    ABLA 2/2 surgery  - Has yearly FOBT which he states was negative, no recent colonoscopy.    Mood / Cognition  - Neuropsychology consult recs appreciated--patient finds this beneficial   - Rec therapy  - 0.5mg Xanax prn Bedtime    Sleep  - Melatonin PRN     GI / Bowel  - Senna qHS  - Miralax Daily  --lactulose prn      / Bladder  - Continue bladder scans Q8 hours with straight cath for >400cc.  - Condom cath (unable to use urinal by himself)  - UA for urinary frequency UA -VE    Skin / Pressure injury  - Skin --t. Aquacel, CDI gauze over drain site.   - Monitor Incisions    Diet:  - Diet Consistency: Regular  -Food and Nutrition f/u regarding request for glucerna on d/c     DVT prophylaxis:   - heparin 5000mg q8  ---------------------------------------  Code Status/Emergency Contact:  Mother Pamela Rey [phone]  ---------------------------------------  * labs 11/27--Unremarkable --stable anemia, normal renal and liver function     Liaison with other providers  11/21--Orthotics provider, we called on phone, discussed patient's deficits and sent consult for left foot AFO   ---------------------------------------  IDT conference on 11/28  Social Work: --living with family, has good support  SLP: --N/A  OT: CG/CS for ADLs/ transfers. Min A for LBD/footwear.  PT: Sup for transfers. Sup ambulation with RW 200ft with LLE AFO. 8 steps with 1 HR CGA.  RT: Parking permit, painting.   DME: Has cane and RW at home.   Barriers: Await L foot AFO  TDD: 11/29 to home  ---------------------------------------  Outpatient Follow-up:    Ami Lopez  Orthopaedic Surgery  [address] [facility], [place] [address]  Phone: [phone]  Fax: [phone]  Established Patient  Follow Up Time:    Pain management  Dr Vaughn Bach -Ph    (Emergency ph  -)-(  (Pain controlled with Tapentadol 100mg qid with good pain control up to few months ago, when insurance declined approval)  11/15--Nucinta approved by insurance and recommended

## 2023-11-29 NOTE — PROGRESS NOTE ADULT - SUBJECTIVE AND OBJECTIVE BOX
Patient is a 63y old  Male who presents with a chief complaint of lumbar stenosis s/p L1-pelvis PSF and decompression (28 Nov 2023 14:13)      Patient seen and examined at bedside, stable, NAD. denies headache, fever, chills, cp, sob, n/v, abd pain. excited to go home.     ALLERGIES:  No Known Drug Allergies  peanuts (Unknown)  eggs (Hives; Rash)    MEDICATIONS  (STANDING):  acetaminophen     Tablet .. 975 milliGRAM(s) Oral every 8 hours  amLODIPine   Tablet 10 milliGRAM(s) Oral daily  atorvastatin 20 milliGRAM(s) Oral at bedtime  diazepam    Tablet 5 milliGRAM(s) Oral at bedtime  enoxaparin Injectable 40 milliGRAM(s) SubCutaneous every 24 hours  gabapentin 900 milliGRAM(s) Oral three times a day  multivitamin 1 Tablet(s) Oral daily  nystatin Powder 1 Application(s) Topical two times a day  polyethylene glycol 3350 17 Gram(s) Oral two times a day  senna 2 Tablet(s) Oral at bedtime  tapentadol 100 milliGRAM(s) Oral <User Schedule>  tiZANidine 6 milliGRAM(s) Oral at bedtime  tiZANidine 4 milliGRAM(s) Oral <User Schedule>    MEDICATIONS  (PRN):  albuterol    90 MICROgram(s) HFA Inhaler 2 Puff(s) Inhalation every 6 hours PRN Shortness of Breath and/or Wheezing  bisacodyl Suppository 10 milliGRAM(s) Rectal daily PRN Constipation  ondansetron   Disintegrating Tablet 4 milliGRAM(s) Oral every 8 hours PRN Nausea and/or Vomiting  oxyCODONE    IR 15 milliGRAM(s) Oral every 3 hours PRN Severe Pain (7 - 10)    Vital Signs Last 24 Hrs  T(F): 98.1 (29 Nov 2023 08:06), Max: 98.2 (28 Nov 2023 19:40)  HR: 91 (29 Nov 2023 08:06) (91 - 98)  BP: 126/74 (29 Nov 2023 08:06) (119/72 - 126/74)  RR: 16 (29 Nov 2023 08:06) (16 - 16)  SpO2: 97% (29 Nov 2023 08:06) (97% - 98%)  I&O's Summary        PHYSICAL EXAM:  General: NAD, A/O x 3  ENT: MMM, no scleral icterus  Neck: Supple, No JVD  Lungs: Clear to auscultation bilaterally, no wheezes, rales, rhonchi  Cardio: RRR, S1/S2  Abdomen: Soft, Nontender, Nondistended; Bowel sounds present  Extremities: No calf tenderness, No pitting edema    LABS:                        9.2    5.76  )-----------( 365      ( 27 Nov 2023 05:58 )             29.3       11-27    137  |  100  |  23  ----------------------------<  111  4.3   |  31  |  0.74    Ca    9.9      27 Nov 2023 05:58    TPro  7.2  /  Alb  3.1  /  TBili  0.2  /  DBili  x   /  AST  22  /  ALT  21  /  AlkPhos  105  11-27                                  Urinalysis Basic - ( 27 Nov 2023 05:58 )    Color: x / Appearance: x / SG: x / pH: x  Gluc: 111 mg/dL / Ketone: x  / Bili: x / Urobili: x   Blood: x / Protein: x / Nitrite: x   Leuk Esterase: x / RBC: x / WBC x   Sq Epi: x / Non Sq Epi: x / Bacteria: x        COVID-19 PCR: NotDetec (11-10-23 @ 21:08)      RADIOLOGY & ADDITIONAL TESTS:    Care Discussed with Consultants/Other Providers: yes, rehab

## 2023-11-29 NOTE — PROGRESS NOTE ADULT - PROVIDER SPECIALTY LIST ADULT
Hospitalist
Rehab Medicine
Hospitalist
Hospitalist
Physiatry
Rehab Medicine
Hospitalist
Rehab Medicine
Hospitalist
Hospitalist
Rehab Medicine

## 2023-11-29 NOTE — PROGRESS NOTE ADULT - ASSESSMENT
62 y/o M with PMH HTN, HLD, CAD (non obstructive), asthma, PE (xarelto stopped 2 months ago), cervical radiculopathy/myelopathy s/p c-spine fusion surgery Oct 2022 (complicated by left arm nerve damage with LUE contractions), chronic severe back pain that radiates to both legs, who presented to [facility] for scheduled L1-pelvis PSF and decompression on 10/31 with Dr. Lopez. Hospital course complicated by fever 2/2 ?PNA treated with zosyn (augmentin while in rehab until 11/13), hyponatremia, ABLA and chronic low back pain. Patient now admitted for a multidisciplinary rehab program. 11-10-23    #Transient hot flushes and claustrophobia - improving/stable    #L1-L5 PSF and decompression due to lumbar stenosis  #Chronic back pain, muscle spasm  #S/p cervical fusion 2022 c/b left arm nerve damage with LUE contraction  -Left lower back pain 2/2 muscle spasm  -Continue comprehensive rehab program  -Pain management per primary team. patient reports he was able to obtain nucynta from outpatient physician.   -ISTOP reviewed on 11/12    #Hyponatremia (resolved)  -Monitor BMP    #Essential HTN  -c/w norvasc 10mg po daily  -BP controlled    #HLD  -c/w lipitor 20mg po daily     #Normocytic anemia  -H/H stable  -Iron panel and ferritin consistent with MARIA DOLORES -- start ferrous sulfate once patient's pain management is optimized  -Outpatient work up     #Aspiration pneumonia - resolved  -Completed augmentin on 11/14  -Albuterol inh PRN     #Severe protein-calorie malnutrition  -Nutrition on board    #DVT ppx - lovenox

## 2023-11-29 NOTE — PROGRESS NOTE ADULT - SUBJECTIVE AND OBJECTIVE BOX
Subjective  Patient seen and examined   Reports good night rest  No acute pain symptoms  Happy with his progress and improvement in therapy     ROS--No chest or abd pain, no headache, LBM 11/28      ICU Vital Signs Last 24 Hrs  T(C): 36.7 (29 Nov 2023 08:06), Max: 36.8 (28 Nov 2023 19:40)  T(F): 98.1 (29 Nov 2023 08:06), Max: 98.2 (28 Nov 2023 19:40)  HR: 91 (29 Nov 2023 08:06) (91 - 98)  BP: 126/74 (29 Nov 2023 08:06) (119/72 - 126/74)  RR: 16 (29 Nov 2023 08:06) (16 - 16)  SpO2: 97% (29 Nov 2023 08:06) (97% - 98%)  O2 Parameters below as of 28 Nov 2023 19:40  Patient On (Oxygen Delivery Method): room air      EXAM  Gen - Comfortable  HEENT - NAD  Neck - Supple,   Pulm - Clear  Cardiovascular - RRR, S1S2, No m/r/g  Abdomen - Soft, non  tender, +BS  Extremities - No C/C/E, No calf tenderness. Left shoulder AROM improving,   Left foot, ext rotated at rest    Neuro-     Cognitive - AAOx3     Communication - Fluent, No dysarthria     Attention: Intact      Cranial Nerves - CN 2-12 grossly intact     Motor -                    LEFT    UE - ShAB 5/5, EF 4-/5, EE 4-/5, WE 5/5,  4/5,  shoulder abduction limited by cervical spine DJD and left arm atrophy                     RIGHT UE - ShAB 5/5, EF 5/5, EE 5/5, WE 5/5,  4/5                    LEFT    LE - HF 1/5, KE 1/5, DF 0/5, PF 4/5                    RIGHT LE - HF 4/5 (due to back pain), KE 5/5, DF 5/5, PF 5/5        Sensory - diminished sensation left lower extremity stocking distribution compared to right.      Tone - normal. Left shoulder spasticity.  Spasticity left distal arm/hand, MAS 3/4     Temp- still decreased temperature sensation LUE  Psychiatric - Mood stable, Affect WNL  Wounds: Thoracolumbar spine, open to air     RECENT LABS/IMAGING                9.2    5.76  )-----------( 365      ( 27 Nov 2023 05:58 )             29.3     11-27    137  |  100  |  23  ----------------------------<  111<H>  4.3   |  31  |  0.74    Ca    9.9      27 Nov 2023 05:58    TPro  7.2  /  Alb  3.1<L>  /  TBili  0.2  /  DBili  x   /  AST  22  /  ALT  21  /  AlkPhos  105  11-27      Urinalysis Basic - ( 27 Nov 2023 05:58 )  Color: x / Appearance: x / SG: x / pH: x  Gluc: 111 mg/dL / Ketone: x  / Bili: x / Urobili: x   Blood: x / Protein: x / Nitrite: x   Leuk Esterase: x / RBC: x / WBC x   Sq Epi: x / Non Sq Epi: x / Bacteria: x    MEDICATIONS  (STANDING):  acetaminophen     Tablet .. 975 milliGRAM(s) Oral every 8 hours  amLODIPine   Tablet 10 milliGRAM(s) Oral daily  atorvastatin 20 milliGRAM(s) Oral at bedtime  diazepam    Tablet 5 milliGRAM(s) Oral at bedtime  enoxaparin Injectable 40 milliGRAM(s) SubCutaneous every 24 hours  gabapentin 900 milliGRAM(s) Oral three times a day  multivitamin 1 Tablet(s) Oral daily  nystatin Powder 1 Application(s) Topical two times a day  polyethylene glycol 3350 17 Gram(s) Oral two times a day  senna 2 Tablet(s) Oral at bedtime  tapentadol 100 milliGRAM(s) Oral <User Schedule>  tiZANidine 6 milliGRAM(s) Oral at bedtime  tiZANidine 4 milliGRAM(s) Oral <User Schedule>    MEDICATIONS  (PRN):  albuterol    90 MICROgram(s) HFA Inhaler 2 Puff(s) Inhalation every 6 hours PRN Shortness of Breath and/or Wheezing  bisacodyl Suppository 10 milliGRAM(s) Rectal daily PRN Constipation  ondansetron   Disintegrating Tablet 4 milliGRAM(s) Oral every 8 hours PRN Nausea and/or Vomiting  oxyCODONE    IR 15 milliGRAM(s) Oral every 3 hours PRN Severe Pain (7 - 10)

## 2023-11-29 NOTE — PROGRESS NOTE ADULT - NUTRITIONAL ASSESSMENT
This patient has been assessed with a concern for Malnutrition and has been determined to have a diagnosis/diagnoses of Severe protein-calorie malnutrition.    This patient is being managed with:   Diet Regular-  Supplement Feeding Modality:  Oral  Ensure Plus High Protein Cans or Servings Per Day:  1       Frequency:  Two Times a day  Entered: Nov 14 2023  2:26PM  
This patient has been assessed with a concern for Malnutrition and has been determined to have a diagnosis/diagnoses of Severe protein-calorie malnutrition.    This patient is being managed with:   Diet Regular-  Supplement Feeding Modality:  Oral  Ensure Plus High Protein Cans or Servings Per Day:  1       Frequency:  Two Times a day  Entered: Nov 14 2023  2:26PM    Diet Regular-  Entered: Nov 10 2023  7:03PM    The following pending diet order is being considered for treatment of Severe protein-calorie malnutrition:null
This patient has been assessed with a concern for Malnutrition and has been determined to have a diagnosis/diagnoses of Severe protein-calorie malnutrition.    The following pending diet order is being considered for treatment of Severe protein-calorie malnutrition:  Diet Regular-  Supplement Feeding Modality:  Oral  Ensure Plus High Protein Cans or Servings Per Day:  1       Frequency:  Two Times a day  Entered: Nov 14 2023  2:26PM  
This patient has been assessed with a concern for Malnutrition and has been determined to have a diagnosis/diagnoses of Severe protein-calorie malnutrition.    This patient is being managed with:   Diet Regular-  Supplement Feeding Modality:  Oral  Ensure Plus High Protein Cans or Servings Per Day:  1       Frequency:  Two Times a day  Entered: Nov 14 2023  2:26PM  

## 2023-11-29 NOTE — PROGRESS NOTE ADULT - REASON FOR ADMISSION
lumbar stenosis s/p L1-pelvis PSF and decompression

## 2024-01-05 NOTE — OCCUPATIONAL THERAPY INITIAL EVALUATION ADULT - GROSSLY INTACT, SENSORY
Patient reports tingling in digits/ feet hematuria/flank pain R/dysuria/increased urinary frequency/nocturia

## 2024-03-04 NOTE — ED ADULT TRIAGE NOTE - SPO2 (%)
Patient see Dr. Cruz and she is having a colonoscopy tomorrow and had some questions and concerns.  Patient stated she wanted to know if there should be any modification to her bowel prep since she had an ostomy, stated the office that is preforming procedure could not provide answers to this questions.  Also was unsure if scope would be inserted through anus as she thought it was sealed off?     RN spoke to MD.  He stated that they would need to go through anus and ostomy with scope to observe all colon tissue.  Also, there would be no need for modification to prep, advised patient to frequently empty ostomy throughout the day.      Patient was very thankful, but tearful and upset that she was sent to multiple places for answers and the office preforming the procedure was unable to assist.  Asked patient to call office with any additional questions.  Patient stated understanding.   
100

## 2024-04-16 NOTE — H&P PST ADULT - NS SC CAGE ALCOHOL ANNOYED YOU
Additional Notes: Schedule punch excision
Render Risk Assessment In Note?: no
Detail Level: Simple
no

## 2025-01-06 NOTE — ED PROVIDER NOTE - ST/T WAVE
I encourage her to follow-up for a BP check with the nurses. no acute JENNIFER/ STD + PVCs, LVH morphology, st flattening v5-v6

## 2025-02-10 NOTE — SBIRT NOTE ADULT - NSSBIRTUNABLESCR_GEN_A_CORE
Please contact caller and inform then they will need to get this information from [facility]- give them their number. Our office will not release information.   Patient refused

## 2025-02-21 ENCOUNTER — EMERGENCY (EMERGENCY)
Facility: HOSPITAL | Age: 65
LOS: 0 days | Discharge: HOME | End: 2025-02-22
Attending: STUDENT IN AN ORGANIZED HEALTH CARE EDUCATION/TRAINING PROGRAM
Payer: COMMERCIAL

## 2025-02-21 VITALS
DIASTOLIC BLOOD PRESSURE: 79 MMHG | OXYGEN SATURATION: 100 % | RESPIRATION RATE: 18 BRPM | HEIGHT: 70 IN | TEMPERATURE: 98 F | SYSTOLIC BLOOD PRESSURE: 145 MMHG | WEIGHT: 162.04 LBS | HEART RATE: 84 BPM

## 2025-02-21 DIAGNOSIS — Z98.1 ARTHRODESIS STATUS: Chronic | ICD-10-CM

## 2025-02-21 DIAGNOSIS — Z98.890 OTHER SPECIFIED POSTPROCEDURAL STATES: Chronic | ICD-10-CM

## 2025-02-21 LAB
ALBUMIN SERPL ELPH-MCNC: 3.7 G/DL — SIGNIFICANT CHANGE UP (ref 3.3–5)
ALP SERPL-CCNC: 75 U/L — SIGNIFICANT CHANGE UP (ref 40–120)
ALT FLD-CCNC: 61 U/L — SIGNIFICANT CHANGE UP (ref 12–78)
ANION GAP SERPL CALC-SCNC: 2 MMOL/L — LOW (ref 5–17)
AST SERPL-CCNC: 56 U/L — HIGH (ref 15–37)
BASOPHILS # BLD AUTO: 0.03 K/UL — SIGNIFICANT CHANGE UP (ref 0–0.2)
BASOPHILS NFR BLD AUTO: 0.7 % — SIGNIFICANT CHANGE UP (ref 0–2)
BILIRUB SERPL-MCNC: 0.4 MG/DL — SIGNIFICANT CHANGE UP (ref 0.2–1.2)
BUN SERPL-MCNC: 12 MG/DL — SIGNIFICANT CHANGE UP (ref 7–23)
CALCIUM SERPL-MCNC: 8.9 MG/DL — SIGNIFICANT CHANGE UP (ref 8.5–10.1)
CHLORIDE SERPL-SCNC: 107 MMOL/L — SIGNIFICANT CHANGE UP (ref 96–108)
CO2 SERPL-SCNC: 30 MMOL/L — SIGNIFICANT CHANGE UP (ref 22–31)
CREAT SERPL-MCNC: 1.14 MG/DL — SIGNIFICANT CHANGE UP (ref 0.5–1.3)
EGFR: 72 ML/MIN/1.73M2 — SIGNIFICANT CHANGE UP
EOSINOPHIL # BLD AUTO: 0.1 K/UL — SIGNIFICANT CHANGE UP (ref 0–0.5)
EOSINOPHIL NFR BLD AUTO: 2.3 % — SIGNIFICANT CHANGE UP (ref 0–6)
GLUCOSE SERPL-MCNC: 111 MG/DL — HIGH (ref 70–99)
HCT VFR BLD CALC: 36.1 % — LOW (ref 39–50)
HGB BLD-MCNC: 11.6 G/DL — LOW (ref 13–17)
IMM GRANULOCYTES NFR BLD AUTO: 0.2 % — SIGNIFICANT CHANGE UP (ref 0–0.9)
LIDOCAIN IGE QN: 34 U/L — SIGNIFICANT CHANGE UP (ref 13–75)
LYMPHOCYTES # BLD AUTO: 1.19 K/UL — SIGNIFICANT CHANGE UP (ref 1–3.3)
LYMPHOCYTES # BLD AUTO: 27.9 % — SIGNIFICANT CHANGE UP (ref 13–44)
MAGNESIUM SERPL-MCNC: 2.3 MG/DL — SIGNIFICANT CHANGE UP (ref 1.6–2.6)
MCHC RBC-ENTMCNC: 27.8 PG — SIGNIFICANT CHANGE UP (ref 27–34)
MCHC RBC-ENTMCNC: 32.1 G/DL — SIGNIFICANT CHANGE UP (ref 32–36)
MCV RBC AUTO: 86.6 FL — SIGNIFICANT CHANGE UP (ref 80–100)
MONOCYTES # BLD AUTO: 0.4 K/UL — SIGNIFICANT CHANGE UP (ref 0–0.9)
MONOCYTES NFR BLD AUTO: 9.4 % — SIGNIFICANT CHANGE UP (ref 2–14)
NEUTROPHILS # BLD AUTO: 2.53 K/UL — SIGNIFICANT CHANGE UP (ref 1.8–7.4)
NEUTROPHILS NFR BLD AUTO: 59.5 % — SIGNIFICANT CHANGE UP (ref 43–77)
NRBC BLD AUTO-RTO: 0 /100 WBCS — SIGNIFICANT CHANGE UP (ref 0–0)
NT-PROBNP SERPL-SCNC: 382 PG/ML — HIGH (ref 0–125)
PLATELET # BLD AUTO: 186 K/UL — SIGNIFICANT CHANGE UP (ref 150–400)
POTASSIUM SERPL-MCNC: 4.3 MMOL/L — SIGNIFICANT CHANGE UP (ref 3.5–5.3)
POTASSIUM SERPL-SCNC: 4.3 MMOL/L — SIGNIFICANT CHANGE UP (ref 3.5–5.3)
PROT SERPL-MCNC: 6.9 GM/DL — SIGNIFICANT CHANGE UP (ref 6–8.3)
RBC # BLD: 4.17 M/UL — LOW (ref 4.2–5.8)
RBC # FLD: 14.8 % — HIGH (ref 10.3–14.5)
SODIUM SERPL-SCNC: 139 MMOL/L — SIGNIFICANT CHANGE UP (ref 135–145)
TROPONIN I, HIGH SENSITIVITY RESULT: 12 NG/L — SIGNIFICANT CHANGE UP
WBC # BLD: 4.26 K/UL — SIGNIFICANT CHANGE UP (ref 3.8–10.5)
WBC # FLD AUTO: 4.26 K/UL — SIGNIFICANT CHANGE UP (ref 3.8–10.5)

## 2025-02-21 PROCEDURE — 99285 EMERGENCY DEPT VISIT HI MDM: CPT

## 2025-02-21 PROCEDURE — 93010 ELECTROCARDIOGRAM REPORT: CPT

## 2025-02-21 RX ORDER — DICYCLOMINE HCL 20 MG
10 TABLET ORAL ONCE
Refills: 0 | Status: COMPLETED | OUTPATIENT
Start: 2025-02-21 | End: 2025-02-21

## 2025-02-21 RX ORDER — FAMOTIDINE 10 MG/ML
20 INJECTION INTRAVENOUS ONCE
Refills: 0 | Status: COMPLETED | OUTPATIENT
Start: 2025-02-21 | End: 2025-02-21

## 2025-02-21 RX ADMIN — FAMOTIDINE 20 MILLIGRAM(S): 10 INJECTION INTRAVENOUS at 22:59

## 2025-02-21 RX ADMIN — Medication 10 MILLIGRAM(S): at 22:58

## 2025-02-21 NOTE — ED ADULT TRIAGE NOTE - WEIGHT METHOD
Received prescription renewal request for Citalopram HBR 10mg    Date last authorized: 04-05-21 for 30 day supply/1 RF.    Refill denied, not due at this time. Patient has current refills on file at [facility] pharmacy     Providers note from 04-05-21 States \"Continue Citalopram 10mg for 2 more months then discontinue\".   stated

## 2025-02-21 NOTE — ED PROVIDER NOTE - PATIENT PORTAL LINK FT
You can access the FollowMyHealth Patient Portal offered by [facility] by registering at the following website: [email or web address]. By joining [facility]’s FollowMyHealth portal, you will also be able to view your health information using other applications (apps) compatible with our system.

## 2025-02-21 NOTE — ED PROVIDER NOTE - CLINICAL SUMMARY MEDICAL DECISION MAKING FREE TEXT BOX
62yo Male with PMH of HTN, HLD, CAD (non obstructive), asthma, PE (xarelto stopped 2 months ago), cervical radiculopathy/myelopathy s/p c-spine fusion surgery Oct 2022 (complicated by right arm nerve damage with RUE contractions), chronic severe back pain that radiates to both legs, who presented to [facility] for scheduled L1-pelvis PSF and decompression on 10/31 with  cath 9/2023 nonobstructive cad, EF 50% last echo 62yo Male with PMH of HTN, HLD, CAD (non obstructive), asthma, PE (xarelto stopped 2 months ago), cervical radiculopathy/myelopathy s/p c-spine fusion surgery Oct 2022 (complicated by right arm nerve damage with RUE contractions), chronic severe back pain s/p L1-pelvis PSF and decompression    cath 9/2023 nonobstructive cad, EF 50% last echo  pt presents for eval of band like lower chest tightness after eating meal cooked by brother-  states symptoms resolved, no shortness of breath, no back pain, notes mid abd intermittent cramping  denies fever or vomiting, notes constipation  plan - check trop x 2 rule out ACS, no hypoxia, no sob, symptoms improved with abd cramping now, trial dicyclomine, ct a/p rule out obstruction  - pt improved, stable for dc, outpt GI follow up and return precautions advised 62yo Male with PMH of HTN, HLD, CAD (non obstructive), asthma, PE (xarelto stopped ), cervical radiculopathy/myelopathy s/p c-spine fusion surgery Oct 2022 (complicated by right arm nerve damage with RUE contractions), chronic severe back pain s/p L1-pelvis PSF and decompression    cath 9/2023 nonobstructive cad, EF 50% last echo  pt presents for eval of band like lower chest tightness after eating meal cooked by brother-  states symptoms resolved, no shortness of breath, no back pain, notes mid abd intermittent cramping  denies fever or vomiting, notes constipation  plan - check trop x 2 rule out ACS, no hypoxia, no sob, symptoms improved with abd cramping now, trial dicyclomine, ct a/p rule out obstruction  - pt improved, no chest pain or sob during ED stay, did complain of lower abd cramping after initial eval which improved, abd soft ntnd on re-eval, d/w pt ct findings and incidental finding and need for f/u,  stable for dc, outpt GI follow up and return precautions advised 64 Male with PMH of HTN, HLD, CAD (non obstructive), asthma, PE (xarelto stopped ), cervical radiculopathy/myelopathy s/p c-spine fusion surgery Oct 2022 (complicated by right arm nerve damage with RUE contractions), chronic severe back pain s/p L1-pelvis PSF and decompression    cath 9/2023 nonobstructive cad, EF 50% last echo  pt presents for eval of band like lower chest tightness after eating meal cooked by brother-  states symptoms resolved, no shortness of breath, no back pain, notes mid abd intermittent cramping  denies fever or vomiting, notes constipation  plan - check trop x 2 rule out ACS, no hypoxia, no sob, symptoms improved with abd cramping now, trial dicyclomine, ct a/p rule out obstruction  - pt improved, no chest pain or sob during ED stay, did complain of lower abd cramping after initial eval which improved, abd soft ntnd on re-eval, d/w pt ct findings and incidental finding and need for f/u,  stable for dc, outpt GI follow up and return precautions advised

## 2025-02-21 NOTE — ED PROVIDER NOTE - NSICDXPASTMEDICALHX_GEN_ALL_CORE_FT
worsening wheezing, cough PAST MEDICAL HISTORY:  Asthma     Cervical spinal stenosis     Chronic back pain     H/O neuropathy     H/O spinal stenosis     HTN (hypertension)     Localized swelling of both lower legs     Pulmonary embolism

## 2025-02-21 NOTE — ED PROVIDER NOTE - PROGRESS NOTE DETAILS
Allred DO: pt feels better, stable for dc, pt asking to stay until AM until brother can pick him up, outpt follow up and return precautions discussed

## 2025-02-21 NOTE — ED PROVIDER NOTE - DISCHARGE DATE
What Type Of Note Output Would You Prefer (Optional)?: Standard Output What Is The Reason For Today's Visit?: Full Body Skin Examination What Is The Reason For Today's Visit? (Being Monitored For X): concerning skin lesions on a periodic basis 22-Feb-2025

## 2025-02-21 NOTE — ED PROVIDER NOTE - PHYSICAL EXAMINATION
Gen: aox3, NAD   Head: NCAT  ENT: Airway patent, moist mucous membranes, nasal passageways clear   Cardiac: Normal rate, normal rhythm, no murmurs  Respiratory: Lungs CTA B/L  Gastrointestinal: Abdomen soft, nontender, nondistended, no rebound, no guarding  MSK: No gross abnormalities, no edema or deformities, FROM of all extremities except chronic limited R arm mobility   HEME: Extremities warm and well perfused   Skin: No rashes, no lesions  Neuro: No gross neurologic deficits, chronic right arm lower contractured and decreased mobility

## 2025-02-21 NOTE — ED PROVIDER NOTE - CARE PROVIDER_API CALL
Tara Hendricks  Gastroenterology  [address] [place] [address]  Phone: [phone]  Fax: [phone]  Follow Up Time:

## 2025-02-21 NOTE — ED PROVIDER NOTE - NSFOLLOWUPINSTRUCTIONS_ED_ALL_ED_FT
You were seen today for lower abdominal pain - your heart enzymes were normal x 2 . Your CT of the abdomen showed constipation    Start laxatives miralax once a day until stools are soft    Follow up with your cardiologist  and your primary care doctor and a gastroenterologist as soon as possible.    Chest Pain    Chest pain can be caused by many different conditions which may or may not be dangerous. Causes include heartburn, lung infections, heart attack, blood clot in lungs, skin infections, strain or damage to muscle, cartilage, or bones, etc. In addition to a history and physical examination, an electrocardiogram (ECG) or other lab tests may have been performed to determine the cause of your chest pain. Follow up with your primary care provider or with a cardiologist as instructed.     SEEK IMMEDIATE MEDICAL CARE IF YOU HAVE ANY OF THE FOLLOWING SYMPTOMS: worsening chest pain, coughing up blood, unexplained back/neck/jaw pain, severe abdominal pain, dizziness or lightheadedness, fainting, shortness of breath, sweaty or clammy skin, vomiting, or racing heart beat. These symptoms may represent a serious problem that is an emergency. Do not wait to see if the symptoms will go away. Get medical help right away. Call 911 and do not drive yourself to the hospital.    Acute Abdominal Pain    WHAT YOU NEED TO KNOW:    The cause of your abdominal pain may not be found. If a cause is found, treatment will depend on what the cause is.     DISCHARGE INSTRUCTIONS:    Return to the emergency department if:     You vomit blood or cannot stop vomiting.      You have blood in your bowel movement or it looks like tar.       You have bleeding from your rectum.       Your abdomen is larger than usual, more painful, and hard.       You have severe pain in your abdomen.       You stop passing gas and having bowel movements.       You feel weak, dizzy, or faint.    Contact your healthcare provider if:     You have a fever.      You have new signs and symptoms.      Your symptoms do not get better with treatment.       You have questions or concerns about your condition or care.    Medicines may be given to decrease pain, treat an infection, and manage your symptoms. Take your medicine as directed. Call your healthcare provider if you think your medicine is not helping or if you have side effects. Tell him if you are allergic to any medicine. Keep a list of the medicines, vitamins, and herbs you take. Include the amounts, and when and why you take them. Bring the list or the pill bottles to follow-up visits. Carry your medicine list with you in case of an emergency.    Manage your symptoms:     Apply heat on your abdomen for 20 to 30 minutes every 2 hours for as many days as directed. Heat helps decrease pain and muscle spasms.       Manage your stress. Stress may cause abdominal pain. Your healthcare provider may recommend relaxation techniques and deep breathing exercises to help decrease your stress. Your healthcare provider may recommend you talk to someone about your stress or anxiety, such as a counselor or a trusted friend. Get plenty of sleep and exercise regularly.       Limit or do not drink alcohol. Alcohol can make your abdominal pain worse. Ask your healthcare provider if it is safe for you to drink alcohol. Also ask how much is safe for you to drink.       Do not smoke. Nicotine and other chemicals in cigarettes can damage your esophagus and stomach. Ask your healthcare provider for information if you currently smoke and need help to quit. E-cigarettes or smokeless tobacco still contain nicotine. Talk to your healthcare provider before you use these products.     Make changes to the food you eat as directed: Do not eat foods that cause abdominal pain or other symptoms. Eat small meals more often.     Eat more high-fiber foods if you are constipated. High-fiber foods include fruits, vegetables, whole-grain foods, and legumes.       Do not eat foods that cause gas if you have bloating. Examples include broccoli, cabbage, and cauliflower. Do not drink soda or carbonated drinks, because these may also cause gas.       Do not eat foods or drinks that contain sorbitol or fructose if you have diarrhea and bloating. Some examples are fruit juices, candy, jelly, and sugar-free gum.       Do not eat high-fat foods, such as fried foods, cheeseburgers, hot dogs, and desserts.      Limit or do not drink caffeine. Caffeine may make symptoms, such as heart burn or nausea, worse.       Drink plenty of liquids to prevent dehydration from diarrhea or vomiting. Ask your healthcare provider how much liquid to drink each day and which liquids are best for you.     Follow up with your healthcare provider as directed: Write down your questions so you remember to ask them during your visits.

## 2025-02-22 VITALS
RESPIRATION RATE: 18 BRPM | OXYGEN SATURATION: 97 % | HEART RATE: 67 BPM | TEMPERATURE: 99 F | SYSTOLIC BLOOD PRESSURE: 138 MMHG | DIASTOLIC BLOOD PRESSURE: 86 MMHG

## 2025-02-22 LAB — TROPONIN I, HIGH SENSITIVITY RESULT: 13.5 NG/L — SIGNIFICANT CHANGE UP

## 2025-02-22 PROCEDURE — 74177 CT ABD & PELVIS W/CONTRAST: CPT | Mod: 26

## 2025-02-22 PROCEDURE — 71045 X-RAY EXAM CHEST 1 VIEW: CPT | Mod: 26

## 2025-02-22 RX ORDER — OXYCODONE HYDROCHLORIDE 30 MG/1
10 TABLET ORAL ONCE
Refills: 0 | Status: DISCONTINUED | OUTPATIENT
Start: 2025-02-22 | End: 2025-02-22

## 2025-02-22 RX ORDER — ACETAMINOPHEN 160 MG/5ML
1000 SUSPENSION ORAL ONCE
Refills: 0 | Status: COMPLETED | OUTPATIENT
Start: 2025-02-22 | End: 2025-02-22

## 2025-02-22 RX ADMIN — ACETAMINOPHEN 400 MILLIGRAM(S): 160 SUSPENSION ORAL at 02:27

## 2025-02-22 RX ADMIN — OXYCODONE HYDROCHLORIDE 10 MILLIGRAM(S): 30 TABLET ORAL at 02:26

## 2025-02-22 NOTE — ED ADULT NURSE NOTE - SUICIDE SCREENING QUESTION 1
Pt resting in bed. No needs or concerns at this time. pts fiance requesting we not let her leave and let her sleep.    No

## 2025-02-22 NOTE — ED ADULT NURSE NOTE - NSFALLRISKINTERV_ED_ALL_ED

## 2025-02-22 NOTE — ED ADULT NURSE REASSESSMENT NOTE - NS ED NURSE REASSESS COMMENT FT1
Handoff report received from MAULIK Cordova. Pt is stable at this time. Awaiting brother for pickup. Denies any pain. IV removed by nightshift. Breakfast tray ordered. No complaints at this time

## 2025-02-22 NOTE — ED ADULT NURSE NOTE - BIRTH SEX
Detail Level: Generalized Detail Level: Detailed Patient Specific Counseling (Will Not Stick From Patient To Patient): Recommended Vbeam Detail Level: Zone Male

## 2025-02-22 NOTE — ED ADULT NURSE NOTE - OBJECTIVE STATEMENT
Pt is a 64y M AOx4 with a pmh of HTN, HLD,CAD, asthma, PE ( not on AC).Pt reports near syncopal episode after eating spicy food. Pt also reports a crampy abdominal pain. Pt denies n/v/d, cp or sob,

## (undated) DEVICE — ELCTR GROUNDING PAD ADULT COVIDIEN

## (undated) DEVICE — SOL IRR BAG NS 0.9% 1000ML

## (undated) DEVICE — POSITIONER CUSHION INSERT PRONE VIEW LG

## (undated) DEVICE — TAPE SILK 3"

## (undated) DEVICE — SYR ASEPTO

## (undated) DEVICE — VENODYNE/SCD SLEEVE CALF MEDIUM

## (undated) DEVICE — MIDAS REX LEGEND MATCH HEAD FLUTED LG BORE 3.0MM X 14CM

## (undated) DEVICE — DRAPE 3/4 SHEET 52X76"

## (undated) DEVICE — GLV 7 PROTEXIS (WHITE)

## (undated) DEVICE — WARMING BLANKET LOWER ADULT

## (undated) DEVICE — NDL HYPO SAFE 21G X 1.5" (GREEN)

## (undated) DEVICE — Device

## (undated) DEVICE — DRAPE COVER SNAP 36X30"

## (undated) DEVICE — SUT MONOCRYL 3-0 18" PS-1

## (undated) DEVICE — DRAPE SURGICAL #1010

## (undated) DEVICE — SPONGE X-RAY 4X8"

## (undated) DEVICE — WARMING BLANKET FULL ADULT

## (undated) DEVICE — LABELS BLANK W PEN

## (undated) DEVICE — GLV 7.5 PROTEXIS (BLUE)

## (undated) DEVICE — ELCTR BOVIE PENCIL SMOKE EVACUATION

## (undated) DEVICE — LIJ-KMEDIC KERRISON RONGUER: Type: DURABLE MEDICAL EQUIPMENT

## (undated) DEVICE — SOL IRR POUR NS 0.9% 500ML

## (undated) DEVICE — CANISTER DISPOSABLE THIN WALL 3000CC

## (undated) DEVICE — DRSG TELFA 3 X 8

## (undated) DEVICE — DRAPE C ARM 41X74"

## (undated) DEVICE — PACK SCOLIOSIS LIJ

## (undated) DEVICE — DRSG TEGADERM 4X4.75"

## (undated) DEVICE — SUT MONOCRYL 3-0 27" PS-2 UNDYED

## (undated) DEVICE — STAPLER SKIN VISI-STAT 35 WIDE

## (undated) DEVICE — DRSG AQUACEL 3.5 X 14"

## (undated) DEVICE — STRYKER BONE MILL BLADE FINE 3.2MM

## (undated) DEVICE — SOL IRR POUR H2O 1500ML

## (undated) DEVICE — DRAPE BACK TABLE COVER 44X90"

## (undated) DEVICE — SUT VICRYL 3-0 18" SH UNDYED (POP-OFF)

## (undated) DEVICE — DRAPE C ARM C-ARMOUR

## (undated) DEVICE — SUT VICRYL 2-0 27" FS-1 UNDYED

## (undated) DEVICE — ELCTR AQUAMANTYS BIPOLAR SEALER 6.0

## (undated) DEVICE — SOL IRR POUR NS 0.9% 1500ML

## (undated) DEVICE — MIDAS REX LEGEND BALL FLUTED SM BORE 3.0MM X 10CM

## (undated) DEVICE — DRSG DERMABOND PRINEO 42CM

## (undated) DEVICE — GLV 7 PROTEXIS (BLUE)

## (undated) DEVICE — DRAPE LAPAROTOMY W VELCRO CORD TABS

## (undated) DEVICE — DRAIN RESERVOIR FOR JACKSON PRATT 100CC CARDINAL

## (undated) DEVICE — GOWN SMARTGOWN RAGLAN XLG

## (undated) DEVICE — POSITIONER JACKSON TABLE CHEST, HIP, THIGH PADS, ARM CRADLE

## (undated) DEVICE — SUCTION YANKAUER NO CONTROL VENT

## (undated) DEVICE — SUT VICRYL 1 36" CTX UNDYED

## (undated) DEVICE — DRSG BENZOIN 0.6CC

## (undated) DEVICE — SNAP ON SPHERZ 3 PACK

## (undated) DEVICE — BIPOLAR FORCEP STRYKER STANDARD 7" X 1MM (YELLOW)

## (undated) DEVICE — SOL IRR POUR H2O 500ML

## (undated) DEVICE — ELCTR BOVIE PENCIL BLADE 10FT

## (undated) DEVICE — ELCTR BOVIE TIP BLADE INSULATED 2.75" EDGE

## (undated) DEVICE — GOWN XL

## (undated) DEVICE — GLV 7.5 PROTEXIS (WHITE)

## (undated) DEVICE — SUT VICRYL 0 18" CT-1 UNDYED (POP-OFF)

## (undated) DEVICE — MISONIX BONESCALPEL BLUNT BLADE & TUBESET 25MM

## (undated) DEVICE — DRAIN JACKSON PRATT 7MM FLAT FULL NO TROCAR

## (undated) DEVICE — DRILL BIT MEDTRONIC 2.4MM

## (undated) DEVICE — BIPOLAR FORCEP STRYKER STANDARD 9" X 1MM (YELLOW)

## (undated) DEVICE — PREP DURAPREP 26CC

## (undated) DEVICE — SUT SILK 2-0 30" SH

## (undated) DEVICE — PACK NEURO MINOR

## (undated) DEVICE — POSITIONER STRAP ARMBOARD VELCRO TS-30

## (undated) DEVICE — FOLEY TRAY 16FR 5CC LF UMETER CLOSED

## (undated) DEVICE — LAP PAD W RING 18 X 18"

## (undated) DEVICE — DRSG STERISTRIPS 0.5 X 4"

## (undated) DEVICE — DRSG CURITY GAUZE SPONGE 4 X 4" 12-PLY

## (undated) DEVICE — SUT PROLENE 3-0 18" PS-2

## (undated) DEVICE — SPONGE DISSECTOR PEANUT

## (undated) DEVICE — DRAPE STERILE-Z PATIENT

## (undated) DEVICE — GLV 8 PROTEXIS (WHITE)

## (undated) DEVICE — DRSG BIOPATCH DISK W CHG 1" W 4.0MM HOLE

## (undated) DEVICE — DRAPE INSTRUMENT POUCH 6.75" X 11"

## (undated) DEVICE — DRAIN JACKSON PRATT 3 SPRING RESERVOIR W 10FR PVC DRAIN

## (undated) DEVICE — SUT VICRYL 1 18" CT-1 UNDYED (POP-OFF)

## (undated) DEVICE — SUT VICRYL 2-0 18" CT-2 (POP-OFF)

## (undated) DEVICE — MIDAS REX LEGEND BALL FLUTED SM BORE 4.0MM X 10CM

## (undated) DEVICE — SUT SILK 2-0 18" FS

## (undated) DEVICE — DRAPE TOWEL BLUE 17" X 24"

## (undated) DEVICE — BIPOLAR FORCEP STRYKER STANDARD 8" X 1MM (YELLOW)

## (undated) DEVICE — TAP RELINEO SOLID 5.5MM

## (undated) DEVICE — SUT VICRYL 0 18" OS-6 (POP-OFF)

## (undated) DEVICE — FOLEY TRAY 14FR 5CC LF UMETER CLOSED

## (undated) DEVICE — NDL SPINAL 18G X 3.5" (PINK)

## (undated) DEVICE — TUBING FOR SMOKE EVACUATOR (PURPLE END)

## (undated) DEVICE — SUT VICRYL 0 27" OS-6 UNDYED